# Patient Record
Sex: MALE | Race: WHITE | NOT HISPANIC OR LATINO | Employment: OTHER | URBAN - METROPOLITAN AREA
[De-identification: names, ages, dates, MRNs, and addresses within clinical notes are randomized per-mention and may not be internally consistent; named-entity substitution may affect disease eponyms.]

---

## 2018-01-22 ENCOUNTER — OFFICE VISIT CONVERTED (OUTPATIENT)
Dept: OTHER | Facility: HOSPITAL | Age: 73
End: 2018-01-22
Attending: INTERNAL MEDICINE

## 2018-02-16 ENCOUNTER — OFFICE VISIT CONVERTED (OUTPATIENT)
Dept: CARDIOLOGY | Facility: CLINIC | Age: 73
End: 2018-02-16
Attending: SPECIALIST

## 2018-02-28 ENCOUNTER — OFFICE VISIT CONVERTED (OUTPATIENT)
Dept: OTHER | Facility: HOSPITAL | Age: 73
End: 2018-02-28
Attending: INTERNAL MEDICINE

## 2018-03-13 ENCOUNTER — OFFICE VISIT CONVERTED (OUTPATIENT)
Dept: OTHER | Facility: HOSPITAL | Age: 73
End: 2018-03-13
Attending: INTERNAL MEDICINE

## 2018-05-22 ENCOUNTER — CONVERSION ENCOUNTER (OUTPATIENT)
Dept: NEUROLOGY | Facility: CLINIC | Age: 73
End: 2018-05-22

## 2018-05-22 ENCOUNTER — OFFICE VISIT CONVERTED (OUTPATIENT)
Dept: NEUROLOGY | Facility: CLINIC | Age: 73
End: 2018-05-22
Attending: PSYCHIATRY & NEUROLOGY

## 2018-06-12 ENCOUNTER — OFFICE VISIT CONVERTED (OUTPATIENT)
Dept: OTHER | Facility: HOSPITAL | Age: 73
End: 2018-06-12
Attending: INTERNAL MEDICINE

## 2018-07-03 ENCOUNTER — OFFICE VISIT CONVERTED (OUTPATIENT)
Dept: CARDIOLOGY | Facility: CLINIC | Age: 73
End: 2018-07-03
Attending: SPECIALIST

## 2018-08-10 ENCOUNTER — OFFICE VISIT CONVERTED (OUTPATIENT)
Dept: GASTROENTEROLOGY | Facility: CLINIC | Age: 73
End: 2018-08-10
Attending: NURSE PRACTITIONER

## 2018-08-10 ENCOUNTER — CONVERSION ENCOUNTER (OUTPATIENT)
Dept: OTHER | Facility: HOSPITAL | Age: 73
End: 2018-08-10

## 2018-08-21 ENCOUNTER — OFFICE VISIT CONVERTED (OUTPATIENT)
Dept: ORTHOPEDIC SURGERY | Facility: CLINIC | Age: 73
End: 2018-08-21
Attending: ORTHOPAEDIC SURGERY

## 2018-09-05 ENCOUNTER — OFFICE VISIT CONVERTED (OUTPATIENT)
Dept: NEUROLOGY | Facility: CLINIC | Age: 73
End: 2018-09-05
Attending: PSYCHIATRY & NEUROLOGY

## 2018-11-06 ENCOUNTER — CONVERSION ENCOUNTER (OUTPATIENT)
Dept: CARDIOLOGY | Facility: CLINIC | Age: 73
End: 2018-11-06

## 2018-11-06 ENCOUNTER — OFFICE VISIT CONVERTED (OUTPATIENT)
Dept: CARDIOLOGY | Facility: CLINIC | Age: 73
End: 2018-11-06
Attending: SPECIALIST

## 2018-12-26 PROCEDURE — 95886 MUSC TEST DONE W/N TEST COMP: CPT | Performed by: PSYCHIATRY & NEUROLOGY

## 2018-12-26 PROCEDURE — 95908 NRV CNDJ TST 3-4 STUDIES: CPT | Performed by: PSYCHIATRY & NEUROLOGY

## 2018-12-27 ENCOUNTER — OUTSIDE FACILITY SERVICE (OUTPATIENT)
Dept: NEUROLOGY | Facility: CLINIC | Age: 73
End: 2018-12-27

## 2019-01-02 ENCOUNTER — HOSPITAL ENCOUNTER (OUTPATIENT)
Dept: OTHER | Facility: HOSPITAL | Age: 74
Discharge: HOME | End: 2019-01-02
Attending: INTERNAL MEDICINE

## 2019-01-02 LAB
ALBUMIN SERPL-MCNC: 4.5 G/DL (ref 3.5–5)
ALBUMIN/GLOB SERPL: 1.7 {RATIO} (ref 1.4–2.6)
ALP SERPL-CCNC: 86 U/L (ref 56–155)
ALT SERPL-CCNC: 38 U/L (ref 10–40)
ANION GAP SERPL CALC-SCNC: 22 MMOL/L (ref 8–19)
AST SERPL-CCNC: 29 U/L (ref 15–50)
BASOPHILS # BLD AUTO: 0.06 10*3/UL (ref 0–0.2)
BASOPHILS NFR BLD AUTO: 0.73 % (ref 0–3)
BILIRUB SERPL-MCNC: 0.42 MG/DL (ref 0.2–1.3)
BUN SERPL-MCNC: 19 MG/DL (ref 5–25)
BUN/CREAT SERPL: 16 {RATIO} (ref 6–20)
CALCIUM SERPL-MCNC: 9.8 MG/DL (ref 8.7–10.4)
CHLORIDE SERPL-SCNC: 100 MMOL/L (ref 99–111)
CHOLEST SERPL-MCNC: 140 MG/DL (ref 107–200)
CHOLEST/HDLC SERPL: 3.6 {RATIO} (ref 3–6)
CONV CO2: 25 MMOL/L (ref 22–32)
CONV TOTAL PROTEIN: 7.1 G/DL (ref 6.3–8.2)
CREAT UR-MCNC: 1.18 MG/DL (ref 0.7–1.2)
EOSINOPHIL # BLD AUTO: 0.38 10*3/UL (ref 0–0.7)
EOSINOPHIL # BLD AUTO: 4.89 % (ref 0–7)
ERYTHROCYTE [DISTWIDTH] IN BLOOD BY AUTOMATED COUNT: 13.9 % (ref 11.5–14.5)
EST. AVERAGE GLUCOSE BLD GHB EST-MCNC: 226 MG/DL
GFR SERPLBLD BASED ON 1.73 SQ M-ARVRAT: >60 ML/MIN/{1.73_M2}
GLOBULIN UR ELPH-MCNC: 2.6 G/DL (ref 2–3.5)
GLUCOSE SERPL-MCNC: 162 MG/DL (ref 70–99)
HBA1C MFR BLD: 14.7 G/DL (ref 14–18)
HBA1C MFR BLD: 9.5 % (ref 3.5–5.7)
HCT VFR BLD AUTO: 41.6 % (ref 42–52)
HDLC SERPL-MCNC: 39 MG/DL (ref 40–60)
LDLC SERPL CALC-MCNC: 64 MG/DL (ref 70–100)
LYMPHOCYTES # BLD AUTO: 2.83 10*3/UL (ref 1–5)
MCH RBC QN AUTO: 31.2 PG (ref 27–31)
MCHC RBC AUTO-ENTMCNC: 35.2 G/DL (ref 33–37)
MCV RBC AUTO: 88.5 FL (ref 80–96)
MONOCYTES # BLD AUTO: 0.41 10*3/UL (ref 0.2–1.2)
MONOCYTES NFR BLD AUTO: 5.39 % (ref 3–10)
NEUTROPHILS # BLD AUTO: 4 10*3/UL (ref 2–8)
NEUTROPHILS NFR BLD AUTO: 52.1 % (ref 30–85)
NRBC BLD AUTO-RTO: 0 % (ref 0–0.01)
OSMOLALITY SERPL CALC.SUM OF ELEC: 300 MOSM/KG (ref 273–304)
PLATELET # BLD AUTO: 82.1 10*3/UL (ref 130–400)
PMV BLD AUTO: 6.6 FL (ref 7.4–10.4)
POTASSIUM SERPL-SCNC: 4.5 MMOL/L (ref 3.5–5.3)
RBC # BLD AUTO: 4.71 10*6/UL (ref 4.7–6.1)
SODIUM SERPL-SCNC: 142 MMOL/L (ref 135–147)
TRIGL SERPL-MCNC: 187 MG/DL (ref 40–150)
TSH SERPL-ACNC: 3.46 M[IU]/L (ref 0.27–4.2)
VARIANT LYMPHS NFR BLD MANUAL: 36.9 % (ref 20–45)
VLDLC SERPL-MCNC: 37 MG/DL (ref 5–37)
WBC # BLD AUTO: 7.68 10*3/UL (ref 4.8–10.8)

## 2019-01-21 ENCOUNTER — HOSPITAL ENCOUNTER (OUTPATIENT)
Dept: OTHER | Facility: HOSPITAL | Age: 74
Discharge: HOME | End: 2019-01-21
Attending: INTERNAL MEDICINE

## 2019-01-21 LAB
ALBUMIN SERPL-MCNC: 4.4 G/DL (ref 3.5–5)
ALBUMIN/GLOB SERPL: 1.6 {RATIO} (ref 1.4–2.6)
ALP SERPL-CCNC: 81 U/L (ref 56–155)
ALT SERPL-CCNC: 35 U/L (ref 10–40)
ANION GAP SERPL CALC-SCNC: 15 MMOL/L (ref 8–19)
AST SERPL-CCNC: 28 U/L (ref 15–50)
BASOPHILS # BLD AUTO: 0.04 10*3/UL (ref 0–0.2)
BASOPHILS NFR BLD AUTO: 0.51 % (ref 0–3)
BILIRUB SERPL-MCNC: 0.53 MG/DL (ref 0.2–1.3)
BUN SERPL-MCNC: 16 MG/DL (ref 5–25)
BUN/CREAT SERPL: 13 {RATIO} (ref 6–20)
CALCIUM SERPL-MCNC: 10.1 MG/DL (ref 8.7–10.4)
CHLORIDE SERPL-SCNC: 101 MMOL/L (ref 99–111)
CHOLEST SERPL-MCNC: 142 MG/DL (ref 107–200)
CHOLEST/HDLC SERPL: 3.2 {RATIO} (ref 3–6)
CONV CO2: 29 MMOL/L (ref 22–32)
CONV TOTAL PROTEIN: 7.1 G/DL (ref 6.3–8.2)
CREAT UR-MCNC: 1.23 MG/DL (ref 0.7–1.2)
EOSINOPHIL # BLD AUTO: 0.43 10*3/UL (ref 0–0.7)
EOSINOPHIL # BLD AUTO: 5.26 % (ref 0–7)
ERYTHROCYTE [DISTWIDTH] IN BLOOD BY AUTOMATED COUNT: 14.3 % (ref 11.5–14.5)
EST. AVERAGE GLUCOSE BLD GHB EST-MCNC: 194 MG/DL
GFR SERPLBLD BASED ON 1.73 SQ M-ARVRAT: 59 ML/MIN/{1.73_M2}
GLOBULIN UR ELPH-MCNC: 2.7 G/DL (ref 2–3.5)
GLUCOSE SERPL-MCNC: 131 MG/DL (ref 70–99)
HBA1C MFR BLD: 15.2 G/DL (ref 14–18)
HBA1C MFR BLD: 8.4 % (ref 3.5–5.7)
HCT VFR BLD AUTO: 43.2 % (ref 42–52)
HDLC SERPL-MCNC: 45 MG/DL (ref 40–60)
LDLC SERPL CALC-MCNC: 62 MG/DL (ref 70–100)
LYMPHOCYTES # BLD AUTO: 2.91 10*3/UL (ref 1–5)
MCH RBC QN AUTO: 31.5 PG (ref 27–31)
MCHC RBC AUTO-ENTMCNC: 35.1 G/DL (ref 33–37)
MCV RBC AUTO: 89.6 FL (ref 80–96)
MONOCYTES # BLD AUTO: 0.46 10*3/UL (ref 0.2–1.2)
MONOCYTES NFR BLD AUTO: 5.63 % (ref 3–10)
NEUTROPHILS # BLD AUTO: 4.27 10*3/UL (ref 2–8)
NEUTROPHILS NFR BLD AUTO: 52.7 % (ref 30–85)
NRBC BLD AUTO-RTO: 0 % (ref 0–0.01)
OSMOLALITY SERPL CALC.SUM OF ELEC: 295 MOSM/KG (ref 273–304)
PLATELET # BLD AUTO: 75.1 10*3/UL (ref 130–400)
PMV BLD AUTO: 6.5 FL (ref 7.4–10.4)
POTASSIUM SERPL-SCNC: 4.4 MMOL/L (ref 3.5–5.3)
RBC # BLD AUTO: 4.82 10*6/UL (ref 4.7–6.1)
SODIUM SERPL-SCNC: 141 MMOL/L (ref 135–147)
TRIGL SERPL-MCNC: 174 MG/DL (ref 40–150)
TSH SERPL-ACNC: 10.46 M[IU]/L (ref 0.27–4.2)
VARIANT LYMPHS NFR BLD MANUAL: 35.9 % (ref 20–45)
VLDLC SERPL-MCNC: 35 MG/DL (ref 5–37)
WBC # BLD AUTO: 8.11 10*3/UL (ref 4.8–10.8)

## 2019-01-24 ENCOUNTER — HOSPITAL ENCOUNTER (OUTPATIENT)
Dept: OTHER | Facility: HOSPITAL | Age: 74
Discharge: HOME | End: 2019-01-24
Attending: INTERNAL MEDICINE

## 2019-01-24 LAB
EST. AVERAGE GLUCOSE BLD GHB EST-MCNC: 183 MG/DL
HBA1C MFR BLD: 8 % (ref 3.5–5.7)

## 2019-02-26 ENCOUNTER — HOSPITAL ENCOUNTER (OUTPATIENT)
Dept: OTHER | Facility: HOSPITAL | Age: 74
Discharge: HOME | End: 2019-02-26
Attending: INTERNAL MEDICINE

## 2019-02-26 LAB
ALBUMIN SERPL-MCNC: 4.2 G/DL (ref 3.5–5)
ALBUMIN/GLOB SERPL: 1.4 {RATIO} (ref 1.4–2.6)
ALP SERPL-CCNC: 100 U/L (ref 56–155)
ALT SERPL-CCNC: 17 U/L (ref 10–40)
ANION GAP SERPL CALC-SCNC: 18 MMOL/L (ref 8–19)
AST SERPL-CCNC: 16 U/L (ref 15–50)
BASOPHILS # BLD AUTO: 0.08 10*3/UL (ref 0–0.2)
BASOPHILS NFR BLD AUTO: 0.9 % (ref 0–3)
BILIRUB SERPL-MCNC: 0.33 MG/DL (ref 0.2–1.3)
BUN SERPL-MCNC: 19 MG/DL (ref 5–25)
BUN/CREAT SERPL: 15 {RATIO} (ref 6–20)
CALCIUM SERPL-MCNC: 9.8 MG/DL (ref 8.7–10.4)
CHLORIDE SERPL-SCNC: 101 MMOL/L (ref 99–111)
CONV ABS IMM GRAN: 0.07 10*3/UL (ref 0–0.2)
CONV CO2: 26 MMOL/L (ref 22–32)
CONV IMMATURE GRAN: 0.8 % (ref 0–1.8)
CONV TOTAL PROTEIN: 7.3 G/DL (ref 6.3–8.2)
CREAT UR-MCNC: 1.31 MG/DL (ref 0.7–1.2)
DEPRECATED RDW RBC AUTO: 54.3 FL (ref 35.1–43.9)
EOSINOPHIL # BLD AUTO: 0.67 10*3/UL (ref 0–0.7)
EOSINOPHIL # BLD AUTO: 7.3 % (ref 0–7)
ERYTHROCYTE [DISTWIDTH] IN BLOOD BY AUTOMATED COUNT: 16.2 % (ref 11.6–14.4)
GFR SERPLBLD BASED ON 1.73 SQ M-ARVRAT: 54 ML/MIN/{1.73_M2}
GLOBULIN UR ELPH-MCNC: 3.1 G/DL (ref 2–3.5)
GLUCOSE SERPL-MCNC: 145 MG/DL (ref 70–99)
HBA1C MFR BLD: 12.7 G/DL (ref 14–18)
HCT VFR BLD AUTO: 38.1 % (ref 42–52)
IRON SATN MFR SERPL: 19 % (ref 20–55)
IRON SERPL-MCNC: 72 UG/DL (ref 70–180)
LYMPHOCYTES # BLD AUTO: 3.19 10*3/UL (ref 1–5)
MCH RBC QN AUTO: 31 PG (ref 27–31)
MCHC RBC AUTO-ENTMCNC: 33.3 G/DL (ref 33–37)
MCV RBC AUTO: 92.9 FL (ref 80–96)
MONOCYTES # BLD AUTO: 0.42 10*3/UL (ref 0.2–1.2)
MONOCYTES NFR BLD AUTO: 4.6 % (ref 3–10)
NEUTROPHILS # BLD AUTO: 4.69 10*3/UL (ref 2–8)
NEUTROPHILS NFR BLD AUTO: 51.4 % (ref 30–85)
NRBC CBCN: 0 % (ref 0–0.7)
OSMOLALITY SERPL CALC.SUM OF ELEC: 295 MOSM/KG (ref 273–304)
PLATELET # BLD AUTO: 156 10*3/UL (ref 130–400)
PMV BLD AUTO: 9.1 FL (ref 9.4–12.4)
POTASSIUM SERPL-SCNC: 5.2 MMOL/L (ref 3.5–5.3)
RBC # BLD AUTO: 4.1 10*6/UL (ref 4.7–6.1)
SODIUM SERPL-SCNC: 140 MMOL/L (ref 135–147)
TIBC SERPL-MCNC: 382 UG/DL (ref 245–450)
TRANSFERRIN SERPL-MCNC: 267 MG/DL (ref 215–365)
TSH SERPL-ACNC: 64.08 M[IU]/L (ref 0.27–4.2)
VARIANT LYMPHS NFR BLD MANUAL: 35 % (ref 20–45)
WBC # BLD AUTO: 9.12 10*3/UL (ref 4.8–10.8)

## 2019-02-27 LAB
ALBUMIN SERPL-MCNC: 3.8 G/DL (ref 2.9–4.4)
ALBUMIN/GLOB SERPL: 1.2 {RATIO} (ref 0.7–1.7)
ALPHA2 GLOB SERPL ELPH-MCNC: 0.6 G/DL (ref 0.4–1)
BETA GLOBULIN: 1 G/DL (ref 0.7–1.3)
CONV ALPHA-1-GLOBULIN: 0.2 G/DL (ref 0–0.4)
CONV IMMUNOGLOBULIN G (IGG): 1180 MG/DL (ref 700–1600)
CONV IMMUNOGLOBULIN M (IGM): 66 MG/DL (ref 15–143)
CONV PE INTERPRETATION: NORMAL
CONV PE NOTE: NORMAL
CONV TOTAL PROTEIN: 6.9 G/DL (ref 6–8.5)
FREE LIGHT CHAINS: 42 MG/L (ref 3.3–19.4)
FRUCTOSAMINE SERPL-SCNC: 298 UMOL/L (ref 0–285)
GAMMA GLOB SERPL ELPH-MCNC: 1.3 G/DL (ref 0.4–1.8)
GLOBULIN UR ELPH-MCNC: 3.1 G/DL (ref 2.2–3.9)
IGA SERPL-MCNC: 169 MG/DL (ref 61–437)
KAPPA LC/LAMBDA SER: 1.62 {RATIO} (ref 0.26–1.65)
LAMBDA LC FREE SERPL-MCNC: 26 MG/L (ref 5.7–26.3)
M-SPIKE: NORMAL G/DL
PROT PATTERN SERPL IFE-IMP: NORMAL

## 2019-03-06 ENCOUNTER — OFFICE VISIT CONVERTED (OUTPATIENT)
Dept: OTHER | Facility: HOSPITAL | Age: 74
End: 2019-03-06
Attending: INTERNAL MEDICINE

## 2019-03-26 ENCOUNTER — OFFICE VISIT CONVERTED (OUTPATIENT)
Dept: CARDIOLOGY | Facility: CLINIC | Age: 74
End: 2019-03-26
Attending: SPECIALIST

## 2019-04-26 ENCOUNTER — HOSPITAL ENCOUNTER (OUTPATIENT)
Dept: OTHER | Facility: HOSPITAL | Age: 74
Discharge: HOME | End: 2019-04-26
Attending: INTERNAL MEDICINE

## 2019-04-26 LAB
ANION GAP SERPL CALC-SCNC: 15 MMOL/L (ref 8–19)
BUN SERPL-MCNC: 22 MG/DL (ref 5–25)
BUN/CREAT SERPL: 19 {RATIO} (ref 6–20)
CALCIUM SERPL-MCNC: 9.4 MG/DL (ref 8.7–10.4)
CHLORIDE SERPL-SCNC: 103 MMOL/L (ref 99–111)
CONV CO2: 26 MMOL/L (ref 22–32)
CREAT UR-MCNC: 1.18 MG/DL (ref 0.7–1.2)
EST. AVERAGE GLUCOSE BLD GHB EST-MCNC: 134 MG/DL
GFR SERPLBLD BASED ON 1.73 SQ M-ARVRAT: >60 ML/MIN/{1.73_M2}
GLUCOSE SERPL-MCNC: 134 MG/DL (ref 70–99)
HBA1C MFR BLD: 6.3 % (ref 3.5–5.7)
OSMOLALITY SERPL CALC.SUM OF ELEC: 293 MOSM/KG (ref 273–304)
POTASSIUM SERPL-SCNC: 4.7 MMOL/L (ref 3.5–5.3)
SODIUM SERPL-SCNC: 139 MMOL/L (ref 135–147)
TSH SERPL-ACNC: 0.7 M[IU]/L (ref 0.27–4.2)

## 2019-05-13 ENCOUNTER — CONVERSION ENCOUNTER (OUTPATIENT)
Dept: GASTROENTEROLOGY | Facility: CLINIC | Age: 74
End: 2019-05-13

## 2019-05-13 ENCOUNTER — OFFICE VISIT CONVERTED (OUTPATIENT)
Dept: GASTROENTEROLOGY | Facility: CLINIC | Age: 74
End: 2019-05-13
Attending: NURSE PRACTITIONER

## 2019-05-24 ENCOUNTER — OFFICE VISIT CONVERTED (OUTPATIENT)
Dept: CARDIOLOGY | Facility: CLINIC | Age: 74
End: 2019-05-24
Attending: SPECIALIST

## 2019-05-24 ENCOUNTER — CONVERSION ENCOUNTER (OUTPATIENT)
Dept: CARDIOLOGY | Facility: CLINIC | Age: 74
End: 2019-05-24

## 2019-05-28 ENCOUNTER — HOSPITAL ENCOUNTER (OUTPATIENT)
Dept: GENERAL RADIOLOGY | Facility: HOSPITAL | Age: 74
Discharge: HOME | End: 2019-05-28
Attending: NURSE PRACTITIONER

## 2019-07-16 ENCOUNTER — OFFICE VISIT CONVERTED (OUTPATIENT)
Dept: CARDIOLOGY | Facility: CLINIC | Age: 74
End: 2019-07-16
Attending: SPECIALIST

## 2019-07-16 ENCOUNTER — CONVERSION ENCOUNTER (OUTPATIENT)
Dept: CARDIOLOGY | Facility: CLINIC | Age: 74
End: 2019-07-16

## 2019-07-23 ENCOUNTER — HOSPITAL ENCOUNTER (OUTPATIENT)
Dept: OTHER | Facility: HOSPITAL | Age: 74
Discharge: HOME | End: 2019-07-23
Attending: INTERNAL MEDICINE

## 2019-07-23 LAB
ALBUMIN SERPL-MCNC: 4.5 G/DL (ref 3.5–5)
ALBUMIN/GLOB SERPL: 1.7 {RATIO} (ref 1.4–2.6)
ALP SERPL-CCNC: 100 U/L (ref 56–155)
ALT SERPL-CCNC: 19 U/L (ref 10–40)
ANION GAP SERPL CALC-SCNC: 17 MMOL/L (ref 8–19)
AST SERPL-CCNC: 17 U/L (ref 15–50)
BILIRUB SERPL-MCNC: 0.43 MG/DL (ref 0.2–1.3)
BUN SERPL-MCNC: 20 MG/DL (ref 5–25)
BUN/CREAT SERPL: 18 {RATIO} (ref 6–20)
CALCIUM SERPL-MCNC: 9.2 MG/DL (ref 8.7–10.4)
CHLORIDE SERPL-SCNC: 101 MMOL/L (ref 99–111)
CHOLEST SERPL-MCNC: 128 MG/DL (ref 107–200)
CHOLEST/HDLC SERPL: 3.5 {RATIO} (ref 3–6)
CONV CO2: 26 MMOL/L (ref 22–32)
CONV TOTAL PROTEIN: 7.2 G/DL (ref 6.3–8.2)
CREAT UR-MCNC: 1.09 MG/DL (ref 0.7–1.2)
EST. AVERAGE GLUCOSE BLD GHB EST-MCNC: 151 MG/DL
GFR SERPLBLD BASED ON 1.73 SQ M-ARVRAT: >60 ML/MIN/{1.73_M2}
GLOBULIN UR ELPH-MCNC: 2.7 G/DL (ref 2–3.5)
GLUCOSE SERPL-MCNC: 100 MG/DL (ref 70–99)
HBA1C MFR BLD: 6.9 % (ref 3.5–5.7)
HDLC SERPL-MCNC: 37 MG/DL (ref 40–60)
LDLC SERPL CALC-MCNC: 57 MG/DL (ref 70–100)
OSMOLALITY SERPL CALC.SUM OF ELEC: 291 MOSM/KG (ref 273–304)
POTASSIUM SERPL-SCNC: 4.6 MMOL/L (ref 3.5–5.3)
SODIUM SERPL-SCNC: 139 MMOL/L (ref 135–147)
TRIGL SERPL-MCNC: 171 MG/DL (ref 40–150)
TSH SERPL-ACNC: 1.03 M[IU]/L (ref 0.27–4.2)
VLDLC SERPL-MCNC: 34 MG/DL (ref 5–37)

## 2019-07-29 ENCOUNTER — HOSPITAL ENCOUNTER (OUTPATIENT)
Dept: GASTROENTEROLOGY | Facility: HOSPITAL | Age: 74
Setting detail: HOSPITAL OUTPATIENT SURGERY
Discharge: HOME | End: 2019-07-29
Attending: INTERNAL MEDICINE

## 2019-07-29 LAB — GLUCOSE BLD-MCNC: 107 MG/DL (ref 70–99)

## 2019-08-05 ENCOUNTER — HOSPITAL ENCOUNTER (OUTPATIENT)
Dept: OTHER | Facility: HOSPITAL | Age: 74
Discharge: HOME | End: 2019-08-05
Attending: INTERNAL MEDICINE

## 2019-08-05 LAB
BASOPHILS # BLD AUTO: 0.08 10*3/UL (ref 0–0.2)
BASOPHILS NFR BLD AUTO: 0.9 % (ref 0–3)
CONV ABS IMM GRAN: 0.03 10*3/UL (ref 0–0.2)
CONV IMMATURE GRAN: 0.4 % (ref 0–1.8)
DEPRECATED RDW RBC AUTO: 50.7 FL (ref 35.1–43.9)
EOSINOPHIL # BLD AUTO: 0.64 10*3/UL (ref 0–0.7)
EOSINOPHIL # BLD AUTO: 7.5 % (ref 0–7)
ERYTHROCYTE [DISTWIDTH] IN BLOOD BY AUTOMATED COUNT: 15 % (ref 11.6–14.4)
HCT VFR BLD AUTO: 39.5 % (ref 42–52)
HGB BLD-MCNC: 13.4 G/DL (ref 14–18)
IRON SATN MFR SERPL: 26 % (ref 20–55)
IRON SERPL-MCNC: 85 UG/DL (ref 70–180)
LYMPHOCYTES # BLD AUTO: 3.76 10*3/UL (ref 1–5)
LYMPHOCYTES NFR BLD AUTO: 43.9 % (ref 20–45)
MCH RBC QN AUTO: 31.2 PG (ref 27–31)
MCHC RBC AUTO-ENTMCNC: 33.9 G/DL (ref 33–37)
MCV RBC AUTO: 92.1 FL (ref 80–96)
MONOCYTES # BLD AUTO: 0.56 10*3/UL (ref 0.2–1.2)
MONOCYTES NFR BLD AUTO: 6.5 % (ref 3–10)
NEUTROPHILS # BLD AUTO: 3.49 10*3/UL (ref 2–8)
NEUTROPHILS NFR BLD AUTO: 40.8 % (ref 30–85)
NRBC CBCN: 0 % (ref 0–0.7)
PLATELET # BLD AUTO: 77 10*3/UL (ref 130–400)
PMV BLD AUTO: 9.5 FL (ref 9.4–12.4)
RBC # BLD AUTO: 4.29 10*6/UL (ref 4.7–6.1)
RETICS # AUTO: 0.11 10*6/UL (ref 0.03–0.1)
RETICS/RBC NFR AUTO: 2.55 % (ref 0.51–1.81)
TIBC SERPL-MCNC: 325 UG/DL (ref 245–450)
TRANSFERRIN SERPL-MCNC: 227 MG/DL (ref 215–365)
WBC # BLD AUTO: 8.56 10*3/UL (ref 4.8–10.8)

## 2019-08-07 ENCOUNTER — HOSPITAL ENCOUNTER (OUTPATIENT)
Dept: OTHER | Facility: HOSPITAL | Age: 74
Discharge: HOME | End: 2019-08-07
Attending: INTERNAL MEDICINE

## 2019-08-07 ENCOUNTER — OFFICE VISIT CONVERTED (OUTPATIENT)
Dept: OTHER | Facility: HOSPITAL | Age: 74
End: 2019-08-07
Attending: INTERNAL MEDICINE

## 2019-08-07 LAB
ANION GAP SERPL CALC-SCNC: 15 MMOL/L (ref 8–19)
BNP SERPL-MCNC: 11 PG/ML (ref 0–900)
BUN SERPL-MCNC: 29 MG/DL (ref 5–25)
BUN/CREAT SERPL: 19 {RATIO} (ref 6–20)
CALCIUM SERPL-MCNC: 9.2 MG/DL (ref 8.7–10.4)
CHLORIDE SERPL-SCNC: 105 MMOL/L (ref 99–111)
CONV CO2: 22 MMOL/L (ref 22–32)
CREAT UR-MCNC: 1.54 MG/DL (ref 0.7–1.2)
GFR SERPLBLD BASED ON 1.73 SQ M-ARVRAT: 44 ML/MIN/{1.73_M2}
GLUCOSE SERPL-MCNC: 258 MG/DL (ref 70–99)
OSMOLALITY SERPL CALC.SUM OF ELEC: 297 MOSM/KG (ref 273–304)
POTASSIUM SERPL-SCNC: 5.5 MMOL/L (ref 3.5–5.3)
SODIUM SERPL-SCNC: 136 MMOL/L (ref 135–147)

## 2019-08-23 ENCOUNTER — HOSPITAL ENCOUNTER (OUTPATIENT)
Dept: OTHER | Facility: HOSPITAL | Age: 74
Discharge: HOME | End: 2019-08-23
Attending: NURSE PRACTITIONER

## 2019-08-23 LAB
ALBUMIN SERPL-MCNC: 4.5 G/DL (ref 3.5–5)
ALBUMIN/GLOB SERPL: 1.7 {RATIO} (ref 1.4–2.6)
ALP SERPL-CCNC: 97 U/L (ref 56–155)
ALT SERPL-CCNC: 25 U/L (ref 10–40)
ANION GAP SERPL CALC-SCNC: 17 MMOL/L (ref 8–19)
AST SERPL-CCNC: 17 U/L (ref 15–50)
BASOPHILS # BLD AUTO: 0.08 10*3/UL (ref 0–0.2)
BASOPHILS NFR BLD AUTO: 1.1 % (ref 0–3)
BILIRUB SERPL-MCNC: 0.36 MG/DL (ref 0.2–1.3)
BUN SERPL-MCNC: 22 MG/DL (ref 5–25)
BUN/CREAT SERPL: 21 {RATIO} (ref 6–20)
CALCIUM SERPL-MCNC: 9.3 MG/DL (ref 8.7–10.4)
CHLORIDE SERPL-SCNC: 102 MMOL/L (ref 99–111)
CONV ABS IMM GRAN: 0.03 10*3/UL (ref 0–0.2)
CONV AFP: 1.8 NG/ML (ref 0–8.7)
CONV CO2: 25 MMOL/L (ref 22–32)
CONV IMMATURE GRAN: 0.4 % (ref 0–1.8)
CONV TOTAL PROTEIN: 7.2 G/DL (ref 6.3–8.2)
CREAT UR-MCNC: 1.07 MG/DL (ref 0.7–1.2)
DEPRECATED RDW RBC AUTO: 48.6 FL (ref 35.1–43.9)
EOSINOPHIL # BLD AUTO: 0.56 10*3/UL (ref 0–0.7)
EOSINOPHIL # BLD AUTO: 8 % (ref 0–7)
ERYTHROCYTE [DISTWIDTH] IN BLOOD BY AUTOMATED COUNT: 14.6 % (ref 11.6–14.4)
GFR SERPLBLD BASED ON 1.73 SQ M-ARVRAT: >60 ML/MIN/{1.73_M2}
GLOBULIN UR ELPH-MCNC: 2.7 G/DL (ref 2–3.5)
GLUCOSE SERPL-MCNC: 129 MG/DL (ref 70–99)
HCT VFR BLD AUTO: 38.6 % (ref 42–52)
HGB BLD-MCNC: 13.1 G/DL (ref 14–18)
INR PPP: 0.98 (ref 2–3)
LYMPHOCYTES # BLD AUTO: 2.59 10*3/UL (ref 1–5)
LYMPHOCYTES NFR BLD AUTO: 36.8 % (ref 20–45)
MCH RBC QN AUTO: 31.1 PG (ref 27–31)
MCHC RBC AUTO-ENTMCNC: 33.9 G/DL (ref 33–37)
MCV RBC AUTO: 91.7 FL (ref 80–96)
MONOCYTES # BLD AUTO: 0.45 10*3/UL (ref 0.2–1.2)
MONOCYTES NFR BLD AUTO: 6.4 % (ref 3–10)
NEUTROPHILS # BLD AUTO: 3.32 10*3/UL (ref 2–8)
NEUTROPHILS NFR BLD AUTO: 47.3 % (ref 30–85)
NRBC CBCN: 0 % (ref 0–0.7)
OSMOLALITY SERPL CALC.SUM OF ELEC: 293 MOSM/KG (ref 273–304)
PLATELET # BLD AUTO: 71 10*3/UL (ref 130–400)
PMV BLD AUTO: 8.9 FL (ref 9.4–12.4)
POTASSIUM SERPL-SCNC: 4.6 MMOL/L (ref 3.5–5.3)
PROTHROMBIN TIME: 10.2 S (ref 9.4–12)
RBC # BLD AUTO: 4.21 10*6/UL (ref 4.7–6.1)
SODIUM SERPL-SCNC: 139 MMOL/L (ref 135–147)
WBC # BLD AUTO: 7.03 10*3/UL (ref 4.8–10.8)

## 2019-09-27 ENCOUNTER — CONVERSION ENCOUNTER (OUTPATIENT)
Dept: CARDIOLOGY | Facility: CLINIC | Age: 74
End: 2019-09-27

## 2019-09-27 ENCOUNTER — OFFICE VISIT CONVERTED (OUTPATIENT)
Dept: CARDIOLOGY | Facility: CLINIC | Age: 74
End: 2019-09-27
Attending: SPECIALIST

## 2019-10-22 ENCOUNTER — HOSPITAL ENCOUNTER (OUTPATIENT)
Dept: OTHER | Facility: HOSPITAL | Age: 74
Discharge: HOME | End: 2019-10-22
Attending: INTERNAL MEDICINE

## 2019-10-22 LAB
ANION GAP SERPL CALC-SCNC: 20 MMOL/L (ref 8–19)
BUN SERPL-MCNC: 19 MG/DL (ref 5–25)
BUN/CREAT SERPL: 17 {RATIO} (ref 6–20)
CALCIUM SERPL-MCNC: 9.5 MG/DL (ref 8.7–10.4)
CHLORIDE SERPL-SCNC: 102 MMOL/L (ref 99–111)
CONV CO2: 24 MMOL/L (ref 22–32)
CREAT UR-MCNC: 1.15 MG/DL (ref 0.7–1.2)
EST. AVERAGE GLUCOSE BLD GHB EST-MCNC: 163 MG/DL
GFR SERPLBLD BASED ON 1.73 SQ M-ARVRAT: >60 ML/MIN/{1.73_M2}
GLUCOSE SERPL-MCNC: 118 MG/DL (ref 70–99)
HBA1C MFR BLD: 7.3 % (ref 3.5–5.7)
OSMOLALITY SERPL CALC.SUM OF ELEC: 295 MOSM/KG (ref 273–304)
POTASSIUM SERPL-SCNC: 4.8 MMOL/L (ref 3.5–5.3)
SODIUM SERPL-SCNC: 141 MMOL/L (ref 135–147)
TSH SERPL-ACNC: 0.57 M[IU]/L (ref 0.27–4.2)

## 2019-11-12 ENCOUNTER — OFFICE VISIT CONVERTED (OUTPATIENT)
Dept: GASTROENTEROLOGY | Facility: CLINIC | Age: 74
End: 2019-11-12
Attending: NURSE PRACTITIONER

## 2019-12-04 ENCOUNTER — HOSPITAL ENCOUNTER (OUTPATIENT)
Dept: GENERAL RADIOLOGY | Facility: HOSPITAL | Age: 74
Discharge: HOME | End: 2019-12-04
Attending: NURSE PRACTITIONER

## 2019-12-06 ENCOUNTER — HOSPITAL ENCOUNTER (OUTPATIENT)
Dept: OTHER | Facility: HOSPITAL | Age: 74
Discharge: HOME | End: 2019-12-06
Attending: INTERNAL MEDICINE

## 2019-12-06 LAB
BASOPHILS # BLD AUTO: 0.04 10*3/UL (ref 0–0.2)
BASOPHILS NFR BLD AUTO: 0.5 % (ref 0–3)
CONV ABS IMM GRAN: 0.02 10*3/UL (ref 0–0.2)
CONV IMMATURE GRAN: 0.3 % (ref 0–1.8)
DEPRECATED RDW RBC AUTO: 48.3 FL (ref 35.1–43.9)
EOSINOPHIL # BLD AUTO: 0.37 10*3/UL (ref 0–0.7)
EOSINOPHIL # BLD AUTO: 5.1 % (ref 0–7)
ERYTHROCYTE [DISTWIDTH] IN BLOOD BY AUTOMATED COUNT: 14.5 % (ref 11.6–14.4)
FOLATE SERPL-MCNC: 10.1 NG/ML (ref 4.8–20)
HCT VFR BLD AUTO: 38.8 % (ref 42–52)
HGB BLD-MCNC: 12.9 G/DL (ref 14–18)
IRON SATN MFR SERPL: 24 % (ref 20–55)
IRON SERPL-MCNC: 79 UG/DL (ref 70–180)
LYMPHOCYTES # BLD AUTO: 2.74 10*3/UL (ref 1–5)
LYMPHOCYTES NFR BLD AUTO: 37.5 % (ref 20–45)
MCH RBC QN AUTO: 30.8 PG (ref 27–31)
MCHC RBC AUTO-ENTMCNC: 33.2 G/DL (ref 33–37)
MCV RBC AUTO: 92.6 FL (ref 80–96)
MONOCYTES # BLD AUTO: 0.53 10*3/UL (ref 0.2–1.2)
MONOCYTES NFR BLD AUTO: 7.3 % (ref 3–10)
NEUTROPHILS # BLD AUTO: 3.61 10*3/UL (ref 2–8)
NEUTROPHILS NFR BLD AUTO: 49.3 % (ref 30–85)
NRBC CBCN: 0 % (ref 0–0.7)
PLATELET # BLD AUTO: 68 10*3/UL (ref 130–400)
PMV BLD AUTO: 9.4 FL (ref 9.4–12.4)
RBC # BLD AUTO: 4.19 10*6/UL (ref 4.7–6.1)
TIBC SERPL-MCNC: 332 UG/DL (ref 245–450)
TRANSFERRIN SERPL-MCNC: 232 MG/DL (ref 215–365)
VIT B12 SERPL-MCNC: 1981 PG/ML (ref 211–911)
WBC # BLD AUTO: 7.31 10*3/UL (ref 4.8–10.8)

## 2019-12-09 ENCOUNTER — OFFICE VISIT CONVERTED (OUTPATIENT)
Dept: OTHER | Facility: HOSPITAL | Age: 74
End: 2019-12-09
Attending: INTERNAL MEDICINE

## 2020-01-31 ENCOUNTER — HOSPITAL ENCOUNTER (OUTPATIENT)
Dept: OTHER | Facility: HOSPITAL | Age: 75
Discharge: HOME | End: 2020-01-31
Attending: INTERNAL MEDICINE

## 2020-01-31 LAB
ALBUMIN SERPL-MCNC: 4.2 G/DL (ref 3.5–5)
ALBUMIN/GLOB SERPL: 1.5 {RATIO} (ref 1.4–2.6)
ALP SERPL-CCNC: 96 U/L (ref 56–155)
ALT SERPL-CCNC: 23 U/L (ref 10–40)
ANION GAP SERPL CALC-SCNC: 17 MMOL/L (ref 8–19)
AST SERPL-CCNC: 17 U/L (ref 15–50)
BILIRUB SERPL-MCNC: 0.31 MG/DL (ref 0.2–1.3)
BUN SERPL-MCNC: 25 MG/DL (ref 5–25)
BUN/CREAT SERPL: 23 {RATIO} (ref 6–20)
CALCIUM SERPL-MCNC: 9.4 MG/DL (ref 8.7–10.4)
CHLORIDE SERPL-SCNC: 103 MMOL/L (ref 99–111)
CHOLEST SERPL-MCNC: 136 MG/DL (ref 107–200)
CHOLEST/HDLC SERPL: 3.6 {RATIO} (ref 3–6)
CONV CO2: 24 MMOL/L (ref 22–32)
CONV TOTAL PROTEIN: 7 G/DL (ref 6.3–8.2)
CREAT UR-MCNC: 1.09 MG/DL (ref 0.7–1.2)
EST. AVERAGE GLUCOSE BLD GHB EST-MCNC: 166 MG/DL
GFR SERPLBLD BASED ON 1.73 SQ M-ARVRAT: >60 ML/MIN/{1.73_M2}
GLOBULIN UR ELPH-MCNC: 2.8 G/DL (ref 2–3.5)
GLUCOSE SERPL-MCNC: 160 MG/DL (ref 70–99)
HBA1C MFR BLD: 7.4 % (ref 3.5–5.7)
HDLC SERPL-MCNC: 38 MG/DL (ref 40–60)
LDLC SERPL CALC-MCNC: 58 MG/DL (ref 70–100)
OSMOLALITY SERPL CALC.SUM OF ELEC: 298 MOSM/KG (ref 273–304)
POTASSIUM SERPL-SCNC: 4.4 MMOL/L (ref 3.5–5.3)
SODIUM SERPL-SCNC: 140 MMOL/L (ref 135–147)
TRIGL SERPL-MCNC: 198 MG/DL (ref 40–150)
TSH SERPL-ACNC: 1.48 M[IU]/L (ref 0.27–4.2)
VLDLC SERPL-MCNC: 40 MG/DL (ref 5–37)

## 2020-03-09 ENCOUNTER — HOSPITAL ENCOUNTER (OUTPATIENT)
Dept: OTHER | Facility: HOSPITAL | Age: 75
Discharge: HOME | End: 2020-03-09
Attending: INTERNAL MEDICINE

## 2020-03-09 ENCOUNTER — OFFICE VISIT CONVERTED (OUTPATIENT)
Dept: OTHER | Facility: HOSPITAL | Age: 75
End: 2020-03-09
Attending: INTERNAL MEDICINE

## 2020-03-09 LAB
BASOPHILS # BLD AUTO: 0.08 10*3/UL (ref 0–0.2)
BASOPHILS # BLD: 1 % (ref 0–3)
BASOPHILS NFR BLD AUTO: 0.9 % (ref 0–3)
CONV ABS BANDS: 0 % (ref 1–5)
CONV ABS IMM GRAN: 0.03 10*3/UL (ref 0–0.2)
CONV ANISOCYTES: ABNORMAL
CONV ATYPICAL LYMPHOCYTES: 8 % (ref 0–5)
CONV HYPOCHROMIA IN BLOOD BY LIGHT MICROSCOPY: SLIGHT
CONV IMMATURE GRAN: 0.3 % (ref 0–1.8)
CONV SEGMENTED NEUTROPHILS: 36 % (ref 45–70)
DACRYOCYTES BLD QL SMEAR: ABNORMAL
DEPRECATED RDW RBC AUTO: 50.4 FL (ref 35.1–43.9)
EOSINOPHIL # BLD AUTO: 0.64 10*3/UL (ref 0–0.7)
EOSINOPHIL # BLD AUTO: 6.8 % (ref 0–7)
EOSINOPHIL NFR BLD AUTO: 8 % (ref 0–7)
ERYTHROCYTE [DISTWIDTH] IN BLOOD BY AUTOMATED COUNT: 14.8 % (ref 11.6–14.4)
HCT VFR BLD AUTO: 40.6 % (ref 42–52)
HGB BLD-MCNC: 13.7 G/DL (ref 14–18)
LARGE PLATELETS: SLIGHT
LYMPHOCYTES # BLD AUTO: 5.01 10*3/UL (ref 1–5)
LYMPHOCYTES NFR BLD AUTO: 53.2 % (ref 20–45)
MACROCYTES BLD QL SMEAR: ABNORMAL
MCH RBC QN AUTO: 31.4 PG (ref 27–31)
MCHC RBC AUTO-ENTMCNC: 33.7 G/DL (ref 33–37)
MCV RBC AUTO: 92.9 FL (ref 80–96)
MONOCYTES # BLD AUTO: 0.46 10*3/UL (ref 0.2–1.2)
MONOCYTES NFR BLD AUTO: 4.9 % (ref 3–10)
MONOCYTES NFR BLD MANUAL: 3 % (ref 3–10)
NEUTROPHILS # BLD AUTO: 3.19 10*3/UL (ref 2–8)
NEUTROPHILS NFR BLD AUTO: 33.9 % (ref 30–85)
NRBC CBCN: 0 % (ref 0–0.7)
NUC CELL # PRT MANUAL: 0 /100{WBCS}
OVALOCYTES BLD QL SMEAR: ABNORMAL
PLAT MORPH BLD: NORMAL
PLATELET # BLD AUTO: 76 10*3/UL (ref 130–400)
PMV BLD AUTO: 9.4 FL (ref 9.4–12.4)
POIKILOCYTOSIS BLD QL SMEAR: ABNORMAL
POLYCHROMASIA BLD QL SMEAR: ABNORMAL
RBC # BLD AUTO: 4.37 10*6/UL (ref 4.7–6.1)
SMALL PLATELETS BLD QL SMEAR: ABNORMAL
VARIANT LYMPHS NFR BLD MANUAL: 44 % (ref 20–45)
WBC # BLD AUTO: 9.41 10*3/UL (ref 4.8–10.8)

## 2020-03-10 LAB
ALBUMIN SERPL-MCNC: 4 G/DL (ref 2.9–4.4)
ALBUMIN/GLOB SERPL: 1.4 {RATIO} (ref 0.7–1.7)
ALPHA2 GLOB SERPL ELPH-MCNC: 0.6 G/DL (ref 0.4–1)
BETA GLOBULIN: 1 G/DL (ref 0.7–1.3)
CONV ALPHA-1-GLOBULIN: 0.2 G/DL (ref 0–0.4)
CONV IMMUNOGLOBULIN G (IGG): 1113 MG/DL (ref 700–1600)
CONV IMMUNOGLOBULIN M (IGM): 81 MG/DL (ref 15–143)
CONV PE INTERPRETATION: NORMAL
CONV PE NOTE: NORMAL
CONV TOTAL PROTEIN: 6.8 G/DL (ref 6–8.5)
FREE LIGHT CHAINS: 36.7 MG/L (ref 3.3–19.4)
GAMMA GLOB SERPL ELPH-MCNC: 1.1 G/DL (ref 0.4–1.8)
GLOBULIN UR ELPH-MCNC: 2.8 G/DL (ref 2.2–3.9)
IGA SERPL-MCNC: 130 MG/DL (ref 61–437)
KAPPA LC/LAMBDA SER: 1.5 {RATIO} (ref 0.26–1.65)
LAMBDA LC FREE SERPL-MCNC: 24.4 MG/L (ref 5.7–26.3)
M-SPIKE: NORMAL G/DL
PROT PATTERN SERPL IFE-IMP: NORMAL

## 2020-03-12 LAB — CONV LEUKEMIA/LYMPHOMA PHENOTYPING PROFILE (BLOOD): NORMAL

## 2020-05-12 ENCOUNTER — TELEMEDICINE CONVERTED (OUTPATIENT)
Dept: GASTROENTEROLOGY | Facility: CLINIC | Age: 75
End: 2020-05-12
Attending: NURSE PRACTITIONER

## 2020-05-29 ENCOUNTER — HOSPITAL ENCOUNTER (OUTPATIENT)
Dept: LAB | Facility: HOSPITAL | Age: 75
Discharge: HOME | End: 2020-05-29
Attending: INTERNAL MEDICINE

## 2020-05-29 LAB
ALBUMIN SERPL-MCNC: 4.4 G/DL (ref 3.5–5)
ALBUMIN/GLOB SERPL: 1.5 {RATIO} (ref 1.4–2.6)
ALP SERPL-CCNC: 93 U/L (ref 56–155)
ALT SERPL-CCNC: 29 U/L (ref 10–40)
ANION GAP SERPL CALC-SCNC: 16 MMOL/L (ref 8–19)
AST SERPL-CCNC: 19 U/L (ref 15–50)
BASOPHILS # BLD AUTO: 0.09 10*3/UL (ref 0–0.2)
BASOPHILS NFR BLD AUTO: 1 % (ref 0–3)
BILIRUB SERPL-MCNC: 0.26 MG/DL (ref 0.2–1.3)
BUN SERPL-MCNC: 30 MG/DL (ref 5–25)
BUN/CREAT SERPL: 21 {RATIO} (ref 6–20)
CALCIUM SERPL-MCNC: 9.7 MG/DL (ref 8.7–10.4)
CHLORIDE SERPL-SCNC: 102 MMOL/L (ref 99–111)
CONV ABS IMM GRAN: 0.02 10*3/UL (ref 0–0.2)
CONV CO2: 24 MMOL/L (ref 22–32)
CONV IMMATURE GRAN: 0.2 % (ref 0–1.8)
CONV TOTAL PROTEIN: 7.3 G/DL (ref 6.3–8.2)
CREAT UR-MCNC: 1.4 MG/DL (ref 0.7–1.2)
DEPRECATED RDW RBC AUTO: 50.5 FL (ref 35.1–43.9)
EOSINOPHIL # BLD AUTO: 0.43 10*3/UL (ref 0–0.7)
EOSINOPHIL # BLD AUTO: 4.9 % (ref 0–7)
ERYTHROCYTE [DISTWIDTH] IN BLOOD BY AUTOMATED COUNT: 14.5 % (ref 11.6–14.4)
EST. AVERAGE GLUCOSE BLD GHB EST-MCNC: 163 MG/DL
GFR SERPLBLD BASED ON 1.73 SQ M-ARVRAT: 50 ML/MIN/{1.73_M2}
GLOBULIN UR ELPH-MCNC: 2.9 G/DL (ref 2–3.5)
GLUCOSE SERPL-MCNC: 140 MG/DL (ref 70–99)
HBA1C MFR BLD: 7.3 % (ref 3.5–5.7)
HCT VFR BLD AUTO: 41.2 % (ref 42–52)
HGB BLD-MCNC: 13.4 G/DL (ref 14–18)
IRON SATN MFR SERPL: 26 % (ref 20–55)
IRON SERPL-MCNC: 84 UG/DL (ref 70–180)
LYMPHOCYTES # BLD AUTO: 3.89 10*3/UL (ref 1–5)
LYMPHOCYTES NFR BLD AUTO: 44.2 % (ref 20–45)
MCH RBC QN AUTO: 30.9 PG (ref 27–31)
MCHC RBC AUTO-ENTMCNC: 32.5 G/DL (ref 33–37)
MCV RBC AUTO: 94.9 FL (ref 80–96)
MONOCYTES # BLD AUTO: 0.44 10*3/UL (ref 0.2–1.2)
MONOCYTES NFR BLD AUTO: 5 % (ref 3–10)
NEUTROPHILS # BLD AUTO: 3.93 10*3/UL (ref 2–8)
NEUTROPHILS NFR BLD AUTO: 44.7 % (ref 30–85)
NRBC CBCN: 0 % (ref 0–0.7)
OSMOLALITY SERPL CALC.SUM OF ELEC: 290 MOSM/KG (ref 273–304)
PLATELET # BLD AUTO: 71 10*3/UL (ref 130–400)
PMV BLD AUTO: 9.7 FL (ref 9.4–12.4)
POTASSIUM SERPL-SCNC: 5.5 MMOL/L (ref 3.5–5.3)
RBC # BLD AUTO: 4.34 10*6/UL (ref 4.7–6.1)
SODIUM SERPL-SCNC: 136 MMOL/L (ref 135–147)
TIBC SERPL-MCNC: 329 UG/DL (ref 245–450)
TRANSFERRIN SERPL-MCNC: 230 MG/DL (ref 215–365)
WBC # BLD AUTO: 8.8 10*3/UL (ref 4.8–10.8)

## 2020-06-23 ENCOUNTER — HOSPITAL ENCOUNTER (OUTPATIENT)
Dept: OTHER | Facility: HOSPITAL | Age: 75
Discharge: HOME | End: 2020-06-23
Attending: INTERNAL MEDICINE

## 2020-06-23 LAB
ALBUMIN SERPL-MCNC: 4.5 G/DL (ref 3.5–5)
ALBUMIN SERPL-MCNC: 4.5 G/DL (ref 3.5–5)
ALBUMIN/GLOB SERPL: 1.8 {RATIO} (ref 1.4–2.6)
ALP SERPL-CCNC: 91 U/L (ref 56–155)
ALT SERPL-CCNC: 26 U/L (ref 10–40)
AMMONIA PLAS-MCNC: 16 UMOL/L (ref 16–60)
ANION GAP SERPL CALC-SCNC: 13 MMOL/L (ref 8–19)
ANION GAP SERPL CALC-SCNC: 14 MMOL/L (ref 8–19)
AST SERPL-CCNC: 18 U/L (ref 15–50)
BASOPHILS # BLD AUTO: 0.07 10*3/UL (ref 0–0.2)
BASOPHILS NFR BLD AUTO: 0.7 % (ref 0–3)
BILIRUB SERPL-MCNC: 0.29 MG/DL (ref 0.2–1.3)
BUN SERPL-MCNC: 18 MG/DL (ref 5–25)
BUN SERPL-MCNC: 19 MG/DL (ref 5–25)
BUN/CREAT SERPL: 16 {RATIO} (ref 6–20)
BUN/CREAT SERPL: 19 {RATIO} (ref 6–20)
CALCIUM SERPL-MCNC: 9.4 MG/DL (ref 8.7–10.4)
CALCIUM SERPL-MCNC: 9.6 MG/DL (ref 8.7–10.4)
CHLORIDE SERPL-SCNC: 103 MMOL/L (ref 99–111)
CHLORIDE SERPL-SCNC: 103 MMOL/L (ref 99–111)
CONV ABS IMM GRAN: 0.03 10*3/UL (ref 0–0.2)
CONV AFP: 2 NG/ML (ref 0–8.7)
CONV CO2: 28 MMOL/L (ref 22–32)
CONV CO2: 28 MMOL/L (ref 22–32)
CONV IMMATURE GRAN: 0.3 % (ref 0–1.8)
CONV TOTAL PROTEIN: 7 G/DL (ref 6.3–8.2)
CREAT UR-MCNC: 1.02 MG/DL (ref 0.7–1.2)
CREAT UR-MCNC: 1.1 MG/DL (ref 0.7–1.2)
DEPRECATED RDW RBC AUTO: 48.2 FL (ref 35.1–43.9)
EOSINOPHIL # BLD AUTO: 0.42 10*3/UL (ref 0–0.7)
EOSINOPHIL # BLD AUTO: 4.4 % (ref 0–7)
ERYTHROCYTE [DISTWIDTH] IN BLOOD BY AUTOMATED COUNT: 14.1 % (ref 11.6–14.4)
GFR SERPLBLD BASED ON 1.73 SQ M-ARVRAT: >60 ML/MIN/{1.73_M2}
GFR SERPLBLD BASED ON 1.73 SQ M-ARVRAT: >60 ML/MIN/{1.73_M2}
GLOBULIN UR ELPH-MCNC: 2.5 G/DL (ref 2–3.5)
GLUCOSE SERPL-MCNC: 104 MG/DL (ref 70–99)
GLUCOSE SERPL-MCNC: 106 MG/DL (ref 70–99)
HCT VFR BLD AUTO: 40.6 % (ref 42–52)
HGB BLD-MCNC: 13.5 G/DL (ref 14–18)
INR PPP: 0.98 (ref 2–3)
LYMPHOCYTES # BLD AUTO: 4.69 10*3/UL (ref 1–5)
LYMPHOCYTES NFR BLD AUTO: 49.4 % (ref 20–45)
MCH RBC QN AUTO: 31.1 PG (ref 27–31)
MCHC RBC AUTO-ENTMCNC: 33.3 G/DL (ref 33–37)
MCV RBC AUTO: 93.5 FL (ref 80–96)
MONOCYTES # BLD AUTO: 0.42 10*3/UL (ref 0.2–1.2)
MONOCYTES NFR BLD AUTO: 4.4 % (ref 3–10)
NEUTROPHILS # BLD AUTO: 3.87 10*3/UL (ref 2–8)
NEUTROPHILS NFR BLD AUTO: 40.8 % (ref 30–85)
NRBC CBCN: 0 % (ref 0–0.7)
OSMOLALITY SERPL CALC.SUM OF ELEC: 293 MOSM/KG (ref 273–304)
PHOSPHATE SERPL-MCNC: 4 MG/DL (ref 2.4–4.5)
PLATELET # BLD AUTO: 67 10*3/UL (ref 130–400)
PMV BLD AUTO: 9.1 FL (ref 9.4–12.4)
POTASSIUM SERPL-SCNC: 4.8 MMOL/L (ref 3.5–5.3)
POTASSIUM SERPL-SCNC: 4.9 MMOL/L (ref 3.5–5.3)
PROTHROMBIN TIME: 10.6 S (ref 9.4–12)
RBC # BLD AUTO: 4.34 10*6/UL (ref 4.7–6.1)
SODIUM SERPL-SCNC: 139 MMOL/L (ref 135–147)
SODIUM SERPL-SCNC: 140 MMOL/L (ref 135–147)
WBC # BLD AUTO: 9.5 10*3/UL (ref 4.8–10.8)

## 2020-07-20 ENCOUNTER — TELEMEDICINE CONVERTED (OUTPATIENT)
Dept: CARDIOLOGY | Facility: CLINIC | Age: 75
End: 2020-07-20
Attending: SPECIALIST

## 2020-09-29 ENCOUNTER — HOSPITAL ENCOUNTER (OUTPATIENT)
Dept: LAB | Facility: HOSPITAL | Age: 75
Discharge: HOME | End: 2020-09-29
Attending: INTERNAL MEDICINE

## 2020-09-29 LAB
ANION GAP SERPL CALC-SCNC: 15 MMOL/L (ref 8–19)
BNP SERPL-MCNC: 143 PG/ML (ref 0–900)
BUN SERPL-MCNC: 24 MG/DL (ref 5–25)
BUN/CREAT SERPL: 16 {RATIO} (ref 6–20)
CALCIUM SERPL-MCNC: 9.3 MG/DL (ref 8.7–10.4)
CHLORIDE SERPL-SCNC: 105 MMOL/L (ref 99–111)
CONV CO2: 23 MMOL/L (ref 22–32)
CREAT UR-MCNC: 1.54 MG/DL (ref 0.7–1.2)
GFR SERPLBLD BASED ON 1.73 SQ M-ARVRAT: 44 ML/MIN/{1.73_M2}
GLUCOSE SERPL-MCNC: 84 MG/DL (ref 70–99)
OSMOLALITY SERPL CALC.SUM OF ELEC: 289 MOSM/KG (ref 273–304)
POTASSIUM SERPL-SCNC: 5.3 MMOL/L (ref 3.5–5.3)
SODIUM SERPL-SCNC: 138 MMOL/L (ref 135–147)

## 2020-10-06 ENCOUNTER — OFFICE VISIT CONVERTED (OUTPATIENT)
Dept: CARDIOLOGY | Facility: CLINIC | Age: 75
End: 2020-10-06
Attending: SPECIALIST

## 2020-10-16 ENCOUNTER — HOSPITAL ENCOUNTER (OUTPATIENT)
Dept: PREADMISSION TESTING | Facility: HOSPITAL | Age: 75
Discharge: HOME | End: 2020-10-16
Attending: SPECIALIST

## 2020-10-17 LAB — SARS-COV-2 RNA SPEC QL NAA+PROBE: NOT DETECTED

## 2020-10-21 ENCOUNTER — HOSPITAL ENCOUNTER (OUTPATIENT)
Dept: INFUSION THERAPY | Facility: HOSPITAL | Age: 75
Setting detail: HOSPITAL OUTPATIENT SURGERY
Discharge: HOME | End: 2020-10-21
Attending: SPECIALIST

## 2020-10-21 LAB
ANION GAP SERPL CALC-SCNC: 15 MMOL/L (ref 8–19)
BASOPHILS # BLD AUTO: 0.07 10*3/UL (ref 0–0.2)
BASOPHILS NFR BLD AUTO: 0.7 % (ref 0–3)
BUN SERPL-MCNC: 23 MG/DL (ref 5–25)
BUN/CREAT SERPL: 15 {RATIO} (ref 6–20)
CALCIUM SERPL-MCNC: 9.9 MG/DL (ref 8.7–10.4)
CHLORIDE SERPL-SCNC: 104 MMOL/L (ref 99–111)
CONV ABS IMM GRAN: 0.02 10*3/UL (ref 0–0.2)
CONV CO2: 25 MMOL/L (ref 22–32)
CONV IMMATURE GRAN: 0.2 % (ref 0–1.8)
CREAT UR-MCNC: 1.53 MG/DL (ref 0.7–1.2)
DEPRECATED RDW RBC AUTO: 52.6 FL (ref 35.1–43.9)
EOSINOPHIL # BLD AUTO: 0.37 10*3/UL (ref 0–0.7)
EOSINOPHIL # BLD AUTO: 3.7 % (ref 0–7)
ERYTHROCYTE [DISTWIDTH] IN BLOOD BY AUTOMATED COUNT: 15.3 % (ref 11.6–14.4)
GFR SERPLBLD BASED ON 1.73 SQ M-ARVRAT: 44 ML/MIN/{1.73_M2}
GLUCOSE SERPL-MCNC: 98 MG/DL (ref 70–99)
HCT VFR BLD AUTO: 38.8 % (ref 42–52)
HGB BLD-MCNC: 12.9 G/DL (ref 14–18)
INR PPP: 1.02 (ref 2–3)
LYMPHOCYTES # BLD AUTO: 5.45 10*3/UL (ref 1–5)
LYMPHOCYTES NFR BLD AUTO: 55.2 % (ref 20–45)
MCH RBC QN AUTO: 31.1 PG (ref 27–31)
MCHC RBC AUTO-ENTMCNC: 33.2 G/DL (ref 33–37)
MCV RBC AUTO: 93.5 FL (ref 80–96)
MONOCYTES # BLD AUTO: 0.45 10*3/UL (ref 0.2–1.2)
MONOCYTES NFR BLD AUTO: 4.6 % (ref 3–10)
NEUTROPHILS # BLD AUTO: 3.51 10*3/UL (ref 2–8)
NEUTROPHILS NFR BLD AUTO: 35.6 % (ref 30–85)
NRBC CBCN: 0 % (ref 0–0.7)
OSMOLALITY SERPL CALC.SUM OF ELEC: 292 MOSM/KG (ref 273–304)
PLAT MORPH BLD: NORMAL
PLATELET # BLD AUTO: 45 10*3/UL (ref 130–400)
PMV BLD AUTO: 9.5 FL (ref 9.4–12.4)
POTASSIUM SERPL-SCNC: 4.8 MMOL/L (ref 3.5–5.3)
PROTHROMBIN TIME: 11 S (ref 9.4–12)
RBC # BLD AUTO: 4.15 10*6/UL (ref 4.7–6.1)
SMALL PLATELETS BLD QL SMEAR: NORMAL
SODIUM SERPL-SCNC: 139 MMOL/L (ref 135–147)
WBC # BLD AUTO: 9.87 10*3/UL (ref 4.8–10.8)

## 2020-11-30 ENCOUNTER — HOSPITAL ENCOUNTER (OUTPATIENT)
Dept: PET IMAGING | Facility: HOSPITAL | Age: 75
Discharge: HOME | End: 2020-11-30
Attending: INTERNAL MEDICINE

## 2020-12-31 ENCOUNTER — HOSPITAL ENCOUNTER (OUTPATIENT)
Dept: PREADMISSION TESTING | Facility: HOSPITAL | Age: 75
Discharge: HOME | End: 2020-12-31
Attending: SPECIALIST

## 2021-01-01 LAB — SARS-COV-2 RNA SPEC QL NAA+PROBE: DETECTED

## 2021-01-04 ENCOUNTER — HOSPITAL ENCOUNTER (OUTPATIENT)
Dept: OTHER | Facility: HOSPITAL | Age: 76
Discharge: HOME | End: 2021-01-04
Attending: SPECIALIST

## 2021-01-04 LAB
ANION GAP SERPL CALC-SCNC: 14 MMOL/L (ref 8–19)
BASOPHILS # BLD AUTO: 0.06 10*3/UL (ref 0–0.2)
BASOPHILS # BLD: 0 % (ref 0–3)
BASOPHILS NFR BLD AUTO: 0.5 % (ref 0–3)
BUN SERPL-MCNC: 20 MG/DL (ref 5–25)
BUN/CREAT SERPL: 17 {RATIO} (ref 6–20)
CALCIUM SERPL-MCNC: 9.6 MG/DL (ref 8.7–10.4)
CHLORIDE SERPL-SCNC: 103 MMOL/L (ref 99–111)
CONV ABS BANDS: 0 % (ref 1–5)
CONV ABS IMM GRAN: 0.06 10*3/UL (ref 0–0.2)
CONV ATYPICAL LYMPHOCYTES: 3 % (ref 0–5)
CONV CO2: 28 MMOL/L (ref 22–32)
CONV IMMATURE GRAN: 0.5 % (ref 0–1.8)
CONV SEGMENTED NEUTROPHILS: 33 % (ref 45–70)
CREAT UR-MCNC: 1.19 MG/DL (ref 0.7–1.2)
DEPRECATED RDW RBC AUTO: 46.5 FL (ref 35.1–43.9)
EOSINOPHIL # BLD AUTO: 0.18 10*3/UL (ref 0–0.7)
EOSINOPHIL # BLD AUTO: 1.4 % (ref 0–7)
EOSINOPHIL NFR BLD AUTO: 2 % (ref 0–7)
ERYTHROCYTE [DISTWIDTH] IN BLOOD BY AUTOMATED COUNT: 13.6 % (ref 11.6–14.4)
GFR SERPLBLD BASED ON 1.73 SQ M-ARVRAT: >60 ML/MIN/{1.73_M2}
GLUCOSE SERPL-MCNC: 89 MG/DL (ref 70–99)
HCT VFR BLD AUTO: 41.6 % (ref 42–52)
HGB BLD-MCNC: 13.7 G/DL (ref 14–18)
INR PPP: 0.99 (ref 2–3)
LYMPHOCYTES # BLD AUTO: 7.72 10*3/UL (ref 1–5)
LYMPHOCYTES NFR BLD AUTO: 58.3 % (ref 20–45)
MCH RBC QN AUTO: 30.3 PG (ref 27–31)
MCHC RBC AUTO-ENTMCNC: 32.9 G/DL (ref 33–37)
MCV RBC AUTO: 92 FL (ref 80–96)
MONOCYTES # BLD AUTO: 0.46 10*3/UL (ref 0.2–1.2)
MONOCYTES NFR BLD AUTO: 3.5 % (ref 3–10)
MONOCYTES NFR BLD MANUAL: 2 % (ref 3–10)
NEUTROPHILS # BLD AUTO: 4.77 10*3/UL (ref 2–8)
NEUTROPHILS NFR BLD AUTO: 35.8 % (ref 30–85)
NRBC CBCN: 0 % (ref 0–0.7)
NUC CELL # PRT MANUAL: 0 /100{WBCS}
OSMOLALITY SERPL CALC.SUM OF ELEC: 292 MOSM/KG (ref 273–304)
PLAT MORPH BLD: NORMAL
PLATELET # BLD AUTO: 58 10*3/UL (ref 130–400)
PMV BLD AUTO: 8.4 FL (ref 9.4–12.4)
POTASSIUM SERPL-SCNC: 4.9 MMOL/L (ref 3.5–5.3)
PROTHROMBIN TIME: 10.7 S (ref 9.4–12)
RBC # BLD AUTO: 4.52 10*6/UL (ref 4.7–6.1)
SMALL PLATELETS BLD QL SMEAR: ABNORMAL
SODIUM SERPL-SCNC: 140 MMOL/L (ref 135–147)
VARIANT LYMPHS NFR BLD MANUAL: 60 % (ref 20–45)
WBC # BLD AUTO: 13.25 10*3/UL (ref 4.8–10.8)

## 2021-01-06 ENCOUNTER — HOSPITAL ENCOUNTER (OUTPATIENT)
Dept: GASTROENTEROLOGY | Facility: HOSPITAL | Age: 76
Discharge: HOME | End: 2021-01-06
Attending: INTERNAL MEDICINE

## 2021-01-19 ENCOUNTER — HOSPITAL ENCOUNTER (OUTPATIENT)
Dept: LAB | Facility: HOSPITAL | Age: 76
Discharge: HOME | End: 2021-01-19
Attending: INTERNAL MEDICINE

## 2021-01-19 LAB
ANION GAP SERPL CALC-SCNC: 16 MMOL/L (ref 8–19)
BASOPHILS # BLD AUTO: 0.07 10*3/UL (ref 0–0.2)
BASOPHILS NFR BLD AUTO: 0.4 % (ref 0–3)
BUN SERPL-MCNC: 30 MG/DL (ref 5–25)
BUN/CREAT SERPL: 24 {RATIO} (ref 6–20)
CALCIUM SERPL-MCNC: 9.6 MG/DL (ref 8.7–10.4)
CHLORIDE SERPL-SCNC: 102 MMOL/L (ref 99–111)
CONV ABS IMM GRAN: 0.05 10*3/UL (ref 0–0.2)
CONV CO2: 24 MMOL/L (ref 22–32)
CONV IMMATURE GRAN: 0.3 % (ref 0–1.8)
CREAT UR-MCNC: 1.24 MG/DL (ref 0.7–1.2)
DEPRECATED RDW RBC AUTO: 46.8 FL (ref 35.1–43.9)
EOSINOPHIL # BLD AUTO: 0.14 10*3/UL (ref 0–0.7)
EOSINOPHIL # BLD AUTO: 0.9 % (ref 0–7)
ERYTHROCYTE [DISTWIDTH] IN BLOOD BY AUTOMATED COUNT: 14 % (ref 11.6–14.4)
EST. AVERAGE GLUCOSE BLD GHB EST-MCNC: 163 MG/DL
FERRITIN SERPL-MCNC: 210 NG/ML (ref 30–300)
GFR SERPLBLD BASED ON 1.73 SQ M-ARVRAT: 57 ML/MIN/{1.73_M2}
GLUCOSE SERPL-MCNC: 82 MG/DL (ref 70–99)
HBA1C MFR BLD: 7.3 % (ref 3.5–5.7)
HCT VFR BLD AUTO: 39.7 % (ref 42–52)
HGB BLD-MCNC: 13.4 G/DL (ref 14–18)
IRON SATN MFR SERPL: 20 % (ref 20–55)
IRON SERPL-MCNC: 62 UG/DL (ref 70–180)
LYMPHOCYTES # BLD AUTO: 10.72 10*3/UL (ref 1–5)
LYMPHOCYTES NFR BLD AUTO: 66.3 % (ref 20–45)
MCH RBC QN AUTO: 30.9 PG (ref 27–31)
MCHC RBC AUTO-ENTMCNC: 33.8 G/DL (ref 33–37)
MCV RBC AUTO: 91.5 FL (ref 80–96)
MONOCYTES # BLD AUTO: 0.51 10*3/UL (ref 0.2–1.2)
MONOCYTES NFR BLD AUTO: 3.2 % (ref 3–10)
NEUTROPHILS # BLD AUTO: 4.69 10*3/UL (ref 2–8)
NEUTROPHILS NFR BLD AUTO: 28.9 % (ref 30–85)
NRBC CBCN: 0 % (ref 0–0.7)
OSMOLALITY SERPL CALC.SUM OF ELEC: 291 MOSM/KG (ref 273–304)
PLATELET # BLD AUTO: 59 10*3/UL (ref 130–400)
PMV BLD AUTO: 9.3 FL (ref 9.4–12.4)
POTASSIUM SERPL-SCNC: 4.4 MMOL/L (ref 3.5–5.3)
RBC # BLD AUTO: 4.34 10*6/UL (ref 4.7–6.1)
SODIUM SERPL-SCNC: 138 MMOL/L (ref 135–147)
TIBC SERPL-MCNC: 315 UG/DL (ref 245–450)
TRANSFERRIN SERPL-MCNC: 220 MG/DL (ref 215–365)
TSH SERPL-ACNC: 0.2 M[IU]/L (ref 0.27–4.2)
WBC # BLD AUTO: 16.18 10*3/UL (ref 4.8–10.8)

## 2021-02-03 ENCOUNTER — HOSPITAL ENCOUNTER (OUTPATIENT)
Dept: INFUSION THERAPY | Facility: HOSPITAL | Age: 76
Setting detail: HOSPITAL OUTPATIENT SURGERY
Discharge: HOME | End: 2021-02-03
Attending: SPECIALIST

## 2021-02-03 LAB
ANION GAP SERPL CALC-SCNC: 13 MMOL/L (ref 8–19)
BASOPHILS # BLD AUTO: 0.09 10*3/UL (ref 0–0.2)
BASOPHILS # BLD: 0 % (ref 0–3)
BASOPHILS NFR BLD AUTO: 0.5 % (ref 0–3)
BUN SERPL-MCNC: 38 MG/DL (ref 5–25)
BUN/CREAT SERPL: 22 {RATIO} (ref 6–20)
CALCIUM SERPL-MCNC: 9.4 MG/DL (ref 8.7–10.4)
CHLORIDE SERPL-SCNC: 104 MMOL/L (ref 99–111)
CONV ABS BANDS: 0 % (ref 1–5)
CONV ABS IMM GRAN: 0.06 10*3/UL (ref 0–0.2)
CONV ANISOCYTES: SLIGHT
CONV ATYPICAL LYMPHOCYTES: 4 % (ref 0–5)
CONV CO2: 26 MMOL/L (ref 22–32)
CONV IMMATURE GRAN: 0.3 % (ref 0–1.8)
CONV SEGMENTED NEUTROPHILS: 27 % (ref 45–70)
CREAT UR-MCNC: 1.74 MG/DL (ref 0.7–1.2)
DACRYOCYTES BLD QL SMEAR: SLIGHT
DEPRECATED RDW RBC AUTO: 47.2 FL (ref 35.1–43.9)
EOSINOPHIL # BLD AUTO: 0.12 10*3/UL (ref 0–0.7)
EOSINOPHIL # BLD AUTO: 0.7 % (ref 0–7)
EOSINOPHIL NFR BLD AUTO: 0 % (ref 0–7)
ERYTHROCYTE [DISTWIDTH] IN BLOOD BY AUTOMATED COUNT: 14.5 % (ref 11.6–14.4)
GFR SERPLBLD BASED ON 1.73 SQ M-ARVRAT: 38 ML/MIN/{1.73_M2}
GLUCOSE SERPL-MCNC: 91 MG/DL (ref 70–99)
HCT VFR BLD AUTO: 40.8 % (ref 42–52)
HGB BLD-MCNC: 13.7 G/DL (ref 14–18)
INR PPP: 0.99 (ref 2–3)
LARGE PLATELETS: SLIGHT
LYMPHOCYTES # BLD AUTO: 10.86 10*3/UL (ref 1–5)
LYMPHOCYTES NFR BLD AUTO: 62.2 % (ref 20–45)
MACROCYTES BLD QL SMEAR: SLIGHT
MCH RBC QN AUTO: 30.3 PG (ref 27–31)
MCHC RBC AUTO-ENTMCNC: 33.6 G/DL (ref 33–37)
MCV RBC AUTO: 90.3 FL (ref 80–96)
MONOCYTES # BLD AUTO: 1.38 10*3/UL (ref 0.2–1.2)
MONOCYTES NFR BLD AUTO: 7.9 % (ref 3–10)
MONOCYTES NFR BLD MANUAL: 5 % (ref 3–10)
NEUTROPHILS # BLD AUTO: 4.94 10*3/UL (ref 2–8)
NEUTROPHILS NFR BLD AUTO: 28.4 % (ref 30–85)
NRBC CBCN: 0 % (ref 0–0.7)
NUC CELL # PRT MANUAL: 0 /100{WBCS}
OSMOLALITY SERPL CALC.SUM OF ELEC: 295 MOSM/KG (ref 273–304)
OVALOCYTES BLD QL SMEAR: SLIGHT
PLAT MORPH BLD: NORMAL
PLATELET # BLD AUTO: 66 10*3/UL (ref 130–400)
PMV BLD AUTO: 9.3 FL (ref 9.4–12.4)
POIKILOCYTOSIS BLD QL SMEAR: SLIGHT
POTASSIUM SERPL-SCNC: 5 MMOL/L (ref 3.5–5.3)
PROTHROMBIN TIME: 10.7 S (ref 9.4–12)
RBC # BLD AUTO: 4.52 10*6/UL (ref 4.7–6.1)
SMALL PLATELETS BLD QL SMEAR: ABNORMAL
SODIUM SERPL-SCNC: 138 MMOL/L (ref 135–147)
VARIANT LYMPHS NFR BLD MANUAL: 64 % (ref 20–45)
WBC # BLD AUTO: 17.45 10*3/UL (ref 4.8–10.8)

## 2021-02-04 ENCOUNTER — TELEPHONE (OUTPATIENT)
Dept: CARDIAC SURGERY | Facility: CLINIC | Age: 76
End: 2021-02-04

## 2021-02-04 ENCOUNTER — PREP FOR SURGERY (OUTPATIENT)
Dept: OTHER | Facility: HOSPITAL | Age: 76
End: 2021-02-04

## 2021-02-04 DIAGNOSIS — I25.10 CAD (CORONARY ARTERY DISEASE): Primary | ICD-10-CM

## 2021-02-04 NOTE — TELEPHONE ENCOUNTER
Spoke to patients wife and told her that today would be last dose of PLAVIX would be today per Dr. Tubbs from [place].  She expressed a verbal understanding of this.

## 2021-02-04 NOTE — TELEPHONE ENCOUNTER
Spoke to wife in regard to office admit and surgery. He will see Arley in office on 2- and be admitted. Surgery will be on 2- CABG.  He is on PLAVIX and I will need to call them back with the last dose.

## 2021-02-08 ENCOUNTER — TELEPHONE (OUTPATIENT)
Dept: CARDIAC SURGERY | Facility: CLINIC | Age: 76
End: 2021-02-08

## 2021-02-08 NOTE — TELEPHONE ENCOUNTER
Mrs. Gauthier called in to state her  had taken his PLAVIX up through today, 2-8-2021. I checked with Dr. Tubbs and he said that was fine. NO MORE after today. She expressed a verbal understanding of this.

## 2021-02-12 ENCOUNTER — TELEPHONE (OUTPATIENT)
Dept: CARDIAC SURGERY | Facility: CLINIC | Age: 76
End: 2021-02-12

## 2021-02-12 PROBLEM — I25.10 CAD (CORONARY ARTERY DISEASE): Status: ACTIVE | Noted: 2021-02-12

## 2021-02-15 ENCOUNTER — APPOINTMENT (OUTPATIENT)
Dept: CARDIOLOGY | Facility: HOSPITAL | Age: 76
End: 2021-02-15

## 2021-02-15 ENCOUNTER — TELEPHONE (OUTPATIENT)
Dept: CARDIAC SURGERY | Facility: CLINIC | Age: 76
End: 2021-02-15

## 2021-02-15 ENCOUNTER — OFFICE VISIT (OUTPATIENT)
Dept: CARDIAC SURGERY | Facility: CLINIC | Age: 76
End: 2021-02-15

## 2021-02-15 ENCOUNTER — APPOINTMENT (OUTPATIENT)
Dept: GENERAL RADIOLOGY | Facility: HOSPITAL | Age: 76
End: 2021-02-15

## 2021-02-15 ENCOUNTER — HOSPITAL ENCOUNTER (INPATIENT)
Facility: HOSPITAL | Age: 76
LOS: 5 days | Discharge: HOME HEALTH | End: 2021-02-20
Attending: THORACIC SURGERY (CARDIOTHORACIC VASCULAR SURGERY) | Admitting: THORACIC SURGERY (CARDIOTHORACIC VASCULAR SURGERY)

## 2021-02-15 VITALS
RESPIRATION RATE: 20 BRPM | TEMPERATURE: 97.6 F | WEIGHT: 260 LBS | HEIGHT: 72 IN | HEART RATE: 91 BPM | BODY MASS INDEX: 35.21 KG/M2 | OXYGEN SATURATION: 96 %

## 2021-02-15 DIAGNOSIS — R06.09 DYSPNEA ON EXERTION: ICD-10-CM

## 2021-02-15 DIAGNOSIS — E66.01 MORBIDLY OBESE (HCC): ICD-10-CM

## 2021-02-15 DIAGNOSIS — I25.118 CORONARY ARTERY DISEASE OF NATIVE HEART WITH STABLE ANGINA PECTORIS, UNSPECIFIED VESSEL OR LESION TYPE (HCC): Primary | ICD-10-CM

## 2021-02-15 PROBLEM — I25.10 CAD (CORONARY ARTERY DISEASE), NATIVE CORONARY ARTERY: Status: ACTIVE | Noted: 2021-02-15

## 2021-02-15 LAB
ABO GROUP BLD: NORMAL
ABO GROUP BLD: NORMAL
ALBUMIN SERPL-MCNC: 3.9 G/DL (ref 3.5–5.2)
ALBUMIN SERPL-MCNC: 4.3 G/DL (ref 3.5–5.2)
ALBUMIN/GLOB SERPL: 1.5 G/DL
ALBUMIN/GLOB SERPL: 1.5 G/DL
ALP SERPL-CCNC: 85 U/L (ref 39–117)
ALP SERPL-CCNC: 94 U/L (ref 39–117)
ALT SERPL W P-5'-P-CCNC: 19 U/L (ref 1–41)
ALT SERPL W P-5'-P-CCNC: 22 U/L (ref 1–41)
ANION GAP SERPL CALCULATED.3IONS-SCNC: 10.6 MMOL/L (ref 5–15)
ANION GAP SERPL CALCULATED.3IONS-SCNC: 9.1 MMOL/L (ref 5–15)
ANION GAP SERPL CALCULATED.3IONS-SCNC: 9.7 MMOL/L (ref 5–15)
APTT PPP: 29.8 SECONDS (ref 22.7–35.4)
ARTERIAL PATENCY WRIST A: POSITIVE
AST SERPL-CCNC: 12 U/L (ref 1–40)
AST SERPL-CCNC: 15 U/L (ref 1–40)
ATMOSPHERIC PRESS: 745.8 MMHG
BASE EXCESS BLDA CALC-SCNC: -4 MMOL/L (ref 0–2)
BDY SITE: ABNORMAL
BH CV XLRA MEAS - DIST GSV CALF DIST LEFT: 0.28 CM
BH CV XLRA MEAS - DIST GSV CALF DIST RIGHT: 0.37 CM
BH CV XLRA MEAS - DIST GSV THIGH DIST LEFT: 0.31 CM
BH CV XLRA MEAS - DIST GSV THIGH DIST RIGHT: 0.67 CM
BH CV XLRA MEAS - DIST LSV CALF DIST LEFT: 0.15 CM
BH CV XLRA MEAS - DIST LSV CALF DIST RIGHT: 0.08 CM
BH CV XLRA MEAS - GSV ANKLE DIST LEFT: 0.24 CM
BH CV XLRA MEAS - GSV ANKLE DIST RIGHT: 0.4 CM
BH CV XLRA MEAS - GSV KNEE DIST LEFT: 0.31 CM
BH CV XLRA MEAS - GSV KNEE DIST RIGHT: 0.46 CM
BH CV XLRA MEAS - GSV ORIGIN DIST LEFT: 0.66 CM
BH CV XLRA MEAS - GSV ORIGIN DIST RIGHT: 0.94 CM
BH CV XLRA MEAS - MID GSV CALF LEFT: 0.26 CM
BH CV XLRA MEAS - MID GSV CALF RIGHT: 0.34 CM
BH CV XLRA MEAS - MID GSV THIGH  LEFT: 0.36 CM
BH CV XLRA MEAS - MID GSV THIGH  RIGHT: 0.48 CM
BH CV XLRA MEAS - MID LSV CALF DIST LEFT: 0.2 CM
BH CV XLRA MEAS - MID LSV CALF DIST RIGHT: 0.11 CM
BH CV XLRA MEAS - PROX GSV CALF DIST LEFT: 0.36 CM
BH CV XLRA MEAS - PROX GSV CALF DIST RIGHT: 0.22 CM
BH CV XLRA MEAS - PROX GSV THIGH  LEFT: 0.47 CM
BH CV XLRA MEAS - PROX GSV THIGH  RIGHT: 0.67 CM
BH CV XLRA MEAS - PROX LSV CALF DIST LEFT: 0.31 CM
BH CV XLRA MEAS - PROX LSV CALF DIST RIGHT: 0.16 CM
BH CV XLRA MEAS LEFT CCA RATIO VEL: 93 CM/SEC
BH CV XLRA MEAS LEFT DIST CCA EDV: 15.5 CM/SEC
BH CV XLRA MEAS LEFT DIST CCA PSV: 93 CM/SEC
BH CV XLRA MEAS LEFT DIST ICA EDV: -18.4 CM/SEC
BH CV XLRA MEAS LEFT DIST ICA PSV: -65.5 CM/SEC
BH CV XLRA MEAS LEFT ICA RATIO VEL: -75 CM/SEC
BH CV XLRA MEAS LEFT ICA/CCA RATIO: -0.81
BH CV XLRA MEAS LEFT MID ICA EDV: -18.8 CM/SEC
BH CV XLRA MEAS LEFT MID ICA PSV: -53.9 CM/SEC
BH CV XLRA MEAS LEFT PROX CCA EDV: 22 CM/SEC
BH CV XLRA MEAS LEFT PROX CCA PSV: 133.9 CM/SEC
BH CV XLRA MEAS LEFT PROX ECA PSV: -95.9 CM/SEC
BH CV XLRA MEAS LEFT PROX ICA EDV: -14.7 CM/SEC
BH CV XLRA MEAS LEFT PROX ICA PSV: -75 CM/SEC
BH CV XLRA MEAS LEFT PROX SCLA PSV: 90.9 CM/SEC
BH CV XLRA MEAS LEFT VERTEBRAL A EDV: 7.5 CM/SEC
BH CV XLRA MEAS LEFT VERTEBRAL A PSV: 36.9 CM/SEC
BH CV XLRA MEAS RIGHT CCA RATIO VEL: -105 CM/SEC
BH CV XLRA MEAS RIGHT DIST CCA EDV: -7.1 CM/SEC
BH CV XLRA MEAS RIGHT DIST CCA PSV: -105.1 CM/SEC
BH CV XLRA MEAS RIGHT DIST ICA EDV: -22.3 CM/SEC
BH CV XLRA MEAS RIGHT DIST ICA PSV: -64.5 CM/SEC
BH CV XLRA MEAS RIGHT ICA RATIO VEL: -97 CM/SEC
BH CV XLRA MEAS RIGHT ICA/CCA RATIO: 0.92
BH CV XLRA MEAS RIGHT MID ICA EDV: -13.7 CM/SEC
BH CV XLRA MEAS RIGHT MID ICA PSV: -47.8 CM/SEC
BH CV XLRA MEAS RIGHT PROX CCA EDV: -14.9 CM/SEC
BH CV XLRA MEAS RIGHT PROX CCA PSV: -148.2 CM/SEC
BH CV XLRA MEAS RIGHT PROX ECA PSV: -133.6 CM/SEC
BH CV XLRA MEAS RIGHT PROX ICA EDV: -13 CM/SEC
BH CV XLRA MEAS RIGHT PROX ICA PSV: -97 CM/SEC
BH CV XLRA MEAS RIGHT PROX SCLA EDV: 19.1 CM/SEC
BH CV XLRA MEAS RIGHT PROX SCLA PSV: 240.9 CM/SEC
BH CV XLRA MEAS RIGHT VERTEBRAL A EDV: -7.9 CM/SEC
BH CV XLRA MEAS RIGHT VERTEBRAL A PSV: -45.7 CM/SEC
BILIRUB SERPL-MCNC: 0.3 MG/DL (ref 0–1.2)
BILIRUB SERPL-MCNC: 0.4 MG/DL (ref 0–1.2)
BILIRUB UR QL STRIP: NEGATIVE
BLD GP AB SCN SERPL QL: NEGATIVE
BUN SERPL-MCNC: 43 MG/DL (ref 8–23)
BUN SERPL-MCNC: 43 MG/DL (ref 8–23)
BUN SERPL-MCNC: 46 MG/DL (ref 8–23)
BUN/CREAT SERPL: 24.7 (ref 7–25)
BUN/CREAT SERPL: 26.4 (ref 7–25)
BUN/CREAT SERPL: 26.9 (ref 7–25)
CALCIUM SPEC-SCNC: 9.1 MG/DL (ref 8.6–10.5)
CALCIUM SPEC-SCNC: 9.8 MG/DL (ref 8.6–10.5)
CALCIUM SPEC-SCNC: 9.8 MG/DL (ref 8.6–10.5)
CHLORIDE SERPL-SCNC: 102 MMOL/L (ref 98–107)
CHLORIDE SERPL-SCNC: 103 MMOL/L (ref 98–107)
CHLORIDE SERPL-SCNC: 105 MMOL/L (ref 98–107)
CHOLEST SERPL-MCNC: 150 MG/DL (ref 0–200)
CLARITY UR: CLEAR
CLOSE TME COLL+ADP + EPINEP PNL BLD: 42 %
CO2 SERPL-SCNC: 21.9 MMOL/L (ref 22–29)
CO2 SERPL-SCNC: 22.3 MMOL/L (ref 22–29)
CO2 SERPL-SCNC: 22.4 MMOL/L (ref 22–29)
COLOR UR: YELLOW
CREAT SERPL-MCNC: 1.63 MG/DL (ref 0.76–1.27)
CREAT SERPL-MCNC: 1.71 MG/DL (ref 0.76–1.27)
CREAT SERPL-MCNC: 1.74 MG/DL (ref 0.76–1.27)
D-LACTATE SERPL-SCNC: 0.9 MMOL/L (ref 0.5–2)
DEPRECATED RDW RBC AUTO: 45.1 FL (ref 37–54)
EOSINOPHIL # BLD MANUAL: 0.16 10*3/MM3 (ref 0–0.4)
EOSINOPHIL NFR BLD MANUAL: 1 % (ref 0.3–6.2)
ERYTHROCYTE [DISTWIDTH] IN BLOOD BY AUTOMATED COUNT: 14.1 % (ref 12.3–15.4)
FERRITIN SERPL-MCNC: 296 NG/ML (ref 30–400)
GFR SERPL CREATININE-BSD FRML MDRD: 39 ML/MIN/1.73
GFR SERPL CREATININE-BSD FRML MDRD: 39 ML/MIN/1.73
GFR SERPL CREATININE-BSD FRML MDRD: 42 ML/MIN/1.73
GLOBULIN UR ELPH-MCNC: 2.6 GM/DL
GLOBULIN UR ELPH-MCNC: 2.8 GM/DL
GLUCOSE BLDC GLUCOMTR-MCNC: 133 MG/DL (ref 70–130)
GLUCOSE BLDC GLUCOMTR-MCNC: 144 MG/DL (ref 70–130)
GLUCOSE SERPL-MCNC: 101 MG/DL (ref 65–99)
GLUCOSE SERPL-MCNC: 138 MG/DL (ref 65–99)
GLUCOSE SERPL-MCNC: 142 MG/DL (ref 65–99)
GLUCOSE UR STRIP-MCNC: ABNORMAL MG/DL
HAPTOGLOB SERPL-MCNC: 56 MG/DL (ref 30–200)
HBA1C MFR BLD: 7.3 % (ref 4.8–5.6)
HCO3 BLDA-SCNC: 21.3 MMOL/L (ref 22–28)
HCT VFR BLD AUTO: 38.1 % (ref 37.5–51)
HDLC SERPL-MCNC: 36 MG/DL (ref 40–60)
HGB BLD-MCNC: 12.9 G/DL (ref 13–17.7)
HGB UR QL STRIP.AUTO: NEGATIVE
INHALED O2 CONCENTRATION: 21 %
INR PPP: 0.97 (ref 0.9–1.1)
IRON 24H UR-MRATE: 66 MCG/DL (ref 59–158)
IRON SATN MFR SERPL: 21 % (ref 20–50)
KETONES UR QL STRIP: NEGATIVE
LDH SERPL-CCNC: 175 U/L (ref 135–225)
LDH SERPL-CCNC: 175 U/L (ref 135–225)
LDLC SERPL CALC-MCNC: 74 MG/DL (ref 0–100)
LDLC/HDLC SERPL: 1.83 {RATIO}
LEUKOCYTE ESTERASE UR QL STRIP.AUTO: NEGATIVE
LYMPHOCYTES # BLD MANUAL: 2.31 10*3/MM3 (ref 0.7–3.1)
LYMPHOCYTES NFR BLD MANUAL: 14 % (ref 19.6–45.3)
LYMPHOCYTES NFR BLD MANUAL: 6 % (ref 5–12)
MAGNESIUM SERPL-MCNC: 1.7 MG/DL (ref 1.6–2.4)
MCH RBC QN AUTO: 30.2 PG (ref 26.6–33)
MCHC RBC AUTO-ENTMCNC: 33.9 G/DL (ref 31.5–35.7)
MCV RBC AUTO: 89.2 FL (ref 79–97)
MODALITY: ABNORMAL
MONOCYTES # BLD AUTO: 0.99 10*3/MM3 (ref 0.1–0.9)
NEUTROPHILS # BLD AUTO: 9.73 10*3/MM3 (ref 1.7–7)
NEUTROPHILS NFR BLD MANUAL: 59 % (ref 42.7–76)
NITRITE UR QL STRIP: NEGATIVE
NRBC BLD AUTO-RTO: 0 /100 WBC (ref 0–0.2)
NT-PROBNP SERPL-MCNC: 47.2 PG/ML (ref 0–900)
O2 A-A PPRESDIFF RESPIRATORY: 0.7 MMHG
PCO2 BLDA: 38.7 MM HG (ref 35–45)
PH BLDA: 7.35 PH UNITS (ref 7.35–7.45)
PH UR STRIP.AUTO: <=5 [PH] (ref 5–8)
PLAT MORPH BLD: NORMAL
PLATELET # BLD AUTO: 59 10*3/MM3 (ref 140–450)
PLATELET # BLD AUTO: 69 10*3/MM3 (ref 140–450)
PLATELETS.RETICULATED NFR BLD AUTO: 2.3 % (ref 0.9–6.5)
PMV BLD AUTO: 9.2 FL (ref 6–12)
PO2 BLDA: 74.6 MM HG (ref 80–100)
POTASSIUM SERPL-SCNC: 5 MMOL/L (ref 3.5–5.2)
POTASSIUM SERPL-SCNC: 6.6 MMOL/L (ref 3.5–5.2)
POTASSIUM SERPL-SCNC: 7.1 MMOL/L (ref 3.5–5.2)
PROT SERPL-MCNC: 6.5 G/DL (ref 6–8.5)
PROT SERPL-MCNC: 7.1 G/DL (ref 6–8.5)
PROT UR QL STRIP: NEGATIVE
PROTHROMBIN TIME: 12.7 SECONDS (ref 11.7–14.2)
QT INTERVAL: 367 MS
RBC # BLD AUTO: 4.27 10*6/MM3 (ref 4.14–5.8)
RBC MORPH BLD: NORMAL
RH BLD: NEGATIVE
RH BLD: NEGATIVE
SAO2 % BLDCOA: 94.1 % (ref 92–99)
SARS-COV-2 RNA RESP QL NAA+PROBE: NOT DETECTED
SODIUM SERPL-SCNC: 134 MMOL/L (ref 136–145)
SODIUM SERPL-SCNC: 136 MMOL/L (ref 136–145)
SODIUM SERPL-SCNC: 136 MMOL/L (ref 136–145)
SP GR UR STRIP: 1.02 (ref 1–1.03)
T&S EXPIRATION DATE: NORMAL
TIBC SERPL-MCNC: 317 MCG/DL (ref 298–536)
TOTAL RATE: 20 BREATHS/MINUTE
TRANSFERRIN SERPL-MCNC: 213 MG/DL (ref 200–360)
TRIGL SERPL-MCNC: 241 MG/DL (ref 0–150)
UROBILINOGEN UR QL STRIP: ABNORMAL
VARIANT LYMPHS NFR BLD MANUAL: 20 % (ref 0–5)
VIT B12 BLD-MCNC: 1986 PG/ML (ref 211–946)
VLDLC SERPL-MCNC: 40 MG/DL (ref 5–40)
WBC # BLD AUTO: 16.49 10*3/MM3 (ref 3.4–10.8)
WBC MORPH BLD: NORMAL

## 2021-02-15 PROCEDURE — 80061 LIPID PANEL: CPT | Performed by: NURSE PRACTITIONER

## 2021-02-15 PROCEDURE — 82747 ASSAY OF FOLIC ACID RBC: CPT | Performed by: INTERNAL MEDICINE

## 2021-02-15 PROCEDURE — 86923 COMPATIBILITY TEST ELECTRIC: CPT

## 2021-02-15 PROCEDURE — 85014 HEMATOCRIT: CPT | Performed by: INTERNAL MEDICINE

## 2021-02-15 PROCEDURE — 80053 COMPREHEN METABOLIC PANEL: CPT | Performed by: NURSE PRACTITIONER

## 2021-02-15 PROCEDURE — 82607 VITAMIN B-12: CPT | Performed by: INTERNAL MEDICINE

## 2021-02-15 PROCEDURE — 83921 ORGANIC ACID SINGLE QUANT: CPT | Performed by: INTERNAL MEDICINE

## 2021-02-15 PROCEDURE — 85055 RETICULATED PLATELET ASSAY: CPT | Performed by: INTERNAL MEDICINE

## 2021-02-15 PROCEDURE — 71046 X-RAY EXAM CHEST 2 VIEWS: CPT

## 2021-02-15 PROCEDURE — 80053 COMPREHEN METABOLIC PANEL: CPT | Performed by: INTERNAL MEDICINE

## 2021-02-15 PROCEDURE — 86900 BLOOD TYPING SEROLOGIC ABO: CPT

## 2021-02-15 PROCEDURE — 85730 THROMBOPLASTIN TIME PARTIAL: CPT | Performed by: NURSE PRACTITIONER

## 2021-02-15 PROCEDURE — 84466 ASSAY OF TRANSFERRIN: CPT | Performed by: INTERNAL MEDICINE

## 2021-02-15 PROCEDURE — 83605 ASSAY OF LACTIC ACID: CPT | Performed by: INTERNAL MEDICINE

## 2021-02-15 PROCEDURE — 93880 EXTRACRANIAL BILAT STUDY: CPT

## 2021-02-15 PROCEDURE — 82728 ASSAY OF FERRITIN: CPT | Performed by: INTERNAL MEDICINE

## 2021-02-15 PROCEDURE — 93010 ELECTROCARDIOGRAM REPORT: CPT | Performed by: INTERNAL MEDICINE

## 2021-02-15 PROCEDURE — 85610 PROTHROMBIN TIME: CPT | Performed by: NURSE PRACTITIONER

## 2021-02-15 PROCEDURE — 36600 WITHDRAWAL OF ARTERIAL BLOOD: CPT

## 2021-02-15 PROCEDURE — 63710000001 PREDNISONE PER 1 MG: Performed by: THORACIC SURGERY (CARDIOTHORACIC VASCULAR SURGERY)

## 2021-02-15 PROCEDURE — 83036 HEMOGLOBIN GLYCOSYLATED A1C: CPT | Performed by: NURSE PRACTITIONER

## 2021-02-15 PROCEDURE — 82962 GLUCOSE BLOOD TEST: CPT

## 2021-02-15 PROCEDURE — 86901 BLOOD TYPING SEROLOGIC RH(D): CPT

## 2021-02-15 PROCEDURE — 86901 BLOOD TYPING SEROLOGIC RH(D): CPT | Performed by: NURSE PRACTITIONER

## 2021-02-15 PROCEDURE — 83735 ASSAY OF MAGNESIUM: CPT | Performed by: NURSE PRACTITIONER

## 2021-02-15 PROCEDURE — 85576 BLOOD PLATELET AGGREGATION: CPT | Performed by: NURSE PRACTITIONER

## 2021-02-15 PROCEDURE — 63710000001 INSULIN REGULAR HUMAN PER 5 UNITS: Performed by: INTERNAL MEDICINE

## 2021-02-15 PROCEDURE — 93970 EXTREMITY STUDY: CPT

## 2021-02-15 PROCEDURE — 85007 BL SMEAR W/DIFF WBC COUNT: CPT | Performed by: NURSE PRACTITIONER

## 2021-02-15 PROCEDURE — 83615 LACTATE (LD) (LDH) ENZYME: CPT | Performed by: INTERNAL MEDICINE

## 2021-02-15 PROCEDURE — 86900 BLOOD TYPING SEROLOGIC ABO: CPT | Performed by: NURSE PRACTITIONER

## 2021-02-15 PROCEDURE — 99202 OFFICE O/P NEW SF 15 MIN: CPT | Performed by: NURSE PRACTITIONER

## 2021-02-15 PROCEDURE — 82803 BLOOD GASES ANY COMBINATION: CPT

## 2021-02-15 PROCEDURE — 83010 ASSAY OF HAPTOGLOBIN QUANT: CPT | Performed by: INTERNAL MEDICINE

## 2021-02-15 PROCEDURE — 82668 ASSAY OF ERYTHROPOIETIN: CPT | Performed by: INTERNAL MEDICINE

## 2021-02-15 PROCEDURE — 83540 ASSAY OF IRON: CPT | Performed by: INTERNAL MEDICINE

## 2021-02-15 PROCEDURE — U0003 INFECTIOUS AGENT DETECTION BY NUCLEIC ACID (DNA OR RNA); SEVERE ACUTE RESPIRATORY SYNDROME CORONAVIRUS 2 (SARS-COV-2) (CORONAVIRUS DISEASE [COVID-19]), AMPLIFIED PROBE TECHNIQUE, MAKING USE OF HIGH THROUGHPUT TECHNOLOGIES AS DESCRIBED BY CMS-2020-01-R: HCPCS | Performed by: NURSE PRACTITIONER

## 2021-02-15 PROCEDURE — 86850 RBC ANTIBODY SCREEN: CPT | Performed by: NURSE PRACTITIONER

## 2021-02-15 PROCEDURE — 83880 ASSAY OF NATRIURETIC PEPTIDE: CPT | Performed by: NURSE PRACTITIONER

## 2021-02-15 PROCEDURE — 85025 COMPLETE CBC W/AUTO DIFF WBC: CPT | Performed by: NURSE PRACTITIONER

## 2021-02-15 PROCEDURE — 25010000002 CALCIUM GLUCONATE-NACL 1-0.675 GM/50ML-% SOLUTION: Performed by: INTERNAL MEDICINE

## 2021-02-15 PROCEDURE — 94640 AIRWAY INHALATION TREATMENT: CPT

## 2021-02-15 PROCEDURE — 93005 ELECTROCARDIOGRAM TRACING: CPT | Performed by: THORACIC SURGERY (CARDIOTHORACIC VASCULAR SURGERY)

## 2021-02-15 PROCEDURE — 81003 URINALYSIS AUTO W/O SCOPE: CPT | Performed by: NURSE PRACTITIONER

## 2021-02-15 RX ORDER — PREDNISONE 20 MG/1
20 TABLET ORAL DAILY
Status: DISCONTINUED | OUTPATIENT
Start: 2021-02-15 | End: 2021-02-20 | Stop reason: HOSPADM

## 2021-02-15 RX ORDER — DONEPEZIL HYDROCHLORIDE 10 MG/1
10 TABLET, FILM COATED ORAL DAILY
Status: DISCONTINUED | OUTPATIENT
Start: 2021-02-15 | End: 2021-02-20 | Stop reason: HOSPADM

## 2021-02-15 RX ORDER — LISINOPRIL 5 MG/1
10 TABLET ORAL DAILY
COMMUNITY
End: 2021-05-31 | Stop reason: HOSPADM

## 2021-02-15 RX ORDER — SIMVASTATIN 40 MG
20 TABLET ORAL NIGHTLY
COMMUNITY

## 2021-02-15 RX ORDER — PANTOPRAZOLE SODIUM 40 MG/1
40 TABLET, DELAYED RELEASE ORAL DAILY
COMMUNITY
End: 2021-06-22

## 2021-02-15 RX ORDER — LANOLIN ALCOHOL/MO/W.PET/CERES
5000 CREAM (GRAM) TOPICAL NIGHTLY
COMMUNITY

## 2021-02-15 RX ORDER — CLOPIDOGREL BISULFATE 75 MG/1
75 TABLET ORAL DAILY
Status: ON HOLD | COMMUNITY
End: 2021-02-15

## 2021-02-15 RX ORDER — INSULIN GLARGINE 100 [IU]/ML
30 INJECTION, SOLUTION SUBCUTANEOUS NIGHTLY
COMMUNITY

## 2021-02-15 RX ORDER — SERTRALINE HYDROCHLORIDE 100 MG/1
100 TABLET, FILM COATED ORAL DAILY
Status: DISCONTINUED | OUTPATIENT
Start: 2021-02-15 | End: 2021-02-20 | Stop reason: HOSPADM

## 2021-02-15 RX ORDER — ALPRAZOLAM 0.25 MG/1
0.25 TABLET ORAL EVERY 8 HOURS PRN
Status: DISCONTINUED | OUTPATIENT
Start: 2021-02-15 | End: 2021-02-20 | Stop reason: HOSPADM

## 2021-02-15 RX ORDER — ATORVASTATIN CALCIUM 20 MG/1
20 TABLET, FILM COATED ORAL DAILY
Status: DISCONTINUED | OUTPATIENT
Start: 2021-02-15 | End: 2021-02-20 | Stop reason: HOSPADM

## 2021-02-15 RX ORDER — LEVOTHYROXINE SODIUM 0.1 MG/1
200 TABLET ORAL EVERY MORNING
Status: DISCONTINUED | OUTPATIENT
Start: 2021-02-16 | End: 2021-02-20 | Stop reason: HOSPADM

## 2021-02-15 RX ORDER — NITROGLYCERIN 0.4 MG/1
0.4 TABLET SUBLINGUAL
Status: DISCONTINUED | OUTPATIENT
Start: 2021-02-15 | End: 2021-02-20 | Stop reason: HOSPADM

## 2021-02-15 RX ORDER — DONEPEZIL HYDROCHLORIDE 10 MG/1
10 TABLET, FILM COATED ORAL
COMMUNITY

## 2021-02-15 RX ORDER — AMITRIPTYLINE HYDROCHLORIDE 50 MG/1
50 TABLET, FILM COATED ORAL NIGHTLY
COMMUNITY
End: 2021-06-22

## 2021-02-15 RX ORDER — NADOLOL 40 MG/1
40 TABLET ORAL DAILY
COMMUNITY
End: 2021-06-22

## 2021-02-15 RX ORDER — GLIPIZIDE 10 MG/1
10 TABLET ORAL
COMMUNITY
End: 2021-06-22

## 2021-02-15 RX ORDER — NICOTINE POLACRILEX 4 MG
15 LOZENGE BUCCAL
Status: DISCONTINUED | OUTPATIENT
Start: 2021-02-15 | End: 2021-02-20 | Stop reason: HOSPADM

## 2021-02-15 RX ORDER — ASPIRIN 81 MG/1
81 TABLET ORAL DAILY
Status: DISCONTINUED | OUTPATIENT
Start: 2021-02-15 | End: 2021-02-20 | Stop reason: HOSPADM

## 2021-02-15 RX ORDER — GABAPENTIN 300 MG/1
300 CAPSULE ORAL NIGHTLY
Status: DISCONTINUED | OUTPATIENT
Start: 2021-02-15 | End: 2021-02-20 | Stop reason: HOSPADM

## 2021-02-15 RX ORDER — ACETAMINOPHEN 325 MG/1
650 TABLET ORAL EVERY 4 HOURS PRN
Status: DISCONTINUED | OUTPATIENT
Start: 2021-02-15 | End: 2021-02-20 | Stop reason: HOSPADM

## 2021-02-15 RX ORDER — AMITRIPTYLINE HYDROCHLORIDE 50 MG/1
50 TABLET, FILM COATED ORAL NIGHTLY
Status: DISCONTINUED | OUTPATIENT
Start: 2021-02-15 | End: 2021-02-15

## 2021-02-15 RX ORDER — DEXTROSE MONOHYDRATE 25 G/50ML
50 INJECTION, SOLUTION INTRAVENOUS ONCE
Status: COMPLETED | OUTPATIENT
Start: 2021-02-15 | End: 2021-02-15

## 2021-02-15 RX ORDER — SERTRALINE HYDROCHLORIDE 100 MG/1
150 TABLET, FILM COATED ORAL DAILY
COMMUNITY
End: 2022-07-06 | Stop reason: SDUPTHER

## 2021-02-15 RX ORDER — SODIUM POLYSTYRENE SULFONATE 15 G/60ML
30 SUSPENSION ORAL; RECTAL ONCE
Status: COMPLETED | OUTPATIENT
Start: 2021-02-15 | End: 2021-02-15

## 2021-02-15 RX ORDER — FERROUS SULFATE 325(65) MG
325 TABLET ORAL
COMMUNITY

## 2021-02-15 RX ORDER — GABAPENTIN 300 MG/1
600 CAPSULE ORAL NIGHTLY
COMMUNITY
End: 2021-09-02 | Stop reason: DRUGHIGH

## 2021-02-15 RX ORDER — CHLORHEXIDINE GLUCONATE 0.12 MG/ML
15 RINSE ORAL EVERY 12 HOURS SCHEDULED
Status: DISPENSED | OUTPATIENT
Start: 2021-02-15 | End: 2021-02-16

## 2021-02-15 RX ORDER — CALCIUM GLUCONATE 20 MG/ML
1 INJECTION, SOLUTION INTRAVENOUS ONCE
Status: COMPLETED | OUTPATIENT
Start: 2021-02-15 | End: 2021-02-15

## 2021-02-15 RX ORDER — SODIUM CHLORIDE 0.9 % (FLUSH) 0.9 %
3 SYRINGE (ML) INJECTION EVERY 12 HOURS SCHEDULED
Status: DISCONTINUED | OUTPATIENT
Start: 2021-02-15 | End: 2021-02-20 | Stop reason: HOSPADM

## 2021-02-15 RX ORDER — SODIUM CHLORIDE 0.9 % (FLUSH) 0.9 %
10 SYRINGE (ML) INJECTION AS NEEDED
Status: DISCONTINUED | OUTPATIENT
Start: 2021-02-15 | End: 2021-02-20 | Stop reason: HOSPADM

## 2021-02-15 RX ORDER — BUMETANIDE 0.25 MG/ML
4 INJECTION INTRAMUSCULAR; INTRAVENOUS ONCE
Status: COMPLETED | OUTPATIENT
Start: 2021-02-15 | End: 2021-02-15

## 2021-02-15 RX ORDER — DEXTROSE MONOHYDRATE 25 G/50ML
25 INJECTION, SOLUTION INTRAVENOUS
Status: DISCONTINUED | OUTPATIENT
Start: 2021-02-15 | End: 2021-02-20 | Stop reason: HOSPADM

## 2021-02-15 RX ORDER — PREDNISONE 1 MG/1
20 TABLET ORAL DAILY
Status: ON HOLD | COMMUNITY
End: 2021-05-18

## 2021-02-15 RX ORDER — LEVOTHYROXINE SODIUM 0.12 MG/1
250 TABLET ORAL EVERY MORNING
COMMUNITY

## 2021-02-15 RX ORDER — CHLORHEXIDINE GLUCONATE 500 MG/1
1 CLOTH TOPICAL EVERY 12 HOURS
Status: ACTIVE | OUTPATIENT
Start: 2021-02-15 | End: 2021-02-16

## 2021-02-15 RX ORDER — ISOSORBIDE MONONITRATE 60 MG/1
60 TABLET, EXTENDED RELEASE ORAL DAILY
COMMUNITY
End: 2021-05-31 | Stop reason: HOSPADM

## 2021-02-15 RX ORDER — CEFAZOLIN SODIUM 2 G/100ML
2 INJECTION, SOLUTION INTRAVENOUS
Status: ACTIVE | OUTPATIENT
Start: 2021-02-16 | End: 2021-02-17

## 2021-02-15 RX ORDER — TEMAZEPAM 7.5 MG/1
7.5 CAPSULE ORAL NIGHTLY PRN
Status: DISCONTINUED | OUTPATIENT
Start: 2021-02-15 | End: 2021-02-20 | Stop reason: HOSPADM

## 2021-02-15 RX ADMIN — DEXTROSE MONOHYDRATE 50 ML: 500 INJECTION PARENTERAL at 18:56

## 2021-02-15 RX ADMIN — PREDNISONE 20 MG: 20 TABLET ORAL at 21:09

## 2021-02-15 RX ADMIN — INSULIN HUMAN 10 UNITS: 100 INJECTION, SOLUTION PARENTERAL at 18:56

## 2021-02-15 RX ADMIN — SODIUM BICARBONATE 50 MEQ: 84 INJECTION INTRAVENOUS at 19:10

## 2021-02-15 RX ADMIN — CHLORHEXIDINE GLUCONATE 1 APPLICATION: 500 CLOTH TOPICAL at 21:11

## 2021-02-15 RX ADMIN — SODIUM CHLORIDE, PRESERVATIVE FREE 3 ML: 5 INJECTION INTRAVENOUS at 21:10

## 2021-02-15 RX ADMIN — GABAPENTIN 300 MG: 300 CAPSULE ORAL at 21:10

## 2021-02-15 RX ADMIN — ATORVASTATIN CALCIUM 20 MG: 20 TABLET, FILM COATED ORAL at 21:09

## 2021-02-15 RX ADMIN — DONEPEZIL HYDROCHLORIDE 10 MG: 10 TABLET, FILM COATED ORAL at 21:09

## 2021-02-15 RX ADMIN — ALBUTEROL SULFATE 10 MG: 2.5 SOLUTION RESPIRATORY (INHALATION) at 21:38

## 2021-02-15 RX ADMIN — SODIUM POLYSTYRENE SULFONATE 30 G: 15 SUSPENSION ORAL; RECTAL at 19:15

## 2021-02-15 RX ADMIN — CHLORHEXIDINE GLUCONATE 15 ML: 1.2 RINSE ORAL at 21:09

## 2021-02-15 RX ADMIN — MUPIROCIN 1 APPLICATION: 20 OINTMENT TOPICAL at 21:10

## 2021-02-15 RX ADMIN — SERTRALINE 100 MG: 100 TABLET, FILM COATED ORAL at 21:09

## 2021-02-15 RX ADMIN — CALCIUM GLUCONATE 1 G: 20 INJECTION, SOLUTION INTRAVENOUS at 19:11

## 2021-02-15 RX ADMIN — TEMAZEPAM 7.5 MG: 7.5 CAPSULE ORAL at 23:04

## 2021-02-15 RX ADMIN — BUMETANIDE 4 MG: 0.25 INJECTION, SOLUTION INTRAMUSCULAR; INTRAVENOUS at 19:00

## 2021-02-15 NOTE — TELEPHONE ENCOUNTER
Pt did not have med list when he came to see Dr Tubbs for new pt appointment. Pt states his med list is down in his car. Please review meds for patient as well as allergies.

## 2021-02-15 NOTE — PROGRESS NOTES
I had reviewed this film earlier because of the LAD.  He has multivessel coronary disease with an occluded LAD and occluded right.  The circumflex had a 70 to 80% lesions.  He has shortness of breath as a symptom but he is a severe diabetic.  This is his angina equivalent.  He has no murmurs clicks rubs or gallops.  His pulses are good distally.  He is weak and wobbly per his history.  He saw us in the office today in a wheelchair.    Operation is advisable and will plan CABG in the morning.  Physical therapy should see him preoperatively.  Discussed all this with the patient and his.

## 2021-02-15 NOTE — PROGRESS NOTES
2/15/2021      Subjective:      Alex Buckley MD    Chief Complaint: dyspnea     History of Present Illness:       Dear Alex García MD and Colleagues,  It was nice to see Cosmo Gauthier in consultation at your request. He is a 75 y.o. male with a history of diabetes type 2, hypothyroidism, hx of CAD s/p stents, hyperlipidemia, obesity who has developed progressively worsening dyspnea on exertion. He denies chest pain, nausea/vomiting, and palpitations.  The Cardiac Cath that Dr. Tubbs reviewed revealed severe 2 vessel CAD, 100% LAD occlusion and 100% RCA occlusion with a preserved LV function, EF 50-60% per  Transthoracic echocardiogram.    Patient Active Problem List   Diagnosis   • CAD (coronary artery disease)   • Morbidly obese (CMS/HCC)     PMH: CAD, s/p STENTs, DM type 2, hyperlipidemia, hypertension, mobility issues related to chronic back pain    PSxH: left total knee, left shoulder surgery     No current outpatient medications on file.    Social History     Socioeconomic History   • Marital status:      Spouse name: Not on file   • Number of children: Not on file   • Years of education: Not on file   • Highest education level: Not on file       No family history on file.        Review of Systems:  Review of Systems   Constitutional: Positive for activity change and fatigue.   HENT: Negative.    Eyes: Negative.    Respiratory: Negative.    Gastrointestinal: Negative.    Endocrine: Negative.    Genitourinary: Negative.    Musculoskeletal: Positive for arthralgias and back pain.   Skin: Negative.    Allergic/Immunologic: Negative.    Neurological: Positive for weakness.   Psychiatric/Behavioral: Negative.      Cardiovascular ROS: positive for - dyspnea on exertion  Physical Exam:    Vital Signs:  Weight: 118 kg (260 lb)   Body mass index is 35.26 kg/m².  Temp: 97.6 °F (36.4 °C)   Heart Rate: 91         Constitutional:       Appearance: Not in distress. Ill-appearing.   Eyes:      Pupils:  Pupils are equal, round, and reactive to light.   Pulmonary:      Effort: Pulmonary effort is normal.      Breath sounds: Normal breath sounds.   Cardiovascular:      PMI at left midclavicular line. Normal rate. Regular rhythm.      Murmurs: There is no murmur.   Pulses:     Intact distal pulses.   Abdominal:      General: Bowel sounds are normal. There is no distension.   Musculoskeletal: Normal range of motion.   Skin:     General: Skin is warm.      Coloration: Skin is pale.   Neurological:      Mental Status: Alert and oriented to person, place and time.          Assessment:       Severe 2- vessel CAD   Dyspnea on exertion   Obesity  Decreased mobility   Hypothyroidism  Hypertension  Hyperlipidemia         Recommendation/Plan:       Dr. Tubbs reviewed films and recommends cardiac surgery revascularization  Will admit to inpatient today and obtain preoperative studies  Discussed with Dr. Tubbs, patient and son as well as wife on the phone       Thank you for allowing me to participate in his care.    Regards,    MAXWELL Nj

## 2021-02-16 ENCOUNTER — APPOINTMENT (OUTPATIENT)
Dept: CARDIOLOGY | Facility: HOSPITAL | Age: 76
End: 2021-02-16

## 2021-02-16 PROBLEM — D72.820 LYMPHOCYTOSIS: Status: ACTIVE | Noted: 2021-02-16

## 2021-02-16 PROBLEM — D64.9 ANEMIA: Status: ACTIVE | Noted: 2021-02-16

## 2021-02-16 LAB
ALBUMIN SERPL-MCNC: 4.2 G/DL (ref 3.5–5.2)
ALBUMIN/GLOB SERPL: 1.9 G/DL
ALP SERPL-CCNC: 84 U/L (ref 39–117)
ALT SERPL W P-5'-P-CCNC: 17 U/L (ref 1–41)
ANION GAP SERPL CALCULATED.3IONS-SCNC: 12.2 MMOL/L (ref 5–15)
AORTIC ARCH: 2.4 CM
AORTIC DIMENSIONLESS INDEX: 0.9 (DI)
ASCENDING AORTA: 4.1 CM
AST SERPL-CCNC: 17 U/L (ref 1–40)
BH CV ECHO MEAS - ACS: 2.4 CM
BH CV ECHO MEAS - AI DEC SLOPE: 202.7 CM/SEC^2
BH CV ECHO MEAS - AI MAX PG: 59.3 MMHG
BH CV ECHO MEAS - AI MAX VEL: 385 CM/SEC
BH CV ECHO MEAS - AI P1/2T: 556.4 MSEC
BH CV ECHO MEAS - AO ACC TIME: 0.11 SEC
BH CV ECHO MEAS - AO MAX PG (FULL): 0.36 MMHG
BH CV ECHO MEAS - AO MAX PG: 5.2 MMHG
BH CV ECHO MEAS - AO MEAN PG (FULL): 1 MMHG
BH CV ECHO MEAS - AO MEAN PG: 3 MMHG
BH CV ECHO MEAS - AO ROOT AREA (BSA CORRECTED): 0.23
BH CV ECHO MEAS - AO ROOT AREA: 10.2 CM^2
BH CV ECHO MEAS - AO ROOT DIAM: 3.6 CM
BH CV ECHO MEAS - AO V2 MAX: 114 CM/SEC
BH CV ECHO MEAS - AO V2 MEAN: 77.2 CM/SEC
BH CV ECHO MEAS - AO V2 VTI: 21.2 CM
BH CV ECHO MEAS - ASC AORTA: 4.1 CM
BH CV ECHO MEAS - AVA(I,A): 3.1 CM^2
BH CV ECHO MEAS - AVA(I,D): 3.1 CM^2
BH CV ECHO MEAS - AVA(V,A): 3.3 CM^2
BH CV ECHO MEAS - AVA(V,D): 3.3 CM^2
BH CV ECHO MEAS - BSA(HAYCOCK): 4.6 M^2
BH CV ECHO MEAS - BSA: 15.8 M^2
BH CV ECHO MEAS - BZI_BMI: 0.01 KILOGRAMS/M^2
BH CV ECHO MEAS - BZI_METRIC_HEIGHT: 5547 CM
BH CV ECHO MEAS - BZI_METRIC_WEIGHT: 29.9 KG
BH CV ECHO MEAS - CONTRAST EF (2CH): 61.1 CM2
BH CV ECHO MEAS - CONTRAST EF 4CH: 58.6 CM2
BH CV ECHO MEAS - EDV(CUBED): 74.1 ML
BH CV ECHO MEAS - EDV(MOD-SP2): 90 ML
BH CV ECHO MEAS - EDV(MOD-SP4): 111 ML
BH CV ECHO MEAS - EDV(TEICH): 78.6 ML
BH CV ECHO MEAS - EF(CUBED): 59.8 %
BH CV ECHO MEAS - EF(MOD-BP): 59 %
BH CV ECHO MEAS - EF(TEICH): 51.7 %
BH CV ECHO MEAS - ESV(CUBED): 29.8 ML
BH CV ECHO MEAS - ESV(MOD-SP2): 35 ML
BH CV ECHO MEAS - ESV(MOD-SP4): 46 ML
BH CV ECHO MEAS - ESV(TEICH): 37.9 ML
BH CV ECHO MEAS - FS: 26.2 %
BH CV ECHO MEAS - IVS/LVPW: 1.3
BH CV ECHO MEAS - IVSD: 1.4 CM
BH CV ECHO MEAS - LAT PEAK E' VEL: 9 CM/SEC
BH CV ECHO MEAS - LV DIASTOLIC VOL/BSA (35-75): 7 ML/M^2
BH CV ECHO MEAS - LV MASS(C)D: 189.2 GRAMS
BH CV ECHO MEAS - LV MASS(C)DI: 12 GRAMS/M^2
BH CV ECHO MEAS - LV MAX PG: 4.8 MMHG
BH CV ECHO MEAS - LV MEAN PG: 2 MMHG
BH CV ECHO MEAS - LV SYSTOLIC VOL/BSA (12-30): 2.9 ML/M^2
BH CV ECHO MEAS - LV V1 MAX: 110 CM/SEC
BH CV ECHO MEAS - LV V1 MEAN: 64.5 CM/SEC
BH CV ECHO MEAS - LV V1 VTI: 19.1 CM
BH CV ECHO MEAS - LVIDD: 4.2 CM
BH CV ECHO MEAS - LVIDS: 3.1 CM
BH CV ECHO MEAS - LVLD AP2: 8.9 CM
BH CV ECHO MEAS - LVLD AP4: 9.5 CM
BH CV ECHO MEAS - LVLS AP2: 7.7 CM
BH CV ECHO MEAS - LVLS AP4: 6.8 CM
BH CV ECHO MEAS - LVOT AREA (M): 3.5 CM^2
BH CV ECHO MEAS - LVOT AREA: 3.5 CM^2
BH CV ECHO MEAS - LVOT DIAM: 2.1 CM
BH CV ECHO MEAS - LVPWD: 1.1 CM
BH CV ECHO MEAS - MED PEAK E' VEL: 5.7 CM/SEC
BH CV ECHO MEAS - MV A DUR: 0.16 SEC
BH CV ECHO MEAS - MV A MAX VEL: 105 CM/SEC
BH CV ECHO MEAS - MV DEC SLOPE: 224.5 CM/SEC^2
BH CV ECHO MEAS - MV DEC TIME: 119 SEC
BH CV ECHO MEAS - MV E MAX VEL: 46.1 CM/SEC
BH CV ECHO MEAS - MV E/A: 0.44
BH CV ECHO MEAS - MV MAX PG: 3.3 MMHG
BH CV ECHO MEAS - MV MEAN PG: 1 MMHG
BH CV ECHO MEAS - MV P1/2T MAX VEL: 42.1 CM/SEC
BH CV ECHO MEAS - MV P1/2T: 54.9 MSEC
BH CV ECHO MEAS - MV V2 MAX: 91.1 CM/SEC
BH CV ECHO MEAS - MV V2 MEAN: 45.2 CM/SEC
BH CV ECHO MEAS - MV V2 VTI: 15.8 CM
BH CV ECHO MEAS - MVA P1/2T LCG: 5.2 CM^2
BH CV ECHO MEAS - MVA(P1/2T): 4 CM^2
BH CV ECHO MEAS - MVA(VTI): 4.2 CM^2
BH CV ECHO MEAS - PA ACC TIME: 0.08 SEC
BH CV ECHO MEAS - PA MAX PG (FULL): 0.74 MMHG
BH CV ECHO MEAS - PA MAX PG: 2 MMHG
BH CV ECHO MEAS - PA PR(ACCEL): 42.1 MMHG
BH CV ECHO MEAS - PA V2 MAX: 71.2 CM/SEC
BH CV ECHO MEAS - PULM A REVS DUR: 0.09 SEC
BH CV ECHO MEAS - PULM A REVS VEL: 30.3 CM/SEC
BH CV ECHO MEAS - PULM DIAS VEL: 50.2 CM/SEC
BH CV ECHO MEAS - PULM S/D: 0.64
BH CV ECHO MEAS - PULM SYS VEL: 32 CM/SEC
BH CV ECHO MEAS - PVA(V,A): 3.9 CM^2
BH CV ECHO MEAS - PVA(V,D): 3.9 CM^2
BH CV ECHO MEAS - QP/QS: 0.73
BH CV ECHO MEAS - RAP SYSTOLE: 8 MMHG
BH CV ECHO MEAS - RV MAX PG: 1.3 MMHG
BH CV ECHO MEAS - RV MEAN PG: 1 MMHG
BH CV ECHO MEAS - RV V1 MAX: 56.8 CM/SEC
BH CV ECHO MEAS - RV V1 MEAN: 40.1 CM/SEC
BH CV ECHO MEAS - RV V1 VTI: 9.9 CM
BH CV ECHO MEAS - RVOT AREA: 4.9 CM^2
BH CV ECHO MEAS - RVOT DIAM: 2.5 CM
BH CV ECHO MEAS - RVSP: 33 MMHG
BH CV ECHO MEAS - SI(AO): 13.7 ML/M^2
BH CV ECHO MEAS - SI(CUBED): 2.8 ML/M^2
BH CV ECHO MEAS - SI(LVOT): 4.2 ML/M^2
BH CV ECHO MEAS - SI(MOD-SP2): 3.5 ML/M^2
BH CV ECHO MEAS - SI(MOD-SP4): 4.1 ML/M^2
BH CV ECHO MEAS - SI(TEICH): 2.6 ML/M^2
BH CV ECHO MEAS - SUP REN AO DIAM: 2.7 CM
BH CV ECHO MEAS - SV(AO): 215.8 ML
BH CV ECHO MEAS - SV(CUBED): 44.3 ML
BH CV ECHO MEAS - SV(LVOT): 66.2 ML
BH CV ECHO MEAS - SV(MOD-SP2): 55 ML
BH CV ECHO MEAS - SV(MOD-SP4): 65 ML
BH CV ECHO MEAS - SV(RVOT): 48.4 ML
BH CV ECHO MEAS - SV(TEICH): 40.7 ML
BH CV ECHO MEAS - TAPSE (>1.6): 2 CM
BH CV ECHO MEAS - TR MAX VEL: 250 CM/SEC
BH CV ECHO MEASUREMENTS AVERAGE E/E' RATIO: 6.27
BH CV VAS BP LEFT ARM: NORMAL MMHG
BH CV XLRA - RV BASE: 3 CM
BH CV XLRA - RV LENGTH: 7.7 CM
BH CV XLRA - RV MID: 2.4 CM
BH CV XLRA - TDI S': 12.4 CM/SEC
BILIRUB SERPL-MCNC: 0.4 MG/DL (ref 0–1.2)
BUN SERPL-MCNC: 41 MG/DL (ref 8–23)
BUN/CREAT SERPL: 27.5 (ref 7–25)
CALCIUM SPEC-SCNC: 9.7 MG/DL (ref 8.6–10.5)
CHLORIDE SERPL-SCNC: 100 MMOL/L (ref 98–107)
CLOSE TME COLL+ADP + EPINEP PNL BLD: 84 %
CO2 SERPL-SCNC: 22.8 MMOL/L (ref 22–29)
CREAT SERPL-MCNC: 1.49 MG/DL (ref 0.76–1.27)
DEPRECATED RDW RBC AUTO: 47.3 FL (ref 37–54)
ERYTHROCYTE [DISTWIDTH] IN BLOOD BY AUTOMATED COUNT: 14.5 % (ref 12.3–15.4)
FOLATE SERPL-MCNC: 13.4 NG/ML (ref 4.78–24.2)
GFR SERPL CREATININE-BSD FRML MDRD: 46 ML/MIN/1.73
GLOBULIN UR ELPH-MCNC: 2.2 GM/DL
GLUCOSE BLDC GLUCOMTR-MCNC: 154 MG/DL (ref 70–130)
GLUCOSE BLDC GLUCOMTR-MCNC: 185 MG/DL (ref 70–130)
GLUCOSE BLDC GLUCOMTR-MCNC: 283 MG/DL (ref 70–130)
GLUCOSE BLDC GLUCOMTR-MCNC: 343 MG/DL (ref 70–130)
GLUCOSE SERPL-MCNC: 162 MG/DL (ref 65–99)
HCT VFR BLD AUTO: 38.1 % (ref 37.5–51)
HGB BLD-MCNC: 12.7 G/DL (ref 13–17.7)
LDH SERPL-CCNC: 171 U/L (ref 135–225)
LEFT ATRIUM VOLUME INDEX: 27.9 ML/M2
LV EF 2D ECHO EST: 59 %
LYMPHOCYTES # BLD MANUAL: 7.41 10*3/MM3 (ref 0.7–3.1)
LYMPHOCYTES NFR BLD MANUAL: 47 % (ref 19.6–45.3)
LYMPHOCYTES NFR BLD MANUAL: 5 % (ref 5–12)
MCH RBC QN AUTO: 29.7 PG (ref 26.6–33)
MCHC RBC AUTO-ENTMCNC: 33.3 G/DL (ref 31.5–35.7)
MCV RBC AUTO: 89.2 FL (ref 79–97)
MONOCYTES # BLD AUTO: 0.79 10*3/MM3 (ref 0.1–0.9)
NEUTROPHILS # BLD AUTO: 7.09 10*3/MM3 (ref 1.7–7)
NEUTROPHILS NFR BLD MANUAL: 45 % (ref 42.7–76)
NRBC BLD AUTO-RTO: 0 /100 WBC (ref 0–0.2)
PLAT MORPH BLD: NORMAL
PLATELET # BLD AUTO: 65 10*3/MM3 (ref 140–450)
PLATELET # BLD AUTO: 65 10*3/MM3 (ref 140–450)
PLATELETS.RETICULATED NFR BLD AUTO: 2.1 % (ref 0.9–6.5)
PMV BLD AUTO: 8.9 FL (ref 6–12)
POTASSIUM SERPL-SCNC: 5 MMOL/L (ref 3.5–5.2)
PROT SERPL-MCNC: 6.4 G/DL (ref 6–8.5)
RBC # BLD AUTO: 4.27 10*6/MM3 (ref 4.14–5.8)
RBC MORPH BLD: NORMAL
SINUS: 4.1 CM
SODIUM SERPL-SCNC: 135 MMOL/L (ref 136–145)
STJ: 3.1 CM
URATE SERPL-MCNC: 7.1 MG/DL (ref 3.4–7)
VARIANT LYMPHS NFR BLD MANUAL: 3 % (ref 0–5)
WBC # BLD AUTO: 15.76 10*3/MM3 (ref 3.4–10.8)
WBC MORPH BLD: NORMAL

## 2021-02-16 PROCEDURE — 83615 LACTATE (LD) (LDH) ENZYME: CPT | Performed by: INTERNAL MEDICINE

## 2021-02-16 PROCEDURE — 82962 GLUCOSE BLOOD TEST: CPT

## 2021-02-16 PROCEDURE — 85576 BLOOD PLATELET AGGREGATION: CPT | Performed by: THORACIC SURGERY (CARDIOTHORACIC VASCULAR SURGERY)

## 2021-02-16 PROCEDURE — 88184 FLOWCYTOMETRY/ TC 1 MARKER: CPT | Performed by: INTERNAL MEDICINE

## 2021-02-16 PROCEDURE — 82746 ASSAY OF FOLIC ACID SERUM: CPT | Performed by: INTERNAL MEDICINE

## 2021-02-16 PROCEDURE — 85055 RETICULATED PLATELET ASSAY: CPT | Performed by: INTERNAL MEDICINE

## 2021-02-16 PROCEDURE — 85007 BL SMEAR W/DIFF WBC COUNT: CPT | Performed by: INTERNAL MEDICINE

## 2021-02-16 PROCEDURE — 99222 1ST HOSP IP/OBS MODERATE 55: CPT | Performed by: INTERNAL MEDICINE

## 2021-02-16 PROCEDURE — 80053 COMPREHEN METABOLIC PANEL: CPT | Performed by: INTERNAL MEDICINE

## 2021-02-16 PROCEDURE — 88182 CELL MARKER STUDY: CPT

## 2021-02-16 PROCEDURE — 25010000002 PERFLUTREN (DEFINITY) 8.476 MG IN SODIUM CHLORIDE (PF) 0.9 % 10 ML INJECTION: Performed by: THORACIC SURGERY (CARDIOTHORACIC VASCULAR SURGERY)

## 2021-02-16 PROCEDURE — 93306 TTE W/DOPPLER COMPLETE: CPT

## 2021-02-16 PROCEDURE — 63710000001 INSULIN REGULAR HUMAN PER 5 UNITS: Performed by: THORACIC SURGERY (CARDIOTHORACIC VASCULAR SURGERY)

## 2021-02-16 PROCEDURE — 85025 COMPLETE CBC W/AUTO DIFF WBC: CPT | Performed by: INTERNAL MEDICINE

## 2021-02-16 PROCEDURE — 97162 PT EVAL MOD COMPLEX 30 MIN: CPT

## 2021-02-16 PROCEDURE — 93306 TTE W/DOPPLER COMPLETE: CPT | Performed by: INTERNAL MEDICINE

## 2021-02-16 PROCEDURE — 84550 ASSAY OF BLOOD/URIC ACID: CPT | Performed by: INTERNAL MEDICINE

## 2021-02-16 PROCEDURE — 97530 THERAPEUTIC ACTIVITIES: CPT

## 2021-02-16 PROCEDURE — 88185 FLOWCYTOMETRY/TC ADD-ON: CPT | Performed by: INTERNAL MEDICINE

## 2021-02-16 RX ADMIN — SERTRALINE 100 MG: 100 TABLET, FILM COATED ORAL at 08:34

## 2021-02-16 RX ADMIN — ASPIRIN 81 MG: 81 TABLET, COATED ORAL at 08:34

## 2021-02-16 RX ADMIN — ATORVASTATIN CALCIUM 20 MG: 20 TABLET, FILM COATED ORAL at 08:34

## 2021-02-16 RX ADMIN — LEVOTHYROXINE SODIUM 200 MCG: 0.1 TABLET ORAL at 05:27

## 2021-02-16 RX ADMIN — GABAPENTIN 300 MG: 300 CAPSULE ORAL at 20:47

## 2021-02-16 RX ADMIN — DONEPEZIL HYDROCHLORIDE 10 MG: 10 TABLET, FILM COATED ORAL at 08:34

## 2021-02-16 RX ADMIN — TEMAZEPAM 7.5 MG: 7.5 CAPSULE ORAL at 20:47

## 2021-02-16 RX ADMIN — PERFLUTREN 3 ML: 6.52 INJECTION, SUSPENSION INTRAVENOUS at 18:15

## 2021-02-16 RX ADMIN — INSULIN HUMAN 12 UNITS: 100 INJECTION, SOLUTION PARENTERAL at 10:45

## 2021-02-16 RX ADMIN — INSULIN HUMAN 16 UNITS: 100 INJECTION, SOLUTION PARENTERAL at 21:12

## 2021-02-16 RX ADMIN — SODIUM CHLORIDE, PRESERVATIVE FREE 3 ML: 5 INJECTION INTRAVENOUS at 22:03

## 2021-02-16 RX ADMIN — INSULIN HUMAN 4 UNITS: 100 INJECTION, SOLUTION PARENTERAL at 17:41

## 2021-02-16 NOTE — PLAN OF CARE
Goal Outcome Evaluation:  Plan of Care Reviewed With: patient     Outcome Summary: Pt is a 74 yo M who was admitted from MD office with generalized weakness and evidence of CAD. Pt is undergoing work up for possible CABG. Pt presents to PT with impaired functional mobility and gait secondary to impaired balance, some impaired coordination, and decreased activity tolerance. Pt may benefit from skilled PT to address strength, mobility, and gait.Patient was intermittently wearing a face mask during this therapy encounter. Therapist used appropriate personal protective equipment including eye protection, mask, and gloves.  Mask used was standard procedure mask. Appropriate PPE was worn during the entire therapy session. Hand hygiene was completed before and after therapy session. Patient is not in enhanced droplet precautions.

## 2021-02-16 NOTE — PROGRESS NOTES
Discharge Planning Assessment  [facility]     Patient Name: Cosmo Gauthier  MRN: [id number]  Today's Date: 2/16/2021    Admit Date: 2/15/2021    Discharge Needs Assessment     Row Name 02/16/21 1407       Living Environment    Lives With  spouse;child(marcelino), adult;grandchild(marcelino)    Name(s) of Who Lives With Patient  Carla Gauthier, spouse; Adult daughter Neelima and 26 year old grandson    Current Living Arrangements  home/apartment/condo    Primary Care Provided by  self    Provides Primary Care For  pet(s) 4 dogs    Family Caregiver if Needed  child(marcelino), adult;spouse    Family Caregiver Names  Carla, spouse and Neelima, daughter    Quality of Family Relationships  helpful;involved;supportive    Able to Return to Prior Arrangements  yes       Resource/Environmental Concerns    Resource/Environmental Concerns  none    Transportation Concerns  car, none       Transition Planning    Patient/Family Anticipates Transition to  home with family    Patient/Family Anticipated Services at Transition  home health care;    Transportation Anticipated  family or friend will provide       Discharge Needs Assessment    Readmission Within the Last 30 Days  no previous admission in last 30 days    Equipment Currently Used at Home  cane, straight;cpap;glucometer;grab bar;shower chair    Concerns to be Addressed  discharge planning    Anticipated Changes Related to Illness  none    Equipment Needed After Discharge  none    Discharge Facility/Level of Care Needs  home with home health    Provided Post Acute Provider List?  Yes    Post Acute Provider List  Home Health    Delivered To  Patient    Method of Delivery  In person    Patient's Choice of Community Agency(s)  [facility]        Discharge Plan     Row Name 02/16/21 1417       Plan    Plan  2/16 Home w/ spouse;  Home Health referral pending.    Plan Comments  CCP spoke with Pt at bedside.  CCP donned a mask and face shield.  CCP introduced self and  role.  Pt confirmed information on face sheet.  Pt stated he is IADL'S, retired & drives.  Pt lives with his spouse, Carla Gauthier ([phone]), adult daughter Neelima and 26 year old grandson in a single story house with four entrance stair steps.  Pt reports PCP is Alex Buckley MD.  Pt confirms pharmacy as [facility] in [place], [place].  Pt chose to use [facility] at discharge.  Pt denies issues with affording his medications.  Pt reports he has used home health in the past but cannot recall the agency name.  Pt declines past sub-acute rehab.  [facility]  Pt reports he occasionally uses a straight cane.  Pt has a shower chair, glucometer and grab bars at home.  Pt reports he plans to return home with his family’s assistance at discharge.  Pt chose [facility] to follow him at d/c.  Pt scheduled for open heart surgery tomorrow, 2/17/2021.  CCP sent home health referral to [facility] .  CCP will continue to follow…GI STALLINGS/[facility]        Continued Care and Services - Admitted Since 2/15/2021     Home Medical Care     Service Provider Request Status Selected Services Address Phone Fax Patient Preferred    [facility]- ,[place]  Pending - Request Sent N/A [id number], [address], [facility] [address] [phone] --                Demographic Summary     Row Name 02/16/21 1406       General Information    Admission Type  inpatient    Arrived From  home    Required Notices Provided  Important Message from Medicare    Referral Source  admission list;physician    Reason for Consult  discharge planning    Preferred Language  English     Used During This Interaction  no        Functional Status     Row Name 02/16/21 1407       Functional Status    Usual Activity Tolerance  good    Current Activity Tolerance  good       Functional Status, IADL    Medications  independent    Meal Preparation  independent     Housekeeping  independent    Laundry  independent    Shopping  independent       Mental Status    General Appearance WDL  WDL       Mental Status Summary    Recent Changes in Mental Status/Cognitive Functioning  no changes       Employment/    Employment Status  retired        Psychosocial    No documentation.       Abuse/Neglect    No documentation.       Legal    No documentation.       Substance Abuse    No documentation.       Patient Forms    No documentation.           Margaret Suero RN

## 2021-02-16 NOTE — PLAN OF CARE
Goal Outcome Evaluation:  Plan of Care Reviewed With: patient  Progress: no change  Outcome Summary: NSR, all VSS. No reports of chest pain or SOB. Ambulating with assist x1. Undergoing workup for possible CABG.

## 2021-02-16 NOTE — CONSULTS
Referring Provider: Jr Tom Tubbs MD  Reason for Consultation: MARKUS and hyperkalemia    Subjective     Chief complaint No chief complaint on file.      History of present illness:        75 years old white male with a past medical history of hypertension, diabetes mellitus type 2, history of coronary artery disease status post stenting in the past, obesity and dyslipidemia as well as low-grade lymphoma and history of ITP who was admitted from Dr. Tubbs office for open heart surgery.  Patient was found to have acute kidney injury and severe hyperkalemia so we were consulted to help with management.  Bedside bladder ultrasound showed urinary retention.  Patient is poor historian and history was taken mainly from the chart and medical staff.  He denies dysuria or urgency.  He denied taking potassium at home or NSAID.            Past Medical History:   Diagnosis Date   • Anxiety and depression    • Arthritis    • Coronary artery disease    • Dementia (CMS/HCC)    • Diabetes mellitus (CMS/HCC)    • Disease of thyroid gland    • Elevated cholesterol    • GERD (gastroesophageal reflux disease)    • Hypertension    • Lymphoma (CMS/HCC)     History of lymphoma, has been in remission   • Sleep apnea      Past Surgical History:   Procedure Laterality Date   • HERNIA REPAIR     • JOINT REPLACEMENT       History reviewed. No pertinent family history.  Social History     Tobacco Use   • Smoking status: Never Smoker   • Smokeless tobacco: Never Used   Substance Use Topics   • Alcohol use: Not Currently     Comment: QUIT DRINKING 32 YEARS AGO   • Drug use: Never     Medications Prior to Admission   Medication Sig Dispense Refill Last Dose   • amitriptyline (ELAVIL) 50 MG tablet Take 50 mg by mouth Every Night.      • DONEPEZIL HCL PO Take 10 mg by mouth Daily.      • ferrous sulfate 325 (65 FE) MG tablet Take 325 mg by mouth 3 (Three) Times a Day.      • gabapentin (NEURONTIN) 300 MG capsule Take 300 mg by mouth Every  "Night.      • glipizide (GLUCOTROL) 10 MG tablet Take 10 mg by mouth 2 (Two) Times a Day Before Meals.      • insulin glargine (LANTUS, SEMGLEE) 100 UNIT/ML injection Inject 30 Units under the skin into the appropriate area as directed Every Night.      • insulin lispro (humaLOG, ADMELOG) 100 UNIT/ML injection Inject 15 Units under the skin into the appropriate area as directed 3 (Three) Times a Day Before Meals.      • isosorbide mononitrate (IMDUR) 60 MG 24 hr tablet Take 60 mg by mouth Daily.      • levothyroxine (SYNTHROID, LEVOTHROID) 200 MCG tablet Take 200 mcg by mouth Every Morning.      • lisinopril (PRINIVIL,ZESTRIL) 5 MG tablet Take 10 mg by mouth Daily. Take 0.5 tablet daily      • metFORMIN (GLUCOPHAGE) 1000 MG tablet Take 1,000 mg by mouth 2 (Two) Times a Day With Meals.      • nadolol (CORGARD) 40 MG tablet Take 40 mg by mouth Daily.      • pantoprazole (PROTONIX) 40 MG EC tablet Take 40 mg by mouth Daily.      • predniSONE (DELTASONE) 5 MG tablet Take 20 mg by mouth Daily.      • sertraline (ZOLOFT) 100 MG tablet Take 100 mg by mouth Daily. Take 1.5 tablets daily      • simvastatin (ZOCOR) 40 MG tablet Take 40 mg by mouth Every Night. Take 0.5 tablets every night      • vitamin B-12 (CYANOCOBALAMIN) 1000 MCG tablet Take 2,000 mcg by mouth Every Night.      • vitamin D3 125 MCG (5000 UT) capsule capsule Take 5,000 Units by mouth Daily.        Allergies:  Patient has no known allergies.    Review of Systems  Pertinent items are noted in HPI.    Objective     Vital Signs  Temp:  [97.6 °F (36.4 °C)-98.2 °F (36.8 °C)] 98.2 °F (36.8 °C)  Heart Rate:  [75-97] 85  Resp:  [16-22] 16  BP: (105-131)/(82-85) 126/83    Flowsheet Rows      First Filed Value   Admission Height  182.9 cm (72\") Documented at 02/15/2021 1404   Admission Weight  115 kg (252 lb 8 oz) Documented at 02/15/2021 1404           No intake/output data recorded.  I/O last 3 completed shifts:  In: 480 [P.O.:480]  Out: 9100 " [Urine:2990]    Intake/Output Summary (Last 24 hours) at 2/16/2021 0959  Last data filed at 2/16/2021 0410  Gross per 24 hour   Intake 480 ml   Output 2990 ml   Net -2510 ml       Physical Exam:     General Appearance:    Alert, cooperative, in no acute distress   Head:    Normocephalic, without obvious abnormality, atraumatic   Eyes:            Lids and lashes normal, conjunctivae and sclerae normal, no   icterus, no pallor, corneas clear, PERRLA   Ears:    Ears appear intact with no abnormalities noted   Throat:   No oral lesions, no thrush, oral mucosa moist   Neck:   No adenopathy, supple, trachea midline, no thyromegaly, no     carotid bruit, no JVD   Back:     No kyphosis present, no scoliosis present, no skin lesions,       erythema or scars, no tenderness to percussion or                   palpation,   range of motion normal   Lungs:     Clear to auscultation,respirations regular, even and                   unlabored    Heart:    Regular rhythm and normal rate, normal S1 and S2, no            murmur, no gallop, no rub, no click       Abdomen:     Normal bowel sounds, no masses, no organomegaly, soft        non-tender, non-distended, no guarding, no rebound                 tenderness       Extremities:   Moves all extremities well, no edema, no cyanosis, no              redness   Pulses:   Pulses palpable and equal bilaterally   Skin:   No bleeding, bruising or rash               Results Review:  Results from last 7 days   Lab Units 02/16/21  0351 02/15/21  2039 02/15/21  1648 02/15/21  1404   SODIUM mmol/L 135* 136 136 134*   POTASSIUM mmol/L 5.0 5.0 7.1* 6.6*   CHLORIDE mmol/L 100 103 105 102   CO2 mmol/L 22.8 22.4 21.9* 22.3   BUN mg/dL 41* 46* 43* 43*   CREATININE mg/dL 1.49* 1.71* 1.63* 1.74*   CALCIUM mg/dL 9.7 9.8 9.1 9.8   BILIRUBIN mg/dL 0.4 0.3  --  0.4   ALK PHOS U/L 84 85  --  94   ALT (SGPT) U/L 17 19  --  22   AST (SGOT) U/L 17 12  --  15   GLUCOSE mg/dL 162* 142* 101* 138*       Estimated  Creatinine Clearance: 41.3 mL/min (A) (by C-G formula based on SCr of 1.49 mg/dL (H)).    Results from last 7 days   Lab Units 02/15/21  1404   MAGNESIUM mg/dL 1.7       Results from last 7 days   Lab Units 02/16/21  0351 02/15/21  1648   WBC 10*3/mm3 15.76* 16.49*   HEMOGLOBIN g/dL 12.7* 12.9*   PLATELETS 10*3/mm3 65*  65* 69*  59*       Results from last 7 days   Lab Units 02/15/21  1404   INR  0.97       Active Medications  aspirin, 81 mg, Oral, Daily  atorvastatin, 20 mg, Oral, Daily  ceFAZolin, 2 g, Intravenous, On Call to OR  chlorhexidine, 15 mL, Mouth/Throat, Q12H  Chlorhexidine Gluconate Cloth, 1 application, Topical, Q12H  donepezil, 10 mg, Oral, Daily  gabapentin, 300 mg, Oral, Nightly  insulin regular, 0-24 Units, Subcutaneous, Q6H  levothyroxine, 200 mcg, Oral, QAM  metoprolol tartrate, 12.5 mg, Oral, On Call to OR  mupirocin, 1 application, Each Nare, Q12H  predniSONE, 20 mg, Oral, Daily  sertraline, 100 mg, Oral, Daily  sodium chloride, 3 mL, Intravenous, Q12H           Assessment/Plan       CAD (coronary artery disease)    Morbidly obese (CMS/HCC)    CAD (coronary artery disease), native coronary artery    Lymphoma (CMS/HCC)    Diabetes mellitus (CMS/HCC)    Dementia (CMS/HCC)    Stage 3b chronic kidney disease (CMS/HCC)    Thrombocytopenia (CMS/HCC)    Disease of thyroid gland      -Acute kidney injury on chronic kidney disease stage III associated with severe hyperkalemia: Etiology is likely related to urinary retention that has improved with Kaur catheter placement as well as side effect of ACE inhibitor.  There is no evidence of hemolysis based on LDH and haptoglobin.    Will keep Kaur catheter till after surgery.  Continue to hold ACE inhibitor and other nephrotoxic medication.  We will give additional dose of diuretic.  Okay to proceed with surgery tomorrow  Cussed with medical staff and oncology    -Severe hyperkalemia with potassium 7.1 on admission and down to 5 today.  Okay to proceed  with surgery tomorrow morning   -Chronic kidney disease stage III  -Coronary artery disease plan for surgery tomorrow morning  -History of lymphoma: Oncology on board  -History of ITP  -Early dementia      Miya Landis MD  02/16/21  09:59 EST

## 2021-02-16 NOTE — CONSULTS
REASON FOR CONSULTATION: 1. Leukocytosis     2.  History of lymphoma followed by Dr. Saldana in [place]     3.  Chronic thrombocytopenia     Provide an opinion on any further workup or treatment                             REQUESTING PHYSICIAN: Dr. Tom Milligan     RECORDS OBTAINED:  Records of the patients history including those obtained from the referring provider were reviewed and summarized in detail.     HISTORY OF PRESENT ILLNESS:  The patient is a 75 y.o. year old male who is here for an opinion about the above issue.     History of Present Illness      Patient is a 75-year-old gentleman who has history of diabetes type 2, hypothyroidism, history of coronary artery disease s/p stents, hyperlipidemia, obesity who developed progressively worsening dyspnea on exertion.  He did not have any palpitations and denied nausea vomiting chest pain.  Dr. Milligan had done cardiac cath which showed severe two-vessel coronary artery disease with 100% LAD occlusion and 100% RCA occlusion.  His ejection fraction on echocardiogram was 50 to 60%.  Actually it was on transthoracic echocardiogram.  He was admitted today for surgery tomorrow.     Patient apparently has had a history of lymphoma in the past and was seen by hematologist.  He has been followed by Dr. Saldana [phone] at [place].  Apparently he has had chronic thrombocytopenia.  When patient had CBC today, his hemoglobin is 12.9, white count of 16.49, platelet of 59.  He has 59% neutrophils, 14% lymphocytes 6% monocytes, 20% atypical lymphocytes.  LDH is 175.  Urine analysis is negative except glucose 100 mg per DL.  PT PTT were normal.  BUN is 43 and creatinine of 1.63.  Patient has a potassium of 7.1.  COVID-19 not detected.     Patient has had significant Chiari malformation.  He also has chronic back pain with multilevel degenerative disease on the spine.  He is followed by neurology at [facility].  He has sleep apnea on CPAP machine        We  have been contacted in order to evaluate the leukocytosis with atypical lymphocytes and history of lymphoma as well as thrombocytopenia.     Medical History[]Expand by Default        Past Medical History:   Diagnosis Date   • Anxiety and depression     • Arthritis     • Coronary artery disease     • Diabetes mellitus (CMS/HCC)     • Disease of thyroid gland     • Elevated cholesterol     • GERD (gastroesophageal reflux disease)     • Hypertension     • Sleep apnea        ·  Follicular lymphoma   · Chronic thrombocytopenia       Surgical History[]Expand by Default         Past Surgical History:   Procedure Laterality Date   • HERNIA REPAIR       • JOINT REPLACEMENT                No current facility-administered medications on file prior to encounter.              Current Outpatient Medications on File Prior to Encounter   Medication Sig Dispense Refill   • amitriptyline (ELAVIL) 50 MG tablet Take 50 mg by mouth Every Night.       • clopidogrel (PLAVIX) 75 MG tablet Take 75 mg by mouth Daily.       • DONEPEZIL HCL PO Take 10 mg by mouth Daily.       • ferrous sulfate 325 (65 FE) MG tablet Take 325 mg by mouth 3 (Three) Times a Day.       • gabapentin (NEURONTIN) 300 MG capsule Take 300 mg by mouth Every Night.       • glipizide (GLUCOTROL) 10 MG tablet Take 10 mg by mouth 2 (Two) Times a Day Before Meals.       • insulin glargine (LANTUS, SEMGLEE) 100 UNIT/ML injection Inject 30 Units under the skin into the appropriate area as directed Every Night.       • insulin lispro (humaLOG, ADMELOG) 100 UNIT/ML injection Inject 15 Units under the skin into the appropriate area as directed 3 (Three) Times a Day Before Meals.       • isosorbide mononitrate (IMDUR) 60 MG 24 hr tablet Take 60 mg by mouth Daily.       • levothyroxine (SYNTHROID, LEVOTHROID) 200 MCG tablet Take 200 mcg by mouth Every Morning.       • lisinopril (PRINIVIL,ZESTRIL) 5 MG tablet Take 10 mg by mouth Daily. Take 0.5 tablet daily       • metFORMIN  (GLUCOPHAGE) 1000 MG tablet Take 1,000 mg by mouth 2 (Two) Times a Day With Meals.       • nadolol (CORGARD) 40 MG tablet Take 40 mg by mouth Daily.       • pantoprazole (PROTONIX) 40 MG EC tablet Take 40 mg by mouth Daily.       • predniSONE (DELTASONE) 5 MG tablet Take 5 mg by mouth 4 (Four) Times a Day.       • sertraline (ZOLOFT) 100 MG tablet Take 100 mg by mouth Daily. Take 1.5 tablets daily       • simvastatin (ZOCOR) 40 MG tablet Take 40 mg by mouth Every Night. Take 0.5 tablets every night       • vitamin B-12 (CYANOCOBALAMIN) 1000 MCG tablet Take 2,000 mcg by mouth Every Night.       • vitamin D3 125 MCG (5000 UT) capsule capsule Take 5,000 Units by mouth Daily.             ALLERGIES:  No Known Allergies      Social History   Social History      Socioeconomic History   • Marital status:        Spouse name: Not on file   • Number of children: Not on file   • Years of education: Not on file   • Highest education level: Not on file   Tobacco Use   • Smoking status: Never Smoker   • Smokeless tobacco: Never Used   Substance and Sexual Activity   • Alcohol use: Not Currently       Comment: QUIT DRINKING 32 YEARS AGO   • Drug use: Never            History reviewed. No pertinent family history.      Review of Systems   Constitutional: Positive for activity change and fatigue. Negative for appetite change, diaphoresis, fever and unexpected weight change.   HENT: Negative for mouth sores and nosebleeds.    Eyes: Negative for photophobia and visual disturbance.   Respiratory: Positive for shortness of breath. Negative for cough.    Cardiovascular: Negative for chest pain and leg swelling.   Gastrointestinal: Negative for abdominal pain, blood in stool and nausea.   Endocrine: Negative for cold intolerance.   Genitourinary: Negative for dysuria and hematuria.   Musculoskeletal: Negative for arthralgias and joint swelling.   Skin: Negative for color change and pallor.   Allergic/Immunologic: Negative for  "immunocompromised state.   Neurological: Negative for dizziness, syncope and headaches.   Hematological: Negative for adenopathy. Does not bruise/bleed easily.   Psychiatric/Behavioral: Negative for agitation and confusion.            Objective         Vitals        Vitals:     02/15/21 1404 02/15/21 1745   BP:   105/82   BP Location:   Left arm   Patient Position:   Sitting   Pulse: 84 75   Resp: 22 16   Temp:   98.2 °F (36.8 °C)   TempSrc: Oral Oral   SpO2:   93%   Weight: 115 kg (252 lb 8 oz)     Height: 182.9 cm (72\")          No flowsheet data found.     Physical Exam  Constitutional:       General: He is not in acute distress.     Appearance: Normal appearance.   Eyes:      General: No scleral icterus.     Conjunctiva/sclera: Conjunctivae normal.   Neck:      Musculoskeletal: Neck supple.   Cardiovascular:      Rate and Rhythm: Normal rate and regular rhythm.      Pulses: Normal pulses.      Heart sounds: Normal heart sounds.   Pulmonary:      Effort: Pulmonary effort is normal.      Breath sounds: Normal breath sounds.   Abdominal:      General: Bowel sounds are normal.      Palpations: Abdomen is soft.   Musculoskeletal:      Right lower leg: No edema.      Left lower leg: No edema.   Lymphadenopathy:      Cervical: No cervical adenopathy.   Skin:     Coloration: Skin is not jaundiced.      Findings: No rash.   Neurological:      General: No focal deficit present.      Mental Status: He is alert and oriented to person, place, and time.   Psychiatric:         Mood and Affect: Mood normal.         Thought Content: Thought content normal.            RECENT LABS:           Results from last 7 days   Lab Units 02/16/21  0351 02/15/21  1648   WBC 10*3/mm3 15.76* 16.49*   HEMOGLOBIN g/dL 12.7* 12.9*   HEMATOCRIT % 38.1 38.1   PLATELETS 10*3/mm3 65*  65* 69*  59*   MONOCYTES % % 5.0 6.0              Results from last 7 days   Lab Units 02/16/21  0351 02/15/21  2039 02/15/21  1648 02/15/21  1404   SODIUM mmol/L " 135* 136 136 134*   POTASSIUM mmol/L 5.0 5.0 7.1* 6.6*   CHLORIDE mmol/L 100 103 105 102   CO2 mmol/L 22.8 22.4 21.9* 22.3   BUN mg/dL 41* 46* 43* 43*   CREATININE mg/dL 1.49* 1.71* 1.63* 1.74*   CALCIUM mg/dL 9.7 9.8 9.1 9.8   BILIRUBIN mg/dL 0.4 0.3  --  0.4   ALK PHOS U/L 84 85  --  94   ALT (SGPT) U/L 17 19  --  22   AST (SGOT) U/L 17 12  --  15   GLUCOSE mg/dL 162* 142* 101* 138*            Lab Results   Component Value Date     IRON 66 02/15/2021     TIBC 317 02/15/2021     FERRITIN 296.00 02/15/2021            Lab Results   Component Value Date     FOLATE 13.40 02/16/2021            Lab Results   Component Value Date     RUAOUWNL30 1,986 (H) 02/15/2021      Component      Latest Ref Rng & Units 2/16/2021   IPF      0.90 - 6.50 % 2.10   Platelets      140 - 450 10*3/mm3 65 (L)   LDH      135 - 225 U/L 171            Assessment/Plan      *  History of lymphoma followed by hematologist Dr. Saldana at [place].   · I called her this morning on 2/16/2021 and spoke with Dr. Saldana, and was told the patient having low-grade follicular lymphoma who has never been treated previously with any chemotherapy or immunotherapy.  Dr. Saldana is planning to treat him may be using Rituxan in the near future.   · His lymphoma is indolent, should not interfere or delay his surgery.     *Chronic thrombocytopenia.   · According to his primary hematologist Dr. Saldana that patient used to have platelets in the 20-30,000 range.  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  Patient has been given prednisone 20 mg daily.  Platelets subsequently improved to the 60,000 range.   · Currently patient has platelets at around 60,000.  Patient has normal IPF 2.3% and 2.1% for 2 consecutive days.  There is no evidence of active platelets destruction/ITP, LDH is also normal.   · I reviewed his peripheral blood smear, there is no clumping of platelets.  Morphology complaints is normal.  · Since surgery team  prefers platelets to be around 100,000, we recommended to transfuse 2 units of pheresis platelets right before surgery.  I double confirmed with blood bank, there are already 2 units platelets being prepared for this patient.   · Postoperatively, platelets need to be followed, he may need extra platelets transfusion.      *Coronary artery disease, with 100% occlusion of LAD and RCA.   · Patient is likely having CABG surgery tomorrow on 3/17/2021.     * Leukocytosis with atypical lymphocytes.   · I reviewed his peripheral blood smear, there are many lymphocytes with dark nucleus occupies almost 90 to 95% of the surface, with very slim cytoplasm.  · Absolute lymphocytes 7410 on 2/16/2021.  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  Will obtain flow cytometry for further evaluation.     *.  Anemia of chronic kidney disease, check EPO level, iron studies.   · Iron study reported proper ferritin and iron saturation, excellent vitamin B12 level and normal folate.  No evidence for deficiency.    · I reviewed his peripheral blood, morphology of RBCs normal, no evidence of schistocytes or targeted cells.  · EPO level is pending.  · Patient has normal LDH, normal total bilirubin, and a normal haptoglobin.  No evidence of hemolysis.      *.  Hyperkalemia and acute renal injury.   · Potassium has normalized today.  Patient is seen by nephrology service.  · Laboratory study otherwise shows no evidence for tumor lysis syndrome. Will check uric acid level.        PLAN:  1. From hematology/medical oncology point of view, patient should go ahead with CABG surgery for his coronary artery disease.   2. We recommended transfusing 2 units pheresis platelets prior to his CABG surgery.  3. Monitor CBC closely, may need further transfusion of platelets pending postoperative bleeding situation.   4. Peripheral blood for flow cytometry study for further evaluation of lymphocytosis.   5. Check uric acid today.   6. Monitor CBC  daily.         Discussed with nephrologist Dr. Chavez.      Also spoke with cardiovascular surgery team Dr. Milligan. and  Gillian MAXWELL.      I discussed with my colleague Dr. Chin today.      I spoke with his primary oncologist Dr. Saldana from [place].     Thanks for letting us participate in the care of this patient!         VICKEY DIANA M.D., Ph.D.    2/16/2021

## 2021-02-16 NOTE — PROGRESS NOTES
" LOS: 1 day   Patient Care Team:  Alex Buckley MD as PCP - General (Internal Medicine)    Chief Complaint:   Dyspnea on exertion    Subjective:  Symptoms:  No shortness of breath or chest pain.    Diet:  No nausea.    Activity level: Impaired due to weakness.      \"I feel good this morning\"    Vital Signs  Temp:  [97.6 °F (36.4 °C)-98.2 °F (36.8 °C)] 98.2 °F (36.8 °C)  Heart Rate:  [75-97] 85  Resp:  [16-22] 16  BP: (105-131)/(82-85) 126/83      02/15/21  1404 02/15/21  2100   Weight: 115 kg (252 lb 8 oz) 66.2 kg (145 lb 14.4 oz)     Body mass index is 19.79 kg/m².    Intake/Output Summary (Last 24 hours) at 2/16/2021 0759  Last data filed at 2/16/2021 0410  Gross per 24 hour   Intake 480 ml   Output 2990 ml   Net -2510 ml     No intake/output data recorded.          Objective:  General Appearance:  In no acute distress and comfortable.    Vital signs: (most recent): Blood pressure 126/83, pulse 85, temperature 98.2 °F (36.8 °C), temperature source Oral, resp. rate 16, height 182.9 cm (72\"), weight 66.2 kg (145 lb 14.4 oz), SpO2 93 %.  Vital signs are normal.    Output: Producing urine and producing stool.    Lungs:  Normal effort and normal respiratory rate.  He is not in respiratory distress.  No wheezes.    Heart: Normal rate.  Regular rhythm.    Abdomen: Bowel sounds are normal.   There is no abdominal tenderness.     Pulses: Distal pulses are intact.    Neurological: Patient is alert and oriented to person, place and time.    Skin:  Warm and pale.              Physical Exam:   General Appearance: awake and alert, no acute distress   Lungs: clear to auscultation, respirations regular, respirations even and respirations unlabored   Heart: regular rhythm & normal rate, normal S1, S2, no murmur, no gallop, no rub and no click   Abdomen: bowel sounds present and normal in all 4 quadrants,     Neuro: alert and oriented, no focal deficits.     Results Review:        WBC WBC   Date Value Ref Range Status   02/16/2021 " 15.76 (H) 3.40 - 10.80 10*3/mm3 Final   02/15/2021 16.49 (H) 3.40 - 10.80 10*3/mm3 Final      HGB Hemoglobin   Date Value Ref Range Status   02/16/2021 12.7 (L) 13.0 - 17.7 g/dL Final   02/15/2021 12.9 (L) 13.0 - 17.7 g/dL Final      HCT Hematocrit   Date Value Ref Range Status   02/16/2021 38.1 37.5 - 51.0 % Final   02/15/2021 38.1 37.5 - 51.0 % Final      Platelets Platelets   Date Value Ref Range Status   02/16/2021 65 (L) 140 - 450 10*3/mm3 Final   02/16/2021 65 (L) 140 - 450 10*3/mm3 Final   02/15/2021 59 (L) 140 - 450 10*3/mm3 Final   02/15/2021 69 (L) 140 - 450 10*3/mm3 Final        PT/INR:    Protime   Date Value Ref Range Status   02/15/2021 12.7 11.7 - 14.2 Seconds Final   /  INR   Date Value Ref Range Status   02/15/2021 0.97 0.90 - 1.10 Final       Sodium Sodium   Date Value Ref Range Status   02/16/2021 135 (L) 136 - 145 mmol/L Final   02/15/2021 136 136 - 145 mmol/L Final   02/15/2021 136 136 - 145 mmol/L Final   02/15/2021 134 (L) 136 - 145 mmol/L Final      Potassium Potassium   Date Value Ref Range Status   02/16/2021 5.0 3.5 - 5.2 mmol/L Final   02/15/2021 5.0 3.5 - 5.2 mmol/L Final   02/15/2021 7.1 (C) 3.5 - 5.2 mmol/L Final   02/15/2021 6.6 (C) 3.5 - 5.2 mmol/L Final      Chloride Chloride   Date Value Ref Range Status   02/16/2021 100 98 - 107 mmol/L Final   02/15/2021 103 98 - 107 mmol/L Final   02/15/2021 105 98 - 107 mmol/L Final   02/15/2021 102 98 - 107 mmol/L Final      Bicarbonate CO2   Date Value Ref Range Status   02/16/2021 22.8 22.0 - 29.0 mmol/L Final   02/15/2021 22.4 22.0 - 29.0 mmol/L Final   02/15/2021 21.9 (L) 22.0 - 29.0 mmol/L Final   02/15/2021 22.3 22.0 - 29.0 mmol/L Final      BUN BUN   Date Value Ref Range Status   02/16/2021 41 (H) 8 - 23 mg/dL Final   02/15/2021 46 (H) 8 - 23 mg/dL Final   02/15/2021 43 (H) 8 - 23 mg/dL Final   02/15/2021 43 (H) 8 - 23 mg/dL Final      Creatinine Creatinine   Date Value Ref Range Status   02/16/2021 1.49 (H) 0.76 - 1.27 mg/dL Final    02/15/2021 1.71 (H) 0.76 - 1.27 mg/dL Final   02/15/2021 1.63 (H) 0.76 - 1.27 mg/dL Final   02/15/2021 1.74 (H) 0.76 - 1.27 mg/dL Final      Calcium Calcium   Date Value Ref Range Status   02/16/2021 9.7 8.6 - 10.5 mg/dL Final   02/15/2021 9.8 8.6 - 10.5 mg/dL Final   02/15/2021 9.1 8.6 - 10.5 mg/dL Final   02/15/2021 9.8 8.6 - 10.5 mg/dL Final      Magnesium Magnesium   Date Value Ref Range Status   02/15/2021 1.7 1.6 - 2.4 mg/dL Final          aspirin, 81 mg, Oral, Daily  atorvastatin, 20 mg, Oral, Daily  ceFAZolin, 2 g, Intravenous, On Call to OR  chlorhexidine, 15 mL, Mouth/Throat, Q12H  Chlorhexidine Gluconate Cloth, 1 application, Topical, Q12H  donepezil, 10 mg, Oral, Daily  gabapentin, 300 mg, Oral, Nightly  insulin regular, 0-24 Units, Subcutaneous, Q6H  levothyroxine, 200 mcg, Oral, QAM  metoprolol tartrate, 12.5 mg, Oral, On Call to OR  mupirocin, 1 application, Each Nare, Q12H  predniSONE, 20 mg, Oral, Daily  sertraline, 100 mg, Oral, Daily  sodium chloride, 3 mL, Intravenous, Q12H               Patient Active Problem List   Diagnosis Code   • CAD (coronary artery disease) I25.10   • Morbidly obese (CMS/[facility]) E66.01   • CAD (coronary artery disease), native coronary artery I25.10   • Lymphoma (CMS/[facility]) C85.90   • Diabetes mellitus (CMS/[facility]) E11.9   • Dementia (CMS/[facility]) F03.90   • Stage 3b chronic kidney disease (CMS/[facility]) N18.32   • Thrombocytopenia (CMS/[facility]) D69.6   • Disease of thyroid gland E07.9       Assessment & Plan    -Severe 2- vessel CAD   -Dyspnea on exertion   -Obesity  -Diabetes type 2  -Decreased mobility   -Hypothyroidism  -Hypertension  -YI  -hx of lymphoma   -chronic steroid use  -chronic back pain  -Hyperlipidemia   -CKD stage III-- renal consulted  -Thrombocytopenia-- plt count 65  -leukocytosis    Hematology oncology consulted with history of lymphoma and thrombocytopenia  Potassium 7 yesterday afternoon- renal consulted  No plans for surgery at this time until evaluated by renal and  hematology  Discussed with patient     Jazmine Lovett, APRN  02/16/21  07:59 EST

## 2021-02-16 NOTE — DISCHARGE PLACEMENT REQUEST
"Cosmo Gauthier (75 y.o. Male)     Date of Birth Social Security Number Address Home Phone MRN    1945  [facility] [address] [phone] [id number]    Nondenominational Marital Status          L[employer]-day        Admission Date Admission Type Admitting Provider Attending Provider Department, Room/Bed    2/15/21 Urgent Jr Tom Tubbs MD [place], Jr Samuel B, MD [facility] CARDIOVASC UNIT, 2221/1    Discharge Date Discharge Disposition Discharge Destination                       Attending Provider: Jr Tom Tubbs MD    Allergies: No Known Allergies    Isolation: None   Infection: None   Code Status: CPR    Ht: 182.9 cm (72\")   Wt: 66.2 kg (145 lb 14.4 oz)    Admission Cmt: None   Principal Problem: None                Active Insurance as of 2/15/2021     Primary Coverage     Payor Plan Insurance Group Employer/Plan Group    AETNA MEDICARE REPLACEMENT A[employer]NA MEDICARE REPLACEMENT [id number]     Payor Plan Address Payor Plan Phone Number Payor Plan Fax Number Effective Dates    [address] [phone]  1/1/2018 - None Entered    [facility] [id number]       Subscriber Name Subscriber Birth Date Member ID       COSMO GAUTHIER [id number]                 Emergency Contacts      (Rel.) Home Phone Work Phone Mobile Phone    ZEV GAUTHIER (Spouse) [phone] -- --          "

## 2021-02-16 NOTE — NURSING NOTE
Patient inadvertently pulled his IJ catheter. Dressing applied, no hematoma noted. He is unable to tell me if it was pulled accidentally by changing position or if he pulled it on purpose. Dr. Tomas notified. No new orders at this time.

## 2021-02-16 NOTE — THERAPY EVALUATION
Patient Name: Cosmo Gauthier  : 1945    MRN: [id number]                              Today's Date: 2021       Admit Date: 2/15/2021    Visit Dx: No diagnosis found.  Patient Active Problem List   Diagnosis   • CAD (coronary artery disease)   • Morbidly obese (CMS/HCC)   • CAD (coronary artery disease), native coronary artery   • Lymphoma (CMS/HCC)   • Diabetes mellitus (CMS/HCC)   • Dementia (CMS/HCC)   • Stage 3b chronic kidney disease (CMS/HCC)   • Thrombocytopenia (CMS/HCC)   • Disease of thyroid gland   • Anemia   • Lymphocytosis     Past Medical History:   Diagnosis Date   • Anxiety and depression    • Arthritis    • Coronary artery disease    • Dementia (CMS/HCC)    • Diabetes mellitus (CMS/HCC)    • Disease of thyroid gland    • Elevated cholesterol    • GERD (gastroesophageal reflux disease)    • Hypertension    • Lymphoma (CMS/HCC)     History of lymphoma, has been in remission   • Sleep apnea      Past Surgical History:   Procedure Laterality Date   • HERNIA REPAIR     • JOINT REPLACEMENT       General Information     Row Name 21 1115          Physical Therapy Time and Intention    Document Type  evaluation  -     Mode of Treatment  individual therapy;physical therapy  -     Row Name 21 1115          General Information    Prior Level of Function  independent:;gait;transfer;bed mobility pt reports he walks with a cane, reports some falls at home  -     Existing Precautions/Restrictions  fall  -     Barriers to Rehab  medically complex  -     Row Name 21 111          Cognition    Orientation Status (Cognition)  oriented x 3  -     Row Name 21 1115          Safety Issues, Functional Mobility    Impairments Affecting Function (Mobility)  balance;coordination;endurance/activity tolerance;shortness of breath  -       User Key  (r) = Recorded By, (t) = Taken By, (c) = Cosigned By    Initials Name Provider Type     Fang Roche, PT Physical Therapist         Mobility     [facility] Name 02/16/21 1116          Bed Mobility    Bed Mobility  supine-sit;sit-supine  -     Supine-Sit [place] (Bed Mobility)  standby assist  -     Sit-Supine [place] (Bed Mobility)  standby assist  -[facility] Name 02/16/21 1116          Sit-Stand Transfer    Sit-Stand [place] (Transfers)  verbal cues;nonverbal cues (demo/gesture);contact guard  -     Assistive Device (Sit-Stand Transfers)  -- HHA  -[facility] Name 02/16/21 1116          Gait/Stairs (Locomotion)    [place] Level (Gait)  verbal cues;nonverbal cues (demo/gesture);minimum assist (75% patient effort);2 person assist  -     Assistive Device (Gait)  -- HHA  -     Distance in Feet (Gait)  80  -     Deviations/Abnormal Patterns (Gait)  gonzalo decreased;gait speed decreased;steppage  -     Left Sided Gait Deviations  heel strike decreased  -     Comment (Gait/Stairs)  pt with posterior lean during ambulation, gait is unsteady, LOB with turns and side steps to \[facility] [place] [facility]\""  -       User Key  (r) = Recorded By, (t) = Taken By, (c) = Cosigned By    Initials Name Provider Type     Fang Roche, PT Physical Therapist        Obj/Interventions     [facility] Name 02/16/21 1118          Range of Motion Comprehensive    General Range of Motion  no range of motion deficits identified  -CH     Row Name 02/16/21 1118          Strength Comprehensive (MMT)    General Manual Muscle Testing (MMT) Assessment  no strength deficits identified  -     Comment, General Manual Muscle Testing (MMT) Assessment  B LE grossly 4+/5  -CH     Row Name 02/16/21 1118          Motor Skills    Therapeutic Exercise  -- 10 reps B LE AP and LAQ  -CH     Row Name 02/16/21 1118          Balance    Balance Assessment  sitting dynamic balance;standing dynamic balance  -     Dynamic Sitting Balance  mild impairment pt with posterior lean in sitting during LE exercises and MMT  -     Dynamic Standing Balance  moderate impairment  -        User Key  (r) = Recorded By, (t) = Taken By, (c) = Cosigned By    Initials Name Provider Type     Fang Roche PT Physical Therapist        Goals/Plan     Row Name 02/16/21 1123          Bed Mobility Goal 1 (PT)    Activity/Assistive Device (Bed Mobility Goal 1, PT)  bed mobility activities, all  -CH     [place] Level/Cues Needed (Bed Mobility Goal 1, PT)  supervision required  -CH     Time Frame (Bed Mobility Goal 1, PT)  1 week  -CH     Row Name 02/16/21 1123          Transfer Goal 1 (PT)    Activity/Assistive Device (Transfer Goal 1, PT)  transfers, all;cane, straight  -CH     [place] Level/Cues Needed (Transfer Goal 1, PT)  supervision required  -CH     Time Frame (Transfer Goal 1, PT)  1 week  -CH     Row Name 02/16/21 1123          Gait Training Goal 1 (PT)    Activity/Assistive Device (Gait Training Goal 1, PT)  gait (walking locomotion);cane, straight  -CH     [place] Level (Gait Training Goal 1, PT)  supervision required  -CH     Distance (Gait Training Goal 1, PT)  150  -CH     Time Frame (Gait Training Goal 1, PT)  1 week  -       User Key  (r) = Recorded By, (t) = Taken By, (c) = Cosigned By    Initials Name Provider Type    Fang Campbell PT Physical Therapist        Clinical Impression     Row Name 02/16/21 1120          Pain    Additional Documentation  Pain Scale: FACES Pre/Post-Treatment (Group)  -CH     Row Name 02/16/21 1120          Pain Scale: FACES Pre/Post-Treatment    Pain: FACES Scale, Pretreatment  0-->no hurt  -     Posttreatment Pain Rating  0-->no hurt  -CH     Row Name 02/16/21 1120          Plan of Care Review    Plan of Care Reviewed With  patient  -     Outcome Summary  Pt is a 76 yo M who was admitted from MD office with generalized weakness and evidence of CAD. Pt is undergoing work up for possible CABG. Pt presents to PT with impaired functional mobility and gait secondary to impaired balance, some impaired coordination, and decreased  activity tolerance. Pt may benefit from skilled PT to address strength, mobility, and gait.  -     Row Name 02/16/21 1120          Therapy Assessment/Plan (PT)    Patient/Family Therapy Goals Statement (PT)  to return to PLOF  -     Rehab Potential (PT)  good, to achieve stated therapy goals  -     Criteria for Skilled Interventions Met (PT)  skilled treatment is necessary  -     Row Name 02/16/21 1120          Positioning and Restraints    Pre-Treatment Position  in bed  -     Post Treatment Position  bed  -     In Bed  supine;call light within reach;encouraged to call for assist;exit alarm on  -       User Key  (r) = Recorded By, (t) = Taken By, (c) = Cosigned By    Initials Name Provider Type     Fang Roche, PT Physical Therapist        Outcome Measures     Row Name 02/16/21 1124          How much help from another person do you currently need...    Turning from your back to your side while in flat bed without using bedrails?  3  -CH     Moving from lying on back to sitting on the side of a flat bed without bedrails?  3  -CH     Moving to and from a bed to a chair (including a wheelchair)?  3  -CH     Standing up from a chair using your arms (e.g., wheelchair, bedside chair)?  3  -CH     Climbing 3-5 steps with a railing?  1  -CH     To walk in hospital room?  3  -     AM-PAC 6 Clicks Score (PT)  [facility]     Row Name 02/16/21 1124          Functional Assessment    Outcome Measure Options  AM-PAC 6 Clicks Basic Mobility (PT)  -       User Key  (r) = Recorded By, (t) = Taken By, (c) = Cosigned By    Initials Name Provider Type    Fang Campbell, PT Physical Therapist        Physical Therapy Education                 Title: PT OT SLP Therapies (Done)     Topic: Physical Therapy (Done)     Point: Mobility training (Done)     Learning Progress Summary           Patient Acceptance, E,TB,D, VU,NR by  at 2/16/2021 1124                   Point: Home exercise program (Done)     Learning  Progress Summary           Patient Acceptance, E,TB,D, VU,NR by  at 2/16/2021 1124                   Point: Body mechanics (Done)     Learning Progress Summary           Patient Acceptance, E,TB,D, VU,NR by  at 2/16/2021 1124                   Point: Precautions (Done)     Learning Progress Summary           Patient Acceptance, E,TB,D, VU,NR by  at 2/16/2021 1124                               User Key     Initials Effective Dates Name Provider Type [facility] 04/03/18 -  Fang Roche PT Physical Therapist PT              PT Recommendation and Plan  Planned Therapy Interventions (PT): balance training, bed mobility training, gait training, home exercise program, patient/family education, strengthening, transfer training  Plan of Care Reviewed With: patient  Outcome Summary: Pt is a 76 yo M who was admitted from MD office with generalized weakness and evidence of CAD. Pt is undergoing work up for possible CABG. Pt presents to PT with impaired functional mobility and gait secondary to impaired balance, some impaired coordination, and decreased activity tolerance. Pt may benefit from skilled PT to address strength, mobility, and gait.     Time Calculation:   PT Charges     Row Name 02/16/21 1109             Time Calculation    Start Time  0845  -      Stop Time  0857  -      Time Calculation (min)  12 min  -      PT Received On  02/16/21  -      PT - Next Appointment  02/17/21  -      PT Goal Re-Cert Due Date  02/23/21  -         Time Calculation- PT    Total Timed Code Minutes- PT  8 minute(s)  -        User Key  (r) = Recorded By, (t) = Taken By, (c) = Cosigned By    Initials Name Provider Type     Fang Roche PT Physical Therapist        Therapy Charges for Today     Code Description Service Date Service Provider Modifiers Qty    71682810143  PT EVAL MOD COMPLEXITY 2 2/16/2021 Fang Roche PT GP 1    [id number]  PT THERAPEUTIC ACT EA 15 MIN 2/16/2021 Melchor  Fang BELLO, PT GP 1    [id number]  PT THER SUPP EA 15 MIN 2/16/2021 Fang Roche, PT GP 1          PT G-Codes  Outcome Measure Options: AM-PAC 6 Clicks Basic Mobility (PT)  AM-PAC 6 Clicks Score (PT): 16    Fang Roche, PT  2/16/2021

## 2021-02-17 LAB
ANION GAP SERPL CALCULATED.3IONS-SCNC: 8.5 MMOL/L (ref 5–15)
BUN SERPL-MCNC: 32 MG/DL (ref 8–23)
BUN/CREAT SERPL: 24.1 (ref 7–25)
CALCIUM SPEC-SCNC: 9 MG/DL (ref 8.6–10.5)
CHLORIDE SERPL-SCNC: 102 MMOL/L (ref 98–107)
CO2 SERPL-SCNC: 30.5 MMOL/L (ref 22–29)
CREAT SERPL-MCNC: 1.33 MG/DL (ref 0.76–1.27)
DEPRECATED RDW RBC AUTO: 44.6 FL (ref 37–54)
EOSINOPHIL # BLD MANUAL: 0.16 10*3/MM3 (ref 0–0.4)
EOSINOPHIL NFR BLD MANUAL: 1 % (ref 0.3–6.2)
EPO SERPL-ACNC: 12.3 MIU/ML (ref 2.6–18.5)
ERYTHROCYTE [DISTWIDTH] IN BLOOD BY AUTOMATED COUNT: 14 % (ref 12.3–15.4)
FOLATE BLD-MCNC: 490 NG/ML
FOLATE RBC-MCNC: 1216 NG/ML
GFR SERPL CREATININE-BSD FRML MDRD: 53 ML/MIN/1.73
GLUCOSE BLDC GLUCOMTR-MCNC: 177 MG/DL (ref 70–130)
GLUCOSE BLDC GLUCOMTR-MCNC: 248 MG/DL (ref 70–130)
GLUCOSE BLDC GLUCOMTR-MCNC: 250 MG/DL (ref 70–130)
GLUCOSE BLDC GLUCOMTR-MCNC: 90 MG/DL (ref 70–130)
GLUCOSE SERPL-MCNC: 60 MG/DL (ref 65–99)
HCT VFR BLD AUTO: 39.2 % (ref 37.5–51)
HCT VFR BLD AUTO: 40.3 % (ref 37.5–51)
HGB BLD-MCNC: 13.3 G/DL (ref 13–17.7)
LYMPHOCYTES # BLD MANUAL: 11.25 10*3/MM3 (ref 0.7–3.1)
LYMPHOCYTES NFR BLD MANUAL: 4 % (ref 5–12)
LYMPHOCYTES NFR BLD MANUAL: 69 % (ref 19.6–45.3)
MCH RBC QN AUTO: 29.9 PG (ref 26.6–33)
MCHC RBC AUTO-ENTMCNC: 33.9 G/DL (ref 31.5–35.7)
MCV RBC AUTO: 88.1 FL (ref 79–97)
MONOCYTES # BLD AUTO: 0.65 10*3/MM3 (ref 0.1–0.9)
NEUTROPHILS # BLD AUTO: 4.24 10*3/MM3 (ref 1.7–7)
NEUTROPHILS NFR BLD MANUAL: 26 % (ref 42.7–76)
PLAT MORPH BLD: NORMAL
PLATELET # BLD AUTO: 48 10*3/MM3 (ref 140–450)
PMV BLD AUTO: 8.9 FL (ref 6–12)
POTASSIUM SERPL-SCNC: 4.3 MMOL/L (ref 3.5–5.2)
RBC # BLD AUTO: 4.45 10*6/MM3 (ref 4.14–5.8)
RBC MORPH BLD: NORMAL
REF LAB TEST METHOD: NORMAL
SODIUM SERPL-SCNC: 141 MMOL/L (ref 136–145)
WBC # BLD AUTO: 16.3 10*3/MM3 (ref 3.4–10.8)
WBC MORPH BLD: NORMAL

## 2021-02-17 PROCEDURE — 85025 COMPLETE CBC W/AUTO DIFF WBC: CPT | Performed by: INTERNAL MEDICINE

## 2021-02-17 PROCEDURE — 99232 SBSQ HOSP IP/OBS MODERATE 35: CPT | Performed by: INTERNAL MEDICINE

## 2021-02-17 PROCEDURE — 82962 GLUCOSE BLOOD TEST: CPT

## 2021-02-17 PROCEDURE — 85007 BL SMEAR W/DIFF WBC COUNT: CPT | Performed by: INTERNAL MEDICINE

## 2021-02-17 PROCEDURE — 63710000001 INSULIN REGULAR HUMAN PER 5 UNITS: Performed by: THORACIC SURGERY (CARDIOTHORACIC VASCULAR SURGERY)

## 2021-02-17 PROCEDURE — 63710000001 PREDNISONE PER 1 MG: Performed by: THORACIC SURGERY (CARDIOTHORACIC VASCULAR SURGERY)

## 2021-02-17 PROCEDURE — 97530 THERAPEUTIC ACTIVITIES: CPT

## 2021-02-17 PROCEDURE — 80048 BASIC METABOLIC PNL TOTAL CA: CPT | Performed by: THORACIC SURGERY (CARDIOTHORACIC VASCULAR SURGERY)

## 2021-02-17 RX ADMIN — INSULIN HUMAN 8 UNITS: 100 INJECTION, SOLUTION PARENTERAL at 16:55

## 2021-02-17 RX ADMIN — LEVOTHYROXINE SODIUM 200 MCG: 0.1 TABLET ORAL at 06:12

## 2021-02-17 RX ADMIN — INSULIN HUMAN 4 UNITS: 100 INJECTION, SOLUTION PARENTERAL at 21:07

## 2021-02-17 RX ADMIN — DONEPEZIL HYDROCHLORIDE 10 MG: 10 TABLET, FILM COATED ORAL at 08:48

## 2021-02-17 RX ADMIN — TEMAZEPAM 7.5 MG: 7.5 CAPSULE ORAL at 21:07

## 2021-02-17 RX ADMIN — ALPRAZOLAM 0.25 MG: 0.25 TABLET ORAL at 16:54

## 2021-02-17 RX ADMIN — SERTRALINE 100 MG: 100 TABLET, FILM COATED ORAL at 08:48

## 2021-02-17 RX ADMIN — GABAPENTIN 300 MG: 300 CAPSULE ORAL at 21:07

## 2021-02-17 RX ADMIN — ATORVASTATIN CALCIUM 20 MG: 20 TABLET, FILM COATED ORAL at 08:48

## 2021-02-17 RX ADMIN — PREDNISONE 20 MG: 20 TABLET ORAL at 08:48

## 2021-02-17 RX ADMIN — ASPIRIN 81 MG: 81 TABLET, COATED ORAL at 08:48

## 2021-02-17 RX ADMIN — SODIUM CHLORIDE, PRESERVATIVE FREE 3 ML: 5 INJECTION INTRAVENOUS at 21:07

## 2021-02-17 RX ADMIN — INSULIN HUMAN 12 UNITS: 100 INJECTION, SOLUTION PARENTERAL at 11:21

## 2021-02-17 NOTE — THERAPY TREATMENT NOTE
----- Message from Maryanne Johnson MD sent at 2/14/2018 12:28 PM CST -----  Start B12 inj 1000 mcg im x 5 days then q w x 5 w then q mo x 5 mo then recheck level   Patient Name: Cosmo Gauthier  : 1945    MRN: [id number]                              Today's Date: 2021       Admit Date: 2/15/2021    Visit Dx: No diagnosis found.  Patient Active Problem List   Diagnosis   • CAD (coronary artery disease)   • Morbidly obese (CMS/HCC)   • CAD (coronary artery disease), native coronary artery   • Lymphoma (CMS/HCC)   • Diabetes mellitus (CMS/HCC)   • Dementia (CMS/HCC)   • Stage 3b chronic kidney disease (CMS/HCC)   • Thrombocytopenia (CMS/HCC)   • Disease of thyroid gland   • Anemia   • Lymphocytosis     Past Medical History:   Diagnosis Date   • Anxiety and depression    • Arthritis    • Coronary artery disease    • Dementia (CMS/HCC)    • Diabetes mellitus (CMS/HCC)    • Disease of thyroid gland    • Elevated cholesterol    • GERD (gastroesophageal reflux disease)    • Hypertension    • Lymphoma (CMS/HCC)     History of lymphoma, has been in remission   • Sleep apnea      Past Surgical History:   Procedure Laterality Date   • HERNIA REPAIR     • JOINT REPLACEMENT       General Information     Row Name 21 1055          Physical Therapy Time and Intention    Document Type  therapy note (daily note)  -CH (r) BH (t) CH (c)     Mode of Treatment  individual therapy;physical therapy  -CH (r) BH (t) CH (c)     Row Name 21 1055          General Information    Existing Precautions/Restrictions  fall  -CH (r) BH (t) CH (c)     Row Name 21 1055          Cognition    Orientation Status (Cognition)  oriented x 4  -CH (r) BH (t) CH (c)     Row Name 21 1055          Safety Issues, Functional Mobility    Impairments Affecting Function (Mobility)  balance;coordination;endurance/activity tolerance;shortness of breath  -CH (r) BH (t) CH (c)       User Key  (r) = Recorded By, (t) = Taken By, (c) = Cosigned By    Initials Name Provider Type    CH Fang Roche, PT Physical Therapist    Sofia Galaviz PT Student PT Student        Mobility     Row Name  02/17/21 1057          Bed Mobility    Bed Mobility  supine-sit;sit-supine  -CH (r) BH (t) CH (c)     Supine-Sit [place] (Bed Mobility)  standby assist  -CH (r) BH (t) CH (c)     Sit-Supine [place] (Bed Mobility)  standby assist  -CH (r) BH (t) CH (c)     Row Name 02/17/21 1057          Sit-Stand Transfer    Sit-Stand [place] (Transfers)  verbal cues;nonverbal cues (demo/gesture);standby assist  -CH (r) BH (t) CH (c)     Row Name 02/17/21 1057          Gait/Stairs (Locomotion)    [place] Level (Gait)  verbal cues;nonverbal cues (demo/gesture);contact guard  -CH (r) BH (t) CH (c)     Distance in Feet (Gait)  150  -CH (r) BH (t) CH (c)     Deviations/Abnormal Patterns (Gait)  gonzalo decreased;gait speed decreased;stride length decreased  -CH (r) BH (t) CH (c)     Left Sided Gait Deviations  heel strike decreased  -CH (r) BH (t) CH (c)     Comment (Gait/Stairs)  Pt was much more steady with gait today and able to increase his distance to 150 ft with no reports of fatigue or SOB. Pt reported he was feeling much better with walking and overall today.  -CH (r) BH (t) CH (c)       User Key  (r) = Recorded By, (t) = Taken By, (c) = Cosigned By    Initials Name Provider Type    Fang Campbell, PT Physical Therapist    Sfoia Galaviz, PT Student PT Student        Obj/Interventions     Row Name 02/17/21 1100          Motor Skills    Therapeutic Exercise  -- 10 reps B cardiac rehab protocol to give pt an idea of what he would be doing with PT post-op  -CH (r) BH (t) CH (c)       User Key  (r) = Recorded By, (t) = Taken By, (c) = Cosigned By    Initials Name Provider Type    Fang Campbell, PT Physical Therapist    Sofia Galaviz, PT Student PT Student        Goals/Plan    No documentation.       Clinical Impression     Row Name 02/17/21 1101          Pain    Additional Documentation  Pain Scale: FACES Pre/Post-Treatment (Group)  -CH (r) BH (t) CH (c)     Row Name 02/17/21 1107           Pain Scale: FACES Pre/Post-Treatment    Pain: FACES Scale, Pretreatment  0-->no hurt  -CH (r) BH (t) CH (c)     Posttreatment Pain Rating  0-->no hurt  -CH (r) BH (t) CH (c)     Row Name 02/17/21 1101          Plan of Care Review    Plan of Care Reviewed With  patient  -CH (r) BH (t) CH (c)     Progress  improving  -CH (r) BH (t) CH (c)     Outcome Summary  Pt increased his distance walked today with no fatigue/SOB and with only CGA. Pt reports he will be having surgery tomorrow morning 2/18. Pt will benefit from continued skilled PT to improve strength, endurance, and functional mobility following surgery.  -CH (r) BH (t) CH (c)     Row Name 02/17/21 1101          Positioning and Restraints    Pre-Treatment Position  in bed  -CH (r) BH (t) CH (c)     Post Treatment Position  bed  -CH (r) BH (t) CH (c)     In Bed  supine;call light within reach;encouraged to call for assist  -CH (r) BH (t) CH (c)       User Key  (r) = Recorded By, (t) = Taken By, (c) = Cosigned By    Initials Name Provider Type     Fang Roche, PT Physical Therapist    Sofia Galaviz, PT Student PT Student        Outcome Measures     Row Name 02/17/21 1103          How much help from another person do you currently need...    Turning from your back to your side while in flat bed without using bedrails?  3  -CH (r) BH (t) CH (c)     Moving from lying on back to sitting on the side of a flat bed without bedrails?  3  -CH (r) BH (t) CH (c)     Moving to and from a bed to a chair (including a wheelchair)?  3  -CH (r) BH (t) CH (c)     Standing up from a chair using your arms (e.g., wheelchair, bedside chair)?  4  -CH (r) BH (t) CH (c)     Climbing 3-5 steps with a railing?  2  -CH (r) BH (t) CH (c)     To walk in hospital room?  4  -CH (r) BH (t) CH (c)     AM-PAC 6 Clicks Score (PT)  19  -CH (r) BH (t)     Row Name 02/17/21 1103          Functional Assessment    Outcome Measure Options  AM-PAC 6 Clicks Basic Mobility (PT)  -MISAEL (r) SARKIS (t) CH  (c)       User Key  (r) = Recorded By, (t) = Taken By, (c) = Cosigned By    Initials Name Provider Type     Fang Roche, PT Physical Therapist     Sofia Chappell, PT Student PT Student        Physical Therapy Education                 Title: PT OT SLP Therapies (Done)     Topic: Physical Therapy (Done)     Point: Mobility training (Done)     Learning Progress Summary           Patient Acceptance, E,TB,D, VU,NR by  at 2/17/2021 1104    Acceptance, E,TB,D, VU,NR by  at 2/16/2021 1124                   Point: Home exercise program (Done)     Learning Progress Summary           Patient Acceptance, E,TB,D, VU,NR by  at 2/17/2021 1104    Acceptance, E,TB,D, VU,NR by  at 2/16/2021 1124                   Point: Body mechanics (Done)     Learning Progress Summary           Patient Acceptance, E,TB,D, VU,NR by  at 2/17/2021 1104    Acceptance, E,TB,D, VU,NR by  at 2/16/2021 1124                   Point: Precautions (Done)     Learning Progress Summary           Patient Acceptance, E,TB,D, VU,NR by  at 2/17/2021 1104    Acceptance, E,TB,D, VU,NR by  at 2/16/2021 1124                               User Key     Initials Effective Dates Name Provider Type [facility] 04/03/18 -  Fang Roche, PT Physical Therapist PT     02/01/21 -  Sofia Chappell, PT Student PT Student PT              PT Recommendation and Plan     Plan of Care Reviewed With: patient  Progress: improving  Outcome Summary: Pt increased his distance walked today with no fatigue/SOB and with only CGA. Pt reports he will be having surgery tomorrow morning 2/18. Pt will benefit from continued skilled PT to improve strength, endurance, and functional mobility following surgery.     Time Calculation:   PT Charges     Row Name 02/17/21 1104             Time Calculation    Start Time  1015  -CH (r) BH (t) CH (c)      Stop Time  1024  -CH (r) BH (t) CH (c)      Time Calculation (min)  9 min  -CH (r) BH (t)      PT Received On   02/18/21  -CH (r) BH (t) CH (c)      PT - Next Appointment  02/18/21  -CH (r) BH (t) CH (c)         Time Calculation- PT    Total Timed Code Minutes- PT  9 minute(s)  -CH (r) BH (t) CH (c)        User Key  (r) = Recorded By, (t) = Taken By, (c) = Cosigned By    Initials Name Provider Type     Fang Roche, PT Physical Therapist    Sofia Galaviz, PT Student PT Student        Therapy Charges for Today     Code Description Service Date Service Provider Modifiers Qty    [id number]  PT THERAPEUTIC ACT EA 15 MIN 2/17/2021 Sofia Chappell, PT Student GP 1          PT G-Codes  Outcome Measure Options: AM-PAC 6 Clicks Basic Mobility (PT)  AM-PAC 6 Clicks Score (PT): 19    Sofia Chappell PT Student  2/17/2021

## 2021-02-17 NOTE — PROGRESS NOTES
LOS: 2 days   Patient Care Team:  Alex Buckley MD as PCP - General (Internal Medicine)    Chief Complaint: CAD    Subjective:  Symptoms:  He reports shortness of breath.  No chest pain or chest pressure.    Activity level: Impaired due to weakness.          Vital Signs  Temp:  [98.1 °F (36.7 °C)-98.9 °F (37.2 °C)] 98.9 °F (37.2 °C)  Heart Rate:  [84-99] 99  Resp:  [15-16] 16  BP: (113-138)/(72-96) 132/86  Body mass index is 33.43 kg/m².    Intake/Output Summary (Last 24 hours) at 2/17/2021 0948  Last data filed at 2/17/2021 0614  Gross per 24 hour   Intake 120 ml   Output 1950 ml   Net -1830 ml     No intake/output data recorded.          02/15/21  2100 02/16/21  1600 02/17/21  0614   Weight: 66.2 kg (145 lb 14.4 oz) 66 kg (145 lb 8.1 oz) 111 kg (244 lb 1.6 oz)         Objective    Results Review:        WBC WBC   Date Value Ref Range Status   02/17/2021 16.30 (H) 3.40 - 10.80 10*3/mm3 Final   02/16/2021 15.76 (H) 3.40 - 10.80 10*3/mm3 Final   02/15/2021 16.49 (H) 3.40 - 10.80 10*3/mm3 Final      HGB Hemoglobin   Date Value Ref Range Status   02/17/2021 13.3 13.0 - 17.7 g/dL Final   02/16/2021 12.7 (L) 13.0 - 17.7 g/dL Final   02/15/2021 12.9 (L) 13.0 - 17.7 g/dL Final      HCT Hematocrit   Date Value Ref Range Status   02/17/2021 39.2 37.5 - 51.0 % Final   02/16/2021 38.1 37.5 - 51.0 % Final   02/15/2021 38.1 37.5 - 51.0 % Final      Platelets Platelets   Date Value Ref Range Status   02/17/2021 48 (C) 140 - 450 10*3/mm3 Final   02/16/2021 65 (L) 140 - 450 10*3/mm3 Final   02/16/2021 65 (L) 140 - 450 10*3/mm3 Final   02/15/2021 59 (L) 140 - 450 10*3/mm3 Final   02/15/2021 69 (L) 140 - 450 10*3/mm3 Final        PT/INR:    Protime   Date Value Ref Range Status   02/15/2021 12.7 11.7 - 14.2 Seconds Final   /  INR   Date Value Ref Range Status   02/15/2021 0.97 0.90 - 1.10 Final       Sodium Sodium   Date Value Ref Range Status   02/17/2021 141 136 - 145 mmol/L Final   02/16/2021 135 (L) 136 - 145 mmol/L Final    02/15/2021 136 136 - 145 mmol/L Final   02/15/2021 136 136 - 145 mmol/L Final   02/15/2021 134 (L) 136 - 145 mmol/L Final      Potassium Potassium   Date Value Ref Range Status   02/17/2021 4.3 3.5 - 5.2 mmol/L Final   02/16/2021 5.0 3.5 - 5.2 mmol/L Final   02/15/2021 5.0 3.5 - 5.2 mmol/L Final   02/15/2021 7.1 (C) 3.5 - 5.2 mmol/L Final   02/15/2021 6.6 (C) 3.5 - 5.2 mmol/L Final      Chloride Chloride   Date Value Ref Range Status   02/17/2021 102 98 - 107 mmol/L Final   02/16/2021 100 98 - 107 mmol/L Final   02/15/2021 103 98 - 107 mmol/L Final   02/15/2021 105 98 - 107 mmol/L Final   02/15/2021 102 98 - 107 mmol/L Final      Bicarbonate CO2   Date Value Ref Range Status   02/17/2021 30.5 (H) 22.0 - 29.0 mmol/L Final   02/16/2021 22.8 22.0 - 29.0 mmol/L Final   02/15/2021 22.4 22.0 - 29.0 mmol/L Final   02/15/2021 21.9 (L) 22.0 - 29.0 mmol/L Final   02/15/2021 22.3 22.0 - 29.0 mmol/L Final      BUN BUN   Date Value Ref Range Status   02/17/2021 32 (H) 8 - 23 mg/dL Final   02/16/2021 41 (H) 8 - 23 mg/dL Final   02/15/2021 46 (H) 8 - 23 mg/dL Final   02/15/2021 43 (H) 8 - 23 mg/dL Final   02/15/2021 43 (H) 8 - 23 mg/dL Final      Creatinine Creatinine   Date Value Ref Range Status   02/17/2021 1.33 (H) 0.76 - 1.27 mg/dL Final   02/16/2021 1.49 (H) 0.76 - 1.27 mg/dL Final   02/15/2021 1.71 (H) 0.76 - 1.27 mg/dL Final   02/15/2021 1.63 (H) 0.76 - 1.27 mg/dL Final   02/15/2021 1.74 (H) 0.76 - 1.27 mg/dL Final      Calcium Calcium   Date Value Ref Range Status   02/17/2021 9.0 8.6 - 10.5 mg/dL Final   02/16/2021 9.7 8.6 - 10.5 mg/dL Final   02/15/2021 9.8 8.6 - 10.5 mg/dL Final   02/15/2021 9.1 8.6 - 10.5 mg/dL Final   02/15/2021 9.8 8.6 - 10.5 mg/dL Final      Magnesium Magnesium   Date Value Ref Range Status   02/15/2021 1.7 1.6 - 2.4 mg/dL Final          aspirin, 81 mg, Oral, Daily  atorvastatin, 20 mg, Oral, Daily  donepezil, 10 mg, Oral, Daily  gabapentin, 300 mg, Oral, Nightly  insulin regular, 0-24 Units,  Subcutaneous, Q6H  levothyroxine, 200 mcg, Oral, QAM  predniSONE, 20 mg, Oral, Daily  sertraline, 100 mg, Oral, Daily  sodium chloride, 3 mL, Intravenous, Q12H               Patient Active Problem List   Diagnosis Code   • CAD (coronary artery disease) I25.10   • Morbidly obese (CMS/[facility]) E66.01   • CAD (coronary artery disease), native coronary artery I25.10   • Lymphoma (CMS/[facility]) C85.90   • Diabetes mellitus (CMS/[facility]) E11.9   • Dementia (CMS/[facility]) F03.90   • Stage 3b chronic kidney disease (CMS/[facility]) N18.32   • Thrombocytopenia (CMS/[facility]) D69.6   • Disease of thyroid gland E07.9   • Anemia D64.9   • Lymphocytosis D72.820       Assessment & Plan    -Severe 2- vessel CAD   -Dyspnea on exertion   -Obesity  -Diabetes type 2  -Decreased mobility   -Hypothyroidism  -Hypertension  -YI  -hx of lymphoma   -chronic steroid use  -chronic back pain  -Hyperlipidemia   -CKD stage III-- renal consulted  -Thrombocytopenia-- plt count 65  -leukocytosis     Echo with mild dilatation of ascending aorta 4.1 cm  plt count 48 this morning  Discussed with Dr. Tubbs he would like cardiology to see, uncertain he is a surgical candidate, will ask Dr. Nichole to see him      Jazmine Lovett, MAXWELL  02/17/21  09:48 EST

## 2021-02-17 NOTE — PROGRESS NOTES
NEPHROLOGY PROGRESS NOTE    PATIENT IDENTIFICATION:   Name:  Cosmo Gauthier      MRN:  [id number]     75 y.o.  male             Reason for visit: MARKUS and hyperkalemia    SUBJECTIVE:   Seen and examined.  Sitting up in chair.  Kaur catheter is anchored.  No new issues.  OBJECTIVE:  Vitals:    02/16/21 2346 02/17/21 0300 02/17/21 0614 02/17/21 0706   BP: 137/83 113/72  132/86   BP Location: Right arm Right arm  Left arm   Patient Position: Lying Lying  Sitting   Pulse: 84 87  99   Resp: 16 15  16   Temp: 98.1 °F (36.7 °C) 98.9 °F (37.2 °C)  98.9 °F (37.2 °C)   TempSrc: Oral Oral  Oral   SpO2: 92% 96%  94%   Weight:   111 kg (244 lb 1.6 oz)    Height:               Body mass index is 33.43 kg/m².    Intake/Output Summary (Last 24 hours) at 2/17/2021 0938  Last data filed at 2/17/2021 0614  Gross per 24 hour   Intake 120 ml   Output 1950 ml   Net -1830 ml         Exam:  GEN:  No distress, appears stated age  EYES:   Anicteric sclera  ENT:    External ears/nose normal, MM are moist  NECK:  No adenopathy, JVP none  LUNGS: Normal chest on inspection; not labored  CV:  Normal S1S2, without murmur  ABD:  Non-tender, non-distended, no hepatosplenomegaly, +BS  EXT:  No edema; no cyanosis; clubbing    Scheduled meds:  aspirin, 81 mg, Oral, Daily  atorvastatin, 20 mg, Oral, Daily  donepezil, 10 mg, Oral, Daily  gabapentin, 300 mg, Oral, Nightly  insulin regular, 0-24 Units, Subcutaneous, Q6H  levothyroxine, 200 mcg, Oral, QAM  predniSONE, 20 mg, Oral, Daily  sertraline, 100 mg, Oral, Daily  sodium chloride, 3 mL, Intravenous, Q12H      IV meds:                           Data Review:    Results from last 7 days   Lab Units 02/17/21  0754 02/16/21  0351 02/15/21  2039  02/15/21  1404   SODIUM mmol/L 141 135* 136   < > 134*   POTASSIUM mmol/L 4.3 5.0 5.0   < > 6.6*   CHLORIDE mmol/L 102 100 103   < > 102   CO2 mmol/L 30.5* 22.8 22.4   < > 22.3   BUN mg/dL 32* 41* 46*   < > 43*   CREATININE mg/dL 1.33* 1.49* 1.71*   < > 1.74*    CALCIUM mg/dL 9.0 9.7 9.8   < > 9.8   BILIRUBIN mg/dL  --  0.4 0.3  --  0.4   ALK PHOS U/L  --  84 85  --  94   ALT (SGPT) U/L  --  17 19  --  22   AST (SGOT) U/L  --  17 12  --  15   GLUCOSE mg/dL 60* 162* 142*   < > 138*    < > = values in this interval not displayed.       Estimated Creatinine Clearance: 63.3 mL/min (A) (by C-G formula based on SCr of 1.33 mg/dL (H)).    Results from last 7 days   Lab Units 02/15/21  1404   MAGNESIUM mg/dL 1.7       Results from last 7 days   Lab Units 02/17/21  0346 02/16/21  0351 02/15/21  1648   WBC 10*3/mm3 16.30* 15.76* 16.49*   HEMOGLOBIN g/dL 13.3 12.7* 12.9*   PLATELETS 10*3/mm3 48* 65*  65* 69*  59*       Results from last 7 days   Lab Units 02/15/21  1404   INR  0.97             ASSESSMENT:     CAD (coronary artery disease)    Morbidly obese (CMS/HCC)    CAD (coronary artery disease), native coronary artery    Lymphoma (CMS/HCC)    Diabetes mellitus (CMS/HCC)    Dementia (CMS/HCC)    Stage 3b chronic kidney disease (CMS/HCC)    Thrombocytopenia (CMS/HCC)    Disease of thyroid gland    Anemia    Lymphocytosis      -Acute kidney injury on chronic kidney disease stage III associated with severe hyperkalemia: Etiology is likely related to urinary retention that has improved with Kaur catheter placement as well as side effect of ACE inhibitor.     -Severe hyperkalemia with potassium 7.1 on admission and down to 4.3  -Chronic kidney disease stage III  -Coronary artery disease plan for surgery tomorrow morning  -History of low-grade lymphoma: Oncology on board  -History of ITP  -Early dementia    Plan:    Kidney function is improving and his potassium is down to normal 4.3.    Keep Kaur catheter in.  Okay to proceed to surgery tomorrow  Surveillance labs    Miya Landis MD  2/17/2021    09:38 EST

## 2021-02-17 NOTE — PROGRESS NOTES
[facility] INPATIENT PROGRESS NOTE    Length of Stay:  2 days    CHIEF COMPLAINT/REASON FOR VISIT:  1. Leukocytosis     2.  History of lymphoma followed by Dr. Saldana in [place]     3.  Chronic thrombocytopenia       SUBJECTIVE:   2.17.2021  Patient is afebrile and vitals are stable.  He continues to have exertional dyspnea.  WBC increased to 16.3 from yesterdays 15.76. Platelets dropped again to 48,000 from 65,000.    Results of peripheral blood flow cytometry study reported today 2/17/2021 monoclonal CD5 negative, CD10 negative, kappa restricted B lymphocytes.  Pathologist commented this was non-specific for CLL, mantle cell lymphoma, follicular cell lymphoma.        HISTORY OF PRESENT ILLNESS:  The patient is a 75 y.o. year old male who is here for an opinion about the above issue.     History of Present Illness      Patient is a 75-year-old gentleman who has history of diabetes type 2, hypothyroidism, history of coronary artery disease s/p stents, hyperlipidemia, obesity who developed progressively worsening dyspnea on exertion.  He did not have any palpitations and denied nausea vomiting chest pain.  Dr. Milligan had done cardiac cath which showed severe two-vessel coronary artery disease with 100% LAD occlusion and 100% RCA occlusion.  His ejection fraction on echocardiogram was 50 to 60%.  Actually it was on transthoracic echocardiogram.  He was admitted today for surgery tomorrow.     Patient apparently has had a history of lymphoma in the past and was seen by hematologist.  He has been followed by Dr. Saldana [phone] at [place].  Apparently he has had chronic thrombocytopenia.  When patient had CBC today, his hemoglobin is 12.9, white count of 16.49, platelet of 59.  He has 59% neutrophils, 14% lymphocytes 6% monocytes, 20% atypical lymphocytes.  LDH is 175.  Urine analysis is negative except glucose 100 mg per DL.  PT PTT were normal.  BUN is 43 and creatinine of 1.63.   Patient has a potassium of 7.1.  COVID-19 not detected.     Patient has had significant Chiari malformation.  He also has chronic back pain with multilevel degenerative disease on the spine.  He is followed by neurology at [facility].  He has sleep apnea on CPAP machine      We have been contacted in order to evaluate the leukocytosis with atypical lymphocytes and history of lymphoma as well as thrombocytopenia      ROS:  Review of Systems   Constitutional: Positive for activity change, appetite change and fatigue. Negative for diaphoresis, fever and unexpected weight change.   HENT: Negative for nosebleeds and sore throat.    Eyes: Negative for photophobia and visual disturbance.   Respiratory: Positive for shortness of breath. Negative for cough and wheezing.    Cardiovascular: Positive for palpitations. Negative for chest pain.   Gastrointestinal: Negative for abdominal pain, blood in stool and nausea.   Endocrine: Negative for cold intolerance.   Genitourinary: Negative for dysuria and hematuria.   Musculoskeletal: Positive for arthralgias. Negative for joint swelling.   Skin: Negative for color change.   Allergic/Immunologic: Positive for immunocompromised state (Chronically non-Hodgkin's lymphoma).   Neurological: Negative for dizziness and headaches.   Hematological: Negative for adenopathy. Does not bruise/bleed easily.   Psychiatric/Behavioral: Negative for agitation and confusion.        OBJECTIVE:  Vitals:    02/17/21 0300 02/17/21 0614 02/17/21 0706 02/17/21 1043   BP: 113/72  132/86 136/85   BP Location: Right arm  Left arm Right arm   Patient Position: Lying  Sitting Sitting   Pulse: 87  99 97   Resp: 15  16 16   Temp: 98.9 °F (37.2 °C)  98.9 °F (37.2 °C) 98.3 °F (36.8 °C)   TempSrc: Oral  Oral Oral   SpO2: 96%  94%    Weight:  111 kg (244 lb 1.6 oz)     Height:         Physical Exam  Constitutional:       General: He is not in acute distress.     Appearance: Normal appearance.   Eyes:      General: No  scleral icterus.     Conjunctiva/sclera: Conjunctivae normal.   Neck:      Musculoskeletal: Neck supple.   Cardiovascular:      Rate and Rhythm: Normal rate and regular rhythm.      Pulses: Normal pulses.      Heart sounds: Normal heart sounds.   Pulmonary:      Effort: Pulmonary effort is normal.      Breath sounds: Normal breath sounds.   Abdominal:      General: Bowel sounds are normal.      Palpations: Abdomen is soft.   Musculoskeletal:      Right lower leg: No edema.      Left lower leg: No edema.   Lymphadenopathy:      Cervical: No cervical adenopathy.   Skin:     Coloration: Skin is not jaundiced.      Findings: No rash.   Neurological:      General: No focal deficit present.      Mental Status: He is alert and oriented to person, place, and time.               DIAGNOSTIC DATA:  Results Review:      Results from last 7 days   Lab Units 02/17/21  0346 02/16/21  0351 02/15/21  1648   WBC 10*3/mm3 16.30* 15.76* 16.49*   HEMOGLOBIN g/dL 13.3 12.7* 12.9*   HEMATOCRIT % 39.2 38.1 38.1   PLATELETS 10*3/mm3 48* 65*  65* 69*  59*      Results from last 7 days   Lab Units 02/17/21  0754 02/16/21  0351 02/15/21  2039  02/15/21  1404   SODIUM mmol/L 141 135* 136   < > 134*   POTASSIUM mmol/L 4.3 5.0 5.0   < > 6.6*   CHLORIDE mmol/L 102 100 103   < > 102   CO2 mmol/L 30.5* 22.8 22.4   < > 22.3   BUN mg/dL 32* 41* 46*   < > 43*   CREATININE mg/dL 1.33* 1.49* 1.71*   < > 1.74*   CALCIUM mg/dL 9.0 9.7 9.8   < > 9.8   BILIRUBIN mg/dL  --  0.4 0.3  --  0.4   ALK PHOS U/L  --  84 85  --  94   ALT (SGPT) U/L  --  17 19  --  22   AST (SGOT) U/L  --  17 12  --  15   GLUCOSE mg/dL 60* 162* 142*   < > 138*    < > = values in this interval not displayed.      Lab Results   Component Value Date    NEUTROABS 4.24 02/17/2021     Results from last 7 days   Lab Units 02/15/21  1404   INR  0.97   APTT seconds 29.8     Results from last 7 days   Lab Units 02/15/21  1404   MAGNESIUM mg/dL 1.7     Component      Latest Ref Rng & Units  2/16/2021   IPF      0.90 - 6.50 % 2.10   Platelets      140 - 450 10*3/mm3 65 (L)   LDH      135 - 225 U/L 171       Flow Cytometry, Blood (02/16/2021 03:51)     IMAGING:    XR Chest PA & Lateral (02/15/2021 15:11)  Carotid Duplex - Bilateral (02/15/2021 15:04)  Duplex Vein Mapping Lower Extremity - Bilateral CAR (02/15/2021 15:02)      Assessment/Plan   ASSESSMENT/PLAN:  This is a 75 y.o. male with:     *  History of lymphoma followed by hematologist Dr. Saldana at [place].   · I called this morning on 2/16/2021 and spoke with Dr. Saldana, and was told the patient having low-grade follicular lymphoma who has never been treated previously with any chemotherapy or immunotherapy.  Dr. Saldana is planning to treat him may be using Rituxan in the near future.   · His lymphoma is indolent, should not interfere or delay his surgery.     * Leukocytosis with atypical lymphocytes.   · I reviewed his peripheral blood smear, there are many lymphocytes with dark nucleus occupies almost 90 to 95% of the surface, with very slim cytoplasm.  · Absolute lymphocytes 7410 on 2/16/2021.  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  Will obtain flow cytometry for further evaluation.  · Results of peripheral blood flow cytometry study reported today 2/17/2021 with monoclonal CD5 negative, CD10 negative, kappa restricted B lymphocytes.  Pathologist commented this was non-specific for CLL, mantle cell lymphoma, follicular cell lymphoma.  · So this patient has leukemia phase of lymphoma.      *Chronic thrombocytopenia.   · According to his primary hematologist Dr. Saldana that patient used to have platelets in the 20-30,000 range.  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  Patient has been given prednisone 20 mg daily.  Platelets subsequently improved to the 60,000 range.   · Currently patient has platelets at around 60,000.  Patient has normal IPF 2.3% and 2.1% for 2 consecutive days.   There is no evidence of active platelets destruction/ITP, LDH is also normal.   · I reviewed his peripheral blood smear, there is no clumping of platelets.  Morphology is normal.       *Coronary artery disease, with 100% occlusion of LAD and RCA.   · Upon further evaluation, patient is a very high risk for CABG surgical procedure.  Patient is waiting for cardiology evaluation to see if he would be a candidate for angioplasty/stents placement.       *.  Anemia of chronic kidney disease, check EPO level, iron studies.   · Iron study reported proper ferritin and iron saturation, excellent vitamin B12 level and normal folate.  No evidence for deficiency.    · I reviewed his peripheral blood, morphology of RBCs normal, no evidence of schistocytes or targeted cells.  · EPO level is 12.3 mIU/mL.  · Patient has normal LDH, normal total bilirubin, and normal haptoglobin.  No evidence of hemolysis.      *.  Hyperkalemia and acute renal injury.   · Potassium has normalized today.  Patient is seen by nephrology service.  · Laboratory study otherwise shows no evidence for tumor lysis syndrome. Will check uric acid level.        PLAN:  1. Waiting for cardiology consultation for possible angioplasty/stent placement.    2. Not a candidate for CABG surgery.  3. If needed, we recommended transfusing 2 units pheresis platelets prior to his surgical procedure.  4. Monitor CBC daily.   5. After this hospitalization, patient can follow-up with his primary hematologist Dr. Saldana in [place].        I spoke with cardiovascular surgery Dr. Milligan.     VICKEY DIANA M.D., Ph.D.    2/17/2021

## 2021-02-17 NOTE — PLAN OF CARE
Goal Outcome Evaluation:  Plan of Care Reviewed With: (P) patient  Progress: (P) improving  Outcome Summary: (P) Pt increased his distance walked today with no fatigue/SOB and with only CGA. Pt reports he will be having surgery tomorrow morning 2/18. Pt will benefit from continued skilled PT to improve strength, endurance, and functional mobility following surgery.

## 2021-02-17 NOTE — PLAN OF CARE
Goal Outcome Evaluation:  Plan of Care Reviewed With: patient  Progress: no change  Outcome Summary: Condition remains the same. Not likely a surgical candidate per CTS note. To be seen by Cardiology for alternative options. Patient and family updated.

## 2021-02-18 LAB
ANION GAP SERPL CALCULATED.3IONS-SCNC: 8.8 MMOL/L (ref 5–15)
ANISOCYTOSIS BLD QL: ABNORMAL
BUN SERPL-MCNC: 24 MG/DL (ref 8–23)
BUN/CREAT SERPL: 19.5 (ref 7–25)
CALCIUM SPEC-SCNC: 9.7 MG/DL (ref 8.6–10.5)
CHLORIDE SERPL-SCNC: 101 MMOL/L (ref 98–107)
CO2 SERPL-SCNC: 27.2 MMOL/L (ref 22–29)
CREAT SERPL-MCNC: 1.23 MG/DL (ref 0.76–1.27)
DEPRECATED RDW RBC AUTO: 45.1 FL (ref 37–54)
EOSINOPHIL # BLD MANUAL: 0.2 10*3/MM3 (ref 0–0.4)
EOSINOPHIL NFR BLD MANUAL: 1 % (ref 0.3–6.2)
ERYTHROCYTE [DISTWIDTH] IN BLOOD BY AUTOMATED COUNT: 13.8 % (ref 12.3–15.4)
GFR SERPL CREATININE-BSD FRML MDRD: 58 ML/MIN/1.73
GLUCOSE BLDC GLUCOMTR-MCNC: 138 MG/DL (ref 70–130)
GLUCOSE BLDC GLUCOMTR-MCNC: 224 MG/DL (ref 70–130)
GLUCOSE BLDC GLUCOMTR-MCNC: 232 MG/DL (ref 70–130)
GLUCOSE BLDC GLUCOMTR-MCNC: 242 MG/DL (ref 70–130)
GLUCOSE BLDC GLUCOMTR-MCNC: 89 MG/DL (ref 70–130)
GLUCOSE SERPL-MCNC: 113 MG/DL (ref 65–99)
HCT VFR BLD AUTO: 38.4 % (ref 37.5–51)
HGB BLD-MCNC: 13 G/DL (ref 13–17.7)
LDH SERPL-CCNC: 192 U/L (ref 135–225)
LYMPHOCYTES # BLD MANUAL: 10.96 10*3/MM3 (ref 0.7–3.1)
LYMPHOCYTES NFR BLD MANUAL: 1 % (ref 5–12)
LYMPHOCYTES NFR BLD MANUAL: 55 % (ref 19.6–45.3)
MCH RBC QN AUTO: 30.1 PG (ref 26.6–33)
MCHC RBC AUTO-ENTMCNC: 33.9 G/DL (ref 31.5–35.7)
MCV RBC AUTO: 88.9 FL (ref 79–97)
METAMYELOCYTES NFR BLD MANUAL: 1 % (ref 0–0)
MONOCYTES # BLD AUTO: 0.2 10*3/MM3 (ref 0.1–0.9)
NEUTROPHILS # BLD AUTO: 6.38 10*3/MM3 (ref 1.7–7)
NEUTROPHILS NFR BLD MANUAL: 32 % (ref 42.7–76)
NRBC BLD AUTO-RTO: 0 /100 WBC (ref 0–0.2)
PLAT MORPH BLD: NORMAL
PLATELET # BLD AUTO: 60 10*3/MM3 (ref 140–450)
PLATELET # BLD AUTO: 60 10*3/MM3 (ref 140–450)
PLATELETS.RETICULATED NFR BLD AUTO: 2.7 % (ref 0.9–6.5)
PMV BLD AUTO: 8.8 FL (ref 6–12)
POTASSIUM SERPL-SCNC: 4.4 MMOL/L (ref 3.5–5.2)
RBC # BLD AUTO: 4.32 10*6/MM3 (ref 4.14–5.8)
SODIUM SERPL-SCNC: 137 MMOL/L (ref 136–145)
VARIANT LYMPHS NFR BLD MANUAL: 10 % (ref 0–5)
WBC # BLD AUTO: 19.93 10*3/MM3 (ref 3.4–10.8)
WBC MORPH BLD: NORMAL

## 2021-02-18 PROCEDURE — 85025 COMPLETE CBC W/AUTO DIFF WBC: CPT | Performed by: INTERNAL MEDICINE

## 2021-02-18 PROCEDURE — 85055 RETICULATED PLATELET ASSAY: CPT | Performed by: INTERNAL MEDICINE

## 2021-02-18 PROCEDURE — 63710000001 INSULIN REGULAR HUMAN PER 5 UNITS: Performed by: THORACIC SURGERY (CARDIOTHORACIC VASCULAR SURGERY)

## 2021-02-18 PROCEDURE — 85007 BL SMEAR W/DIFF WBC COUNT: CPT | Performed by: INTERNAL MEDICINE

## 2021-02-18 PROCEDURE — 63710000001 PREDNISONE PER 1 MG: Performed by: THORACIC SURGERY (CARDIOTHORACIC VASCULAR SURGERY)

## 2021-02-18 PROCEDURE — 83615 LACTATE (LD) (LDH) ENZYME: CPT | Performed by: INTERNAL MEDICINE

## 2021-02-18 PROCEDURE — 99222 1ST HOSP IP/OBS MODERATE 55: CPT | Performed by: INTERNAL MEDICINE

## 2021-02-18 PROCEDURE — 80048 BASIC METABOLIC PNL TOTAL CA: CPT | Performed by: NURSE PRACTITIONER

## 2021-02-18 PROCEDURE — 99231 SBSQ HOSP IP/OBS SF/LOW 25: CPT | Performed by: INTERNAL MEDICINE

## 2021-02-18 PROCEDURE — 97530 THERAPEUTIC ACTIVITIES: CPT

## 2021-02-18 PROCEDURE — 82962 GLUCOSE BLOOD TEST: CPT

## 2021-02-18 RX ORDER — POLYETHYLENE GLYCOL 3350 17 G/17G
17 POWDER, FOR SOLUTION ORAL DAILY
Status: DISCONTINUED | OUTPATIENT
Start: 2021-02-18 | End: 2021-02-20 | Stop reason: HOSPADM

## 2021-02-18 RX ORDER — BISACODYL 5 MG/1
10 TABLET, DELAYED RELEASE ORAL DAILY PRN
Status: DISCONTINUED | OUTPATIENT
Start: 2021-02-18 | End: 2021-02-20 | Stop reason: HOSPADM

## 2021-02-18 RX ORDER — TAMSULOSIN HYDROCHLORIDE 0.4 MG/1
0.4 CAPSULE ORAL DAILY
Status: DISCONTINUED | OUTPATIENT
Start: 2021-02-18 | End: 2021-02-20 | Stop reason: HOSPADM

## 2021-02-18 RX ADMIN — LEVOTHYROXINE SODIUM 200 MCG: 0.1 TABLET ORAL at 06:16

## 2021-02-18 RX ADMIN — SODIUM CHLORIDE, PRESERVATIVE FREE 3 ML: 5 INJECTION INTRAVENOUS at 21:09

## 2021-02-18 RX ADMIN — ASPIRIN 81 MG: 81 TABLET, COATED ORAL at 09:19

## 2021-02-18 RX ADMIN — ATORVASTATIN CALCIUM 20 MG: 20 TABLET, FILM COATED ORAL at 09:19

## 2021-02-18 RX ADMIN — DONEPEZIL HYDROCHLORIDE 10 MG: 10 TABLET, FILM COATED ORAL at 09:19

## 2021-02-18 RX ADMIN — SERTRALINE 100 MG: 100 TABLET, FILM COATED ORAL at 09:19

## 2021-02-18 RX ADMIN — TAMSULOSIN HYDROCHLORIDE 0.4 MG: 0.4 CAPSULE ORAL at 16:55

## 2021-02-18 RX ADMIN — POLYETHYLENE GLYCOL 3350 17 G: 17 POWDER, FOR SOLUTION ORAL at 16:56

## 2021-02-18 RX ADMIN — PREDNISONE 20 MG: 20 TABLET ORAL at 09:19

## 2021-02-18 RX ADMIN — TEMAZEPAM 7.5 MG: 7.5 CAPSULE ORAL at 21:09

## 2021-02-18 RX ADMIN — INSULIN HUMAN 8 UNITS: 100 INJECTION, SOLUTION PARENTERAL at 16:57

## 2021-02-18 RX ADMIN — GABAPENTIN 300 MG: 300 CAPSULE ORAL at 21:09

## 2021-02-18 RX ADMIN — INSULIN HUMAN 8 UNITS: 100 INJECTION, SOLUTION PARENTERAL at 12:06

## 2021-02-18 NOTE — PLAN OF CARE
Goal Outcome Evaluation:  Plan of Care Reviewed With: (P) patient  Progress: (P) improving  Outcome Summary: (P) Pt increased his gait distance to 250 ft today with improved fluidity and gait speed. Pt is still intermittently unsteady on his feet, but overall safety and indpendence with functional mobility has improved. Pt will continue to benefit from skilled PT to improve strength, endurance, and gait.

## 2021-02-18 NOTE — CONSULTS
Referring Provider:       Patient Care Team:  Alex Buckley MD as PCP - General (Internal Medicine)      Reason for Consultation:   Multivessel coronary artery disease  Dyspnea      History of present illness:   I had the pleasure of seeing Mr. Gauthier as consultation secondary to his coronary artery disease.  He is a very pleasant 75-year-old male with medical history of diabetes mellitus, hypothyroidism, coronary artery disease, and lymphoma who presented with worsening NICHOLS.  He did not report orthopnea or PND.  He underwent coronary angiography which I have reviewed showing a chronic total occlusion of the mid LAD that is short along with a chronic total occlusion of the RCA.  There was also a critical mid to distal RCA stenosis prior to the .  Ejection fraction was normal.  Denies angina.  He was noted to have chronic kidney disease with a creatinine of 1.6 along with evidence of thrombocytopenia.  Platelet count decreased to 48 yesterday.  Has improved to 60,000 today.  White count is up to 19,000.  Creatinine has improved to 1.2.    Review of Systems  All other systems reviewed and negative.     Past Medical History:   Past Medical History:   Diagnosis Date   • Anxiety and depression    • Arthritis    • Coronary artery disease    • Dementia (CMS/HCC)    • Diabetes mellitus (CMS/HCC)    • Disease of thyroid gland    • Elevated cholesterol    • GERD (gastroesophageal reflux disease)    • Hypertension    • Lymphoma (CMS/HCC)     History of lymphoma, has been in remission   • Sleep apnea        Past Surgical History:   Past Surgical History:   Procedure Laterality Date   • HERNIA REPAIR     • JOINT REPLACEMENT         Family History: History reviewed. No pertinent family history.    Social History:   Social History     Tobacco Use   • Smoking status: Never Smoker   • Smokeless tobacco: Never Used   Substance Use Topics   • Alcohol use: Not Currently     Comment: QUIT DRINKING 32 YEARS AGO   • Drug use: Never        Home Medications:   Medications Prior to Admission   Medication Sig Dispense Refill Last Dose   • amitriptyline (ELAVIL) 50 MG tablet Take 50 mg by mouth Every Night.      • DONEPEZIL HCL PO Take 10 mg by mouth Daily.      • ferrous sulfate 325 (65 FE) MG tablet Take 325 mg by mouth 3 (Three) Times a Day.      • gabapentin (NEURONTIN) 300 MG capsule Take 300 mg by mouth Every Night.      • glipizide (GLUCOTROL) 10 MG tablet Take 10 mg by mouth 2 (Two) Times a Day Before Meals.      • insulin glargine (LANTUS, SEMGLEE) 100 UNIT/ML injection Inject 30 Units under the skin into the appropriate area as directed Every Night.      • insulin lispro (humaLOG, ADMELOG) 100 UNIT/ML injection Inject 15 Units under the skin into the appropriate area as directed 3 (Three) Times a Day Before Meals.      • isosorbide mononitrate (IMDUR) 60 MG 24 hr tablet Take 60 mg by mouth Daily.      • levothyroxine (SYNTHROID, LEVOTHROID) 200 MCG tablet Take 200 mcg by mouth Every Morning.      • lisinopril (PRINIVIL,ZESTRIL) 5 MG tablet Take 10 mg by mouth Daily. Take 0.5 tablet daily      • metFORMIN (GLUCOPHAGE) 1000 MG tablet Take 1,000 mg by mouth 2 (Two) Times a Day With Meals.      • nadolol (CORGARD) 40 MG tablet Take 40 mg by mouth Daily.      • pantoprazole (PROTONIX) 40 MG EC tablet Take 40 mg by mouth Daily.      • predniSONE (DELTASONE) 5 MG tablet Take 20 mg by mouth Daily.      • sertraline (ZOLOFT) 100 MG tablet Take 100 mg by mouth Daily. Take 1.5 tablets daily      • simvastatin (ZOCOR) 40 MG tablet Take 40 mg by mouth Every Night. Take 0.5 tablets every night      • vitamin B-12 (CYANOCOBALAMIN) 1000 MCG tablet Take 2,000 mcg by mouth Every Night.      • vitamin D3 125 MCG (5000 UT) capsule capsule Take 5,000 Units by mouth Daily.          Current Medications:   Current Facility-Administered Medications:   •  acetaminophen (TYLENOL) tablet 650 mg, 650 mg, Oral, Q4H PRN, French, Jazmine, APRN  •  ALPRAZolam (XANAX)  tablet 0.25 mg, 0.25 mg, Oral, Q8H PRN, Jazmine French APRN, 0.25 mg at 02/17/21 1654  •  aspirin EC tablet 81 mg, 81 mg, Oral, Daily, Jr Tom Tubbs MD, 81 mg at 02/18/21 0919  •  atorvastatin (LIPITOR) tablet 20 mg, 20 mg, Oral, Daily, Jr Tom Tubbs MD, 20 mg at 02/18/21 0919  •  bisacodyl (DULCOLAX) EC tablet 10 mg, 10 mg, Oral, Daily PRN, Ayde Smith MD  •  dextrose (D50W) 25 g/ 50mL Intravenous Solution 25 g, 25 g, Intravenous, Q15 Min PRN, Jr Tom Tubbs MD  •  dextrose (GLUTOSE) oral gel 15 g, 15 g, Oral, Q15 Min PRN, Jr Tom Tubbs MD  •  donepezil (ARICEPT) tablet 10 mg, 10 mg, Oral, Daily, Jr Tom Tubbs MD, 10 mg at 02/18/21 0919  •  gabapentin (NEURONTIN) capsule 300 mg, 300 mg, Oral, Nightly, Jr Tom Tubbs MD, 300 mg at 02/17/21 2107  •  glucagon (human recombinant) (GLUCAGEN DIAGNOSTIC) injection 1 mg, 1 mg, Subcutaneous, Q15 Min PRN, Jr Tom Tubbs MD  •  insulin regular (humuLIN R,novoLIN R) injection 0-24 Units, 0-24 Units, Subcutaneous, Q6H, Jr Tom Tubbs MD, 8 Units at 02/18/21 1206  •  levothyroxine (SYNTHROID, LEVOTHROID) tablet 200 mcg, 200 mcg, Oral, QAM, Jr Tom Tubbs MD, 200 mcg at 02/18/21 0616  •  nitroglycerin (NITROSTAT) SL tablet 0.4 mg, 0.4 mg, Sublingual, Q5 Min PRN, Jazmine French, APRN  •  polyethylene glycol (MIRALAX) packet 17 g, 17 g, Oral, Daily, Ayde Smith MD  •  predniSONE (DELTASONE) tablet 20 mg, 20 mg, Oral, Daily, Jr Tom Tubbs MD, 20 mg at 02/18/21 0919  •  sertraline (ZOLOFT) tablet 100 mg, 100 mg, Oral, Daily, Jr Tom Tubbs MD, 100 mg at 02/18/21 0919  •  sodium chloride 0.9 % flush 10 mL, 10 mL, Intravenous, PRN, Jazmine French APRN  •  sodium chloride 0.9 % flush 3 mL, 3 mL, Intravenous, Q12H, Jazmine French APRN, 3 mL at 02/17/21 2107  •  tamsulosin (FLOMAX) 24 hr capsule 0.4 mg, 0.4 mg, Oral, Daily, Ayde Smith MD  •  temazepam (RESTORIL) capsule  "7.5 mg, 7.5 mg, Oral, Nightly PRN, French Jazmine, APRN, 7.5 mg at 02/17/21 2107     Allergies: Patient has no known allergies.      Vital Signs   Temp:  [97.6 °F (36.4 °C)-98.1 °F (36.7 °C)] 97.6 °F (36.4 °C)  Heart Rate:  [96-98] 96  Resp:  [16] 16  BP: (109-153)/(67-99) 109/73  Flowsheet Rows      First Filed Value   Admission Height  182.9 cm (72\") Documented at 02/15/2021 1404   Admission Weight  115 kg (252 lb 8 oz) Documented at 02/15/2021 1404          General Appearance:    Alert, cooperative, in no acute distress   Head:    Normocephalic, without obvious abnormality, atraumatic       Neck:   No adenopathy, supple, no thyromegaly, no carotid bruit, no    JVD   Lungs:     Clear to auscultation bilaterally, no wheezes, rales, or     rhonchi    Heart:    Normal rate, regular rhythm, no murmur, no rub, no gallop   Chest Wall:    No abnormalities observed   Abdomen:     Normal bowel sounds, soft, nontender, nondistended,            no rebound tenderness   Extremities:   No cyanosis, clubbing, or edema   Pulses:   Pulses palpable and equal bilaterally   Skin:   No bleeding or rash   Lymph nodes:   No cervical adenopathy   Neurologic:   Cranial nerves 2 - 12 grossly intact, sensation intact               Results Review: I personally viewed and interpreted the patient's EKG/Telemetry data    Results from last 7 days   Lab Units 02/18/21  0307   WBC 10*3/mm3 19.93*   HEMOGLOBIN g/dL 13.0   HEMATOCRIT % 38.4   PLATELETS 10*3/mm3 60*  60*     Results from last 7 days   Lab Units 02/18/21  0307   SODIUM mmol/L 137   POTASSIUM mmol/L 4.4   CHLORIDE mmol/L 101   CO2 mmol/L 27.2   BUN mg/dL 24*   CREATININE mg/dL 1.23   GLUCOSE mg/dL 113*   CALCIUM mg/dL 9.7     No results found for: TROPONINT        Assessment/Plan   1.  Coronary artery disease with  of the LAD and RCA  2.  Lymphoma  3.  Thrombocytopenia  4.  Diabetes mellitus  5.  Mild ascending aortic dilation    -I have reviewed the patient's clinical history " and anatomy.  His symptoms consist predominantly of dyspnea on exertion in the setting of a preserved ejection fraction.  He does not have angina.  He has a chronic total occlusion of the LAD and a  of the RCA.  I think the lesion in the mid to distal RCA prior to the  is amenable to PCI but not the actual chronic total occlusion.  A  of the LAD could be fixable, however I have great worries about  intervention to the LAD with PCI and PCI to the RCA in somebody with chronic thrombocytopenia with fluctuating platelet count.  -She would need to be on dual antiplatelet therapy uninterrupted with this type of intervention  -I think that the better options in this scenario would be medical management of his coronary artery disease or consideration of platelet transfusion and CAB G with a LIMA to LAD and SVG to the right.  -I would not recommend PCI in this scenario.    I discussed the patient's findings and my recommendations with the patient

## 2021-02-18 NOTE — THERAPY TREATMENT NOTE
Patient Name: Cosmo Gauthier  : 1945    MRN: [id number]                              Today's Date: 2021       Admit Date: 2/15/2021    Visit Dx: No diagnosis found.  Patient Active Problem List   Diagnosis   • CAD (coronary artery disease)   • Morbidly obese (CMS/HCC)   • CAD (coronary artery disease), native coronary artery   • Lymphoma (CMS/HCC)   • Diabetes mellitus (CMS/HCC)   • Dementia (CMS/HCC)   • Stage 3b chronic kidney disease (CMS/HCC)   • Thrombocytopenia (CMS/HCC)   • Disease of thyroid gland   • Anemia   • Lymphocytosis     Past Medical History:   Diagnosis Date   • Anxiety and depression    • Arthritis    • Coronary artery disease    • Dementia (CMS/HCC)    • Diabetes mellitus (CMS/HCC)    • Disease of thyroid gland    • Elevated cholesterol    • GERD (gastroesophageal reflux disease)    • Hypertension    • Lymphoma (CMS/HCC)     History of lymphoma, has been in remission   • Sleep apnea      Past Surgical History:   Procedure Laterality Date   • HERNIA REPAIR     • JOINT REPLACEMENT       General Information     Row Name 21 1130          Physical Therapy Time and Intention    Document Type  therapy note (daily note)  -CH (r) BH (t) CH (c)     Mode of Treatment  individual therapy;physical therapy  -CH (r) BH (t) CH (c)     Row Name 21 1130          General Information    Existing Precautions/Restrictions  fall  -CH (r) BH (t) CH (c)     Row Name 21 1130          Cognition    Orientation Status (Cognition)  oriented x 4  -CH (r) BH (t) CH (c)     Row Name 21 1130          Safety Issues, Functional Mobility    Impairments Affecting Function (Mobility)  balance;coordination;endurance/activity tolerance;shortness of breath  -CH (r) BH (t) CH (c)       User Key  (r) = Recorded By, (t) = Taken By, (c) = Cosigned By    Initials Name Provider Type    CH Fang Roche, PT Physical Therapist    Sofia Galaviz PT Student PT Student        Mobility     Row Name  02/18/21 1130          Bed Mobility    Bed Mobility  supine-sit;sit-supine  -CH (r) BH (t) CH (c)     Supine-Sit Twiggs (Bed Mobility)  standby assist  -CH (r) BH (t) CH (c)     Sit-Supine [place] (Bed Mobility)  standby assist  -CH (r) BH (t) CH (c)     Row Name 02/18/21 1130          Sit-Stand Transfer    Sit-Stand Twiggs (Transfers)  verbal cues;nonverbal cues (demo/gesture);standby assist  -CH (r) BH (t) CH (c)     Row Name 02/18/21 1130          Gait/Stairs (Locomotion)    Twiggs Level (Gait)  verbal cues;nonverbal cues (demo/gesture);contact guard  -CH (r) BH (t) CH (c)     Distance in Feet (Gait)  250  -CH (r) BH (t) CH (c)     Deviations/Abnormal Patterns (Gait)  gonzalo decreased;gait speed decreased;stride length decreased  -CH (r) BH (t) CH (c)     Left Sided Gait Deviations  heel strike decreased  -CH (r) BH (t) CH (c)     Comment (Gait/Stairs)  Pt increased his gait distance to 250 ft today noting he continues to feel better and better. He did not report fatigue or SOB, and O2sat stayed between 90 and 96 during ambulation.  -CH (r) BH (t) CH (c)       User Key  (r) = Recorded By, (t) = Taken By, (c) = Cosigned By    Initials Name Provider Type    Fang Campbell, PT Physical Therapist    Sofia Galaviz, PT Student PT Student        Obj/Interventions     Row Name 02/18/21 1133          Motor Skills    Therapeutic Exercise  -- 10 reps B cardiac rehab protocol, 10x seated marches  -CH (r) BH (t) CH (c)       User Key  (r) = Recorded By, (t) = Taken By, (c) = Cosigned By    Initials Name Provider Type    Fang Campbell, PT Physical Therapist    Sofia Galaviz, PT Student PT Student        Goals/Plan    No documentation.       Clinical Impression     Row Name 02/18/21 1134          Pain    Additional Documentation  Pain Scale: FACES Pre/Post-Treatment (Group)  -CH (r) BH (t) CH (c)     Row Name 02/18/21 1134          Pain Scale: FACES Pre/Post-Treatment    Pain:  FACES Scale, Pretreatment  0-->no hurt  -CH (r) BH (t) CH (c)     Posttreatment Pain Rating  0-->no hurt  -CH (r) BH (t) CH (c)     Row Name 02/18/21 1134          Plan of Care Review    Plan of Care Reviewed With  patient  -CH (r) BH (t) CH (c)     Progress  improving  -CH (r) BH (t) CH (c)     Outcome Summary  Pt increased his gait distance to 250 ft today with improved fluidity and gait speed. Pt is still intermittently unsteady on his feet, but overall safety and indpendence with functional mobility has improved. Pt will continue to benefit from skilled PT to improve strength, endurance, and gait.  -CH (r) BH (t) CH (c)     Row Name 02/18/21 1134          Vital Signs    Pre SpO2 (%)  96  -CH (r) BH (t) CH (c)     O2 Delivery Pre Treatment  room air  -CH (r) BH (t) CH (c)     Intra SpO2 (%)  90  -CH (r) BH (t) CH (c)     O2 Delivery Intra Treatment  room air  -CH (r) BH (t) CH (c)     Post SpO2 (%)  96  -CH (r) BH (t) CH (c)     O2 Delivery Post Treatment  room air  -CH (r) BH (t) CH (c)     Row Name 02/18/21 1134          Positioning and Restraints    Pre-Treatment Position  in bed  -CH (r) BH (t) CH (c)     Post Treatment Position  bed  -CH (r) BH (t) CH (c)     In Bed  supine;call light within reach;encouraged to call for assist;exit alarm on  -CH (r) BH (t) CH (c)       User Key  (r) = Recorded By, (t) = Taken By, (c) = Cosigned By    Initials Name Provider Type    CH Fang Roche, PT Physical Therapist    Sofia Galaviz, PT Student PT Student        Outcome Measures     Row Name 02/18/21 1137          How much help from another person do you currently need...    Turning from your back to your side while in flat bed without using bedrails?  3  -CH (r) BH (t) CH (c)     Moving from lying on back to sitting on the side of a flat bed without bedrails?  3  -CH (r) BH (t) CH (c)     Moving to and from a bed to a chair (including a wheelchair)?  4  -CH (r) BH (t) CH (c)     Standing up from a chair using  your arms (e.g., wheelchair, bedside chair)?  4  -CH (r) BH (t) CH (c)     Climbing 3-5 steps with a railing?  2  -CH (r) BH (t) CH (c)     To walk in hospital room?  4  -CH (r) BH (t) CH (c)     AM-PAC 6 Clicks Score (PT)  20  -CH (r) BH (t)     Row Name 02/18/21 1136          Functional Assessment    Outcome Measure Options  AM-PAC 6 Clicks Basic Mobility (PT)  -CH (r) BH (t) CH (c)       User Key  (r) = Recorded By, (t) = Taken By, (c) = Cosigned By    Initials Name Provider Type     Fang Roche, PT Physical Therapist     Sofia Chappell PT Student PT Student        Physical Therapy Education                 Title: PT OT SLP Therapies (Done)     Topic: Physical Therapy (Done)     Point: Mobility training (Done)     Learning Progress Summary           Patient Acceptance, E,TB,D, VU,NR by  at 2/18/2021 1137    Acceptance, E,TB,D, VU,NR by  at 2/17/2021 1104    Acceptance, E,TB,D, VU,NR by  at 2/16/2021 1124                   Point: Home exercise program (Done)     Learning Progress Summary           Patient Acceptance, E,TB,D, VU,NR by  at 2/18/2021 1137    Acceptance, E,TB,D, VU,NR by  at 2/17/2021 1104    Acceptance, E,TB,D, VU,NR by  at 2/16/2021 1124                   Point: Body mechanics (Done)     Learning Progress Summary           Patient Acceptance, E,TB,D, VU,NR by  at 2/18/2021 1137    Acceptance, E,TB,D, VU,NR by  at 2/17/2021 1104    Acceptance, E,TB,D, VU,NR by  at 2/16/2021 1124                   Point: Precautions (Done)     Learning Progress Summary           Patient Acceptance, E,TB,D, VU,NR by  at 2/18/2021 1137    Acceptance, E,TB,D, VU,NR by  at 2/17/2021 1104    Acceptance, E,TB,D, VU,NR by  at 2/16/2021 1124                               User Key     Initials Effective Dates Name Provider Type Discipline     04/03/18 -  Fang Roche, PT Physical Therapist PT     02/01/21 -  Sofia Chappell PT Student PT Student PT              PT Recommendation  and Plan     Plan of Care Reviewed With: patient  Progress: improving  Outcome Summary: Pt increased his gait distance to 250 ft today with improved fluidity and gait speed. Pt is still intermittently unsteady on his feet, but overall safety and indpendence with functional mobility has improved. Pt will continue to benefit from skilled PT to improve strength, endurance, and gait.     Time Calculation:   PT Charges     Row Name 02/18/21 1138             Time Calculation    Start Time  1028  -CH (r) BH (t) CH (c)      Stop Time  1040  -CH (r) BH (t) CH (c)      Time Calculation (min)  12 min  -CH (r) BH (t)      PT Received On  02/18/21  -CH (r) BH (t) CH (c)      PT - Next Appointment  02/19/21  -CH (r) BH (t) CH (c)         Time Calculation- PT    Total Timed Code Minutes- PT  12 minute(s)  -CH (r) BH (t) CH (c)        User Key  (r) = Recorded By, (t) = Taken By, (c) = Cosigned By    Initials Name Provider Type     Fang Roche S, PT Physical Therapist     Sofia Chappell, PT Student PT Student        Therapy Charges for Today     Code Description Service Date Service Provider Modifiers Qty    02598562993  PT THERAPEUTIC ACT EA 15 MIN 2/17/2021 Sofia Chappell, PT Student GP 1    [id number]  PT THERAPEUTIC ACT EA 15 MIN 2/18/2021 Sofia Chappell PT Student GP 1          PT G-Codes  Outcome Measure Options: AM-PAC 6 Clicks Basic Mobility (PT)  AM-PAC 6 Clicks Score (PT): 20    Sofia Chappell PT Student  2/18/2021

## 2021-02-18 NOTE — PLAN OF CARE
Problem: Adult Inpatient Plan of Care  Goal: Plan of Care Review  Outcome: Ongoing, Progressing  Goal: Patient-Specific Goal (Individualized)  Outcome: Ongoing, Progressing  Goal: Absence of Hospital-Acquired Illness or Injury  Outcome: Ongoing, Progressing  Intervention: Identify and Manage Fall Risk  Recent Flowsheet Documentation  Taken 2/18/2021 0756 by Fabiola Tripp RN  Safety Promotion/Fall Prevention:   activity supervised   fall prevention program maintained   safety round/check completed   room organization consistent   nonskid shoes/slippers when out of bed  Intervention: Prevent Skin Injury  Recent Flowsheet Documentation  Taken 2/18/2021 0756 by Fabiola Tripp RN  Body Position: position changed independently  Intervention: Prevent Infection  Recent Flowsheet Documentation  Taken 2/18/2021 0756 by Fabiola Tripp RN  Infection Prevention: environmental surveillance performed  Goal: Optimal Comfort and Wellbeing  Outcome: Ongoing, Progressing  Goal: Readiness for Transition of Care  Outcome: Ongoing, Progressing     Problem: Fall Injury Risk  Goal: Absence of Fall and Fall-Related Injury  Outcome: Ongoing, Progressing  Intervention: Identify and Manage Contributors to Fall Injury Risk  Recent Flowsheet Documentation  Taken 2/18/2021 0756 by Fabiola Tripp RN  Medication Review/Management: medications reviewed  Intervention: Promote Injury-Free Environment  Recent Flowsheet Documentation  Taken 2/18/2021 0756 by Fabiola Tripp RN  Safety Promotion/Fall Prevention:   activity supervised   fall prevention program maintained   safety round/check completed   room organization consistent   nonskid shoes/slippers when out of bed     Problem: Skin Injury Risk Increased  Goal: Skin Health and Integrity  Outcome: Ongoing, Progressing  Intervention: Optimize Skin Protection  Recent Flowsheet Documentation  Taken 2/18/2021 0756 by Fabiola Tripp RN  Pressure Reduction Techniques: heels elevated off bed  Head of Bed  (HOB): HOB at 30-45 degrees  Pressure Reduction Devices: alternating pressure pump (ADD)  Skin Protection: adhesive use limited   Goal Outcome Evaluation:

## 2021-02-18 NOTE — PROGRESS NOTES
[facility] CBC GROUP INPATIENT PROGRESS NOTE    Length of Stay:  3 days    CHIEF COMPLAINT/REASON FOR VISIT:  1. Leukocytosis     2.  History of lymphoma followed by Dr. Saldana in [place]     3.  Chronic thrombocytopenia       SUBJECTIVE:     2.18.2021  Afebrile and hypotensive at 109/73    IPF 2.70  WBC increased to 19.93 from 16.3 yesterday, platelets also higher at 60,000 from 48,000.  Patient reports his dyspnea is slightly better when he is sitting in chair not having too much physical activity.      2.17.2021  Patient is afebrile and vitals are stable.  He continues to have exertional dyspnea.  WBC increased to 16.3 from yesterdays 15.76. Platelets dropped again to 48,000 from 65,000.    Results of peripheral blood flow cytometry study reported today 2/17/2021 monoclonal CD5 negative, CD10 negative, kappa restricted B lymphocytes.  Pathologist commented this was non-specific for CLL, mantle cell lymphoma, follicular cell lymphoma.        HISTORY OF PRESENT ILLNESS:  The patient is a 75 y.o. year old male who is here for an opinion about the above issue.     History of Present Illness      Patient is a 75-year-old gentleman who has history of diabetes type 2, hypothyroidism, history of coronary artery disease s/p stents, hyperlipidemia, obesity who developed progressively worsening dyspnea on exertion.  He did not have any palpitations and denied nausea vomiting chest pain.  Dr. Milligan had done cardiac cath which showed severe two-vessel coronary artery disease with 100% LAD occlusion and 100% RCA occlusion.  His ejection fraction on echocardiogram was 50 to 60%.  Actually it was on transthoracic echocardiogram.  He was admitted today for surgery tomorrow.     Patient apparently has had a history of lymphoma in the past and was seen by hematologist.  He has been followed by Dr. Saldana [phone] at [place].  Apparently he has had chronic thrombocytopenia.  When patient had CBC  today, his hemoglobin is 12.9, white count of 16.49, platelet of 59.  He has 59% neutrophils, 14% lymphocytes 6% monocytes, 20% atypical lymphocytes.  LDH is 175.  Urine analysis is negative except glucose 100 mg per DL.  PT PTT were normal.  BUN is 43 and creatinine of 1.63.  Patient has a potassium of 7.1.  COVID-19 not detected.     Patient has had significant Chiari malformation.  He also has chronic back pain with multilevel degenerative disease on the spine.  He is followed by neurology at [facility].  He has sleep apnea on CPAP machine        We have been contacted in order to evaluate the leukocytosis with atypical lymphocytes and history of lymphoma as well as thrombocytopenia  ROS:  Review of Systems   Constitutional: Positive for activity change and fatigue. Negative for appetite change and diaphoresis.   HENT: Negative for nosebleeds and sore throat.    Respiratory: Positive for shortness of breath (Improved). Negative for cough.    Cardiovascular: Negative for chest pain and leg swelling.   Gastrointestinal: Negative for abdominal pain, blood in stool and nausea.   Genitourinary: Negative for dysuria and hematuria.   Musculoskeletal: Negative for arthralgias and joint swelling.   Skin: Negative for color change and pallor.   Allergic/Immunologic: Positive for immunocompromised state (Chronic non-Hodgkin's lymphoma/leukemia).   Neurological: Negative for dizziness and headaches.   Hematological: Negative for adenopathy. Does not bruise/bleed easily.   Psychiatric/Behavioral: Negative for agitation and confusion.        OBJECTIVE:  Vitals:    02/17/21 1535 02/17/21 1906 02/18/21 0306 02/18/21 0720   BP: 143/93 153/99 117/67 109/73   BP Location: Right arm Right arm Right arm Right arm   Patient Position: Lying Lying Lying Lying   Pulse: 96 98  96   Resp: 16 16 16 16   Temp: 98.3 °F (36.8 °C) 98.1 °F (36.7 °C) 98 °F (36.7 °C) 97.6 °F (36.4 °C)   TempSrc: Oral Oral Oral Oral   SpO2:       Weight:        Height:         Physical Exam  Constitutional:       General: He is not in acute distress.  Patient is sitting in chair.     Appearance: Normal appearance.   Eyes:      General: No scleral icterus.     Conjunctiva/sclera: Conjunctivae normal.   Neck:      Musculoskeletal: Neck supple.   Cardiovascular:      Rate and Rhythm: Normal rate and regular rhythm.      Pulses: Normal pulses.      Heart sounds: Normal heart sounds.   Pulmonary:      Effort: Pulmonary effort is normal.      Breath sounds: Normal breath sounds.   Abdominal:      General: Bowel sounds are normal.      Palpations: Abdomen is soft.   Musculoskeletal:      Right lower leg: No edema.      Left lower leg: No edema.   Lymphadenopathy:      Cervical: No cervical adenopathy.   Skin:     Coloration: Skin is not jaundiced.   Neurological:      General: No focal deficit present.      Mental Status: He is alert and oriented to person, place, and time.   Psychiatric:         Mood and Affect: Mood normal.         Thought Content: Thought content normal.             DIAGNOSTIC DATA:  Results Review:      Results from last 7 days   Lab Units 02/18/21  0307 02/17/21  0346 02/16/21  0351   WBC 10*3/mm3 19.93* 16.30* 15.76*   HEMOGLOBIN g/dL 13.0 13.3 12.7*   HEMATOCRIT % 38.4 39.2 38.1   PLATELETS 10*3/mm3 60*  60* 48* 65*  65*      Results from last 7 days   Lab Units 02/18/21  0307 02/17/21  0754 02/16/21  0351 02/15/21  2039  02/15/21  1404   SODIUM mmol/L 137 141 135* 136   < > 134*   POTASSIUM mmol/L 4.4 4.3 5.0 5.0   < > 6.6*   CHLORIDE mmol/L 101 102 100 103   < > 102   CO2 mmol/L 27.2 30.5* 22.8 22.4   < > 22.3   BUN mg/dL 24* 32* 41* 46*   < > 43*   CREATININE mg/dL 1.23 1.33* 1.49* 1.71*   < > 1.74*   CALCIUM mg/dL 9.7 9.0 9.7 9.8   < > 9.8   BILIRUBIN mg/dL  --   --  0.4 0.3  --  0.4   ALK PHOS U/L  --   --  84 85  --  94   ALT (SGPT) U/L  --   --  17 19  --  22   AST (SGOT) U/L  --   --  17 12  --  15   GLUCOSE mg/dL 113* 60* 162* 142*   < > 138*     < > = values in this interval not displayed.      Lab Results   Component Value Date    NEUTROABS 6.38 02/18/2021     Results from last 7 days   Lab Units 02/15/21  1404   INR  0.97   APTT seconds 29.8     Results from last 7 days   Lab Units 02/15/21  1404   MAGNESIUM mg/dL 1.7     Component      Latest Ref Rng & Units 2/16/2021   IPF      0.90 - 6.50 % 2.10   Platelets      140 - 450 10*3/mm3 65 (L)   LDH      135 - 225 U/L 171      Component      Latest Ref Rng & Units 2/18/2021   IPF      0.90 - 6.50 % 2.70   Platelets      140 - 450 10*3/mm3 60 (L)   LDH      135 - 225 U/L 192      Flow Cytometry, Blood (02/16/2021 03:51)     IMAGING:    XR Chest PA & Lateral (02/15/2021 15:11)  Carotid Duplex - Bilateral (02/15/2021 15:04)  Duplex Vein Mapping Lower Extremity - Bilateral CAR (02/15/2021 15:02)      Assessment/Plan   ASSESSMENT/PLAN:  This is a 75 y.o. male with:     *  History of lymphoma followed by hematologist Dr. Saldana at [place].   · I called this morning on 2/16/2021 and spoke with Dr. Saldana, and was told the patient having low-grade follicular lymphoma who has never been treated previously with any chemotherapy or immunotherapy.  Dr. Saldana is planning to treat him may be using Rituxan in the near future.   · His lymphoma is indolent, should not interfere or delay his surgery.     * Leukocytosis with atypical lymphocytes.   · I reviewed his peripheral blood smear, there are many lymphocytes with dark nucleus occupies almost 90 to 95% of the surface, with very slim cytoplasm.  · Absolute lymphocytes 7410 on 2/16/2021.  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  Will obtain flow cytometry for further evaluation.  · Results of peripheral blood flow cytometry study reported today 2/17/2021 with monoclonal CD5 negative, CD10 negative, kappa restricted B lymphocytes.  Pathologist commented this was non-specific for CLL, mantle cell lymphoma, follicular cell lymphoma.   · So  this patient has leukemia phase of lymphoma.   · Lymphocytes 10,160 on 2/18/2020, stable.     *Chronic thrombocytopenia.   · According to his primary hematologist Dr. Saldana that patient used to have platelets in the 20-30,000 range.  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  Patient has been given prednisone 20 mg daily.  Platelets subsequently improved to the 60,000 range.   · Currently patient has platelets at around 60,000.  Patient has normal IPF 2.3% and 2.1% for 2 consecutive days.  There is no evidence of active platelets destruction/ITP, LDH is also normal.   · I reviewed his peripheral blood smear, there is no clumping of platelets.  Morphology is normal.       *Coronary artery disease, with 100% occlusion of LAD and RCA.   · Upon further evaluation, patient is a very high risk for CABG surgical procedure.    · Patient was evaluated by cardiology service, and deemed not a good candidate for PCI which require long-term dual antiplatelets medication however not feasible due to his thrombocytopenia.         *.  Anemia of chronic kidney disease, check EPO level, iron studies.   · Iron study reported proper ferritin and iron saturation, excellent vitamin B12 level and normal folate.  No evidence for deficiency.    · I reviewed his peripheral blood, morphology of RBCs normal, no evidence of schistocytes or targeted cells.  · EPO level is 12.3 mIU/mL.  · Patient has normal LDH, normal total bilirubin, and normal haptoglobin.  No evidence of hemolysis.      *.  Hyperkalemia and acute renal injury.   · Potassium has normalized today.  Patient is seen by nephrology service.  · Laboratory study otherwise shows no evidence for tumor lysis syndrome. Will check uric acid level.  · Stable hemoglobin 13.0 on 2/18/2021.         PLAN:  1. Not a candidate for CABG surgery.  Nor candidate for PCI.  2. Monitor CBC daily.   3. After this hospitalization, patient can follow-up with his primary  hematologist Dr. Saldana in [place].     If no surgical procedure planned, patient could not be discharged and to follow-up with local hematologist.     We will sign off.  Please call if having questions.       VICKEY DIANA M.D., Ph.D.    2/18/2021

## 2021-02-18 NOTE — PROGRESS NOTES
NEPHROLOGY PROGRESS NOTE    PATIENT IDENTIFICATION:   Name:  Cosmo Gauthier      MRN:  [id number]     75 y.o.  male             Reason for visit: MARKUS and hyperkalemia    SUBJECTIVE:   Feels fine.  Anxious about what might be options for 2 V CAD.  No bm for 3 days.  Not soa. Walked around in halls .Kaur in place.  OBJECTIVE:  Vitals:    02/17/21 1535 02/17/21 1906 02/18/21 0306 02/18/21 0720   BP: 143/93 153/99 117/67 109/73   BP Location: Right arm Right arm Right arm Right arm   Patient Position: Lying Lying Lying Lying   Pulse: 96 98  96   Resp: 16 16 16 16   Temp: 98.3 °F (36.8 °C) 98.1 °F (36.7 °C) 98 °F (36.7 °C) 97.6 °F (36.4 °C)   TempSrc: Oral Oral Oral Oral   SpO2:       Weight:       Height:               Body mass index is 33.43 kg/m².    Intake/Output Summary (Last 24 hours) at 2/18/2021 1346  Last data filed at 2/18/2021 1300  Gross per 24 hour   Intake 480 ml   Output 1850 ml   Net -1370 ml         Exam:  GEN:  Lying in bed. Anxious., no distress.   EYES:   Anicteric sclera  ENT:    External ears/nose normal, MM are moist. Mask                            applied .    LUNGS: Clear to auscultation, no wheezing .  HEART:  RRR no s3 or rub  ABD:  Non-tender, non-distended,  +BS  EXT:  No edema; no cyanosis.   NEURO:             Speech fluent. Moves all 4 ext    Scheduled meds:  aspirin, 81 mg, Oral, Daily  atorvastatin, 20 mg, Oral, Daily  donepezil, 10 mg, Oral, Daily  gabapentin, 300 mg, Oral, Nightly  insulin regular, 0-24 Units, Subcutaneous, Q6H  levothyroxine, 200 mcg, Oral, QAM  predniSONE, 20 mg, Oral, Daily  sertraline, 100 mg, Oral, Daily  sodium chloride, 3 mL, Intravenous, Q12H      IV meds:                           Data Review:    Results from last 7 days   Lab Units 02/18/21  0307 02/17/21  0754 02/16/21  0351 02/15/21  2039  02/15/21  1404   SODIUM mmol/L 137 141 135* 136   < > 134*   POTASSIUM mmol/L 4.4 4.3 5.0 5.0   < > 6.6*   CHLORIDE mmol/L 101 102 100 103   < > 102   CO2 mmol/L  27.2 30.5* 22.8 22.4   < > 22.3   BUN mg/dL 24* 32* 41* 46*   < > 43*   CREATININE mg/dL 1.23 1.33* 1.49* 1.71*   < > 1.74*   CALCIUM mg/dL 9.7 9.0 9.7 9.8   < > 9.8   BILIRUBIN mg/dL  --   --  0.4 0.3  --  0.4   ALK PHOS U/L  --   --  84 85  --  94   ALT (SGPT) U/L  --   --  17 19  --  22   AST (SGOT) U/L  --   --  17 12  --  15   GLUCOSE mg/dL 113* 60* 162* 142*   < > 138*    < > = values in this interval not displayed.       Estimated Creatinine Clearance: 68.5 mL/min (by C-G formula based on SCr of 1.23 mg/dL).    Results from last 7 days   Lab Units 02/15/21  1404   MAGNESIUM mg/dL 1.7       Results from last 7 days   Lab Units 02/18/21  0307 02/17/21  0346 02/16/21  0351 02/15/21  1648   WBC 10*3/mm3 19.93* 16.30* 15.76* 16.49*   HEMOGLOBIN g/dL 13.0 13.3 12.7* 12.9*   PLATELETS 10*3/mm3 60*  60* 48* 65*  65* 69*  59*       Results from last 7 days   Lab Units 02/15/21  1404   INR  0.97             ASSESSMENT:       1. MARKUS on chronic kidney disease stage III associated with severe hyperkalemia:urinary retention.  Resolved with chairez. K normal.      2. 2V, LAD and RCA Coronary artery disease .  Cardiology discussing plan .  3. Lymphoma, chronic thrombocytopenia .  4. YI.   5. DM2  6. Dementia     Plan:  1. Flomax 0.4 mg daily .  2. Will sign off. Please call with questions.    Ayde Smith MD  2/18/2021    13:46 EST

## 2021-02-18 NOTE — PROGRESS NOTES
" LOS: 3 days   Patient Care Team:  Alex Buckley MD as PCP - General (Internal Medicine)    Chief Complaint:     Subjective:  Symptoms:  No shortness of breath, cough or chest pain.    Diet:  Adequate intake.  No nausea or vomiting.    Activity level: Normal.    Pain:  He reports no pain.      Vital Signs  Temp:  [97.6 °F (36.4 °C)-98.3 °F (36.8 °C)] 97.6 °F (36.4 °C)  Heart Rate:  [96-98] 96  Resp:  [16] 16  BP: (109-153)/(67-99) 109/73  Body mass index is 33.43 kg/m².    Intake/Output Summary (Last 24 hours) at 2/18/2021 1040  Last data filed at 2/18/2021 0720  Gross per 24 hour   Intake 480 ml   Output 1350 ml   Net -870 ml     I/O this shift:  In: 480 [P.O.:480]  Out: -           02/15/21  2100 02/16/21  1600 02/17/21  0614   Weight: 66.2 kg (145 lb 14.4 oz) 66 kg (145 lb 8.1 oz) 111 kg (244 lb 1.6 oz)         Objective:  General Appearance:  Comfortable and in no acute distress.    Vital signs: (most recent): Blood pressure 109/73, pulse 96, temperature 97.6 °F (36.4 °C), temperature source Oral, resp. rate 16, height 182 cm (71.65\"), weight 111 kg (244 lb 1.6 oz), SpO2 94 %.  Vital signs are normal.  No fever.    Output: Producing urine.    Lungs:  Normal effort and normal respiratory rate.    Heart: Normal rate.  Regular rhythm.  (SR on tele monitor)  Abdomen: Abdomen is soft and distended.  Bowel sounds are normal.     Extremities: There is no dependent edema.    Pulses: Distal pulses are intact.    Neurological: Patient is alert and oriented to person, place and time.    Skin:  Warm and dry.          Results Review:        WBC WBC   Date Value Ref Range Status   02/18/2021 19.93 (H) 3.40 - 10.80 10*3/mm3 Final   02/17/2021 16.30 (H) 3.40 - 10.80 10*3/mm3 Final   02/16/2021 15.76 (H) 3.40 - 10.80 10*3/mm3 Final   02/15/2021 16.49 (H) 3.40 - 10.80 10*3/mm3 Final      HGB Hemoglobin   Date Value Ref Range Status   02/18/2021 13.0 13.0 - 17.7 g/dL Final   02/17/2021 13.3 13.0 - 17.7 g/dL Final   02/16/2021 " 12.7 (L) 13.0 - 17.7 g/dL Final   02/15/2021 12.9 (L) 13.0 - 17.7 g/dL Final      HCT Hematocrit   Date Value Ref Range Status   02/18/2021 38.4 37.5 - 51.0 % Final   02/17/2021 39.2 37.5 - 51.0 % Final   02/16/2021 38.1 37.5 - 51.0 % Final   02/15/2021 40.3 37.5 - 51.0 % Final   02/15/2021 38.1 37.5 - 51.0 % Final      Platelets Platelets   Date Value Ref Range Status   02/18/2021 60 (L) 140 - 450 10*3/mm3 Final   02/18/2021 60 (L) 140 - 450 10*3/mm3 Final   02/17/2021 48 (C) 140 - 450 10*3/mm3 Final   02/16/2021 65 (L) 140 - 450 10*3/mm3 Final   02/16/2021 65 (L) 140 - 450 10*3/mm3 Final   02/15/2021 59 (L) 140 - 450 10*3/mm3 Final   02/15/2021 69 (L) 140 - 450 10*3/mm3 Final        PT/INR:    Protime   Date Value Ref Range Status   02/15/2021 12.7 11.7 - 14.2 Seconds Final   /  INR   Date Value Ref Range Status   02/15/2021 0.97 0.90 - 1.10 Final       Sodium Sodium   Date Value Ref Range Status   02/18/2021 137 136 - 145 mmol/L Final   02/17/2021 141 136 - 145 mmol/L Final   02/16/2021 135 (L) 136 - 145 mmol/L Final   02/15/2021 136 136 - 145 mmol/L Final   02/15/2021 136 136 - 145 mmol/L Final   02/15/2021 134 (L) 136 - 145 mmol/L Final      Potassium Potassium   Date Value Ref Range Status   02/18/2021 4.4 3.5 - 5.2 mmol/L Final   02/17/2021 4.3 3.5 - 5.2 mmol/L Final   02/16/2021 5.0 3.5 - 5.2 mmol/L Final   02/15/2021 5.0 3.5 - 5.2 mmol/L Final   02/15/2021 7.1 (C) 3.5 - 5.2 mmol/L Final   02/15/2021 6.6 (C) 3.5 - 5.2 mmol/L Final      Chloride Chloride   Date Value Ref Range Status   02/18/2021 101 98 - 107 mmol/L Final   02/17/2021 102 98 - 107 mmol/L Final   02/16/2021 100 98 - 107 mmol/L Final   02/15/2021 103 98 - 107 mmol/L Final   02/15/2021 105 98 - 107 mmol/L Final   02/15/2021 102 98 - 107 mmol/L Final      Bicarbonate CO2   Date Value Ref Range Status   02/18/2021 27.2 22.0 - 29.0 mmol/L Final   02/17/2021 30.5 (H) 22.0 - 29.0 mmol/L Final   02/16/2021 22.8 22.0 - 29.0 mmol/L Final   02/15/2021  22.4 22.0 - 29.0 mmol/L Final   02/15/2021 21.9 (L) 22.0 - 29.0 mmol/L Final   02/15/2021 22.3 22.0 - 29.0 mmol/L Final      BUN BUN   Date Value Ref Range Status   02/18/2021 24 (H) 8 - 23 mg/dL Final   02/17/2021 32 (H) 8 - 23 mg/dL Final   02/16/2021 41 (H) 8 - 23 mg/dL Final   02/15/2021 46 (H) 8 - 23 mg/dL Final   02/15/2021 43 (H) 8 - 23 mg/dL Final   02/15/2021 43 (H) 8 - 23 mg/dL Final      Creatinine Creatinine   Date Value Ref Range Status   02/18/2021 1.23 0.76 - 1.27 mg/dL Final   02/17/2021 1.33 (H) 0.76 - 1.27 mg/dL Final   02/16/2021 1.49 (H) 0.76 - 1.27 mg/dL Final   02/15/2021 1.71 (H) 0.76 - 1.27 mg/dL Final   02/15/2021 1.63 (H) 0.76 - 1.27 mg/dL Final   02/15/2021 1.74 (H) 0.76 - 1.27 mg/dL Final      Calcium Calcium   Date Value Ref Range Status   02/18/2021 9.7 8.6 - 10.5 mg/dL Final   02/17/2021 9.0 8.6 - 10.5 mg/dL Final   02/16/2021 9.7 8.6 - 10.5 mg/dL Final   02/15/2021 9.8 8.6 - 10.5 mg/dL Final   02/15/2021 9.1 8.6 - 10.5 mg/dL Final   02/15/2021 9.8 8.6 - 10.5 mg/dL Final      Magnesium Magnesium   Date Value Ref Range Status   02/15/2021 1.7 1.6 - 2.4 mg/dL Final          aspirin, 81 mg, Oral, Daily  atorvastatin, 20 mg, Oral, Daily  donepezil, 10 mg, Oral, Daily  gabapentin, 300 mg, Oral, Nightly  insulin regular, 0-24 Units, Subcutaneous, Q6H  levothyroxine, 200 mcg, Oral, QAM  predniSONE, 20 mg, Oral, Daily  sertraline, 100 mg, Oral, Daily  sodium chloride, 3 mL, Intravenous, Q12H               Patient Active Problem List   Diagnosis Code   • CAD (coronary artery disease) I25.10   • Morbidly obese (CMS/[facility]) E66.01   • CAD (coronary artery disease), native coronary artery I25.10   • Lymphoma (CMS/[facility]) C85.90   • Diabetes mellitus (CMS/HCC) E11.9   • Dementia (CMS/[facility]) F03.90   • Stage 3b chronic kidney disease (CMS/HCC) N18.32   • Thrombocytopenia (CMS/[facility]) D69.6   • Disease of thyroid gland E07.9   • Anemia D64.9   • Lymphocytosis D72.820       Assessment & Plan   -Severe 2- vessel  CAD   -Dyspnea on exertion   -Obesity  -Diabetes type 2  -Decreased mobility   -Hypothyroidism  -Hypertension  -YI  -hx of lymphoma   -chronic steroid use  -chronic back pain  -Hyperlipidemia   -CKD stage III-- renal consulted  -Thrombocytopenia-- plt count 60  -leukocytosis--continue to trend    Plt count improved to 60k this morning  Discussed with Dr. Tubbs he would like cardiology to see, uncertain he is a surgical candidate--evaluation pending    Fabiola Burris, APRN  02/18/21  10:40 EST

## 2021-02-19 LAB
ANION GAP SERPL CALCULATED.3IONS-SCNC: 9.8 MMOL/L (ref 5–15)
BH BB BLOOD EXPIRATION DATE: NORMAL
BH BB BLOOD EXPIRATION DATE: NORMAL
BH BB BLOOD TYPE BARCODE: 600
BH BB BLOOD TYPE BARCODE: 600
BH BB DISPENSE STATUS: NORMAL
BH BB DISPENSE STATUS: NORMAL
BH BB PRODUCT CODE: NORMAL
BH BB PRODUCT CODE: NORMAL
BH BB UNIT NUMBER: NORMAL
BH BB UNIT NUMBER: NORMAL
BUN SERPL-MCNC: 25 MG/DL (ref 8–23)
BUN/CREAT SERPL: 23.1 (ref 7–25)
CALCIUM SPEC-SCNC: 9 MG/DL (ref 8.6–10.5)
CHLORIDE SERPL-SCNC: 101 MMOL/L (ref 98–107)
CO2 SERPL-SCNC: 25.2 MMOL/L (ref 22–29)
CREAT SERPL-MCNC: 1.08 MG/DL (ref 0.76–1.27)
CROSSMATCH INTERPRETATION: NORMAL
CROSSMATCH INTERPRETATION: NORMAL
DEPRECATED RDW RBC AUTO: 45.6 FL (ref 37–54)
ERYTHROCYTE [DISTWIDTH] IN BLOOD BY AUTOMATED COUNT: 14.2 % (ref 12.3–15.4)
GFR SERPL CREATININE-BSD FRML MDRD: 67 ML/MIN/1.73
GLUCOSE BLDC GLUCOMTR-MCNC: 117 MG/DL (ref 70–130)
GLUCOSE BLDC GLUCOMTR-MCNC: 136 MG/DL (ref 70–130)
GLUCOSE BLDC GLUCOMTR-MCNC: 143 MG/DL (ref 70–130)
GLUCOSE BLDC GLUCOMTR-MCNC: 267 MG/DL (ref 70–130)
GLUCOSE BLDC GLUCOMTR-MCNC: 290 MG/DL (ref 70–130)
GLUCOSE BLDC GLUCOMTR-MCNC: 321 MG/DL (ref 70–130)
GLUCOSE SERPL-MCNC: 160 MG/DL (ref 65–99)
HCT VFR BLD AUTO: 39.9 % (ref 37.5–51)
HGB BLD-MCNC: 13.6 G/DL (ref 13–17.7)
LYMPHOCYTES # BLD MANUAL: 10.74 10*3/MM3 (ref 0.7–3.1)
LYMPHOCYTES NFR BLD MANUAL: 55 % (ref 19.6–45.3)
LYMPHOCYTES NFR BLD MANUAL: 9 % (ref 5–12)
MCH RBC QN AUTO: 30.1 PG (ref 26.6–33)
MCHC RBC AUTO-ENTMCNC: 34.1 G/DL (ref 31.5–35.7)
MCV RBC AUTO: 88.3 FL (ref 79–97)
MONOCYTES # BLD AUTO: 1.76 10*3/MM3 (ref 0.1–0.9)
NEUTROPHILS # BLD AUTO: 4.88 10*3/MM3 (ref 1.7–7)
NEUTROPHILS NFR BLD MANUAL: 25 % (ref 42.7–76)
PLAT MORPH BLD: NORMAL
PLATELET # BLD AUTO: 60 10*3/MM3 (ref 140–450)
PMV BLD AUTO: 9.9 FL (ref 6–12)
POTASSIUM SERPL-SCNC: 5.1 MMOL/L (ref 3.5–5.2)
RBC # BLD AUTO: 4.52 10*6/MM3 (ref 4.14–5.8)
RBC MORPH BLD: NORMAL
SODIUM SERPL-SCNC: 136 MMOL/L (ref 136–145)
UNIT  ABO: NORMAL
UNIT  ABO: NORMAL
UNIT  RH: NORMAL
UNIT  RH: NORMAL
VARIANT LYMPHS NFR BLD MANUAL: 11 % (ref 0–5)
WBC # BLD AUTO: 19.53 10*3/MM3 (ref 3.4–10.8)
WBC MORPH BLD: NORMAL

## 2021-02-19 PROCEDURE — 63710000001 PREDNISONE PER 1 MG: Performed by: THORACIC SURGERY (CARDIOTHORACIC VASCULAR SURGERY)

## 2021-02-19 PROCEDURE — 97530 THERAPEUTIC ACTIVITIES: CPT

## 2021-02-19 PROCEDURE — 85007 BL SMEAR W/DIFF WBC COUNT: CPT | Performed by: INTERNAL MEDICINE

## 2021-02-19 PROCEDURE — 85025 COMPLETE CBC W/AUTO DIFF WBC: CPT | Performed by: INTERNAL MEDICINE

## 2021-02-19 PROCEDURE — 63710000001 INSULIN REGULAR HUMAN PER 5 UNITS: Performed by: THORACIC SURGERY (CARDIOTHORACIC VASCULAR SURGERY)

## 2021-02-19 PROCEDURE — 82962 GLUCOSE BLOOD TEST: CPT

## 2021-02-19 PROCEDURE — 80048 BASIC METABOLIC PNL TOTAL CA: CPT | Performed by: NURSE PRACTITIONER

## 2021-02-19 RX ORDER — ISOSORBIDE MONONITRATE 60 MG/1
60 TABLET, EXTENDED RELEASE ORAL DAILY
Status: DISCONTINUED | OUTPATIENT
Start: 2021-02-19 | End: 2021-02-20 | Stop reason: HOSPADM

## 2021-02-19 RX ORDER — NADOLOL 40 MG/1
40 TABLET ORAL DAILY
Status: DISCONTINUED | OUTPATIENT
Start: 2021-02-19 | End: 2021-02-20 | Stop reason: HOSPADM

## 2021-02-19 RX ADMIN — TAMSULOSIN HYDROCHLORIDE 0.4 MG: 0.4 CAPSULE ORAL at 08:00

## 2021-02-19 RX ADMIN — GABAPENTIN 300 MG: 300 CAPSULE ORAL at 21:52

## 2021-02-19 RX ADMIN — LEVOTHYROXINE SODIUM 200 MCG: 0.1 TABLET ORAL at 06:48

## 2021-02-19 RX ADMIN — PREDNISONE 20 MG: 20 TABLET ORAL at 08:00

## 2021-02-19 RX ADMIN — INSULIN HUMAN 16 UNITS: 100 INJECTION, SOLUTION PARENTERAL at 11:10

## 2021-02-19 RX ADMIN — ISOSORBIDE MONONITRATE 60 MG: 60 TABLET ORAL at 13:38

## 2021-02-19 RX ADMIN — ASPIRIN 81 MG: 81 TABLET, COATED ORAL at 08:00

## 2021-02-19 RX ADMIN — INSULIN HUMAN 12 UNITS: 100 INJECTION, SOLUTION PARENTERAL at 17:24

## 2021-02-19 RX ADMIN — ATORVASTATIN CALCIUM 20 MG: 20 TABLET, FILM COATED ORAL at 08:00

## 2021-02-19 RX ADMIN — NADOLOL 40 MG: 40 TABLET ORAL at 13:38

## 2021-02-19 RX ADMIN — DONEPEZIL HYDROCHLORIDE 10 MG: 10 TABLET, FILM COATED ORAL at 08:00

## 2021-02-19 RX ADMIN — POLYETHYLENE GLYCOL 3350 17 G: 17 POWDER, FOR SOLUTION ORAL at 08:00

## 2021-02-19 RX ADMIN — TEMAZEPAM 7.5 MG: 7.5 CAPSULE ORAL at 21:52

## 2021-02-19 RX ADMIN — SERTRALINE 100 MG: 100 TABLET, FILM COATED ORAL at 08:00

## 2021-02-19 RX ADMIN — SODIUM CHLORIDE, PRESERVATIVE FREE 3 ML: 5 INJECTION INTRAVENOUS at 08:00

## 2021-02-19 NOTE — THERAPY TREATMENT NOTE
Patient Name: Cosmo aGuthier  : 1945    MRN: [id number]                              Today's Date: 2021       Admit Date: 2/15/2021    Visit Dx: No diagnosis found.  Patient Active Problem List   Diagnosis   • CAD (coronary artery disease)   • Morbidly obese (CMS/HCC)   • CAD (coronary artery disease), native coronary artery   • Lymphoma (CMS/HCC)   • Diabetes mellitus (CMS/HCC)   • Dementia (CMS/HCC)   • Stage 3b chronic kidney disease (CMS/HCC)   • Thrombocytopenia (CMS/HCC)   • Disease of thyroid gland   • Anemia   • Lymphocytosis     Past Medical History:   Diagnosis Date   • Anxiety and depression    • Arthritis    • Coronary artery disease    • Dementia (CMS/HCC)    • Diabetes mellitus (CMS/HCC)    • Disease of thyroid gland    • Elevated cholesterol    • GERD (gastroesophageal reflux disease)    • Hypertension    • Lymphoma (CMS/HCC)     History of lymphoma, has been in remission   • Sleep apnea      Past Surgical History:   Procedure Laterality Date   • HERNIA REPAIR     • JOINT REPLACEMENT       General Information     Row Name 21 1116          Physical Therapy Time and Intention    Document Type  therapy note (daily note)  -CH (r) BH (t) CH (c)     Mode of Treatment  individual therapy;physical therapy  -CH (r) BH (t) CH (c)     Row Name 21 1116          General Information    Existing Precautions/Restrictions  fall  -CH (r) BH (t) CH (c)     Row Name 21 1116          Cognition    Orientation Status (Cognition)  oriented x 4  -CH (r) BH (t) CH (c)     Row Name 21 1116          Safety Issues, Functional Mobility    Impairments Affecting Function (Mobility)  balance;coordination;endurance/activity tolerance;shortness of breath  -CH (r) BH (t) CH (c)       User Key  (r) = Recorded By, (t) = Taken By, (c) = Cosigned By    Initials Name Provider Type    CH Fang Roche, PT Physical Therapist    Sofia Galaviz PT Student PT Student        Mobility     Row Name  02/19/21 1116          Bed Mobility    Supine-Sit [place] (Bed Mobility)  not tested  -CH (r) BH (t) CH (c)     Sit-Supine [place] (Bed Mobility)  not tested  -CH (r) BH (t) CH (c)     Comment (Bed Mobility)  Sitting in chair.  -CH (r) BH (t) CH (c)     Row Name 02/19/21 1116          Sit-Stand Transfer    Sit-Stand [place] (Transfers)  verbal cues;nonverbal cues (demo/gesture);standby assist  -CH (r) BH (t) CH (c)     Row Name 02/19/21 1116          Gait/Stairs (Locomotion)    [place] Level (Gait)  verbal cues;nonverbal cues (demo/gesture);contact guard  -CH (r) BH (t) CH (c)     Distance in Feet (Gait)  225  -CH (r) BH (t) CH (c)     Deviations/Abnormal Patterns (Gait)  gonzalo decreased;stride length decreased  -CH (r) BH (t) CH (c)     Bilateral Gait Deviations  heel strike decreased  -CH (r) BH (t) CH (c)     Comment (Gait/Stairs)  Pt demonstrated improved gait speed overall today, but continues to be moderately unsteady requiring CGA for safety.  -CH (r) BH (t) CH (c)       User Key  (r) = Recorded By, (t) = Taken By, (c) = Cosigned By    Initials Name Provider Type    Fang Campbell, PT Physical Therapist    Sofia Galaviz, PT Student PT Student        Obj/Interventions     Row Name 02/19/21 1117          Motor Skills    Therapeutic Exercise  -- 10 reps B cardiac rehab protocol, 10x seated marches  -CH (r) BH (t) CH (c)       User Key  (r) = Recorded By, (t) = Taken By, (c) = Cosigned By    Initials Name Provider Type    Fang Campbell, PT Physical Therapist    Sofia Galaviz, PT Student PT Student        Goals/Plan    No documentation.       Clinical Impression     Row Name 02/19/21 1118          Pain    Additional Documentation  Pain Scale: FACES Pre/Post-Treatment (Group)  -CH (r) BH (t) CH (c)     Row Name 02/19/21 1118          Pain Scale: FACES Pre/Post-Treatment    Pain: FACES Scale, Pretreatment  0-->no hurt  -CH (r) BH (t) CH (c)     Posttreatment Pain Rating   0-->no hurt  -CH (r) BH (t) CH (c)     Row Name 02/19/21 1118          Plan of Care Review    Plan of Care Reviewed With  patient  -CH (r) BH (t) CH (c)     Progress  no change  -CH (r) BH (t) CH (c)     Outcome Summary  Pt continues to walk with good speed and fluidity of gait, but he is quite unsteady on his feet requiring CGA to maintain balance. Pt reported being frustrated today as he doesn't really know what is going on and would like to speak to his doctor. PT passed information along to nursing. Pt will continue to benefit from skilled PT to maintain strength and improve gait safety/independence.  -CH (r) BH (t) CH (c)     Row Name 02/19/21 1118          Positioning and Restraints    Pre-Treatment Position  sitting in chair/recliner  -CH (r) BH (t) CH (c)     Post Treatment Position  chair  -CH (r) BH (t) CH (c)     In Chair  sitting;call light within reach;encouraged to call for assist  -CH (r) BH (t) CH (c)       User Key  (r) = Recorded By, (t) = Taken By, (c) = Cosigned By    Initials Name Provider Type     Fang Roche, PT Physical Therapist    Sofia Galaviz, PT Student PT Student        Outcome Measures     Row Name 02/19/21 1121          How much help from another person do you currently need...    Turning from your back to your side while in flat bed without using bedrails?  3  -CH (r) BH (t) CH (c)     Moving from lying on back to sitting on the side of a flat bed without bedrails?  3  -CH (r) BH (t) CH (c)     Moving to and from a bed to a chair (including a wheelchair)?  4  -CH (r) BH (t) CH (c)     Standing up from a chair using your arms (e.g., wheelchair, bedside chair)?  4  -CH (r) BH (t) CH (c)     Climbing 3-5 steps with a railing?  3  -CH (r) BH (t) CH (c)     To walk in hospital room?  4  -CH (r) BH (t) CH (c)     AM-PAC 6 Clicks Score (PT)  21  -CH (r) BH (t)       User Key  (r) = Recorded By, (t) = Taken By, (c) = Cosigned By    Initials Name Provider Type    MISAEL Roche,  Fang BELLO, PT Physical Therapist     Sofia Chappell, PT Student PT Student        Physical Therapy Education                 Title: PT OT SLP Therapies (Done)     Topic: Physical Therapy (Done)     Point: Mobility training (Done)     Learning Progress Summary           Patient Acceptance, E,TB,D, VU,NR by  at 2/19/2021 1121    Acceptance, E,TB,D, VU,NR by  at 2/18/2021 1137    Acceptance, E,TB,D, VU,NR by  at 2/17/2021 1104    Acceptance, E,TB,D, VU,NR by  at 2/16/2021 1124                   Point: Home exercise program (Done)     Learning Progress Summary           Patient Acceptance, E,TB,D, VU,NR by  at 2/19/2021 1121    Acceptance, E,TB,D, VU,NR by  at 2/18/2021 1137    Acceptance, E,TB,D, VU,NR by  at 2/17/2021 1104    Acceptance, E,TB,D, VU,NR by  at 2/16/2021 1124                   Point: Body mechanics (Done)     Learning Progress Summary           Patient Acceptance, E,TB,D, VU,NR by  at 2/19/2021 1121    Acceptance, E,TB,D, VU,NR by  at 2/18/2021 1137    Acceptance, E,TB,D, VU,NR by  at 2/17/2021 1104    Acceptance, E,TB,D, VU,NR by  at 2/16/2021 1124                   Point: Precautions (Done)     Learning Progress Summary           Patient Acceptance, E,TB,D, VU,NR by  at 2/19/2021 1121    Acceptance, E,TB,D, VU,NR by  at 2/18/2021 1137    Acceptance, E,TB,D, VU,NR by  at 2/17/2021 1104    Acceptance, E,TB,D, VU,NR by  at 2/16/2021 1124                               User Key     Initials Effective Dates Name Provider Type Discipline     04/03/18 -  Fang Roche, PT Physical Therapist PT     02/01/21 -  Sofia Chappell, PT Student PT Student PT              PT Recommendation and Plan     Plan of Care Reviewed With: patient  Progress: no change  Outcome Summary: Pt continues to walk with good speed and fluidity of gait, but he is quite unsteady on his feet requiring CGA to maintain balance. Pt reported being frustrated today as he doesn't really know what is going  on and would like to speak to his doctor. PT passed information along to nursing. Pt will continue to benefit from skilled PT to maintain strength and improve gait safety/independence.     Time Calculation:   PT Charges     Row Name 02/19/21 1121             Time Calculation    Start Time  1001  -CH (r) BH (t) CH (c)      Stop Time  1011  -CH (r) BH (t) CH (c)      Time Calculation (min)  10 min  -CH (r) BH (t)      PT Received On  02/19/21  -CH (r) BH (t) CH (c)      PT - Next Appointment  02/20/21  -CH (r) BH (t) CH (c)         Time Calculation- PT    Total Timed Code Minutes- PT  10 minute(s)  -CH (r) BH (t) CH (c)        User Key  (r) = Recorded By, (t) = Taken By, (c) = Cosigned By    Initials Name Provider Type     Fang Roche, PT Physical Therapist     Sofia Chappell, PT Student PT Student        Therapy Charges for Today     Code Description Service Date Service Provider Modifiers Qty    [id number]  PT THERAPEUTIC ACT EA 15 MIN 2/18/2021 Sofia Chappell, PT Student GP 1    [id number]  PT THERAPEUTIC ACT EA 15 MIN 2/19/2021 Sofia Chappell, PT Student GP 1          PT G-Codes  Outcome Measure Options: AM-PAC 6 Clicks Basic Mobility (PT)  AM-PAC 6 Clicks Score (PT): 21    Sofia Chappell PT Student  2/19/2021

## 2021-02-19 NOTE — CONSULTS
FIRST UROLOGY CONSULT      Patient Identification:  NAME:  Cosmo Gauthier  Age:  75 y.o.   Sex:  male   :  1945   MRN:  [id number]       Chief complaint: Unable to urinate    History of present illness: 75-year-old gentleman multiple health issues.  He has type 2 diabetes.  Hypertension hypothyroidism extensive coronary disease.  Recently had angiography and has high-grade occlusion of his LAD and at this point medical management is being entertained.  Catheter was placed on Monday.  He states he has no baseline voiding issues.  He has nocturia x1 minimal daytime frequency and urgency.  He has no incontinence.  His stream is of good caliber.  He states he does not take any prostate medications at home.  We have been asked to see him for this retention.  Is tentatively to be discharged here in the next day or so.      Past medical history:  Past Medical History:   Diagnosis Date   • Anxiety and depression    • Arthritis    • Coronary artery disease    • Dementia (CMS/HCC)    • Diabetes mellitus (CMS/HCC)    • Disease of thyroid gland    • Elevated cholesterol    • GERD (gastroesophageal reflux disease)    • Hypertension    • Lymphoma (CMS/HCC)     History of lymphoma, has been in remission   • Sleep apnea        Past surgical history:  Past Surgical History:   Procedure Laterality Date   • HERNIA REPAIR     • JOINT REPLACEMENT         Allergies:  Patient has no known allergies.    Home medications:  Medications Prior to Admission   Medication Sig Dispense Refill Last Dose   • amitriptyline (ELAVIL) 50 MG tablet Take 50 mg by mouth Every Night.      • DONEPEZIL HCL PO Take 10 mg by mouth Daily.      • ferrous sulfate 325 (65 FE) MG tablet Take 325 mg by mouth 3 (Three) Times a Day.      • gabapentin (NEURONTIN) 300 MG capsule Take 300 mg by mouth Every Night.      • glipizide (GLUCOTROL) 10 MG tablet Take 10 mg by mouth 2 (Two) Times a Day Before Meals.      • insulin glargine (LANTUS, SEMGLEE) 100  UNIT/ML injection Inject 30 Units under the skin into the appropriate area as directed Every Night.      • insulin lispro (humaLOG, ADMELOG) 100 UNIT/ML injection Inject 15 Units under the skin into the appropriate area as directed 3 (Three) Times a Day Before Meals.      • isosorbide mononitrate (IMDUR) 60 MG 24 hr tablet Take 60 mg by mouth Daily.      • levothyroxine (SYNTHROID, LEVOTHROID) 200 MCG tablet Take 200 mcg by mouth Every Morning.      • lisinopril (PRINIVIL,ZESTRIL) 5 MG tablet Take 10 mg by mouth Daily. Take 0.5 tablet daily      • metFORMIN (GLUCOPHAGE) 1000 MG tablet Take 1,000 mg by mouth 2 (Two) Times a Day With Meals.      • nadolol (CORGARD) 40 MG tablet Take 40 mg by mouth Daily.      • pantoprazole (PROTONIX) 40 MG EC tablet Take 40 mg by mouth Daily.      • predniSONE (DELTASONE) 5 MG tablet Take 20 mg by mouth Daily.      • sertraline (ZOLOFT) 100 MG tablet Take 100 mg by mouth Daily. Take 1.5 tablets daily      • simvastatin (ZOCOR) 40 MG tablet Take 40 mg by mouth Every Night. Take 0.5 tablets every night      • vitamin B-12 (CYANOCOBALAMIN) 1000 MCG tablet Take 2,000 mcg by mouth Every Night.      • vitamin D3 125 MCG (5000 UT) capsule capsule Take 5,000 Units by mouth Daily.           Hospital medications:  aspirin, 81 mg, Oral, Daily  atorvastatin, 20 mg, Oral, Daily  donepezil, 10 mg, Oral, Daily  gabapentin, 300 mg, Oral, Nightly  insulin regular, 0-24 Units, Subcutaneous, Q6H  isosorbide mononitrate, 60 mg, Oral, Daily  levothyroxine, 200 mcg, Oral, QAM  nadolol, 40 mg, Oral, Daily  polyethylene glycol, 17 g, Oral, Daily  predniSONE, 20 mg, Oral, Daily  sertraline, 100 mg, Oral, Daily  sodium chloride, 3 mL, Intravenous, Q12H  tamsulosin, 0.4 mg, Oral, Daily         •  acetaminophen  •  ALPRAZolam  •  bisacodyl  •  dextrose  •  dextrose  •  glucagon (human recombinant)  •  nitroglycerin  •  sodium chloride  •  temazepam    Family history:  History reviewed. No pertinent family  history.    Social history:  Social History     Tobacco Use   • Smoking status: Never Smoker   • Smokeless tobacco: Never Used   Substance Use Topics   • Alcohol use: Not Currently     Comment: QUIT DRINKING 32 YEARS AGO   • Drug use: Never       Review of systems:    Negative 12-system ROS except for the following: No gross hematuria.  No recurrent UTIs.  Voiding issues as above.      Objective:  TMax 24 hours:   Temp (24hrs), Av.8 °F (36.6 °C), Min:97.7 °F (36.5 °C), Max:97.9 °F (36.6 °C)      Vitals Ranges:   Temp:  [97.7 °F (36.5 °C)-97.9 °F (36.6 °C)] 97.9 °F (36.6 °C)  Heart Rate:  [] 118  Resp:  [16] 16  BP: (111-130)/(74-91) 130/82    Intake/Output Last 3 shifts:  I/O last 3 completed shifts:  In: 1140 [P.O.:1140]  Out: 1550 [Urine:1550]     Physical Exam:       General Appearance:    Alert, cooperative, in no acute distress   Head:    Normocephalic, without obvious abnormality, atraumatic   Eyes:          PERRL, conjunctivae and corneas clear   Ears:    Normal external inspection   Throat:   No oral lesions, oral mucosa moist   Neck:   Supple, no LAD, trachea midline   Back:     No CVA tenderness   Lungs:     Respirations unlabored, symmetric excursion    Heart:    RRR, intact peripheral pulses   Abdomen:    Soft obese.   :   Phallus normal Kaur catheter present.   NORMAN:  Extremities:  Furred for now.  Patient is in his cardiac chair..  No edema, no deformity   Skin:   No bleeding, bruising or rashes   Neuro/Psych:   Orientation intact, mood/affect pleasant, no focal findings       Results review:   I reviewed the patient's new clinical results.    Data review:  Lab Results (last 24 hours)     Procedure Component Value Units Date/Time    POC Glucose Once [[id number]]  (Abnormal) Collected: 21 1042    Specimen: Blood Updated: 21 1043     Glucose 321 mg/dL     Manual Differential [[id number]]  (Abnormal) Collected: 21 0640    Specimen: Blood Updated: 21 0817     Neutrophil %  25.0 %      Lymphocyte % 55.0 %      Monocyte % 9.0 %      Atypical Lymphocyte % 11.0 %      Neutrophils Absolute 4.88 10*3/mm3      Lymphocytes Absolute 10.74 10*3/mm3      Monocytes Absolute 1.76 10*3/mm3      RBC Morphology Normal     WBC Morphology Normal     Platelet Morphology Normal    CBC & Differential [[id number]]  (Abnormal) Collected: 02/19/21 0640    Specimen: Blood Updated: 02/19/21 0815    Narrative:      The following orders were created for panel order CBC & Differential.  Procedure                               Abnormality         Status                     ---------                               -----------         ------                     CBC Auto Differential[[id number]]        Abnormal            Final result                 Please view results for these tests on the individual orders.    CBC Auto Differential [[id number]]  (Abnormal) Collected: 02/19/21 0640    Specimen: Blood Updated: 02/19/21 0815     WBC 19.53 10*3/mm3      RBC 4.52 10*6/mm3      Hemoglobin 13.6 g/dL      Hematocrit 39.9 %      MCV 88.3 fL      MCH 30.1 pg      MCHC 34.1 g/dL      RDW 14.2 %      RDW-SD 45.6 fl      MPV 9.9 fL      Platelets 60 10*3/mm3     Basic Metabolic Panel [[id number]]  (Abnormal) Collected: 02/19/21 0640    Specimen: Blood Updated: 02/19/21 0723     Glucose 160 mg/dL      BUN 25 mg/dL      Creatinine 1.08 mg/dL      Sodium 136 mmol/L      Potassium 5.1 mmol/L      Comment: Slight hemolysis detected by analyzer. Results may be affected.        Chloride 101 mmol/L      CO2 25.2 mmol/L      Calcium 9.0 mg/dL      eGFR Non African Amer 67 mL/min/1.73      BUN/Creatinine Ratio 23.1     Anion Gap 9.8 mmol/L     Narrative:      GFR Normal >60  Chronic Kidney Disease <60  Kidney Failure <15      POC Glucose Once [[id number]]  (Abnormal) Collected: 02/19/21 0538    Specimen: Blood Updated: 02/19/21 0540     Glucose 143 mg/dL     POC Glucose Once [[id number]]  (Normal) Collected: 02/19/21 0002    Specimen: Blood  Updated: 02/19/21 0004     Glucose 117 mg/dL     POC Glucose Once [[id number]]  (Abnormal) Collected: 02/18/21 2005    Specimen: Blood Updated: 02/18/21 2006     Glucose 232 mg/dL     POC Glucose Once [[id number]]  (Abnormal) Collected: 02/18/21 1612    Specimen: Blood Updated: 02/18/21 1621     Glucose 242 mg/dL            Imaging:  Imaging Results (Last 24 Hours)     ** No results found for the last 24 hours. **             Assessment:       CAD (coronary artery disease)    Morbidly obese (CMS/HCC)    CAD (coronary artery disease), native coronary artery    Lymphoma (CMS/HCC)    Diabetes mellitus (CMS/HCC)    Dementia (CMS/HCC)    Stage 3b chronic kidney disease (CMS/HCC)    Thrombocytopenia (CMS/HCC)    Disease of thyroid gland    Anemia    Lymphocytosis    Urinary retention with minimal baseline voiding issues.  Possible component of neurogenic bladder exacerbated by medications and decreased activity.    Plan:     Voiding trial today.  He will remain on Flomax.  If he is able to void he can be discharged to home and we should follow him up in the office to do a rectal examination and check his postvoid residuals.  If he is unable to void that I would replace his Kaur catheter and send him home with this and we give another voiding trial in the office setting next week or have home health remove his catheter.    Robin Tejada MD  02/19/21  15:23 EST

## 2021-02-19 NOTE — PROGRESS NOTES
" LOS: 4 days   Patient Care Team:  Alex Buckley MD as PCP - General (Internal Medicine)    Chief Complaint: CAD    Subjective:  Symptoms:  No shortness of breath, cough or chest pain.    Diet:  Adequate intake.  No nausea or vomiting.    Activity level: Returning to normal.    Pain:  He reports no pain.      States that he is feeling much better    Vital Signs  Temp:  [97.7 °F (36.5 °C)-97.9 °F (36.6 °C)] 97.9 °F (36.6 °C)  Heart Rate:  [] 118  Resp:  [16] 16  BP: (111-130)/(74-91) 130/82  Body mass index is 33.43 kg/m².    Intake/Output Summary (Last 24 hours) at 2/19/2021 1219  Last data filed at 2/18/2021 2300  Gross per 24 hour   Intake 660 ml   Output 900 ml   Net -240 ml     No intake/output data recorded.          02/15/21  2100 02/16/21  1600 02/17/21  0614   Weight: 66.2 kg (145 lb 14.4 oz) 66 kg (145 lb 8.1 oz) 111 kg (244 lb 1.6 oz)       Objective:  General Appearance:  Comfortable and in no acute distress.    Vital signs: (most recent): Blood pressure 130/82, pulse 118, temperature 97.9 °F (36.6 °C), temperature source Oral, resp. rate 16, height 182 cm (71.65\"), weight 111 kg (244 lb 1.6 oz), SpO2 93 %.  Vital signs are normal.  No fever.    Output: Producing urine.    Lungs:  Normal effort and normal respiratory rate.    Heart: Tachycardia.  Regular rhythm.  (ST on tele monitor)  Abdomen: Abdomen is soft.  Bowel sounds are normal.     Extremities: There is no dependent edema.    Pulses: Distal pulses are intact.    Neurological: Patient is alert and oriented to person, place and time.    Skin:  Warm and dry.          Results Review:        WBC WBC   Date Value Ref Range Status   02/19/2021 19.53 (H) 3.40 - 10.80 10*3/mm3 Final   02/18/2021 19.93 (H) 3.40 - 10.80 10*3/mm3 Final   02/17/2021 16.30 (H) 3.40 - 10.80 10*3/mm3 Final      HGB Hemoglobin   Date Value Ref Range Status   02/19/2021 13.6 13.0 - 17.7 g/dL Final   02/18/2021 13.0 13.0 - 17.7 g/dL Final   02/17/2021 13.3 13.0 - 17.7 g/dL " Final      HCT Hematocrit   Date Value Ref Range Status   02/19/2021 39.9 37.5 - 51.0 % Final   02/18/2021 38.4 37.5 - 51.0 % Final   02/17/2021 39.2 37.5 - 51.0 % Final      Platelets Platelets   Date Value Ref Range Status   02/19/2021 60 (L) 140 - 450 10*3/mm3 Final   02/18/2021 60 (L) 140 - 450 10*3/mm3 Final   02/18/2021 60 (L) 140 - 450 10*3/mm3 Final   02/17/2021 48 (C) 140 - 450 10*3/mm3 Final        PT/INR:  No results found for: PROTIME/No results found for: INR    Sodium Sodium   Date Value Ref Range Status   02/19/2021 136 136 - 145 mmol/L Final   02/18/2021 137 136 - 145 mmol/L Final   02/17/2021 141 136 - 145 mmol/L Final      Potassium Potassium   Date Value Ref Range Status   02/19/2021 5.1 3.5 - 5.2 mmol/L Final     Comment:     Slight hemolysis detected by analyzer. Results may be affected.   02/18/2021 4.4 3.5 - 5.2 mmol/L Final   02/17/2021 4.3 3.5 - 5.2 mmol/L Final      Chloride Chloride   Date Value Ref Range Status   02/19/2021 101 98 - 107 mmol/L Final   02/18/2021 101 98 - 107 mmol/L Final   02/17/2021 102 98 - 107 mmol/L Final      Bicarbonate CO2   Date Value Ref Range Status   02/19/2021 25.2 22.0 - 29.0 mmol/L Final   02/18/2021 27.2 22.0 - 29.0 mmol/L Final   02/17/2021 30.5 (H) 22.0 - 29.0 mmol/L Final      BUN BUN   Date Value Ref Range Status   02/19/2021 25 (H) 8 - 23 mg/dL Final   02/18/2021 24 (H) 8 - 23 mg/dL Final   02/17/2021 32 (H) 8 - 23 mg/dL Final      Creatinine Creatinine   Date Value Ref Range Status   02/19/2021 1.08 0.76 - 1.27 mg/dL Final   02/18/2021 1.23 0.76 - 1.27 mg/dL Final   02/17/2021 1.33 (H) 0.76 - 1.27 mg/dL Final      Calcium Calcium   Date Value Ref Range Status   02/19/2021 9.0 8.6 - 10.5 mg/dL Final   02/18/2021 9.7 8.6 - 10.5 mg/dL Final   02/17/2021 9.0 8.6 - 10.5 mg/dL Final      Magnesium No results found for: MG       aspirin, 81 mg, Oral, Daily  atorvastatin, 20 mg, Oral, Daily  donepezil, 10 mg, Oral, Daily  gabapentin, 300 mg, Oral,  Nightly  insulin regular, 0-24 Units, Subcutaneous, Q6H  isosorbide mononitrate, 60 mg, Oral, Daily  levothyroxine, 200 mcg, Oral, QAM  nadolol, 40 mg, Oral, Daily  polyethylene glycol, 17 g, Oral, Daily  predniSONE, 20 mg, Oral, Daily  sertraline, 100 mg, Oral, Daily  sodium chloride, 3 mL, Intravenous, Q12H  tamsulosin, 0.4 mg, Oral, Daily           Patient Active Problem List   Diagnosis Code   • CAD (coronary artery disease) I25.10   • Morbidly obese (CMS/[facility]) E66.01   • CAD (coronary artery disease), native coronary artery I25.10   • Lymphoma (CMS/[facility]) C85.90   • Diabetes mellitus (CMS/[facility]) E11.9   • Dementia (CMS/[facility]) F03.90   • Stage 3b chronic kidney disease (CMS/[facility]) N18.32   • Thrombocytopenia (CMS/[facility]) D69.6   • Disease of thyroid gland E07.9   • Anemia D64.9   • Lymphocytosis D72.820       Assessment & Plan   -Severe 2- vessel CAD   -Dyspnea on exertion   -Obesity  -Diabetes type 2  -Decreased mobility   -Hypothyroidism  -Hypertension  -YI  -hx of lymphoma   -chronic steroid use  -chronic back pain  -Hyperlipidemia   -CKD stage III-- renal consulted  -Thrombocytopenia-- plt count 60  - acute urinary retention--chairez in place  -leukocytosis--continue to trend     Dr. Tubbs discussed with the patient, and recommends medical management at this time  Tachycardic--will restart home BB  He still has a chairez--will have urology come and see today  Anticipate possible discharge home tomorrow    Fabiola Burris, APRN  02/19/21  12:19 EST

## 2021-02-19 NOTE — PLAN OF CARE
Goal Outcome Evaluation:  Plan of Care Reviewed With: (P) patient  Progress: (P) no change  Outcome Summary: (P) Pt continues to walk with good speed and fluidity of gait, but he is quite unsteady on his feet requiring CGA to maintain balance. Pt reported being frustrated today as he doesn't really know what is going on and would like to speak to his doctor. PT passed information along to nursing. Pt will continue to benefit from skilled PT to maintain strength and improve gait safety/independence.

## 2021-02-20 VITALS
WEIGHT: 244.1 LBS | OXYGEN SATURATION: 93 % | HEIGHT: 72 IN | SYSTOLIC BLOOD PRESSURE: 102 MMHG | TEMPERATURE: 97.5 F | BODY MASS INDEX: 33.06 KG/M2 | DIASTOLIC BLOOD PRESSURE: 66 MMHG | HEART RATE: 61 BPM | RESPIRATION RATE: 18 BRPM

## 2021-02-20 LAB
ANION GAP SERPL CALCULATED.3IONS-SCNC: 11.1 MMOL/L (ref 5–15)
BUN SERPL-MCNC: 28 MG/DL (ref 8–23)
BUN/CREAT SERPL: 24.1 (ref 7–25)
CALCIUM SPEC-SCNC: 9.4 MG/DL (ref 8.6–10.5)
CHLORIDE SERPL-SCNC: 102 MMOL/L (ref 98–107)
CO2 SERPL-SCNC: 23.9 MMOL/L (ref 22–29)
CREAT SERPL-MCNC: 1.16 MG/DL (ref 0.76–1.27)
DEPRECATED RDW RBC AUTO: 46.6 FL (ref 37–54)
EOSINOPHIL # BLD MANUAL: 0.22 10*3/MM3 (ref 0–0.4)
EOSINOPHIL NFR BLD MANUAL: 1 % (ref 0.3–6.2)
ERYTHROCYTE [DISTWIDTH] IN BLOOD BY AUTOMATED COUNT: 14.3 % (ref 12.3–15.4)
GFR SERPL CREATININE-BSD FRML MDRD: 62 ML/MIN/1.73
GLUCOSE BLDC GLUCOMTR-MCNC: 133 MG/DL (ref 70–130)
GLUCOSE BLDC GLUCOMTR-MCNC: 199 MG/DL (ref 70–130)
GLUCOSE SERPL-MCNC: 103 MG/DL (ref 65–99)
HCT VFR BLD AUTO: 37.2 % (ref 37.5–51)
HGB BLD-MCNC: 12.3 G/DL (ref 13–17.7)
LYMPHOCYTES # BLD MANUAL: 13.31 10*3/MM3 (ref 0.7–3.1)
LYMPHOCYTES NFR BLD MANUAL: 2 % (ref 5–12)
LYMPHOCYTES NFR BLD MANUAL: 60 % (ref 19.6–45.3)
MCH RBC QN AUTO: 29.3 PG (ref 26.6–33)
MCHC RBC AUTO-ENTMCNC: 33.1 G/DL (ref 31.5–35.7)
MCV RBC AUTO: 88.6 FL (ref 79–97)
MONOCYTES # BLD AUTO: 0.44 10*3/MM3 (ref 0.1–0.9)
NEUTROPHILS # BLD AUTO: 6.43 10*3/MM3 (ref 1.7–7)
NEUTROPHILS NFR BLD MANUAL: 29 % (ref 42.7–76)
NRBC BLD AUTO-RTO: 0 /100 WBC (ref 0–0.2)
PLAT MORPH BLD: NORMAL
PLATELET # BLD AUTO: 63 10*3/MM3 (ref 140–450)
PMV BLD AUTO: 9.3 FL (ref 6–12)
POTASSIUM SERPL-SCNC: 4.3 MMOL/L (ref 3.5–5.2)
RBC # BLD AUTO: 4.2 10*6/MM3 (ref 4.14–5.8)
RBC MORPH BLD: NORMAL
SODIUM SERPL-SCNC: 137 MMOL/L (ref 136–145)
VARIANT LYMPHS NFR BLD MANUAL: 8 % (ref 0–5)
WBC # BLD AUTO: 22.18 10*3/MM3 (ref 3.4–10.8)
WBC MORPH BLD: NORMAL

## 2021-02-20 PROCEDURE — 85007 BL SMEAR W/DIFF WBC COUNT: CPT | Performed by: INTERNAL MEDICINE

## 2021-02-20 PROCEDURE — 63710000001 INSULIN REGULAR HUMAN PER 5 UNITS: Performed by: THORACIC SURGERY (CARDIOTHORACIC VASCULAR SURGERY)

## 2021-02-20 PROCEDURE — 82962 GLUCOSE BLOOD TEST: CPT

## 2021-02-20 PROCEDURE — 85025 COMPLETE CBC W/AUTO DIFF WBC: CPT | Performed by: INTERNAL MEDICINE

## 2021-02-20 PROCEDURE — 80048 BASIC METABOLIC PNL TOTAL CA: CPT | Performed by: NURSE PRACTITIONER

## 2021-02-20 PROCEDURE — 63710000001 PREDNISONE PER 1 MG: Performed by: THORACIC SURGERY (CARDIOTHORACIC VASCULAR SURGERY)

## 2021-02-20 RX ORDER — TAMSULOSIN HYDROCHLORIDE 0.4 MG/1
0.4 CAPSULE ORAL DAILY
Qty: 30 CAPSULE | Refills: 5 | Status: SHIPPED | OUTPATIENT
Start: 2021-02-20

## 2021-02-20 RX ADMIN — INSULIN HUMAN 4 UNITS: 100 INJECTION, SOLUTION PARENTERAL at 11:23

## 2021-02-20 RX ADMIN — SERTRALINE 100 MG: 100 TABLET, FILM COATED ORAL at 09:30

## 2021-02-20 RX ADMIN — PREDNISONE 20 MG: 20 TABLET ORAL at 09:30

## 2021-02-20 RX ADMIN — TAMSULOSIN HYDROCHLORIDE 0.4 MG: 0.4 CAPSULE ORAL at 09:30

## 2021-02-20 RX ADMIN — NADOLOL 40 MG: 40 TABLET ORAL at 09:30

## 2021-02-20 RX ADMIN — DONEPEZIL HYDROCHLORIDE 10 MG: 10 TABLET, FILM COATED ORAL at 09:30

## 2021-02-20 RX ADMIN — ATORVASTATIN CALCIUM 20 MG: 20 TABLET, FILM COATED ORAL at 09:30

## 2021-02-20 RX ADMIN — POLYETHYLENE GLYCOL 3350 17 G: 17 POWDER, FOR SOLUTION ORAL at 09:30

## 2021-02-20 RX ADMIN — LEVOTHYROXINE SODIUM 200 MCG: 0.1 TABLET ORAL at 06:07

## 2021-02-20 RX ADMIN — ISOSORBIDE MONONITRATE 60 MG: 60 TABLET ORAL at 09:30

## 2021-02-20 RX ADMIN — ASPIRIN 81 MG: 81 TABLET, COATED ORAL at 09:30

## 2021-02-20 RX ADMIN — SODIUM CHLORIDE, PRESERVATIVE FREE 3 ML: 5 INJECTION INTRAVENOUS at 06:08

## 2021-02-20 NOTE — DISCHARGE SUMMARY
Date of Admission:   Date of Discharge:  2/20/2021    Discharge Diagnosis: Coronary artery disease.  Lymphoma.  Diabetes.  Thrombocytopenia.    Presenting Problem/History of Present Illness  CAD (coronary artery disease) [I25.10]     Hospital Course  Patient is a 75 y.o. male presented with shortness of air.  He had multivessel coronary disease.  He has thrombocytopenia and we checked out his lymphoma.  He did not have a chemo transformation.  We had a long time thinking about what was the best course of therapy for him.  While he was in the hospital his shortness of breath completely went away.  He has no angina.  Because of this I thought it was best to treat him medically and we will follow him up in a month.  He will also follow-up by his cardiologist.  When he came in the hospital his potassium was 7.1 from urinary tract obstruction.  A Kaur catheter was placed and he was put on medication for this.  A catheter removal trial was done and was successful.  I will see him back in 1 month.  He will follow up also with his hematologist nephrologist and cardiologist.    Procedures Performed         Consults:   Consults     Date and Time Order Name Status Description    2/19/2021 1218 Inpatient Urology Consult Completed     2/17/2021 1136 Inpatient Cardiology Consult Completed     2/15/2021 1833 Hematology & Oncology Inpatient Consult Completed     2/15/2021 1813 Inpatient Nephrology Consult Completed     2/15/2021 1339 Inpatient Anesthesiology Consult            Pertinent Test Results:    Lab Results   Component Value Date    WBC 22.18 (H) 02/20/2021    HGB 12.3 (L) 02/20/2021    HCT 37.2 (L) 02/20/2021    MCV 88.6 02/20/2021    PLT 63 (L) 02/20/2021      Lab Results   Component Value Date    GLUCOSE 103 (H) 02/20/2021    CALCIUM 9.4 02/20/2021     02/20/2021    K 4.3 02/20/2021    CO2 23.9 02/20/2021     02/20/2021    BUN 28 (H) 02/20/2021    CREATININE 1.16 02/20/2021    EGFRIFNONA 62 02/20/2021    BCR  24.1 02/20/2021    ANIONGAP 11.1 02/20/2021     Lab Results   Component Value Date    INR 0.97 02/15/2021    PROTIME 12.7 02/15/2021         Condition on Discharge: Improved without surgery.    Vital Signs  Temp:  [97.7 °F (36.5 °C)-98 °F (36.7 °C)] 97.7 °F (36.5 °C)  Heart Rate:  [56-69] 59  Resp:  [16-18] 18  BP: ()/(64-82) 119/75      Discharge Disposition  Home or Self Care    Discharge Medications     Discharge Medications      New Medications      Instructions Start Date   tamsulosin 0.4 MG capsule 24 hr capsule  Commonly known as: FLOMAX   0.4 mg, Oral, Daily         Continue These Medications      Instructions Start Date   amitriptyline 50 MG tablet  Commonly known as: ELAVIL   50 mg, Oral, Nightly      DONEPEZIL HCL PO   10 mg, Oral, Daily      ferrous sulfate 325 (65 FE) MG tablet   325 mg, Oral, 3 Times Daily      gabapentin 300 MG capsule  Commonly known as: NEURONTIN   300 mg, Oral, Nightly      glipizide 10 MG tablet  Commonly known as: GLUCOTROL   10 mg, Oral, 2 Times Daily Before Meals      insulin glargine 100 UNIT/ML injection  Commonly known as: LANTUS, SEMGLEE   30 Units, Subcutaneous, Nightly      insulin lispro 100 UNIT/ML injection  Commonly known as: humaLOG   15 Units, Subcutaneous, 3 Times Daily Before Meals      isosorbide mononitrate 60 MG 24 hr tablet  Commonly known as: IMDUR   60 mg, Oral, Daily      levothyroxine 200 MCG tablet  Commonly known as: SYNTHROID, LEVOTHROID   200 mcg, Oral, Every Morning      lisinopril 5 MG tablet  Commonly known as: PRINIVIL,ZESTRIL   10 mg, Oral, Daily, Take 0.5 tablet daily       metFORMIN 1000 MG tablet  Commonly known as: GLUCOPHAGE   1,000 mg, Oral, 2 Times Daily With Meals      nadolol 40 MG tablet  Commonly known as: CORGARD   40 mg, Oral, Daily      pantoprazole 40 MG EC tablet  Commonly known as: PROTONIX   40 mg, Oral, Daily      predniSONE 5 MG tablet  Commonly known as: DELTASONE   20 mg, Oral, Daily      sertraline 100 MG  tablet  Commonly known as: ZOLOFT   100 mg, Oral, Daily, Take 1.5 tablets daily       simvastatin 40 MG tablet  Commonly known as: ZOCOR   40 mg, Oral, Nightly, Take 0.5 tablets every night       vitamin B-12 1000 MCG tablet  Commonly known as: CYANOCOBALAMIN   2,000 mcg, Oral, Nightly      vitamin D3 125 MCG (5000 UT) capsule capsule   5,000 Units, Oral, Daily             Discharge Diet:     Activity at Discharge:     Follow-up Appointments  No future appointments.      Test Results Pending at Discharge  Pending Labs     Order Current Status    Methylmalonic Acid, Serum In process           Tom Tubbs MD  02/20/21  09:20 EST

## 2021-02-20 NOTE — PROGRESS NOTES
Discharge Planning Assessment  [facility]     Patient Name: Cosmo Gauthier  MRN: [id number]  Today's Date: 2/20/2021    Admit Date: 2/15/2021    Discharge Needs Assessment    No documentation.       Discharge Plan     Row Name 02/20/21 1249       Plan    Plan Comments  KAIT called and states Margaret sent this referral and she will call the patient. The patient was discharged on 2/20/21. BRIAN Heart RN, CCP.        Continued Care and Services - Discharged on 2/20/2021 Admission date: 2/15/2021 - Discharge disposition: Home or Self Care    Home Medical Care     Service Provider Request Status Selected Services Address Phone Fax Patient Preferred    [facility]  Accepted N/A [address], [facility] [address] [phone] --    [facility]  P[facility] - Request Sent N/A [address] [facility] [address] [address] [phone] --    [facility]  Declined  payor N/A [address], [facility] [id number] [phone] --    [facility] [place]  Declined  [employer] plan is PPO we accept only O N/A [address] [facility] [address] [phone] --    [facility] [place]  Declined  entered by accident N/A [address] [facility] [address] [phone] --    [facility] [place]  Declined  cannot provide care at this time N/A [address] [facility] [address] [id number] [phone] --              Expected Discharge Date and Time     Expected Discharge Date Expected Discharge Time    Feb 20, 2021         Demographic Summary    No documentation.       Functional Status    No documentation.       Psychosocial    No documentation.       Abuse/Neglect    No documentation.       Legal    No documentation.       Substance Abuse    No documentation.       Patient Forms    No documentation.            Susanna Heart RN

## 2021-02-21 ENCOUNTER — READMISSION MANAGEMENT (OUTPATIENT)
Dept: CALL CENTER | Facility: HOSPITAL | Age: 76
End: 2021-02-21

## 2021-02-21 LAB
Lab: NORMAL
METHYLMALONATE SERPL-SCNC: 243 NMOL/L (ref 0–378)

## 2021-02-22 NOTE — PROGRESS NOTES
Case Management Discharge Note      Final Note: Pt d/c home with [facility] scheduled to follow.  [facility] STALLINGS/CCP    Provided Post Acute Provider List?: Yes  Post Acute Provider List: Home Health  Delivered To: Patient  Method of Delivery: In person    Selected Continued Care - Discharged on 2/20/2021 Admission date: 2/15/2021 - Discharge disposition: Home or Self Care    Destination    No services have been selected for the patient.              Durable Medical Equipment    No services have been selected for the patient.              Dialysis/Infusion    No services have been selected for the patient.              Home Medical Care    No services have been selected for the patient.              Therapy    No services have been selected for the patient.              Community Resources    No services have been selected for the patient.                       Final Discharge Disposition Code: 06 - home with home health care

## 2021-02-22 NOTE — PAYOR COMM NOTE
"Shell Gauthier (75 y.o. Male)                       ATTENTION;    DISCHARGE SUMMARY CASE REF #     [id number]                    REPLY TO UR DEPT ,DANNIE CHAMBERLAIN LPN    [phone] OR CALL          Date of Birth Social Security Number Address Home Phone MRN    1945  [facility] [address] [phone] [id number]    Catholic Marital Status          Yarsanism        Admission Date Admission Type Admitting Provider Attending Provider Department, Room/Bed    2/15/21 Urgent Jr Tom Tubbs MD  [facility] CARDIOVASC UNIT, 2221/1    Discharge Date Discharge Disposition Discharge Destination        2/20/2021 Home or Self Care              Attending Provider: (none)   Allergies: No Known Allergies    Isolation: None   Infection: None   Code Status: Prior    Ht: 182 cm (71.65\")   Wt: 111 kg (244 lb 1.6 oz)    Admission Cmt: None   Principal Problem: None                Active Insurance as of 2/15/2021     Primary Coverage     Payor Plan Insurance Group Employer/Plan Group    AETNA MEDICARE REPLACEMENT AETNA MEDICARE REPLACEMENT [id number]     Payor Plan Address Payor Plan Phone Number Payor Plan Fax Number Effective Dates    [address] [phone]  1/1/2018 - None Entered    [facility] [id number]       Subscriber Name Subscriber Birth Date Member ID       SHELL GAUTHIER 1945 [id number]                 Emergency Contacts      (Rel.) Home Phone Work Phone Mobile Phone    ZEV GAUTHIER (Spouse) [phone] -- --               Discharge Summary      Jr Tom Tubbs MD at 02/20/21 0920          Date of Admission:   Date of Discharge:  2/20/2021    Discharge Diagnosis: Coronary artery disease.  Lymphoma.  Diabetes.  Thrombocytopenia.    Presenting Problem/History of Present Illness  CAD (coronary artery disease) [I25.10]     Hospital Course  Patient is a 75 y.o. male presented with shortness of air.  He had multivessel " coronary disease.  He has thrombocytopenia and we checked out his lymphoma.  He did not have a chemo transformation.  We had a long time thinking about what was the best course of therapy for him.  While he was in the hospital his shortness of breath completely went away.  He has no angina.  Because of this I thought it was best to treat him medically and we will follow him up in a month.  He will also follow-up by his cardiologist.  When he came in the hospital his potassium was 7.1 from urinary tract obstruction.  A Kaur catheter was placed and he was put on medication for this.  A catheter removal trial was done and was successful.  I will see him back in 1 month.  He will follow up also with his hematologist nephrologist and cardiologist.    Procedures Performed         Consults:   Consults     Date and Time Order Name Status Description    2/19/2021 1218 Inpatient Urology Consult Completed     2/17/2021 1136 Inpatient Cardiology Consult Completed     2/15/2021 1833 Hematology & Oncology Inpatient Consult Completed     2/15/2021 1813 Inpatient Nephrology Consult Completed     2/15/2021 1339 Inpatient Anesthesiology Consult            Pertinent Test Results:    Lab Results   Component Value Date    WBC 22.18 (H) 02/20/2021    HGB 12.3 (L) 02/20/2021    HCT 37.2 (L) 02/20/2021    MCV 88.6 02/20/2021    PLT 63 (L) 02/20/2021      Lab Results   Component Value Date    GLUCOSE 103 (H) 02/20/2021    CALCIUM 9.4 02/20/2021     02/20/2021    K 4.3 02/20/2021    CO2 23.9 02/20/2021     02/20/2021    BUN 28 (H) 02/20/2021    CREATININE 1.16 02/20/2021    EGFRIFNONA 62 02/20/2021    BCR 24.1 02/20/2021    ANIONGAP 11.1 02/20/2021     Lab Results   Component Value Date    INR 0.97 02/15/2021    PROTIME 12.7 02/15/2021         Condition on Discharge: Improved without surgery.    Vital Signs  Temp:  [97.7 °F (36.5 °C)-98 °F (36.7 °C)] 97.7 °F (36.5 °C)  Heart Rate:  [56-69] 59  Resp:  [16-18] 18  BP:  (96130)/(64-82) 119/75      Discharge Disposition  Home or Self Care    Discharge Medications     Discharge Medications      New Medications      Instructions Start Date   tamsulosin 0.4 MG capsule 24 hr capsule  Commonly known as: FLOMAX   0.4 mg, Oral, Daily         Continue These Medications      Instructions Start Date   amitriptyline 50 MG tablet  Commonly known as: ELAVIL   50 mg, Oral, Nightly      DONEPEZIL HCL PO   10 mg, Oral, Daily      ferrous sulfate 325 (65 FE) MG tablet   325 mg, Oral, 3 Times Daily      gabapentin 300 MG capsule  Commonly known as: NEURONTIN   300 mg, Oral, Nightly      glipizide 10 MG tablet  Commonly known as: GLUCOTROL   10 mg, Oral, 2 Times Daily Before Meals      insulin glargine 100 UNIT/ML injection  Commonly known as: LANTUS, SEMGLEE   30 Units, Subcutaneous, Nightly      insulin lispro 100 UNIT/ML injection  Commonly known as: humaLOG   15 Units, Subcutaneous, 3 Times Daily Before Meals      isosorbide mononitrate 60 MG 24 hr tablet  Commonly known as: IMDUR   60 mg, Oral, Daily      levothyroxine 200 MCG tablet  Commonly known as: SYNTHROID, LEVOTHROID   200 mcg, Oral, Every Morning      lisinopril 5 MG tablet  Commonly known as: PRINIVIL,ZESTRIL   10 mg, Oral, Daily, Take 0.5 tablet daily       metFORMIN 1000 MG tablet  Commonly known as: GLUCOPHAGE   1,000 mg, Oral, 2 Times Daily With Meals      nadolol 40 MG tablet  Commonly known as: CORGARD   40 mg, Oral, Daily      pantoprazole 40 MG EC tablet  Commonly known as: PROTONIX   40 mg, Oral, Daily      predniSONE 5 MG tablet  Commonly known as: DELTASONE   20 mg, Oral, Daily      sertraline 100 MG tablet  Commonly known as: ZOLOFT   100 mg, Oral, Daily, Take 1.5 tablets daily       simvastatin 40 MG tablet  Commonly known as: ZOCOR   40 mg, Oral, Nightly, Take 0.5 tablets every night       vitamin B-12 1000 MCG tablet  Commonly known as: CYANOCOBALAMIN   2,000 mcg, Oral, Nightly      vitamin D3 125 MCG (5000 UT) capsule  capsule   5,000 Units, Oral, Daily             Discharge Diet:     Activity at Discharge:     Follow-up Appointments  No future appointments.      Test Results Pending at Discharge  Pending Labs     Order Current Status    Methylmalonic Acid, Serum In process           Tom Tubbs MD  02/20/21  09:20 EST              Electronically signed by Jr Tom Tubbs MD at 02/20/21 6145

## 2021-02-23 ENCOUNTER — READMISSION MANAGEMENT (OUTPATIENT)
Dept: CALL CENTER | Facility: HOSPITAL | Age: 76
End: 2021-02-23

## 2021-02-23 NOTE — OUTREACH NOTE
Medical Week 1 Survey      Responses   [facility] patient discharged from?  [place]   Does the patient have one of the following disease processes/diagnoses(primary or secondary)?  Other   Week 1 attempt successful?  Yes   Call start time  1042   Call end time  1047   Discharge diagnosis  CAD, lymphoma, diabetes, thrombocytopenia   List who call center can speak with  Chris - Wife    Person spoke with today (if not patient) and relationship  wife - Deboarah   Medication alerts for this patient  Per AVS   Meds reviewed with patient/caregiver?  Yes   Is the patient having any side effects they believe may be caused by any medication additions or changes?  No   Does the patient have all medications ordered at discharge?  Yes   Is the patient taking all medications as directed (includes completed medication regime)?  Yes   Does the patient have a primary care provider?   Yes   Does the patient have an appointment with their PCP within 7 days of discharge?  No   What is preventing the patient from scheduling follow up appointments within 7 days of discharge?  Uncomfortable attending in person appointment   Nursing Interventions  Assisted with scheduling video appointment   Has the patient kept scheduled appointments due by today?  Yes   Comments  telehealth visit.    What is the Home health agency?   [facility]    Has home health visited the patient within 72 hours of discharge?  Yes   Did the patient receive a copy of their discharge instructions?  Yes   Nursing interventions  Reviewed instructions with patient, Educated on MyChart   What is the patient's perception of their health status since discharge?  Same [He was dizzy yesterday it is gone today. ]   Is the patient/caregiver able to teach back signs and symptoms related to disease process for when to call PCP?  Yes   Is the patient/caregiver able to teach back signs and symptoms related to disease process for when to call 911?  Yes   Is  the patient/caregiver able to teach back the hierarchy of who to call/visit for symptoms/problems? PCP, Specialist, Home health nurse, Urgent Care, ED, 911  Yes   If the patient is a current smoker, are they able to teach back resources for cessation?  Not a smoker   Week 1 call completed?  Yes          Anum Delcid RN

## 2021-03-01 ENCOUNTER — OUTSIDE FACILITY SERVICE (OUTPATIENT)
Dept: CARDIAC SURGERY | Facility: CLINIC | Age: 76
End: 2021-03-01

## 2021-03-01 PROCEDURE — G0180 MD CERTIFICATION HHA PATIENT: HCPCS | Performed by: THORACIC SURGERY (CARDIOTHORACIC VASCULAR SURGERY)

## 2021-03-03 ENCOUNTER — READMISSION MANAGEMENT (OUTPATIENT)
Dept: CALL CENTER | Facility: HOSPITAL | Age: 76
End: 2021-03-03

## 2021-03-03 NOTE — OUTREACH NOTE
Medical Week 2 Survey      Responses   [facility] [place] [facility]  [place]   Does the patient have one of the following disease processes/diagnoses(primary or secondary)?  Other   Week 2 attempt successful?  Yes   Call start time  1350   Discharge diagnosis  CAD, lymphoma, diabetes, thrombocytopenia   Call end time  1353   Person spoke with today (if not patient) and relationship  wife - Temitopeoarah   Meds reviewed with patient/caregiver?  Yes   Is the patient taking all medications as directed (includes completed medication regime)?  Yes   Has the patient kept scheduled appointments due by today?  Yes   Comments  Seeing Oncology Cardiology   [facility]    What is the patient's perception of their health status since discharge?  Same   Week 2 Call Completed?  Yes          Brittney Galvin RN

## 2021-03-04 ENCOUNTER — TELEPHONE (OUTPATIENT)
Dept: CARDIAC SURGERY | Facility: CLINIC | Age: 76
End: 2021-03-04

## 2021-03-04 NOTE — TELEPHONE ENCOUNTER
Spoke with , pt has f/u with hematologist today 3/4, cardiology 3/9/21, nephrology 3/11/21.  states due to transportation and helping take care of grandson  would next a Tuesday appt or Wednesday (every other as grandson has school) Wednesday appts that would work 3/24 or 4/7? Per  they will need an appt between 1230-2pm.   Advised  I will return her call with appt.

## 2021-03-04 NOTE — TELEPHONE ENCOUNTER
----- Message from Rynan Ha sent at 2/22/2021  8:51 AM EST -----  Regarding: patient can only come on Tuesday  Mrs. Gauthier called, needs to schedule a one month follow up for her  . They can only come on Tuesday , due to transportation      please call and let her know , after speaking with Dr. Tubbs   her phone no     [phone]

## 2021-03-05 ENCOUNTER — TELEPHONE (OUTPATIENT)
Dept: CARDIAC SURGERY | Facility: CLINIC | Age: 76
End: 2021-03-05

## 2021-03-05 NOTE — TELEPHONE ENCOUNTER
Patients spouse calls to set up a follow up visit with Dr Tubbs Her  has appointments with hematology, cardiology and nephrology. He will follow up with Dr Tubbs after treatments with hematology are finished Dr Quinn [phone]

## 2021-03-09 ENCOUNTER — TELEPHONE (OUTPATIENT)
Dept: CARDIAC SURGERY | Facility: CLINIC | Age: 76
End: 2021-03-09

## 2021-03-09 ENCOUNTER — TELEMEDICINE CONVERTED (OUTPATIENT)
Dept: CARDIOLOGY | Facility: CLINIC | Age: 76
End: 2021-03-09
Attending: SPECIALIST

## 2021-03-09 ENCOUNTER — PREP FOR SURGERY (OUTPATIENT)
Dept: OTHER | Facility: HOSPITAL | Age: 76
End: 2021-03-09

## 2021-03-09 ENCOUNTER — HOSPITAL ENCOUNTER (OUTPATIENT)
Facility: HOSPITAL | Age: 76
Setting detail: SURGERY ADMIT
End: 2021-03-09
Attending: THORACIC SURGERY (CARDIOTHORACIC VASCULAR SURGERY) | Admitting: THORACIC SURGERY (CARDIOTHORACIC VASCULAR SURGERY)

## 2021-03-09 ENCOUNTER — DOCUMENTATION (OUTPATIENT)
Dept: CARDIAC SURGERY | Facility: CLINIC | Age: 76
End: 2021-03-09

## 2021-03-09 DIAGNOSIS — I25.118 CORONARY ARTERY DISEASE OF NATIVE HEART WITH STABLE ANGINA PECTORIS, UNSPECIFIED VESSEL OR LESION TYPE (HCC): Primary | ICD-10-CM

## 2021-03-09 NOTE — TELEPHONE ENCOUNTER
After speaking with Dr Tubbs I called and spoke with patients spouse. He is agreeable to being seen in office 3/15/21 with surgery likely being the following day I verified that he has not restarted any blood thinner

## 2021-03-09 NOTE — PROGRESS NOTES
Dr. Quinn reports that his platelet count is up to 87,000.  He will give him some more steroids and will try to planned surgery for next week.  Hold off on his chemotherapy for now.  Will contact the patient.

## 2021-03-10 ENCOUNTER — READMISSION MANAGEMENT (OUTPATIENT)
Dept: CALL CENTER | Facility: HOSPITAL | Age: 76
End: 2021-03-10

## 2021-03-10 NOTE — OUTREACH NOTE
Medical Week 3 Survey      Responses   [facility] patient discharged from?  [place]   Does the patient have one of the following disease processes/diagnoses(primary or secondary)?  Other   Week 3 attempt successful?  Yes   Call start time  1302   Call end time  1303   Discharge diagnosis  CAD, lymphoma, diabetes, thrombocytopenia   Person spoke with today (if not patient) and relationship  wife - Temitopeoarah   Meds reviewed with patient/caregiver?  Yes   Is the patient taking all medications as directed (includes completed medication regime)?  Yes   Has the patient kept scheduled appointments due by today?  Yes   Comments  Dr Tubbs 03/16/2021   What is the Home health agency?   [facility]    Psychosocial issues?  No   What is the patient's perception of their health status since discharge?  Improving   Week 3 Call Completed?  Yes          Brittney Galvin RN

## 2021-03-11 ENCOUNTER — HOSPITAL ENCOUNTER (OUTPATIENT)
Dept: UROLOGY | Facility: CLINIC | Age: 76
Discharge: HOME | End: 2021-03-11
Attending: UROLOGY

## 2021-03-11 ENCOUNTER — OFFICE VISIT CONVERTED (OUTPATIENT)
Dept: UROLOGY | Facility: CLINIC | Age: 76
End: 2021-03-11
Attending: UROLOGY

## 2021-03-11 ENCOUNTER — CONVERSION ENCOUNTER (OUTPATIENT)
Dept: UROLOGY | Facility: CLINIC | Age: 76
End: 2021-03-11

## 2021-03-11 LAB
BILIRUB UR QL STRIP: NEGATIVE
COLOR UR: YELLOW
CONV BACTERIA IN URINE MICRO: NORMAL
CONV CALCIUM OXALATE CRYSTALS /HPF IN URINE SEDIMENT BY MICROSCOPY: 0
CONV CLARITY OF URINE: CLEAR
CONV PROTEIN IN URINE BY AUTOMATED TEST STRIP: NEGATIVE
CONV UROBILINOGEN IN URINE BY AUTOMATED TEST STRIP: NORMAL
GLUCOSE UR QL: NEGATIVE
HGB UR QL STRIP: NORMAL
KETONES UR QL STRIP: NEGATIVE
LEUKOCYTE ESTERASE UR QL STRIP: NORMAL
NITRITE UR QL STRIP: NEGATIVE
PH UR STRIP.AUTO: 5.5 [PH]
RBC #/AREA URNS HPF: 0 /[HPF]
RENAL EPI CELLS #/AREA URNS HPF: 0 /[HPF]
SP GR UR: 1.02
SQUAMOUS SPT QL MICRO: 0
WBC #/AREA URNS HPF: NORMAL /[HPF]

## 2021-03-12 ENCOUNTER — TELEPHONE (OUTPATIENT)
Dept: CARDIAC SURGERY | Facility: CLINIC | Age: 76
End: 2021-03-12

## 2021-03-12 LAB — PSA SERPL-MCNC: 0.56 NG/ML (ref 0–4)

## 2021-03-12 NOTE — TELEPHONE ENCOUNTER
Discussed with Fabiola ARREOLA and , per  will proceed with surgery as scheduled.     Spoke with  notified per  proceed with PAT and surgery as scheduled.  states patient currently at [facility] for evaluation.

## 2021-03-12 NOTE — TELEPHONE ENCOUNTER
Spoke with Carla patient's wife. Per Carla patient f/u with urologist yesterday 3/11  was diagnosed with UTI and started on Bactrim DS. Carla states last night 2100 patient become nauseous and this morning pt very weak, has fallen multiple times requiring assistance to get up, Carla states she had to call her son to come and help her. She is wanting to know if she should continue to give  Bactrim. Advised  to consult with urologist and have patient evaluated in the emergency room since he is very weak and has fallen multiple times this morning and unable to get up from his falls. Advised  I would also discuss with  as  is scheduled for surgery next Tuesday 3/16 and will still be on antibiotic treatment for UTI.

## 2021-03-14 LAB
AMPICILLIN SUSC ISLT: <=2
BACTERIA UR CULT: ABNORMAL
CIPROFLOXACIN SUSC ISLT: <=0.5
CONV GENTAMICIN HIGH LEVEL SYNERGY: ABNORMAL
CONV STREPTOMYCIN HIGH LEVEL SYNERGY: ABNORMAL
DAPTOMYCIN SUSC ISLT: 2
DOXYCYCLINE SUSC ISLT: >=16
ERYTHROMYCIN SUSC ISLT: 2
LEVOFLOXACIN SUSC ISLT: 1
LINEZOLID SUSC ISLT: 2
NITROFURANTOIN SUSC ISLT: <=16
TETRACYCLINE SUSC ISLT: >=16
VANCOMYCIN SUSC ISLT: 1

## 2021-03-15 ENCOUNTER — HOSPITAL ENCOUNTER (INPATIENT)
Facility: HOSPITAL | Age: 76
End: 2021-03-15
Attending: THORACIC SURGERY (CARDIOTHORACIC VASCULAR SURGERY) | Admitting: THORACIC SURGERY (CARDIOTHORACIC VASCULAR SURGERY)

## 2021-03-19 ENCOUNTER — READMISSION MANAGEMENT (OUTPATIENT)
Dept: CALL CENTER | Facility: HOSPITAL | Age: 76
End: 2021-03-19

## 2021-03-19 NOTE — OUTREACH NOTE
Medical Week 4 Survey      Responses   [facility] patient discharged from?  [place]   Does the patient have one of the following disease processes/diagnoses(primary or secondary)?  Other   Week 4 attempt successful?  No   Rescheduled  Revoked   Revoke  Phone number issues          Samra Guthrie RN

## 2021-03-26 ENCOUNTER — TELEPHONE (OUTPATIENT)
Dept: CARDIAC SURGERY | Facility: CLINIC | Age: 76
End: 2021-03-26

## 2021-03-30 ENCOUNTER — TELEPHONE (OUTPATIENT)
Dept: CARDIAC SURGERY | Facility: CLINIC | Age: 76
End: 2021-03-30

## 2021-03-31 ENCOUNTER — TELEPHONE (OUTPATIENT)
Dept: CARDIAC SURGERY | Facility: CLINIC | Age: 76
End: 2021-03-31

## 2021-03-31 ENCOUNTER — OFFICE VISIT (OUTPATIENT)
Dept: CARDIAC SURGERY | Facility: CLINIC | Age: 76
End: 2021-03-31

## 2021-03-31 VITALS
HEART RATE: 64 BPM | TEMPERATURE: 97.6 F | DIASTOLIC BLOOD PRESSURE: 86 MMHG | BODY MASS INDEX: 33.18 KG/M2 | OXYGEN SATURATION: 99 % | HEIGHT: 72 IN | SYSTOLIC BLOOD PRESSURE: 141 MMHG | WEIGHT: 245 LBS | RESPIRATION RATE: 20 BRPM

## 2021-03-31 DIAGNOSIS — I25.10 CORONARY ARTERY DISEASE INVOLVING NATIVE CORONARY ARTERY OF NATIVE HEART WITHOUT ANGINA PECTORIS: Primary | ICD-10-CM

## 2021-03-31 PROCEDURE — 99024 POSTOP FOLLOW-UP VISIT: CPT | Performed by: THORACIC SURGERY (CARDIOTHORACIC VASCULAR SURGERY)

## 2021-03-31 NOTE — TELEPHONE ENCOUNTER
ROSEANNA WITH [facility] INS[employer] CALLED REGARDING PT'S AUTH FOR CABG W/DR TENORIO. I INFORMED ROSEANNA THAT DR TENORIO HAS DECIDED NOT TO OPERATE AT THIS TIME. PER ROSEANNA, PT HAS BEEN APPROVED FOR SURGERY AND THE AUTH IS GOOD FOR 6 MONTHS FROM 4/1/2021. THE AUTH # IS [id number].

## 2021-03-31 NOTE — PROGRESS NOTES
I saw him in the office today.  He is completely asymptomatic and feels great.  He has no chest discomfort and has no shortness of air.  He has significant morbidities related to his medical conditions and for this reason I would recommend medical treatment.  Stenting would be a problem for him as we have had cardiology to look at his coronaries and I think at this point it is safer to treat medically with a high risk of comorbid problems with his bypass surgery.  If anything changes we can always reconsider.  I do not think he needs to be on Plavix either so he has been off for a month.  I discussed this in detail with the patient face-to-face.

## 2021-05-10 ENCOUNTER — TELEPHONE (OUTPATIENT)
Dept: CARDIAC SURGERY | Facility: CLINIC | Age: 76
End: 2021-05-10

## 2021-05-10 NOTE — TELEPHONE ENCOUNTER
Patients spouse calls asking if Dr Tubbs will reconsider her  for surgery She states his cancer is in remission and his platelet count is good. I will discuss with Dr Tubbs and call her back.

## 2021-05-11 ENCOUNTER — TELEPHONE (OUTPATIENT)
Dept: CARDIAC SURGERY | Facility: CLINIC | Age: 76
End: 2021-05-11

## 2021-05-11 NOTE — TELEPHONE ENCOUNTER
After speaking with Dr Tubbs I called and spoke with patients spouse Patient is agreeable to seeing Dr Tubbs in office 5/18/21 at 11am, with surgery likely the following day

## 2021-05-13 NOTE — PROGRESS NOTES
Progress Note      Patient Name: Cosmo Gauthier   Patient ID: [id number]   Sex: Male   YOB: 1945    Primary Care Provider: Alex Buckley MD   Referring Provider: Donna ARREOLA    Visit Date: July 20, 2020    Provider: Sven Duran MD   Location: [facility]   Location Address: [address] [place] [address], [address]   [place]  [id number]   Location Phone: [phone]          Chief Complaint  · Coronary artery disease   · Shortness of breath       History Of Present Illness  Video Conferencing Visit  Cosmo Gauthier is a 74 year old /White male who is presenting for evaluation via video conferencing. Verbal consent obtained before beginning visit. Telehealth due to COVID-19. He has a history of coronary artery disease. He denies any chest pain. No shortness of breath.   The following staff were present during this visit: Provider only.   CURRENT MEDICATIONS: include Imdur 60 mg daily; Clopidogrel 75 mg daily; Simvastatin 40 mg 1/2 tablet daily; ferrous sulfate 325 mg t.i.d.; Donepezil 10 mg daily; Levothyroxine 250 mcg daily; Vitamin D3; Vitamin B12; Humalog; Lantus; Metformin 1000 mg b.i.d; Glipizide 10 mg b.i.d; Pantoprazole 40 mg daily; Sertraline 100 mg 1-1/2 tablets daily. The dosage and frequency of the medications were reviewed with the patient.   PAST MEDICAL HISTORY: Positive for coronary artery disease, status post PTCA/stent, diabetes.   FAMILY HISTORY: Positive for diabetes and heart disease. Negative for hypertension.   PSYCHOSOCIAL HISTORY: He never used alcohol or tobacco.       Review of Systems  · Cardiovascular  o Admits  o : shortness of breath while walking or lying flat  o Denies  o : palpitations (fast, fluttering, or skipping beats), swelling (feet, ankles, hands), chest pain or angina pectoris   · Respiratory  o Denies  o : chronic or frequent cough, asthma or wheezing      Vitals     Per patient, at-home vitals are blood pressure  118/65, heart rate of 78.           Assessment     ASSESSMENT AND PLAN:    1.  Coronary artery disease, s/p PTCA/stent, stable:  Continue aspirin and Imdur.  2.  Hyperlipidemia:  Continue current dose of Simvastatin.  3.  Morbid obesity:  I asked him to be on a low-fat diet and try to lose some weight.    Sven Duran MD, [facility]  ELVIS/miriam           This note was transcribed by Cyn Pop.  miriam/ELVIS  The above service was transcribed by Cyn Pop, and I attest to the accuracy of the note.  ELVIS               Electronically Signed by: Cyn Pop-, -Author on July 22, 2020 10:54:42 AM  Electronically Co-signed by: Sven Duran MD -Reviewer on July 24, 2020 08:34:40 AM

## 2021-05-13 NOTE — PROGRESS NOTES
Progress Note      Patient Name: Cosmo Gauthier   Patient ID: [id number]   Sex: Male   YOB: 1945    Primary Care Provider: Alex Buckley MD   Referring Provider: Donna ARREOLA    Visit Date: May 12, 2020    Provider: MAXWELL Herzog   Location: [facility]   Location Address: [address] [place] [address]  [id number]   Location Phone: [phone]          Chief Complaint     Pt states follow up. no other issue at this time.       History Of Present Illness  Cosmo Gauthier is a 74 year old /White male who presents to the office today.   Video Conferencing Visit  Cosmo Gauthier is a 74 year old /White male who is presenting for evaluation via video conferencing. Verbal consent obtained before beginning visit.   The following staff were present during this visit: Marian Marcos [facility] [place]; Jon Antoine MA        Patient started seeing Dr. Bradshaw in 2006 for screening colonoscopies and heartburn.  Fatty liver noted in 2010, liver biopsy April 2011 showed CANSECO.  Hepatic work-up negative 2014/2015. Fibrosure 5/20/2018: F2, A0A1. Hx ETOH x 20 yrs, but quit 30 yrs ago.  Last EGD February 25, 2014 gastritis, esophageal varices, patient was started on nadolol 40 mg/day.  Last colonoscopy 7/29/2019: Adequate prep, 2 small polyps in the transverse colon completely removedinflammatory and hyperplastic polyps.  Patient is in recall for 5 years.    Last AFP 1.8 August 2019, last CMP August 2019    Patient was last seen November 2019 reporting he was back on Xifaxan and thought it helped him feel a little better but still with daytime fatigue and complained of trouble sleeping at night.  No other GI complaints.  Labs and ultrasound were ordered.  Protonix was decreased to 20 mg/day and long-term risks of PPI were discussed.   Today, pt has no GI c/o. He appears to be off Xifaxin and he's not sure why, asked me to RF. He tolerated lower dose of Protonix well. He  did not do labs as ordered at last visit, states he still has orders and will do soon.   RUQ US 12/4/2019: Liver appears mildly enlarged and hyperechoic as before, fatty infiltration/cirrhosis, mild splenomegaly anechoic lesion in the left hepatic lobe appears similar to prior studies and is suggestive of a small cyst no new hepatic lesion is identified    3/9/2020: Hemoglobin 13.7, MCH elevated, RDW elevated       Past Medical History  Aftercare following left knee joint replacement surgery; Arthritis; Chronic Back Pain; Cirrhosis; Depression; Diabetes mellitus; Essential hypertension; Family history of colon cancer; Fatty liver; Gastric Ulcer; GERD (gastroesophageal reflux disease); Heart Disease; High cholesterol; Hyperthyroidism; Memory change; Mood disorder; Pain: Knee; Primary osteoarthritis of left knee; Sleep apnea; Thrombocytopenia; Thyroid disease         Past Surgical History  Colonoscopy; Endoscopy; Inguinal Hernia Repair; Knee Replacement; PTCA with Stent; shoulder repair         Medication List  Name Date Started Instructions   amitriptyline 10 mg oral tablet  take 1 tablet (10 mg) by oral route once daily at bedtime   clopidogrel 75 mg oral tablet  take 1 tablet (75 mg) by oral route once daily   cyanocobalamin (vitamin B-12) 500 mcg oral tablet  take 2 tablets by oral route daily   donepezil 10 mg oral tablet  take 1 tablet (10 mg) by oral route once daily in the evening   ferrous sulfate 325 mg (65 mg iron) oral tablet  take 1 tablet by oral route 3 times a day   gabapentin 300 mg oral capsule  take 1 capsule (300 mg) by oral route 3 times per day   glipizide 10 mg oral tablet  take 1 tablet (10 mg) by oral route 2 times per day before meals   Humalog U-100 Insulin 100 unit/mL subcutaneous solution  inject by subcutaneous route per prescriber's instructions. Insulin dosing requires individualization.   isosorbide mononitrate 60 mg oral tablet extended release 24 hr  take one-half tablet (30 mg) by  oral route once daily in the morning   Lantus Solostar 100 unit/mL (3 mL) subcutaneous insulin pen  inject by subcutaneous route as per insulin protocol   levothyroxine 175 mcg oral tablet  take 1 tablet (175 mcg) by oral route once daily   metformin 1,000 mg oral tablet  take 1 tablet (1,000 mg) by oral route 2 times per day with morning and evening meals   nadolol 40 mg oral tablet 11/12/2019 TAKE 1 TABLET BY MOUTH ONCE DAILY   pantoprazole 20 mg oral tablet,delayed release (DR/EC) 11/12/2019 take 1 tablet (20 mg) by oral route once daily for 30 days   sertraline 100 mg oral tablet  take 1 tablet (100 mg) by oral route once daily   simvastatin 40 mg oral tablet  take 1 tablet (40 mg) by oral route once daily in the evening   Vitamin D3 2,000 unit oral capsule  take 1 capsule by oral route daily         Allergy List  NO KNOWN DRUG ALLERGIES         Family Medical History  Diabetes, unspecified type; Hypertension; Family history of colon cancer; Family history of Gastrointestinal Cancer         Social History  Alcohol (Never); Denies illicit substance abuse (Never); lives with spouse; ; Retired; Tobacco (Never)         Review of Systems  · Constitutional  o Admits  o : good general health lately, no acute distress  · Gastrointestinal  o Denies  o : additional gastrointestinal symptoms except as noted in the HPI  · Psychiatric  o Admits  o : pleasant affect      Physical Examination  · Constitutional  o Appearance  o : well developed, well-nourished, in no acute distress  · Head and Face  o Head  o :   § Inspection  § : atraumatic, normocephalic  · Eyes  o Sclerae  o : sclerae white, no sclerae icterus  · Neck  o Inspection/Palpation  o : supple  · Respiratory  o Respiratory Effort  o : breathing unlabored  · Skin and Subcutaneous Tissue  o General Inspection  o : no lesions present, no rashes present  · Neurologic  o Mental Status Examination  o :   § Orientation  § : grossly oriented to person, place and  time  § Speech/Language  § : communication ability within normal limits, voice quality normal, articulation of speech normal, no evidence of aphasia  § Attention  § : attention normal, concentration abilities normal  · Psychiatric  o General  o : Alert and oriented x3  o Mood and Affect  o : Mood and affect are appropriate to circumstances              Assessment  · Anemia     285.9/D64.9  mild, also follows w DR Chaudhry  · Cirrhosis     571.5/K74.60  hx CANSECO  · Heartburn     787.1/R12  · Hepatic encephalopathy     572.2/K72.90  mild  · Esophageal varices     456.1/I85.00  · Liver cyst     573.8/K76.89      Plan  · Orders  o CT Abdomen without and with IV Contrast [facility]; suggest Oral Prep (41815) - - 06/12/2020  · Medications  o Xifaxan 550 mg oral tablet   SIG: take 1 tablet by oral route 2 times a day for 30 days   DISP: (60) tablets with 5 refills  Prescribed on 05/12/2020     o Medications have been Reconciled  o Transition of Care or Provider Policy  · Instructions  o Patient was educated/instructed on their diagnosis, treatment and medications prior to discharge from the clinic today.  o Patient instructed to seek medical attention urgently for new or worsening symptoms.  o Pt to do labs as ordered, he verb understanding  o F/U 6 mo            Electronically Signed by: MAXWELL Herzog -Author on May 12, 2020 01:54:29 PM

## 2021-05-13 NOTE — PROGRESS NOTES
Progress Note      Patient Name: Cosmo Gauthier   Patient ID: [id number]   Sex: Male   YOB: 1945    Primary Care Provider: Alex Buckley MD   Referring Provider: Donna ARREOLA    Visit Date: October 6, 2020    Provider: Sven Duran MD   Location: [facility]   Location Address: [address] [place] [address], [address]   [place]  [id number]   Location Phone: [phone]          Chief Complaint  · Coronary artery disease   · Exertional shortness of breath   · Chest pain       History Of Present Illness  Cosmo Gauthier is a 75 year old male with history of coronary artery disease, recently in the hospital with exertional shortness of breath and chest pain. Still has shortness of breath on minimal exertion. No chest pain at the present time.   CURRENT MEDICATIONS: include Lantus; Pantoprazole 40 my daily; Simvastatin 40 mg 1/2 tablet daily; Metformin 1000 b.i.d.; Levothyroxine 250 mg daily; Donepezil 10 mg daily; Glipizide 10 mg daily; iron 325 mg b.i.d.; Clopidogrel 75 mg daily; Amitriptyline 50 mg Gabapentin 600 mg 1/2 tablet daily; Lisinopril 10 mg 1/2 tablet daily; Sertraline 100 mg 1-1/2 tablet daily; vitamin B12; vitamin D3. The dosage and frequency of the medications were reviewed with the patient.   PAST MEDICAL HISTORY: Coronary artery disease, status post PTCA/stent; diabetes mellitus; hyperlipidemia.   PSYCHOSOCIAL HISTORY: Denies alcohol use. Denies tobacco use.       Review of Systems  · Cardiovascular  o Admits  o : shortness of breath while walking or lying flat  o Denies  o : palpitations (fast, fluttering, or skipping beats), swelling (feet, ankles, hands), chest pain or angina pectoris   · Respiratory  o Admits  o : chronic or frequent cough      Vitals  Date Time BP Position Site L\R Cuff Size HR RR TEMP (F) WT  HT  BMI kg/m2 BSA m2 O2 Sat FR L/min FiO2 HC       10/06/2020 01:40 /76 Sitting    84 - R   261lbs 0oz 6'   35.4 2.45             Physical  Examination  · Constitutional  o Appearance  o : Awake, alert, cooperative, pleasant.  · Respiratory  o Inspection of Chest  o : No chest wall deformities, moving equal.  o Auscultation of Lungs  o : Good air entry with vesicular breath sounds.  · Cardiovascular  o Heart  o :   § Auscultation of Heart  § : S1 and S2 regular. No S3. No S4. No murmurs.  o Peripheral Vascular System  o :   § Extremities  § : Peripheral pulses were well felt. No edema. No cyanosis.  · Gastrointestinal  o Abdominal Examination  o : No masses or organomegaly noted.          Assessment     ASSESSMENT AND PLAN:  Coronary artery disease, history of PTCA/stent, increased exertional shortness of breath.  In view of his symptoms and history of coronary artery disease, proceed with cardiac catheterization to definitely rule out any significant coronary artery disease.  Discussed with the patient all the risks and benefits of cardiac catheterization including risk of myocardial infarction, peripheral vascular complications, CVA, and worsening failure.  He understands and consents to the procedure.        MD ELVIS Aguirre/vasiliy    This note was transcribed by Divine Chavez.  vasiliy/ELVIS  The above service was transcribed by Divine Chavez, and I attest to the accuracy of the note.  ELVIS             Electronically Signed by: Karine Chavez-, Other -Author on October 12, 2020 08:19:40 AM  Electronically Co-signed by: Sven Duran MD -Reviewer on October 12, 2020 11:35:24 AM

## 2021-05-14 VITALS
WEIGHT: 261 LBS | HEIGHT: 72 IN | BODY MASS INDEX: 35.35 KG/M2 | HEART RATE: 84 BPM | SYSTOLIC BLOOD PRESSURE: 118 MMHG | DIASTOLIC BLOOD PRESSURE: 76 MMHG

## 2021-05-14 VITALS
SYSTOLIC BLOOD PRESSURE: 134 MMHG | DIASTOLIC BLOOD PRESSURE: 70 MMHG | WEIGHT: 261 LBS | HEIGHT: 72 IN | HEART RATE: 55 BPM | BODY MASS INDEX: 35.35 KG/M2 | TEMPERATURE: 97.5 F

## 2021-05-14 NOTE — PROGRESS NOTES
"   Progress Note      Patient Name: Cosmo Gauthier   Patient ID: [id number]   Sex: Male   YOB: 1945    Primary Care Provider: Alex Buckley MD   Referring Provider: Donna ARREOLA    Visit Date: March 9, 2021    Provider: Sven Duran MD   Location: [facility] [place] [facility]   Location Address: [address] [place] [address], [address]   [place]  [id number]   Location Phone: [phone]          Chief Complaint     Coronary artery disease.  Shortness of breath.  Thrombocytopenia.       History Of Present Illness  Video Conferencing Visit  Cosmo Gauthier is a 75 year old /White male who is presenting for evaluation via video conferencing. Verbal consent obtained before beginning visit. He is a morbidly obese male with history of multivessel coronary artery disease that was supposed to undergo coronary artery bypass graft surgery. He is waiting in view of his underlying thrombocytopenia. He sees Dr. Saldana for his thrombocytopenia.   The following staff were present during this visit: Provider only.   CURRENT MEDICATIONS: Medication list was reviewed and is as documented.   PAST MEDICAL HISTORY: Coronary artery disease, status post PTCA/stent; diabetes mellitus; hyperlipidemia.   PSYCHOSOCIAL HISTORY: Lifelong nonsmoker.      ALLERGIES: No known drug allergies.       Vitals     Per patient: Blood pressure: 146/68:  Height: 6\"0\"  Weight: 250           Assessment     ASSESSMENT & PLAN:    1.  Coronary artery disease.  Continue Imdur.  Continue aspirin.  Awaiting coronary artery bypass graft surgery        with Dr. Tubbs.  2.  Thrombocytopenia.  Managed by Dr. Saldana.  3.  Hyperlipidemia.  Continue current dose of simvastatin, managed by his PMD.   4.  Obesity.  Asked him to be on a low fat diet and to lose weight.   5.  See me back in 6 months.     Sven Duran MD, [facility]  ELVIS/rt                 Electronically Signed by: Nisha James-, Other -Author on March 17, " 2021 09:29:35 AM  Electronically Co-signed by: Sven Duran MD -Reviewer on March 18, 2021 09:12:35 AM

## 2021-05-14 NOTE — PROGRESS NOTES
"   Progress Note      Patient Name: Cosmo Gauthier   Patient ID: [id number]   Sex: Male   YOB: 1945    Primary Care Provider: Alex Buckley MD   Referring Provider: Donna ARREOLA    Visit Date: March 11, 2021    Provider: Morgan Dill MD   Location: [facility]   Location Address: [address] [place] [address], [address]  [id number]   Location Phone: [phone]          Chief Complaint  · \"Here for a prostate check\"      History Of Present Illness  The patient is a 75 year old /White male , who is a consultation from Alex Buckley MD , for the evaluation of bph.      1/19/21 cbcbmp.  Patient has no problem with urination.  Sometimes get up once at nighttime to urinate.  No frequency in the daytime.  His stream is good.       Past Medical History  Aftercare following left knee joint replacement surgery; Arthritis; Chronic Back Pain; Cirrhosis; Depression; Diabetes mellitus; Essential hypertension; Family history of colon cancer; Fatty liver; Gastric Ulcer; GERD (gastroesophageal reflux disease); Heart Disease; High cholesterol; Hyperthyroidism; Memory change; Mood disorder; Pain: Knee; Primary osteoarthritis of left knee; Sleep apnea; Thrombocytopenia; Thyroid disease         Past Surgical History  Colonoscopy; Endoscopy; Inguinal Hernia Repair; Knee Replacement; PTCA with Stent; shoulder repair         Medication List  amitriptyline 10 mg oral tablet; clopidogrel 75 mg oral tablet; cyanocobalamin (vitamin B-12) 500 mcg oral tablet; donepezil 10 mg oral tablet; ferrous sulfate 325 mg (65 mg iron) oral tablet; gabapentin 300 mg oral capsule; glipizide 10 mg oral tablet; Humalog U-100 Insulin 100 unit/mL subcutaneous solution; isosorbide mononitrate 60 mg oral tablet extended release 24 hr; Lantus Solostar 100 unit/mL (3 mL) subcutaneous insulin pen; levothyroxine 175 mcg oral tablet; metformin 1,000 mg oral tablet; nadolol 40 mg oral tablet; pantoprazole 20 mg oral " tablet,delayed release (DR/EC); sertraline 100 mg oral tablet; simvastatin 40 mg oral tablet; Vitamin D3 2,000 unit oral capsule; Xifaxan 550 mg oral tablet         Allergy List  NO KNOWN DRUG ALLERGIES       Allergies Reconciled  Family Medical History  Diabetes, unspecified type; Hypertension; Family history of colon cancer; Family history of Gastrointestinal Cancer         Social History  Alcohol (Never); Denies illicit substance abuse (Never); lives with spouse; ; Retired; Tobacco (Never)         Review of Systems  · Constitutional  o Denies  o : fever, headache, chills  · Eyes  o Denies  o : eye pain, double vision, blurred vision  · HENT  o Denies  o : sinus problems, sore throat, ear infection  · Cardiovascular  o Denies  o : chest pain, high blood pressure, varicosities  · Respiratory  o Denies  o : shortness of breath, wheezing, frequent cough  · Gastrointestinal  o Denies  o : nausea, vomiting, heartburn, indigestion, abdominal pain  · Genitourinary  o Denies  o : urgency, frequency, urinary retention, painful urination  · Integument  o Denies  o : rash, itching, boils  · Neurologic  o Denies  o : tingling or numbness, tremors, dizzy spells  · Musculoskeletal  o Denies  o : joint pain, neck pain, back pain  · Endocrine  o Denies  o : cold intolerance, heat intolerance, tired, excessive thirst, sluggish  · Psychiatric  o Admits  o : feels satisfied with life  o Denies  o : severe depression, concerns with hurting themselves  · Heme-Lymph  o Denies  o : swollen glands, blood clotting problems  · Allergic-Immunologic  o Denies  o : sinus allergy symptoms, hay fever      Vitals  Date Time BP Position Site L\R Cuff Size HR RR TEMP (F) WT  HT  BMI kg/m2 BSA m2 O2 Sat FR L/min FiO2        03/11/2021 10:51 /70 Sitting    55 - R  97.5 261lbs 0oz 6'   35.4 2.45             Physical Examination  · Constitutional  o Appearance  o : 72-year-old white male who has shortness of breath and is  obese  · Gastrointestinal  o Abdominal Examination  o : abdomen nontender to palpation, normal bowel sounds, tone normal without rigidity or guarding, no masses present, abdomen obese upon supine  o Liver and spleen  o : no abnormalities  o Hernias  o : Left inguinal hernia present. No hernia on the right side  · Genitourinary  o Bladder  o : no abnormalities  o Penis  o : Penis is normal  o Urethral Meatus  o : no abnormalities  o Scrotum and Scrotal Contents  o :   § Scrotum  § : no abnormalities  § Epididymides  § : no abnormalities  § Testes  § : no abnormalities  o Digital Rectal Examination  o :   § Prostate  § : Prostate gland is just about 25 to 30 g and feels benign  · Lymphatic  o Neck  o : No lymphadenopathy present  o Groin  o : No lymphadenopathy present  · Skin and Subcutaneous Tissue  o General Inspection  o : No rashes, lesions or areas of discoloration present. Skin turgor is normal.  · Neurologic  o Mental Status Examination  o : grossly oriented to person, place and time  o Gait and Station  o : normal gait, able to stand without difficulty  · Psychiatric  o Mood and Affect  o : mood normal, affect appropriate      Figure 1.0: Pain Rating Scale-[place]         Results  · In-Office Procedures  o Lab procedure  § Automated dipstick urinalysis with microscopy (64607)   § Color Ur: Yellow   § Clarity Ur: Clear   § Glucose Ur Ql Strip: Negative   § Bilirub Ur Ql Strip: Negative   § Ketones Ur Ql Strip: Negative   § Sp Gr Ur Qn: 1.025   § Hgb Ur Ql Strip: Trace-Lysed   § pH Ur-LsCnc: 5.5   § Prot Ur Ql Strip: Negative   § Urobilinogen Ur Strip-mCnc: 1.0 E.U./dL   § Nitrite Ur Ql Strip: Negative   § WBC Est Ur Ql Strip: Moderate   § RBC UrnS Qn HPF: 0   § WBC UrnS Qn HPF: 7-8   § Bacteria UrnS Qn HPF: 4+   § Crystals UrnS Qn HPF: 0   § Epithelial Cells (non renal): 0 /HPF  § Epithelial Cells (renal): 0       Assessment  · Prostate Cancer Screening     V76.44/Z12.5  · Urinary Tract  Infection     599.0/N39.0  · BPH     600.00  · Bladder Neck Obstruction     596.0/N32.0  · Diabetes mellitus     250.00/E11.9    Problems Reconciled  Plan  · Orders  o Urine culture (36798, 22011) - 599.0/N39.0, 250.00/E11.9, 600.00 - 03/11/2021  o PSA Ultrasensitive, ANNUAL SCREENING [facility] (85412, ) - 600.00, 596.0/N32.0, V76.44/Z12.5 - 03/11/2021  · Medications  o Medications have been Reconciled  o Transition of Care or Provider Policy  · Instructions  o Patient has urinary tract infection and is scheduled for surgery next week. I have done a urine culture on him and start him on Bactrim DS 1 tablet twice a day for 1 week. I checked his BUN/creatinine which are normal so the dose should be okay. I will recheck him in 2 months time because he is going to have a big operation next week            Electronically Signed by: Morgan Dill MD -Author on March 11, 2021 11:45:56 AM

## 2021-05-15 VITALS
DIASTOLIC BLOOD PRESSURE: 57 MMHG | HEIGHT: 72 IN | HEART RATE: 70 BPM | BODY MASS INDEX: 35.81 KG/M2 | WEIGHT: 264.37 LBS | SYSTOLIC BLOOD PRESSURE: 130 MMHG

## 2021-05-15 VITALS
DIASTOLIC BLOOD PRESSURE: 76 MMHG | HEART RATE: 86 BPM | WEIGHT: 261 LBS | BODY MASS INDEX: 35.35 KG/M2 | SYSTOLIC BLOOD PRESSURE: 146 MMHG | HEIGHT: 72 IN

## 2021-05-15 VITALS
HEIGHT: 72 IN | HEART RATE: 72 BPM | DIASTOLIC BLOOD PRESSURE: 76 MMHG | BODY MASS INDEX: 35.89 KG/M2 | WEIGHT: 265 LBS | SYSTOLIC BLOOD PRESSURE: 140 MMHG

## 2021-05-15 VITALS
WEIGHT: 268.12 LBS | SYSTOLIC BLOOD PRESSURE: 139 MMHG | BODY MASS INDEX: 36.32 KG/M2 | HEIGHT: 72 IN | DIASTOLIC BLOOD PRESSURE: 72 MMHG | HEART RATE: 94 BPM

## 2021-05-15 VITALS
HEIGHT: 72 IN | HEART RATE: 81 BPM | SYSTOLIC BLOOD PRESSURE: 132 MMHG | BODY MASS INDEX: 36.16 KG/M2 | WEIGHT: 267 LBS | DIASTOLIC BLOOD PRESSURE: 110 MMHG

## 2021-05-15 VITALS
HEART RATE: 82 BPM | SYSTOLIC BLOOD PRESSURE: 148 MMHG | BODY MASS INDEX: 36.03 KG/M2 | HEIGHT: 72 IN | DIASTOLIC BLOOD PRESSURE: 70 MMHG | WEIGHT: 266 LBS

## 2021-05-16 VITALS
WEIGHT: 258 LBS | DIASTOLIC BLOOD PRESSURE: 72 MMHG | SYSTOLIC BLOOD PRESSURE: 122 MMHG | HEIGHT: 72 IN | HEART RATE: 86 BPM | BODY MASS INDEX: 34.95 KG/M2

## 2021-05-16 VITALS — RESPIRATION RATE: 16 BRPM | BODY MASS INDEX: 35.21 KG/M2 | WEIGHT: 260 LBS | HEIGHT: 72 IN

## 2021-05-16 VITALS
HEART RATE: 58 BPM | BODY MASS INDEX: 35.9 KG/M2 | SYSTOLIC BLOOD PRESSURE: 141 MMHG | HEIGHT: 72 IN | WEIGHT: 265.06 LBS | DIASTOLIC BLOOD PRESSURE: 72 MMHG

## 2021-05-16 VITALS
HEIGHT: 72 IN | SYSTOLIC BLOOD PRESSURE: 108 MMHG | HEART RATE: 60 BPM | DIASTOLIC BLOOD PRESSURE: 84 MMHG | WEIGHT: 261 LBS | BODY MASS INDEX: 35.35 KG/M2

## 2021-05-16 VITALS
HEART RATE: 68 BPM | BODY MASS INDEX: 35.89 KG/M2 | DIASTOLIC BLOOD PRESSURE: 69 MMHG | HEIGHT: 72 IN | SYSTOLIC BLOOD PRESSURE: 134 MMHG | WEIGHT: 265 LBS

## 2021-05-16 VITALS
BODY MASS INDEX: 36.3 KG/M2 | HEART RATE: 60 BPM | HEIGHT: 72 IN | WEIGHT: 268 LBS | DIASTOLIC BLOOD PRESSURE: 68 MMHG | SYSTOLIC BLOOD PRESSURE: 126 MMHG

## 2021-05-16 VITALS
HEART RATE: 61 BPM | BODY MASS INDEX: 35.62 KG/M2 | SYSTOLIC BLOOD PRESSURE: 120 MMHG | HEIGHT: 72 IN | DIASTOLIC BLOOD PRESSURE: 63 MMHG | WEIGHT: 263 LBS

## 2021-05-17 ENCOUNTER — TELEPHONE (OUTPATIENT)
Dept: CARDIAC SURGERY | Facility: CLINIC | Age: 76
End: 2021-05-17

## 2021-05-18 ENCOUNTER — APPOINTMENT (OUTPATIENT)
Dept: CARDIOLOGY | Facility: HOSPITAL | Age: 76
End: 2021-05-18

## 2021-05-18 ENCOUNTER — HOSPITAL ENCOUNTER (INPATIENT)
Facility: HOSPITAL | Age: 76
LOS: 13 days | Discharge: HOME HEALTH | End: 2021-05-31
Attending: THORACIC SURGERY (CARDIOTHORACIC VASCULAR SURGERY) | Admitting: THORACIC SURGERY (CARDIOTHORACIC VASCULAR SURGERY)

## 2021-05-18 ENCOUNTER — APPOINTMENT (OUTPATIENT)
Dept: GENERAL RADIOLOGY | Facility: HOSPITAL | Age: 76
End: 2021-05-18

## 2021-05-18 ENCOUNTER — OFFICE VISIT (OUTPATIENT)
Dept: CARDIAC SURGERY | Facility: CLINIC | Age: 76
End: 2021-05-18

## 2021-05-18 VITALS
RESPIRATION RATE: 20 BRPM | OXYGEN SATURATION: 99 % | HEART RATE: 58 BPM | BODY MASS INDEX: 33.86 KG/M2 | SYSTOLIC BLOOD PRESSURE: 94 MMHG | DIASTOLIC BLOOD PRESSURE: 56 MMHG | TEMPERATURE: 97.6 F | WEIGHT: 250 LBS | HEIGHT: 72 IN

## 2021-05-18 DIAGNOSIS — D69.6 THROMBOCYTOPENIA (HCC): ICD-10-CM

## 2021-05-18 DIAGNOSIS — C85.10 B-CELL LYMPHOMA, UNSPECIFIED B-CELL LYMPHOMA TYPE, UNSPECIFIED BODY REGION (HCC): ICD-10-CM

## 2021-05-18 DIAGNOSIS — Z95.1 S/P CABG (CORONARY ARTERY BYPASS GRAFT): Primary | ICD-10-CM

## 2021-05-18 DIAGNOSIS — I25.10 CORONARY ARTERY DISEASE INVOLVING NATIVE CORONARY ARTERY OF NATIVE HEART WITHOUT ANGINA PECTORIS: Primary | ICD-10-CM

## 2021-05-18 DIAGNOSIS — E66.01 MORBIDLY OBESE (HCC): ICD-10-CM

## 2021-05-18 DIAGNOSIS — D72.820 LYMPHOCYTOSIS: ICD-10-CM

## 2021-05-18 DIAGNOSIS — E11.49 TYPE 2 DIABETES MELLITUS WITH OTHER NEUROLOGIC COMPLICATION, WITH LONG-TERM CURRENT USE OF INSULIN (HCC): ICD-10-CM

## 2021-05-18 DIAGNOSIS — I25.118 CORONARY ARTERY DISEASE OF NATIVE ARTERY OF NATIVE HEART WITH STABLE ANGINA PECTORIS (HCC): ICD-10-CM

## 2021-05-18 DIAGNOSIS — I25.118 CORONARY ARTERY DISEASE OF NATIVE HEART WITH STABLE ANGINA PECTORIS, UNSPECIFIED VESSEL OR LESION TYPE (HCC): ICD-10-CM

## 2021-05-18 DIAGNOSIS — D64.9 ANEMIA, UNSPECIFIED TYPE: ICD-10-CM

## 2021-05-18 DIAGNOSIS — E07.9 DISEASE OF THYROID GLAND: ICD-10-CM

## 2021-05-18 DIAGNOSIS — Z79.4 TYPE 2 DIABETES MELLITUS WITH OTHER NEUROLOGIC COMPLICATION, WITH LONG-TERM CURRENT USE OF INSULIN (HCC): ICD-10-CM

## 2021-05-18 LAB
ALBUMIN SERPL-MCNC: 4 G/DL (ref 3.5–5.2)
ALBUMIN/GLOB SERPL: 1.7 G/DL
ALP SERPL-CCNC: 53 U/L (ref 39–117)
ALT SERPL W P-5'-P-CCNC: 13 U/L (ref 1–41)
ANION GAP SERPL CALCULATED.3IONS-SCNC: 7.3 MMOL/L (ref 5–15)
APTT PPP: 31.8 SECONDS (ref 22.7–35.4)
ARTERIAL PATENCY WRIST A: POSITIVE
AST SERPL-CCNC: 11 U/L (ref 1–40)
ATMOSPHERIC PRESS: 753.3 MMHG
BASE EXCESS BLDA CALC-SCNC: -4.7 MMOL/L (ref 0–2)
BASOPHILS # BLD AUTO: 0.03 10*3/MM3 (ref 0–0.2)
BASOPHILS NFR BLD AUTO: 0.5 % (ref 0–1.5)
BDY SITE: ABNORMAL
BH CV XLRA MEAS - DIST GSV CALF DIST LEFT: 0.31 CM
BH CV XLRA MEAS - DIST GSV CALF DIST RIGHT: 0.34 CM
BH CV XLRA MEAS - DIST GSV THIGH DIST LEFT: 0.3 CM
BH CV XLRA MEAS - DIST GSV THIGH DIST RIGHT: 0.46 CM
BH CV XLRA MEAS - GSV ANKLE DIST LEFT: 0.31 CM
BH CV XLRA MEAS - GSV ANKLE DIST RIGHT: 0.36 CM
BH CV XLRA MEAS - GSV KNEE DIST LEFT: 0.32 CM
BH CV XLRA MEAS - GSV KNEE DIST RIGHT: 0.44 CM
BH CV XLRA MEAS - GSV ORIGIN DIST LEFT: 0.74 CM
BH CV XLRA MEAS - GSV ORIGIN DIST RIGHT: 0.7 CM
BH CV XLRA MEAS - MID GSV CALF LEFT: 0.31 CM
BH CV XLRA MEAS - MID GSV CALF RIGHT: 0.35 CM
BH CV XLRA MEAS - MID GSV THIGH  LEFT: 0.31 CM
BH CV XLRA MEAS - MID GSV THIGH  RIGHT: 0.52 CM
BH CV XLRA MEAS - PROX GSV CALF DIST LEFT: 0.35 CM
BH CV XLRA MEAS - PROX GSV CALF DIST RIGHT: 0.55 CM
BH CV XLRA MEAS - PROX GSV THIGH  LEFT: 0.37 CM
BH CV XLRA MEAS - PROX GSV THIGH  RIGHT: 0.81 CM
BH CV XLRA MEAS LEFT DIST CCA EDV: -18.2 CM/SEC
BH CV XLRA MEAS LEFT DIST CCA PSV: -98.5 CM/SEC
BH CV XLRA MEAS LEFT DIST ICA EDV: -19.4 CM/SEC
BH CV XLRA MEAS LEFT DIST ICA PSV: -60.1 CM/SEC
BH CV XLRA MEAS LEFT ICA/CCA RATIO: 0.9
BH CV XLRA MEAS LEFT MID ICA EDV: -17.6 CM/SEC
BH CV XLRA MEAS LEFT MID ICA PSV: -65.7 CM/SEC
BH CV XLRA MEAS LEFT PROX CCA EDV: -16 CM/SEC
BH CV XLRA MEAS LEFT PROX CCA PSV: -108 CM/SEC
BH CV XLRA MEAS LEFT PROX ECA EDV: -9.4 CM/SEC
BH CV XLRA MEAS LEFT PROX ECA PSV: -80.9 CM/SEC
BH CV XLRA MEAS LEFT PROX ICA EDV: 11.1 CM/SEC
BH CV XLRA MEAS LEFT PROX ICA PSV: 78.6 CM/SEC
BH CV XLRA MEAS LEFT PROX SCLA PSV: 89.6 CM/SEC
BH CV XLRA MEAS LEFT VERTEBRAL A EDV: 15.3 CM/SEC
BH CV XLRA MEAS LEFT VERTEBRAL A PSV: 64.4 CM/SEC
BH CV XLRA MEAS RIGHT DIST CCA EDV: 14.1 CM/SEC
BH CV XLRA MEAS RIGHT DIST CCA PSV: 109 CM/SEC
BH CV XLRA MEAS RIGHT DIST ICA EDV: -19.5 CM/SEC
BH CV XLRA MEAS RIGHT DIST ICA PSV: -78.6 CM/SEC
BH CV XLRA MEAS RIGHT ICA/CCA RATIO: 1
BH CV XLRA MEAS RIGHT MID ICA EDV: -25.2 CM/SEC
BH CV XLRA MEAS RIGHT MID ICA PSV: -84.4 CM/SEC
BH CV XLRA MEAS RIGHT PROX CCA EDV: 12.9 CM/SEC
BH CV XLRA MEAS RIGHT PROX CCA PSV: 97.3 CM/SEC
BH CV XLRA MEAS RIGHT PROX ICA EDV: 20.4 CM/SEC
BH CV XLRA MEAS RIGHT PROX ICA PSV: 112 CM/SEC
BH CV XLRA MEAS RIGHT PROX SCLA PSV: 214 CM/SEC
BH CV XLRA MEAS RIGHT VERTEBRAL A EDV: -9.5 CM/SEC
BH CV XLRA MEAS RIGHT VERTEBRAL A PSV: -34.4 CM/SEC
BILIRUB SERPL-MCNC: 0.4 MG/DL (ref 0–1.2)
BILIRUB UR QL STRIP: NEGATIVE
BUN SERPL-MCNC: 28 MG/DL (ref 8–23)
BUN/CREAT SERPL: 20.6 (ref 7–25)
CALCIUM SPEC-SCNC: 9 MG/DL (ref 8.6–10.5)
CHLORIDE SERPL-SCNC: 109 MMOL/L (ref 98–107)
CHOLEST SERPL-MCNC: 125 MG/DL (ref 0–200)
CLARITY UR: CLEAR
CLOSE TME COLL+ADP + EPINEP PNL BLD: 72 % (ref 86–100)
CO2 SERPL-SCNC: 23.7 MMOL/L (ref 22–29)
COLOR UR: ABNORMAL
CREAT SERPL-MCNC: 1.36 MG/DL (ref 0.76–1.27)
DEPRECATED RDW RBC AUTO: 51.3 FL (ref 37–54)
EOSINOPHIL # BLD AUTO: 0.15 10*3/MM3 (ref 0–0.4)
EOSINOPHIL NFR BLD AUTO: 2.6 % (ref 0.3–6.2)
ERYTHROCYTE [DISTWIDTH] IN BLOOD BY AUTOMATED COUNT: 15.8 % (ref 12.3–15.4)
GFR SERPL CREATININE-BSD FRML MDRD: 51 ML/MIN/1.73
GLOBULIN UR ELPH-MCNC: 2.4 GM/DL
GLUCOSE BLDC GLUCOMTR-MCNC: 128 MG/DL (ref 70–130)
GLUCOSE BLDC GLUCOMTR-MCNC: 136 MG/DL (ref 70–130)
GLUCOSE SERPL-MCNC: 125 MG/DL (ref 65–99)
GLUCOSE UR STRIP-MCNC: ABNORMAL MG/DL
HBA1C MFR BLD: 6.16 % (ref 4.8–5.6)
HCO3 BLDA-SCNC: 20.8 MMOL/L (ref 22–28)
HCT VFR BLD AUTO: 31.6 % (ref 37.5–51)
HDLC SERPL-MCNC: 40 MG/DL (ref 40–60)
HGB BLD-MCNC: 10.9 G/DL (ref 13–17.7)
HGB UR QL STRIP.AUTO: NEGATIVE
IMM GRANULOCYTES # BLD AUTO: 0.02 10*3/MM3 (ref 0–0.05)
IMM GRANULOCYTES NFR BLD AUTO: 0.4 % (ref 0–0.5)
INR PPP: 1.06 (ref 0.9–1.1)
KETONES UR QL STRIP: ABNORMAL
LDLC SERPL CALC-MCNC: 51 MG/DL (ref 0–100)
LDLC/HDLC SERPL: 1.09 {RATIO}
LEFT ARM BP: NORMAL MMHG
LEUKOCYTE ESTERASE UR QL STRIP.AUTO: NEGATIVE
LYMPHOCYTES # BLD AUTO: 2.24 10*3/MM3 (ref 0.7–3.1)
LYMPHOCYTES NFR BLD AUTO: 39.4 % (ref 19.6–45.3)
MAGNESIUM SERPL-MCNC: 1.5 MG/DL (ref 1.6–2.4)
MAXIMAL PREDICTED HEART RATE: 147 BPM
MAXIMAL PREDICTED HEART RATE: 147 BPM
MCH RBC QN AUTO: 30.7 PG (ref 26.6–33)
MCHC RBC AUTO-ENTMCNC: 34.5 G/DL (ref 31.5–35.7)
MCV RBC AUTO: 89 FL (ref 79–97)
MODALITY: ABNORMAL
MONOCYTES # BLD AUTO: 0.36 10*3/MM3 (ref 0.1–0.9)
MONOCYTES NFR BLD AUTO: 6.3 % (ref 5–12)
NEUTROPHILS NFR BLD AUTO: 2.88 10*3/MM3 (ref 1.7–7)
NEUTROPHILS NFR BLD AUTO: 50.8 % (ref 42.7–76)
NITRITE UR QL STRIP: NEGATIVE
NRBC BLD AUTO-RTO: 0 /100 WBC (ref 0–0.2)
NT-PROBNP SERPL-MCNC: 101.1 PG/ML (ref 0–900)
PCO2 BLDA: 38.8 MM HG (ref 35–45)
PH BLDA: 7.34 PH UNITS (ref 7.35–7.45)
PH UR STRIP.AUTO: <=5 [PH] (ref 5–8)
PLATELET # BLD AUTO: 64 10*3/MM3 (ref 140–450)
PMV BLD AUTO: 8.6 FL (ref 6–12)
PO2 BLDA: 76.2 MM HG (ref 80–100)
POTASSIUM SERPL-SCNC: 4.8 MMOL/L (ref 3.5–5.2)
PROT SERPL-MCNC: 6.4 G/DL (ref 6–8.5)
PROT UR QL STRIP: NEGATIVE
PROTHROMBIN TIME: 13.6 SECONDS (ref 11.7–14.2)
RBC # BLD AUTO: 3.55 10*6/MM3 (ref 4.14–5.8)
RIGHT ARM BP: NORMAL MMHG
SAO2 % BLDCOA: 94.2 % (ref 92–99)
SARS-COV-2 ORF1AB RESP QL NAA+PROBE: NOT DETECTED
SODIUM SERPL-SCNC: 140 MMOL/L (ref 136–145)
SP GR UR STRIP: 1.02 (ref 1–1.03)
STRESS TARGET HR: 125 BPM
STRESS TARGET HR: 125 BPM
TOTAL RATE: 18 BREATHS/MINUTE
TRIGL SERPL-MCNC: 208 MG/DL (ref 0–150)
UROBILINOGEN UR QL STRIP: ABNORMAL
VLDLC SERPL-MCNC: 34 MG/DL (ref 5–40)
WBC # BLD AUTO: 5.68 10*3/MM3 (ref 3.4–10.8)

## 2021-05-18 PROCEDURE — 80061 LIPID PANEL: CPT | Performed by: NURSE PRACTITIONER

## 2021-05-18 PROCEDURE — 85025 COMPLETE CBC W/AUTO DIFF WBC: CPT | Performed by: NURSE PRACTITIONER

## 2021-05-18 PROCEDURE — 80053 COMPREHEN METABOLIC PANEL: CPT | Performed by: NURSE PRACTITIONER

## 2021-05-18 PROCEDURE — 36600 WITHDRAWAL OF ARTERIAL BLOOD: CPT

## 2021-05-18 PROCEDURE — 82803 BLOOD GASES ANY COMBINATION: CPT

## 2021-05-18 PROCEDURE — 85610 PROTHROMBIN TIME: CPT | Performed by: NURSE PRACTITIONER

## 2021-05-18 PROCEDURE — 99215 OFFICE O/P EST HI 40 MIN: CPT | Performed by: THORACIC SURGERY (CARDIOTHORACIC VASCULAR SURGERY)

## 2021-05-18 PROCEDURE — 81003 URINALYSIS AUTO W/O SCOPE: CPT | Performed by: NURSE PRACTITIONER

## 2021-05-18 PROCEDURE — 83036 HEMOGLOBIN GLYCOSYLATED A1C: CPT | Performed by: NURSE PRACTITIONER

## 2021-05-18 PROCEDURE — 63710000001 INSULIN GLARGINE PER 5 UNITS: Performed by: NURSE PRACTITIONER

## 2021-05-18 PROCEDURE — U0004 COV-19 TEST NON-CDC HGH THRU: HCPCS | Performed by: NURSE PRACTITIONER

## 2021-05-18 PROCEDURE — 71046 X-RAY EXAM CHEST 2 VIEWS: CPT

## 2021-05-18 PROCEDURE — 82962 GLUCOSE BLOOD TEST: CPT

## 2021-05-18 PROCEDURE — 83880 ASSAY OF NATRIURETIC PEPTIDE: CPT | Performed by: NURSE PRACTITIONER

## 2021-05-18 PROCEDURE — 83735 ASSAY OF MAGNESIUM: CPT | Performed by: NURSE PRACTITIONER

## 2021-05-18 PROCEDURE — 85576 BLOOD PLATELET AGGREGATION: CPT | Performed by: NURSE PRACTITIONER

## 2021-05-18 PROCEDURE — 85730 THROMBOPLASTIN TIME PARTIAL: CPT | Performed by: NURSE PRACTITIONER

## 2021-05-18 PROCEDURE — 93880 EXTRACRANIAL BILAT STUDY: CPT

## 2021-05-18 PROCEDURE — 93970 EXTREMITY STUDY: CPT

## 2021-05-18 PROCEDURE — 94799 UNLISTED PULMONARY SVC/PX: CPT

## 2021-05-18 RX ORDER — NALOXONE HCL 0.4 MG/ML
0.4 VIAL (ML) INJECTION
Status: DISCONTINUED | OUTPATIENT
Start: 2021-05-18 | End: 2021-05-20

## 2021-05-18 RX ORDER — DONEPEZIL HYDROCHLORIDE 10 MG/1
10 TABLET, FILM COATED ORAL NIGHTLY
Status: DISCONTINUED | OUTPATIENT
Start: 2021-05-18 | End: 2021-05-20

## 2021-05-18 RX ORDER — ASPIRIN 81 MG/1
81 TABLET, CHEWABLE ORAL DAILY
Status: DISCONTINUED | OUTPATIENT
Start: 2021-05-18 | End: 2021-05-20

## 2021-05-18 RX ORDER — SERTRALINE HYDROCHLORIDE 100 MG/1
100 TABLET, FILM COATED ORAL DAILY
Status: DISCONTINUED | OUTPATIENT
Start: 2021-05-18 | End: 2021-05-20

## 2021-05-18 RX ORDER — ONDANSETRON 4 MG/1
4 TABLET, FILM COATED ORAL EVERY 6 HOURS PRN
Status: DISCONTINUED | OUTPATIENT
Start: 2021-05-18 | End: 2021-05-20

## 2021-05-18 RX ORDER — TAMSULOSIN HYDROCHLORIDE 0.4 MG/1
0.4 CAPSULE ORAL DAILY
Status: DISCONTINUED | OUTPATIENT
Start: 2021-05-18 | End: 2021-05-20

## 2021-05-18 RX ORDER — HYDROCODONE BITARTRATE AND ACETAMINOPHEN 5; 325 MG/1; MG/1
1 TABLET ORAL EVERY 4 HOURS PRN
Status: DISCONTINUED | OUTPATIENT
Start: 2021-05-18 | End: 2021-05-20

## 2021-05-18 RX ORDER — NICOTINE POLACRILEX 4 MG
15 LOZENGE BUCCAL
Status: DISCONTINUED | OUTPATIENT
Start: 2021-05-18 | End: 2021-05-20

## 2021-05-18 RX ORDER — DIPHENHYDRAMINE HCL 25 MG
25 CAPSULE ORAL NIGHTLY PRN
Status: DISCONTINUED | OUTPATIENT
Start: 2021-05-18 | End: 2021-05-20

## 2021-05-18 RX ORDER — ASPIRIN 81 MG/1
81 TABLET ORAL DAILY
COMMUNITY

## 2021-05-18 RX ORDER — GLIPIZIDE 10 MG/1
10 TABLET ORAL
Status: DISCONTINUED | OUTPATIENT
Start: 2021-05-18 | End: 2021-05-20

## 2021-05-18 RX ORDER — FERROUS SULFATE 325(65) MG
325 TABLET ORAL
Status: DISCONTINUED | OUTPATIENT
Start: 2021-05-19 | End: 2021-05-20

## 2021-05-18 RX ORDER — LISINOPRIL 10 MG/1
10 TABLET ORAL DAILY
Status: DISCONTINUED | OUTPATIENT
Start: 2021-05-18 | End: 2021-05-20

## 2021-05-18 RX ORDER — LEVOTHYROXINE SODIUM 0.05 MG/1
50 TABLET ORAL DAILY
COMMUNITY
End: 2022-01-18

## 2021-05-18 RX ORDER — LEVOTHYROXINE SODIUM 0.12 MG/1
250 TABLET ORAL EVERY MORNING
Status: DISCONTINUED | OUTPATIENT
Start: 2021-05-19 | End: 2021-05-20

## 2021-05-18 RX ORDER — NADOLOL 40 MG/1
40 TABLET ORAL DAILY
Status: DISCONTINUED | OUTPATIENT
Start: 2021-05-18 | End: 2021-05-20

## 2021-05-18 RX ORDER — GABAPENTIN 300 MG/1
300 CAPSULE ORAL NIGHTLY
Status: DISCONTINUED | OUTPATIENT
Start: 2021-05-18 | End: 2021-05-20

## 2021-05-18 RX ORDER — ALUMINA, MAGNESIA, AND SIMETHICONE 2400; 2400; 240 MG/30ML; MG/30ML; MG/30ML
15 SUSPENSION ORAL EVERY 6 HOURS PRN
Status: DISCONTINUED | OUTPATIENT
Start: 2021-05-18 | End: 2021-05-20

## 2021-05-18 RX ORDER — ONDANSETRON 2 MG/ML
4 INJECTION INTRAMUSCULAR; INTRAVENOUS EVERY 6 HOURS PRN
Status: DISCONTINUED | OUTPATIENT
Start: 2021-05-18 | End: 2021-05-20

## 2021-05-18 RX ORDER — INSULIN LISPRO 100 [IU]/ML
0-14 INJECTION, SOLUTION INTRAVENOUS; SUBCUTANEOUS
Status: DISCONTINUED | OUTPATIENT
Start: 2021-05-18 | End: 2021-05-20

## 2021-05-18 RX ORDER — DEXTROSE MONOHYDRATE 25 G/50ML
25 INJECTION, SOLUTION INTRAVENOUS
Status: DISCONTINUED | OUTPATIENT
Start: 2021-05-18 | End: 2021-05-20

## 2021-05-18 RX ORDER — ALPRAZOLAM 0.5 MG/1
0.5 TABLET ORAL EVERY 8 HOURS PRN
Status: DISCONTINUED | OUTPATIENT
Start: 2021-05-18 | End: 2021-05-20

## 2021-05-18 RX ORDER — ATORVASTATIN CALCIUM 20 MG/1
20 TABLET, FILM COATED ORAL DAILY
Status: DISCONTINUED | OUTPATIENT
Start: 2021-05-18 | End: 2021-05-20

## 2021-05-18 RX ORDER — ISOSORBIDE MONONITRATE 60 MG/1
60 TABLET, EXTENDED RELEASE ORAL DAILY
Status: DISCONTINUED | OUTPATIENT
Start: 2021-05-18 | End: 2021-05-20

## 2021-05-18 RX ORDER — MORPHINE SULFATE 2 MG/ML
1 INJECTION, SOLUTION INTRAMUSCULAR; INTRAVENOUS EVERY 4 HOURS PRN
Status: DISCONTINUED | OUTPATIENT
Start: 2021-05-18 | End: 2021-05-20

## 2021-05-18 RX ORDER — PANTOPRAZOLE SODIUM 40 MG/1
40 TABLET, DELAYED RELEASE ORAL
Status: DISCONTINUED | OUTPATIENT
Start: 2021-05-19 | End: 2021-05-20

## 2021-05-18 RX ORDER — INSULIN GLARGINE 100 [IU]/ML
30 INJECTION, SOLUTION SUBCUTANEOUS NIGHTLY
Status: DISCONTINUED | OUTPATIENT
Start: 2021-05-18 | End: 2021-05-20

## 2021-05-18 RX ORDER — SODIUM CHLORIDE 0.9 % (FLUSH) 0.9 %
10 SYRINGE (ML) INJECTION EVERY 12 HOURS SCHEDULED
Status: DISCONTINUED | OUTPATIENT
Start: 2021-05-18 | End: 2021-05-20

## 2021-05-18 RX ORDER — LEVOTHYROXINE SODIUM 0.1 MG/1
200 TABLET ORAL EVERY MORNING
Status: DISCONTINUED | OUTPATIENT
Start: 2021-05-19 | End: 2021-05-18

## 2021-05-18 RX ORDER — SODIUM CHLORIDE 0.9 % (FLUSH) 0.9 %
10 SYRINGE (ML) INJECTION AS NEEDED
Status: DISCONTINUED | OUTPATIENT
Start: 2021-05-18 | End: 2021-05-20

## 2021-05-18 RX ORDER — LEVOTHYROXINE SODIUM 0.05 MG/1
50 TABLET ORAL DAILY
Status: DISCONTINUED | OUTPATIENT
Start: 2021-05-18 | End: 2021-05-18

## 2021-05-18 RX ORDER — AMITRIPTYLINE HYDROCHLORIDE 50 MG/1
50 TABLET, FILM COATED ORAL NIGHTLY
Status: DISCONTINUED | OUTPATIENT
Start: 2021-05-18 | End: 2021-05-20

## 2021-05-18 RX ADMIN — ASPIRIN 81 MG: 81 TABLET, CHEWABLE ORAL at 21:09

## 2021-05-18 RX ADMIN — AMITRIPTYLINE HYDROCHLORIDE 50 MG: 50 TABLET, FILM COATED ORAL at 21:09

## 2021-05-18 RX ADMIN — DONEPEZIL HYDROCHLORIDE 10 MG: 10 TABLET, FILM COATED ORAL at 21:10

## 2021-05-18 RX ADMIN — GLIPIZIDE 10 MG: 10 TABLET ORAL at 21:09

## 2021-05-18 RX ADMIN — INSULIN GLARGINE 30 UNITS: 100 INJECTION, SOLUTION SUBCUTANEOUS at 21:10

## 2021-05-18 NOTE — PROGRESS NOTES
5/18/2021      Subjective:      Alex Buckley MD    Chief Complaint: Shortness of air    History of Present Illness:       Dear Alex García MD and Colleagues,  It was nice to see Cosmo Gauthier in consultation at your request. He is a 75 y.o. male with a history of coronary artery disease who we have seen several times over the last few months and postpone surgery due to thrombocytopenia.  Who has developed class III shortness of air late into class IV. He denies sniffing and angina. The ECHO that I reviewed personally shows ejection fraction of 30%.  There is no mitral incompetence. The Cardiac Cath that I reviewed personally shows 1% LAD.  99% ostial circumflex.  There is a 99.9% RCA.  No LV gram done.    We have seen him several times over the last couple months and now his platelet count is 134,000.  I think it is now time for surgery.  This is a difficult case and we spent a long time going over and over again we should do his surgery.  I think he is now ready..    Patient Active Problem List   Diagnosis   • CAD (coronary artery disease)   • Morbidly obese (CMS/HCC)   • CAD (coronary artery disease), native coronary artery   • Lymphoma (CMS/HCC)   • Diabetes mellitus (CMS/HCC)   • Dementia (CMS/HCC)   • Stage 3b chronic kidney disease (CMS/HCC)   • Thrombocytopenia (CMS/HCC)   • Disease of thyroid gland   • Anemia   • Lymphocytosis   • Coronary artery disease of native heart with stable angina pectoris (CMS/HCC)       Past Medical History:   Diagnosis Date   • Anxiety and depression    • Arthritis    • Coronary artery disease    • Dementia (CMS/HCC)    • Diabetes mellitus (CMS/HCC)    • Disease of thyroid gland    • Elevated cholesterol    • GERD (gastroesophageal reflux disease)    • Hypertension    • Lymphoma (CMS/HCC)     History of lymphoma, has been in remission   • Sleep apnea        Past Surgical History:   Procedure Laterality Date   • HERNIA REPAIR     • JOINT REPLACEMENT         No Known  Allergies      Current Outpatient Medications:   •  amitriptyline (ELAVIL) 50 MG tablet, Take 50 mg by mouth Every Night., Disp: , Rfl:   •  DONEPEZIL HCL PO, Take 10 mg by mouth Daily., Disp: , Rfl:   •  ferrous sulfate 325 (65 FE) MG tablet, Take 325 mg by mouth 3 (Three) Times a Day., Disp: , Rfl:   •  gabapentin (NEURONTIN) 300 MG capsule, Take 300 mg by mouth Every Night., Disp: , Rfl:   •  glipizide (GLUCOTROL) 10 MG tablet, Take 10 mg by mouth 2 (Two) Times a Day Before Meals., Disp: , Rfl:   •  insulin glargine (LANTUS, SEMGLEE) 100 UNIT/ML injection, Inject 30 Units under the skin into the appropriate area as directed Every Night., Disp: , Rfl:   •  insulin lispro (humaLOG, ADMELOG) 100 UNIT/ML injection, Inject 15 Units under the skin into the appropriate area as directed 3 (Three) Times a Day Before Meals., Disp: , Rfl:   •  isosorbide mononitrate (IMDUR) 60 MG 24 hr tablet, Take 60 mg by mouth Daily., Disp: , Rfl:   •  levothyroxine (SYNTHROID, LEVOTHROID) 200 MCG tablet, Take 200 mcg by mouth Every Morning., Disp: , Rfl:   •  lisinopril (PRINIVIL,ZESTRIL) 5 MG tablet, Take 10 mg by mouth Daily. Take 0.5 tablet daily, Disp: , Rfl:   •  metFORMIN (GLUCOPHAGE) 1000 MG tablet, Take 1,000 mg by mouth 2 (Two) Times a Day With Meals., Disp: , Rfl:   •  nadolol (CORGARD) 40 MG tablet, Take 40 mg by mouth Daily., Disp: , Rfl:   •  pantoprazole (PROTONIX) 40 MG EC tablet, Take 40 mg by mouth Daily., Disp: , Rfl:   •  sertraline (ZOLOFT) 100 MG tablet, Take 100 mg by mouth Daily. Take 1.5 tablets daily, Disp: , Rfl:   •  simvastatin (ZOCOR) 40 MG tablet, Take 40 mg by mouth Every Night. Take 0.5 tablets every night, Disp: , Rfl:   •  tamsulosin (FLOMAX) 0.4 MG capsule 24 hr capsule, Take 1 capsule by mouth Daily., Disp: 30 capsule, Rfl: 5  •  vitamin B-12 (CYANOCOBALAMIN) 1000 MCG tablet, Take 2,000 mcg by mouth Every Night., Disp: , Rfl:   •  vitamin D3 125 MCG (5000 UT) capsule capsule, Take 5,000 Units by mouth  Daily., Disp: , Rfl:   •  predniSONE (DELTASONE) 5 MG tablet, Take 20 mg by mouth Daily., Disp: , Rfl:     Social History     Socioeconomic History   • Marital status:      Spouse name: Not on file   • Number of children: Not on file   • Years of education: Not on file   • Highest education level: Not on file   Tobacco Use   • Smoking status: Never Smoker   • Smokeless tobacco: Never Used   Substance and Sexual Activity   • Alcohol use: Not Currently     Comment: QUIT DRINKING 32 YEARS AGO   • Drug use: Never       No family history on file.        Review of Systems:  Review of Systems   Constitutional: Positive for activity change and fatigue.   HENT: Negative.    Eyes: Negative.    Respiratory: Positive for chest tightness and shortness of breath.    Cardiovascular: Positive for leg swelling.   Gastrointestinal: Negative.    Endocrine: Negative.    Genitourinary: Negative.    Musculoskeletal: Negative.    Skin: Negative.    Allergic/Immunologic: Negative.    Neurological: Negative.    Hematological: Negative.    Psychiatric/Behavioral: Negative.      Cardiovascular ROS: positive for - chest pain, orthopnea and shortness of breath  Physical Exam:    Vital Signs:  Weight: 113 kg (250 lb)   Body mass index is 33.91 kg/m².  Temp: 97.6 °F (36.4 °C)   Heart Rate: 58   BP: 94/56     Constitutional:       Appearance: Chronically ill-appearing.   Eyes:      Conjunctiva/sclera: Conjunctivae normal.      Pupils: Pupils are equal, round, and reactive to light.   HENT:      Nose: Nose normal.    Mouth/Throat:      Pharynx: Oropharynx is clear.   Neck:      Vascular: JVR present.   Pulmonary:      Effort: Pulmonary effort is normal.      Breath sounds: Normal breath sounds.   Chest:      Chest wall: Not tender to palpatation.   Cardiovascular:      PMI at left midclavicular line. Normal rate. Regular rhythm. Normal S1. Normal S2.      Murmurs: There is no murmur.      S3 Gallop. No click. No rub.   Pulses:     Carotid:  2+ bilaterally.     Radial: 2+ bilaterally.     Femoral: 2+ bilaterally.     Dorsalis pedis: 2+ bilaterally.     Posterior tibial: 2+ bilaterally.  Edema:     Ankle: bilateral 2+ edema of the ankle.  Abdominal:      General: Bowel sounds are normal.   Musculoskeletal: Normal range of motion.      Cervical back: Normal range of motion and neck supple. Skin:     General: Skin is warm and dry.   Neurological:      Mental Status: Alert and oriented to person, place and time.          Assessment:   Multivessel coronary disease.  We have seen him several times when he had thrombocytopenia and this is improved.  He is still having significant symptoms and I think were at a point where we have to get his heart fixed.            Recommendation/Plan:     He will be admitted today for evaluation at surgery on Thursday.  We will check his platelets.  He may need platelets preoperatively but he is doing much better.  This is a complicated case and took a long time to getting ready for surgery.          Thank you for allowing me to participate in his care.    Regards,    Tom Tubbs MD

## 2021-05-19 ENCOUNTER — ANESTHESIA EVENT (OUTPATIENT)
Dept: PERIOP | Facility: HOSPITAL | Age: 76
End: 2021-05-19

## 2021-05-19 LAB
ABO GROUP BLD: NORMAL
ALBUMIN SERPL-MCNC: 4 G/DL (ref 3.5–5.2)
ALBUMIN/GLOB SERPL: 1.7 G/DL
ALP SERPL-CCNC: 49 U/L (ref 39–117)
ALT SERPL W P-5'-P-CCNC: 14 U/L (ref 1–41)
ANION GAP SERPL CALCULATED.3IONS-SCNC: 8.6 MMOL/L (ref 5–15)
AST SERPL-CCNC: 12 U/L (ref 1–40)
BASOPHILS # BLD AUTO: 0.03 10*3/MM3 (ref 0–0.2)
BASOPHILS NFR BLD AUTO: 0.5 % (ref 0–1.5)
BILIRUB SERPL-MCNC: 0.5 MG/DL (ref 0–1.2)
BLD GP AB SCN SERPL QL: NEGATIVE
BUN SERPL-MCNC: 26 MG/DL (ref 8–23)
BUN/CREAT SERPL: 23.9 (ref 7–25)
CALCIUM SPEC-SCNC: 9 MG/DL (ref 8.6–10.5)
CHLORIDE SERPL-SCNC: 109 MMOL/L (ref 98–107)
CO2 SERPL-SCNC: 23.4 MMOL/L (ref 22–29)
CREAT SERPL-MCNC: 1.09 MG/DL (ref 0.76–1.27)
DEPRECATED RDW RBC AUTO: 50.3 FL (ref 37–54)
EOSINOPHIL # BLD AUTO: 0.2 10*3/MM3 (ref 0–0.4)
EOSINOPHIL NFR BLD AUTO: 3.4 % (ref 0.3–6.2)
ERYTHROCYTE [DISTWIDTH] IN BLOOD BY AUTOMATED COUNT: 15.7 % (ref 12.3–15.4)
GFR SERPL CREATININE-BSD FRML MDRD: 66 ML/MIN/1.73
GLOBULIN UR ELPH-MCNC: 2.4 GM/DL
GLUCOSE BLDC GLUCOMTR-MCNC: 105 MG/DL (ref 70–130)
GLUCOSE BLDC GLUCOMTR-MCNC: 177 MG/DL (ref 70–130)
GLUCOSE BLDC GLUCOMTR-MCNC: 182 MG/DL (ref 70–130)
GLUCOSE BLDC GLUCOMTR-MCNC: 76 MG/DL (ref 70–130)
GLUCOSE SERPL-MCNC: 59 MG/DL (ref 65–99)
HCT VFR BLD AUTO: 32.8 % (ref 37.5–51)
HGB BLD-MCNC: 11.3 G/DL (ref 13–17.7)
IMM GRANULOCYTES # BLD AUTO: 0.02 10*3/MM3 (ref 0–0.05)
IMM GRANULOCYTES NFR BLD AUTO: 0.3 % (ref 0–0.5)
LYMPHOCYTES # BLD AUTO: 1.96 10*3/MM3 (ref 0.7–3.1)
LYMPHOCYTES NFR BLD AUTO: 33.2 % (ref 19.6–45.3)
MCH RBC QN AUTO: 30.2 PG (ref 26.6–33)
MCHC RBC AUTO-ENTMCNC: 34.5 G/DL (ref 31.5–35.7)
MCV RBC AUTO: 87.7 FL (ref 79–97)
MONOCYTES # BLD AUTO: 0.38 10*3/MM3 (ref 0.1–0.9)
MONOCYTES NFR BLD AUTO: 6.4 % (ref 5–12)
NEUTROPHILS NFR BLD AUTO: 3.31 10*3/MM3 (ref 1.7–7)
NEUTROPHILS NFR BLD AUTO: 56.2 % (ref 42.7–76)
NRBC BLD AUTO-RTO: 0 /100 WBC (ref 0–0.2)
PA ADP PRP-ACNC: 295 PRU (ref 194–418)
PLATELET # BLD AUTO: 66 10*3/MM3 (ref 140–450)
PMV BLD AUTO: 8.9 FL (ref 6–12)
POTASSIUM SERPL-SCNC: 4.5 MMOL/L (ref 3.5–5.2)
PROT SERPL-MCNC: 6.4 G/DL (ref 6–8.5)
QT INTERVAL: 431 MS
RBC # BLD AUTO: 3.74 10*6/MM3 (ref 4.14–5.8)
RH BLD: NEGATIVE
SODIUM SERPL-SCNC: 141 MMOL/L (ref 136–145)
T&S EXPIRATION DATE: NORMAL
WBC # BLD AUTO: 5.9 10*3/MM3 (ref 3.4–10.8)

## 2021-05-19 PROCEDURE — 86900 BLOOD TYPING SEROLOGIC ABO: CPT | Performed by: NURSE PRACTITIONER

## 2021-05-19 PROCEDURE — 86923 COMPATIBILITY TEST ELECTRIC: CPT

## 2021-05-19 PROCEDURE — 93010 ELECTROCARDIOGRAM REPORT: CPT | Performed by: INTERNAL MEDICINE

## 2021-05-19 PROCEDURE — 99024 POSTOP FOLLOW-UP VISIT: CPT | Performed by: THORACIC SURGERY (CARDIOTHORACIC VASCULAR SURGERY)

## 2021-05-19 PROCEDURE — 93005 ELECTROCARDIOGRAM TRACING: CPT | Performed by: THORACIC SURGERY (CARDIOTHORACIC VASCULAR SURGERY)

## 2021-05-19 PROCEDURE — 85025 COMPLETE CBC W/AUTO DIFF WBC: CPT | Performed by: NURSE PRACTITIONER

## 2021-05-19 PROCEDURE — 82962 GLUCOSE BLOOD TEST: CPT

## 2021-05-19 PROCEDURE — 63710000001 INSULIN LISPRO (HUMAN) PER 5 UNITS: Performed by: NURSE PRACTITIONER

## 2021-05-19 PROCEDURE — 86850 RBC ANTIBODY SCREEN: CPT | Performed by: NURSE PRACTITIONER

## 2021-05-19 PROCEDURE — 86901 BLOOD TYPING SEROLOGIC RH(D): CPT | Performed by: NURSE PRACTITIONER

## 2021-05-19 PROCEDURE — 85576 BLOOD PLATELET AGGREGATION: CPT | Performed by: NURSE PRACTITIONER

## 2021-05-19 PROCEDURE — 80053 COMPREHEN METABOLIC PANEL: CPT | Performed by: NURSE PRACTITIONER

## 2021-05-19 RX ORDER — CEFAZOLIN SODIUM 2 G/100ML
2 INJECTION, SOLUTION INTRAVENOUS
Status: COMPLETED | OUTPATIENT
Start: 2021-05-20 | End: 2021-05-20

## 2021-05-19 RX ORDER — LORAZEPAM 2 MG/ML
1 INJECTION INTRAMUSCULAR ONCE
Status: CANCELLED | OUTPATIENT
Start: 2021-05-20

## 2021-05-19 RX ORDER — CHLORHEXIDINE GLUCONATE 500 MG/1
1 CLOTH TOPICAL EVERY 12 HOURS
Status: DISCONTINUED | OUTPATIENT
Start: 2021-05-19 | End: 2021-05-20

## 2021-05-19 RX ORDER — FAMOTIDINE 10 MG/ML
20 INJECTION, SOLUTION INTRAVENOUS ONCE
Status: DISCONTINUED | OUTPATIENT
Start: 2021-05-19 | End: 2021-05-20

## 2021-05-19 RX ORDER — SODIUM CHLORIDE 0.9 % (FLUSH) 0.9 %
10 SYRINGE (ML) INJECTION AS NEEDED
Status: DISCONTINUED | OUTPATIENT
Start: 2021-05-19 | End: 2021-05-20

## 2021-05-19 RX ORDER — TEMAZEPAM 7.5 MG/1
7.5 CAPSULE ORAL NIGHTLY PRN
Status: DISCONTINUED | OUTPATIENT
Start: 2021-05-19 | End: 2021-05-20

## 2021-05-19 RX ORDER — SODIUM CHLORIDE 0.9 % (FLUSH) 0.9 %
3 SYRINGE (ML) INJECTION EVERY 12 HOURS SCHEDULED
Status: DISCONTINUED | OUTPATIENT
Start: 2021-05-19 | End: 2021-05-20

## 2021-05-19 RX ORDER — CHLORHEXIDINE GLUCONATE 0.12 MG/ML
15 RINSE ORAL EVERY 12 HOURS SCHEDULED
Status: COMPLETED | OUTPATIENT
Start: 2021-05-19 | End: 2021-05-20

## 2021-05-19 RX ADMIN — GLIPIZIDE 10 MG: 10 TABLET ORAL at 17:08

## 2021-05-19 RX ADMIN — CHLORHEXIDINE GLUCONATE 15 ML: 1.2 RINSE ORAL at 20:30

## 2021-05-19 RX ADMIN — MUPIROCIN 1 APPLICATION: 20 OINTMENT TOPICAL at 20:30

## 2021-05-19 RX ADMIN — CHLORHEXIDINE GLUCONATE 1 APPLICATION: 500 CLOTH TOPICAL at 20:31

## 2021-05-19 RX ADMIN — INSULIN LISPRO 3 UNITS: 100 INJECTION, SOLUTION INTRAVENOUS; SUBCUTANEOUS at 17:08

## 2021-05-19 RX ADMIN — AMITRIPTYLINE HYDROCHLORIDE 50 MG: 50 TABLET, FILM COATED ORAL at 20:31

## 2021-05-19 RX ADMIN — Medication 3 ML: at 21:57

## 2021-05-19 RX ADMIN — ATORVASTATIN CALCIUM 20 MG: 20 TABLET, FILM COATED ORAL at 09:54

## 2021-05-19 RX ADMIN — ISOSORBIDE MONONITRATE 60 MG: 60 TABLET ORAL at 09:53

## 2021-05-19 RX ADMIN — GABAPENTIN 300 MG: 300 CAPSULE ORAL at 20:30

## 2021-05-19 RX ADMIN — SODIUM CHLORIDE, PRESERVATIVE FREE 10 ML: 5 INJECTION INTRAVENOUS at 09:54

## 2021-05-19 RX ADMIN — LISINOPRIL 10 MG: 10 TABLET ORAL at 09:53

## 2021-05-19 RX ADMIN — ASPIRIN 81 MG: 81 TABLET, CHEWABLE ORAL at 09:53

## 2021-05-19 RX ADMIN — GLIPIZIDE 10 MG: 10 TABLET ORAL at 09:53

## 2021-05-19 RX ADMIN — TAMSULOSIN HYDROCHLORIDE 0.4 MG: 0.4 CAPSULE ORAL at 09:54

## 2021-05-19 RX ADMIN — SERTRALINE 100 MG: 100 TABLET, FILM COATED ORAL at 09:54

## 2021-05-19 RX ADMIN — PANTOPRAZOLE SODIUM 40 MG: 40 TABLET, DELAYED RELEASE ORAL at 07:20

## 2021-05-19 RX ADMIN — DONEPEZIL HYDROCHLORIDE 10 MG: 10 TABLET, FILM COATED ORAL at 20:31

## 2021-05-19 RX ADMIN — SODIUM CHLORIDE, PRESERVATIVE FREE 10 ML: 5 INJECTION INTRAVENOUS at 21:57

## 2021-05-19 RX ADMIN — NADOLOL 40 MG: 40 TABLET ORAL at 12:08

## 2021-05-19 RX ADMIN — INSULIN LISPRO 3 UNITS: 100 INJECTION, SOLUTION INTRAVENOUS; SUBCUTANEOUS at 12:08

## 2021-05-19 RX ADMIN — FERROUS SULFATE TAB 325 MG (65 MG ELEMENTAL FE) 325 MG: 325 (65 FE) TAB at 09:53

## 2021-05-20 ENCOUNTER — ANESTHESIA (OUTPATIENT)
Dept: PERIOP | Facility: HOSPITAL | Age: 76
End: 2021-05-20

## 2021-05-20 ENCOUNTER — ANCILLARY PROCEDURE (OUTPATIENT)
Dept: PERIOP | Facility: HOSPITAL | Age: 76
End: 2021-05-20

## 2021-05-20 ENCOUNTER — APPOINTMENT (OUTPATIENT)
Dept: GENERAL RADIOLOGY | Facility: HOSPITAL | Age: 76
End: 2021-05-20

## 2021-05-20 LAB
ACT BLD: 109 SECONDS (ref 82–152)
ACT BLD: 114 SECONDS (ref 82–152)
ACT BLD: 395 SECONDS (ref 82–152)
ACT BLD: 406 SECONDS (ref 82–152)
ACT BLD: 423 SECONDS (ref 82–152)
ACT BLD: 494 SECONDS (ref 82–152)
ALBUMIN SERPL-MCNC: 3.9 G/DL (ref 3.5–5.2)
ALBUMIN SERPL-MCNC: 3.9 G/DL (ref 3.5–5.2)
ALBUMIN SERPL-MCNC: 4.2 G/DL (ref 3.5–5.2)
ALBUMIN SERPL-MCNC: 4.2 G/DL (ref 3.5–5.2)
ALBUMIN/GLOB SERPL: 1.6 G/DL
ALP SERPL-CCNC: 52 U/L (ref 39–117)
ALT SERPL W P-5'-P-CCNC: 12 U/L (ref 1–41)
ANION GAP SERPL CALCULATED.3IONS-SCNC: 10.9 MMOL/L (ref 5–15)
ANION GAP SERPL CALCULATED.3IONS-SCNC: 11.2 MMOL/L (ref 5–15)
ANION GAP SERPL CALCULATED.3IONS-SCNC: 9.3 MMOL/L (ref 5–15)
ANION GAP SERPL CALCULATED.3IONS-SCNC: 9.8 MMOL/L (ref 5–15)
APTT PPP: 30.7 SECONDS (ref 22.7–35.4)
ARTERIAL PATENCY WRIST A: ABNORMAL
ARTERIAL PATENCY WRIST A: ABNORMAL
AST SERPL-CCNC: 15 U/L (ref 1–40)
ATMOSPHERIC PRESS: 759 MMHG
ATMOSPHERIC PRESS: 759.6 MMHG
BASE EXCESS BLDA CALC-SCNC: -1 MMOL/L (ref -5–5)
BASE EXCESS BLDA CALC-SCNC: -2 MMOL/L (ref -5–5)
BASE EXCESS BLDA CALC-SCNC: -2 MMOL/L (ref 0–2)
BASE EXCESS BLDA CALC-SCNC: -3 MMOL/L (ref -5–5)
BASE EXCESS BLDA CALC-SCNC: -3 MMOL/L (ref 0–2)
BASE EXCESS BLDA CALC-SCNC: 0 MMOL/L (ref -5–5)
BASE EXCESS BLDA CALC-SCNC: 1 MMOL/L (ref -5–5)
BASE EXCESS BLDA CALC-SCNC: 4 MMOL/L (ref -5–5)
BASE EXCESS BLDA CALC-SCNC: >30 MMOL/L (ref -5–5)
BASOPHILS # BLD AUTO: 0.01 10*3/MM3 (ref 0–0.2)
BASOPHILS # BLD AUTO: 0.03 10*3/MM3 (ref 0–0.2)
BASOPHILS NFR BLD AUTO: 0.1 % (ref 0–1.5)
BASOPHILS NFR BLD AUTO: 0.5 % (ref 0–1.5)
BDY SITE: ABNORMAL
BDY SITE: ABNORMAL
BILIRUB SERPL-MCNC: 0.4 MG/DL (ref 0–1.2)
BUN SERPL-MCNC: 20 MG/DL (ref 8–23)
BUN SERPL-MCNC: 20 MG/DL (ref 8–23)
BUN SERPL-MCNC: 21 MG/DL (ref 8–23)
BUN SERPL-MCNC: 21 MG/DL (ref 8–23)
BUN/CREAT SERPL: 16.3 (ref 7–25)
BUN/CREAT SERPL: 17.5 (ref 7–25)
BUN/CREAT SERPL: 18.9 (ref 7–25)
BUN/CREAT SERPL: 21.4 (ref 7–25)
CA-I BLD-MCNC: 5 MG/DL (ref 4.6–5.4)
CA-I BLDA-SCNC: ABNORMAL MMOL/L
CA-I SERPL ISE-MCNC: 1.26 MMOL/L (ref 1.15–1.35)
CALCIUM SPEC-SCNC: 8.7 MG/DL (ref 8.6–10.5)
CALCIUM SPEC-SCNC: 8.8 MG/DL (ref 8.6–10.5)
CALCIUM SPEC-SCNC: 9.1 MG/DL (ref 8.6–10.5)
CALCIUM SPEC-SCNC: 9.1 MG/DL (ref 8.6–10.5)
CHLORIDE SERPL-SCNC: 106 MMOL/L (ref 98–107)
CHLORIDE SERPL-SCNC: 108 MMOL/L (ref 98–107)
CHLORIDE SERPL-SCNC: 109 MMOL/L (ref 98–107)
CHLORIDE SERPL-SCNC: 109 MMOL/L (ref 98–107)
CO2 BLDA-SCNC: 24 MMOL/L (ref 24–29)
CO2 BLDA-SCNC: 25 MMOL/L (ref 24–29)
CO2 BLDA-SCNC: 25 MMOL/L (ref 24–29)
CO2 BLDA-SCNC: 28 MMOL/L (ref 24–29)
CO2 BLDA-SCNC: 28 MMOL/L (ref 24–29)
CO2 BLDA-SCNC: 31 MMOL/L (ref 24–29)
CO2 BLDA-SCNC: >50 MMOL/L (ref 24–29)
CO2 SERPL-SCNC: 19.8 MMOL/L (ref 22–29)
CO2 SERPL-SCNC: 20.1 MMOL/L (ref 22–29)
CO2 SERPL-SCNC: 20.7 MMOL/L (ref 22–29)
CO2 SERPL-SCNC: 21.2 MMOL/L (ref 22–29)
CREAT SERPL-MCNC: 0.98 MG/DL (ref 0.76–1.27)
CREAT SERPL-MCNC: 1.06 MG/DL (ref 0.76–1.27)
CREAT SERPL-MCNC: 1.14 MG/DL (ref 0.76–1.27)
CREAT SERPL-MCNC: 1.29 MG/DL (ref 0.76–1.27)
DEPRECATED RDW RBC AUTO: 50.9 FL (ref 37–54)
DEPRECATED RDW RBC AUTO: 51.7 FL (ref 37–54)
DEPRECATED RDW RBC AUTO: 51.9 FL (ref 37–54)
DEPRECATED RDW RBC AUTO: 52 FL (ref 37–54)
DEPRECATED RDW RBC AUTO: 53.8 FL (ref 37–54)
EOSINOPHIL # BLD AUTO: 0.11 10*3/MM3 (ref 0–0.4)
EOSINOPHIL # BLD AUTO: 0.22 10*3/MM3 (ref 0–0.4)
EOSINOPHIL NFR BLD AUTO: 1.6 % (ref 0.3–6.2)
EOSINOPHIL NFR BLD AUTO: 3.7 % (ref 0.3–6.2)
ERYTHROCYTE [DISTWIDTH] IN BLOOD BY AUTOMATED COUNT: 15.7 % (ref 12.3–15.4)
ERYTHROCYTE [DISTWIDTH] IN BLOOD BY AUTOMATED COUNT: 15.7 % (ref 12.3–15.4)
ERYTHROCYTE [DISTWIDTH] IN BLOOD BY AUTOMATED COUNT: 15.8 % (ref 12.3–15.4)
ERYTHROCYTE [DISTWIDTH] IN BLOOD BY AUTOMATED COUNT: 15.9 % (ref 12.3–15.4)
ERYTHROCYTE [DISTWIDTH] IN BLOOD BY AUTOMATED COUNT: 16 % (ref 12.3–15.4)
FIBRINOGEN PPP-MCNC: 236 MG/DL (ref 219–464)
GFR SERPL CREATININE-BSD FRML MDRD: 55 ML/MIN/1.73
GFR SERPL CREATININE-BSD FRML MDRD: 63 ML/MIN/1.73
GFR SERPL CREATININE-BSD FRML MDRD: 68 ML/MIN/1.73
GFR SERPL CREATININE-BSD FRML MDRD: 75 ML/MIN/1.73
GLOBULIN UR ELPH-MCNC: 2.5 GM/DL
GLUCOSE BLDC GLUCOMTR-MCNC: 101 MG/DL (ref 70–130)
GLUCOSE BLDC GLUCOMTR-MCNC: 110 MG/DL (ref 70–130)
GLUCOSE BLDC GLUCOMTR-MCNC: 110 MG/DL (ref 70–130)
GLUCOSE BLDC GLUCOMTR-MCNC: 123 MG/DL (ref 70–130)
GLUCOSE BLDC GLUCOMTR-MCNC: 129 MG/DL (ref 70–130)
GLUCOSE BLDC GLUCOMTR-MCNC: 146 MG/DL (ref 70–130)
GLUCOSE BLDC GLUCOMTR-MCNC: 146 MG/DL (ref 70–130)
GLUCOSE BLDC GLUCOMTR-MCNC: 147 MG/DL (ref 70–130)
GLUCOSE BLDC GLUCOMTR-MCNC: 149 MG/DL (ref 70–130)
GLUCOSE BLDC GLUCOMTR-MCNC: 152 MG/DL (ref 70–130)
GLUCOSE BLDC GLUCOMTR-MCNC: 153 MG/DL (ref 70–130)
GLUCOSE BLDC GLUCOMTR-MCNC: 159 MG/DL (ref 70–130)
GLUCOSE BLDC GLUCOMTR-MCNC: 174 MG/DL (ref 70–130)
GLUCOSE BLDC GLUCOMTR-MCNC: 193 MG/DL (ref 70–130)
GLUCOSE BLDC GLUCOMTR-MCNC: 71 MG/DL (ref 70–130)
GLUCOSE BLDC GLUCOMTR-MCNC: 91 MG/DL (ref 70–130)
GLUCOSE BLDC GLUCOMTR-MCNC: 98 MG/DL (ref 70–130)
GLUCOSE SERPL-MCNC: 137 MG/DL (ref 65–99)
GLUCOSE SERPL-MCNC: 186 MG/DL (ref 65–99)
GLUCOSE SERPL-MCNC: 65 MG/DL (ref 65–99)
GLUCOSE SERPL-MCNC: 88 MG/DL (ref 65–99)
HCO3 BLDA-SCNC: 21.1 MMOL/L (ref 22–28)
HCO3 BLDA-SCNC: 22.8 MMOL/L (ref 22–26)
HCO3 BLDA-SCNC: 23.6 MMOL/L (ref 22–26)
HCO3 BLDA-SCNC: 23.7 MMOL/L (ref 22–28)
HCO3 BLDA-SCNC: 24 MMOL/L (ref 22–26)
HCO3 BLDA-SCNC: 26.2 MMOL/L (ref 22–26)
HCO3 BLDA-SCNC: 26.9 MMOL/L (ref 22–26)
HCO3 BLDA-SCNC: 29.8 MMOL/L (ref 22–26)
HCO3 BLDA-SCNC: 67.5 MMOL/L (ref 22–26)
HCT VFR BLD AUTO: 22.9 % (ref 37.5–51)
HCT VFR BLD AUTO: 26.2 % (ref 37.5–51)
HCT VFR BLD AUTO: 26.2 % (ref 37.5–51)
HCT VFR BLD AUTO: 29.9 % (ref 37.5–51)
HCT VFR BLD AUTO: 33.6 % (ref 37.5–51)
HCT VFR BLDA CALC: 23 % (ref 38–51)
HCT VFR BLDA CALC: 24 % (ref 38–51)
HCT VFR BLDA CALC: 25 % (ref 38–51)
HCT VFR BLDA CALC: 26 % (ref 38–51)
HCT VFR BLDA CALC: 27 % (ref 38–51)
HCT VFR BLDA CALC: 27 % (ref 38–51)
HCT VFR BLDA CALC: 30 % (ref 38–51)
HGB BLD-MCNC: 10.2 G/DL (ref 13–17.7)
HGB BLD-MCNC: 11.4 G/DL (ref 13–17.7)
HGB BLD-MCNC: 7.9 G/DL (ref 13–17.7)
HGB BLD-MCNC: 8.9 G/DL (ref 13–17.7)
HGB BLD-MCNC: 9 G/DL (ref 13–17.7)
HGB BLDA-MCNC: 10.2 G/DL (ref 12–17)
HGB BLDA-MCNC: 7.8 G/DL (ref 12–17)
HGB BLDA-MCNC: 8.2 G/DL (ref 12–17)
HGB BLDA-MCNC: 8.5 G/DL (ref 12–17)
HGB BLDA-MCNC: 8.8 G/DL (ref 12–17)
HGB BLDA-MCNC: 9.2 G/DL (ref 12–17)
HGB BLDA-MCNC: 9.2 G/DL (ref 12–17)
IMM GRANULOCYTES # BLD AUTO: 0.04 10*3/MM3 (ref 0–0.05)
IMM GRANULOCYTES NFR BLD AUTO: 0.6 % (ref 0–0.5)
INHALED O2 CONCENTRATION: 100 %
INHALED O2 CONCENTRATION: 40 %
INR PPP: 1.21 (ref 0.9–1.1)
LYMPHOCYTES # BLD AUTO: 0.92 10*3/MM3 (ref 0.7–3.1)
LYMPHOCYTES # BLD AUTO: 1.99 10*3/MM3 (ref 0.7–3.1)
LYMPHOCYTES NFR BLD AUTO: 13 % (ref 19.6–45.3)
LYMPHOCYTES NFR BLD AUTO: 33.6 % (ref 19.6–45.3)
MAGNESIUM SERPL-MCNC: 2.1 MG/DL (ref 1.6–2.4)
MAGNESIUM SERPL-MCNC: 2.3 MG/DL (ref 1.6–2.4)
MCH RBC QN AUTO: 30.2 PG (ref 26.6–33)
MCH RBC QN AUTO: 30.5 PG (ref 26.6–33)
MCH RBC QN AUTO: 30.6 PG (ref 26.6–33)
MCH RBC QN AUTO: 31 PG (ref 26.6–33)
MCH RBC QN AUTO: 31.3 PG (ref 26.6–33)
MCHC RBC AUTO-ENTMCNC: 33.9 G/DL (ref 31.5–35.7)
MCHC RBC AUTO-ENTMCNC: 34 G/DL (ref 31.5–35.7)
MCHC RBC AUTO-ENTMCNC: 34.1 G/DL (ref 31.5–35.7)
MCHC RBC AUTO-ENTMCNC: 34.4 G/DL (ref 31.5–35.7)
MCHC RBC AUTO-ENTMCNC: 34.5 G/DL (ref 31.5–35.7)
MCV RBC AUTO: 88.8 FL (ref 79–97)
MCV RBC AUTO: 88.9 FL (ref 79–97)
MCV RBC AUTO: 89.8 FL (ref 79–97)
MCV RBC AUTO: 90 FL (ref 79–97)
MCV RBC AUTO: 91.7 FL (ref 79–97)
MODALITY: ABNORMAL
MODALITY: ABNORMAL
MONOCYTES # BLD AUTO: 0.41 10*3/MM3 (ref 0.1–0.9)
MONOCYTES # BLD AUTO: 0.41 10*3/MM3 (ref 0.1–0.9)
MONOCYTES NFR BLD AUTO: 5.8 % (ref 5–12)
MONOCYTES NFR BLD AUTO: 6.9 % (ref 5–12)
NEUTROPHILS NFR BLD AUTO: 3.27 10*3/MM3 (ref 1.7–7)
NEUTROPHILS NFR BLD AUTO: 5.57 10*3/MM3 (ref 1.7–7)
NEUTROPHILS NFR BLD AUTO: 55.1 % (ref 42.7–76)
NEUTROPHILS NFR BLD AUTO: 78.9 % (ref 42.7–76)
NRBC BLD AUTO-RTO: 0 /100 WBC (ref 0–0.2)
O2 A-A PPRESDIFF RESPIRATORY: 0.1 MMHG
O2 A-A PPRESDIFF RESPIRATORY: 0.4 MMHG
PCO2 BLDA: 32.7 MM HG (ref 35–45)
PCO2 BLDA: 39.6 MM HG (ref 35–45)
PCO2 BLDA: 40.6 MM HG (ref 35–45)
PCO2 BLDA: 44.4 MM HG (ref 35–45)
PCO2 BLDA: 46.2 MM HG (ref 35–45)
PCO2 BLDA: 47.6 MM HG (ref 35–45)
PCO2 BLDA: 49.6 MM HG (ref 35–45)
PCO2 BLDA: 52.8 MM HG (ref 35–45)
PCO2 BLDA: 95.1 MM HG (ref 35–45)
PEEP RESPIRATORY: 7.5 CM[H2O]
PEEP RESPIRATORY: 7.5 CM[H2O]
PH BLDA: 7.32 PH UNITS (ref 7.35–7.6)
PH BLDA: 7.33 PH UNITS (ref 7.35–7.6)
PH BLDA: 7.34 PH UNITS (ref 7.35–7.45)
PH BLDA: 7.36 PH UNITS (ref 7.35–7.6)
PH BLDA: 7.36 PH UNITS (ref 7.35–7.6)
PH BLDA: 7.37 PH UNITS (ref 7.35–7.6)
PH BLDA: 7.38 PH UNITS (ref 7.35–7.6)
PH BLDA: 7.42 PH UNITS (ref 7.35–7.45)
PH BLDA: 7.46 PH UNITS (ref 7.35–7.6)
PHOSPHATE SERPL-MCNC: 2.2 MG/DL (ref 2.5–4.5)
PHOSPHATE SERPL-MCNC: 2.3 MG/DL (ref 2.5–4.5)
PHOSPHATE SERPL-MCNC: 4.4 MG/DL (ref 2.5–4.5)
PLATELET # BLD AUTO: 111 10*3/MM3 (ref 140–450)
PLATELET # BLD AUTO: 70 10*3/MM3 (ref 140–450)
PLATELET # BLD AUTO: 83 10*3/MM3 (ref 140–450)
PLATELET # BLD AUTO: 86 10*3/MM3 (ref 140–450)
PLATELET # BLD AUTO: 89 10*3/MM3 (ref 140–450)
PMV BLD AUTO: 8.9 FL (ref 6–12)
PMV BLD AUTO: 9.1 FL (ref 6–12)
PMV BLD AUTO: 9.2 FL (ref 6–12)
PMV BLD AUTO: 9.2 FL (ref 6–12)
PMV BLD AUTO: 9.4 FL (ref 6–12)
PO2 BLDA: 106.2 MM HG (ref 80–100)
PO2 BLDA: 272 MMHG (ref 80–105)
PO2 BLDA: 363 MMHG (ref 80–105)
PO2 BLDA: 425 MMHG (ref 80–105)
PO2 BLDA: 497 MMHG (ref 80–105)
PO2 BLDA: 509 MMHG (ref 80–105)
PO2 BLDA: 510 MMHG (ref 80–105)
PO2 BLDA: 56 MMHG (ref 80–105)
PO2 BLDA: 90.3 MM HG (ref 80–100)
POTASSIUM BLDA-SCNC: 4.5 MMOL/L (ref 3.5–4.9)
POTASSIUM BLDA-SCNC: 4.6 MMOL/L (ref 3.5–4.9)
POTASSIUM BLDA-SCNC: 5.5 MMOL/L (ref 3.5–4.9)
POTASSIUM BLDA-SCNC: 5.7 MMOL/L (ref 3.5–4.9)
POTASSIUM BLDA-SCNC: 5.8 MMOL/L (ref 3.5–4.9)
POTASSIUM BLDA-SCNC: 5.9 MMOL/L (ref 3.5–4.9)
POTASSIUM BLDA-SCNC: 6.3 MMOL/L (ref 3.5–4.9)
POTASSIUM SERPL-SCNC: 4.4 MMOL/L (ref 3.5–5.2)
POTASSIUM SERPL-SCNC: 4.6 MMOL/L (ref 3.5–5.2)
POTASSIUM SERPL-SCNC: 4.8 MMOL/L (ref 3.5–5.2)
POTASSIUM SERPL-SCNC: 5.1 MMOL/L (ref 3.5–5.2)
PROT SERPL-MCNC: 6.4 G/DL (ref 6–8.5)
PROTHROMBIN TIME: 15.1 SECONDS (ref 11.7–14.2)
PSV: 8 CMH2O
QT INTERVAL: 428 MS
RBC # BLD AUTO: 2.55 10*6/MM3 (ref 4.14–5.8)
RBC # BLD AUTO: 2.91 10*6/MM3 (ref 4.14–5.8)
RBC # BLD AUTO: 2.95 10*6/MM3 (ref 4.14–5.8)
RBC # BLD AUTO: 3.26 10*6/MM3 (ref 4.14–5.8)
RBC # BLD AUTO: 3.78 10*6/MM3 (ref 4.14–5.8)
SAO2 % BLDA: 100 % (ref 95–98)
SAO2 % BLDA: 86 % (ref 95–98)
SAO2 % BLDCOA: 97.2 % (ref 92–99)
SAO2 % BLDCOA: 97.7 % (ref 92–99)
SET MECH RESP RATE: 14
SET MECH RESP RATE: 24
SODIUM SERPL-SCNC: 136 MMOL/L (ref 136–145)
SODIUM SERPL-SCNC: 139 MMOL/L (ref 136–145)
SODIUM SERPL-SCNC: 140 MMOL/L (ref 136–145)
SODIUM SERPL-SCNC: 140 MMOL/L (ref 136–145)
TOTAL RATE: 14 BREATHS/MINUTE
VENTILATOR MODE: ABNORMAL
VENTILATOR MODE: ABNORMAL
VT ON VENT VENT: 550 ML
VT ON VENT VENT: 850 ML
WBC # BLD AUTO: 5.45 10*3/MM3 (ref 3.4–10.8)
WBC # BLD AUTO: 5.93 10*3/MM3 (ref 3.4–10.8)
WBC # BLD AUTO: 6.11 10*3/MM3 (ref 3.4–10.8)
WBC # BLD AUTO: 6.59 10*3/MM3 (ref 3.4–10.8)
WBC # BLD AUTO: 7.06 10*3/MM3 (ref 3.4–10.8)

## 2021-05-20 PROCEDURE — 85014 HEMATOCRIT: CPT

## 2021-05-20 PROCEDURE — 85027 COMPLETE CBC AUTOMATED: CPT | Performed by: THORACIC SURGERY (CARDIOTHORACIC VASCULAR SURGERY)

## 2021-05-20 PROCEDURE — 25010000002 PROPOFOL 10 MG/ML EMULSION: Performed by: NURSE PRACTITIONER

## 2021-05-20 PROCEDURE — 63710000001 INSULIN REGULAR HUMAN PER 5 UNITS: Performed by: ANESTHESIOLOGY

## 2021-05-20 PROCEDURE — 80053 COMPREHEN METABOLIC PANEL: CPT | Performed by: NURSE PRACTITIONER

## 2021-05-20 PROCEDURE — 33533 CABG ARTERIAL SINGLE: CPT | Performed by: THORACIC SURGERY (CARDIOTHORACIC VASCULAR SURGERY)

## 2021-05-20 PROCEDURE — C1713 ANCHOR/SCREW BN/BN,TIS/BN: HCPCS | Performed by: THORACIC SURGERY (CARDIOTHORACIC VASCULAR SURGERY)

## 2021-05-20 PROCEDURE — 85027 COMPLETE CBC AUTOMATED: CPT | Performed by: NURSE PRACTITIONER

## 2021-05-20 PROCEDURE — 85018 HEMOGLOBIN: CPT

## 2021-05-20 PROCEDURE — 25010000002 PAPAVERINE PER 60 MG: Performed by: THORACIC SURGERY (CARDIOTHORACIC VASCULAR SURGERY)

## 2021-05-20 PROCEDURE — 80069 RENAL FUNCTION PANEL: CPT | Performed by: THORACIC SURGERY (CARDIOTHORACIC VASCULAR SURGERY)

## 2021-05-20 PROCEDURE — 93318 ECHO TRANSESOPHAGEAL INTRAOP: CPT | Performed by: ANESTHESIOLOGY

## 2021-05-20 PROCEDURE — 25010000002 METOCLOPRAMIDE PER 10 MG: Performed by: NURSE PRACTITIONER

## 2021-05-20 PROCEDURE — 33521 CABG ARTERY-VEIN FOUR: CPT | Performed by: PHYSICIAN ASSISTANT

## 2021-05-20 PROCEDURE — C1751 CATH, INF, PER/CENT/MIDLINE: HCPCS | Performed by: ANESTHESIOLOGY

## 2021-05-20 PROCEDURE — 36430 TRANSFUSION BLD/BLD COMPNT: CPT

## 2021-05-20 PROCEDURE — 25010000003 CEFAZOLIN IN DEXTROSE 2-4 GM/100ML-% SOLUTION: Performed by: NURSE PRACTITIONER

## 2021-05-20 PROCEDURE — 85025 COMPLETE CBC W/AUTO DIFF WBC: CPT | Performed by: NURSE PRACTITIONER

## 2021-05-20 PROCEDURE — P9041 ALBUMIN (HUMAN),5%, 50ML: HCPCS | Performed by: NURSE PRACTITIONER

## 2021-05-20 PROCEDURE — C1889 IMPLANT/INSERT DEVICE, NOC: HCPCS | Performed by: THORACIC SURGERY (CARDIOTHORACIC VASCULAR SURGERY)

## 2021-05-20 PROCEDURE — 71045 X-RAY EXAM CHEST 1 VIEW: CPT

## 2021-05-20 PROCEDURE — 82803 BLOOD GASES ANY COMBINATION: CPT

## 2021-05-20 PROCEDURE — B246ZZ4 ULTRASONOGRAPHY OF RIGHT AND LEFT HEART, TRANSESOPHAGEAL: ICD-10-PCS | Performed by: ANESTHESIOLOGY

## 2021-05-20 PROCEDURE — 25010000002 MAGNESIUM SULFATE IN D5W 1G/100ML (PREMIX) 1-5 GM/100ML-% SOLUTION: Performed by: NURSE PRACTITIONER

## 2021-05-20 PROCEDURE — 25010000002 ALBUMIN HUMAN 25% PER 50 ML

## 2021-05-20 PROCEDURE — 94002 VENT MGMT INPAT INIT DAY: CPT

## 2021-05-20 PROCEDURE — 25010000002 VANCOMYCIN 1 G RECONSTITUTED SOLUTION

## 2021-05-20 PROCEDURE — 33508 ENDOSCOPIC VEIN HARVEST: CPT | Performed by: PHYSICIAN ASSISTANT

## 2021-05-20 PROCEDURE — 25010000002 PROPOFOL 10 MG/ML EMULSION: Performed by: ANESTHESIOLOGY

## 2021-05-20 PROCEDURE — 94799 UNLISTED PULMONARY SVC/PX: CPT

## 2021-05-20 PROCEDURE — 5A1221Z PERFORMANCE OF CARDIAC OUTPUT, CONTINUOUS: ICD-10-PCS | Performed by: THORACIC SURGERY (CARDIOTHORACIC VASCULAR SURGERY)

## 2021-05-20 PROCEDURE — 25010000002 PHENYLEPHRINE PER 1 ML: Performed by: ANESTHESIOLOGY

## 2021-05-20 PROCEDURE — 85610 PROTHROMBIN TIME: CPT | Performed by: NURSE PRACTITIONER

## 2021-05-20 PROCEDURE — 33533 CABG ARTERIAL SINGLE: CPT | Performed by: PHYSICIAN ASSISTANT

## 2021-05-20 PROCEDURE — 83735 ASSAY OF MAGNESIUM: CPT | Performed by: NURSE PRACTITIONER

## 2021-05-20 PROCEDURE — 82947 ASSAY GLUCOSE BLOOD QUANT: CPT

## 2021-05-20 PROCEDURE — 85347 COAGULATION TIME ACTIVATED: CPT

## 2021-05-20 PROCEDURE — 25010000002 MAGNESIUM SULFATE PER 500 MG OF MAGNESIUM: Performed by: ANESTHESIOLOGY

## 2021-05-20 PROCEDURE — 25010000002 MIDAZOLAM PER 1 MG: Performed by: ANESTHESIOLOGY

## 2021-05-20 PROCEDURE — 25010000002 FENTANYL CITRATE (PF) 50 MCG/ML SOLUTION: Performed by: ANESTHESIOLOGY

## 2021-05-20 PROCEDURE — P9047 ALBUMIN (HUMAN), 25%, 50ML: HCPCS

## 2021-05-20 PROCEDURE — 021309W BYPASS CORONARY ARTERY, FOUR OR MORE ARTERIES FROM AORTA WITH AUTOLOGOUS VENOUS TISSUE, OPEN APPROACH: ICD-10-PCS | Performed by: THORACIC SURGERY (CARDIOTHORACIC VASCULAR SURGERY)

## 2021-05-20 PROCEDURE — 25010000002 PROTAMINE SULFATE PER 10 MG: Performed by: ANESTHESIOLOGY

## 2021-05-20 PROCEDURE — 93005 ELECTROCARDIOGRAM TRACING: CPT | Performed by: NURSE PRACTITIONER

## 2021-05-20 PROCEDURE — 85730 THROMBOPLASTIN TIME PARTIAL: CPT | Performed by: NURSE PRACTITIONER

## 2021-05-20 PROCEDURE — 80069 RENAL FUNCTION PANEL: CPT | Performed by: NURSE PRACTITIONER

## 2021-05-20 PROCEDURE — 06BQ4ZZ EXCISION OF LEFT SAPHENOUS VEIN, PERCUTANEOUS ENDOSCOPIC APPROACH: ICD-10-PCS | Performed by: THORACIC SURGERY (CARDIOTHORACIC VASCULAR SURGERY)

## 2021-05-20 PROCEDURE — 82330 ASSAY OF CALCIUM: CPT | Performed by: NURSE PRACTITIONER

## 2021-05-20 PROCEDURE — 25010000002 HEPARIN (PORCINE) PER 1000 UNITS: Performed by: THORACIC SURGERY (CARDIOTHORACIC VASCULAR SURGERY)

## 2021-05-20 PROCEDURE — 25010000002 HEPARIN (PORCINE) PER 1000 UNITS: Performed by: ANESTHESIOLOGY

## 2021-05-20 PROCEDURE — 33521 CABG ARTERY-VEIN FOUR: CPT | Performed by: THORACIC SURGERY (CARDIOTHORACIC VASCULAR SURGERY)

## 2021-05-20 PROCEDURE — C1729 CATH, DRAINAGE: HCPCS | Performed by: THORACIC SURGERY (CARDIOTHORACIC VASCULAR SURGERY)

## 2021-05-20 PROCEDURE — 85384 FIBRINOGEN ACTIVITY: CPT | Performed by: NURSE PRACTITIONER

## 2021-05-20 PROCEDURE — 25010000002 LORAZEPAM PER 2 MG: Performed by: ANESTHESIOLOGY

## 2021-05-20 PROCEDURE — 25010000002 NEOSTIGMINE 5 MG/10ML SOLUTION: Performed by: ANESTHESIOLOGY

## 2021-05-20 PROCEDURE — 25010000002 HEPARIN (PORCINE) PER 1000 UNITS

## 2021-05-20 PROCEDURE — 02100Z9 BYPASS CORONARY ARTERY, ONE ARTERY FROM LEFT INTERNAL MAMMARY, OPEN APPROACH: ICD-10-PCS | Performed by: THORACIC SURGERY (CARDIOTHORACIC VASCULAR SURGERY)

## 2021-05-20 PROCEDURE — 25010000002 ALBUMIN HUMAN 5% PER 50 ML: Performed by: NURSE PRACTITIONER

## 2021-05-20 PROCEDURE — 33508 ENDOSCOPIC VEIN HARVEST: CPT | Performed by: THORACIC SURGERY (CARDIOTHORACIC VASCULAR SURGERY)

## 2021-05-20 PROCEDURE — P9035 PLATELET PHERES LEUKOREDUCED: HCPCS

## 2021-05-20 PROCEDURE — A4648 IMPLANTABLE TISSUE MARKER: HCPCS | Performed by: THORACIC SURGERY (CARDIOTHORACIC VASCULAR SURGERY)

## 2021-05-20 PROCEDURE — 25010000002 MORPHINE PER 10 MG: Performed by: NURSE PRACTITIONER

## 2021-05-20 PROCEDURE — 82962 GLUCOSE BLOOD TEST: CPT

## 2021-05-20 PROCEDURE — 25010000003 PROTAMINE SULFATE PER 10 MG: Performed by: THORACIC SURGERY (CARDIOTHORACIC VASCULAR SURGERY)

## 2021-05-20 DEVICE — ABSORBABLE HEMOSTAT (OXIDIZED REGENERATED CELLULOSE, U.S.P.)
Type: IMPLANTABLE DEVICE | Site: STERNUM | Status: FUNCTIONAL
Brand: SURGICEL

## 2021-05-20 DEVICE — SS SUTURE, 3 PER SLEEVE
Type: IMPLANTABLE DEVICE | Site: STERNUM | Status: FUNCTIONAL
Brand: MYO/WIRE II

## 2021-05-20 DEVICE — SS SUTURE, 4 PER SLEEVE
Type: IMPLANTABLE DEVICE | Site: STERNUM | Status: FUNCTIONAL
Brand: MYO/WIRE II

## 2021-05-20 RX ORDER — AMINOCAPROIC ACID 250 MG/ML
INJECTION, SOLUTION INTRAVENOUS AS NEEDED
Status: DISCONTINUED | OUTPATIENT
Start: 2021-05-20 | End: 2021-05-20 | Stop reason: SURG

## 2021-05-20 RX ORDER — LORAZEPAM 2 MG/ML
1 INJECTION INTRAMUSCULAR ONCE
Status: COMPLETED | OUTPATIENT
Start: 2021-05-20 | End: 2021-05-20

## 2021-05-20 RX ORDER — POTASSIUM CHLORIDE 29.8 MG/ML
20 INJECTION INTRAVENOUS
Status: DISCONTINUED | OUTPATIENT
Start: 2021-05-20 | End: 2021-05-31 | Stop reason: HOSPADM

## 2021-05-20 RX ORDER — MIDAZOLAM HYDROCHLORIDE 1 MG/ML
2 INJECTION INTRAMUSCULAR; INTRAVENOUS
Status: DISCONTINUED | OUTPATIENT
Start: 2021-05-20 | End: 2021-05-25

## 2021-05-20 RX ORDER — PROTAMINE SULFATE 10 MG/ML
INJECTION, SOLUTION INTRAVENOUS AS NEEDED
Status: DISCONTINUED | OUTPATIENT
Start: 2021-05-20 | End: 2021-05-20 | Stop reason: SURG

## 2021-05-20 RX ORDER — FENTANYL CITRATE 50 UG/ML
INJECTION, SOLUTION INTRAMUSCULAR; INTRAVENOUS AS NEEDED
Status: DISCONTINUED | OUTPATIENT
Start: 2021-05-20 | End: 2021-05-20 | Stop reason: SURG

## 2021-05-20 RX ORDER — ASPIRIN 81 MG/1
81 TABLET ORAL DAILY
Status: DISCONTINUED | OUTPATIENT
Start: 2021-05-21 | End: 2021-05-31 | Stop reason: HOSPADM

## 2021-05-20 RX ORDER — CYCLOBENZAPRINE HCL 10 MG
10 TABLET ORAL EVERY 8 HOURS PRN
Status: DISCONTINUED | OUTPATIENT
Start: 2021-05-21 | End: 2021-05-31 | Stop reason: HOSPADM

## 2021-05-20 RX ORDER — ROCURONIUM BROMIDE 10 MG/ML
INJECTION, SOLUTION INTRAVENOUS AS NEEDED
Status: DISCONTINUED | OUTPATIENT
Start: 2021-05-20 | End: 2021-05-20 | Stop reason: SURG

## 2021-05-20 RX ORDER — POTASSIUM CHLORIDE 7.45 MG/ML
10 INJECTION INTRAVENOUS
Status: DISCONTINUED | OUTPATIENT
Start: 2021-05-20 | End: 2021-05-31 | Stop reason: HOSPADM

## 2021-05-20 RX ORDER — ACETAMINOPHEN 650 MG/1
650 SUPPOSITORY RECTAL EVERY 4 HOURS PRN
Status: DISCONTINUED | OUTPATIENT
Start: 2021-05-21 | End: 2021-05-31 | Stop reason: HOSPADM

## 2021-05-20 RX ORDER — PROTAMINE SULFATE 10 MG/ML
INJECTION, SOLUTION INTRAVENOUS AS NEEDED
Status: DISCONTINUED | OUTPATIENT
Start: 2021-05-20 | End: 2021-05-20 | Stop reason: HOSPADM

## 2021-05-20 RX ORDER — HEPARIN SODIUM 5000 [USP'U]/ML
INJECTION, SOLUTION INTRAVENOUS; SUBCUTANEOUS AS NEEDED
Status: DISCONTINUED | OUTPATIENT
Start: 2021-05-20 | End: 2021-05-20 | Stop reason: HOSPADM

## 2021-05-20 RX ORDER — PANTOPRAZOLE SODIUM 40 MG/10ML
40 INJECTION, POWDER, LYOPHILIZED, FOR SOLUTION INTRAVENOUS ONCE
Status: DISCONTINUED | OUTPATIENT
Start: 2021-05-20 | End: 2021-05-31 | Stop reason: HOSPADM

## 2021-05-20 RX ORDER — NEOSTIGMINE METHYLSULFATE 0.5 MG/ML
INJECTION, SOLUTION INTRAVENOUS AS NEEDED
Status: DISCONTINUED | OUTPATIENT
Start: 2021-05-20 | End: 2021-05-20 | Stop reason: SURG

## 2021-05-20 RX ORDER — OXYCODONE HYDROCHLORIDE 5 MG/1
10 TABLET ORAL EVERY 4 HOURS PRN
Status: DISPENSED | OUTPATIENT
Start: 2021-05-20 | End: 2021-05-30

## 2021-05-20 RX ORDER — SODIUM CHLORIDE 0.9 % (FLUSH) 0.9 %
30 SYRINGE (ML) INJECTION ONCE AS NEEDED
Status: DISCONTINUED | OUTPATIENT
Start: 2021-05-20 | End: 2021-05-31 | Stop reason: HOSPADM

## 2021-05-20 RX ORDER — ONDANSETRON 2 MG/ML
4 INJECTION INTRAMUSCULAR; INTRAVENOUS EVERY 6 HOURS PRN
Status: DISCONTINUED | OUTPATIENT
Start: 2021-05-20 | End: 2021-05-31 | Stop reason: HOSPADM

## 2021-05-20 RX ORDER — MORPHINE SULFATE 2 MG/ML
4 INJECTION, SOLUTION INTRAMUSCULAR; INTRAVENOUS
Status: DISPENSED | OUTPATIENT
Start: 2021-05-20 | End: 2021-05-30

## 2021-05-20 RX ORDER — BISACODYL 5 MG/1
10 TABLET, DELAYED RELEASE ORAL DAILY PRN
Status: DISCONTINUED | OUTPATIENT
Start: 2021-05-20 | End: 2021-05-31 | Stop reason: HOSPADM

## 2021-05-20 RX ORDER — ACETAMINOPHEN 160 MG/5ML
650 SOLUTION ORAL EVERY 4 HOURS
Status: ACTIVE | OUTPATIENT
Start: 2021-05-20 | End: 2021-05-21

## 2021-05-20 RX ORDER — ALBUMIN, HUMAN INJ 5% 5 %
1500 SOLUTION INTRAVENOUS AS NEEDED
Status: DISPENSED | OUTPATIENT
Start: 2021-05-20 | End: 2021-05-21

## 2021-05-20 RX ORDER — NICARDIPINE HYDROCHLORIDE 2.5 MG/ML
INJECTION INTRAVENOUS AS NEEDED
Status: DISCONTINUED | OUTPATIENT
Start: 2021-05-20 | End: 2021-05-20

## 2021-05-20 RX ORDER — SODIUM CHLORIDE 9 MG/ML
INJECTION, SOLUTION INTRAVENOUS CONTINUOUS PRN
Status: DISCONTINUED | OUTPATIENT
Start: 2021-05-20 | End: 2021-05-20 | Stop reason: SURG

## 2021-05-20 RX ORDER — SODIUM CHLORIDE 9 MG/ML
30 INJECTION, SOLUTION INTRAVENOUS CONTINUOUS
Status: DISCONTINUED | OUTPATIENT
Start: 2021-05-20 | End: 2021-05-31 | Stop reason: HOSPADM

## 2021-05-20 RX ORDER — BUPIVACAINE HCL/0.9 % NACL/PF 0.125 %
PLASTIC BAG, INJECTION (ML) EPIDURAL AS NEEDED
Status: DISCONTINUED | OUTPATIENT
Start: 2021-05-20 | End: 2021-05-20 | Stop reason: SURG

## 2021-05-20 RX ORDER — FAMOTIDINE 10 MG/ML
20 INJECTION, SOLUTION INTRAVENOUS ONCE
Status: COMPLETED | OUTPATIENT
Start: 2021-05-20 | End: 2021-05-20

## 2021-05-20 RX ORDER — CEFAZOLIN SODIUM 2 G/100ML
2 INJECTION, SOLUTION INTRAVENOUS EVERY 8 HOURS
Status: COMPLETED | OUTPATIENT
Start: 2021-05-20 | End: 2021-05-22

## 2021-05-20 RX ORDER — PAPAVERINE HYDROCHLORIDE 30 MG/ML
INJECTION INTRAMUSCULAR; INTRAVENOUS AS NEEDED
Status: DISCONTINUED | OUTPATIENT
Start: 2021-05-20 | End: 2021-05-20 | Stop reason: HOSPADM

## 2021-05-20 RX ORDER — POLYETHYLENE GLYCOL 3350 17 G/17G
17 POWDER, FOR SOLUTION ORAL DAILY
Status: DISCONTINUED | OUTPATIENT
Start: 2021-05-21 | End: 2021-05-31 | Stop reason: HOSPADM

## 2021-05-20 RX ORDER — ACETAMINOPHEN 650 MG/1
650 SUPPOSITORY RECTAL EVERY 4 HOURS
Status: ACTIVE | OUTPATIENT
Start: 2021-05-20 | End: 2021-05-21

## 2021-05-20 RX ORDER — MEPERIDINE HYDROCHLORIDE 25 MG/ML
25 INJECTION INTRAMUSCULAR; INTRAVENOUS; SUBCUTANEOUS EVERY 4 HOURS PRN
Status: ACTIVE | OUTPATIENT
Start: 2021-05-20 | End: 2021-05-20

## 2021-05-20 RX ORDER — SODIUM CHLORIDE 9 MG/ML
30 INJECTION, SOLUTION INTRAVENOUS CONTINUOUS PRN
Status: DISCONTINUED | OUTPATIENT
Start: 2021-05-20 | End: 2021-05-31 | Stop reason: HOSPADM

## 2021-05-20 RX ORDER — BISACODYL 10 MG
10 SUPPOSITORY, RECTAL RECTAL DAILY PRN
Status: DISCONTINUED | OUTPATIENT
Start: 2021-05-21 | End: 2021-05-31 | Stop reason: HOSPADM

## 2021-05-20 RX ORDER — MAGNESIUM HYDROXIDE 1200 MG/15ML
LIQUID ORAL AS NEEDED
Status: DISCONTINUED | OUTPATIENT
Start: 2021-05-20 | End: 2021-05-20 | Stop reason: HOSPADM

## 2021-05-20 RX ORDER — GLYCOPYRROLATE 0.2 MG/ML
INJECTION INTRAMUSCULAR; INTRAVENOUS AS NEEDED
Status: DISCONTINUED | OUTPATIENT
Start: 2021-05-20 | End: 2021-05-20 | Stop reason: SURG

## 2021-05-20 RX ORDER — MAGNESIUM SULFATE 1 G/100ML
1 INJECTION INTRAVENOUS EVERY 8 HOURS
Status: DISPENSED | OUTPATIENT
Start: 2021-05-20 | End: 2021-05-21

## 2021-05-20 RX ORDER — MORPHINE SULFATE 2 MG/ML
1 INJECTION, SOLUTION INTRAMUSCULAR; INTRAVENOUS EVERY 4 HOURS PRN
Status: DISPENSED | OUTPATIENT
Start: 2021-05-20 | End: 2021-05-27

## 2021-05-20 RX ORDER — NOREPINEPHRINE BIT/0.9 % NACL 8 MG/250ML
.02-.3 INFUSION BOTTLE (ML) INTRAVENOUS CONTINUOUS PRN
Status: DISCONTINUED | OUTPATIENT
Start: 2021-05-20 | End: 2021-05-25

## 2021-05-20 RX ORDER — MAGNESIUM SULFATE HEPTAHYDRATE 500 MG/ML
INJECTION, SOLUTION INTRAMUSCULAR; INTRAVENOUS AS NEEDED
Status: DISCONTINUED | OUTPATIENT
Start: 2021-05-20 | End: 2021-05-20 | Stop reason: SURG

## 2021-05-20 RX ORDER — CHLORHEXIDINE GLUCONATE 0.12 MG/ML
15 RINSE ORAL EVERY 12 HOURS
Status: DISCONTINUED | OUTPATIENT
Start: 2021-05-20 | End: 2021-05-25

## 2021-05-20 RX ORDER — POTASSIUM CHLORIDE 1.5 G/1.77G
40 POWDER, FOR SOLUTION ORAL AS NEEDED
Status: DISCONTINUED | OUTPATIENT
Start: 2021-05-20 | End: 2021-05-31 | Stop reason: HOSPADM

## 2021-05-20 RX ORDER — ALPRAZOLAM 0.25 MG/1
0.25 TABLET ORAL EVERY 8 HOURS PRN
Status: DISPENSED | OUTPATIENT
Start: 2021-05-20 | End: 2021-05-30

## 2021-05-20 RX ORDER — MIDAZOLAM HYDROCHLORIDE 1 MG/ML
INJECTION INTRAMUSCULAR; INTRAVENOUS AS NEEDED
Status: DISCONTINUED | OUTPATIENT
Start: 2021-05-20 | End: 2021-05-20 | Stop reason: SURG

## 2021-05-20 RX ORDER — AMOXICILLIN 250 MG
2 CAPSULE ORAL NIGHTLY
Status: DISCONTINUED | OUTPATIENT
Start: 2021-05-21 | End: 2021-05-31 | Stop reason: HOSPADM

## 2021-05-20 RX ORDER — NALOXONE HCL 0.4 MG/ML
0.4 VIAL (ML) INJECTION
Status: DISCONTINUED | OUTPATIENT
Start: 2021-05-20 | End: 2021-05-31 | Stop reason: HOSPADM

## 2021-05-20 RX ORDER — MILRINONE LACTATE 0.2 MG/ML
.25-.375 INJECTION, SOLUTION INTRAVENOUS CONTINUOUS PRN
Status: DISCONTINUED | OUTPATIENT
Start: 2021-05-20 | End: 2021-05-25

## 2021-05-20 RX ORDER — HYDROCODONE BITARTRATE AND ACETAMINOPHEN 5; 325 MG/1; MG/1
2 TABLET ORAL EVERY 4 HOURS PRN
Status: DISPENSED | OUTPATIENT
Start: 2021-05-20 | End: 2021-05-30

## 2021-05-20 RX ORDER — METOCLOPRAMIDE HYDROCHLORIDE 5 MG/ML
10 INJECTION INTRAMUSCULAR; INTRAVENOUS EVERY 6 HOURS
Status: COMPLETED | OUTPATIENT
Start: 2021-05-20 | End: 2021-05-21

## 2021-05-20 RX ORDER — PANTOPRAZOLE SODIUM 40 MG/1
40 TABLET, DELAYED RELEASE ORAL EVERY MORNING
Status: DISCONTINUED | OUTPATIENT
Start: 2021-05-21 | End: 2021-05-31 | Stop reason: HOSPADM

## 2021-05-20 RX ORDER — ACETAMINOPHEN 325 MG/1
650 TABLET ORAL EVERY 4 HOURS PRN
Status: DISCONTINUED | OUTPATIENT
Start: 2021-05-21 | End: 2021-05-31 | Stop reason: HOSPADM

## 2021-05-20 RX ORDER — NITROGLYCERIN 5 MG/ML
INJECTION, SOLUTION INTRAVENOUS AS NEEDED
Status: DISCONTINUED | OUTPATIENT
Start: 2021-05-20 | End: 2021-05-20 | Stop reason: SURG

## 2021-05-20 RX ORDER — NICARDIPINE HYDROCHLORIDE 2.5 MG/ML
INJECTION INTRAVENOUS AS NEEDED
Status: DISCONTINUED | OUTPATIENT
Start: 2021-05-20 | End: 2021-05-20 | Stop reason: SURG

## 2021-05-20 RX ORDER — ACETAMINOPHEN 160 MG/5ML
650 SOLUTION ORAL EVERY 4 HOURS PRN
Status: DISCONTINUED | OUTPATIENT
Start: 2021-05-21 | End: 2021-05-31 | Stop reason: HOSPADM

## 2021-05-20 RX ORDER — ACETAMINOPHEN 325 MG/1
650 TABLET ORAL EVERY 4 HOURS
Status: DISPENSED | OUTPATIENT
Start: 2021-05-20 | End: 2021-05-21

## 2021-05-20 RX ORDER — DOPAMINE HYDROCHLORIDE 160 MG/100ML
2-20 INJECTION, SOLUTION INTRAVENOUS CONTINUOUS PRN
Status: DISCONTINUED | OUTPATIENT
Start: 2021-05-20 | End: 2021-05-25

## 2021-05-20 RX ORDER — PROPOFOL 10 MG/ML
VIAL (ML) INTRAVENOUS CONTINUOUS PRN
Status: DISCONTINUED | OUTPATIENT
Start: 2021-05-20 | End: 2021-05-20 | Stop reason: SURG

## 2021-05-20 RX ORDER — POTASSIUM CHLORIDE 750 MG/1
40 TABLET, FILM COATED, EXTENDED RELEASE ORAL AS NEEDED
Status: DISCONTINUED | OUTPATIENT
Start: 2021-05-20 | End: 2021-05-31 | Stop reason: HOSPADM

## 2021-05-20 RX ORDER — ATORVASTATIN CALCIUM 20 MG/1
40 TABLET, FILM COATED ORAL NIGHTLY
Status: DISCONTINUED | OUTPATIENT
Start: 2021-05-20 | End: 2021-05-31 | Stop reason: HOSPADM

## 2021-05-20 RX ORDER — NITROGLYCERIN 20 MG/100ML
5-200 INJECTION INTRAVENOUS
Status: DISCONTINUED | OUTPATIENT
Start: 2021-05-20 | End: 2021-05-25

## 2021-05-20 RX ORDER — HEPARIN SODIUM 1000 [USP'U]/ML
INJECTION, SOLUTION INTRAVENOUS; SUBCUTANEOUS AS NEEDED
Status: DISCONTINUED | OUTPATIENT
Start: 2021-05-20 | End: 2021-05-20 | Stop reason: SURG

## 2021-05-20 RX ORDER — LIDOCAINE HYDROCHLORIDE 20 MG/ML
INJECTION, SOLUTION INFILTRATION; PERINEURAL AS NEEDED
Status: DISCONTINUED | OUTPATIENT
Start: 2021-05-20 | End: 2021-05-20 | Stop reason: SURG

## 2021-05-20 RX ORDER — PROPOFOL 10 MG/ML
VIAL (ML) INTRAVENOUS AS NEEDED
Status: DISCONTINUED | OUTPATIENT
Start: 2021-05-20 | End: 2021-05-20 | Stop reason: SURG

## 2021-05-20 RX ADMIN — PROPOFOL 50 MG: 10 INJECTION, EMULSION INTRAVENOUS at 07:27

## 2021-05-20 RX ADMIN — AMINOCAPROIC ACID 10 G: 250 INJECTION, SOLUTION INTRAVENOUS at 08:35

## 2021-05-20 RX ADMIN — CHLORHEXIDINE GLUCONATE 15 ML: 1.2 RINSE ORAL at 06:00

## 2021-05-20 RX ADMIN — CEFAZOLIN SODIUM 2 G: 2 INJECTION, SOLUTION INTRAVENOUS at 10:21

## 2021-05-20 RX ADMIN — ALBUMIN HUMAN 500 ML: 0.05 INJECTION, SOLUTION INTRAVENOUS at 15:10

## 2021-05-20 RX ADMIN — ATORVASTATIN CALCIUM 40 MG: 20 TABLET, FILM COATED ORAL at 20:01

## 2021-05-20 RX ADMIN — MORPHINE SULFATE 1 MG: 2 INJECTION, SOLUTION INTRAMUSCULAR; INTRAVENOUS at 21:03

## 2021-05-20 RX ADMIN — MIDAZOLAM 2 MG: 1 INJECTION INTRAMUSCULAR; INTRAVENOUS at 06:36

## 2021-05-20 RX ADMIN — NICARDIPINE HYDROCHLORIDE 0.2 MG: 2.5 INJECTION, SOLUTION INTRAVENOUS at 07:44

## 2021-05-20 RX ADMIN — CYCLOBENZAPRINE 10 MG: 10 TABLET, FILM COATED ORAL at 21:03

## 2021-05-20 RX ADMIN — PHENYLEPHRINE HYDROCHLORIDE 0.3 MCG/KG/MIN: 10 INJECTION, SOLUTION INTRAMUSCULAR; INTRAVENOUS; SUBCUTANEOUS at 06:54

## 2021-05-20 RX ADMIN — NEOSTIGMINE METHYLSULFATE 5 MG: 0.5 INJECTION INTRAVENOUS at 11:41

## 2021-05-20 RX ADMIN — CHLORHEXIDINE GLUCONATE 15 ML: 1.2 RINSE ORAL at 20:01

## 2021-05-20 RX ADMIN — MUPIROCIN 1 APPLICATION: 20 OINTMENT TOPICAL at 20:01

## 2021-05-20 RX ADMIN — METOCLOPRAMIDE HYDROCHLORIDE 10 MG: 5 INJECTION INTRAMUSCULAR; INTRAVENOUS at 17:36

## 2021-05-20 RX ADMIN — PHENYLEPHRINE-NACL PREF SYR 1 MG/10ML-0.9% (100 MCG/ML) 100 MCG: 1-0.9/1 SOLUTION PREFILLED SYRINGE at 07:20

## 2021-05-20 RX ADMIN — PROPOFOL 40 MCG/KG/MIN: 10 INJECTION, EMULSION INTRAVENOUS at 13:21

## 2021-05-20 RX ADMIN — MAGNESIUM SULFATE HEPTAHYDRATE 2 G: 500 INJECTION, SOLUTION INTRAMUSCULAR; INTRAVENOUS at 10:03

## 2021-05-20 RX ADMIN — ALBUMIN HUMAN 500 ML: 0.05 INJECTION, SOLUTION INTRAVENOUS at 13:56

## 2021-05-20 RX ADMIN — GLYCOPYRROLATE 0.8 MG: 0.2 INJECTION INTRAMUSCULAR; INTRAVENOUS at 11:41

## 2021-05-20 RX ADMIN — FAMOTIDINE 20 MG: 10 INJECTION INTRAVENOUS at 05:56

## 2021-05-20 RX ADMIN — PROTAMINE SULFATE 250 MG: 10 INJECTION, SOLUTION INTRAVENOUS at 10:21

## 2021-05-20 RX ADMIN — FENTANYL CITRATE 100 MCG: 50 INJECTION INTRAMUSCULAR; INTRAVENOUS at 07:42

## 2021-05-20 RX ADMIN — HYDROCODONE BITARTRATE AND ACETAMINOPHEN 2 TABLET: 5; 325 TABLET ORAL at 19:44

## 2021-05-20 RX ADMIN — FENTANYL CITRATE 100 MCG: 50 INJECTION INTRAMUSCULAR; INTRAVENOUS at 09:40

## 2021-05-20 RX ADMIN — NICARDIPINE HYDROCHLORIDE 0.6 MG: 2.5 INJECTION, SOLUTION INTRAVENOUS at 07:51

## 2021-05-20 RX ADMIN — CEFAZOLIN SODIUM 2 G: 2 INJECTION, SOLUTION INTRAVENOUS at 07:00

## 2021-05-20 RX ADMIN — Medication 0.5 MCG/KG/HR: at 07:11

## 2021-05-20 RX ADMIN — ROCURONIUM BROMIDE 80 MG: 50 INJECTION INTRAVENOUS at 06:51

## 2021-05-20 RX ADMIN — METOPROLOL TARTRATE 12.5 MG: 25 TABLET, FILM COATED ORAL at 05:56

## 2021-05-20 RX ADMIN — LIDOCAINE HYDROCHLORIDE 100 MG: 20 INJECTION, SOLUTION INFILTRATION; PERINEURAL at 06:51

## 2021-05-20 RX ADMIN — HEPARIN SODIUM 30000 UNITS: 1000 INJECTION INTRAVENOUS; SUBCUTANEOUS at 08:30

## 2021-05-20 RX ADMIN — SODIUM CHLORIDE 2 UNITS/HR: 9 INJECTION, SOLUTION INTRAVENOUS at 09:07

## 2021-05-20 RX ADMIN — ROCURONIUM BROMIDE 20 MG: 50 INJECTION INTRAVENOUS at 08:36

## 2021-05-20 RX ADMIN — CEFAZOLIN SODIUM 2 G: 2 INJECTION, SOLUTION INTRAVENOUS at 17:36

## 2021-05-20 RX ADMIN — MAGNESIUM SULFATE HEPTAHYDRATE 1 G: 1 INJECTION, SOLUTION INTRAVENOUS at 19:47

## 2021-05-20 RX ADMIN — ACETAMINOPHEN 650 MG: 325 TABLET, FILM COATED ORAL at 23:50

## 2021-05-20 RX ADMIN — FENTANYL CITRATE 150 MCG: 50 INJECTION INTRAMUSCULAR; INTRAVENOUS at 10:33

## 2021-05-20 RX ADMIN — SODIUM CHLORIDE: 9 INJECTION, SOLUTION INTRAVENOUS at 06:33

## 2021-05-20 RX ADMIN — NICARDIPINE HYDROCHLORIDE 5 MG/HR: 2.5 INJECTION, SOLUTION INTRAVENOUS at 07:46

## 2021-05-20 RX ADMIN — NITROGLYCERIN 200 MCG: 5 INJECTION, SOLUTION INTRAVENOUS at 07:52

## 2021-05-20 RX ADMIN — LORAZEPAM 1 MG: 2 INJECTION INTRAMUSCULAR; INTRAVENOUS at 05:56

## 2021-05-20 RX ADMIN — NITROGLYCERIN 50 MCG: 5 INJECTION, SOLUTION INTRAVENOUS at 11:04

## 2021-05-20 RX ADMIN — PHENYLEPHRINE-NACL PREF SYR 1 MG/10ML-0.9% (100 MCG/ML) 100 MCG: 1-0.9/1 SOLUTION PREFILLED SYRINGE at 07:07

## 2021-05-20 RX ADMIN — SODIUM CHLORIDE 30 ML/HR: 9 INJECTION, SOLUTION INTRAVENOUS at 12:15

## 2021-05-20 RX ADMIN — AMINOCAPROIC ACID 10 G: 250 INJECTION, SOLUTION INTRAVENOUS at 10:27

## 2021-05-20 RX ADMIN — PHENYLEPHRINE-NACL PREF SYR 1 MG/10ML-0.9% (100 MCG/ML) 200 MCG: 1-0.9/1 SOLUTION PREFILLED SYRINGE at 07:26

## 2021-05-20 RX ADMIN — FENTANYL CITRATE 150 MCG: 50 INJECTION INTRAMUSCULAR; INTRAVENOUS at 06:51

## 2021-05-20 RX ADMIN — MUPIROCIN 1 APPLICATION: 20 OINTMENT TOPICAL at 05:57

## 2021-05-20 RX ADMIN — DEXMEDETOMIDINE HYDROCHLORIDE 0.5 MCG/KG/HR: 100 INJECTION, SOLUTION, CONCENTRATE INTRAVENOUS at 13:30

## 2021-05-20 RX ADMIN — NICARDIPINE HYDROCHLORIDE 0.4 MG: 2.5 INJECTION, SOLUTION INTRAVENOUS at 07:46

## 2021-05-20 RX ADMIN — DEXTROSE MONOHYDRATE 25 G: 25 INJECTION, SOLUTION INTRAVENOUS at 04:39

## 2021-05-20 RX ADMIN — ROCURONIUM BROMIDE 30 MG: 50 INJECTION INTRAVENOUS at 09:40

## 2021-05-20 RX ADMIN — METOCLOPRAMIDE HYDROCHLORIDE 10 MG: 5 INJECTION INTRAMUSCULAR; INTRAVENOUS at 12:17

## 2021-05-20 RX ADMIN — PHENYLEPHRINE-NACL PREF SYR 1 MG/10ML-0.9% (100 MCG/ML) 100 MCG: 1-0.9/1 SOLUTION PREFILLED SYRINGE at 07:06

## 2021-05-20 RX ADMIN — MORPHINE SULFATE 4 MG: 2 INJECTION, SOLUTION INTRAMUSCULAR; INTRAVENOUS at 14:43

## 2021-05-20 RX ADMIN — METOCLOPRAMIDE HYDROCHLORIDE 10 MG: 5 INJECTION INTRAMUSCULAR; INTRAVENOUS at 23:52

## 2021-05-20 RX ADMIN — PROPOFOL 25 MCG/KG/MIN: 10 INJECTION, EMULSION INTRAVENOUS at 07:13

## 2021-05-20 RX ADMIN — SODIUM CHLORIDE 30 ML/HR: 9 INJECTION, SOLUTION INTRAVENOUS at 12:14

## 2021-05-20 RX ADMIN — PROPOFOL 60 MG: 10 INJECTION, EMULSION INTRAVENOUS at 06:51

## 2021-05-20 RX ADMIN — OXYCODONE 10 MG: 5 TABLET ORAL at 23:50

## 2021-05-20 NOTE — ANESTHESIA PROCEDURE NOTES
Central Line      Patient reassessed immediately prior to procedure    Patient location during procedure: OR  Start time: 5/20/2021 7:03 AM  Stop Time:5/20/2021 7:10 AM  Indications: central pressure monitoring  Staff  Anesthesiologist: Lee Ann Mike MD  Preanesthetic Checklist  Completed: patient identified, IV checked, site marked, risks and benefits discussed, surgical consent, monitors and equipment checked, pre-op evaluation and timeout performed  Central Line Prep  Sterile Tech:cap, gloves, gown, mask and sterile barriers  Prep: chloraprep  Patient monitoring: blood pressure monitoring, EKG and continuous pulse oximetry  Central Line Procedure  Laterality:right  Location:internal jugular  Catheter Type:[place]-Joey  Assessment  Complications:no  Patient Tolerance:patient tolerated the procedure well with no apparent complications

## 2021-05-20 NOTE — ANESTHESIA PROCEDURE NOTES
Airway  Urgency: elective    Date/Time: 5/20/2021 6:52 AM  Airway not difficult    General Information and Staff    Patient location during procedure: OR  Anesthesiologist: Lee Ann Mike MD    Indications and Patient Condition  Indications for airway management: airway protection    Preoxygenated: yes  MILS maintained throughout  Mask difficulty assessment: 1 - vent by mask    Final Airway Details  Final airway type: endotracheal airway      Successful airway: ETT  Cuffed: yes   Successful intubation technique: direct laryngoscopy  Blade: Nereida  Blade size: 3  ETT size (mm): 8.0  Cormack-Lehane Classification: grade IIb - view of arytenoids or posterior of glottis only  Placement verified by: chest auscultation and capnometry   Measured from: teeth  Number of attempts at approach: 1  Assessment: atraumatic intubation    Additional Comments  Neck ROM is severely limited, Small upper lip laceration noted after blade removal, dentition intact

## 2021-05-20 NOTE — ANESTHESIA PROCEDURE NOTES
Arterial Line      Patient reassessed immediately prior to procedure    Start time: 5/20/2021 6:39 AM  Stop Time:5/20/2021 6:43 AM       Line placed for hemodynamic monitoring and ABGs/Labs/ISTAT.  Performed By   Anesthesiologist: Lee Ann Mike MD  Preanesthetic Checklist  Completed: patient identified, IV checked, site marked, risks and benefits discussed, surgical consent, monitors and equipment checked, pre-op evaluation and timeout performed  Arterial Line Prep   Sterile Tech: gloves, mask and cap  Prep: ChloraPrep  Patient monitoring: blood pressure monitoring, continuous pulse oximetry and EKG  Arterial Line Procedure   Laterality:left  Location:  radial artery  Catheter size: 20 G   Guidance: ultrasound guided  PROCEDURE NOTE/ULTRASOUND INTERPRETATION.  Using ultrasound guidance the potential vascular sites for insertion of the catheter were visualized to determine the patency of the vessel to be used for vascular access.  After selecting the appropriate site for insertion, the needle was visualized under ultrasound being inserted into the radial artery, followed by ultrasound confirmation of wire and catheter placement. There were no abnormalities seen on ultrasound; an image was taken; and the patient tolerated the procedure with no complications.   Number of attempts: 1  Successful placement: yes  Post Assessment   Dressing Type: secured with tape and occlusive dressing applied.   Complications no  Circ/Move/Sens Assessment: normal and unchanged.   Patient Tolerance: patient tolerated the procedure well with no apparent complications

## 2021-05-20 NOTE — ANESTHESIA POSTPROCEDURE EVALUATION
"Patient: Cosmo Gauthier    Procedure Summary     Date: 05/20/21 Room / Location: [facility] OR [address] [place] [address] MAIN OR    Anesthesia Start: 0633 Anesthesia Stop: 1148    Procedures:       MIDLINE STERNOTOMY,CORONARY ARTERY BYPASS GRAFTING X 5, UTILIZING THE LEFT COMPA AND LEFT SAPHENOUS VEIN, ABHIJEET, PRP (N/A Chest)      TRANSESOPHAGEAL ECHOCARDIOGRAM WITH ANESTHESIA (N/A Chest) Diagnosis:       Coronary artery disease of native heart with stable angina pectoris, unspecified vessel or lesion type (CMS/HCC)      (Coronary artery disease of native heart with stable angina pectoris, unspecified vessel or lesion type (CMS/HCC) [I25.118])    Surgeons: Jr Tom Tubbs MD Provider: Lee Ann Mike MD    Anesthesia Type: general ASA Status: 4          Anesthesia Type: general    Vitals  Vitals Value Taken Time   /64 05/20/21 1600   Temp 37.3 °C (99.14 °F) 05/20/21 1613   Pulse 85 05/20/21 1613   Resp     SpO2 100 % 05/20/21 1613   Vitals shown include unvalidated device data.        Post Anesthesia Care and Evaluation    Patient location during evaluation: ICU  Patient participation: complete - patient participated  Level of consciousness: awake  Pain score: 2  Pain management: adequate  Airway patency: patent  Anesthetic complications: No anesthetic complications  PONV Status: none  Cardiovascular status: acceptable  Respiratory status: acceptable and nasal cannula  Hydration status: acceptable    Comments: /74 (BP Location: Right arm)   Pulse 85   Temp 36.4 °C (97.5 °F) (Core)   Resp 18   Ht 182.9 cm (72\")   Wt 116 kg (255 lb 14.4 oz)   SpO2 100%   BMI 34.71 kg/m²       "

## 2021-05-20 NOTE — ANESTHESIA PREPROCEDURE EVALUATION
Anesthesia Evaluation     Patient summary reviewed and Nursing notes reviewed   NPO Solid Status: > 8 hours  NPO Liquid Status: > 2 hours           Airway   Mallampati: II  TM distance: >3 FB  Neck ROM: full  No difficulty expected  Dental - normal exam   (+) poor dentition    Pulmonary - normal exam   (+) sleep apnea on CPAP,   Cardiovascular - normal exam  Exercise tolerance: poor (<4 METS)    ECG reviewed  Patient on routine beta blocker    (+) hypertension 2 medications or greater, CAD, angina, hyperlipidemia,       Neuro/Psych  (+) psychiatric history, dementia,     GI/Hepatic/Renal/Endo    (+) obesity,  GERD well controlled,  diabetes mellitus type 2 well controlled,     Musculoskeletal     Abdominal  - normal exam    Bowel sounds: normal.   Substance History - negative use     OB/GYN negative ob/gyn ROS         Other   arthritis,                    Anesthesia Plan    ASA 4     general   (A line, Central line and ABHIJEET)  intravenous induction   Postoperative Plan: Expected vent after surgery  Anesthetic plan, all risks, benefits, and alternatives have been provided, discussed and informed consent has been obtained with: patient.  Use of blood products discussed with patient  Consented to blood products.      Liliana would like a call back regarding a follow up appointment for raciel for a right reverse total shoulder.  She needs to follow up in two weeks. Raciel has an appointment scheduled for 8-28-17.

## 2021-05-20 NOTE — ANESTHESIA PROCEDURE NOTES
Central Line      Patient reassessed immediately prior to procedure    Patient location during procedure: OR  Start time: 5/20/2021 6:55 AM  Stop Time:5/20/2021 7:03 AM  Indications: central pressure monitoring, vascular access and MD/Surgeon request  Staff  Anesthesiologist: Lee Ann Mike MD  Preanesthetic Checklist  Completed: patient identified, IV checked, site marked, risks and benefits discussed, surgical consent, monitors and equipment checked, pre-op evaluation and timeout performed  Central Line Prep  Sterile Tech:cap, gloves, gown, mask and sterile barriers  Prep: chloraprep  Patient monitoring: blood pressure monitoring, EKG and continuous pulse oximetry  Central Line Procedure  Laterality:right  Location:internal jugular  Catheter Type:Cordis (MAC Introducer)  Catheter Size:9 Fr  Guidance:ultrasound guided  PROCEDURE NOTE/ULTRASOUND INTERPRETATION.  Using ultrasound guidance the potential vascular sites for insertion of the catheter were visualized to determine the patency of the vessel to be used for vascular access.  After selecting the appropriate site for insertion, the needle was visualized under ultrasound being inserted into the internal jugular vein, followed by ultrasound confirmation of wire and catheter placement. There were no abnormalities seen on ultrasound; an image was taken; and the patient tolerated the procedure with no complications. Images: still images not obtained  Assessment  Post procedure:biopatch applied, line sutured, occlusive dressing applied and secured with tape  Assessement:blood return through all ports, free fluid flow, chest x-ray ordered and Eulalio Test  Complications:no  Patient Tolerance:patient tolerated the procedure well with no apparent complications

## 2021-05-20 NOTE — ANESTHESIA PROCEDURE NOTES
Preanesthesia Checklist:  Patient identified, IV assessed, risks and benefits discussed, monitors and equipment assessed and procedure being performed at surgeon's request.    General Procedure Information  Diagnostic Indications for Echo:  assessment of ascending aorta, assessment of surgical repair, defect repair evaluation and hemodynamic monitoring  Physician Requesting Echo: Jr Tom Tubbs MD  ICD Code for Medical Necessity:  Aortic Insufficiency   Location performed:  OR  Intubated  Bite block placed  Heart visualized  Probe Insertion:  Easy  Probe Type:  Multiplane  Modalities:  Color flow mapping, 2D only, continuous wave Doppler and pulse wave Doppler    Echocardiographic and Doppler Measurements    Ventricles    Right Ventricle:  Cavity size normal.  Hypertrophy not present.    Left Ventricle:  Diastolic dimension 4.7 cm.  Hypertrophy not present.  Thrombus not present.  Global Function mildly impaired.  Ejection Fraction 50%.      Ventricular Regional Function:  13- Apical Anterior:  hypokinetic  14- Apical Lateral:  hypokinetic  16- Apical Septal:  hypokinetic  17- Hammond:  hypokinetic      Valves    Aortic Valve:  Annulus normal.  Stenosis not present.  Pressure Half-time: 910 ms.  Regurgitation mild.  Leaflets normal.  Leaflet motions normal.      Mitral Valve:  Annulus normal.  Stenosis not present.  Regurgitation trace.      Tricuspid Valve:  Annulus normal.  Stenosis not present.  Regurgitation mild.    Pulmonic Valve:  Annulus normal.  Regurgitation trace.          Aorta    Ascending Aorta:  Size normal.  Diameter 3.6 cm.  Dissection not present.  Plaque thickness less than 3 mm.  Mobile plaque not present.    Aortic Arch:  Size normal.  Diameter 3.1 cm.  Dissection not present.  Plaque thickness less than 3 mm.  Mobile plaque not present.    Descending Aorta:  Size normal.  Diameter 2.5 cm.  Dissection not present.  Plaque thickness less than 3 mm.  Mobile plaque not present.           Atria    Right Atrium:  Size normal.  Spontaneous echo contrast not present.  Thrombus not present.  Tumor not present.  Device present.    Left Atrium:  Size normal.  Spontaneous echo contrast not present.  Thrombus not present.  Tumor not present.  Device not present.    Left atrial appendage normal.      Septa    Atrial Septum:  Intra-atrial septal morphology lipomatous hypertrophy.          Diastolic Function Measurements:  Diastolic Dysfunction Grade= II  E= 40.6 ms  A= 35.3 ms  E/A Ratio=  1.2  DT=  296 ms  S/D=   IVRT=    Other Findings  Pericardium:  normal  Pleural Effusion:  none  Pulmonary Arteries:  normal  Pulmonary Venous Flow:  normal    Anesthesia Information  Performed Personally      Echocardiogram Comments:       Post CPB:  LVEF much improved, 60%, apex no longer hypokinetic.  No aortic dissection post decannulation.  AI unchanged.

## 2021-05-21 ENCOUNTER — APPOINTMENT (OUTPATIENT)
Dept: GENERAL RADIOLOGY | Facility: HOSPITAL | Age: 76
End: 2021-05-21

## 2021-05-21 LAB
ALBUMIN SERPL-MCNC: 4.2 G/DL (ref 3.5–5.2)
ANION GAP SERPL CALCULATED.3IONS-SCNC: 9.7 MMOL/L (ref 5–15)
BASOPHILS # BLD AUTO: 0.02 10*3/MM3 (ref 0–0.2)
BASOPHILS NFR BLD AUTO: 0.2 % (ref 0–1.5)
BH BB BLOOD EXPIRATION DATE: NORMAL
BH BB BLOOD EXPIRATION DATE: NORMAL
BH BB BLOOD TYPE BARCODE: 6200
BH BB BLOOD TYPE BARCODE: 6200
BH BB DISPENSE STATUS: NORMAL
BH BB DISPENSE STATUS: NORMAL
BH BB PRODUCT CODE: NORMAL
BH BB PRODUCT CODE: NORMAL
BH BB UNIT NUMBER: NORMAL
BH BB UNIT NUMBER: NORMAL
BUN SERPL-MCNC: 20 MG/DL (ref 8–23)
BUN/CREAT SERPL: 17.1 (ref 7–25)
CA-I BLD-MCNC: 5.1 MG/DL (ref 4.6–5.4)
CA-I SERPL ISE-MCNC: 1.28 MMOL/L (ref 1.15–1.35)
CALCIUM SPEC-SCNC: 8.5 MG/DL (ref 8.6–10.5)
CHLORIDE SERPL-SCNC: 110 MMOL/L (ref 98–107)
CO2 SERPL-SCNC: 21.3 MMOL/L (ref 22–29)
CREAT SERPL-MCNC: 1.17 MG/DL (ref 0.76–1.27)
DEPRECATED RDW RBC AUTO: 51.7 FL (ref 37–54)
EOSINOPHIL # BLD AUTO: 0.16 10*3/MM3 (ref 0–0.4)
EOSINOPHIL NFR BLD AUTO: 1.9 % (ref 0.3–6.2)
ERYTHROCYTE [DISTWIDTH] IN BLOOD BY AUTOMATED COUNT: 15.7 % (ref 12.3–15.4)
GFR SERPL CREATININE-BSD FRML MDRD: 61 ML/MIN/1.73
GLUCOSE BLDC GLUCOMTR-MCNC: 126 MG/DL (ref 70–130)
GLUCOSE BLDC GLUCOMTR-MCNC: 129 MG/DL (ref 70–130)
GLUCOSE BLDC GLUCOMTR-MCNC: 129 MG/DL (ref 70–130)
GLUCOSE BLDC GLUCOMTR-MCNC: 136 MG/DL (ref 70–130)
GLUCOSE BLDC GLUCOMTR-MCNC: 148 MG/DL (ref 70–130)
GLUCOSE BLDC GLUCOMTR-MCNC: 161 MG/DL (ref 70–130)
GLUCOSE BLDC GLUCOMTR-MCNC: 225 MG/DL (ref 70–130)
GLUCOSE SERPL-MCNC: 123 MG/DL (ref 65–99)
HCT VFR BLD AUTO: 25.4 % (ref 37.5–51)
HGB BLD-MCNC: 8.7 G/DL (ref 13–17.7)
IMM GRANULOCYTES # BLD AUTO: 0.04 10*3/MM3 (ref 0–0.05)
IMM GRANULOCYTES NFR BLD AUTO: 0.5 % (ref 0–0.5)
INR PPP: 1.21 (ref 0.9–1.1)
LYMPHOCYTES # BLD AUTO: 0.69 10*3/MM3 (ref 0.7–3.1)
LYMPHOCYTES NFR BLD AUTO: 8.3 % (ref 19.6–45.3)
MAGNESIUM SERPL-MCNC: 2.1 MG/DL (ref 1.6–2.4)
MCH RBC QN AUTO: 30.9 PG (ref 26.6–33)
MCHC RBC AUTO-ENTMCNC: 34.3 G/DL (ref 31.5–35.7)
MCV RBC AUTO: 90.1 FL (ref 79–97)
MONOCYTES # BLD AUTO: 0.63 10*3/MM3 (ref 0.1–0.9)
MONOCYTES NFR BLD AUTO: 7.6 % (ref 5–12)
NEUTROPHILS NFR BLD AUTO: 6.78 10*3/MM3 (ref 1.7–7)
NEUTROPHILS NFR BLD AUTO: 81.5 % (ref 42.7–76)
NRBC BLD AUTO-RTO: 0 /100 WBC (ref 0–0.2)
PHOSPHATE SERPL-MCNC: 5 MG/DL (ref 2.5–4.5)
PLATELET # BLD AUTO: 79 10*3/MM3 (ref 140–450)
PMV BLD AUTO: 9.6 FL (ref 6–12)
POTASSIUM SERPL-SCNC: 4.6 MMOL/L (ref 3.5–5.2)
PROTHROMBIN TIME: 15.1 SECONDS (ref 11.7–14.2)
QT INTERVAL: 375 MS
RBC # BLD AUTO: 2.82 10*6/MM3 (ref 4.14–5.8)
SODIUM SERPL-SCNC: 141 MMOL/L (ref 136–145)
UNIT  ABO: NORMAL
UNIT  ABO: NORMAL
UNIT  RH: NORMAL
UNIT  RH: NORMAL
WBC # BLD AUTO: 8.32 10*3/MM3 (ref 3.4–10.8)

## 2021-05-21 PROCEDURE — 82962 GLUCOSE BLOOD TEST: CPT

## 2021-05-21 PROCEDURE — 25010000002 METOCLOPRAMIDE PER 10 MG: Performed by: NURSE PRACTITIONER

## 2021-05-21 PROCEDURE — 25010000002 MAGNESIUM SULFATE IN D5W 1G/100ML (PREMIX) 1-5 GM/100ML-% SOLUTION: Performed by: NURSE PRACTITIONER

## 2021-05-21 PROCEDURE — 71045 X-RAY EXAM CHEST 1 VIEW: CPT

## 2021-05-21 PROCEDURE — 94799 UNLISTED PULMONARY SVC/PX: CPT

## 2021-05-21 PROCEDURE — 80069 RENAL FUNCTION PANEL: CPT | Performed by: NURSE PRACTITIONER

## 2021-05-21 PROCEDURE — 85025 COMPLETE CBC W/AUTO DIFF WBC: CPT | Performed by: NURSE PRACTITIONER

## 2021-05-21 PROCEDURE — 94640 AIRWAY INHALATION TREATMENT: CPT

## 2021-05-21 PROCEDURE — 25010000003 CEFAZOLIN IN DEXTROSE 2-4 GM/100ML-% SOLUTION: Performed by: NURSE PRACTITIONER

## 2021-05-21 PROCEDURE — 83735 ASSAY OF MAGNESIUM: CPT | Performed by: NURSE PRACTITIONER

## 2021-05-21 PROCEDURE — 63710000001 INSULIN LISPRO (HUMAN) PER 5 UNITS: Performed by: NURSE PRACTITIONER

## 2021-05-21 PROCEDURE — 97110 THERAPEUTIC EXERCISES: CPT

## 2021-05-21 PROCEDURE — 82330 ASSAY OF CALCIUM: CPT | Performed by: NURSE PRACTITIONER

## 2021-05-21 PROCEDURE — 25010000002 MORPHINE PER 10 MG: Performed by: NURSE PRACTITIONER

## 2021-05-21 PROCEDURE — 97162 PT EVAL MOD COMPLEX 30 MIN: CPT

## 2021-05-21 PROCEDURE — 25010000002 CALCIUM GLUCONATE-NACL 1-0.675 GM/50ML-% SOLUTION: Performed by: NURSE PRACTITIONER

## 2021-05-21 PROCEDURE — 93005 ELECTROCARDIOGRAM TRACING: CPT | Performed by: NURSE PRACTITIONER

## 2021-05-21 PROCEDURE — 85610 PROTHROMBIN TIME: CPT | Performed by: NURSE PRACTITIONER

## 2021-05-21 RX ORDER — SODIUM CHLORIDE FOR INHALATION 7 %
4 VIAL, NEBULIZER (ML) INHALATION
Status: DISPENSED | OUTPATIENT
Start: 2021-05-21 | End: 2021-05-24

## 2021-05-21 RX ORDER — IPRATROPIUM BROMIDE AND ALBUTEROL SULFATE 2.5; .5 MG/3ML; MG/3ML
3 SOLUTION RESPIRATORY (INHALATION)
Status: DISPENSED | OUTPATIENT
Start: 2021-05-21 | End: 2021-05-24

## 2021-05-21 RX ORDER — CALCIUM GLUCONATE 20 MG/ML
2 INJECTION, SOLUTION INTRAVENOUS ONCE
Status: COMPLETED | OUTPATIENT
Start: 2021-05-21 | End: 2021-05-21

## 2021-05-21 RX ORDER — INSULIN LISPRO 100 [IU]/ML
0-14 INJECTION, SOLUTION INTRAVENOUS; SUBCUTANEOUS
Status: DISCONTINUED | OUTPATIENT
Start: 2021-05-21 | End: 2021-05-31 | Stop reason: HOSPADM

## 2021-05-21 RX ORDER — NICOTINE POLACRILEX 4 MG
15 LOZENGE BUCCAL
Status: DISCONTINUED | OUTPATIENT
Start: 2021-05-21 | End: 2021-05-31 | Stop reason: HOSPADM

## 2021-05-21 RX ORDER — GABAPENTIN 300 MG/1
300 CAPSULE ORAL 3 TIMES DAILY
Status: DISCONTINUED | OUTPATIENT
Start: 2021-05-21 | End: 2021-05-21 | Stop reason: SDUPTHER

## 2021-05-21 RX ORDER — GABAPENTIN 100 MG/1
100 CAPSULE ORAL EVERY 12 HOURS SCHEDULED
Status: DISPENSED | OUTPATIENT
Start: 2021-05-21 | End: 2021-05-24

## 2021-05-21 RX ORDER — DEXTROSE MONOHYDRATE 25 G/50ML
25 INJECTION, SOLUTION INTRAVENOUS
Status: DISCONTINUED | OUTPATIENT
Start: 2021-05-21 | End: 2021-05-31 | Stop reason: HOSPADM

## 2021-05-21 RX ORDER — GUAIFENESIN 600 MG/1
1200 TABLET, EXTENDED RELEASE ORAL EVERY 12 HOURS SCHEDULED
Status: DISCONTINUED | OUTPATIENT
Start: 2021-05-21 | End: 2021-05-31 | Stop reason: HOSPADM

## 2021-05-21 RX ADMIN — PANTOPRAZOLE SODIUM 40 MG: 40 TABLET, DELAYED RELEASE ORAL at 06:00

## 2021-05-21 RX ADMIN — MORPHINE SULFATE 2 MG: 2 INJECTION, SOLUTION INTRAMUSCULAR; INTRAVENOUS at 07:44

## 2021-05-21 RX ADMIN — GABAPENTIN 100 MG: 100 CAPSULE ORAL at 21:12

## 2021-05-21 RX ADMIN — MAGNESIUM SULFATE HEPTAHYDRATE 1 G: 1 INJECTION, SOLUTION INTRAVENOUS at 04:46

## 2021-05-21 RX ADMIN — MORPHINE SULFATE 1 MG: 2 INJECTION, SOLUTION INTRAMUSCULAR; INTRAVENOUS at 03:50

## 2021-05-21 RX ADMIN — INSULIN LISPRO 5 UNITS: 100 INJECTION, SOLUTION INTRAVENOUS; SUBCUTANEOUS at 21:59

## 2021-05-21 RX ADMIN — GUAIFENESIN 1200 MG: 600 TABLET, EXTENDED RELEASE ORAL at 09:50

## 2021-05-21 RX ADMIN — IPRATROPIUM BROMIDE AND ALBUTEROL SULFATE 3 ML: 2.5; .5 SOLUTION RESPIRATORY (INHALATION) at 14:26

## 2021-05-21 RX ADMIN — INSULIN LISPRO 3 UNITS: 100 INJECTION, SOLUTION INTRAVENOUS; SUBCUTANEOUS at 17:59

## 2021-05-21 RX ADMIN — MUPIROCIN: 20 OINTMENT TOPICAL at 21:12

## 2021-05-21 RX ADMIN — POLYETHYLENE GLYCOL 3350 17 G: 17 POWDER, FOR SOLUTION ORAL at 21:12

## 2021-05-21 RX ADMIN — HYDROCODONE BITARTRATE AND ACETAMINOPHEN 2 TABLET: 5; 325 TABLET ORAL at 13:55

## 2021-05-21 RX ADMIN — CYCLOBENZAPRINE 10 MG: 10 TABLET, FILM COATED ORAL at 19:43

## 2021-05-21 RX ADMIN — DOCUSATE SODIUM 50MG AND SENNOSIDES 8.6MG 2 TABLET: 8.6; 5 TABLET, FILM COATED ORAL at 21:11

## 2021-05-21 RX ADMIN — OXYCODONE 10 MG: 5 TABLET ORAL at 04:46

## 2021-05-21 RX ADMIN — CEFAZOLIN SODIUM 2 G: 2 INJECTION, SOLUTION INTRAVENOUS at 09:52

## 2021-05-21 RX ADMIN — CHLORHEXIDINE GLUCONATE 15 ML: 1.2 RINSE ORAL at 21:12

## 2021-05-21 RX ADMIN — HYDROCODONE BITARTRATE AND ACETAMINOPHEN 2 TABLET: 5; 325 TABLET ORAL at 21:58

## 2021-05-21 RX ADMIN — HYDROCODONE BITARTRATE AND ACETAMINOPHEN 2 TABLET: 5; 325 TABLET ORAL at 18:00

## 2021-05-21 RX ADMIN — GABAPENTIN 300 MG: 300 CAPSULE ORAL at 07:06

## 2021-05-21 RX ADMIN — CEFAZOLIN SODIUM 2 G: 2 INJECTION, SOLUTION INTRAVENOUS at 18:00

## 2021-05-21 RX ADMIN — Medication 4 ML: at 20:32

## 2021-05-21 RX ADMIN — CEFAZOLIN SODIUM 2 G: 2 INJECTION, SOLUTION INTRAVENOUS at 01:08

## 2021-05-21 RX ADMIN — MUPIROCIN 1 APPLICATION: 20 OINTMENT TOPICAL at 09:51

## 2021-05-21 RX ADMIN — CHLORHEXIDINE GLUCONATE 15 ML: 1.2 RINSE ORAL at 09:51

## 2021-05-21 RX ADMIN — Medication 4 ML: at 14:26

## 2021-05-21 RX ADMIN — ATORVASTATIN CALCIUM 40 MG: 20 TABLET, FILM COATED ORAL at 21:58

## 2021-05-21 RX ADMIN — OXYCODONE 10 MG: 5 TABLET ORAL at 09:57

## 2021-05-21 RX ADMIN — METOCLOPRAMIDE HYDROCHLORIDE 10 MG: 5 INJECTION INTRAMUSCULAR; INTRAVENOUS at 06:00

## 2021-05-21 RX ADMIN — CYCLOBENZAPRINE 10 MG: 10 TABLET, FILM COATED ORAL at 05:38

## 2021-05-21 RX ADMIN — CALCIUM GLUCONATE 2 G: 20 INJECTION, SOLUTION INTRAVENOUS at 08:28

## 2021-05-21 RX ADMIN — GUAIFENESIN 1200 MG: 600 TABLET, EXTENDED RELEASE ORAL at 21:12

## 2021-05-21 RX ADMIN — IPRATROPIUM BROMIDE AND ALBUTEROL SULFATE 3 ML: 2.5; .5 SOLUTION RESPIRATORY (INHALATION) at 20:32

## 2021-05-21 RX ADMIN — ACETAMINOPHEN 650 MG: 325 TABLET, FILM COATED ORAL at 04:46

## 2021-05-22 ENCOUNTER — APPOINTMENT (OUTPATIENT)
Dept: GENERAL RADIOLOGY | Facility: HOSPITAL | Age: 76
End: 2021-05-22

## 2021-05-22 LAB
ANION GAP SERPL CALCULATED.3IONS-SCNC: 7.9 MMOL/L (ref 5–15)
BUN SERPL-MCNC: 20 MG/DL (ref 8–23)
BUN/CREAT SERPL: 16 (ref 7–25)
CALCIUM SPEC-SCNC: 8.9 MG/DL (ref 8.6–10.5)
CHLORIDE SERPL-SCNC: 104 MMOL/L (ref 98–107)
CO2 SERPL-SCNC: 23.1 MMOL/L (ref 22–29)
CREAT SERPL-MCNC: 1.25 MG/DL (ref 0.76–1.27)
DEPRECATED RDW RBC AUTO: 51.8 FL (ref 37–54)
ERYTHROCYTE [DISTWIDTH] IN BLOOD BY AUTOMATED COUNT: 15.7 % (ref 12.3–15.4)
GFR SERPL CREATININE-BSD FRML MDRD: 57 ML/MIN/1.73
GLUCOSE BLDC GLUCOMTR-MCNC: 185 MG/DL (ref 70–130)
GLUCOSE BLDC GLUCOMTR-MCNC: 214 MG/DL (ref 70–130)
GLUCOSE BLDC GLUCOMTR-MCNC: 216 MG/DL (ref 70–130)
GLUCOSE BLDC GLUCOMTR-MCNC: 235 MG/DL (ref 70–130)
GLUCOSE SERPL-MCNC: 183 MG/DL (ref 65–99)
HCT VFR BLD AUTO: 26.8 % (ref 37.5–51)
HGB BLD-MCNC: 9 G/DL (ref 13–17.7)
MCH RBC QN AUTO: 30.4 PG (ref 26.6–33)
MCHC RBC AUTO-ENTMCNC: 33.6 G/DL (ref 31.5–35.7)
MCV RBC AUTO: 90.5 FL (ref 79–97)
PLATELET # BLD AUTO: 79 10*3/MM3 (ref 140–450)
PMV BLD AUTO: 9.1 FL (ref 6–12)
POTASSIUM SERPL-SCNC: 4.3 MMOL/L (ref 3.5–5.2)
QT INTERVAL: 347 MS
RBC # BLD AUTO: 2.96 10*6/MM3 (ref 4.14–5.8)
SODIUM SERPL-SCNC: 135 MMOL/L (ref 136–145)
WBC # BLD AUTO: 10.87 10*3/MM3 (ref 3.4–10.8)

## 2021-05-22 PROCEDURE — 82962 GLUCOSE BLOOD TEST: CPT

## 2021-05-22 PROCEDURE — 94660 CPAP INITIATION&MGMT: CPT

## 2021-05-22 PROCEDURE — 97116 GAIT TRAINING THERAPY: CPT

## 2021-05-22 PROCEDURE — 25010000002 AMIODARONE IN DEXTROSE 5% 360-4.14 MG/200ML-% SOLUTION: Performed by: THORACIC SURGERY (CARDIOTHORACIC VASCULAR SURGERY)

## 2021-05-22 PROCEDURE — 93005 ELECTROCARDIOGRAM TRACING: CPT | Performed by: THORACIC SURGERY (CARDIOTHORACIC VASCULAR SURGERY)

## 2021-05-22 PROCEDURE — 63710000001 INSULIN LISPRO (HUMAN) PER 5 UNITS: Performed by: NURSE PRACTITIONER

## 2021-05-22 PROCEDURE — 71045 X-RAY EXAM CHEST 1 VIEW: CPT

## 2021-05-22 PROCEDURE — 25010000002 AMIODARONE IN DEXTROSE 5% 150-4.21 MG/100ML-% SOLUTION: Performed by: THORACIC SURGERY (CARDIOTHORACIC VASCULAR SURGERY)

## 2021-05-22 PROCEDURE — 85027 COMPLETE CBC AUTOMATED: CPT | Performed by: NURSE PRACTITIONER

## 2021-05-22 PROCEDURE — 25010000003 CEFAZOLIN IN DEXTROSE 2-4 GM/100ML-% SOLUTION: Performed by: NURSE PRACTITIONER

## 2021-05-22 PROCEDURE — 97530 THERAPEUTIC ACTIVITIES: CPT

## 2021-05-22 PROCEDURE — 93005 ELECTROCARDIOGRAM TRACING: CPT | Performed by: NURSE PRACTITIONER

## 2021-05-22 PROCEDURE — 93010 ELECTROCARDIOGRAM REPORT: CPT | Performed by: INTERNAL MEDICINE

## 2021-05-22 PROCEDURE — 25010000002 MAGNESIUM SULFATE 2 GM/50ML SOLUTION: Performed by: THORACIC SURGERY (CARDIOTHORACIC VASCULAR SURGERY)

## 2021-05-22 PROCEDURE — 80048 BASIC METABOLIC PNL TOTAL CA: CPT | Performed by: NURSE PRACTITIONER

## 2021-05-22 PROCEDURE — 99024 POSTOP FOLLOW-UP VISIT: CPT | Performed by: THORACIC SURGERY (CARDIOTHORACIC VASCULAR SURGERY)

## 2021-05-22 PROCEDURE — 94799 UNLISTED PULMONARY SVC/PX: CPT

## 2021-05-22 RX ORDER — MAGNESIUM SULFATE HEPTAHYDRATE 40 MG/ML
2 INJECTION, SOLUTION INTRAVENOUS ONCE
Status: COMPLETED | OUTPATIENT
Start: 2021-05-22 | End: 2021-05-22

## 2021-05-22 RX ADMIN — HYDROCODONE BITARTRATE AND ACETAMINOPHEN 2 TABLET: 5; 325 TABLET ORAL at 02:48

## 2021-05-22 RX ADMIN — Medication 4 ML: at 19:50

## 2021-05-22 RX ADMIN — HYDROCODONE BITARTRATE AND ACETAMINOPHEN 2 TABLET: 5; 325 TABLET ORAL at 11:03

## 2021-05-22 RX ADMIN — GUAIFENESIN 1200 MG: 600 TABLET, EXTENDED RELEASE ORAL at 21:48

## 2021-05-22 RX ADMIN — HYDROCODONE BITARTRATE AND ACETAMINOPHEN 2 TABLET: 5; 325 TABLET ORAL at 18:04

## 2021-05-22 RX ADMIN — CHLORHEXIDINE GLUCONATE 15 ML: 1.2 RINSE ORAL at 08:42

## 2021-05-22 RX ADMIN — IPRATROPIUM BROMIDE AND ALBUTEROL SULFATE 3 ML: 2.5; .5 SOLUTION RESPIRATORY (INHALATION) at 19:50

## 2021-05-22 RX ADMIN — HYDROCODONE BITARTRATE AND ACETAMINOPHEN 2 TABLET: 5; 325 TABLET ORAL at 07:01

## 2021-05-22 RX ADMIN — MAGNESIUM SULFATE HEPTAHYDRATE 2 G: 2 INJECTION, SOLUTION INTRAVENOUS at 21:59

## 2021-05-22 RX ADMIN — CEFAZOLIN SODIUM 2 G: 2 INJECTION, SOLUTION INTRAVENOUS at 02:48

## 2021-05-22 RX ADMIN — CHLORHEXIDINE GLUCONATE 15 ML: 1.2 RINSE ORAL at 21:48

## 2021-05-22 RX ADMIN — IPRATROPIUM BROMIDE AND ALBUTEROL SULFATE 3 ML: 2.5; .5 SOLUTION RESPIRATORY (INHALATION) at 14:25

## 2021-05-22 RX ADMIN — IPRATROPIUM BROMIDE AND ALBUTEROL SULFATE 3 ML: 2.5; .5 SOLUTION RESPIRATORY (INHALATION) at 08:35

## 2021-05-22 RX ADMIN — INSULIN LISPRO 5 UNITS: 100 INJECTION, SOLUTION INTRAVENOUS; SUBCUTANEOUS at 18:04

## 2021-05-22 RX ADMIN — INSULIN LISPRO 3 UNITS: 100 INJECTION, SOLUTION INTRAVENOUS; SUBCUTANEOUS at 07:01

## 2021-05-22 RX ADMIN — MUPIROCIN 1 APPLICATION: 20 OINTMENT TOPICAL at 21:47

## 2021-05-22 RX ADMIN — MUPIROCIN 1 APPLICATION: 20 OINTMENT TOPICAL at 08:42

## 2021-05-22 RX ADMIN — INSULIN LISPRO 5 UNITS: 100 INJECTION, SOLUTION INTRAVENOUS; SUBCUTANEOUS at 21:47

## 2021-05-22 RX ADMIN — PANTOPRAZOLE SODIUM 40 MG: 40 TABLET, DELAYED RELEASE ORAL at 07:01

## 2021-05-22 RX ADMIN — AMIODARONE HYDROCHLORIDE 1 MG/MIN: 1.8 INJECTION, SOLUTION INTRAVENOUS at 22:10

## 2021-05-22 RX ADMIN — GABAPENTIN 100 MG: 100 CAPSULE ORAL at 21:48

## 2021-05-22 RX ADMIN — DOCUSATE SODIUM 50MG AND SENNOSIDES 8.6MG 2 TABLET: 8.6; 5 TABLET, FILM COATED ORAL at 21:48

## 2021-05-22 RX ADMIN — ATORVASTATIN CALCIUM 40 MG: 20 TABLET, FILM COATED ORAL at 21:48

## 2021-05-22 RX ADMIN — METOPROLOL TARTRATE 12.5 MG: 25 TABLET, FILM COATED ORAL at 08:43

## 2021-05-22 RX ADMIN — ACETAMINOPHEN 650 MG: 325 TABLET, FILM COATED ORAL at 22:20

## 2021-05-22 RX ADMIN — INSULIN LISPRO 5 UNITS: 100 INJECTION, SOLUTION INTRAVENOUS; SUBCUTANEOUS at 11:03

## 2021-05-22 RX ADMIN — AMIODARONE HYDROCHLORIDE 150 MG: 1.5 INJECTION, SOLUTION INTRAVENOUS at 21:59

## 2021-05-22 RX ADMIN — Medication 4 ML: at 08:34

## 2021-05-22 RX ADMIN — GUAIFENESIN 1200 MG: 600 TABLET, EXTENDED RELEASE ORAL at 08:42

## 2021-05-22 RX ADMIN — GABAPENTIN 100 MG: 100 CAPSULE ORAL at 09:50

## 2021-05-23 ENCOUNTER — APPOINTMENT (OUTPATIENT)
Dept: GENERAL RADIOLOGY | Facility: HOSPITAL | Age: 76
End: 2021-05-23

## 2021-05-23 LAB
ANION GAP SERPL CALCULATED.3IONS-SCNC: 8.6 MMOL/L (ref 5–15)
BH BB BLOOD EXPIRATION DATE: NORMAL
BH BB BLOOD EXPIRATION DATE: NORMAL
BH BB BLOOD TYPE BARCODE: 2800
BH BB BLOOD TYPE BARCODE: 2800
BH BB DISPENSE STATUS: NORMAL
BH BB DISPENSE STATUS: NORMAL
BH BB PRODUCT CODE: NORMAL
BH BB PRODUCT CODE: NORMAL
BH BB UNIT NUMBER: NORMAL
BH BB UNIT NUMBER: NORMAL
BUN SERPL-MCNC: 27 MG/DL (ref 8–23)
BUN/CREAT SERPL: 21.6 (ref 7–25)
CALCIUM SPEC-SCNC: 8.8 MG/DL (ref 8.6–10.5)
CHLORIDE SERPL-SCNC: 102 MMOL/L (ref 98–107)
CO2 SERPL-SCNC: 22.4 MMOL/L (ref 22–29)
CREAT SERPL-MCNC: 1.25 MG/DL (ref 0.76–1.27)
CROSSMATCH INTERPRETATION: NORMAL
CROSSMATCH INTERPRETATION: NORMAL
DEPRECATED RDW RBC AUTO: 52.1 FL (ref 37–54)
ERYTHROCYTE [DISTWIDTH] IN BLOOD BY AUTOMATED COUNT: 15.6 % (ref 12.3–15.4)
GFR SERPL CREATININE-BSD FRML MDRD: 57 ML/MIN/1.73
GLUCOSE BLDC GLUCOMTR-MCNC: 187 MG/DL (ref 70–130)
GLUCOSE BLDC GLUCOMTR-MCNC: 188 MG/DL (ref 70–130)
GLUCOSE BLDC GLUCOMTR-MCNC: 226 MG/DL (ref 70–130)
GLUCOSE BLDC GLUCOMTR-MCNC: 234 MG/DL (ref 70–130)
GLUCOSE SERPL-MCNC: 207 MG/DL (ref 65–99)
HCT VFR BLD AUTO: 25.2 % (ref 37.5–51)
HGB BLD-MCNC: 8.6 G/DL (ref 13–17.7)
MCH RBC QN AUTO: 30.9 PG (ref 26.6–33)
MCHC RBC AUTO-ENTMCNC: 34.1 G/DL (ref 31.5–35.7)
MCV RBC AUTO: 90.6 FL (ref 79–97)
PLATELET # BLD AUTO: 78 10*3/MM3 (ref 140–450)
PMV BLD AUTO: 9.4 FL (ref 6–12)
POTASSIUM SERPL-SCNC: 4.1 MMOL/L (ref 3.5–5.2)
QT INTERVAL: 308 MS
QT INTERVAL: 321 MS
QT INTERVAL: 347 MS
RBC # BLD AUTO: 2.78 10*6/MM3 (ref 4.14–5.8)
SODIUM SERPL-SCNC: 133 MMOL/L (ref 136–145)
UNIT  ABO: NORMAL
UNIT  ABO: NORMAL
UNIT  RH: NORMAL
UNIT  RH: NORMAL
WBC # BLD AUTO: 8.92 10*3/MM3 (ref 3.4–10.8)

## 2021-05-23 PROCEDURE — 63710000001 INSULIN LISPRO (HUMAN) PER 5 UNITS: Performed by: NURSE PRACTITIONER

## 2021-05-23 PROCEDURE — 94799 UNLISTED PULMONARY SVC/PX: CPT

## 2021-05-23 PROCEDURE — 82962 GLUCOSE BLOOD TEST: CPT

## 2021-05-23 PROCEDURE — 93010 ELECTROCARDIOGRAM REPORT: CPT | Performed by: INTERNAL MEDICINE

## 2021-05-23 PROCEDURE — 85027 COMPLETE CBC AUTOMATED: CPT | Performed by: NURSE PRACTITIONER

## 2021-05-23 PROCEDURE — 25010000002 AMIODARONE IN DEXTROSE 5% 360-4.14 MG/200ML-% SOLUTION: Performed by: THORACIC SURGERY (CARDIOTHORACIC VASCULAR SURGERY)

## 2021-05-23 PROCEDURE — 93005 ELECTROCARDIOGRAM TRACING: CPT | Performed by: THORACIC SURGERY (CARDIOTHORACIC VASCULAR SURGERY)

## 2021-05-23 PROCEDURE — 80048 BASIC METABOLIC PNL TOTAL CA: CPT | Performed by: NURSE PRACTITIONER

## 2021-05-23 PROCEDURE — 99024 POSTOP FOLLOW-UP VISIT: CPT | Performed by: THORACIC SURGERY (CARDIOTHORACIC VASCULAR SURGERY)

## 2021-05-23 PROCEDURE — 71046 X-RAY EXAM CHEST 2 VIEWS: CPT

## 2021-05-23 PROCEDURE — 97530 THERAPEUTIC ACTIVITIES: CPT

## 2021-05-23 PROCEDURE — 25010000002 MAGNESIUM SULFATE 2 GM/50ML SOLUTION: Performed by: THORACIC SURGERY (CARDIOTHORACIC VASCULAR SURGERY)

## 2021-05-23 RX ORDER — MAGNESIUM SULFATE HEPTAHYDRATE 40 MG/ML
2 INJECTION, SOLUTION INTRAVENOUS ONCE
Status: COMPLETED | OUTPATIENT
Start: 2021-05-23 | End: 2021-05-23

## 2021-05-23 RX ORDER — TAMSULOSIN HYDROCHLORIDE 0.4 MG/1
0.4 CAPSULE ORAL DAILY
Status: DISCONTINUED | OUTPATIENT
Start: 2021-05-23 | End: 2021-05-31 | Stop reason: HOSPADM

## 2021-05-23 RX ADMIN — METOPROLOL TARTRATE 12.5 MG: 25 TABLET, FILM COATED ORAL at 20:50

## 2021-05-23 RX ADMIN — IPRATROPIUM BROMIDE AND ALBUTEROL SULFATE 3 ML: 2.5; .5 SOLUTION RESPIRATORY (INHALATION) at 10:43

## 2021-05-23 RX ADMIN — IPRATROPIUM BROMIDE AND ALBUTEROL SULFATE 3 ML: 2.5; .5 SOLUTION RESPIRATORY (INHALATION) at 19:40

## 2021-05-23 RX ADMIN — HYDROCODONE BITARTRATE AND ACETAMINOPHEN 2 TABLET: 5; 325 TABLET ORAL at 16:01

## 2021-05-23 RX ADMIN — IPRATROPIUM BROMIDE AND ALBUTEROL SULFATE 3 ML: 2.5; .5 SOLUTION RESPIRATORY (INHALATION) at 13:54

## 2021-05-23 RX ADMIN — CHLORHEXIDINE GLUCONATE 15 ML: 1.2 RINSE ORAL at 20:50

## 2021-05-23 RX ADMIN — GUAIFENESIN 1200 MG: 600 TABLET, EXTENDED RELEASE ORAL at 20:50

## 2021-05-23 RX ADMIN — INSULIN LISPRO 3 UNITS: 100 INJECTION, SOLUTION INTRAVENOUS; SUBCUTANEOUS at 06:57

## 2021-05-23 RX ADMIN — HYDROCODONE BITARTRATE AND ACETAMINOPHEN 2 TABLET: 5; 325 TABLET ORAL at 07:01

## 2021-05-23 RX ADMIN — AMIODARONE HYDROCHLORIDE 0.5 MG/MIN: 1.8 INJECTION, SOLUTION INTRAVENOUS at 04:34

## 2021-05-23 RX ADMIN — INSULIN LISPRO 3 UNITS: 100 INJECTION, SOLUTION INTRAVENOUS; SUBCUTANEOUS at 20:50

## 2021-05-23 RX ADMIN — ASPIRIN 81 MG: 81 TABLET, COATED ORAL at 09:15

## 2021-05-23 RX ADMIN — GUAIFENESIN 1200 MG: 600 TABLET, EXTENDED RELEASE ORAL at 09:15

## 2021-05-23 RX ADMIN — CHLORHEXIDINE GLUCONATE 15 ML: 1.2 RINSE ORAL at 09:15

## 2021-05-23 RX ADMIN — CYCLOBENZAPRINE 10 MG: 10 TABLET, FILM COATED ORAL at 04:41

## 2021-05-23 RX ADMIN — INSULIN LISPRO 5 UNITS: 100 INJECTION, SOLUTION INTRAVENOUS; SUBCUTANEOUS at 11:42

## 2021-05-23 RX ADMIN — POLYETHYLENE GLYCOL 3350 17 G: 17 POWDER, FOR SOLUTION ORAL at 09:15

## 2021-05-23 RX ADMIN — HYDROCODONE BITARTRATE AND ACETAMINOPHEN 2 TABLET: 5; 325 TABLET ORAL at 20:58

## 2021-05-23 RX ADMIN — Medication 4 ML: at 19:40

## 2021-05-23 RX ADMIN — ATORVASTATIN CALCIUM 40 MG: 20 TABLET, FILM COATED ORAL at 20:50

## 2021-05-23 RX ADMIN — DOCUSATE SODIUM 50MG AND SENNOSIDES 8.6MG 2 TABLET: 8.6; 5 TABLET, FILM COATED ORAL at 20:50

## 2021-05-23 RX ADMIN — MUPIROCIN 1 APPLICATION: 20 OINTMENT TOPICAL at 09:15

## 2021-05-23 RX ADMIN — PANTOPRAZOLE SODIUM 40 MG: 40 TABLET, DELAYED RELEASE ORAL at 06:57

## 2021-05-23 RX ADMIN — GABAPENTIN 100 MG: 100 CAPSULE ORAL at 20:50

## 2021-05-23 RX ADMIN — INSULIN LISPRO 5 UNITS: 100 INJECTION, SOLUTION INTRAVENOUS; SUBCUTANEOUS at 16:01

## 2021-05-23 RX ADMIN — MAGNESIUM SULFATE HEPTAHYDRATE 2 G: 2 INJECTION, SOLUTION INTRAVENOUS at 09:57

## 2021-05-23 RX ADMIN — AMIODARONE HYDROCHLORIDE 0.5 MG/MIN: 1.8 INJECTION, SOLUTION INTRAVENOUS at 16:01

## 2021-05-23 RX ADMIN — GABAPENTIN 100 MG: 100 CAPSULE ORAL at 09:15

## 2021-05-23 RX ADMIN — TAMSULOSIN HYDROCHLORIDE 0.4 MG: 0.4 CAPSULE ORAL at 12:31

## 2021-05-24 ENCOUNTER — APPOINTMENT (OUTPATIENT)
Dept: GENERAL RADIOLOGY | Facility: HOSPITAL | Age: 76
End: 2021-05-24

## 2021-05-24 LAB
ANION GAP SERPL CALCULATED.3IONS-SCNC: 11.1 MMOL/L (ref 5–15)
BUN SERPL-MCNC: 30 MG/DL (ref 8–23)
BUN/CREAT SERPL: 25.6 (ref 7–25)
CALCIUM SPEC-SCNC: 8.5 MG/DL (ref 8.6–10.5)
CHLORIDE SERPL-SCNC: 101 MMOL/L (ref 98–107)
CO2 SERPL-SCNC: 21.9 MMOL/L (ref 22–29)
CREAT SERPL-MCNC: 1.17 MG/DL (ref 0.76–1.27)
DEPRECATED RDW RBC AUTO: 52.7 FL (ref 37–54)
ERYTHROCYTE [DISTWIDTH] IN BLOOD BY AUTOMATED COUNT: 15.7 % (ref 12.3–15.4)
GFR SERPL CREATININE-BSD FRML MDRD: 61 ML/MIN/1.73
GLUCOSE BLDC GLUCOMTR-MCNC: 143 MG/DL (ref 70–130)
GLUCOSE BLDC GLUCOMTR-MCNC: 173 MG/DL (ref 70–130)
GLUCOSE BLDC GLUCOMTR-MCNC: 179 MG/DL (ref 70–130)
GLUCOSE BLDC GLUCOMTR-MCNC: 223 MG/DL (ref 70–130)
GLUCOSE SERPL-MCNC: 172 MG/DL (ref 65–99)
HCT VFR BLD AUTO: 24.1 % (ref 37.5–51)
HGB BLD-MCNC: 8.1 G/DL (ref 13–17.7)
MCH RBC QN AUTO: 30.8 PG (ref 26.6–33)
MCHC RBC AUTO-ENTMCNC: 33.6 G/DL (ref 31.5–35.7)
MCV RBC AUTO: 91.6 FL (ref 79–97)
PLATELET # BLD AUTO: 80 10*3/MM3 (ref 140–450)
PMV BLD AUTO: 9.4 FL (ref 6–12)
POTASSIUM SERPL-SCNC: 4.1 MMOL/L (ref 3.5–5.2)
QT INTERVAL: 368 MS
RBC # BLD AUTO: 2.63 10*6/MM3 (ref 4.14–5.8)
SODIUM SERPL-SCNC: 134 MMOL/L (ref 136–145)
WBC # BLD AUTO: 6.43 10*3/MM3 (ref 3.4–10.8)

## 2021-05-24 PROCEDURE — 80048 BASIC METABOLIC PNL TOTAL CA: CPT | Performed by: NURSE PRACTITIONER

## 2021-05-24 PROCEDURE — 94799 UNLISTED PULMONARY SVC/PX: CPT

## 2021-05-24 PROCEDURE — 85027 COMPLETE CBC AUTOMATED: CPT | Performed by: NURSE PRACTITIONER

## 2021-05-24 PROCEDURE — 93005 ELECTROCARDIOGRAM TRACING: CPT | Performed by: THORACIC SURGERY (CARDIOTHORACIC VASCULAR SURGERY)

## 2021-05-24 PROCEDURE — 97530 THERAPEUTIC ACTIVITIES: CPT

## 2021-05-24 PROCEDURE — 63710000001 INSULIN GLARGINE PER 5 UNITS: Performed by: NURSE PRACTITIONER

## 2021-05-24 PROCEDURE — 97166 OT EVAL MOD COMPLEX 45 MIN: CPT

## 2021-05-24 PROCEDURE — 71045 X-RAY EXAM CHEST 1 VIEW: CPT

## 2021-05-24 PROCEDURE — 97110 THERAPEUTIC EXERCISES: CPT

## 2021-05-24 PROCEDURE — 25010000002 ENOXAPARIN PER 10 MG: Performed by: NURSE PRACTITIONER

## 2021-05-24 PROCEDURE — 25010000002 AMIODARONE IN DEXTROSE 5% 360-4.14 MG/200ML-% SOLUTION: Performed by: THORACIC SURGERY (CARDIOTHORACIC VASCULAR SURGERY)

## 2021-05-24 PROCEDURE — 93010 ELECTROCARDIOGRAM REPORT: CPT | Performed by: INTERNAL MEDICINE

## 2021-05-24 PROCEDURE — 63710000001 INSULIN LISPRO (HUMAN) PER 5 UNITS: Performed by: NURSE PRACTITIONER

## 2021-05-24 PROCEDURE — 82962 GLUCOSE BLOOD TEST: CPT

## 2021-05-24 RX ORDER — AMIODARONE HYDROCHLORIDE 200 MG/1
300 TABLET ORAL EVERY 12 HOURS SCHEDULED
Status: DISCONTINUED | OUTPATIENT
Start: 2021-05-24 | End: 2021-05-25

## 2021-05-24 RX ORDER — LEVOTHYROXINE SODIUM 0.05 MG/1
50 TABLET ORAL
Status: DISCONTINUED | OUTPATIENT
Start: 2021-05-24 | End: 2021-05-31 | Stop reason: HOSPADM

## 2021-05-24 RX ORDER — GLIPIZIDE 10 MG/1
10 TABLET ORAL
Status: DISCONTINUED | OUTPATIENT
Start: 2021-05-24 | End: 2021-05-31 | Stop reason: HOSPADM

## 2021-05-24 RX ORDER — NADOLOL 40 MG/1
40 TABLET ORAL DAILY
Status: DISCONTINUED | OUTPATIENT
Start: 2021-05-24 | End: 2021-05-26

## 2021-05-24 RX ORDER — LEVOTHYROXINE SODIUM 0.1 MG/1
200 TABLET ORAL EVERY MORNING
Status: DISCONTINUED | OUTPATIENT
Start: 2021-05-24 | End: 2021-05-24

## 2021-05-24 RX ORDER — INSULIN GLARGINE 100 [IU]/ML
30 INJECTION, SOLUTION SUBCUTANEOUS NIGHTLY
Status: DISCONTINUED | OUTPATIENT
Start: 2021-05-24 | End: 2021-05-31 | Stop reason: HOSPADM

## 2021-05-24 RX ORDER — DONEPEZIL HYDROCHLORIDE 10 MG/1
10 TABLET, FILM COATED ORAL NIGHTLY
Status: DISCONTINUED | OUTPATIENT
Start: 2021-05-24 | End: 2021-05-31 | Stop reason: HOSPADM

## 2021-05-24 RX ORDER — FUROSEMIDE 40 MG/1
40 TABLET ORAL DAILY
Status: DISCONTINUED | OUTPATIENT
Start: 2021-05-24 | End: 2021-05-27

## 2021-05-24 RX ORDER — FERROUS SULFATE 325(65) MG
325 TABLET ORAL
Status: DISCONTINUED | OUTPATIENT
Start: 2021-05-24 | End: 2021-05-31 | Stop reason: HOSPADM

## 2021-05-24 RX ORDER — POTASSIUM CHLORIDE 750 MG/1
20 TABLET, FILM COATED, EXTENDED RELEASE ORAL DAILY
Status: DISCONTINUED | OUTPATIENT
Start: 2021-05-24 | End: 2021-05-31 | Stop reason: HOSPADM

## 2021-05-24 RX ADMIN — NADOLOL 40 MG: 40 TABLET ORAL at 10:04

## 2021-05-24 RX ADMIN — AMIODARONE HYDROCHLORIDE 300 MG: 200 TABLET ORAL at 11:59

## 2021-05-24 RX ADMIN — IPRATROPIUM BROMIDE AND ALBUTEROL SULFATE 3 ML: 2.5; .5 SOLUTION RESPIRATORY (INHALATION) at 06:24

## 2021-05-24 RX ADMIN — ASPIRIN 81 MG: 81 TABLET, COATED ORAL at 09:00

## 2021-05-24 RX ADMIN — ATORVASTATIN CALCIUM 40 MG: 20 TABLET, FILM COATED ORAL at 20:41

## 2021-05-24 RX ADMIN — AMIODARONE HYDROCHLORIDE 300 MG: 200 TABLET ORAL at 20:41

## 2021-05-24 RX ADMIN — GLIPIZIDE 10 MG: 10 TABLET ORAL at 17:24

## 2021-05-24 RX ADMIN — POLYETHYLENE GLYCOL 3350 17 G: 17 POWDER, FOR SOLUTION ORAL at 09:00

## 2021-05-24 RX ADMIN — DONEPEZIL HYDROCHLORIDE 10 MG: 10 TABLET, FILM COATED ORAL at 20:41

## 2021-05-24 RX ADMIN — TAMSULOSIN HYDROCHLORIDE 0.4 MG: 0.4 CAPSULE ORAL at 09:00

## 2021-05-24 RX ADMIN — GUAIFENESIN 1200 MG: 600 TABLET, EXTENDED RELEASE ORAL at 20:41

## 2021-05-24 RX ADMIN — FERROUS SULFATE TAB 325 MG (65 MG ELEMENTAL FE) 325 MG: 325 (65 FE) TAB at 10:04

## 2021-05-24 RX ADMIN — ENOXAPARIN SODIUM 40 MG: 40 INJECTION SUBCUTANEOUS at 09:00

## 2021-05-24 RX ADMIN — INSULIN LISPRO 3 UNITS: 100 INJECTION, SOLUTION INTRAVENOUS; SUBCUTANEOUS at 17:24

## 2021-05-24 RX ADMIN — LEVOTHYROXINE SODIUM 50 MCG: 0.05 TABLET ORAL at 10:04

## 2021-05-24 RX ADMIN — GLIPIZIDE 10 MG: 10 TABLET ORAL at 10:04

## 2021-05-24 RX ADMIN — INSULIN GLARGINE 30 UNITS: 100 INJECTION, SOLUTION SUBCUTANEOUS at 20:36

## 2021-05-24 RX ADMIN — POTASSIUM CHLORIDE 20 MEQ: 750 TABLET, EXTENDED RELEASE ORAL at 12:00

## 2021-05-24 RX ADMIN — DOCUSATE SODIUM 50MG AND SENNOSIDES 8.6MG 2 TABLET: 8.6; 5 TABLET, FILM COATED ORAL at 20:41

## 2021-05-24 RX ADMIN — FUROSEMIDE 40 MG: 40 TABLET ORAL at 11:59

## 2021-05-24 RX ADMIN — HYDROCODONE BITARTRATE AND ACETAMINOPHEN 2 TABLET: 5; 325 TABLET ORAL at 08:59

## 2021-05-24 RX ADMIN — GUAIFENESIN 1200 MG: 600 TABLET, EXTENDED RELEASE ORAL at 09:00

## 2021-05-24 RX ADMIN — PANTOPRAZOLE SODIUM 40 MG: 40 TABLET, DELAYED RELEASE ORAL at 06:45

## 2021-05-24 RX ADMIN — INSULIN LISPRO 5 UNITS: 100 INJECTION, SOLUTION INTRAVENOUS; SUBCUTANEOUS at 12:00

## 2021-05-24 RX ADMIN — AMIODARONE HYDROCHLORIDE 0.5 MG/MIN: 1.8 INJECTION, SOLUTION INTRAVENOUS at 03:37

## 2021-05-24 RX ADMIN — INSULIN LISPRO 3 UNITS: 100 INJECTION, SOLUTION INTRAVENOUS; SUBCUTANEOUS at 06:45

## 2021-05-25 LAB
ANION GAP SERPL CALCULATED.3IONS-SCNC: 9 MMOL/L (ref 5–15)
BUN SERPL-MCNC: 38 MG/DL (ref 8–23)
BUN/CREAT SERPL: 26.6 (ref 7–25)
CALCIUM SPEC-SCNC: 8.6 MG/DL (ref 8.6–10.5)
CHLORIDE SERPL-SCNC: 101 MMOL/L (ref 98–107)
CO2 SERPL-SCNC: 21 MMOL/L (ref 22–29)
CREAT SERPL-MCNC: 1.43 MG/DL (ref 0.76–1.27)
GFR SERPL CREATININE-BSD FRML MDRD: 48 ML/MIN/1.73
GLUCOSE BLDC GLUCOMTR-MCNC: 123 MG/DL (ref 70–130)
GLUCOSE BLDC GLUCOMTR-MCNC: 159 MG/DL (ref 70–130)
GLUCOSE BLDC GLUCOMTR-MCNC: 172 MG/DL (ref 70–130)
GLUCOSE BLDC GLUCOMTR-MCNC: 181 MG/DL (ref 70–130)
GLUCOSE SERPL-MCNC: 143 MG/DL (ref 65–99)
POTASSIUM SERPL-SCNC: 3.9 MMOL/L (ref 3.5–5.2)
QT INTERVAL: 415 MS
QT INTERVAL: 468 MS
SODIUM SERPL-SCNC: 131 MMOL/L (ref 136–145)

## 2021-05-25 PROCEDURE — 94799 UNLISTED PULMONARY SVC/PX: CPT

## 2021-05-25 PROCEDURE — 93005 ELECTROCARDIOGRAM TRACING: CPT | Performed by: THORACIC SURGERY (CARDIOTHORACIC VASCULAR SURGERY)

## 2021-05-25 PROCEDURE — 97110 THERAPEUTIC EXERCISES: CPT

## 2021-05-25 PROCEDURE — 93010 ELECTROCARDIOGRAM REPORT: CPT | Performed by: INTERNAL MEDICINE

## 2021-05-25 PROCEDURE — 80048 BASIC METABOLIC PNL TOTAL CA: CPT | Performed by: NURSE PRACTITIONER

## 2021-05-25 PROCEDURE — 82962 GLUCOSE BLOOD TEST: CPT

## 2021-05-25 PROCEDURE — 25010000002 ENOXAPARIN PER 10 MG: Performed by: NURSE PRACTITIONER

## 2021-05-25 PROCEDURE — 63710000001 INSULIN GLARGINE PER 5 UNITS: Performed by: NURSE PRACTITIONER

## 2021-05-25 PROCEDURE — 25010000002 MORPHINE PER 10 MG: Performed by: NURSE PRACTITIONER

## 2021-05-25 PROCEDURE — 63710000001 INSULIN LISPRO (HUMAN) PER 5 UNITS: Performed by: NURSE PRACTITIONER

## 2021-05-25 PROCEDURE — 97530 THERAPEUTIC ACTIVITIES: CPT

## 2021-05-25 RX ORDER — BISACODYL 10 MG
10 SUPPOSITORY, RECTAL RECTAL ONCE
Status: COMPLETED | OUTPATIENT
Start: 2021-05-25 | End: 2021-05-25

## 2021-05-25 RX ORDER — TRAMADOL HYDROCHLORIDE 50 MG/1
50 TABLET ORAL EVERY 6 HOURS PRN
Status: DISCONTINUED | OUTPATIENT
Start: 2021-05-25 | End: 2021-05-31 | Stop reason: HOSPADM

## 2021-05-25 RX ADMIN — BISACODYL 10 MG: 10 SUPPOSITORY RECTAL at 17:09

## 2021-05-25 RX ADMIN — AMIODARONE HYDROCHLORIDE 300 MG: 200 TABLET ORAL at 08:27

## 2021-05-25 RX ADMIN — INSULIN LISPRO 3 UNITS: 100 INJECTION, SOLUTION INTRAVENOUS; SUBCUTANEOUS at 12:53

## 2021-05-25 RX ADMIN — FUROSEMIDE 40 MG: 40 TABLET ORAL at 08:27

## 2021-05-25 RX ADMIN — PANTOPRAZOLE SODIUM 40 MG: 40 TABLET, DELAYED RELEASE ORAL at 06:11

## 2021-05-25 RX ADMIN — DONEPEZIL HYDROCHLORIDE 10 MG: 10 TABLET, FILM COATED ORAL at 21:34

## 2021-05-25 RX ADMIN — NADOLOL 40 MG: 40 TABLET ORAL at 08:27

## 2021-05-25 RX ADMIN — HYDROCODONE BITARTRATE AND ACETAMINOPHEN 2 TABLET: 5; 325 TABLET ORAL at 03:35

## 2021-05-25 RX ADMIN — GLIPIZIDE 10 MG: 10 TABLET ORAL at 17:08

## 2021-05-25 RX ADMIN — TAMSULOSIN HYDROCHLORIDE 0.4 MG: 0.4 CAPSULE ORAL at 08:26

## 2021-05-25 RX ADMIN — HYDROCODONE BITARTRATE AND ACETAMINOPHEN 2 TABLET: 5; 325 TABLET ORAL at 13:50

## 2021-05-25 RX ADMIN — FERROUS SULFATE TAB 325 MG (65 MG ELEMENTAL FE) 325 MG: 325 (65 FE) TAB at 08:26

## 2021-05-25 RX ADMIN — INSULIN LISPRO 3 UNITS: 100 INJECTION, SOLUTION INTRAVENOUS; SUBCUTANEOUS at 21:34

## 2021-05-25 RX ADMIN — GUAIFENESIN 1200 MG: 600 TABLET, EXTENDED RELEASE ORAL at 21:34

## 2021-05-25 RX ADMIN — LEVOTHYROXINE SODIUM 50 MCG: 0.05 TABLET ORAL at 06:12

## 2021-05-25 RX ADMIN — MORPHINE SULFATE 1 MG: 2 INJECTION, SOLUTION INTRAMUSCULAR; INTRAVENOUS at 13:04

## 2021-05-25 RX ADMIN — POLYETHYLENE GLYCOL 3350 17 G: 17 POWDER, FOR SOLUTION ORAL at 08:27

## 2021-05-25 RX ADMIN — POTASSIUM CHLORIDE 20 MEQ: 750 TABLET, EXTENDED RELEASE ORAL at 08:29

## 2021-05-25 RX ADMIN — DOCUSATE SODIUM 50MG AND SENNOSIDES 8.6MG 2 TABLET: 8.6; 5 TABLET, FILM COATED ORAL at 21:34

## 2021-05-25 RX ADMIN — ATORVASTATIN CALCIUM 40 MG: 20 TABLET, FILM COATED ORAL at 21:34

## 2021-05-25 RX ADMIN — GUAIFENESIN 1200 MG: 600 TABLET, EXTENDED RELEASE ORAL at 08:27

## 2021-05-25 RX ADMIN — ENOXAPARIN SODIUM 40 MG: 40 INJECTION SUBCUTANEOUS at 08:26

## 2021-05-25 RX ADMIN — INSULIN GLARGINE 30 UNITS: 100 INJECTION, SOLUTION SUBCUTANEOUS at 21:34

## 2021-05-25 RX ADMIN — ASPIRIN 81 MG: 81 TABLET, COATED ORAL at 08:27

## 2021-05-26 ENCOUNTER — TELEPHONE (OUTPATIENT)
Dept: CARDIAC SURGERY | Facility: CLINIC | Age: 76
End: 2021-05-26

## 2021-05-26 LAB
ANION GAP SERPL CALCULATED.3IONS-SCNC: 11.9 MMOL/L (ref 5–15)
BUN SERPL-MCNC: 42 MG/DL (ref 8–23)
BUN/CREAT SERPL: 27.3 (ref 7–25)
CALCIUM SPEC-SCNC: 8.8 MG/DL (ref 8.6–10.5)
CHLORIDE SERPL-SCNC: 100 MMOL/L (ref 98–107)
CO2 SERPL-SCNC: 20.1 MMOL/L (ref 22–29)
CREAT SERPL-MCNC: 1.54 MG/DL (ref 0.76–1.27)
GFR SERPL CREATININE-BSD FRML MDRD: 45 ML/MIN/1.73
GLUCOSE BLDC GLUCOMTR-MCNC: 128 MG/DL (ref 70–130)
GLUCOSE BLDC GLUCOMTR-MCNC: 142 MG/DL (ref 70–130)
GLUCOSE BLDC GLUCOMTR-MCNC: 143 MG/DL (ref 70–130)
GLUCOSE BLDC GLUCOMTR-MCNC: 99 MG/DL (ref 70–130)
GLUCOSE SERPL-MCNC: 127 MG/DL (ref 65–99)
POTASSIUM SERPL-SCNC: 4.3 MMOL/L (ref 3.5–5.2)
SODIUM SERPL-SCNC: 132 MMOL/L (ref 136–145)

## 2021-05-26 PROCEDURE — 94799 UNLISTED PULMONARY SVC/PX: CPT

## 2021-05-26 PROCEDURE — 80048 BASIC METABOLIC PNL TOTAL CA: CPT | Performed by: NURSE PRACTITIONER

## 2021-05-26 PROCEDURE — 82962 GLUCOSE BLOOD TEST: CPT

## 2021-05-26 PROCEDURE — 97530 THERAPEUTIC ACTIVITIES: CPT

## 2021-05-26 PROCEDURE — 63710000001 INSULIN GLARGINE PER 5 UNITS: Performed by: NURSE PRACTITIONER

## 2021-05-26 PROCEDURE — 25010000002 ENOXAPARIN PER 10 MG: Performed by: NURSE PRACTITIONER

## 2021-05-26 RX ORDER — FINASTERIDE 5 MG/1
5 TABLET, FILM COATED ORAL DAILY
Status: DISCONTINUED | OUTPATIENT
Start: 2021-05-26 | End: 2021-05-31 | Stop reason: HOSPADM

## 2021-05-26 RX ORDER — NADOLOL 20 MG/1
20 TABLET ORAL DAILY
Status: DISCONTINUED | OUTPATIENT
Start: 2021-05-27 | End: 2021-05-31 | Stop reason: HOSPADM

## 2021-05-26 RX ADMIN — ATORVASTATIN CALCIUM 40 MG: 20 TABLET, FILM COATED ORAL at 21:46

## 2021-05-26 RX ADMIN — TAMSULOSIN HYDROCHLORIDE 0.4 MG: 0.4 CAPSULE ORAL at 08:45

## 2021-05-26 RX ADMIN — DONEPEZIL HYDROCHLORIDE 10 MG: 10 TABLET, FILM COATED ORAL at 21:46

## 2021-05-26 RX ADMIN — POTASSIUM CHLORIDE 20 MEQ: 750 TABLET, EXTENDED RELEASE ORAL at 08:44

## 2021-05-26 RX ADMIN — GLIPIZIDE 10 MG: 10 TABLET ORAL at 06:42

## 2021-05-26 RX ADMIN — FUROSEMIDE 40 MG: 40 TABLET ORAL at 08:48

## 2021-05-26 RX ADMIN — GUAIFENESIN 1200 MG: 600 TABLET, EXTENDED RELEASE ORAL at 08:43

## 2021-05-26 RX ADMIN — ENOXAPARIN SODIUM 40 MG: 40 INJECTION SUBCUTANEOUS at 08:43

## 2021-05-26 RX ADMIN — HYDROCODONE BITARTRATE AND ACETAMINOPHEN 2 TABLET: 5; 325 TABLET ORAL at 08:44

## 2021-05-26 RX ADMIN — GUAIFENESIN 1200 MG: 600 TABLET, EXTENDED RELEASE ORAL at 21:46

## 2021-05-26 RX ADMIN — HYDROCODONE BITARTRATE AND ACETAMINOPHEN 2 TABLET: 5; 325 TABLET ORAL at 22:03

## 2021-05-26 RX ADMIN — NADOLOL 40 MG: 40 TABLET ORAL at 08:45

## 2021-05-26 RX ADMIN — INSULIN GLARGINE 30 UNITS: 100 INJECTION, SOLUTION SUBCUTANEOUS at 21:46

## 2021-05-26 RX ADMIN — DOCUSATE SODIUM 50MG AND SENNOSIDES 8.6MG 2 TABLET: 8.6; 5 TABLET, FILM COATED ORAL at 21:46

## 2021-05-26 RX ADMIN — GLIPIZIDE 10 MG: 10 TABLET ORAL at 17:04

## 2021-05-26 RX ADMIN — FINASTERIDE 5 MG: 5 TABLET, FILM COATED ORAL at 12:26

## 2021-05-26 RX ADMIN — POLYETHYLENE GLYCOL 3350 17 G: 17 POWDER, FOR SOLUTION ORAL at 08:43

## 2021-05-26 RX ADMIN — LEVOTHYROXINE SODIUM 50 MCG: 0.05 TABLET ORAL at 07:27

## 2021-05-26 RX ADMIN — PANTOPRAZOLE SODIUM 40 MG: 40 TABLET, DELAYED RELEASE ORAL at 06:42

## 2021-05-26 RX ADMIN — ASPIRIN 81 MG: 81 TABLET, COATED ORAL at 08:44

## 2021-05-26 NOTE — TELEPHONE ENCOUNTER
currently admitted, advised Carla she would need to discuss with , Carla states she will and is waiting for  to return her call.     ----- Message from Davonte Bonilla sent at 5/26/2021  2:09 PM EDT -----  Regarding: Rehab  Pt's wife Carla would like a call back at [phone]. She wants to discuss her  Cosmo going to rehab and having our drs call an ambulance to have him transported if possible. She said she called the insurance company and was told if the dr requested the transport they would cover it.

## 2021-05-27 ENCOUNTER — APPOINTMENT (OUTPATIENT)
Dept: GENERAL RADIOLOGY | Facility: HOSPITAL | Age: 76
End: 2021-05-27

## 2021-05-27 LAB
ANION GAP SERPL CALCULATED.3IONS-SCNC: 10.5 MMOL/L (ref 5–15)
BUN SERPL-MCNC: 38 MG/DL (ref 8–23)
BUN/CREAT SERPL: 30.6 (ref 7–25)
CALCIUM SPEC-SCNC: 9 MG/DL (ref 8.6–10.5)
CHLORIDE SERPL-SCNC: 104 MMOL/L (ref 98–107)
CO2 SERPL-SCNC: 22.5 MMOL/L (ref 22–29)
CREAT SERPL-MCNC: 1.24 MG/DL (ref 0.76–1.27)
GFR SERPL CREATININE-BSD FRML MDRD: 57 ML/MIN/1.73
GLUCOSE BLDC GLUCOMTR-MCNC: 110 MG/DL (ref 70–130)
GLUCOSE BLDC GLUCOMTR-MCNC: 171 MG/DL (ref 70–130)
GLUCOSE BLDC GLUCOMTR-MCNC: 172 MG/DL (ref 70–130)
GLUCOSE BLDC GLUCOMTR-MCNC: 81 MG/DL (ref 70–130)
GLUCOSE BLDC GLUCOMTR-MCNC: 87 MG/DL (ref 70–130)
GLUCOSE SERPL-MCNC: 114 MG/DL (ref 65–99)
POTASSIUM SERPL-SCNC: 3.9 MMOL/L (ref 3.5–5.2)
SODIUM SERPL-SCNC: 137 MMOL/L (ref 136–145)

## 2021-05-27 PROCEDURE — 99024 POSTOP FOLLOW-UP VISIT: CPT | Performed by: NURSE PRACTITIONER

## 2021-05-27 PROCEDURE — 25010000002 FUROSEMIDE PER 20 MG: Performed by: NURSE PRACTITIONER

## 2021-05-27 PROCEDURE — 25010000002 ENOXAPARIN PER 10 MG: Performed by: NURSE PRACTITIONER

## 2021-05-27 PROCEDURE — 97530 THERAPEUTIC ACTIVITIES: CPT

## 2021-05-27 PROCEDURE — 82962 GLUCOSE BLOOD TEST: CPT

## 2021-05-27 PROCEDURE — 71045 X-RAY EXAM CHEST 1 VIEW: CPT

## 2021-05-27 PROCEDURE — 63710000001 INSULIN GLARGINE PER 5 UNITS: Performed by: NURSE PRACTITIONER

## 2021-05-27 PROCEDURE — 80048 BASIC METABOLIC PNL TOTAL CA: CPT | Performed by: NURSE PRACTITIONER

## 2021-05-27 RX ORDER — FUROSEMIDE 40 MG/1
40 TABLET ORAL DAILY
Status: DISCONTINUED | OUTPATIENT
Start: 2021-05-28 | End: 2021-05-31 | Stop reason: HOSPADM

## 2021-05-27 RX ORDER — FUROSEMIDE 10 MG/ML
40 INJECTION INTRAMUSCULAR; INTRAVENOUS ONCE
Status: COMPLETED | OUTPATIENT
Start: 2021-05-27 | End: 2021-05-27

## 2021-05-27 RX ADMIN — FINASTERIDE 5 MG: 5 TABLET, FILM COATED ORAL at 09:40

## 2021-05-27 RX ADMIN — NADOLOL 20 MG: 20 TABLET ORAL at 09:41

## 2021-05-27 RX ADMIN — FUROSEMIDE 40 MG: 10 INJECTION, SOLUTION INTRAMUSCULAR; INTRAVENOUS at 09:39

## 2021-05-27 RX ADMIN — GUAIFENESIN 1200 MG: 600 TABLET, EXTENDED RELEASE ORAL at 21:24

## 2021-05-27 RX ADMIN — GLIPIZIDE 10 MG: 10 TABLET ORAL at 18:07

## 2021-05-27 RX ADMIN — GLIPIZIDE 10 MG: 10 TABLET ORAL at 06:07

## 2021-05-27 RX ADMIN — POLYETHYLENE GLYCOL 3350 17 G: 17 POWDER, FOR SOLUTION ORAL at 09:39

## 2021-05-27 RX ADMIN — DONEPEZIL HYDROCHLORIDE 10 MG: 10 TABLET, FILM COATED ORAL at 21:24

## 2021-05-27 RX ADMIN — PANTOPRAZOLE SODIUM 40 MG: 40 TABLET, DELAYED RELEASE ORAL at 06:07

## 2021-05-27 RX ADMIN — TAMSULOSIN HYDROCHLORIDE 0.4 MG: 0.4 CAPSULE ORAL at 09:39

## 2021-05-27 RX ADMIN — LEVOTHYROXINE SODIUM 50 MCG: 0.05 TABLET ORAL at 06:08

## 2021-05-27 RX ADMIN — FERROUS SULFATE TAB 325 MG (65 MG ELEMENTAL FE) 325 MG: 325 (65 FE) TAB at 09:40

## 2021-05-27 RX ADMIN — INSULIN GLARGINE 30 UNITS: 100 INJECTION, SOLUTION SUBCUTANEOUS at 22:00

## 2021-05-27 RX ADMIN — HYDROCODONE BITARTRATE AND ACETAMINOPHEN 2 TABLET: 5; 325 TABLET ORAL at 09:39

## 2021-05-27 RX ADMIN — ACETAMINOPHEN 650 MG: 325 TABLET, FILM COATED ORAL at 21:24

## 2021-05-27 RX ADMIN — POTASSIUM CHLORIDE 20 MEQ: 750 TABLET, EXTENDED RELEASE ORAL at 09:49

## 2021-05-27 RX ADMIN — ASPIRIN 81 MG: 81 TABLET, COATED ORAL at 09:39

## 2021-05-27 RX ADMIN — ALPRAZOLAM 0.25 MG: 0.25 TABLET ORAL at 21:24

## 2021-05-27 RX ADMIN — ATORVASTATIN CALCIUM 40 MG: 20 TABLET, FILM COATED ORAL at 21:24

## 2021-05-27 RX ADMIN — GUAIFENESIN 1200 MG: 600 TABLET, EXTENDED RELEASE ORAL at 09:40

## 2021-05-27 RX ADMIN — DOCUSATE SODIUM 50MG AND SENNOSIDES 8.6MG 2 TABLET: 8.6; 5 TABLET, FILM COATED ORAL at 21:24

## 2021-05-27 RX ADMIN — ENOXAPARIN SODIUM 40 MG: 40 INJECTION SUBCUTANEOUS at 09:38

## 2021-05-28 VITALS
HEIGHT: 71 IN | TEMPERATURE: 98.5 F | HEART RATE: 72 BPM | BODY MASS INDEX: 37.03 KG/M2 | OXYGEN SATURATION: 97 % | DIASTOLIC BLOOD PRESSURE: 97 MMHG | BODY MASS INDEX: 37.52 KG/M2 | HEART RATE: 63 BPM | HEART RATE: 82 BPM | HEIGHT: 71 IN | WEIGHT: 268 LBS | RESPIRATION RATE: 20 BRPM | OXYGEN SATURATION: 97 % | DIASTOLIC BLOOD PRESSURE: 74 MMHG | OXYGEN SATURATION: 95 % | HEIGHT: 71 IN | BODY MASS INDEX: 38.5 KG/M2 | DIASTOLIC BLOOD PRESSURE: 71 MMHG | WEIGHT: 262.25 LBS | WEIGHT: 264 LBS | OXYGEN SATURATION: 96 % | TEMPERATURE: 97.7 F | BODY MASS INDEX: 36.85 KG/M2 | TEMPERATURE: 98.3 F | SYSTOLIC BLOOD PRESSURE: 134 MMHG | HEART RATE: 72 BPM | HEART RATE: 86 BPM | RESPIRATION RATE: 16 BRPM | OXYGEN SATURATION: 100 % | HEART RATE: 64 BPM | TEMPERATURE: 98.2 F | TEMPERATURE: 98.2 F | HEIGHT: 71 IN | RESPIRATION RATE: 16 BRPM | DIASTOLIC BLOOD PRESSURE: 62 MMHG | SYSTOLIC BLOOD PRESSURE: 151 MMHG | DIASTOLIC BLOOD PRESSURE: 69 MMHG | OXYGEN SATURATION: 96 % | BODY MASS INDEX: 37.52 KG/M2 | RESPIRATION RATE: 16 BRPM | OXYGEN SATURATION: 96 % | BODY MASS INDEX: 37.83 KG/M2 | BODY MASS INDEX: 36.71 KG/M2 | HEIGHT: 71 IN | HEIGHT: 71 IN | HEIGHT: 71 IN | HEART RATE: 53 BPM | TEMPERATURE: 98.1 F | TEMPERATURE: 98.4 F | SYSTOLIC BLOOD PRESSURE: 140 MMHG | RESPIRATION RATE: 16 BRPM | WEIGHT: 270.25 LBS | SYSTOLIC BLOOD PRESSURE: 134 MMHG | BODY MASS INDEX: 36.96 KG/M2 | OXYGEN SATURATION: 97 % | DIASTOLIC BLOOD PRESSURE: 65 MMHG | SYSTOLIC BLOOD PRESSURE: 124 MMHG | TEMPERATURE: 98.4 F | WEIGHT: 275 LBS | SYSTOLIC BLOOD PRESSURE: 137 MMHG | RESPIRATION RATE: 16 BRPM | HEART RATE: 72 BPM | SYSTOLIC BLOOD PRESSURE: 141 MMHG | RESPIRATION RATE: 24 BRPM | DIASTOLIC BLOOD PRESSURE: 74 MMHG | WEIGHT: 264.5 LBS | SYSTOLIC BLOOD PRESSURE: 137 MMHG | WEIGHT: 268 LBS | HEIGHT: 71 IN | DIASTOLIC BLOOD PRESSURE: 71 MMHG | WEIGHT: 263.25 LBS

## 2021-05-28 LAB
ANION GAP SERPL CALCULATED.3IONS-SCNC: 12.8 MMOL/L (ref 5–15)
BUN SERPL-MCNC: 27 MG/DL (ref 8–23)
BUN/CREAT SERPL: 20.5 (ref 7–25)
CALCIUM SPEC-SCNC: 9.4 MG/DL (ref 8.6–10.5)
CHLORIDE SERPL-SCNC: 104 MMOL/L (ref 98–107)
CO2 SERPL-SCNC: 22.2 MMOL/L (ref 22–29)
CREAT SERPL-MCNC: 1.32 MG/DL (ref 0.76–1.27)
DEPRECATED RDW RBC AUTO: 50.8 FL (ref 37–54)
ERYTHROCYTE [DISTWIDTH] IN BLOOD BY AUTOMATED COUNT: 15.7 % (ref 12.3–15.4)
GFR SERPL CREATININE-BSD FRML MDRD: 53 ML/MIN/1.73
GLUCOSE BLDC GLUCOMTR-MCNC: 102 MG/DL (ref 70–130)
GLUCOSE BLDC GLUCOMTR-MCNC: 136 MG/DL (ref 70–130)
GLUCOSE BLDC GLUCOMTR-MCNC: 186 MG/DL (ref 70–130)
GLUCOSE BLDC GLUCOMTR-MCNC: 80 MG/DL (ref 70–130)
GLUCOSE SERPL-MCNC: 58 MG/DL (ref 65–99)
HCT VFR BLD AUTO: 26.5 % (ref 37.5–51)
HGB BLD-MCNC: 8.8 G/DL (ref 13–17.7)
MCH RBC QN AUTO: 29.7 PG (ref 26.6–33)
MCHC RBC AUTO-ENTMCNC: 33.2 G/DL (ref 31.5–35.7)
MCV RBC AUTO: 89.5 FL (ref 79–97)
PLATELET # BLD AUTO: 168 10*3/MM3 (ref 140–450)
PMV BLD AUTO: 8.7 FL (ref 6–12)
POTASSIUM SERPL-SCNC: 3.9 MMOL/L (ref 3.5–5.2)
RBC # BLD AUTO: 2.96 10*6/MM3 (ref 4.14–5.8)
SODIUM SERPL-SCNC: 139 MMOL/L (ref 136–145)
WBC # BLD AUTO: 8.69 10*3/MM3 (ref 3.4–10.8)

## 2021-05-28 PROCEDURE — 63710000001 INSULIN GLARGINE PER 5 UNITS: Performed by: NURSE PRACTITIONER

## 2021-05-28 PROCEDURE — 63710000001 INSULIN LISPRO (HUMAN) PER 5 UNITS: Performed by: NURSE PRACTITIONER

## 2021-05-28 PROCEDURE — 99024 POSTOP FOLLOW-UP VISIT: CPT | Performed by: NURSE PRACTITIONER

## 2021-05-28 PROCEDURE — 82962 GLUCOSE BLOOD TEST: CPT

## 2021-05-28 PROCEDURE — 97110 THERAPEUTIC EXERCISES: CPT

## 2021-05-28 PROCEDURE — 80048 BASIC METABOLIC PNL TOTAL CA: CPT | Performed by: NURSE PRACTITIONER

## 2021-05-28 PROCEDURE — 25010000002 ENOXAPARIN PER 10 MG: Performed by: NURSE PRACTITIONER

## 2021-05-28 PROCEDURE — 97535 SELF CARE MNGMENT TRAINING: CPT | Performed by: OCCUPATIONAL THERAPIST

## 2021-05-28 PROCEDURE — 85027 COMPLETE CBC AUTOMATED: CPT | Performed by: NURSE PRACTITIONER

## 2021-05-28 RX ORDER — CHOLECALCIFEROL (VITAMIN D3) 125 MCG
5 CAPSULE ORAL NIGHTLY PRN
Status: DISCONTINUED | OUTPATIENT
Start: 2021-05-28 | End: 2021-05-31 | Stop reason: HOSPADM

## 2021-05-28 RX ADMIN — POTASSIUM CHLORIDE 20 MEQ: 750 TABLET, EXTENDED RELEASE ORAL at 08:01

## 2021-05-28 RX ADMIN — INSULIN GLARGINE 30 UNITS: 100 INJECTION, SOLUTION SUBCUTANEOUS at 20:37

## 2021-05-28 RX ADMIN — DONEPEZIL HYDROCHLORIDE 10 MG: 10 TABLET, FILM COATED ORAL at 20:37

## 2021-05-28 RX ADMIN — PANTOPRAZOLE SODIUM 40 MG: 40 TABLET, DELAYED RELEASE ORAL at 08:02

## 2021-05-28 RX ADMIN — ATORVASTATIN CALCIUM 40 MG: 20 TABLET, FILM COATED ORAL at 20:37

## 2021-05-28 RX ADMIN — ASPIRIN 81 MG: 81 TABLET, COATED ORAL at 08:00

## 2021-05-28 RX ADMIN — GUAIFENESIN 1200 MG: 600 TABLET, EXTENDED RELEASE ORAL at 08:01

## 2021-05-28 RX ADMIN — TRAMADOL HYDROCHLORIDE 50 MG: 50 TABLET, FILM COATED ORAL at 23:36

## 2021-05-28 RX ADMIN — ACETAMINOPHEN 650 MG: 325 TABLET, FILM COATED ORAL at 06:17

## 2021-05-28 RX ADMIN — FINASTERIDE 5 MG: 5 TABLET, FILM COATED ORAL at 08:01

## 2021-05-28 RX ADMIN — FERROUS SULFATE TAB 325 MG (65 MG ELEMENTAL FE) 325 MG: 325 (65 FE) TAB at 08:01

## 2021-05-28 RX ADMIN — DOCUSATE SODIUM 50MG AND SENNOSIDES 8.6MG 2 TABLET: 8.6; 5 TABLET, FILM COATED ORAL at 20:37

## 2021-05-28 RX ADMIN — LEVOTHYROXINE SODIUM 50 MCG: 0.05 TABLET ORAL at 06:17

## 2021-05-28 RX ADMIN — GLIPIZIDE 10 MG: 10 TABLET ORAL at 06:17

## 2021-05-28 RX ADMIN — ALPRAZOLAM 0.25 MG: 0.25 TABLET ORAL at 16:55

## 2021-05-28 RX ADMIN — ACETAMINOPHEN 650 MG: 325 TABLET, FILM COATED ORAL at 20:37

## 2021-05-28 RX ADMIN — ENOXAPARIN SODIUM 40 MG: 40 INJECTION SUBCUTANEOUS at 08:03

## 2021-05-28 RX ADMIN — POLYETHYLENE GLYCOL 3350 17 G: 17 POWDER, FOR SOLUTION ORAL at 08:02

## 2021-05-28 RX ADMIN — INSULIN LISPRO 2 UNITS: 100 INJECTION, SOLUTION INTRAVENOUS; SUBCUTANEOUS at 11:18

## 2021-05-28 RX ADMIN — TAMSULOSIN HYDROCHLORIDE 0.4 MG: 0.4 CAPSULE ORAL at 08:01

## 2021-05-28 RX ADMIN — GUAIFENESIN 1200 MG: 600 TABLET, EXTENDED RELEASE ORAL at 20:37

## 2021-05-28 NOTE — PROGRESS NOTES
Patient: SHELL JARAMILLO     Acct: [id number]     Report: #[id number]  UNIT #: [id number]     : 1945    Encounter Date:2018  PRIMARY CARE: PREM SRINIVASAN  ***Signed***  --------------------------------------------------------------------------------------------------------------------  Progress Note      DATE: 3/13/18      Referring Physician      BRIAN Srinivasan      Chief Complaint      Follow up Thrombocytopenia            Subjective      72-year-old white male has history of immune thrombocytopenia purpura.  Patient     was seen in the emergency room because he didn't feel well andaccount was 50,    000 which is much lower than his a usual platelet counts.            Additional workup was performed to determine cause of his decreasing platelet     count.            Past Med/Surg History            Past Med/Surg History:   Hypertension             Diabetes Mellitus             Heart Disease (status post stent placement)             Other (hypothyroidism, hyperlipidemia, reflux disease, depression, bilateral     knee replacement, shoulder surgery)            Social History      Social History:  No Tobacco Use, Alcohol Use (recovering alcoholic), No     Recreational Drug use, Other (he lives with his wife)            Allergies      Coded Allergies:             NO KNOWN DRUG ALLERGIES (Verified  Allergy, Unknown, 3/13/18)            Medications      Medications    Last Reconciled on 3/14/18 18:46 by HAYDEN ZAMUDIO MD      Gabapentin (Gabapentin) 300 Mg Capsule      300 MG PO HS, #30 CAP 0 Refills         Reported         3/13/18       Pantoprazole (Protonix*) 40 Mg Tablet.dr      40 MG PO QDAY@07, #30 TAB 0 Refills         Reported         3/13/18       Donepezil Hcl (Donepezil*) 10 Mg Tablet      10 MG PO HS, TAB         Reported         18       Insulin Glargine (Lantus VIAL) 100 Units/Ml Vial      20 UNITS SUBQ QPM, #1 VIAL 0 Refills         Reported         17       Saxagliptin Hcl (Onglyza)  5 Mg Tablet      5 MG PO QDAY, #30 TAB         Reported         12/1/17       Cholecalciferol (Vitamin D3*) 2,000 Unit Tablet      2000 UNITS PO QDAY, #30 TAB         Reported         12/1/17       Cyanocobalamin (Vitamin B-12*) 500 Mcg Tab      500 MCG PO BID, #60 TAB         Reported         3/7/17       Simvastatin (Simvastatin*) 40 Mg Tablet      20 MG PO HS, #30 TAB 0 Refills         Reported         3/7/17       Clopidogrel Bisulfate (Plavix) 75 Mg Tablet      75 MG PO QDAY, #30 TAB 0 Refills         Reported         10/28/16       glipiZIDE XL (glipiZIDE XL*) 10 Mg Tab.er.24      10 MG PO BID, #30 TAB.ER 0 Refills         Reported         8/30/16       Isosorbide Mononitrate ER (Isosorbide Mononitrate ER) 60 Mg Tab.er.24h      60 MG PO QDAY, TAB         Reported         12/17/15       Metformin Hcl (Metformin Hcl*) 1,000 Mg Tablet      1000 MG PO BID, TAB         Reported         10/30/13       Sertraline Hcl (Zoloft*) 100 Mg Tablet      100 MG PO HS         Reported         12/17/12       Levothyroxine Sodium (Levothroid*) 0.2 Mg Tablet      200 MCG PO QAM         Reported         2/22/11      Current Medications      Current Medications Reviewed 3/13/18            Pain Assessment      Pain Intensity:  0 (none)      Description:  None            Review of Systems      General:  No Anxiety, Fatigue Scale: (6), No Pain Scale:, No Fever, No Other      HEENT:  No Dysphagia, No Hearing Changes, No Rhinorrhea, No Tinnitus, Visual     Changes, No Nasal Congestion, No Epistaxis, No Other      Respiratory:  No Cough, No Shortness of Air, No Wheezing, No Hemoptysis, No     Congestion, No Other      Cardiovascular:  No Chest Pain, No Pedal Edema, No Orthopnea, No Palpitations,     No Chest Pressure, No Dizziness, No Other      Gastrointestinal:  No Nausea, No Vomiting, No Dysphagia, No Constipation, No     Diarrhea, Appetite Good, No Appetite Fair, No Appetite Poor, No Early Satiety,     No Other      Genitourinary:   Nocturia (3 x), No Dysuria, No Other      Musculoskeletal:  No Joint Effusions, Joint Tenderness, Joint Stiffness, No     Myalgias, Aches, Pains, No Other      Endocrine:  No Heat Intolerance, No Cold Intolerance, No Fatigue, No Blood     Sugar Control, No Other      Hematologic:  No Bleeding, No Bruising, No Swollen Glands, No Other      Allergic/Immunologic:  No Hives, No Throat closing off, Nasal drip, No Itchy     eyes, No Hay fever, No Other      Psychological:  Anxiety, Depression (mild), No Other      Neurological:  No Headaches, No Dizziness, No Weakness, No Numbness, No Other      Skin:  No Rash, No Open Wounds, No Edema, No Other            Vitals      Height 71 in / 180.34 cm      Weight 262 lbs 4 oz / 118.007096 kg      BSA 2.49 m2      BMI 36.6 kg/m2      Temperature 98.3 F / 36.83 C - Oral      Pulse 72      Respirations 16      Blood Pressure 134/71 Sitting, Left Arm      Pulse Oximetry 96%, Room air            Exam      Constitutional:  No acute distress, Conversant, Pleasant, No Weakness      Eyes:  Anicteric sclerae, Palpebral Conjunctivae (pink with no petechiae), CHIQUI      HENT:  Oropharynx clear, No Erythema, Buccal mucosae (pink with no petechiae)      Neck:  Supple, Full Range of Motion      Cardiovascular:  RRR, No Murmurs, Normal PMI, No Peripheral Edema      Lungs:  Clear to Ausculation, Normal Respiratory Effort      Abdomen:  Soft, NABS, No Tenderness      Chest:  Other (symmetrical and equal)      Lymphatic:  No Neck      Extremities:  Normal gait, Other (no deformity, no limitation in range of motion    )      Neurological:  Cranial Nerve II-XII, No Focal Sensory deficits      Psychological:  Intact judgement      Skin:  Normal temperature, Other            Lab Results      Laboratory Tests      2/17/18 15:47            3/1/18 08:42            Laboratory Tests            Test        12/1/17      13:17 1/2/18      19:40 1/2/18      22:29 1/11/18      09:54             Urine Collection  Type Unknown                 Urine Color YELLOW                 Urine Appearance CLEAR                 Urine pH        5.0 pH UNIT      (5.0-8.0)                      Urine Specific Gravity        1.020      (1.005-1.030)                      Urine Protein        NEGATIVE mg/dl      (NEGATIVE)                      Urine Glucose (UA)        100 mg/dl      (NEGATIVE)                      Urine Ketones        NEGATIVE mg/dl      (NEGATIVE)                      Urine Occult Blood        NEGATIVE      (NEGATIVE)                      Urine Nitrite        NEGATIVE      (NEGATIVE)                      Urine Bilirubin        NEGATIVE      (NEGATIVE)                      Urine Urobilinogen        1.0 EU/dl      (0.1-1.0)                      Urine Leukocyte Esterase        NEGATIVE      (NEGATIVE)                      Prothrombin Time                10.1 Seconds      (9.4-12.0)                      International Ratio (Anticoag      Ther)         0.91      (2.00-3.00)                      Activated Partial      Thromboplast Time         22.3 seconds      (22.2-34.2)                      Magnesium Level                1.77 mg/dL      (1.60-2.30)                      Total Creatine Kinase                74 U/L      ()                      Bedside Troponin I                0.00 ng/ml      (0.00-0.08)              Glomerular Filtration Rate                >60      ml/min/1.73m2             Hemoglobin A1c                7.4 %      (3.5-5.7)             Estimated Average Glucose      (eAG)                 166 mg/dL              Triglycerides Level                159 mg/dL      ()             Cholesterol Level                132 mg/dL      (107-200)             LDL Cholesterol                59 mg/dL      ()             VLDL Cholesterol                32 mg/dl      (5-37)             HDL Cholesterol                41 mg/dL      (40-60)             Cholesterol/HDL Ratio    3.2 (3.0-6.0)              Thyroid  Stimulating Hormone      (TSH)                 0.176 mIU/L      (0.270-4.200)             Test        2/17/18      15:39 2/17/18      15:47 3/1/18      08:23 3/1/18      08:42             Capillary Blood Glucose (Chem)        216 mg/dL      (70-99)                      Sodium Level                141 mmol/L      (135-147)                      Potassium Level                4.4 mmol/L      (3.5-5.3)                      Chloride Level                103 mmol/L      ()                      Carbon Dioxide Level                25 mmol/L      (22-32)                      Anion Gap                17 mmol/L      (8-19)                      Blood Urea Nitrogen                17 mg/dL      (5-25)                      Creatinine                0.94 mg/dL      (0.70-1.20)                      Glomerular Filtration Rate      Calc         >60      mL/min/{1.73_m2}                      BUN/Creatinine Ratio                18 {ratio}      (6-20)                      Glucose Level                218 mg/dL      (70-99)                      Serum Osmolality  300 (273-304)                Calcium Level                9.0 mg/dL      (8.7-10.4)                      Total Bilirubin                0.60 mg/dL      (0.20-1.30)                      Aspartate Amino Transf      (AST/SGOT)         23 U/L (15-50)                      Alanine Aminotransferase      (ALT/SGPT)         30 U/L (10-40)                      Alkaline Phosphatase                92 U/L      ()                      Troponin T                <0.01 ng/mL      (0.00-0.10)                      Total Protein                6.9 g/dL      (6.3-8.2)                      Urine Total Protein                15.8 mg/dL      (Not Estab.)              Urine Albumin   9.3 %               Urine Alpha-1-Globulin   3.1 %               Urine Alpha-2-Globulins   18.7 %               Urine Beta Globulin   37.0 %               Urine Gamma Globulin   32.0 %               Ur Protein  Electrophoresis      M-Nii                 Not Observed %      (Not Observed)              Urine Protein Electrophoresis      Intrp                 Comment NA                      Urine Immunofixation   Comment NA               Free Kappa Light Chains                28.6 mg/L      (3.3-19.4)              Free Lambda Light Chains                25.8 mg/L      (5.7-26.3)              Free Kappa/Lambda Light Chain      Ratio                 1.11 NA      (0.26-1.65)              White Blood Count                6.32 10*3/uL      (4.80-10.80)             Red Blood Count                4.58 10*6/uL      (4.70-6.10)             Hemoglobin                14.10 g/dL      (14.00-18.00)             Hematocrit                41.3 %      (42.0-52.0)             Mean Corpuscular Volume                90.2 fL      (80.0-96.0)             Mean Corpuscular Hemoglobin                30.8 pg      (27.0-31.0)             Mean Corpuscular Hemoglobin      Concent                 34.1 %      (33.0-37.0)             Red Cell Distribution Width                14.0 %      (11.5-14.5)             Platelet Count                92.20 10*3/uL      (130..00)             Mean Platelet Volume                7.3 fL      (7.4-10.4)             Granulocytes (%)                60.5 %      (30.0-85.0)             Lymphocytes (%) (Auto)                28.60 %      (20.00-45.00)             Monocytes (%) (Auto)                6.17 %      (3.00-10.00)             Eosinophils (%) (Auto)                4.17 %      (0.00-7.00)             Basophils (%) (Auto)                0.63 %      (0.00-3.00)             Granulocytes #                3.82 10*3/uL      (2.00-8.00)             Lymphocytes # (Auto)                1.81 10*3/uL      (1.00-5.00)             Monocytes # (Auto)                0.39 10*3/uL      (0.20-1.20)             Eosinophils # (Auto)                0.26 10*3/uL      (0.00-0.70)             Basophils # (Auto)                0.04  10*3/uL      (0.00-0.20)             Nucleated Red Blood Cells %                0.00 %      (0.00-0.01)             Serum Total Protein                6.7 g/dL      (6.0-8.5)             Albumin                3.8 g/dL      (2.9-4.4)             Globulin                2.9 g/dL      (2.2-3.9)             Albumin/Globulin Ratio                1.3 NA      (0.7-1.7)             Alpha-1-Globulins                0.2 g/dL      (0.0-0.4)             Alpha-2-Globulins                0.6 g/dL      (0.4-1.0)             Beta Globulins                0.9 g/dL      (0.7-1.3)             Beta-2-Microglobulin                2.6 mg/L      (0.6-2.4)             Gamma Globulins                1.2 g/dL      (0.4-1.8)             Protein Electrophoresis Note    Comment NA              Protein Electrophoresis      Interpret                 Comment NA              Immunoglobulin G                1062 mg/dL      (700-1600)             Immunoglobulin A                165 mg/dL      ()             Immunoglobulin M                74 mg/dL      ()             Serum Immunofixation    Comment NA              M-Nii (BATSHEVA)                Not Observed      g/dL (Not             Test        3/1/18      08:43 3/8/18      21:57                      Fish Group Allergens (RAST) SEE BELOW NA                 Lymphoma/Leukemia      Immunophenotyping SEE BELOW NA                      Lab Scanned Report                LAB FINAL      CUMULATIVES -                            Impression/Problem List      Anemia resolved      Thrombocytopenia.Bone marrow showed evidence of  adequate megakaryocyte     production.      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      Vitamin D deficiency      Notes      New Medications      * Gabapentin 300 MG CAPSULE: 300 MG PO HS #30      Changed Medications      * PANTOPRAZOLE (Protonix*) 40 MG TABLET.DR: 40 MG PO QDAY@07 #30       Replaced 20 MG TABLET.DR: 20 MG PO QDAY       Instructions: Take on an empty stomach.            Plan      As the following studies to the patient: No evidence of multiple myeloma workup.            Lymphoma leukemia panel showed the following: A typical phenotype for chronic     lymphocytic       leukemia/small lymphocytic  lymphoma, mantle cell lymphoma, hairy cell leukemia    , or follicular center      cell lymphoma is not detected. There is no loss of, or aberrant expression of,     the pan T       cell antigens to suggest a neoplastic T cell process      No circulating blasts are detected.      There is no immunophenotypic  evidence of abnormal myeloid       maturation.            Fish for CLL showed normal results with no evidence of trisomy 12 or deletion     of GEORGI, 13 q 14, orT p53.            Patient's drop in platelet count may be secondary to a viral infection.            Patient will continue follow-up every 3 months with a CBC.            Patient Education:        High Blood Pressure            Hx Influenza Vaccination:  Yes (2015)      Date Influenza Vaccine Given:  Nov 13, 2017      2 or More Falls Past Year?:  Yes      Fall Past Year with Injury?:  No      Hx Pneumococcal Vaccination:  Yes (2015)      Encouraged to follow-up with:  PCP regarding preventative exams.      Chart initiated by      Jeri Boss MA                 Disclaimer: Converted document may not contain table formatting or lab diagrams. Please see [facility] for the authenticated document.

## 2021-05-28 NOTE — PROGRESS NOTES
Patient: SHELL JARAMILLO     Acct: [id number]     Report: #[id number]  UNIT #: [id number]     : 1945    Encounter Date:2018  PRIMARY CARE: PREM SRINIVASAN  ***Signed***  --------------------------------------------------------------------------------------------------------------------  Progress Note      DATE: 18      Primary Care Provider:  PREM SRINIVASAN      Referring Provider:  PREM SRINIVASAN      Chief Complaint      Follow up Thrombocytopenia            Subjective      72-year-old white male has a history of immune from 624 pleura and an abnormal     bowel monitor study due to possible monoclonal gammopathy versus important     plasmacytic proliferation.  Initial workup was negative.      Further workup was done on the last encounter because the patient had a     significant drop in platelet count.      Patient has no complaints of bleeding.            Past Med/Surg History            Past Med/Surg History:   Hypertension             Diabetes Mellitus             Heart Disease (status post stent placement)             Other (hypothyroidism, hyperlipidemia, reflux disease, depression, bilateral     knee replacement, shoulder surgery)            Social History      Social History:  No Tobacco Use, Alcohol Use (recovering alcoholic), No     Recreational Drug use, Other (he lives with his wife)            Allergies      Coded Allergies:             NO KNOWN DRUG ALLERGIES (Verified  Allergy, Unknown, 18)            Medications      Medications    Last Reconciled on 18 20:38 by HAYDEN ZAMUDIO MD      Lactulose (Lactulose*) 10 Gm/15 Ml Solution      30 ML PO BID, #1800 ML 0 Refills         Reported         18       Nadolol (Corgard) 40 Mg Tab      40 MG PO QDAY for 30 Days, #30 TAB         Reported         18       Gabapentin (Gabapentin) 300 Mg Capsule      300 MG PO HS, #30 CAP 0 Refills         Reported         3/13/18       Donepezil Hcl (Donepezil*) 10 Mg Tablet      10 MG  PO HS, TAB         Reported         1/22/18       Insulin Glargine (Lantus VIAL) 100 Units/Ml Vial      20 UNITS SUBQ QPM, #1 VIAL 0 Refills         Reported         12/1/17       Saxagliptin Hcl (Onglyza) 5 Mg Tablet      5 MG PO QDAY, #30 TAB         Reported         12/1/17       Cholecalciferol (Vitamin D3*) 2,000 Unit Tablet      2000 UNITS PO QDAY, #30 TAB         Reported         12/1/17       Cyanocobalamin (Vitamin B-12*) 500 Mcg Tab      500 MCG PO BID, #60 TAB         Reported         3/7/17       Simvastatin (Simvastatin*) 40 Mg Tablet      20 MG PO HS, #30 TAB 0 Refills         Reported         3/7/17       Clopidogrel Bisulfate (Plavix) 75 Mg Tablet      75 MG PO QDAY, #30 TAB 0 Refills         Reported         10/28/16       glipiZIDE XL (glipiZIDE XL*) 10 Mg Tab.er.24      10 MG PO BID, #30 TAB.ER 0 Refills         Reported         8/30/16       Isosorbide Mononitrate ER (Isosorbide Mononitrate ER) 60 Mg Tab.er.24h      60 MG PO QDAY, TAB         Reported         12/17/15       Metformin Hcl (Metformin Hcl*) 1,000 Mg Tablet      1000 MG PO BID, TAB         Reported         10/30/13       Sertraline Hcl (Zoloft*) 100 Mg Tablet      100 MG PO HS         Reported         12/17/12       Levothyroxine Sodium (Levothroid*) 0.2 Mg Tablet      200 MCG PO QAM         Reported         2/22/11      Current Medications      Current Medications Reviewed 6/12/18            Pain Assessment      Pain Intensity:  0 (none)      Description:  None            Review of Systems      General:  No Anxiety, Fatigue Scale: (6), No Pain Scale:, No Fever, No Other      HEENT:  No Dysphagia, No Hearing Changes, No Rhinorrhea, No Tinnitus, No Visual     Changes, No Nasal Congestion, No Epistaxis, No Other      Respiratory:  No Cough, No Shortness of Air, No Sputum Changes, No Wheezing, No     Hemoptysis, No Congestion, No Other      Cardiovascular:  No Chest Pain, No Pedal Edema, No Orthopnea, No Palpitations,     No Chest  Pressure, No Dizziness, No Other      Gastrointestinal:  No Nausea, No Vomiting, No Dysphagia, No Constipation, No     Diarrhea, Appetite Good, No Appetite Fair, No Appetite Poor, No Early Satiety,     No Other      Genitourinary:  Nocturia (4x), No Dysuria, No Other      Musculoskeletal:  No Joint Effusions, Joint Tenderness, Joint Stiffness, No     Myalgias, Aches, Pains, No Other      Endocrine:  No Heat Intolerance, No Cold Intolerance, No Fatigue, No Blood     Sugar Control, No Other      Hematologic:  Bleeding, Bruising, No Swollen Glands, No Other      Allergic/Immunologic:  No Hives, No Throat closing off, No Nasal drip, No Itchy     eyes, No Hay fever, No Other      Psychological:  Anxiety, Depression, No Other      Neurological:  No Headaches, No Dizziness, Weakness, Numbness, No Other      Skin:  No Rash, No Open Wounds, No Edema, No Other            Vitals      Height 71 in / 180.34 cm      Weight 264 lbs 8 oz / 119.11878 kg      BSA 2.50 m2      BMI 36.9 kg/m2      Temperature 98.4 F / 36.89 C - Oral      Pulse 64      Respirations 16      Blood Pressure 134/62 Sitting      Pulse Oximetry 96%, room air            Exam      Constitutional:  No acute distress, Conversant, Pleasant      Eyes:  Anicteric sclerae, Palpebral Conjunctivae (no petechia), CHIQUI      HENT:  Oropharynx clear, No Erythema, No Tonsils, Buccal mucosae (no petechiae)      Neck:  Supple, Full Range of Motion      Cardiovascular:  RRR, No Murmurs, Normal PMI, No Peripheral Edema      Lungs:  Clear to Ausculation, Normal Respiratory Effort      Abdomen:  Soft, NABS, No Masses, No Tenderness      Chest:  Other (symmetrical and equal)      Lymphatic:  No Neck, No Axillae      Extremities:  No digital cyanosis, Normal gait, Other (no deformity, no     limitation range of motion)      Neurological:  Cranial Nerve II-XII (Intact), No Focal Sensory deficits      Psychological:  Appropriate affect, Intact judgement, Alert      Skin:  Normal  temperature, Other (no dermatosis)            Lab Results      Laboratory Tests      6/12/18 06:54            Laboratory Tests            Test        2/17/18      15:39 2/17/18      15:47 3/1/18      08:23 3/1/18      08:42             Capillary Blood Glucose (Chem)        216 mg/dL      (70-99)                      Troponin T                <0.01 ng/mL      (0.00-0.10)                      Urine Total Protein                15.8 mg/dL      (Not Estab.)              Urine Albumin   9.3 %               Urine Alpha-1-Globulin   3.1 %               Urine Alpha-2-Globulins   18.7 %               Urine Beta Globulin   37.0 %               Urine Gamma Globulin   32.0 %               Ur Protein Electrophoresis      M-Nii                 Not Observed %      (Not Observed)              Urine Protein Electrophoresis      Intrp                 Comment NA                      Urine Immunofixation   Comment NA               Free Kappa Light Chains                28.6 mg/L      (3.3-19.4)              Free Lambda Light Chains                25.8 mg/L      (5.7-26.3)              Free Kappa/Lambda Light Chain      Ratio                 1.11 NA      (0.26-1.65)              Serum Total Protein                6.7 g/dL      (6.0-8.5)             Alpha-1-Globulins                0.2 g/dL      (0.0-0.4)             Alpha-2-Globulins                0.6 g/dL      (0.4-1.0)             Beta Globulins                0.9 g/dL      (0.7-1.3)             Beta-2-Microglobulin                2.6 mg/L      (0.6-2.4)             Gamma Globulins                1.2 g/dL      (0.4-1.8)             Protein Electrophoresis Note    Comment NA              Protein Electrophoresis      Interpret                 Comment NA              Immunoglobulin G                1062 mg/dL      (700-1600)             Immunoglobulin A                165 mg/dL      ()             Immunoglobulin M                74 mg/dL      ()             Serum  Immunofixation    Comment NA              M-Nii (BATSHEVA)                Not Observed      g/dL (Not             Test        3/1/18      08:43 4/16/18      07:23 5/10/18      09:15 5/15/18      21:56             Fish Group Allergens (RAST) SEE BELOW NA                 Lymphoma/Leukemia      Immunophenotyping SEE BELOW NA                      Hemoglobin A1c                7.5 %      (3.5-5.7)                      Estimated Average Glucose      (eAG)         169 mg/dL                      Thyroid Stimulating Hormone      (TSH)         0.689 m(iU)/L      (0.270-4.200)                      Prothrombin Time                10.5 s      (9.4-12.0)              International Ratio (Anticoag      Ther)                 0.95 NA      (2.00-3.00)              Alpha Fetoprotein                2.4 ng/mL      (0.0-8.7)              HCV Liver Fibrosis Test   SEE BELOW NA               Lab Scanned Report                LAB FINAL      CUMULATIVES -             Test        6/12/18      06:54                      White Blood Count        7.95 10*3/uL      (4.80-10.80)                      Red Blood Count        4.49 10*6/uL      (4.70-6.10)                      Hemoglobin        14.40 g/dL      (14.00-18.00)                      Hematocrit        39.8 %      (42.0-52.0)                      Mean Corpuscular Volume        88.6 fL      (80.0-96.0)                      Mean Corpuscular Hemoglobin        32.0 pg      (27.0-31.0)                      Mean Corpuscular Hemoglobin      Concent 36.1 %      (33.0-37.0)                      Red Cell Distribution Width        14.1 %      (11.5-14.5)                      Platelet Count        88.10 10*3/uL      (130..00)                      Mean Platelet Volume        7.1 fL      (7.4-10.4)                      Granulocytes (%)        46.1 %      (30.0-85.0)                      Lymphocytes (%) (Auto)        43.00 %      (20.00-45.00)                      Monocytes (%) (Auto)        6.41 %       (3.00-10.00)                      Eosinophils (%) (Auto)        4.12 %      (0.00-7.00)                      Basophils (%) (Auto)        0.44 %      (0.00-3.00)                      Granulocytes #        3.66 10*3/uL      (2.00-8.00)                      Lymphocytes # (Auto)        3.41 10*3/uL      (1.00-5.00)                      Monocytes # (Auto)        0.51 10*3/uL      (0.20-1.20)                      Eosinophils # (Auto)        0.33 10*3/uL      (0.00-0.70)                      Basophils # (Auto)        0.04 10*3/uL      (0.00-0.20)                      Nucleated Red Blood Cells %        0.00 %      (0.00-0.01)                      Sodium Level        141 mmol/L      (135-147)                      Potassium Level        4.4 mmol/L      (3.5-5.3)                      Chloride Level        103 mmol/L      ()                      Carbon Dioxide Level        24 mmol/L      (22-32)                      Anion Gap        18 mmol/L      (8-19)                      Blood Urea Nitrogen        17 mg/dL      (5-25)                      Creatinine        0.87 mg/dL      (0.70-1.20)                      Glomerular Filtration Rate      Calc >60      mL/min/{1.73_m2}                      BUN/Creatinine Ratio        20 {ratio}      (6-20)                      Glucose Level        154 mg/dL      (70-99)                      Serum Osmolality 297 (273-304)                 Calcium Level        9.3 mg/dL      (8.7-10.4)                      Total Bilirubin        0.42 mg/dL      (0.20-1.30)                      Aspartate Amino Transf      (AST/SGOT) 15 U/L (15-50)                      Alanine Aminotransferase      (ALT/SGPT) 20 U/L (10-40)                      Alkaline Phosphatase        100 U/L      ()                      Total Protein        7.1 g/dL      (6.3-8.2)                      Albumin        4.3 g/dL      (3.5-5.0)                      Globulin        2.8 g/dL      (2.0-3.5)                       Albumin/Globulin Ratio        1.5 {ratio}      (1.4-2.6)                            Lymphoma and leukemia panel for CLL showed normal fish results lymphoma     leukemia immunophenotype showed the following atypical phenotype for chronic     lymphocytic leukemia/small lymphocytic lymphoma, mantle cell lymphoma, who     recently leukemia, follicular center cell lymphoma is not detected.  There is     no immunophenotypic evidence of abnormal myeloid maturation            Impression/Problem List      Anemia resolved      Thrombocytopenia.Bone marrow showed evidence of  adequate megakaryocyte     production.      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      Vitamin D deficiency      Notes      New Medications      * NADOLOL (Corgard) 40 MG TAB: 40 MG PO QDAY 30 Days #30      * Lactulose (Lactulose*) 10 GM/15 ML SOLUTION: 30 ML PO BID #1800            Plan            Patient will continue follow-up every 3 to 4 months with a CBC.            Patient Education:        High Blood Pressure      Immune Thrombocytopenia Purpura            PREVENTION      Hx Influenza Vaccination:  Yes (2015)      Date Influenza Vaccine Given:  Nov 13, 2017      2 or More Falls Past Year?:  Yes      Hx Pneumococcal Vaccination:  No      Encouraged to follow-up with:  PCP regarding preventative exams.      Chart initiated by      Jeri Boss MA                 Disclaimer: Converted document may not contain table formatting or lab diagrams. Please see [employer] for the authenticated document.

## 2021-05-28 NOTE — PROGRESS NOTES
Patient: SHELL JARAMILLO     Acct: [id number]     Report: #[id number]  UNIT #: [id number]     : 1945    Encounter Date:2019  PRIMARY CARE: PREM SRINIVASAN  ***Signed***  --------------------------------------------------------------------------------------------------------------------  Progress Note      DATE: 19      Primary Care Provider:  PREM SRINIVASAN      Referring Provider:  PREM SRINIVASAN      Chief Complaint      FU/ THROMBOCYTOPENIA, mild anemia            Subjective      74-year-old white male is followed up in the office for immune thrombocytopenic     purpura and mild anemia.  Patient's bone marrow studies done in  showed     possibility of monoclonal gammopathy of unknown significance.            Patient is doing well except for mild fatigue.  Patient has low back pain that     is chronic.            Past Med/Surg History            Past Med/Surg History:   Hypertension             Diabetes Mellitus             Heart Disease (status post stent placement)             No Blood Clots             No Cancer             No Lung Disease             No Kidney Disease             Other (hypothyroidism, hyperlipidemia, reflux disease, depression, bilateral     knee replacement, shoulder surgery)            Social History      Social History:  No Tobacco Use; Alcohol Use (recovering alcoholic); No     Recreational Drug use; Other (he lives with his wife)            Allergies      Coded Allergies:             NO KNOWN DRUG ALLERGIES (Verified  Allergy, Unknown, 19)            Medications      Medications    Last Reconciled on 19 19:45 by HAYDEN ZAMUDIO MD      Levothyroxine (Levothyroxine) 0.025 Mg Tablet      0.25 MG PO QDAY@07, #30 TAB 0 Refills         Reported         19       Pantoprazole (Protonix*) 20 Mg Tablet      40 MG PO QDAY@07, #60 TAB 0 Refills         Reported         19       Amitriptyline HCl (Amitriptyline HCl) 25 Mg Tablet      50 MG PO HS, #30 TAB          Reported         5/19/19       Insulin Glargine (Lantus SOLOSTAR) 100 Unit/1 Ml Insuln.pen      30 UNITS SUBQ HS, #1 BOX 0 Refills         Reported         5/19/19       Insulin Lispro (HumaLOG VIAL) 100 Units/Ml Vial      15 UNITS SUBQ BID, #1 VIAL 0 Refills         Reported         5/19/19       Ferrous Sulfate (Ferrous Sulfate*) 325 Mg Tablet      325 MG PO TID for 90 Days, #270 TAB 3 Refills         Prov: [place]         3/6/19       Donepezil Hcl (Donepezil*) 10 Mg Tablet      10 MG PO HS, TAB         Reported         1/22/18       Cholecalciferol (Vitamin D3*) 2,000 Unit Tablet      2000 UNITS PO QDAY, #30 TAB         Reported         12/1/17       Cyanocobalamin (Vitamin B-12*) 500 Mcg Tab      500 MCG PO BID, #60 TAB         Reported         3/7/17       Simvastatin (Simvastatin*) 40 Mg Tablet      20 MG PO HS, #30 TAB 0 Refills         Reported         3/7/17       Clopidogrel Bisulfate (Plavix) 75 Mg Tablet      75 MG PO QDAY, #30 TAB 0 Refills         Reported         10/28/16       glipiZIDE XL (glipiZIDE XL*) 10 Mg Tab.er.24      10 MG PO BID, #30 TAB.ER 0 Refills         Reported         8/30/16       Metformin HCl (Metformin HCl) 1,000 Mg Tablet      1000 MG PO BID, TAB         Reported         10/30/13       Sertraline Hcl (Zoloft*) 100 Mg Tablet      150 MG PO QDAY         Reported         12/17/12      Current Medications      Current Medications Reviewed 12/9/19            Pain Assessment      Pain Intensity:  2      Description:  Throbbing, None      Duration:  Intermittent            Review of Systems      General:  Fatigue Scale: (6), Pain Scale:      HEENT:  No Hearing Changes; Visual Changes (CATARACT SURGERY 2 MONTHS AGO BOTH     EYES)      Respiratory:  Shortness of Air; No Sputum Changes, No Wheezing      Cardiovascular:  No Pedal Edema, No Palpitations, No Chest Pressure      Gastrointestinal:  No Nausea, No Vomiting, No Dysphagia, No Diarrhea; Appetite     Good      Genitourinary:   No Dysuria      Musculoskeletal:  Aches (LOW BACK ), Pains (LOW BACK)      Endocrine:  No Cold Intolerance; Fatigue      Hematologic:  No Bruising, No Swollen Glands      Allergic/Immunologic:  No Throat closing off, No Nasal drip      Psychological:  No Depression      Neurological:  Dizziness, Weakness (LEGS)      Skin:  No Open Wounds, No Edema            Vitals      Height 71 in / 180.34 cm      Weight 268 lbs 0 oz / 121.115289 kg      BSA 2.39 m2      BMI 37.4 kg/m2      Temperature 98.4 F / 36.89 C      Pulse 53      Blood Pressure 137/97      Pulse Oximetry 95%            Exam      Constitutional:  No acute distress, Conversant, Pleasant      Eyes:  Anicteric sclerae, Palpebral Conjunctivae (Pink), CHIQUI      HENT:  Oropharynx clear; No Erythema; Buccal mucosae (Pink)      Neck:  Supple, Full Range of Motion      Cardiovascular:  RRR; No Murmurs; Normal PMI; No Peripheral Edema      Lungs:  Clear to Ausculation, Normal Respiratory Effort      Abdomen:  Soft, NABS; No Masses, No Tenderness      Chest:  Other (Symmetrical)      Extremities:  No digital cyanosis, No digital ischemia, Other (No deformity)      Neurological:  Cranial Nerve II-XII (Intact); No Focal Sensory deficits      Psychological:  Appropriate affect, Appropriate mood, Intact judgement, Alert      Skin:  Other (No dermatosis)            Lab Results      Laboratory Tests      1/31/20 07:05            3/9/20 06:33            Laboratory Tests            Test       12/6/19      08:30 1/31/20      07:05 2/2/20      21:57 3/9/20      06:33             Iron Level       79 ug/dL      ()                           Total Iron Binding Capacity       332 ug/dL      (245-450)                           Percent Iron Saturation 24 % (20-55)                 Transferrin       232.00 mg/dL      (215..00)                           Vitamin B12 Level       1981 pg/mL      (211-911)                           Folate       10.1 ng/mL      (4.8-20.0)                            Sodium Level              140 mmol/L      (135-147)                           Potassium Level              4.4 mmol/L      (3.5-5.3)                           Chloride Level              103 mmol/L      ()                           Carbon Dioxide Level              24 mmol/L      (22-32)                           Anion Gap              17 mmol/L      (8-19)                           Blood Urea Nitrogen              25 mg/dL      (5-25)                           Creatinine              1.09 mg/dL      (0.70-1.20)                           Glomerular Filtration Rate      Calc        >60      mL/min/{1.73_m2}                           BUN/Creatinine Ratio              23 {ratio}      (6-20)                           Glucose Level              160 mg/dL      (70-99)                           Hemoglobin A1c              7.4 %      (3.5-5.7)                           Estimated Average Glucose      (eAG)        166 mg/dL                           Serum Osmolality  298 (273-304)                Calcium Level              9.4 mg/dL      (8.7-10.4)                           Total Bilirubin              0.31 mg/dL      (0.20-1.30)                           Aspartate Amino Transf      (AST/SGOT)        17 U/L (15-50)                           Alanine Aminotransferase      (ALT/SGPT)        23 U/L (10-40)                           Alkaline Phosphatase              96 U/L      ()                           Total Protein              7.0 g/dL      (6.3-8.2)                           Triglycerides Level              198 mg/dL      ()                           Cholesterol Level              136 mg/dL      (107-200)                           LDL Cholesterol              58 mg/dL      ()                           VLDL Cholesterol              40 mg/dL      (5-37)                           HDL Cholesterol              38 mg/dL      (40-60)                           Cholesterol/HDL Ratio               3.6 NA      (3.0-6.0)                           Thyroid Stimulating Hormone      (TSH)        1.480 m(iU)/L      (0.270-4.200)                           Lab Scanned Report              LAB FINAL      CUMULATIVES -                    White Blood Count              9.41 10*3/uL      (4.80-10.80)             Red Blood Count              4.37 10*6/uL      (4.70-6.10)             Hemoglobin              13.7 g/dL      (14.0-18.0)             Hematocrit              40.6 %      (42.0-52.0)             Mean Corpuscular Volume              92.9 fL      (80.0-96.0)             Mean Corpuscular Hemoglobin              31.4 pg      (27.0-31.0)             Mean Corpuscular Hemoglobin      Concent               33.7      (33.0-37.0)             Red Cell Distribution Width              14.8 %      (11.6-14.4)             RDW Standard Deviation              50.4 fL      (35.1-43.9)             Platelet Count              76 10*3/uL      (130-400)             Mean Platelet Volume              9.4 fL      (9.4-12.4)             Granulocytes (%)              33.9 %      (30.0-85.0)             Granulocytes #              3.19 10*3/uL      (2.00-8.00)             Lymphocytes # (Auto)              5.01 10*3/uL      (1.00-5.00)             Monocytes # (Auto)              0.46 10*3/uL      (0.20-1.20)             Eosinophils # (Auto)              0.64 10*3/uL      (0.00-0.70)             Basophils # (Auto)              0.08 10*3/uL      (0.00-0.20)             Immature Granulocytes %              0.3 %      (0.0-1.8)             Segmented Neutrophils %    36 % (45-70)              Band Neutrophils %    0 % (1-5)              Lymphocytes %              53.2 %      (20.0-45.0)             Lymphocytes % (Manual)    44 % (20-45)              Atypical Lymphocytes %    8 % (0-5)              Monocytes %              4.9 %      (3.0-10.0)             Monocytes % (Manual)    3 % (3-10)              Eosinophils %              6.8 %       (0.0-7.0)             Eosinophils % (Manual)    8 % (0-7)              Basophils %              0.9 %      (0.0-3.0)             Basophils % (Manual)    1 % (0-3)              Nucleated Red Blood Cells %              0.00 %      (0.00-0.70)             Immature Granulocytes #              0.03 10*3/uL      (0.00-0.20)             Nucleated Red Blood Cells    0 /100{WBCs}              Platelet Estimate    Decreased NA              Large Platelets    SLIGHT NA              Platelet Morphology Comment    NORMAL NA              Polychromasia    RARE NA              Hypochromasia    SLIGHT NA              Poikilocytosis    RARE NA              Anisocytosis    RARE NA              Macrocytosis    RARE NA              Tear Drop Cells    RARE NA              Ovalocytes    RARE NA              Serum Total Protein              6.8 g/dL      (6.0-8.5)             Albumin              4.0 g/dL      (2.9-4.4)             Globulin              2.8 g/dL      (2.2-3.9)             Albumin/Globulin Ratio              1.4 NA      (0.7-1.7)             Alpha-1-Globulins              0.2 g/dL      (0.0-0.4)             Alpha-2-Globulins              0.6 g/dL      (0.4-1.0)             Beta Globulins              1.0 g/dL      (0.7-1.3)             Gamma Globulins              1.1 g/dL      (0.4-1.8)             Protein Electrophoresis Note    Comment NA              Protein Electrophoresis      Interpret               Comment NA                    Immunoglobulin G              1113 mg/dL      (700-1600)             Immunoglobulin A              130 mg/dL      ()             Immunoglobulin M              81 mg/dL      ()             Serum Immunofixation    Comment NA              M-Nii (BATSHEVA)              Not Observed      g/dL (Not             Free Kappa Light Chains              36.7 mg/L      (3.3-19.4)             Free Lambda Light Chains              24.4 mg/L      (5.7-26.3)             Free Kappa/Lambda Light Chain       Ratio               1.50 NA      (0.26-1.65)            Impression/Problem List      Anemia, mild      Thrombocytopenia. Bone marrow showed evidence of  adequate megakaryocyte     production.      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      Vitamin D deficiency      Notes      Changed Medications      * Amitriptyline HCl 25 MG TABLET: 50 MG PO HS #30      Problems:        (1) Hyperlipidemia      Qualifiers:           Qualified Codes:  E78.5 - Hyperlipidemia, unspecified      (2) Hypertension      Qualifiers:           Qualified Codes:  I10 - Essential (primary) hypertension      (3) Hypothyroidism, unspecified      (4) Monoclonal gammopathy of unknown significance      (5) Mild chronic anemia      (6) Thrombocytopenia            Plan      Follow-up in March with a CBC, serum protein electrophoresis with immunofixation    , serum free light chains, lymphoma leukemia panel from the peripheral blood.            Patient Education:        High Blood Pressure (Hypertension) (Alternative Therapy)            PREVENTION      Hx Influenza Vaccination:  Yes      Date Influenza Vaccine Given:  Nov 12, 2019      Influenza Vaccine Declined:  No      2 or More Falls Past Year?:  No      Fall Past Year with Injury?:  No      Encouraged to follow-up with:  PCP regarding preventative exams.      Chart initiated by      KELSI ROBERSON.            Electronically signed by Leslye Chaudhry  03/10/2020 18:31       Disclaimer: Converted document may not contain table formatting or lab diagrams. Please see [facility] for the authenticated document.

## 2021-05-28 NOTE — PROGRESS NOTES
Patient: SHELL JARAMILLO     Acct: [id number]     Report: #[id number]  UNIT #: [id number]     : 1945    Encounter Date:2020  PRIMARY CARE: PREM SRINIVASAN  ***Signed***  --------------------------------------------------------------------------------------------------------------------  Progress Note      DATE: 3/9/20      Primary Care Provider:  PREM SRINIVASAN      Referring Provider:  PREM SRINIVASAN      Chief Complaint      FU Thrombocytopenia            Subjective      74-year-old white male is followed up in my office for chronic anemia and     thrombocytopenia.  In 2016 patient had a bone marrow biopsy and aspiration that     showed mildly hypercellular marrow with relatively orderly trilineage maturation    and adequate megakaryocytes.  There was minimally increased population of small     B lymphocytes (CD20) and marrow per immunohistochemical staining.  On flow     cytometry there was a small population of atypical CD20 positive monoclonal     kappa expressing plasma cells (0.1%) indicative of plasma cell dyscrasia and sma    ll population of CD5 negative, CD10 negative Expressing monoclonal B cells     (1.5%).  Patient may have monoclonal gammopathy of unknown significance or an     absent lymphoplasmacytic proliferation.            Patient's complaint is mild fatigue.            Past Med/Surg History            Past Med/Surg History:   Hypertension             Diabetes Mellitus             Heart Disease (status post stent placement)             No Blood Clots             No Cancer             No Lung Disease             No Kidney Disease             Other (hypothyroidism, hyperlipidemia, reflux disease, depression, bilateral     knee replacement, shoulder surgery)            Social History      Social History:  No Tobacco Use; Alcohol Use (recovering alcoholic); No Re    creational Drug use; Other (he lives with his wife)            Allergies      Coded Allergies:             NO KNOWN DRUG  ALLERGIES (Verified  Allergy, Unknown, 7/29/19)            Medications      Medications    Last Reconciled on 3/10/20 18:28 by HAYDEN CHAUDHRY MD      Levothyroxine (Levothyroxine) 0.2 Mg Tablet      0.2 MG PO QDAY@07, #30 TAB 0 Refills         Reported         3/9/20       Gabapentin (Gabapentin) 600 Mg Tablet      300 MG PO HS, #30 TAB 0 Refills         Reported         3/9/20       Lisinopril* (Lisinopril*) 10 Mg Tablet      10 MG PO QDAY, #30 TAB 0 Refills         Reported         3/9/20       Nadolol (NADOLOL) 40 Mg Tablet      40 MG PO QDAY for 30 Days, #30 TAB         Reported         3/9/20       Levothyroxine (Levothyroxine) 0.05 Mg Tablet      0.05 MG PO QDAY@07, #30 TAB 0 Refills         Reported         3/9/20       Pantoprazole (Protonix) 40 Mg Tablet.dr      40 MG PO QDAY@07, #30 TAB 0 Refills         Reported         3/9/20       Amitriptyline HCl (Amitriptyline HCl) 25 Mg Tablet      50 MG PO HS, #30 TAB         Reported         5/19/19       Insulin Glargine (Lantus SOLOSTAR) 100 Unit/1 Ml Insuln.pen      25 UNITS SUBQ HS, #1 BOX 0 Refills         Reported         5/19/19       Insulin Lispro (HumaLOG VIAL) 100 Units/Ml Vial      15 UNITS SUBQ TIDAC, #1 VIAL 0 Refills         Reported         5/19/19       Ferrous Sulfate (Ferrous Sulfate*) 325 Mg Tablet      325 MG PO TID for 90 Days, #270 TAB 3 Refills         Prov: Leslye Chaudhry         3/6/19       Donepezil Hcl (Donepezil*) 10 Mg Tablet      10 MG PO HS, TAB         Reported         1/22/18       Cholecalciferol (Vitamin D3*) 2,000 Unit Tablet      2000 UNITS PO QDAY, #30 TAB         Reported         12/1/17       Cyanocobalamin (Vitamin B-12*) 500 Mcg Tab      500 MCG PO BID, #60 TAB         Reported         3/7/17       Simvastatin (Simvastatin*) 40 Mg Tablet      20 MG PO HS, #30 TAB 0 Refills         Reported         3/7/17       Clopidogrel Bisulfate (Plavix) 75 Mg Tablet      75 MG PO QDAY, #30 TAB 0 Refills         Reported         10/28/16        glipiZIDE XL (glipiZIDE XL) 10 Mg Tab.er.24      10 MG PO BID, #30 TAB.ER 0 Refills         Reported         8/30/16       metFORMIN HCl (metFORMIN HCl) 1,000 Mg Tablet      1000 MG PO BID, TAB         Reported         10/30/13       Sertraline Hcl (Zoloft*) 100 Mg Tablet      150 MG PO QDAY         Reported         12/17/12      Current Medications      Current Medications Reviewed 3/9/20            Pain Assessment      Pain Intensity:  0      Description:  None            Review of Systems      General:  Anxiety, Fatigue Scale: (4), Pain Scale: (0)      HEENT:  No Dysphagia      Respiratory:  No Cough; Shortness of Air      Cardiovascular:  No Chest Pain      Gastrointestinal:  No Nausea; Appetite Good (Good)      Genitourinary:  Nocturia (3x); No Dysuria      Musculoskeletal:  No Joint Effusions      Endocrine:  No Heat Intolerance      Hematologic:  No Bleeding      Allergic/Immunologic:  No Hives      Psychological:  Anxiety; No Depression      Neurological:  No Headaches; Dizziness, Weakness      Skin:  No Rash, No Open Wounds            Vitals      Height 71 in / 180.34 cm      Weight 275 lbs 0 oz / 124.80553 kg      BSA 2.41 m2      BMI 38.4 kg/m2      Temperature 97.7 F / 36.5 C      Pulse 63      Respirations 16      Blood Pressure 141/69      Pulse Oximetry 97%            Exam      Constitutional:  No acute distress, Conversant, Pleasant      Eyes:  Anicteric sclerae, Palpebral Conjunctivae (Pink), CHIQUI      HENT:  Oropharynx clear; No Erythema; Buccal mucosae (Pain)      Neck:  Supple, Full Range of Motion      Cardiovascular:  RRR; No Murmurs; Normal PMI; No Peripheral Edema      Lungs:  Clear to Ausculation, Normal Respiratory Effort      Abdomen:  Soft, NABS; No Masses, No Tenderness      Chest:  Other (Symmetrical)      Extremities:  No digital cyanosis, No digital ischemia, Normal gait, Other (No     deformity)      Neurological:  Cranial Nerve II-XII (Intact); No Focal Sensory deficits       Psychological:  Appropriate affect, Appropriate mood, Intact judgement      Skin:  Other (No dermatosis)            Lab Results      Laboratory Tests      1/31/20 07:05            3/9/20 06:33            Laboratory Tests            Test       12/6/19      08:30 1/31/20      07:05 2/2/20      21:57 3/9/20      06:33             Iron Level       79 ug/dL      ()                           Total Iron Binding Capacity       332 ug/dL      (245-450)                           Percent Iron Saturation 24 % (20-55)                 Transferrin       232.00 mg/dL      (215..00)                           Vitamin B12 Level       1981 pg/mL      (211-911)                           Folate       10.1 ng/mL      (4.8-20.0)                           Sodium Level              140 mmol/L      (135-147)                           Potassium Level              4.4 mmol/L      (3.5-5.3)                           Chloride Level              103 mmol/L      ()                           Carbon Dioxide Level              24 mmol/L      (22-32)                           Anion Gap              17 mmol/L      (8-19)                           Blood Urea Nitrogen              25 mg/dL      (5-25)                           Creatinine              1.09 mg/dL      (0.70-1.20)                           Glomerular Filtration Rate      Calc        >60      mL/min/{1.73_m2}                           BUN/Creatinine Ratio              23 {ratio}      (6-20)                           Glucose Level              160 mg/dL      (70-99)                           Hemoglobin A1c              7.4 %      (3.5-5.7)                           Estimated Average Glucose      (eAG)        166 mg/dL                           Serum Osmolality  298 (273-304)                Calcium Level              9.4 mg/dL      (8.7-10.4)                           Total Bilirubin              0.31 mg/dL      (0.20-1.30)                           Aspartate Amino Transf       (AST/SGOT)        17 U/L (15-50)                           Alanine Aminotransferase      (ALT/SGPT)        23 U/L (10-40)                           Alkaline Phosphatase              96 U/L      ()                           Total Protein              7.0 g/dL      (6.3-8.2)                           Triglycerides Level              198 mg/dL      ()                           Cholesterol Level              136 mg/dL      (107-200)                           LDL Cholesterol              58 mg/dL      ()                           VLDL Cholesterol              40 mg/dL      (5-37)                           HDL Cholesterol              38 mg/dL      (40-60)                           Cholesterol/HDL Ratio              3.6 NA      (3.0-6.0)                           Thyroid Stimulating Hormone      (TSH)        1.480 m(iU)/L      (0.270-4.200)                           Lab Scanned Report              LAB FINAL      CUMULATIVES -                    White Blood Count              9.41 10*3/uL      (4.80-10.80)             Red Blood Count              4.37 10*6/uL      (4.70-6.10)             Hemoglobin              13.7 g/dL      (14.0-18.0)             Hematocrit              40.6 %      (42.0-52.0)             Mean Corpuscular Volume              92.9 fL      (80.0-96.0)             Mean Corpuscular Hemoglobin              31.4 pg      (27.0-31.0)             Mean Corpuscular Hemoglobin      Concent               33.7      (33.0-37.0)             Red Cell Distribution Width              14.8 %      (11.6-14.4)             RDW Standard Deviation              50.4 fL      (35.1-43.9)             Platelet Count              76 10*3/uL      (130-400)             Mean Platelet Volume              9.4 fL      (9.4-12.4)             Granulocytes (%)              33.9 %      (30.0-85.0)             Granulocytes #              3.19 10*3/uL      (2.00-8.00)             Lymphocytes # (Auto)              5.01 10*3/uL       (1.00-5.00)             Monocytes # (Auto)              0.46 10*3/uL      (0.20-1.20)             Eosinophils # (Auto)              0.64 10*3/uL      (0.00-0.70)             Basophils # (Auto)              0.08 10*3/uL      (0.00-0.20)             Immature Granulocytes %              0.3 %      (0.0-1.8)             Segmented Neutrophils %    36 % (45-70)              Band Neutrophils %    0 % (1-5)              Lymphocytes %              53.2 %      (20.0-45.0)             Lymphocytes % (Manual)    44 % (20-45)              Atypical Lymphocytes %    8 % (0-5)              Monocytes %              4.9 %      (3.0-10.0)             Monocytes % (Manual)    3 % (3-10)              Eosinophils %              6.8 %      (0.0-7.0)             Eosinophils % (Manual)    8 % (0-7)              Basophils %              0.9 %      (0.0-3.0)             Basophils % (Manual)    1 % (0-3)              Nucleated Red Blood Cells %              0.00 %      (0.00-0.70)             Immature Granulocytes #              0.03 10*3/uL      (0.00-0.20)             Nucleated Red Blood Cells    0 /100{WBCs}              Platelet Estimate    Decreased NA              Large Platelets    SLIGHT NA              Platelet Morphology Comment    NORMAL NA              Polychromasia    RARE NA              Hypochromasia    SLIGHT NA              Poikilocytosis    RARE NA              Anisocytosis    RARE NA              Macrocytosis    RARE NA              Tear Drop Cells    RARE NA              Ovalocytes    RARE NA              Serum Total Protein              6.8 g/dL      (6.0-8.5)             Albumin              4.0 g/dL      (2.9-4.4)             Globulin              2.8 g/dL      (2.2-3.9)             Albumin/Globulin Ratio              1.4 NA      (0.7-1.7)             Alpha-1-Globulins              0.2 g/dL      (0.0-0.4)             Alpha-2-Globulins              0.6 g/dL      (0.4-1.0)             Beta Globulins              1.0  g/dL      (0.7-1.3)             Gamma Globulins              1.1 g/dL      (0.4-1.8)             Protein Electrophoresis Note    Comment NA              Protein Electrophoresis      Interpret               Comment NA                    Immunoglobulin G              1113 mg/dL      (700-1600)             Immunoglobulin A              130 mg/dL      ()             Immunoglobulin M              81 mg/dL      ()             Serum Immunofixation    Comment NA              M-Nii (BATSHEVA)              Not Observed      g/dL (Not             Free Kappa Light Chains              36.7 mg/L      (3.3-19.4)             Free Lambda Light Chains              24.4 mg/L      (5.7-26.3)             Free Kappa/Lambda Light Chain      Ratio               1.50 NA      (0.26-1.65)            Impression/Problem List      Anemia, mild      Thrombocytopenia. Bone marrow showed evidence of  adequate megakaryocyte produc    tion.      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      Vitamin D deficiency      Mild chronic anemia - D64.9            Monoclonal gammopathy of unknown significance - D47.2            Hypertension - I10            Hyperlipidemia - E78.5            Hypothyroidism, unspecified - E03.9            Notes      New Medications      * PANTOPRAZOLE (Protonix) 40 MG TABLET.DR: 40 MG PO QDAY@07 #30         Instructions: Take on an empty stomach.      * NADOLOL 40 MG TABLET: 40 MG PO QDAY 30 Days #30      * Lisinopril* 10 MG TABLET: 10 MG PO QDAY #30      * Gabapentin 600 MG TABLET: 300 MG PO HS #30         Instructions: 0.5 tab      * Levothyroxine 0.2 MG  TABLET: 0.2 MG PO QDAY@07 #30      Changed Medications      * SERTRALINE HCL (Zoloft*) 100 MG TABLET: 150 MG PO QDAY         Instructions: 1.5 TAB      * Clopidogrel Bisulfate (Plavix) 75 MG TABLET: 75 MG PO QDAY #30      * INSULIN LISPRO (HumaLOG VIAL) 100 UNITS/ML VIAL: 15 UNITS SUBQ TIDAC #1      * INSULIN GLARGINE (Lantus SOLOSTAR) 100 UNIT/1 ML INSULN.PEN: 25 UNITS SUBQ HS       #1         Instructions: Dispense 1 box of 100 pen needles also.      * Levothyroxine 0.05 MG TABLET: 0.05 MG PO QDAY@07 #30         Replaced 0.025 MG TABLET: 0.25 MG PO QDAY@07 #30         Instructions: Take on an empty stomach.      Problems:        (1) Hypothyroidism, unspecified      (2) Hyperlipidemia      Qualifiers:           Qualified Codes:  E78.5 - Hyperlipidemia, unspecified      (3) Hypertension      Qualifiers:           Qualified Codes:  I10 - Essential (primary) hypertension      (4) Monoclonal gammopathy of unknown significance      (5) Mild chronic anemia      (6) Thrombocytopenia            Plan      6 months follow-up with CBC, CMP, iron profile, serum protein electrophoresis     with immunofixation, kappa and lambda light chain            Patient Education:        Platelet Count            PREVENTION      Hx Influenza Vaccination:  Yes      Influenza Vaccine Declined:  No      2 or More Falls Past Year?:  Yes      Fall Past Year with Injury?:  No      Encouraged to follow-up with:  PCP regarding preventative exams.      Chart initiated by      LEANNA Mercado MA            Electronically signed by Leslye Chaudhry  03/10/2020 18:28       Disclaimer: Converted document may not contain table formatting or lab diagrams. Please see [employer] for the authenticated document.

## 2021-05-28 NOTE — PROGRESS NOTES
Patient: SHELL JARAMILLO     Acct: [id number]     Report: #[id number]  UNIT #: [id number]     : 1945    Encounter Date:2018  PRIMARY CARE: PREM SRINIVASAN  ***Signed***  --------------------------------------------------------------------------------------------------------------------  Progress Note      DATE: 18      Referring Physician      BRIAN Srinivasan      Chief Complaint      Follow up  Thrombocytopenia            Subjective      72-year-old white male was referred by emergency room because of a low platelet     count that was lower than usual.  Patient usually runs platelet count around 70,    000 however in the emergency room it was 51,000.  Patient denies any bleeding     in any orifices.  He is tired but feels fine            Past Med/Surg History            Past Med/Surg History:   Hypertension             Diabetes Mellitus             Heart Disease (status post stent placement)             Other (hypothyroidism, hyperlipidemia, reflux disease, depression, bilateral     knee replacement, shoulder surgery)            Social History      Social History:  No Tobacco Use, Alcohol Use (recovering alcoholic), No     Recreational Drug use, Other (he lives with his wife)            Allergies      Coded Allergies:             NO KNOWN DRUG ALLERGIES (Verified  Allergy, Unknown, 18)            Medications      Medications    Last Reconciled on 18 18:13 by HAYDEN ZAMUDIO MD      Donepezil Hcl (Donepezil*) 10 Mg Tablet      10 MG PO HS, TAB         Reported         18       Pantoprazole (Protonix*) 20 Mg Tablet.      20 MG PO QDAY, TAB         Reported         17       Insulin Glargine (Lantus VIAL) 100 Units/Ml Vial      20 UNITS SUBQ QPM, #1 VIAL 0 Refills         Reported         17       Saxagliptin Hcl (Onglyza) 5 Mg Tablet      5 MG PO QDAY, #30 TAB         Reported         17       Cholecalciferol (Vitamin D3*) 2,000 Unit Tablet      2000 UNITS PO QDAY, #30 TAB          Reported         12/1/17       Lisinopril* (Lisinopril*) 5 Mg Tablet      5 MG PO QDAY         Reported         12/1/17       Cyanocobalamin (Vitamin B-12*) 500 Mcg Tab      500 MCG PO BID, #60 TAB         Reported         3/7/17       Simvastatin (Simvastatin*) 40 Mg Tablet      20 MG PO HS, #30 TAB 0 Refills         Reported         3/7/17       Clopidogrel Bisulfate (Plavix) 75 Mg Tablet      75 MG PO QDAY, #30 TAB 0 Refills         Reported         10/28/16       glipiZIDE XL (glipiZIDE XL*) 10 Mg Tab.er.24      10 MG PO BID, #30 TAB.ER 0 Refills         Reported         8/30/16       Isosorbide Mononitrate ER (Isosorbide Mononitrate ER) 60 Mg Tab.er.24h      60 MG PO QDAY, TAB         Reported         12/17/15       Metformin Hcl (Metformin Hcl*) 1,000 Mg Tablet      1000 MG PO BID, TAB         Reported         10/30/13       Sertraline Hcl (Zoloft*) 100 Mg Tablet      100 MG PO HS         Reported         12/17/12       Levothyroxine Sodium (Levothroid*) 0.2 Mg Tablet      200 MCG PO QAM         Reported         2/22/11      Current Medications      Current Medications Reviewed 2/28/18            Pain Assessment      Pain Intensity:  0 (none)      Description:  None            Review of Systems      General:  No Anxiety, Fatigue Scale: (3), No Pain Scale:, No Fever, No Other      HEENT:  No Dysphagia, No Hearing Changes, No Rhinorrhea, No Tinnitus, Visual     Changes (eyes blurry), No Nasal Congestion, No Epistaxis, No Other      Respiratory:  No Cough, No Shortness of Air, No Sputum Changes, No Wheezing, No     Hemoptysis, No Congestion, No Other      Cardiovascular:  No Chest Pain, No Pedal Edema, No Orthopnea, No Palpitations,     No Chest Pressure, No Dizziness, No Other      Gastrointestinal:  No Nausea, No Vomiting, No Dysphagia, No Constipation, No     Diarrhea, No Appetite Good, Appetite Fair, No Appetite Poor, No Early Satiety,     No Other      Genitourinary:  Nocturia (3 x), No Dysuria, No  Other      Musculoskeletal:  No Joint Effusions, Joint Tenderness, Joint Stiffness, No     Myalgias, Aches, Pains, No Other      Endocrine:  No Heat Intolerance, No Cold Intolerance, No Fatigue, No Blood     Sugar Control, No Other      Hematologic:  Bleeding, Bruising, No Swollen Glands, No Other      Allergic/Immunologic:  No Hives, No Throat closing off, No Nasal drip, No Itchy     eyes, No Hay fever, No Other      Psychological:  Anxiety, Depression (mild), No Other      Neurological:  No Headaches, Dizziness, Weakness, No Numbness, No Other      Skin:  No Rash, No Open Wounds, No Edema, No Other            Vitals      Height 71 in / 180.34 cm      Weight 263 lbs 4 oz / 119.008588 kg      BSA 2.49 m2      BMI 36.7 kg/m2      Temperature 98.5 F / 36.94 C - Oral      Pulse 72      Respirations 16      Blood Pressure 140/74 Sitting, Left Arm      Pulse Oximetry 96%, room air            Exam      Constitutional:  No acute distress, Conversant, Pleasant, No Weakness      Eyes:  Anicteric sclerae, Palpebral Conjunctivae (pink, no petechiae), CHIQUI      HENT:  Oropharynx clear, No Erythema, Buccal mucosae (pink with no petechiae)      Neck:  Supple, Full Range of Motion      Cardiovascular:  RRR, No Murmurs, Normal PMI, No Peripheral Edema      Lungs:  Clear to Ausculation, Normal Respiratory Effort      Abdomen:  Soft, NABS, No Tenderness      Chest:  Other (symmetrical and equal)      Lymphatic:  No Neck      Extremities:  No digital cyanosis, Normal gait, Other (no deformity no     limitation in range of motion)      Neurological:  Cranial Nerve II-XII, No Focal Sensory deficits      Psychological:  Appropriate affect, Intact judgement, Alert      Skin:  Normal temperature, Other (no dermatosis, no hematoma no petechiae)            Lab Results      Laboratory Tests      2/17/18 15:47            Laboratory Tests            Test        12/1/17      13:17 1/2/18      19:40 1/2/18      22:29 1/11/18      09:54              Urine Collection Type Unknown                 Urine Color YELLOW                 Urine Appearance CLEAR                 Urine pH        5.0 pH UNIT      (5.0-8.0)                      Urine Specific Gravity        1.020      (1.005-1.030)                      Urine Protein        NEGATIVE mg/dl      (NEGATIVE)                      Urine Glucose (UA)        100 mg/dl      (NEGATIVE)                      Urine Ketones        NEGATIVE mg/dl      (NEGATIVE)                      Urine Occult Blood        NEGATIVE      (NEGATIVE)                      Urine Nitrite        NEGATIVE      (NEGATIVE)                      Urine Bilirubin        NEGATIVE      (NEGATIVE)                      Urine Urobilinogen        1.0 EU/dl      (0.1-1.0)                      Urine Leukocyte Esterase        NEGATIVE      (NEGATIVE)                      Prothrombin Time                10.1 Seconds      (9.4-12.0)                      International Ratio (Anticoag      Ther)         0.91      (2.00-3.00)                      Activated Partial      Thromboplast Time         22.3 seconds      (22.2-34.2)                      Magnesium Level                1.77 mg/dL      (1.60-2.30)                      Total Creatine Kinase                74 U/L      ()                      Bedside Troponin I                0.00 ng/ml      (0.00-0.08)              Mean Corpuscular Hemoglobin      Concent                 35.5 %      (33.0-37.0)             Glomerular Filtration Rate                >60      ml/min/1.73m2             Hemoglobin A1c                7.4 %      (3.5-5.7)             Estimated Average Glucose      (eAG)                 166 mg/dL              Triglycerides Level                159 mg/dL      ()             Cholesterol Level                132 mg/dL      (107-200)             LDL Cholesterol                59 mg/dL      ()             VLDL Cholesterol                32 mg/dl      (5-37)             HDL Cholesterol                 41 mg/dL      (40-60)             Cholesterol/HDL Ratio    3.2 (3.0-6.0)              Thyroid Stimulating Hormone      (TSH)                 0.176 mIU/L      (0.270-4.200)             Test        2/17/18      15:39 2/17/18      15:47 2/18/18      21:55              Capillary Blood Glucose (Chem)        216 mg/dL      (70-99)                      White Blood Count                6.30 10*3/uL      (4.80-10.80)                      Red Blood Count                4.49 10*6/uL      (4.70-6.10)                      Hemoglobin                14.10 g/dL      (14.00-18.00)                      Hematocrit                41.0 %      (42.0-52.0)                      Mean Corpuscular Volume                91.4 fL      (80.0-96.0)                      Mean Corpuscular Hemoglobin                31.4 pg      (27.0-31.0)                      Red Cell Distribution Width                14.2 %      (11.5-14.5)                      Platelet Count                51.40 10*3/uL      (130..00)                      Mean Platelet Volume                7.7 fL      (7.4-10.4)                      Granulocytes (%)                52.4 %      (30.0-85.0)                      Lymphocytes (%) (Auto)                35.70 %      (20.00-45.00)                      Monocytes (%) (Auto)                7.73 %      (3.00-10.00)                      Eosinophils (%) (Auto)                3.46 %      (0.00-7.00)                      Basophils (%) (Auto)                0.70 %      (0.00-3.00)                      Granulocytes #                3.30 10*3/uL      (2.00-8.00)                      Lymphocytes # (Auto)                2.25 10*3/uL      (1.00-5.00)                      Monocytes # (Auto)                0.49 10*3/uL      (0.20-1.20)                      Eosinophils # (Auto)                0.22 10*3/uL      (0.00-0.70)                      Basophils # (Auto)                0.04 10*3/uL      (0.00-0.20)                      Nucleated Red  Blood Cells %                0.00 %      (0.00-0.01)                      Sodium Level                141 mmol/L      (135-147)                      Potassium Level                4.4 mmol/L      (3.5-5.3)                      Chloride Level                103 mmol/L      ()                      Carbon Dioxide Level                25 mmol/L      (22-32)                      Anion Gap                17 mmol/L      (8-19)                      Blood Urea Nitrogen                17 mg/dL      (5-25)                      Creatinine                0.94 mg/dL      (0.70-1.20)                      Glomerular Filtration Rate      Calc         >60      mL/min/{1.73_m2}                      BUN/Creatinine Ratio                18 {ratio}      (6-20)                      Glucose Level                218 mg/dL      (70-99)                      Serum Osmolality  300 (273-304)                Calcium Level                9.0 mg/dL      (8.7-10.4)                      Total Bilirubin                0.60 mg/dL      (0.20-1.30)                      Aspartate Amino Transf      (AST/SGOT)         23 U/L (15-50)                      Alanine Aminotransferase      (ALT/SGPT)         30 U/L (10-40)                      Alkaline Phosphatase                92 U/L      ()                      Troponin T                <0.01 ng/mL      (0.00-0.10)                      Total Protein                6.9 g/dL      (6.3-8.2)                      Albumin                4.3 g/dL      (3.5-5.0)                      Globulin                2.6 g/dL      (2.0-3.5)                      Albumin/Globulin Ratio                1.7 {ratio}      (1.4-2.6)                      Lab Scanned Report                LAB FINAL      CUMULATIVES -                    Impression/Problem List      Anemia resolved      Thrombocytopenia.Bone marrow showed evidence of  adequate megakaryocyte     production.      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      Vitamin D deficiency            Plan      Since the patient's bone marrow showed possibility of either CLL or multiple     myeloma patient will have multiple myeloma workup as well as Oncofish for CLL.      Other tests will be a CBC, and leukemia lymphoma panel.  Patient will stop his     pantoprazole because it can cause thrombocytopenia.      Return in 2 weeks.            Patient Education:        Anemia      DI for Diabetes Type 2      High Blood Pressure            Hx Influenza Vaccination:  Yes (2015)      Date Influenza Vaccine Given:  Nov 13, 2017      2 or More Falls Past Year?:  Yes      Fall Past Year with Injury?:  No      Hx Pneumococcal Vaccination:  Yes (2015)      Encouraged to follow-up with:  PCP regarding preventative exams.      Chart initiated by      Jeri Boss MA                 Disclaimer: Converted document may not contain table formatting or lab diagrams. Please see [facility] for the authenticated document.

## 2021-05-28 NOTE — PROGRESS NOTES
Patient: SHELL JARAMILLO     Acct: [id number]     Report: #[id number]  UNIT #: [id number]     : 1945    Encounter Date:2018  PRIMARY CARE: PREM SRINIVASAN  ***Signed***  --------------------------------------------------------------------------------------------------------------------  Progress Note      DATE: 18      Referring Physician      BRIAN Srinivasan      Chief Complaint      Follow up Anemia, Thrombocytopenia            Subjective      Patient is a 72-year-old white male with a history of anemia which is now     resolved, thrombocytopenia , and monoclonal gammopathy of unknown significance.      Patient comes in every 6 months.  He is doing well right now except for blurred     vision.            Past Med/Surg History            Past Med/Surg History:   Hypertension             Diabetes Mellitus             Heart Disease (status post stent placement)             Other (hypothyroidism, hyperlipidemia, reflux disease, depression, bilateral     knee replacement, shoulder surgery)            Social History      Social History:  No Tobacco Use, Alcohol Use (recovering alcoholic), No     Recreational Drug use, Other (he lives with his wife)            Allergies      Coded Allergies:             NO KNOWN DRUG ALLERGIES (Verified  Allergy, Unknown, 18)            Medications      Medications    Last Reconciled on 18 10:22 by HAYDEN ZAMUDIO MD      Donepezil Hcl (Donepezil*) 10 Mg Tablet      10 MG PO HS, TAB         Reported         18       Pantoprazole (Protonix*) 20 Mg Tablet.dr      20 MG PO QDAY, TAB         Reported         17       Insulin Glargine (Lantus VIAL) 100 Units/Ml Vial      20 UNITS SUBQ QPM, #1 VIAL 0 Refills         Reported         17       Saxagliptin Hcl (Onglyza) 5 Mg Tablet      5 MG PO QDAY, #30 TAB         Reported         17       Cholecalciferol (Vitamin D3*) 2,000 Unit Tablet      2000 UNITS PO QDAY, #30 TAB         Reported         17        Lisinopril* (Lisinopril*) 5 Mg Tablet      5 MG PO QDAY         Reported         12/1/17       Cyanocobalamin (Vitamin B-12*) 500 Mcg Tab      500 MCG PO BID, #60 TAB         Reported         3/7/17       Simvastatin (Simvastatin*) 40 Mg Tablet      20 MG PO HS, #30 TAB 0 Refills         Reported         3/7/17       Clopidogrel Bisulfate (Plavix) 75 Mg Tablet      75 MG PO QDAY, #30 TAB 0 Refills         Reported         10/28/16       glipiZIDE XL (glipiZIDE XL*) 10 Mg Tab.er.24      10 MG PO BID, #30 TAB.ER 0 Refills         Reported         8/30/16       Isosorbide Mononitrate ER (Isosorbide Mononitrate ER) 60 Mg Tab.er.24h      60 MG PO QDAY, TAB         Reported         12/17/15       Metformin Hcl (Metformin Hcl*) 1,000 Mg Tablet      1000 MG PO BID, TAB         Reported         10/30/13       Sertraline Hcl (Zoloft*) 100 Mg Tablet      100 MG PO HS         Reported         12/17/12       Levothyroxine Sodium (Levothroid*) 0.2 Mg Tablet      200 MCG PO QAM         Reported         2/22/11      Current Medications      Current Medications Reviewed 1/22/18            Pain Assessment      Pain Intensity:  0 (none)      Description:  None            Review of Systems      General:  No Anxiety, Fatigue Scale: (5), No Pain Scale:, No Fever, No Other      HEENT:  No Dysphagia, No Hearing Changes, No Rhinorrhea, No Tinnitus, Visual     Changes (blurred vision), No Nasal Congestion, No Epistaxis, No Other      Respiratory:  No Cough, Shortness of Air, No Sputum Changes, No Wheezing, No     Hemoptysis, No Congestion, No Other      Cardiovascular:  No Chest Pain, No Pedal Edema, No Orthopnea, No Palpitations,     No Chest Pressure, No Dizziness, No Other      Gastrointestinal:  No Nausea, No Vomiting, No Dysphagia, No Constipation, No     Diarrhea, Appetite Good, No Appetite Fair, No Appetite Poor, No Early Satiety,     No Other      Genitourinary:  No Nocturia, No Dysuria, No Other      Musculoskeletal:  No Joint  Effusions, Joint Tenderness, Joint Stiffness, No     Myalgias, Aches, Pains, No Other      Endocrine:  No Heat Intolerance, No Cold Intolerance, No Fatigue, No Blood     Sugar Control, No Other      Hematologic:  No Bleeding, No Bruising, No Swollen Glands, No Other      Allergic/Immunologic:  No Hives, No Throat closing off, Nasal drip, No Itchy     eyes, No Hay fever, No Other      Psychological:  Anxiety (mild), Depression, No Other      Neurological:  No Headaches, No Dizziness, Weakness (all over), No Numbness, No     Other      Skin:  No Rash, No Open Wounds, No Edema, No Other            Vitals      Height 71 in / 180.34 cm      Weight 264 lbs 0 oz / 119.078015 kg      BSA 2.49 m2      BMI 36.8 kg/m2      Temperature 98.2 F / 36.78 C - Oral      Pulse 72      Respirations 16      Blood Pressure 151/74 Sitting, Left Arm      Pulse Oximetry 97%, room air            Exam      Constitutional:  No acute distress, Conversant, Pleasant      Eyes:  Anicteric sclerae, Palpebral Conjunctivae (pink), CHIQUI      HENT:  Oropharynx clear, No Erythema, Buccal mucosae (pink)      Neck:  Supple, Full Range of Motion      Cardiovascular:  Normal PMI, No Peripheral Edema      Lungs:  Clear to Ausculation, Normal Respiratory Effort      Abdomen:  Soft, NABS, No Tenderness      Chest:  Other (symmetrical and equal)      Lymphatic:  No Neck      Extremities:  No digital cyanosis, Normal gait, Other (no deformity no     limitation in range of motion)      Neurological:  Cranial Nerve II-XII, No Focal Sensory deficits      Psychological:  Appropriate affect, Intact judgement, Alert      Skin:  Normal temperature, Other (no dermatosis)            Lab Results      Laboratory Tests      1/11/18 09:54            Laboratory Tests            Test        10/19/17      11:17 12/1/17      11:54 12/1/17      13:17 1/2/18      19:40             Vitamin B12 Level        933 pg/mL      (211-911)                      Homocysteine        9.9  umol/L      (3.7-13.9)                      Troponin T                <0.01 ng/mL      (0.00-0.10)                      Urine Collection Type   Unknown               Urine Color   YELLOW               Urine Appearance   CLEAR               Urine pH                5.0 pH UNIT      (5.0-8.0)              Urine Specific Gravity                1.020      (1.005-1.030)              Urine Protein                NEGATIVE mg/dl      (NEGATIVE)              Urine Glucose (UA)                100 mg/dl      (NEGATIVE)              Urine Ketones                NEGATIVE mg/dl      (NEGATIVE)              Urine Occult Blood                NEGATIVE      (NEGATIVE)              Urine Nitrite                NEGATIVE      (NEGATIVE)              Urine Bilirubin                NEGATIVE      (NEGATIVE)              Urine Urobilinogen                1.0 EU/dl      (0.1-1.0)              Urine Leukocyte Esterase                NEGATIVE      (NEGATIVE)              Prothrombin Time                10.1 Seconds      (9.4-12.0)             International Ratio (Anticoag      Ther)                 0.91      (2.00-3.00)             Activated Partial      Thromboplast Time                 22.3 seconds      (22.2-34.2)             Magnesium Level                1.77 mg/dL      (1.60-2.30)             Total Creatine Kinase                74 U/L      ()             Test        1/2/18      21:12 1/2/18      22:29 1/11/18      09:54 1/13/18      22:01             Capillary Blood Glucose (Chem)        259 mg/dl      (70-99)                      Bedside Troponin I                0.00 ng/ml      (0.00-0.08)                      White Blood Count                6.06 K/ul      (4.80-10.80)              Red Blood Count                4.64 M/ul      (4.70-6.10)              Hemoglobin                14.70 g/dl      (14.00-18.00)              Hematocrit                41.5 %      (42.0-52.0)              Mean Corpuscular Volume                89.5 fl       (80.0-96.0)              Mean Corpuscular Hemoglobin                31.7 pcg      (27.0-31.0)              Mean Corpuscular Hemoglobin      Concent                 35.5 %      (33.0-37.0)              Red Cell Distribution Width                13.8 %      (11.5-14.5)              Platelet Count                72.20 K/ul      (130..00)              Mean Platelet Volume                7.0 fl      (7.4-10.4)              Granulocytes (%)                55.4 %      (30.0-85.0)              Lymphocytes (%) (Auto)                32.70 %      (20.00-45.00)              Monocytes (%) (Auto)                7.47 %      (3.00-10.00)              Eosinophils (%) (Auto)                4.20 %      (0.00-7.00)              Basophils (%) (Auto)                0.30 %      (0.00-3.00)              Granulocytes #                3.35 K/ul      (2.00-8.00)              Lymphocytes # (Auto)                1.98 K/ul      (1.00-5.00)              Monocytes # (Auto)                0.45 K/ul      (0.20-1.20)              Eosinophils # (Auto)                0.25 K/ul      (0.00-0.70)              Basophils # (Auto)                0.02 K/ul      (0.00-0.20)              Nucleated Red Blood Cells %                0.00 %      (0.00-0.01)              Sodium Level                140 mmol/L      (135-147)              Potassium Level                4.5 mmol/L      (3.5-5.3)              Chloride Level                102 mmol/L      ()              Carbon Dioxide Level                25 mmol/L      (22-32)              Anion Gap                18 mmol/l      (8-19)              Blood Urea Nitrogen                14 mg/dl      (5-25)              Creatinine                1.04 mg/dl      (0.70-1.20)              Glomerular Filtration Rate                >60      ml/min/1.73m2              BUN/Creatinine Ratio                13 Ratio      (6-20)              Glucose Level                148 mg/dL      (70-99)              Hemoglobin  A1c                7.4 %      (3.5-5.7)              Estimated Average Glucose      (eAG)                 166 mg/dL                      Serum Osmolality                293 mOsm/kg      (273-304)              Calcium Level                9.3 mg/dl      (8.7-10.4)              Total Bilirubin                0.68 mg/dL      (0.20-1.30)              Aspartate Amino Transf      (AST/SGOT)                 25 U/L (15-50)                      Alanine Aminotransferase      (ALT/SGPT)                 29 U/L (10-40)                      Alkaline Phosphatase                91 U/L      ()              Total Protein                7.2 g/dl      (6.3-8.2)              Albumin                4.1 g/dl      (3.5-5.0)              Globulin                3.1 g/dl      (2.0-3.5)              Albumin/Globulin Ratio                1.3 RATIO      (1.4-2.6)              Triglycerides Level                159 mg/dL      ()              Cholesterol Level                132 mg/dL      (107-200)              LDL Cholesterol                59 mg/dL      ()              VLDL Cholesterol                32 mg/dl      (5-37)              HDL Cholesterol                41 mg/dL      (40-60)              Cholesterol/HDL Ratio   3.2 (3.0-6.0)               Thyroid Stimulating Hormone      (TSH)                 0.176 mIU/L      (0.270-4.200)              Lab Scanned Report                LAB FINAL      CUMULATIVES -            Impression/Problem List      Anemia resolved      Thrombocytopenia - stable.Bone marrow showed evidence of  adequate     megakaryocyte production.      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      Vitamin D deficiency      Notes      New Medications      * Donepezil Hcl (Donepezil*) 10 MG TABLET: 10 MG PO HS            Plan      The patient will come back in 4 months at which time we will do a CBC.      Discussed increased activity, less fatty food intake to help with fatigue and     fatty liver respectively.            Patient Education:        Anemia            Hx Influenza Vaccination:  Yes (2015)      Date Influenza Vaccine Given:  Nov 13, 2017      2 or More Falls Past Year?:  Yes      Fall Past Year with Injury?:  No      Hx Pneumococcal Vaccination:  Yes (2015)      Encouraged to follow-up with:  PCP regarding preventative exams.      Chart initiated by      Jeri Boss MA                 Disclaimer: Converted document may not contain table formatting or lab diagrams. Please see [employer] for the authenticated document.

## 2021-05-28 NOTE — PROGRESS NOTES
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            Patient has been complaining of dizziness for the past 2 to 3 weeks.  Closing     his eyes makes it worse.  Patient complains of worsening shortness of air on     walking.  Patient also feels like he will faint and feels shaky.            Past Med/Surg History            Past Med/Surg History:   Hypertension             Diabetes Mellitus             Heart Disease (status post stent placement)             No Blood Clots             No Cancer             No Lung Disease             No Kidney Disease             Other (hypothyroidism, hyperlipidemia, reflux disease, depression, bilateral     knee replacement, shoulder surgery)            Social History      Social History:  No Tobacco Use; Alcohol Use (recovering alcoholic); No     Recreational Drug use; Other (he lives with his wife)            Allergies      Coded Allergies:             NO KNOWN DRUG ALLERGIES (Verified  Allergy, Unknown, 19)            Medications      Medications    Last Reconciled on 19 19:45 by HAYDEN ZAMUDIO MD      Levothyroxine (Levothyroxine) 0.025 Mg Tablet      0.25 MG PO QDAY@07, #30 TAB 0 Refills         Reported         19       Saxagliptin Hcl (Onglyza) 2.5 Mg Tablet      2.5 MG PO QDAY, #30 TAB         Reported         19       Pantoprazole  (Protonix*) 20 Mg Tablet      40 MG PO QDAY@07, #60 TAB 0 Refills         Reported         5/19/19       Amitriptyline HCl (Amitriptyline HCl) 25 Mg Tablet      10 MG PO HS, #30 TAB         Reported         5/19/19       Insulin Glargine (Lantus SOLOSTAR) 100 Unit/1 Ml Insuln.pen      30 UNITS SUBQ HS, #1 BOX 0 Refills         Reported         5/19/19       Insulin Lispro (HumaLOG VIAL*) 100 Units/Ml Vial      15 UNITS SUBQ BID, #1 VIAL 0 Refills         Reported         5/19/19       Ferrous Sulfate (Ferrous Sulfate*) 325 Mg Tablet      325 MG PO TID for 90 Days, #270 TAB 3 Refills         Prov: [place]         3/6/19       Donepezil Hcl (Donepezil*) 10 Mg Tablet      10 MG PO HS, TAB         Reported         1/22/18       Cholecalciferol (Vitamin D3*) 2,000 Unit Tablet      2000 UNITS PO QDAY, #30 TAB         Reported         12/1/17       Cyanocobalamin (Vitamin B-12*) 500 Mcg Tab      500 MCG PO BID, #60 TAB         Reported         3/7/17       Simvastatin (Simvastatin*) 40 Mg Tablet      20 MG PO HS, #30 TAB 0 Refills         Reported         3/7/17       Clopidogrel Bisulfate (Plavix) 75 Mg Tablet      75 MG PO QDAY, #30 TAB 0 Refills         Reported         10/28/16       glipiZIDE XL (glipiZIDE XL*) 10 Mg Tab.er.24      10 MG PO BID, #30 TAB.ER 0 Refills         Reported         8/30/16       Isosorbide Mononitrate ER (Isosorbide Mononitrate ER) 60 Mg Tab.er.24h      60 MG PO QDAY, TAB         Reported         12/17/15       Metformin HCl (Metformin HCl) 1,000 Mg Tablet      1000 MG PO BID, TAB         Reported         10/30/13       Sertraline Hcl (Zoloft*) 100 Mg Tablet      150 MG PO QDAY         Reported         12/17/12      Current Medications      Current Medications Reviewed 8/7/19            Pain Assessment      Pain Intensity:  0      Description:  None            Review of Systems      General:  Fatigue Scale: (8); No Pain Scale:      HEENT:  No Dysphagia, No Hearing Changes; Visual Changes  (Eyes red and watery,     c/o blurred vision, cataracts)      Respiratory:  No Cough; Shortness of Air      Cardiovascular:  No Chest Pain; Dizziness      Gastrointestinal:  No Nausea, No Vomiting, No Constipation; Diarrhea, Appetite     Good      Genitourinary:  No Nocturia, No Dysuria      Musculoskeletal:  No Aches, No Pains      Endocrine:  No Heat Intolerance; Fatigue, Blood Sugar Control      Hematologic:  No Bleeding, No Bruising      Allergic/Immunologic:  No Hives, No Nasal drip      Psychological:  No Anxiety, No Depression      Neurological:  No Headaches; Dizziness, Weakness, Other (-feels faint, -shaking,    -has been falling down)      Skin:  No Rash, No Edema            Vitals      Height 5 ft 11 in / 180.34 cm      Weight 270 lbs 4 oz / 122.423785 kg      BSA 2.40 m2      BMI 37.7 kg/m2      Temperature 98.2 F / 36.78 C      Pulse 86      Respirations 20      Blood Pressure 137/71      Pulse Oximetry 100%            Exam      Constitutional:  No acute distress, Conversant, Pleasant, Other (Ill-looking)      Eyes:  Anicteric sclerae, Palpebral Conjunctivae (Pink with no petechiae), CHIQUI      HENT:  Oropharynx clear; No Erythema; Buccal mucosae (Pink, no petechia)      Neck:  Supple, Full Range of Motion      Cardiovascular:  RRR; No Murmurs; Normal PMI      Lungs:  Clear to Ausculation, Normal Respiratory Effort      Abdomen:  Soft, NABS; No Masses, No Tenderness      Chest:  Other (Symmetrical)      Lymphatic:  No Neck      Extremities:  No digital cyanosis, No digital ischemia, Normal gait, Other (No     deformity)      Neurological:  Cranial Nerve II-XII; No Focal Sensory deficits      Psychological:  Appropriate affect, Appropriate mood, Intact judgement, Alert      Skin:  Normal temperature, Other (No dermatoses)            Lab Results      Laboratory Tests      8/5/19 08:12            8/7/19 12:49            Laboratory Tests            Test       5/19/19      11:37 5/19/19      11:42 5/19/19       15:14 5/20/19      09:27             Bedside Troponin I       0.01 ng/mL      (0.00-0.08)                           Lymphocytes (%) (Auto)              28.90 %      (20.00-45.00)                           Monocytes (%) (Auto)              6.90 %      (3.00-10.00)                           Eosinophils (%) (Auto)              4.10 %      (0.00-7.00)                           Basophils (%) (Auto)              0.60 %      (0.00-3.00)                           Prothrombin Time              10.3 s      (9.4-12.0)                           International Ratio (Anticoag      Ther)        0.99 NA      (2.00-3.00)                           Activated Partial      Thromboplast Time        23.2      (22.2-34.2)                           Magnesium Level              1.88 mg/dL      (1.60-2.30)                           Total Creatine Kinase              49 U/L      ()                    Troponin T              <0.01 ng/mL      (0.00-0.10)             Test       7/23/19      08:56 7/29/19      06:59 8/5/19      08:12 8/7/19      12:49             Hemoglobin A1c       6.9 %      (3.5-5.7)                           Estimated Average Glucose      (eAG) 151 mg/dL                           Total Bilirubin       0.43 mg/dL      (0.20-1.30)                           Aspartate Amino Transf      (AST/SGOT) 17 U/L (15-50)                           Alanine Aminotransferase      (ALT/SGPT) 19 U/L (10-40)                           Alkaline Phosphatase       100 U/L      ()                           Total Protein       7.2 g/dL      (6.3-8.2)                           Albumin       4.5 g/dL      (3.5-5.0)                           Globulin       2.7 g/dL      (2.0-3.5)                           Albumin/Globulin Ratio       1.7 {ratio}      (1.4-2.6)                           Triglycerides Level       171 mg/dL      ()                           Cholesterol Level       128 mg/dL      (107-200)                           LDL  Cholesterol       57 mg/dL      ()                           VLDL Cholesterol       34 mg/dL      (5-37)                           HDL Cholesterol       37 mg/dL      (40-60)                           Cholesterol/HDL Ratio       3.5 NA      (3.0-6.0)                           Thyroid Stimulating Hormone      (TSH) 1.030 m(iU)/L      (0.270-4.200)                           Capillary Blood Glucose (Chem)              107 mg/dL      (70-99)                           White Blood Count              8.56 10*3/uL      (4.80-10.80)                    Red Blood Count              4.29 10*6/uL      (4.70-6.10)                    Hemoglobin              13.4 g/dL      (14.0-18.0)                    Hematocrit              39.5 %      (42.0-52.0)                    Mean Corpuscular Volume              92.1 fL      (80.0-96.0)                    Mean Corpuscular Hemoglobin              31.2 pg      (27.0-31.0)                    Mean Corpuscular Hemoglobin      Concent               33.9      (33.0-37.0)                    Red Cell Distribution Width              15.0 %      (11.6-14.4)                    RDW Standard Deviation              50.7 fL      (35.1-43.9)                    Platelet Count              77 10*3/uL      (130-400)                    Mean Platelet Volume              9.5 fL      (9.4-12.4)                    Granulocytes (%)              40.8 %      (30.0-85.0)                    Granulocytes #              3.49 10*3/uL      (2.00-8.00)                    Lymphocytes # (Auto)              3.76 10*3/uL      (1.00-5.00)                    Monocytes # (Auto)              0.56 10*3/uL      (0.20-1.20)                    Eosinophils # (Auto)              0.64 10*3/uL      (0.00-0.70)                    Basophils # (Auto)              0.08 10*3/uL      (0.00-0.20)                    Immature Granulocytes %              0.4 %      (0.0-1.8)                    Lymphocytes %              43.9 %       (20.0-45.0)                    Monocytes %              6.5 %      (3.0-10.0)                    Eosinophils %              7.5 %      (0.0-7.0)                    Basophils %              0.9 %      (0.0-3.0)                    Nucleated Red Blood Cells %              0.00 %      (0.00-0.70)                    Immature Granulocytes #              0.03 10*3/uL      (0.00-0.20)                    Absolute Reticulocyte Count              0.1094 10*6/uL      (0.0260-0.0950)                    Percent Reticulocyte Count              2.55 %      (0.51-1.81)                    Iron Level              85 ug/dL      ()                    Total Iron Binding Capacity              325 ug/dL      (245-450)                    Percent Iron Saturation   26 % (20-55)               Transferrin              227.00 mg/dL      (215..00)                    Sodium Level              136 mmol/L      (135-147)             Potassium Level              5.5 mmol/L      (3.5-5.3)             Chloride Level              105 mmol/L      ()             Carbon Dioxide Level              22 mmol/L      (22-32)             Anion Gap              15 mmol/L      (8-19)             Blood Urea Nitrogen              29 mg/dL      (5-25)             Creatinine              1.54 mg/dL      (0.70-1.20)             Glomerular Filtration Rate      Calc               44      mL/min/{1.73_m2}             BUN/Creatinine Ratio              19 {ratio}      (6-20)             Glucose Level              258 mg/dL      (70-99)             Serum Osmolality    297 (273-304)              Calcium Level              9.2 mg/dL      (8.7-10.4)             NT-Pro-B-Type Natriuretic      Peptide               11 pg/mL      (0-900)             Test       8/7/19      21:57                           Lab Scanned Report       LAB FINAL      CUMULATIVES -                    Impression/Problem List      Anemia, mild      Thrombocytopenia. Bone marrow showed  evidence of  adequate megakaryocyte     production.      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      Vitamin D deficiency      Shortness of      Dizziness            Plan      Discussed patient's symptoms with Dr. Buckley      Chest x-ray today, BNP today rule out congestive heart failure      Return to clinic in 3 months with CBC, iron profile, B12 and folate levels.      Glucose checks 3 times a day before meals and at bedtime            Patient Education:        DI for Dizziness-Nonvertigo      DI for Fatigue      DI for Syncope in Adults (Fainting)            PREVENTION      Hx Influenza Vaccination:  Yes      Influenza Vaccine Declined:  No      2 or More Falls Past Year?:  Yes      Fall Past Year with Injury?:  No      Encouraged to follow-up with:  PCP regarding preventative exams.      Chart initiated by      TREE Ramsey RN                 Disclaimer: Converted document may not contain table formatting or lab diagrams. Please see [facility] for the authenticated document.

## 2021-05-28 NOTE — PROGRESS NOTES
Patient: SHELL JARAMILLO     Acct: [id number]     Report: #[id number]  UNIT #: [id number]     : 1945    Encounter Date:2019  PRIMARY CARE: PREM SRINIVASAN  ***Signed***  --------------------------------------------------------------------------------------------------------------------  Progress Note      DATE: 3/6/19      Primary Care Provider:  PREM SRINIVASAN      Referring Provider:  PREM SRINIVASAN      Chief Complaint      FU Thrombocytopenia            Subjective      73-year-old white male with history of idiopathic thrombocytopenic purpura which    apparently has been diagnosed in 2011.  Patient had bone marrow studies     done in 2016 which showed a mildly hypercellular marrow (45%     cellularity) with relatively orderly trilineage maturation and adequate     megakaryocytes.  There was questionable minimally increased population of small     B lymphocytes (CD20 positive) and marrow.  Flow cytometry showed a small     population of atypical CD20 positive monoclonal kappa expressing plasma cells     (0.1%) indicative of plasma cell dyscrasia and small population of CD5 negative     CD10 negative kappa expressing monoclonal B cells (1.5%).  Monoclonal gammopathy    of unknown significance or a nascent lymphoplasmacytic proliferation is     possible.            Patient claims that he had left knee replacement last 2019 and is     presently taking antibiotic to prevent infection.  He complains of fatigue 7 out    of 10.            Past Med/Surg History            Past Med/Surg History:   Hypertension             Diabetes Mellitus             Heart Disease (status post stent placement)             No Blood Clots             No Cancer             No Lung Disease             No Kidney Disease             Other (hypothyroidism, hyperlipidemia, reflux disease, depression, bilateral     knee replacement, shoulder surgery)            Left total knee replacement 2019.       History of Pretty      Splenomegaly            Social History      Social History:  No Tobacco Use; Alcohol Use (recovering alcoholic); No     Recreational Drug use; Other (he lives with his wife)            Allergies      Coded Allergies:             NO KNOWN DRUG ALLERGIES (Verified  Allergy, Unknown, 8/19/18)            Medications      Medications    Last Reconciled on 3/12/19 20:24 by HAYDEN ZAMUDIO MD      Ascorbic Acid (Vitamin C*) 500 Mg Tablet      500 MG PO BID for 90 Days, #180 TAB 3 Refills         Prov: Veza,Leslye         3/6/19       Ferrous Sulfate (Ferrous Sulfate*) 325 Mg Tablet      325 MG PO TID for 90 Days, #270 TAB 3 Refills         Prov: Veza,Lima         3/6/19       Gabapentin (Gabapentin) 300 Mg Capsule      300 MG PO HS, #30 CAP 0 Refills         Reported         3/13/18       Donepezil Hcl (Donepezil*) 10 Mg Tablet      10 MG PO HS, TAB         Reported         1/22/18       Saxagliptin Hcl (Onglyza) 5 Mg Tablet      5 MG PO QDAY, #30 TAB         Reported         12/1/17       Cholecalciferol (Vitamin D3*) 2,000 Unit Tablet      2000 UNITS PO QDAY, #30 TAB         Reported         12/1/17       Cyanocobalamin (Vitamin B-12*) 500 Mcg Tab      500 MCG PO BID, #60 TAB         Reported         3/7/17       Simvastatin (Simvastatin*) 40 Mg Tablet      20 MG PO HS, #30 TAB 0 Refills         Reported         3/7/17       Clopidogrel Bisulfate (Plavix) 75 Mg Tablet      75 MG PO QDAY, #30 TAB 0 Refills         Reported         10/28/16       glipiZIDE XL (glipiZIDE XL*) 10 Mg Tab.er.24      10 MG PO BID, #30 TAB.ER 0 Refills         Reported         8/30/16       Isosorbide Mononitrate ER (Isosorbide Mononitrate ER) 60 Mg Tab.er.24h      60 MG PO QDAY, TAB         Reported         12/17/15       Metformin HCl (Metformin HCl) 1,000 Mg Tablet      1000 MG PO BID, TAB         Reported         10/30/13       Sertraline Hcl (Zoloft*) 100 Mg Tablet      100 MG PO HS         Reported         12/17/12        Levothyroxine Sodium (Levothroid*) 0.2 Mg Tablet      200 MCG PO QAM         Reported         2/22/11      Current Medications      Current Medications Reviewed 3/6/19            Pain Assessment      Pain Intensity:  0 (none)      Description:  None            Review of Systems      General:  Anxiety, Fatigue Scale: (7), Pain Scale: (2)      HEENT:  No Dysphagia, No Hearing Changes; Nasal Congestion      Respiratory:  Cough (dry), Shortness of Air      Cardiovascular:  No Chest Pain      Gastrointestinal:  No Nausea, No Vomiting; Appetite Good (good)      Genitourinary:  Nocturia (2x); No Dysuria      Musculoskeletal:  No Joint Effusions, No Joint Tenderness; Aches, Pains ((L)     knee)      Endocrine:  No Heat Intolerance      Hematologic:  No Bleeding, No Bruising      Allergic/Immunologic:  No Throat closing off; Other (Sinus drainage)      Psychological:  Anxiety; No Depression      Neurological:  No Headaches; Dizziness      Skin:  No Rash, No Open Wounds            Vitals      Height 71 in / 180.34 cm      Weight 268 lbs 0 oz / 121.143896 kg      BSA 2.39 m2      BMI 37.4 kg/m2      Temperature 98.1 F / 36.72 C      Pulse 82      Respirations 24      Blood Pressure 124/65      Pulse Oximetry 97%            Exam      Constitutional:  No acute distress, Conversant, Pleasant; No Weakness      Eyes:  Anicteric sclerae, Palpebral Conjunctivae (Pink), CHIQUI      HENT:  Oropharynx clear; No Erythema; Buccal mucosae (Pink)      Neck:  Supple, Full Range of Motion      Cardiovascular:  RRR; No Murmurs; Normal PMI      Lungs:  Clear to Ausculation, Normal Respiratory Effort      Abdomen:  Soft, NABS; No Masses, No Tenderness      Chest:  Other (Symmetrical)      Lymphatic:  No Neck      Extremities:  No digital cyanosis, No digital ischemia      Neurological:  Cranial Nerve II-XII; No Focal Sensory deficits      Psychological:  Appropriate affect, Appropriate mood, Intact judgement, Alert      Skin:  Other (No  dermatosis)            Lab Results      Laboratory Tests      2/26/19 09:11            2/26/19 09:27            Laboratory Tests            Test       1/21/19      06:59 1/24/19      06:27 2/26/19      08:47 2/26/19      09:11             Triglycerides Level       174 mg/dL      ()                           Cholesterol Level       142 mg/dL      (107-200)                           LDL Cholesterol       62 mg/dL      ()                           VLDL Cholesterol       35 mg/dL      (5-37)                           HDL Cholesterol       45 mg/dL      (40-60)                           Cholesterol/HDL Ratio       3.2 NA      (3.0-6.0)                           Hemoglobin A1c              8.0 %      (3.5-5.7)                           Estimated Average Glucose      (eAG)        183 mg/dL                           Fructosamine              298 umol/L      (0-285)                    Serum Total Protein              6.9 g/dL      (6.0-8.5)                    Alpha-1-Globulins              0.2 g/dL      (0.0-0.4)                    Alpha-2-Globulins              0.6 g/dL      (0.4-1.0)                    Beta Globulins              1.0 g/dL      (0.7-1.3)                    Gamma Globulins              1.3 g/dL      (0.4-1.8)                    Protein Electrophoresis Note   Comment NA               Protein Electrophoresis      Interpret               Comment NA                           Immunoglobulin G              1180 mg/dL      (700-1600)                    Immunoglobulin A              169 mg/dL      ()                    Immunoglobulin M              66 mg/dL      ()                    Serum Immunofixation   Comment NA               M-Nii (BATSHEVA)              Not Observed      g/dL (Not                    Free Kappa Light Chains              42.0 mg/L      (3.3-19.4)                    Free Lambda Light Chains              26.0 mg/L      (5.7-26.3)                    Free Kappa/Lambda Light  Chain      Ratio               1.62 NA      (0.26-1.65)                    White Blood Count              9.12 10*3/uL      (4.80-10.80)             Red Blood Count              4.10 10*6/uL      (4.70-6.10)             Hemoglobin              12.70 g/dL      (14.00-18.00)             Hematocrit              38.1 %      (42.0-52.0)             Mean Corpuscular Volume              92.9 fL      (80.0-96.0)             Mean Corpuscular Hemoglobin              31.0 pg      (27.0-31.0)             Mean Corpuscular Hemoglobin      Concent               33.3 %      (33.0-37.0)             Red Cell Distribution Width              16.2 %      (11.6-14.4)             RDW Standard Deviation              54.3 fL      (35.1-43.9)             Platelet Count              156.00 10*3/uL      (130..00)             Mean Platelet Volume              9.1 fL      (9.4-12.4)             Granulocytes (%)              51.4 %      (30.0-85.0)             Lymphocytes (%) (Auto)              35.00 %      (20.00-45.00)             Monocytes (%) (Auto)              4.60 %      (3.00-10.00)             Eosinophils (%) (Auto)              7.30 %      (0.00-7.00)             Basophils (%) (Auto)              0.90 %      (0.00-3.00)             Granulocytes #              4.69 10*3/uL      (2.00-8.00)             Lymphocytes # (Auto)              3.19 10*3/uL      (1.00-5.00)             Monocytes # (Auto)              0.42 10*3/uL      (0.20-1.20)             Eosinophils # (Auto)              0.67 10*3/uL      (0.00-0.70)             Basophils # (Auto)              0.08 10*3/uL      (0.00-0.20)             Immature Granulocytes %              0.8 %      (0.0-1.8)             Nucleated Red Blood Cells %              0.00 %      (0.00-0.70)             Immature Granulocytes #              0.07 10*3/uL      (0.00-0.20)             Test       2/26/19      09:27 2/26/19      09:49 2/28/19      21:57                    Sodium Level       140  mmol/L      (135-147)                           Potassium Level       5.2 mmol/L      (3.5-5.3)                           Chloride Level       101 mmol/L      ()                           Carbon Dioxide Level       26 mmol/L      (22-32)                           Anion Gap       18 mmol/L      (8-19)                           Blood Urea Nitrogen       19 mg/dL      (5-25)                           Creatinine       1.31 mg/dL      (0.70-1.20)                           Glomerular Filtration Rate      Calc 54      mL/min/{1.73_m2}                           BUN/Creatinine Ratio       15 {ratio}      (6-20)                           Glucose Level       145 mg/dL      (70-99)                           Serum Osmolality 295 (273-304)                 Calcium Level       9.8 mg/dL      (8.7-10.4)                           Total Bilirubin       0.33 mg/dL      (0.20-1.30)                           Aspartate Amino Transf      (AST/SGOT) 16 U/L (15-50)                           Alanine Aminotransferase      (ALT/SGPT) 17 U/L (10-40)                           Alkaline Phosphatase       100 U/L      ()                           Total Protein       7.3 g/dL      (6.3-8.2)                           Albumin       4.2 g/dL      (3.5-5.0)                           Globulin       3.1 g/dL      (2.0-3.5)                           Albumin/Globulin Ratio       1.4 {ratio}      (1.4-2.6)                           Iron Level              72 ug/dL      ()                           Total Iron Binding Capacity              382 ug/dL      (245-450)                           Percent Iron Saturation  19 % (20-55)                Transferrin              267.00 mg/dL      (215..00)                           Thyroid Stimulating Hormone      (TSH)        64.080 m(iU)/L      (0.270-4.200)                           Lab Scanned Report              LAB FINAL      CUMULATIVES -             Radiology Impressions      Ultrasound of  the abdomen done on November 1, 2018 showed enlarged liver     measuring 20 cm in size.      Spleen is enlarged measuring 16.1 x 4.2 x 4.5 cm in size      Both liver and spleen have increased in size since May 1, 2018.            Impression/Problem List      Anemia probably from recent left total knee replacement, iron saturation is low      Thrombocytopenia.Bone marrow showed evidence of  adequate megakaryocyte     production.      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      Vitamin D deficiency      Notes      New Medications      * Ferrous Sulfate (Ferrous Sulfate*) 325 MG TABLET: 325 MG PO TID 90 Days #270      * ASCORBIC ACID (Vitamin C*) 500 MG TABLET: 500 MG PO BID 90 Days #180            Plan      Started on ferrous sulfate 1 tablet 3 times a day with vitamin C 500 mg 2 times     a day.      In 8 weeks get a CBC, iron profile, reticulocyte count.            Patient Education:        DI for Anxiety -- Adult            PREVENTION      Date Influenza Vaccine Given:  Nov 13, 2017      Encouraged to follow-up with:  PCP regarding preventative exams.                 Disclaimer: Converted document may not contain table formatting or lab diagrams. Please see [employer] [facility] for the authenticated document.

## 2021-05-29 LAB
ANION GAP SERPL CALCULATED.3IONS-SCNC: 11.4 MMOL/L (ref 5–15)
BUN SERPL-MCNC: 20 MG/DL (ref 8–23)
BUN/CREAT SERPL: 17.9 (ref 7–25)
CALCIUM SPEC-SCNC: 8.9 MG/DL (ref 8.6–10.5)
CHLORIDE SERPL-SCNC: 106 MMOL/L (ref 98–107)
CO2 SERPL-SCNC: 20.6 MMOL/L (ref 22–29)
CREAT SERPL-MCNC: 1.12 MG/DL (ref 0.76–1.27)
GFR SERPL CREATININE-BSD FRML MDRD: 64 ML/MIN/1.73
GLUCOSE BLDC GLUCOMTR-MCNC: 121 MG/DL (ref 70–130)
GLUCOSE BLDC GLUCOMTR-MCNC: 125 MG/DL (ref 70–130)
GLUCOSE BLDC GLUCOMTR-MCNC: 153 MG/DL (ref 70–130)
GLUCOSE BLDC GLUCOMTR-MCNC: 71 MG/DL (ref 70–130)
GLUCOSE SERPL-MCNC: 58 MG/DL (ref 65–99)
POTASSIUM SERPL-SCNC: 3.8 MMOL/L (ref 3.5–5.2)
SODIUM SERPL-SCNC: 138 MMOL/L (ref 136–145)

## 2021-05-29 PROCEDURE — 93005 ELECTROCARDIOGRAM TRACING: CPT | Performed by: THORACIC SURGERY (CARDIOTHORACIC VASCULAR SURGERY)

## 2021-05-29 PROCEDURE — 97110 THERAPEUTIC EXERCISES: CPT

## 2021-05-29 PROCEDURE — 25010000002 ENOXAPARIN PER 10 MG: Performed by: NURSE PRACTITIONER

## 2021-05-29 PROCEDURE — 80048 BASIC METABOLIC PNL TOTAL CA: CPT | Performed by: NURSE PRACTITIONER

## 2021-05-29 PROCEDURE — 63710000001 INSULIN GLARGINE PER 5 UNITS: Performed by: NURSE PRACTITIONER

## 2021-05-29 PROCEDURE — 63710000001 INSULIN LISPRO (HUMAN) PER 5 UNITS: Performed by: NURSE PRACTITIONER

## 2021-05-29 PROCEDURE — 82962 GLUCOSE BLOOD TEST: CPT

## 2021-05-29 RX ADMIN — GUAIFENESIN 1200 MG: 600 TABLET, EXTENDED RELEASE ORAL at 09:17

## 2021-05-29 RX ADMIN — INSULIN GLARGINE 30 UNITS: 100 INJECTION, SOLUTION SUBCUTANEOUS at 21:43

## 2021-05-29 RX ADMIN — TRAMADOL HYDROCHLORIDE 50 MG: 50 TABLET, FILM COATED ORAL at 16:19

## 2021-05-29 RX ADMIN — DOCUSATE SODIUM 50MG AND SENNOSIDES 8.6MG 2 TABLET: 8.6; 5 TABLET, FILM COATED ORAL at 21:42

## 2021-05-29 RX ADMIN — ENOXAPARIN SODIUM 40 MG: 40 INJECTION SUBCUTANEOUS at 09:16

## 2021-05-29 RX ADMIN — ATORVASTATIN CALCIUM 40 MG: 20 TABLET, FILM COATED ORAL at 21:36

## 2021-05-29 RX ADMIN — FINASTERIDE 5 MG: 5 TABLET, FILM COATED ORAL at 09:16

## 2021-05-29 RX ADMIN — POTASSIUM CHLORIDE 20 MEQ: 750 TABLET, EXTENDED RELEASE ORAL at 09:16

## 2021-05-29 RX ADMIN — DONEPEZIL HYDROCHLORIDE 10 MG: 10 TABLET, FILM COATED ORAL at 21:42

## 2021-05-29 RX ADMIN — FUROSEMIDE 40 MG: 40 TABLET ORAL at 09:16

## 2021-05-29 RX ADMIN — GUAIFENESIN 1200 MG: 600 TABLET, EXTENDED RELEASE ORAL at 21:37

## 2021-05-29 RX ADMIN — FERROUS SULFATE TAB 325 MG (65 MG ELEMENTAL FE) 325 MG: 325 (65 FE) TAB at 09:17

## 2021-05-29 RX ADMIN — PANTOPRAZOLE SODIUM 40 MG: 40 TABLET, DELAYED RELEASE ORAL at 06:11

## 2021-05-29 RX ADMIN — LEVOTHYROXINE SODIUM 50 MCG: 0.05 TABLET ORAL at 06:38

## 2021-05-29 RX ADMIN — NADOLOL 20 MG: 20 TABLET ORAL at 09:17

## 2021-05-29 RX ADMIN — ASPIRIN 81 MG: 81 TABLET, COATED ORAL at 09:16

## 2021-05-29 RX ADMIN — INSULIN LISPRO 3 UNITS: 100 INJECTION, SOLUTION INTRAVENOUS; SUBCUTANEOUS at 11:26

## 2021-05-29 RX ADMIN — TAMSULOSIN HYDROCHLORIDE 0.4 MG: 0.4 CAPSULE ORAL at 09:16

## 2021-05-30 LAB
ANION GAP SERPL CALCULATED.3IONS-SCNC: 9.1 MMOL/L (ref 5–15)
BUN SERPL-MCNC: 18 MG/DL (ref 8–23)
BUN/CREAT SERPL: 15.5 (ref 7–25)
CALCIUM SPEC-SCNC: 8.7 MG/DL (ref 8.6–10.5)
CHLORIDE SERPL-SCNC: 106 MMOL/L (ref 98–107)
CO2 SERPL-SCNC: 21.9 MMOL/L (ref 22–29)
CREAT SERPL-MCNC: 1.16 MG/DL (ref 0.76–1.27)
GFR SERPL CREATININE-BSD FRML MDRD: 62 ML/MIN/1.73
GLUCOSE BLDC GLUCOMTR-MCNC: 115 MG/DL (ref 70–130)
GLUCOSE BLDC GLUCOMTR-MCNC: 119 MG/DL (ref 70–130)
GLUCOSE BLDC GLUCOMTR-MCNC: 121 MG/DL (ref 70–130)
GLUCOSE BLDC GLUCOMTR-MCNC: 198 MG/DL (ref 70–130)
GLUCOSE SERPL-MCNC: 102 MG/DL (ref 65–99)
MAGNESIUM SERPL-MCNC: 1.6 MG/DL (ref 1.6–2.4)
POTASSIUM SERPL-SCNC: 4 MMOL/L (ref 3.5–5.2)
QT INTERVAL: 445 MS
SODIUM SERPL-SCNC: 137 MMOL/L (ref 136–145)
TROPONIN T SERPL-MCNC: 0.04 NG/ML (ref 0–0.03)

## 2021-05-30 PROCEDURE — 93010 ELECTROCARDIOGRAM REPORT: CPT | Performed by: INTERNAL MEDICINE

## 2021-05-30 PROCEDURE — 83735 ASSAY OF MAGNESIUM: CPT | Performed by: THORACIC SURGERY (CARDIOTHORACIC VASCULAR SURGERY)

## 2021-05-30 PROCEDURE — 82962 GLUCOSE BLOOD TEST: CPT

## 2021-05-30 PROCEDURE — 84484 ASSAY OF TROPONIN QUANT: CPT | Performed by: THORACIC SURGERY (CARDIOTHORACIC VASCULAR SURGERY)

## 2021-05-30 PROCEDURE — 99024 POSTOP FOLLOW-UP VISIT: CPT | Performed by: THORACIC SURGERY (CARDIOTHORACIC VASCULAR SURGERY)

## 2021-05-30 PROCEDURE — 80048 BASIC METABOLIC PNL TOTAL CA: CPT | Performed by: NURSE PRACTITIONER

## 2021-05-30 PROCEDURE — 25010000002 ENOXAPARIN PER 10 MG: Performed by: NURSE PRACTITIONER

## 2021-05-30 PROCEDURE — 63710000001 INSULIN LISPRO (HUMAN) PER 5 UNITS: Performed by: NURSE PRACTITIONER

## 2021-05-30 RX ADMIN — ENOXAPARIN SODIUM 40 MG: 40 INJECTION SUBCUTANEOUS at 08:10

## 2021-05-30 RX ADMIN — NADOLOL 20 MG: 20 TABLET ORAL at 08:09

## 2021-05-30 RX ADMIN — FINASTERIDE 5 MG: 5 TABLET, FILM COATED ORAL at 08:09

## 2021-05-30 RX ADMIN — TRAMADOL HYDROCHLORIDE 50 MG: 50 TABLET, FILM COATED ORAL at 09:59

## 2021-05-30 RX ADMIN — ASPIRIN 81 MG: 81 TABLET, COATED ORAL at 08:10

## 2021-05-30 RX ADMIN — ATORVASTATIN CALCIUM 40 MG: 20 TABLET, FILM COATED ORAL at 21:32

## 2021-05-30 RX ADMIN — FERROUS SULFATE TAB 325 MG (65 MG ELEMENTAL FE) 325 MG: 325 (65 FE) TAB at 09:59

## 2021-05-30 RX ADMIN — GUAIFENESIN 1200 MG: 600 TABLET, EXTENDED RELEASE ORAL at 21:32

## 2021-05-30 RX ADMIN — GUAIFENESIN 1200 MG: 600 TABLET, EXTENDED RELEASE ORAL at 08:10

## 2021-05-30 RX ADMIN — INSULIN LISPRO 3 UNITS: 100 INJECTION, SOLUTION INTRAVENOUS; SUBCUTANEOUS at 11:59

## 2021-05-30 RX ADMIN — FUROSEMIDE 40 MG: 40 TABLET ORAL at 08:10

## 2021-05-30 RX ADMIN — DONEPEZIL HYDROCHLORIDE 10 MG: 10 TABLET, FILM COATED ORAL at 21:32

## 2021-05-30 RX ADMIN — LEVOTHYROXINE SODIUM 50 MCG: 0.05 TABLET ORAL at 06:45

## 2021-05-30 RX ADMIN — POTASSIUM CHLORIDE 20 MEQ: 750 TABLET, EXTENDED RELEASE ORAL at 08:09

## 2021-05-30 RX ADMIN — TAMSULOSIN HYDROCHLORIDE 0.4 MG: 0.4 CAPSULE ORAL at 09:59

## 2021-05-30 RX ADMIN — PANTOPRAZOLE SODIUM 40 MG: 40 TABLET, DELAYED RELEASE ORAL at 06:45

## 2021-05-30 RX ADMIN — GLIPIZIDE 10 MG: 10 TABLET ORAL at 09:59

## 2021-05-31 VITALS
BODY MASS INDEX: 33.29 KG/M2 | OXYGEN SATURATION: 98 % | WEIGHT: 245.8 LBS | HEART RATE: 60 BPM | DIASTOLIC BLOOD PRESSURE: 68 MMHG | SYSTOLIC BLOOD PRESSURE: 118 MMHG | RESPIRATION RATE: 16 BRPM | TEMPERATURE: 98.5 F | HEIGHT: 72 IN

## 2021-05-31 LAB
ANION GAP SERPL CALCULATED.3IONS-SCNC: 8.4 MMOL/L (ref 5–15)
BUN SERPL-MCNC: 14 MG/DL (ref 8–23)
BUN/CREAT SERPL: 11.4 (ref 7–25)
CALCIUM SPEC-SCNC: 9.1 MG/DL (ref 8.6–10.5)
CHLORIDE SERPL-SCNC: 104 MMOL/L (ref 98–107)
CO2 SERPL-SCNC: 25.6 MMOL/L (ref 22–29)
CREAT SERPL-MCNC: 1.23 MG/DL (ref 0.76–1.27)
GFR SERPL CREATININE-BSD FRML MDRD: 58 ML/MIN/1.73
GLUCOSE BLDC GLUCOMTR-MCNC: 150 MG/DL (ref 70–130)
GLUCOSE BLDC GLUCOMTR-MCNC: 96 MG/DL (ref 70–130)
GLUCOSE SERPL-MCNC: 105 MG/DL (ref 65–99)
POTASSIUM SERPL-SCNC: 4.8 MMOL/L (ref 3.5–5.2)
SODIUM SERPL-SCNC: 138 MMOL/L (ref 136–145)

## 2021-05-31 PROCEDURE — 94799 UNLISTED PULMONARY SVC/PX: CPT

## 2021-05-31 PROCEDURE — 25010000002 ENOXAPARIN PER 10 MG: Performed by: NURSE PRACTITIONER

## 2021-05-31 PROCEDURE — 82962 GLUCOSE BLOOD TEST: CPT

## 2021-05-31 PROCEDURE — 80048 BASIC METABOLIC PNL TOTAL CA: CPT | Performed by: NURSE PRACTITIONER

## 2021-05-31 RX ORDER — FINASTERIDE 5 MG/1
5 TABLET, FILM COATED ORAL DAILY
Qty: 60 TABLET | Refills: 5 | Status: SHIPPED | OUTPATIENT
Start: 2021-06-01

## 2021-05-31 RX ORDER — POTASSIUM CHLORIDE 20 MEQ/1
20 TABLET, EXTENDED RELEASE ORAL DAILY
Qty: 30 TABLET | Refills: 0 | Status: SHIPPED | OUTPATIENT
Start: 2021-06-01 | End: 2021-06-15 | Stop reason: SDUPTHER

## 2021-05-31 RX ORDER — FUROSEMIDE 40 MG/1
40 TABLET ORAL DAILY
Qty: 30 TABLET | Refills: 0 | Status: SHIPPED | OUTPATIENT
Start: 2021-06-01 | End: 2021-06-15 | Stop reason: SDUPTHER

## 2021-05-31 RX ORDER — TRAMADOL HYDROCHLORIDE 50 MG/1
50 TABLET ORAL EVERY 6 HOURS PRN
Qty: 42 TABLET | Refills: 0 | Status: SHIPPED | OUTPATIENT
Start: 2021-05-31 | End: 2021-06-07

## 2021-05-31 RX ADMIN — ENOXAPARIN SODIUM 40 MG: 40 INJECTION SUBCUTANEOUS at 08:44

## 2021-05-31 RX ADMIN — ASPIRIN 81 MG: 81 TABLET, COATED ORAL at 08:43

## 2021-05-31 RX ADMIN — PANTOPRAZOLE SODIUM 40 MG: 40 TABLET, DELAYED RELEASE ORAL at 05:28

## 2021-05-31 RX ADMIN — GLIPIZIDE 10 MG: 10 TABLET ORAL at 08:43

## 2021-05-31 RX ADMIN — FERROUS SULFATE TAB 325 MG (65 MG ELEMENTAL FE) 325 MG: 325 (65 FE) TAB at 08:43

## 2021-05-31 RX ADMIN — TAMSULOSIN HYDROCHLORIDE 0.4 MG: 0.4 CAPSULE ORAL at 08:43

## 2021-05-31 RX ADMIN — NADOLOL 20 MG: 20 TABLET ORAL at 08:43

## 2021-05-31 RX ADMIN — FINASTERIDE 5 MG: 5 TABLET, FILM COATED ORAL at 08:43

## 2021-05-31 RX ADMIN — POTASSIUM CHLORIDE 20 MEQ: 750 TABLET, EXTENDED RELEASE ORAL at 08:43

## 2021-05-31 RX ADMIN — LEVOTHYROXINE SODIUM 50 MCG: 0.05 TABLET ORAL at 05:28

## 2021-05-31 RX ADMIN — FUROSEMIDE 40 MG: 40 TABLET ORAL at 08:43

## 2021-05-31 RX ADMIN — GUAIFENESIN 1200 MG: 600 TABLET, EXTENDED RELEASE ORAL at 08:43

## 2021-06-01 ENCOUNTER — READMISSION MANAGEMENT (OUTPATIENT)
Dept: CALL CENTER | Facility: HOSPITAL | Age: 76
End: 2021-06-01

## 2021-06-01 RX ORDER — FUROSEMIDE 40 MG/1
40 TABLET ORAL DAILY
Qty: 90 TABLET | OUTPATIENT
Start: 2021-06-01

## 2021-06-01 RX ORDER — POTASSIUM CHLORIDE 20 MEQ/1
20 TABLET, EXTENDED RELEASE ORAL DAILY
Qty: 90 TABLET | OUTPATIENT
Start: 2021-06-01

## 2021-06-01 NOTE — OUTREACH NOTE
Prep Survey      Responses   [facility] patient discharged from?  [place]   Is LACE score < 7 ?  No   Emergency Room discharge w/ pulse ox?  No   Eligibility  Readm Mgmt   Discharge diagnosis  CABG   Does the patient have one of the following disease processes/diagnoses(primary or secondary)?  Cardiothoracic surgery   Does the patient have Home health ordered?  No   Is there a DME ordered?  No   Prep survey completed?  Yes          Dottie Lazar RN

## 2021-06-01 NOTE — CASE MANAGEMENT/SOCIAL WORK
Case Management Discharge Note      Final Note: CCP received a message from Neva Rayo/Kathy  [phone] at 10:03 AM advising they would now be able to accept and follow Pt at home.  CCP updated MAXWELL Hicks.  SS/CCP         Selected Continued Care - Discharged on 5/31/2021 Admission date: 5/18/2021 - Discharge disposition: Home or Self Care    Destination    No services have been selected for the patient.              Durable Medical Equipment    No services have been selected for the patient.              Dialysis/Infusion    No services have been selected for the patient.              Home Medical Care Coordination complete    Service Provider Selected Services Address Phone Fax Patient Preferred    [facility] [place]  [facility] [address] [place] [address] [phone] --          Therapy    No services have been selected for the patient.              Community Resources    No services have been selected for the patient.                Selected Continued Care - Prior Encounters Includes selections from prior encounters from 2/17/2021 to 5/31/2021    Discharged on 2/20/2021 Admission date: 2/15/2021 - Discharge disposition: Home-Health Care c    Home Medical Care     Service Provider Selected Services Address Phone Fax Patient Preferred    [facility] [address] [place] [id number] [phone] --                         Final Discharge Disposition Code: 06 - home with home health care

## 2021-06-02 ENCOUNTER — CONVERSION ENCOUNTER (OUTPATIENT)
Dept: UROLOGY | Facility: CLINIC | Age: 76
End: 2021-06-02

## 2021-06-02 ENCOUNTER — OFFICE VISIT CONVERTED (OUTPATIENT)
Dept: UROLOGY | Facility: CLINIC | Age: 76
End: 2021-06-02
Attending: UROLOGY

## 2021-06-02 LAB
BILIRUB UR QL STRIP: NEGATIVE
COLOR UR: NORMAL
CONV BACTERIA IN URINE MICRO: 0
CONV CALCIUM OXALATE CRYSTALS /HPF IN URINE SEDIMENT BY MICROSCOPY: NORMAL
CONV CLARITY OF URINE: NORMAL
CONV PROTEIN IN URINE BY AUTOMATED TEST STRIP: NEGATIVE
CONV UROBILINOGEN IN URINE BY AUTOMATED TEST STRIP: NORMAL
GLUCOSE UR QL: NEGATIVE
HGB UR QL STRIP: NORMAL
KETONES UR QL STRIP: NEGATIVE
LEUKOCYTE ESTERASE UR QL STRIP: NEGATIVE
NITRITE UR QL STRIP: NEGATIVE
PH UR STRIP.AUTO: 6.5 [PH]
RBC #/AREA URNS HPF: NORMAL /[HPF]
RENAL EPI CELLS #/AREA URNS HPF: 0 /[HPF]
SP GR UR: 1.01
SQUAMOUS SPT QL MICRO: 0
WBC #/AREA URNS HPF: 0 /[HPF]

## 2021-06-04 ENCOUNTER — READMISSION MANAGEMENT (OUTPATIENT)
Dept: CALL CENTER | Facility: HOSPITAL | Age: 76
End: 2021-06-04

## 2021-06-04 NOTE — OUTREACH NOTE
CT Surgery Week 1 Survey      Responses   [facility] patient discharged from?  [place]   Does the patient have one of the following disease processes/diagnoses(primary or secondary)?  Cardiothoracic surgery   Week 1 attempt successful?  Yes   Call start time  1527   Call end time  1531   Discharge diagnosis  CABG   Meds reviewed with patient/caregiver?  Yes   Is the patient having any side effects they believe may be caused by any medication additions or changes?  No   Does the patient have all medications related to this admission filled (includes all antibiotics, pain medications, cardiac medications, etc.)  Yes   Is the patient taking all medications as directed (includes completed medication regime)?  Yes   Does the patient have a primary care provider?   Yes   Does the patient have an appointment scheduled with their C/T surgeon?  Yes   Has the patient kept scheduled appointments due by today?  N/A   Comments  Surgeon appt on 6/25/2021 and cardiology on 6/15/2021   Has home health visited the patient within 72 hours of discharge?  N/A   Psychosocial issues?  No   Did the patient receive a copy of their discharge instructions?  Yes   Nursing interventions  Reviewed instructions with patient   What is the patient's perception of their health status since discharge?  Improving   Nursing interventions  Nurse provided patient education   Is the patient/caregiver able to teach back normal signs of recovery?  Pain or discomfort at incisional site   Nursing interventions  Reassured on normal signs of recovery   Is the patient /caregiver able to teach back basic post-op care?  Take showers only when approved by MD-sponge bathe until then, No tub bath, swimming, or hot tub until instructed by MD, Keep incision areas clean, dry and protected, Do not remove steri-strips, Lifting as instructed by MD in discharge instructions   Is the patient/caregiver able to teach back signs and symptoms of incisional infection?   Increased redness, swelling or pain at the incisonal site, Increased drainage or bleeding, Incisional warmth, Pus or odor from incision, Fever   Is the patient/caregiver able to teach back steps to recovery at home?  Set small, achievable goals for return to baseline health, Rest and rebuild strength, gradually increase activity, Make a list of questions for surgeon's appointment   If the patient is a current smoker, are they able to teach back resources for cessation?  Not a smoker   Is the patient/caregiver able to teach back the hierarchy of who to call/visit for symptoms/problems? PCP, Specialist, Home health nurse, Urgent Care, ED, 911  Yes   Week 1 call completed?  Yes            Sonny Rollins RN

## 2021-06-05 NOTE — PROGRESS NOTES
Progress Note      Patient Name: Cosmo Gauthier   Patient ID: [id number]   Sex: Male   YOB: 1945    Primary Care Provider: Alex Buckley MD   Referring Provider: Tom Tubbs Jr. MD    Visit Date: June 2, 2021    Provider: Morgan Dill MD   Location: [facility]   Location Address: [address] [place] [address], [address] [place] [address]  [id number]   Location Phone: [phone]          Chief Complaint  · I cannot urinate      History Of Present Illness  The patient is a 75 year old /White male presents today in office to be seen for urinary retention , he has a chairez catheter in, he had a recent CABG. patient had open heart surgery about 13 days ago and is in urinary retention. Patient is on finasteride and tamsulosin       Past Medical History  Aftercare following left knee joint replacement surgery; Arthritis; Chronic Back Pain; Cirrhosis; Depression; Diabetes mellitus; Essential hypertension; Family history of colon cancer; Fatty liver; Gastric Ulcer; GERD (gastroesophageal reflux disease); Heart Disease; High cholesterol; Hyperthyroidism; Memory change; Mood disorder; Pain: Knee; Primary osteoarthritis of left knee; Sleep apnea; Thrombocytopenia; Thyroid disease         Past Surgical History  Colonoscopy; Coronary artery bypass graft; Endoscopy; Inguinal Hernia Repair; Knee Replacement; PTCA with Stent; shoulder repair         Medication List  amitriptyline 10 mg oral tablet; clopidogrel 75 mg oral tablet; cyanocobalamin (vitamin B-12) 500 mcg oral tablet; donepezil 10 mg oral tablet; ferrous sulfate 325 mg (65 mg iron) oral tablet; finasteride 5 mg oral tablet; furosemide 20 mg oral tablet; gabapentin 300 mg oral capsule; glipizide 10 mg oral tablet; Humalog U-100 Insulin 100 unit/mL subcutaneous solution; Lantus Solostar 100 unit/mL (3 mL) subcutaneous insulin pen; levothyroxine 175 mcg oral tablet; metformin 1,000 mg oral tablet; nadolol 40 mg oral tablet; pantoprazole 20 mg  oral tablet,delayed release (DR/EC); sertraline 100 mg oral tablet; simvastatin 40 mg oral tablet; Vitamin D3 2,000 unit oral capsule; Xifaxan 550 mg oral tablet         Allergy List  NO KNOWN DRUG ALLERGIES         Family Medical History  Diabetes, unspecified type; Hypertension; Family history of colon cancer; Family history of Gastrointestinal Cancer         Social History  Alcohol (Never); Denies illicit substance abuse (Never); lives with spouse; ; Retired; Tobacco (Never)         Review of Systems  · Constitutional  o Denies  o : fever, headache, chills  · Eyes  o Denies  o : eye pain, double vision, blurred vision  · HENT  o Denies  o : sinus problems, sore throat, ear infection  · Cardiovascular  o Denies  o : chest pain, high blood pressure, varicosities  · Respiratory  o Denies  o : shortness of breath, wheezing, frequent cough  · Gastrointestinal  o Denies  o : nausea, vomiting, heartburn, indigestion, abdominal pain  · Genitourinary  o Denies  o : urgency, frequency, urinary retention, painful urination  · Integument  o Denies  o : rash, itching, boils  · Neurologic  o Denies  o : tingling or numbness, tremors, dizzy spells  · Musculoskeletal  o Denies  o : joint pain, neck pain, back pain  · Endocrine  o Denies  o : cold intolerance, heat intolerance, tired, excessive thirst, sluggish  · Psychiatric  o Admits  o : feels satisfied with life  o Denies  o : severe depression, concerns with hurting themselves  · Heme-Lymph  o Denies  o : swollen glands, blood clotting problems  · Allergic-Immunologic  o Denies  o : sinus allergy symptoms, hay fever      Vitals  Date Time BP Position Site L\R Cuff Size HR RR TEMP (F) WT  HT  BMI kg/m2 BSA m2 O2 Sat FR L/min FiO2        06/02/2021 10:12 /57 Sitting    61 - R  97.3                 Physical Examination  · Constitutional  o Appearance  o : 75-year-old white male who is pale l and obviously anemic  · Respiratory  o Respiratory Effort  o : breathing  labored  o Inspection of Chest  o : normal appearance, no retractions  o Auscultation of Lungs  o : normal breath sounds throughout  · Gastrointestinal  o Abdominal Examination  o : abdomen nontender to palpation, tone normal without rigidity or guarding, no masses present, abdominal contour obese  o Liver and spleen  o : no abnormalities  o Hernias  o : absent   · Genitourinary  o Bladder  o : no abnormalities  o Penis  o : Has a Kaur catheter in place  o Urethral Meatus  o : no abnormalities  o Scrotum and Scrotal Contents  o :   § Scrotum  § : no abnormalities  § Epididymides  § : no abnormalities  § Testes  § : no abnormalities  o Digital Rectal Examination  o :   § Prostate  § : Patient has enlarged prostate on previous examination done a few months ago  · Skin and Subcutaneous Tissue  o General Inspection  o : no lesions present, no areas of discoloration, skin turgor normal, texture normal  · Neurologic  o Mental Status Examination  o : grossly oriented to person, place and time  o Gait and Station  o : Walking with a walker  · Psychiatric  o Mood and Affect  o : mood normal, affect appropriate          Results  · In-Office Procedures  o Lab procedure  § Automated dipstick urinalysis with microscopy ([id number])   § Color Ur: Dark yellow   § Clarity Ur: Slightly cloudy   § Glucose Ur Ql Strip: Negative   § Bilirub Ur Ql Strip: Negative   § Ketones Ur Ql Strip: Negative   § Sp Gr Ur Qn: 1.010   § Hgb Ur Ql Strip: Moderate   § pH Ur-LsCnc: 6.5   § Prot Ur Ql Strip: Negative   § Urobilinogen Ur Strip-mCnc: 0.2 E.U./dL   § Nitrite Ur Ql Strip: Negative   § WBC Est Ur Ql Strip: Negative   § RBC UrnS Qn HPF: 0-1   § WBC UrnS Qn HPF: 0   § Bacteria UrnS Qn HPF: 0   § Crystals UrnS Qn HPF: Crystals   § Epithelial Cells (non renal): 0 /HPF  § Epithelial Cells (renal): 0       Assessment  · Urinary Retention     788.20/R33.9      Plan  · Instructions  o We will give him a trial of catheter removal  o Patient emptied his  bladder completely and available to recheck him in 3 months time. Once his general condition improves he does not need greenlight laser or TUR prostate            Electronically Signed by: Morgan Dill MD -Author on June 2, 2021 11:33:21 AM

## 2021-06-05 NOTE — PROCEDURES
Procedure Note      Patient Name: Cosmo Gauthier   Patient ID: [id number]   Sex: Male   YOB: 1945    Primary Care Provider: Alex Buckley MD   Referring Provider: Tom Tubbs Jr. MD    Visit Date: June 2, 2021    Provider: Morgan Dill MD   Location: [facility]   Location Address: [address] [place] [address], [address]  [id number]   Location Phone: [phone]          Bladder Scan  I filled his bladder via his chairez catheter to 300ml of sterile water. I removed the catheter and sent him to urinate. The patient voided and was placed in the supine position.   The ultrasound machine was used at 3.5 mHz and the bladder was scanned. The bladder volume was estimated at 0.   The patient tolerated the procedure well.           Assessment  · Incomplete bladder emptying     788.21/R33.9      Plan  · Orders  o Bladder Scan, post void (70284) - 788.21/R33.9 - 06/02/2021  · Medications  o Medications have been Reconciled  o Transition of Care or Provider Policy            Electronically Signed by: PONCHO Merida -Author on June 2, 2021 11:19:45 AM  Electronically Co-signed by: Morgan Dill MD -Reviewer on June 2, 2021 01:37:44 PM

## 2021-06-10 ENCOUNTER — READMISSION MANAGEMENT (OUTPATIENT)
Dept: CALL CENTER | Facility: HOSPITAL | Age: 76
End: 2021-06-10

## 2021-06-10 NOTE — OUTREACH NOTE
CT Surgery Week 2 Survey      Responses   [facility] patient discharged from?  [place]   Does the patient have one of the following disease processes/diagnoses(primary or secondary)?  Cardiothoracic surgery   Week 2 attempt successful?  Yes   Call start time  1049   Call end time  1058   General alerts for this patient  Wife requests that home phone number be called first.   Is patient permission given to speak with other caregiver?  Yes   List who call center can speak with  Carla-wife   Meds reviewed with patient/caregiver?  Yes   Is the patient having any side effects they believe may be caused by any medication additions or changes?  No   Does the patient have all medications related to this admission filled (includes all antibiotics, pain medications, cardiac medications, etc.)  Yes   Is the patient taking all medications as directed (includes completed medication regime)?  Yes   Does the patient have a primary care provider?   Yes   Does the patient have an appointment scheduled with their C/T surgeon?  Yes   Has the patient kept scheduled appointments due by today?  N/A   Has home health visited the patient within 72 hours of discharge?  Yes   Home health comments  States having HH PT.   Psychosocial issues?  No   What is the patient's perception of their health status since discharge?  Improving   Nursing interventions  Nurse provided patient education   Is the patient/caregiver able to teach back signs and symptoms of incisional infection?  Increased redness, swelling or pain at the incisonal site, Incisional warmth, Fever, Increased drainage or bleeding, Pus or odor from incision   Is the patient /caregiver able to teach back the importance of cardiac rehab?  Yes   Nursing interventions  Provided education on importance of cardiac rehab   Is the patient/caregiver able to teach back the hierarchy of who to call/visit for symptoms/problems? PCP, Specialist, Home health nurse, Urgent Care, ED, 911   Yes   Additional teach back comments  States weighs himself every other day. States knows to report weight gain of 3# over 24 hours or 5# over one week.   Week 2 call completed?  Yes   Wrap up additional comments  States is feeling better-denies any s/s of infection at incisions or any edema. States appetite improved. Denies any needs today.          Jaqueline Raymond, RN

## 2021-06-11 ENCOUNTER — LAB (OUTPATIENT)
Dept: LAB | Facility: HOSPITAL | Age: 76
End: 2021-06-11

## 2021-06-11 ENCOUNTER — TRANSCRIBE ORDERS (OUTPATIENT)
Dept: LAB | Facility: HOSPITAL | Age: 76
End: 2021-06-11

## 2021-06-11 DIAGNOSIS — E11.649 UNCONTROLLED TYPE 2 DIABETES MELLITUS WITH HYPOGLYCEMIA, UNSPECIFIED HYPOGLYCEMIA COMA STATUS (HCC): Primary | ICD-10-CM

## 2021-06-11 DIAGNOSIS — E11.649 UNCONTROLLED TYPE 2 DIABETES MELLITUS WITH HYPOGLYCEMIA, UNSPECIFIED HYPOGLYCEMIA COMA STATUS (HCC): ICD-10-CM

## 2021-06-11 DIAGNOSIS — E78.2 MIXED HYPERLIPIDEMIA: ICD-10-CM

## 2021-06-11 LAB
ALBUMIN SERPL-MCNC: 4.1 G/DL (ref 3.5–5.2)
ALBUMIN/GLOB SERPL: 1.6 G/DL
ALP SERPL-CCNC: 98 U/L (ref 39–117)
ALT SERPL W P-5'-P-CCNC: 9 U/L (ref 1–41)
ANION GAP SERPL CALCULATED.3IONS-SCNC: 11.5 MMOL/L (ref 5–15)
AST SERPL-CCNC: 16 U/L (ref 1–40)
BILIRUB SERPL-MCNC: 0.4 MG/DL (ref 0–1.2)
BUN SERPL-MCNC: 25 MG/DL (ref 8–23)
BUN/CREAT SERPL: 19.4 (ref 7–25)
CALCIUM SPEC-SCNC: 9.9 MG/DL (ref 8.6–10.5)
CHLORIDE SERPL-SCNC: 102 MMOL/L (ref 98–107)
CHOLEST SERPL-MCNC: 125 MG/DL (ref 0–200)
CO2 SERPL-SCNC: 25.5 MMOL/L (ref 22–29)
CREAT SERPL-MCNC: 1.29 MG/DL (ref 0.76–1.27)
GFR SERPL CREATININE-BSD FRML MDRD: 55 ML/MIN/1.73
GLOBULIN UR ELPH-MCNC: 2.6 GM/DL
GLUCOSE SERPL-MCNC: 151 MG/DL (ref 65–99)
HBA1C MFR BLD: 5.6 % (ref 4.8–5.6)
HDLC SERPL-MCNC: 39 MG/DL (ref 40–60)
IRON 24H UR-MRATE: 95 MCG/DL (ref 59–158)
IRON SATN MFR SERPL: 27 % (ref 20–50)
LDLC SERPL CALC-MCNC: 46 MG/DL (ref 0–100)
LDLC/HDLC SERPL: 0.9 {RATIO}
POTASSIUM SERPL-SCNC: 5.2 MMOL/L (ref 3.5–5.2)
PROT SERPL-MCNC: 6.7 G/DL (ref 6–8.5)
SODIUM SERPL-SCNC: 139 MMOL/L (ref 136–145)
TIBC SERPL-MCNC: 349 MCG/DL (ref 298–536)
TRANSFERRIN SERPL-MCNC: 234 MG/DL (ref 200–360)
TRIGL SERPL-MCNC: 254 MG/DL (ref 0–150)
VLDLC SERPL-MCNC: 40 MG/DL (ref 5–40)

## 2021-06-11 PROCEDURE — 80061 LIPID PANEL: CPT

## 2021-06-11 PROCEDURE — 36415 COLL VENOUS BLD VENIPUNCTURE: CPT

## 2021-06-11 PROCEDURE — 83540 ASSAY OF IRON: CPT

## 2021-06-11 PROCEDURE — 80053 COMPREHEN METABOLIC PANEL: CPT

## 2021-06-11 PROCEDURE — 83036 HEMOGLOBIN GLYCOSYLATED A1C: CPT

## 2021-06-11 PROCEDURE — 84466 ASSAY OF TRANSFERRIN: CPT

## 2021-06-12 PROBLEM — Z95.1 HX OF CABG: Status: ACTIVE | Noted: 2021-06-12

## 2021-06-12 PROBLEM — R06.02 SHORTNESS OF BREATH: Status: ACTIVE | Noted: 2021-06-12

## 2021-06-12 PROBLEM — E78.2 HYPERLIPEMIA, MIXED: Status: ACTIVE | Noted: 2021-06-12

## 2021-06-15 ENCOUNTER — OFFICE VISIT (OUTPATIENT)
Dept: CARDIOLOGY | Facility: CLINIC | Age: 76
End: 2021-06-15

## 2021-06-15 VITALS
DIASTOLIC BLOOD PRESSURE: 70 MMHG | SYSTOLIC BLOOD PRESSURE: 118 MMHG | HEIGHT: 76 IN | WEIGHT: 240 LBS | HEART RATE: 110 BPM | BODY MASS INDEX: 29.22 KG/M2

## 2021-06-15 DIAGNOSIS — Z95.1 HX OF CABG: ICD-10-CM

## 2021-06-15 DIAGNOSIS — E78.2 HYPERLIPEMIA, MIXED: ICD-10-CM

## 2021-06-15 DIAGNOSIS — E66.01 MORBID (SEVERE) OBESITY DUE TO EXCESS CALORIES (HCC): ICD-10-CM

## 2021-06-15 DIAGNOSIS — E11.8 TYPE 2 DIABETES MELLITUS WITH COMPLICATIONS (HCC): ICD-10-CM

## 2021-06-15 DIAGNOSIS — R06.02 SHORTNESS OF BREATH: ICD-10-CM

## 2021-06-15 DIAGNOSIS — I25.10 CORONARY ARTERY DISEASE INVOLVING NATIVE CORONARY ARTERY OF NATIVE HEART WITHOUT ANGINA PECTORIS: Primary | ICD-10-CM

## 2021-06-15 PROCEDURE — 99214 OFFICE O/P EST MOD 30 MIN: CPT | Performed by: SPECIALIST

## 2021-06-15 RX ORDER — POTASSIUM CHLORIDE 20 MEQ/1
20 TABLET, EXTENDED RELEASE ORAL DAILY
Qty: 90 TABLET | Refills: 3 | Status: SHIPPED | OUTPATIENT
Start: 2021-06-15 | End: 2021-08-02

## 2021-06-15 RX ORDER — POTASSIUM CHLORIDE 20 MEQ/1
20 TABLET, EXTENDED RELEASE ORAL DAILY
Qty: 30 TABLET | Refills: 0 | Status: SHIPPED | OUTPATIENT
Start: 2021-06-15 | End: 2021-06-15

## 2021-06-15 RX ORDER — FUROSEMIDE 40 MG/1
40 TABLET ORAL DAILY
Qty: 30 TABLET | Refills: 0 | Status: SHIPPED | OUTPATIENT
Start: 2021-06-15 | End: 2021-06-15

## 2021-06-15 RX ORDER — FUROSEMIDE 40 MG/1
40 TABLET ORAL DAILY
Qty: 90 TABLET | Refills: 3 | Status: SHIPPED | OUTPATIENT
Start: 2021-06-15 | End: 2022-07-06

## 2021-06-15 NOTE — PROGRESS NOTES
[facility]  Cardiology progress Note    Patient Name: Cosmo Gauthier  : 1945    CC: Recent CABG    Subjective   Subjective       HPI:  Cosmo Gauthier is a 75 y.o. male with history of recent CABG comes in for a follow-up visit.  Denies any chest pain or shortness of breath.    Review of Systems:   Constitutional no fever,  no weight loss   Skin no rash   Otolaryngeal no difficulty swallowing   Cardiovascular See HPI   Pulmonary no cough, no sputum production   Gastrointestinal no constipation, no diarrhea   Genitourinary no dysuria, no hematuria   Hematologic no easy bruisability, no abnormal bleeding   Musculoskeletal no muscle pain   Neurologic no dizziness, no falls         Personal History     Social History:  reports that he has never smoked. He has never used smokeless tobacco. He reports previous alcohol use. He reports that he does not use drugs.    Home Medications:  Current Outpatient Medications on File Prior to Visit   Medication Sig   • DONEPEZIL HCL PO Take 10 mg by mouth every night at bedtime.   • ferrous sulfate 325 (65 FE) MG tablet Take 325 mg by mouth 2 (two) times a day.   • finasteride (PROSCAR) 5 MG tablet Take 1 tablet by mouth Daily.   • gabapentin (NEURONTIN) 300 MG capsule Take 300 mg by mouth Every Night.   • insulin glargine (LANTUS, SEMGLEE) 100 UNIT/ML injection Inject 30 Units under the skin into the appropriate area as directed Every Night.   • insulin lispro (humaLOG, ADMELOG) 100 UNIT/ML injection Inject 15 Units under the skin into the appropriate area as directed 3 (Three) Times a Day Before Meals.   • levothyroxine (SYNTHROID, LEVOTHROID) 200 MCG tablet Take 200 mcg by mouth Every Morning.   • levothyroxine (SYNTHROID, LEVOTHROID) 50 MCG tablet Take 50 mcg by mouth Daily.   • metFORMIN (GLUCOPHAGE) 1000 MG tablet Take 1,000 mg by mouth 2 (Two) Times a Day With Meals.   • simvastatin (ZOCOR) 40 MG tablet Take 40 mg by mouth Every Night. Take 0.5 tablets  every night   • tamsulosin (FLOMAX) 0.4 MG capsule 24 hr capsule Take 1 capsule by mouth Daily.   • vitamin B-12 (CYANOCOBALAMIN) 1000 MCG tablet Take 2,000 mcg by mouth Every Night.   • vitamin D3 125 MCG (5000 UT) capsule capsule Take 5,000 Units by mouth Daily.   • [DISCONTINUED] furosemide (LASIX) 40 MG tablet Take 1 tablet by mouth Daily.   • [DISCONTINUED] potassium chloride (K-DUR,KLOR-CON) 20 MEQ CR tablet Take 1 tablet by mouth Daily.   • amitriptyline (ELAVIL) 50 MG tablet Take 50 mg by mouth Every Night.   • aspirin 81 MG chewable tablet Chew 81 mg Daily.   • glipizide (GLUCOTROL) 10 MG tablet Take 10 mg by mouth 2 (Two) Times a Day Before Meals.   • nadolol (CORGARD) 40 MG tablet Take 40 mg by mouth Daily.   • pantoprazole (PROTONIX) 40 MG EC tablet Take 40 mg by mouth Daily.   • sertraline (ZOLOFT) 100 MG tablet Take 100 mg by mouth Daily. Take 1.5 tablets daily     No current facility-administered medications on file prior to visit.     Allergies:  No Known Allergies    Objective    Objective       Vitals:   Heart Rate:  [110] 110  BP: (118)/(70) 118/70  Body mass index is 29.21 kg/m².     Physical Exam:   Constitutional: Awake, alert, No acute distress    Eyes: PERRLA, sclerae anicteric, no conjunctival injection   HENT: NCAT, mucous membranes moist   Neck: Supple, no thyromegaly, no lymphadenopathy, trachea midline   Respiratory: Clear to auscultation bilaterally, nonlabored respirations    Cardiovascular: RRR, no murmurs, rubs, or gallops, palpable pedal pulses bilaterally   Gastrointestinal: Positive bowel sounds, soft, nontender, nondistended   Musculoskeletal: No bilateral ankle edema, no clubbing or cyanosis to extremities   Psychiatric: Appropriate affect, cooperative   Neurologic: Oriented x 3, strength symmetric in all extremities, Cranial Nerves grossly intact to confrontation, speech clear   Skin: No rashes.    Result Review    Result Review:  I have personally reviewed the available  results from  [x]  Laboratory  [x]  EKG  [x]  Cardiology  [x]  Medications  [x]  Old records  []  Other:   Procedures  Lab Results   Component Value Date    CHOL 125 06/11/2021    CHOL 125 05/18/2021    CHOL 150 02/15/2021     Lab Results   Component Value Date    TRIG 254 (H) 06/11/2021    TRIG 208 (H) 05/18/2021    TRIG 241 (H) 02/15/2021     Lab Results   Component Value Date    HDL 39 (L) 06/11/2021    HDL 40 05/18/2021    HDL 36 (L) 02/15/2021     Lab Results   Component Value Date    LDL 46 06/11/2021    LDL 51 05/18/2021    LDL 74 02/15/2021     Lab Results   Component Value Date    VLDL 40 06/11/2021    VLDL 34 05/18/2021    VLDL 40 02/15/2021           Lab Results   Component Value Date    PROBNP 101.1 05/18/2021         Impression/Plan    1.  Coronary artery disease recent CABG: Continue aspirin 81 mg once a day.  Advised to increase exercise tolerance.  2.  Hyperlipidemia: Last LDL was 46.  Continue simvastatin 40 mg once a day.  Check lipid profile next visit.  3.  Chronic thrombocytopenia: Managed by Dr. Saldana.  4.  Morbid obesity: Low-fat diet, regular exercise advised.  5.  Chronic diastolic heart failure: Continue Lasix and potassium replacement.  Check BMP in 1 week  Follow-up in 3 months.          Electronically signed by Sven Duran MD, 06/15/21, 2:02 PM EDT.

## 2021-06-15 NOTE — PATIENT INSTRUCTIONS
Cholesterol Content in Foods  Cholesterol is a waxy, fat-like substance that helps to carry fat in the blood. The body needs cholesterol in small amounts, but too much cholesterol can cause damage to the arteries and heart.  Most people should eat less than 200 milligrams (mg) of cholesterol a day.  Foods with cholesterol    Cholesterol is found in animal-based foods, such as meat, seafood, and dairy. Generally, low-fat dairy and lean meats have less cholesterol than full-fat dairy and fatty meats. The milligrams of cholesterol per serving (mg per serving) of common cholesterol-containing foods are listed below.  Meat and other proteins  · Egg -- one large whole egg has 186 mg.  · Veal shank -- 4 oz has 141 mg.  · Lean ground turkey (93% lean) -- 4 oz has 118 mg.  · Fat-trimmed lamb loin -- 4 oz has 106 mg.  · Lean ground beef (90% lean) -- 4 oz has 100 mg.  · Lobster -- 3.5 oz has 90 mg.  · Pork loin chops -- 4 oz has 86 mg.  · Canned salmon -- 3.5 oz has 83 mg.  · Fat-trimmed beef top loin -- 4 oz has 78 mg.  · Frankfurter -- 1 analilia (3.5 oz) has 77 mg.  · Crab -- 3.5 oz has 71 mg.  · Roasted chicken without skin, white meat -- 4 oz has 66 mg.  · Light bologna -- 2 oz has 45 mg.  · Deli-cut turkey -- 2 oz has 31 mg.  · Canned tuna -- 3.5 oz has 31 mg.  · Bass -- 1 oz has 29 mg.  · Oysters and mussels (raw) -- 3.5 oz has 25 mg.  · Mackerel -- 1 oz has 22 mg.  · Trout -- 1 oz has 20 mg.  · Pork sausage -- 1 link (1 oz) has 17 mg.  · [place] -- 1 oz has 16 mg.  · Tilapia -- 1 oz has 14 mg.  Dairy  · Soft-serve ice cream -- ½ cup (4 oz) has 103 mg.  · Whole-milk yogurt -- 1 cup (8 oz) has 29 mg.  · Cheddar cheese -- 1 oz has 28 mg.  · American cheese -- 1 oz has 28 mg.  · Whole milk -- 1 cup (8 oz) has 23 mg.  · 2% milk -- 1 cup (8 oz) has 18 mg.  · Cream cheese -- 1 tablespoon (Tbsp) has 15 mg.  · Cottage cheese -- ½ cup (4 oz) has 14 mg.  · Low-fat (1%) milk -- 1 cup (8 oz) has 10 mg.  · Sour cream -- 1 Tbsp has 8.5  mg.  · Low-fat yogurt -- 1 cup (8 oz) has 8 mg.  · Nonfat Greek yogurt -- 1 cup (8 oz) has 7 mg.  · Half-and-half cream -- 1 Tbsp has 5 mg.  Fats and oils  · Cod liver oil -- 1 tablespoon (Tbsp) has 82 mg.  · Butter -- 1 Tbsp has 15 mg.  · Lard -- 1 Tbsp has 14 mg.  · Bass grease -- 1 Tbsp has 14 mg.  · Mayonnaise -- 1 Tbsp has 5-10 mg.  · Margarine -- 1 Tbsp has 3-10 mg.  Exact amounts of cholesterol in these foods may vary depending on specific ingredients and brands.  Foods without cholesterol  Most plant-based foods do not have cholesterol unless you combine them with a food that has cholesterol. Foods without cholesterol include:  · Grains and cereals.  · Vegetables.  · Fruits.  · Vegetable oils, such as olive, canola, and sunflower oil.  · Legumes, such as peas, beans, and lentils.  · Nuts and seeds.  · Egg whites.  Summary  · The body needs cholesterol in small amounts, but too much cholesterol can cause damage to the arteries and heart.  · Most people should eat less than 200 milligrams (mg) of cholesterol a day.  This information is not intended to replace advice given to you by your health care provider. Make sure you discuss any questions you have with your health care provider.  Document Revised: 11/30/2018 Document Reviewed: 08/14/2018  [employer] Patient Education © 2021 [employer]

## 2021-06-21 ENCOUNTER — READMISSION MANAGEMENT (OUTPATIENT)
Dept: CALL CENTER | Facility: HOSPITAL | Age: 76
End: 2021-06-21

## 2021-06-21 NOTE — OUTREACH NOTE
CT Surgery Week 3 Survey      Responses   [facility] patient discharged from?  [place]   Does the patient have one of the following disease processes/diagnoses(primary or secondary)?  Cardiothoracic surgery   Week 3 attempt successful?  Yes   Call start time  1702   Call end time  1704   General alerts for this patient  Wife requests that home phone number be called first.   Discharge diagnosis  CABG   Is patient permission given to speak with other caregiver?  Yes   List who call center can speak with  Carla-wife   Meds reviewed with patient/caregiver?  Yes   Is the patient having any side effects they believe may be caused by any medication additions or changes?  No   Does the patient have all medications related to this admission filled (includes all antibiotics, pain medications, cardiac medications, etc.)  Yes   Is the patient taking all medications as directed (includes completed medication regime)?  Yes   Does the patient have a primary care provider?   Yes   Does the patient have an appointment scheduled with their C/T surgeon?  Yes   Has the patient kept scheduled appointments due by today?  Yes   Has home health visited the patient within 72 hours of discharge?  Yes   Home health comments  States having HH PT.   Psychosocial issues?  No   Did the patient receive a copy of their discharge instructions?  Yes   Nursing interventions  Reviewed instructions with patient   What is the patient's perception of their health status since discharge?  Improving   Nursing interventions  Nurse provided patient education   Nursing interventions  Reassured on normal signs of recovery   Is the patient /caregiver able to teach back basic post-op care?  Continue use of incentive spirometry at least 1 week post discharge, Practice 'cough and deep breath', Drive as instructed by MD in discharge instructions, Take showers only when approved by MD-sponge bathe until then, Keep incision areas clean, dry and protected,  Third message left to reschedule 04/03 appointment.   Lifting as instructed by MD in discharge instructions   Is the patient/caregiver able to teach back signs and symptoms of incisional infection?  Increased redness, swelling or pain at the incisonal site, Increased drainage or bleeding, Incisional warmth, Pus or odor from incision, Fever   Is the patient/caregiver able to teach back steps to recovery at home?  Set small, achievable goals for return to baseline health, Rest and rebuild strength, gradually increase activity, Eat a well-balance diet, Make a list of questions for surgeon's appointment   Is the patient /caregiver able to teach back the importance of cardiac rehab?  Yes   Nursing interventions  Provided education on importance of cardiac rehab   If the patient is a current smoker, are they able to teach back resources for cessation?  Not a smoker   Is the patient/caregiver able to teach back the hierarchy of who to call/visit for symptoms/problems? PCP, Specialist, Home health nurse, Urgent Care, ED, 911  Yes   Additional teach back comments  His appetite is still low she says, he doesn't want to gain the weight back.   Week 3 call completed?  Yes   Wrap up additional comments  He is improving she says.          Diana Neri RN

## 2021-06-22 ENCOUNTER — OFFICE VISIT (OUTPATIENT)
Dept: CARDIAC SURGERY | Facility: CLINIC | Age: 76
End: 2021-06-22

## 2021-06-22 VITALS
RESPIRATION RATE: 20 BRPM | HEIGHT: 72 IN | HEART RATE: 86 BPM | BODY MASS INDEX: 32.51 KG/M2 | TEMPERATURE: 97.1 F | OXYGEN SATURATION: 99 % | DIASTOLIC BLOOD PRESSURE: 77 MMHG | SYSTOLIC BLOOD PRESSURE: 146 MMHG | WEIGHT: 240 LBS

## 2021-06-22 DIAGNOSIS — Z95.1 S/P CABG X 5: Primary | ICD-10-CM

## 2021-06-22 PROCEDURE — 99024 POSTOP FOLLOW-UP VISIT: CPT | Performed by: NURSE PRACTITIONER

## 2021-06-22 NOTE — PROGRESS NOTES
"CARDIOVASCULAR SURGERY FOLLOW-UP PROGRESS NOTE  Chief Complaint:  Post op        HPI:   Dear Alex García MD and colleagues:    It was nice to see Cosmo Gauthier in follow up 4 weeks  after surgery.  As you know, he is a 75 y.o. male with CAD who underwent CABGx5 LIMA. He did well postoperatively and continues to do well. He comes in today complaining of nothing.  His activity level has been good.   No popping or clicking when he coughs or sneezes.    Physical Exam:         /77 (BP Location: Left arm, Patient Position: Sitting, Cuff Size: Adult)   Pulse 86   Temp 97.1 °F (36.2 °C)   Resp 20   Ht 182.9 cm (72\")   Wt 109 kg (240 lb)   SpO2 99%   BMI 32.55 kg/m²   Heart:  regular rate and rhythm  Lungs:  clear to auscultation bilaterally  Extremities:  no edema  Incision(s):  sternum stable, incisions healing well    Assessment/Plan:     S/P CABG. Overall, he is doing well.    Slow post-op rehabilitation progress    Keep incisions clean and dry  OK to drive if not taking narcotic pain medicine  OK to begin cardiac rehab  Follow-up as scheduled with cardiology  Follow-up as scheduled with PCP      Thank you for allowing me to participate in the care of your   patient.  Regards,  MAXWELL N[place]      "

## 2021-06-25 ENCOUNTER — LAB (OUTPATIENT)
Dept: LAB | Facility: HOSPITAL | Age: 76
End: 2021-06-25

## 2021-06-25 DIAGNOSIS — R06.02 SHORTNESS OF BREATH: ICD-10-CM

## 2021-06-25 LAB
ANION GAP SERPL CALCULATED.3IONS-SCNC: 11.6 MMOL/L (ref 5–15)
BUN SERPL-MCNC: 12 MG/DL (ref 8–23)
BUN/CREAT SERPL: 11.8 (ref 7–25)
CALCIUM SPEC-SCNC: 9.3 MG/DL (ref 8.6–10.5)
CHLORIDE SERPL-SCNC: 108 MMOL/L (ref 98–107)
CO2 SERPL-SCNC: 23.4 MMOL/L (ref 22–29)
CREAT SERPL-MCNC: 1.02 MG/DL (ref 0.76–1.27)
GFR SERPL CREATININE-BSD FRML MDRD: 72 ML/MIN/1.73
GLUCOSE SERPL-MCNC: 96 MG/DL (ref 65–99)
POTASSIUM SERPL-SCNC: 4.5 MMOL/L (ref 3.5–5.2)
SODIUM SERPL-SCNC: 143 MMOL/L (ref 136–145)

## 2021-06-25 PROCEDURE — 80048 BASIC METABOLIC PNL TOTAL CA: CPT

## 2021-06-25 PROCEDURE — 36415 COLL VENOUS BLD VENIPUNCTURE: CPT

## 2021-06-30 ENCOUNTER — READMISSION MANAGEMENT (OUTPATIENT)
Dept: CALL CENTER | Facility: HOSPITAL | Age: 76
End: 2021-06-30

## 2021-06-30 NOTE — OUTREACH NOTE
CT Surgery Week 3 Survey      Responses   [facility] patient discharged from?  [place]   Does the patient have one of the following disease processes/diagnoses(primary or secondary)?  Cardiothoracic surgery   Call start time  1508   Call end time  1509   General alerts for this patient  Wife requests that home phone number be called first.   Person spoke with today (if not patient) and relationship  Carla   Meds reviewed with patient/caregiver?  Yes   Is the patient taking all medications as directed (includes completed medication regime)?  Yes   Has the patient kept scheduled appointments due by today?  Yes   Home health comments  discharged from  on 6/30/21   What is the patient's perception of their health status since discharge?  Improving   Is the patient /caregiver able to teach back the importance of cardiac rehab?  -- [Rehab will start on 7/29/21]   Is the patient interested in additional calls from an ambulatory ?  NOTE:  applies to high risk patients requiring additional follow-up.  No          Alona Dobbs RN

## 2021-07-02 ENCOUNTER — OUTSIDE FACILITY SERVICE (OUTPATIENT)
Dept: CARDIAC SURGERY | Facility: CLINIC | Age: 76
End: 2021-07-02

## 2021-07-02 PROCEDURE — G0179 MD RECERTIFICATION HHA PT: HCPCS | Performed by: THORACIC SURGERY (CARDIOTHORACIC VASCULAR SURGERY)

## 2021-07-15 VITALS — TEMPERATURE: 97.3 F | SYSTOLIC BLOOD PRESSURE: 121 MMHG | HEART RATE: 61 BPM | DIASTOLIC BLOOD PRESSURE: 57 MMHG

## 2021-07-22 PROBLEM — G47.30 SLEEP APNEA: Status: ACTIVE | Noted: 2021-07-22

## 2021-07-22 PROBLEM — M51.369 DDD (DEGENERATIVE DISC DISEASE), LUMBAR: Status: ACTIVE | Noted: 2018-11-20

## 2021-07-22 PROBLEM — F32.A DEPRESSION: Status: ACTIVE | Noted: 2021-07-22

## 2021-07-22 PROBLEM — I10 ESSENTIAL HYPERTENSION: Status: ACTIVE | Noted: 2021-07-22

## 2021-07-22 PROBLEM — I51.9 HEART DISEASE: Status: ACTIVE | Noted: 2021-07-22

## 2021-07-22 PROBLEM — M51.36 DDD (DEGENERATIVE DISC DISEASE), LUMBAR: Status: ACTIVE | Noted: 2018-11-20

## 2021-07-22 PROBLEM — R26.81 UNSTABLE GAIT: Status: ACTIVE | Noted: 2018-11-20

## 2021-07-22 PROBLEM — M43.10 DEGENERATIVE SPONDYLOLISTHESIS: Status: ACTIVE | Noted: 2019-01-15

## 2021-07-22 PROBLEM — M48.062 SPINAL STENOSIS OF LUMBAR REGION WITH NEUROGENIC CLAUDICATION: Status: ACTIVE | Noted: 2019-01-15

## 2021-07-22 PROBLEM — Q06.8 TETHERED SPINAL CORD: Status: ACTIVE | Noted: 2018-11-20

## 2021-07-22 PROBLEM — Q06.8 LOW LYING CONUS MEDULLARIS: Status: ACTIVE | Noted: 2018-11-20

## 2021-07-22 PROBLEM — G89.29 CHRONIC BACK PAIN: Status: ACTIVE | Noted: 2021-07-22

## 2021-07-22 PROBLEM — K76.0 FATTY LIVER: Status: ACTIVE | Noted: 2021-07-22

## 2021-07-22 PROBLEM — E03.4 ACQUIRED ATROPHY OF THYROID: Status: ACTIVE | Noted: 2021-07-22

## 2021-07-22 PROBLEM — M19.90 ARTHRITIS: Status: ACTIVE | Noted: 2021-07-22

## 2021-07-22 PROBLEM — Z80.0 FAMILY HISTORY OF COLON CANCER: Status: ACTIVE | Noted: 2021-07-22

## 2021-07-22 PROBLEM — D50.9 IRON DEFICIENCY ANEMIA: Status: ACTIVE | Noted: 2021-07-22

## 2021-07-22 PROBLEM — K25.9 GASTRIC ULCER: Status: ACTIVE | Noted: 2021-07-22

## 2021-07-22 PROBLEM — M54.9 CHRONIC BACK PAIN: Status: ACTIVE | Noted: 2021-07-22

## 2021-07-22 PROBLEM — K74.60 NON-ALCOHOLIC CIRRHOSIS: Status: ACTIVE | Noted: 2021-07-22

## 2021-07-22 PROBLEM — R41.3 MEMORY CHANGE: Status: ACTIVE | Noted: 2017-10-18

## 2021-07-22 PROBLEM — M25.569 PAIN IN JOINT, LOWER LEG: Status: ACTIVE | Noted: 2021-07-22

## 2021-07-22 PROBLEM — F39 MOOD DISORDER (HCC): Status: ACTIVE | Noted: 2018-05-22

## 2021-07-22 RX ORDER — NADOLOL 40 MG/1
TABLET ORAL
COMMUNITY
Start: 2021-06-23 | End: 2022-01-18

## 2021-07-22 RX ORDER — GABAPENTIN 600 MG/1
TABLET ORAL
COMMUNITY
Start: 2021-06-22 | End: 2021-08-16

## 2021-07-29 ENCOUNTER — OFFICE VISIT (OUTPATIENT)
Dept: CARDIAC REHAB | Facility: HOSPITAL | Age: 76
End: 2021-07-29

## 2021-07-29 VITALS — HEIGHT: 72 IN | BODY MASS INDEX: 33.41 KG/M2 | WEIGHT: 246.69 LBS

## 2021-07-29 DIAGNOSIS — Z95.1 S/P CABG (CORONARY ARTERY BYPASS GRAFT): Primary | ICD-10-CM

## 2021-07-29 LAB
GLUCOSE BLDC GLUCOMTR-MCNC: 264 MG/DL (ref 70–99)
GLUCOSE BLDC GLUCOMTR-MCNC: 285 MG/DL (ref 70–99)

## 2021-07-29 PROCEDURE — 93798 PHYS/QHP OP CAR RHAB W/ECG: CPT

## 2021-07-29 PROCEDURE — 82962 GLUCOSE BLOOD TEST: CPT

## 2021-07-29 RX ORDER — AMITRIPTYLINE HYDROCHLORIDE 50 MG/1
50 TABLET, FILM COATED ORAL NIGHTLY
COMMUNITY
End: 2022-01-18

## 2021-07-29 RX ORDER — LISINOPRIL 5 MG/1
5 TABLET ORAL DAILY
COMMUNITY
End: 2021-11-02 | Stop reason: SDUPTHER

## 2021-07-29 RX ORDER — PANTOPRAZOLE SODIUM 40 MG/1
40 TABLET, DELAYED RELEASE ORAL DAILY
COMMUNITY
End: 2022-01-18

## 2021-07-29 RX ORDER — GLIPIZIDE 10 MG/1
10 TABLET ORAL
COMMUNITY
End: 2021-08-02

## 2021-07-29 RX ORDER — ISOSORBIDE MONONITRATE 60 MG/1
60 TABLET, EXTENDED RELEASE ORAL DAILY
COMMUNITY
End: 2022-01-18

## 2021-07-29 NOTE — PROGRESS NOTES
Insurance: Spoke with pt regarding insurance coverage.  Instructed him to call his insurance to see what it covers for Cardiac Rehab, co-pay etc.  Verbalizes understanding.

## 2021-07-29 NOTE — PROGRESS NOTES
Pt tolerated exercises see scanned report.Phase II and III Education    Discipline Teaching:  Cardiac Rehab Phase II [x]      Cardiac Rehab Phase III []      Pulmonary Rehab II []      Pulmonary Rehab III []      Peripheral Artery Disease []        Class Given:  Asthma Management []      Beginners Cardiac Rehab []      Beginners Pulmonary Rehab []      CAD I []      CAD II []      CHF []      Diabetes Mellitus []     Diet & Cholesterol []     Diet Consult []      Energy Conservation []     Exercise []     Grocery Store Tour []     Harmonica Therapy []     High Blood Pressure []     Living with COPD []     Medication Safety []     Peripheral Artery Disease []     S & S of Infection []      Stress []        Education Topics:  Angina []      Arrhythmias []      Asthma Management []      Beginning Cardiac Rehab [x]      Blood Pressure [x]     Blood Sugar [x]     Breathing Retrainment []     CHF [x]     Cooking []     Daily Weight [x]     Diabetes Mellitus [x]      Diet [x]     Energy Conservation []     Exercise []      Foot Care []      Grocery Store Tour []      Heart Disease []      Heart Function []      Incision Care []     Living with COPD []     Medication Safety []     PAD Symptoms/Treatment []     Reconditioning Exercises []     S & S Infection []     Smoking Consult []     Stress Management []     Test Results []       Teaching Aids:  Video [x]      PAD Handout []      Pulmonary Workbook []      CAD Teaching Packet [x]      Stress Teaching Packet [x]     Exercise Teaching Packet [x]      Diet Teaching Packet []      CHF Teaching Packet []      Blood Pressure Teaching Packet [x]      Smoking Cessation Packet []      Medication Safety Handout []      Pflex Training []      Diabetes Teaching Packet [x]      Harmonica Therapy Packet []        Teaching Method:  Discussion [x]      Written Information [x]      Audio Visual [x]      Demonstration []        Teaching Recipient:  Patient [x]      Family []      Friend  []      Legal Guardian []      Primary Care Giver []      Significant Other []        Barriers To Learning:  None [x]      Auditory []      Cultural []      Emotional []      Financial []      Language []    Mental []      Motivation []      Physical []      Reading Skills []      Scientology []      Verbal/Cognitive []      Visual []      Written/Cognitive []        Teaching Response:  Verified Via Teach back [x]      Return Demo Via Teach Back []      Reinforcement Needed []      Unable to Return Demo []      Unable to Comprehend []      Declines Teaching  []        Additional Education Topics:Pt to watch educational videos on smartphone at home.Verbalizes understanding    Follow Up Instruction Comment:  Chief Complaint  Cardiac Rehab Phase II    Cosmo Gauthier presents to [facility] CARDIOPULMONARY REHABILITATION    Physical Exam  Cardiovascular:      Rate and Rhythm: Normal rate and regular rhythm.   Pulmonary:      Breath sounds: Normal breath sounds.      Comments: States a little short of breath at rest and with activity  Skin:     General: Skin is warm and dry.      Comments: No edema   Neurological:      Mental Status: He is alert.        Result Review :          Assessment and Plan    Diagnoses and all orders for this visit:    1. S/P CABG (coronary artery bypass graft) (Primary)        Dottie Campos RN

## 2021-07-29 NOTE — PROGRESS NOTES
Chief Complaint  Cardiac Rehab Phase II    Cosmo Gauthier presents to [facility] CARDIOPULMONARY REHABILITATION    Physical Exam  Chest:      Comments: Complains of soreness to mid chest incision states Dr. Tubbs aware rates 4. States tolerable       Result Review :          Assessment and Plan    Diagnoses and all orders for this visit:    1. S/P CABG (coronary artery bypass graft) (Primary)    Other orders  -     POC Glucose  -     POC Glucose        Dottie Campos RN

## 2021-07-30 ENCOUNTER — TRANSCRIBE ORDERS (OUTPATIENT)
Dept: LAB | Facility: HOSPITAL | Age: 76
End: 2021-07-30

## 2021-07-30 ENCOUNTER — LAB (OUTPATIENT)
Dept: LAB | Facility: HOSPITAL | Age: 76
End: 2021-07-30

## 2021-07-30 ENCOUNTER — APPOINTMENT (OUTPATIENT)
Dept: CARDIAC REHAB | Facility: HOSPITAL | Age: 76
End: 2021-07-30

## 2021-07-30 ENCOUNTER — TELEPHONE (OUTPATIENT)
Dept: CARDIOLOGY | Facility: CLINIC | Age: 76
End: 2021-07-30

## 2021-07-30 DIAGNOSIS — D69.6 THROMBOCYTOPENIA (HCC): ICD-10-CM

## 2021-07-30 DIAGNOSIS — D69.6 THROMBOCYTOPENIA (HCC): Primary | ICD-10-CM

## 2021-07-30 DIAGNOSIS — E11.65 TYPE 2 DIABETES MELLITUS WITH HYPERGLYCEMIA, UNSPECIFIED WHETHER LONG TERM INSULIN USE (HCC): ICD-10-CM

## 2021-07-30 DIAGNOSIS — E03.4 ATROPHY OF THYROID: ICD-10-CM

## 2021-07-30 PROBLEM — Z80.0 FAMILY HISTORY OF COLON CANCER: Status: RESOLVED | Noted: 2021-07-22 | Resolved: 2021-07-30

## 2021-07-30 PROBLEM — I25.118 CORONARY ARTERY DISEASE OF NATIVE HEART WITH STABLE ANGINA PECTORIS: Status: RESOLVED | Noted: 2021-03-09 | Resolved: 2021-07-30

## 2021-07-30 PROBLEM — M51.36 DDD (DEGENERATIVE DISC DISEASE), LUMBAR: Status: RESOLVED | Noted: 2018-11-20 | Resolved: 2021-07-30

## 2021-07-30 PROBLEM — I25.10 CAD (CORONARY ARTERY DISEASE), NATIVE CORONARY ARTERY: Status: RESOLVED | Noted: 2021-02-15 | Resolved: 2021-07-30

## 2021-07-30 PROBLEM — F03.90 DEMENTIA WITHOUT BEHAVIORAL DISTURBANCE (HCC): Status: ACTIVE | Noted: 2021-07-30

## 2021-07-30 PROBLEM — D50.9 IRON DEFICIENCY ANEMIA: Status: ACTIVE | Noted: 2021-07-30

## 2021-07-30 PROBLEM — I25.10 CAD (CORONARY ARTERY DISEASE): Status: RESOLVED | Noted: 2021-02-12 | Resolved: 2021-07-30

## 2021-07-30 PROBLEM — K25.9 GASTRIC ULCER: Status: RESOLVED | Noted: 2021-07-22 | Resolved: 2021-07-30

## 2021-07-30 PROBLEM — M51.369 DDD (DEGENERATIVE DISC DISEASE), LUMBAR: Status: RESOLVED | Noted: 2018-11-20 | Resolved: 2021-07-30

## 2021-07-30 LAB
ANION GAP SERPL CALCULATED.3IONS-SCNC: 11.5 MMOL/L (ref 5–15)
BASOPHILS # BLD AUTO: 0.07 10*3/MM3 (ref 0–0.2)
BASOPHILS NFR BLD AUTO: 1 % (ref 0–1.5)
BUN SERPL-MCNC: 23 MG/DL (ref 8–23)
BUN/CREAT SERPL: 22.8 (ref 7–25)
CALCIUM SPEC-SCNC: 9.8 MG/DL (ref 8.6–10.5)
CHLORIDE SERPL-SCNC: 104 MMOL/L (ref 98–107)
CO2 SERPL-SCNC: 23.5 MMOL/L (ref 22–29)
CREAT SERPL-MCNC: 1.01 MG/DL (ref 0.76–1.27)
DEPRECATED RDW RBC AUTO: 48 FL (ref 37–54)
EOSINOPHIL # BLD AUTO: 0.69 10*3/MM3 (ref 0–0.4)
EOSINOPHIL NFR BLD AUTO: 10.2 % (ref 0.3–6.2)
ERYTHROCYTE [DISTWIDTH] IN BLOOD BY AUTOMATED COUNT: 14.3 % (ref 12.3–15.4)
GFR SERPL CREATININE-BSD FRML MDRD: 72 ML/MIN/1.73
GLUCOSE SERPL-MCNC: 162 MG/DL (ref 65–99)
HBA1C MFR BLD: 5.6 % (ref 4.8–5.6)
HCT VFR BLD AUTO: 37.4 % (ref 37.5–51)
HGB BLD-MCNC: 12.7 G/DL (ref 13–17.7)
IMM GRANULOCYTES # BLD AUTO: 0.01 10*3/MM3 (ref 0–0.05)
IMM GRANULOCYTES NFR BLD AUTO: 0.1 % (ref 0–0.5)
LYMPHOCYTES # BLD AUTO: 1.75 10*3/MM3 (ref 0.7–3.1)
LYMPHOCYTES NFR BLD AUTO: 25.8 % (ref 19.6–45.3)
MCH RBC QN AUTO: 31.3 PG (ref 26.6–33)
MCHC RBC AUTO-ENTMCNC: 34 G/DL (ref 31.5–35.7)
MCV RBC AUTO: 92.1 FL (ref 79–97)
MONOCYTES # BLD AUTO: 0.43 10*3/MM3 (ref 0.1–0.9)
MONOCYTES NFR BLD AUTO: 6.3 % (ref 5–12)
NEUTROPHILS NFR BLD AUTO: 3.83 10*3/MM3 (ref 1.7–7)
NEUTROPHILS NFR BLD AUTO: 56.6 % (ref 42.7–76)
NRBC BLD AUTO-RTO: 0.1 /100 WBC (ref 0–0.2)
PLATELET # BLD AUTO: 101 10*3/MM3 (ref 140–450)
PMV BLD AUTO: 10 FL (ref 6–12)
POTASSIUM SERPL-SCNC: 4.7 MMOL/L (ref 3.5–5.2)
RBC # BLD AUTO: 4.06 10*6/MM3 (ref 4.14–5.8)
SODIUM SERPL-SCNC: 139 MMOL/L (ref 136–145)
TSH SERPL DL<=0.05 MIU/L-ACNC: 0.48 UIU/ML (ref 0.27–4.2)
WBC # BLD AUTO: 6.78 10*3/MM3 (ref 3.4–10.8)

## 2021-07-30 PROCEDURE — 80048 BASIC METABOLIC PNL TOTAL CA: CPT

## 2021-07-30 PROCEDURE — 85025 COMPLETE CBC W/AUTO DIFF WBC: CPT

## 2021-07-30 PROCEDURE — 36415 COLL VENOUS BLD VENIPUNCTURE: CPT

## 2021-07-30 PROCEDURE — 84443 ASSAY THYROID STIM HORMONE: CPT

## 2021-07-30 PROCEDURE — 83036 HEMOGLOBIN GLYCOSYLATED A1C: CPT

## 2021-07-30 NOTE — TELEPHONE ENCOUNTER
"Spoke with patient.    He states his incision feels tight and is sore.  This is not constant.  Patient recently started cardiac rehab and has been exercising in his pool.  Symptoms described sound muscle-skeletal.  Explaining likely caused by the exercises he has just started at rehab, and the muscle have not been used.    Reports a small \"lump\" at the top of incision.  Patient description sounds like the normal healing process of the skin.  Denies redness or swelling.    Denies clicking or popping in his chest.      Advised if he does hear popping or clicking to call our office or proceed to ER.    Patient has cardiac rehab on Monday.  Advised to have one of the nurses to look at incision.    Patient verbalized understanding.   "

## 2021-07-30 NOTE — TELEPHONE ENCOUNTER
Received VM from patient.  Re: C/o chest tightness.  Patient is s/p CABG x 5.    Attempted to reach patient.  No answer, unable to LVM.

## 2021-08-02 ENCOUNTER — TREATMENT (OUTPATIENT)
Dept: CARDIAC REHAB | Facility: HOSPITAL | Age: 76
End: 2021-08-02

## 2021-08-02 ENCOUNTER — OFFICE VISIT (OUTPATIENT)
Dept: INTERNAL MEDICINE | Facility: CLINIC | Age: 76
End: 2021-08-02

## 2021-08-02 VITALS
OXYGEN SATURATION: 95 % | HEIGHT: 71 IN | DIASTOLIC BLOOD PRESSURE: 75 MMHG | WEIGHT: 247.2 LBS | TEMPERATURE: 99.2 F | SYSTOLIC BLOOD PRESSURE: 130 MMHG | BODY MASS INDEX: 34.61 KG/M2

## 2021-08-02 DIAGNOSIS — K74.60 NON-ALCOHOLIC CIRRHOSIS (HCC): ICD-10-CM

## 2021-08-02 DIAGNOSIS — I50.32 CHRONIC DIASTOLIC (CONGESTIVE) HEART FAILURE (HCC): ICD-10-CM

## 2021-08-02 DIAGNOSIS — F03.90 DEMENTIA WITHOUT BEHAVIORAL DISTURBANCE, UNSPECIFIED DEMENTIA TYPE: ICD-10-CM

## 2021-08-02 DIAGNOSIS — E11.8 TYPE 2 DIABETES MELLITUS WITH COMPLICATIONS (HCC): ICD-10-CM

## 2021-08-02 DIAGNOSIS — N18.32 STAGE 3B CHRONIC KIDNEY DISEASE (HCC): ICD-10-CM

## 2021-08-02 DIAGNOSIS — D69.6 THROMBOCYTOPENIA (HCC): ICD-10-CM

## 2021-08-02 DIAGNOSIS — E03.4 ATROPHY OF THYROID: ICD-10-CM

## 2021-08-02 DIAGNOSIS — Z95.1 S/P CABG (CORONARY ARTERY BYPASS GRAFT): Primary | ICD-10-CM

## 2021-08-02 DIAGNOSIS — E78.2 MIXED HYPERLIPIDEMIA: ICD-10-CM

## 2021-08-02 DIAGNOSIS — D50.9 IRON DEFICIENCY ANEMIA, UNSPECIFIED IRON DEFICIENCY ANEMIA TYPE: ICD-10-CM

## 2021-08-02 DIAGNOSIS — I10 ESSENTIAL HYPERTENSION: ICD-10-CM

## 2021-08-02 LAB
GLUCOSE BLDC GLUCOMTR-MCNC: 166 MG/DL (ref 70–99)
GLUCOSE BLDC GLUCOMTR-MCNC: 179 MG/DL (ref 70–99)

## 2021-08-02 PROCEDURE — 82962 GLUCOSE BLOOD TEST: CPT

## 2021-08-02 PROCEDURE — 93798 PHYS/QHP OP CAR RHAB W/ECG: CPT

## 2021-08-02 PROCEDURE — 99214 OFFICE O/P EST MOD 30 MIN: CPT | Performed by: INTERNAL MEDICINE

## 2021-08-02 RX ORDER — MELOXICAM 15 MG/1
15 TABLET ORAL DAILY
Qty: 10 TABLET | Refills: 0 | Status: SHIPPED | OUTPATIENT
Start: 2021-08-02 | End: 2022-01-18

## 2021-08-02 NOTE — PROGRESS NOTES
"CARDIAC/PULMONARY REHAB NUTRITION EDUCATION/ASSESSMENT      75 y.o.         Height: 72  in    Weight: 246  lb     BMI: 33.45 IBW:   178    %IBW: 138   Diet Survey Score: 50  Cardiac Risk Factors: Waist circ 44\"/112cm  Weight Assessment: Obese  Weight Change:  weight loss of 15 lbs over the past 2 month(s)    Intentional?  Yes  Usual Weight: 260 lb  Desired Weight: 200 lb     Current Diet: heart healthy  Appetite: good   Factors limiting PO intake   Taste/smell changes:  No Food records reviewed? Yes  Pt reports trying to eat better, has given up late snacks and is not eating after 6pm. He is incorporating fruit for daytime snacks. He is avoiding extra servings and using smaller plates. He feels satisfied except does miss some favorites like pizza and spaghetti.      Occupation:  retired  Job Activity Level:    Routine Exercise: moderate     Who does the patient live with: wife  Who does the cooking at home:  wife  Spouse/significant other's name:    Spouse/significant other present for diet instruction today? No  Patient actively receiving lifestyle support from others at home? Yes       Pertinent Lab Values:   Total: No components found for: CHOLESTEROL  HDL:   HDL Cholesterol   Date Value Ref Range Status   06/11/2021 39 (L) 40 - 60 mg/dL Final     LDL:  LDL Cholesterol    Date Value Ref Range Status   06/11/2021 46 0 - 100 mg/dL Final     Triglyceride: No components found for: TRIGLYCERIDE 208H  Last HGBA1C with date if applicable:No components found for: A1C 6.1 controlled DM  Glucose:   Glucose   Date Value Ref Range Status   07/30/2021 162 (H) 65 - 99 mg/dL Final    Nutritional Supplements:     Pertinent Nutrition-Related Medications:  Simvastatin, furosemide, glipizide, metformin, lanutus         Stated Problem Areas / Concerns: perserverence     Assessment / Recommendations: Regular meal schedule. Answered pt questions re: eating out, specific foods such as juices, nuts, eggs.   Estimated calorie needs " 2600kcal, reduce for wt loss to 2000kcal daily  Motivation level toward diet compliance: strong       Education:  [employer]  Education Level:  HS  Previous cardiac diet education prior to coming to Cardiac Rehab?  Yes , years ago  Instructed on:  Cardiac diet  Diabetic diet  Phase II and III Education    Discipline Teaching:  Cardiac Rehab Phase II [x]      Cardiac Rehab Phase III []      Pulmonary Rehab II []      Pulmonary Rehab III []      Peripheral Artery Disease []        Class Given:  Asthma Management []      Beginners Cardiac Rehab []      Beginners Pulmonary Rehab []      CAD I []      CAD II []      CHF []      Diabetes Mellitus []     Diet & Cholesterol []     Diet Consult [x]      Energy Conservation []     Exercise []     Grocery Store Tour []     Harmonica Therapy []     High Blood Pressure []     Living with COPD []     Medication Safety []     Peripheral Artery Disease []     S & S of Infection []      Stress []        Education Topics:  Angina []      Arrhythmias []      Asthma Management []      Beginning Cardiac Rehab []      Blood Pressure []     Blood Sugar []     Breathing Retrainment []     CHF []     Cooking []     Daily Weight []     Diabetes Mellitus []      Diet [x]     Energy Conservation []     Exercise []      Foot Care []      Grocery Store Tour []      Heart Disease []      Heart Function []      Incision Care []     Living with COPD []     Medication Safety []     PAD Symptoms/Treatment []     Reconditioning Exercises []     S & S Infection []     Smoking Consult []     Stress Management []     Test Results []       Teaching Aids:  Video []      PAD Handout []      Pulmonary Workbook []      CAD Teaching Packet []      Stress Teaching Packet []     Exercise Teaching Packet []      Diet Teaching Packet [x]      CHF Teaching Packet []      Blood Pressure Teaching Packet []      Smoking Cessation Packet []      Medication Safety Handout []      Pflex Training []      Diabetes  Teaching Packet []      Harmonica Therapy Packet []        Teaching Method:  Discussion []      Written Information [x]      Audio Visual [x]      Demonstration []        Teaching Recipient:  Patient [x]      Family []      Friend []      Legal Guardian []      Primary Care Giver []      Significant Other []        Barriers To Learning:  None []      Auditory [x]      Cultural []      Emotional []      Financial []      Language []    Mental []      Motivation []      Physical []      Reading Skills []      Adventism []      Verbal/Cognitive []      Visual []      Written/Cognitive []        Teaching Response:  Verified Via Teach back [x]      Return Demo Via Teach Back []      Reinforcement Needed [x]      Unable to Return Demo []      Unable to Comprehend []      Declines Teaching  []        Additional Education Topics:  American Heart Association Heart   Check program & recipe bank.    Follow Up Instruction Comment:  prn      Written materials given: High Triglyceride Nutrition Therapy, Quick and easy meals, Healthy breakfast options.         Goals:  Pt desires wt loss to 200#. Reduce Triglycerides to 150 or less.                 08:49 EDT  8/2/2021  Shayy Trevizo RD

## 2021-08-04 ENCOUNTER — TREATMENT (OUTPATIENT)
Dept: CARDIAC REHAB | Facility: HOSPITAL | Age: 76
End: 2021-08-04

## 2021-08-04 DIAGNOSIS — Z95.1 S/P CABG (CORONARY ARTERY BYPASS GRAFT): Primary | ICD-10-CM

## 2021-08-04 LAB
GLUCOSE BLDC GLUCOMTR-MCNC: 190 MG/DL (ref 70–99)
GLUCOSE BLDC GLUCOMTR-MCNC: 200 MG/DL (ref 70–99)

## 2021-08-04 PROCEDURE — 82962 GLUCOSE BLOOD TEST: CPT

## 2021-08-04 PROCEDURE — 93798 PHYS/QHP OP CAR RHAB W/ECG: CPT

## 2021-08-06 ENCOUNTER — TREATMENT (OUTPATIENT)
Dept: CARDIAC REHAB | Facility: HOSPITAL | Age: 76
End: 2021-08-06

## 2021-08-06 DIAGNOSIS — Z95.1 S/P CABG (CORONARY ARTERY BYPASS GRAFT): Primary | ICD-10-CM

## 2021-08-06 LAB
GLUCOSE BLDC GLUCOMTR-MCNC: 137 MG/DL (ref 70–99)
GLUCOSE BLDC GLUCOMTR-MCNC: 161 MG/DL (ref 70–99)

## 2021-08-06 PROCEDURE — 93798 PHYS/QHP OP CAR RHAB W/ECG: CPT

## 2021-08-06 PROCEDURE — 82962 GLUCOSE BLOOD TEST: CPT

## 2021-08-09 ENCOUNTER — TREATMENT (OUTPATIENT)
Dept: CARDIAC REHAB | Facility: HOSPITAL | Age: 76
End: 2021-08-09

## 2021-08-09 DIAGNOSIS — Z95.1 S/P CABG (CORONARY ARTERY BYPASS GRAFT): Primary | ICD-10-CM

## 2021-08-09 LAB
GLUCOSE BLDC GLUCOMTR-MCNC: 113 MG/DL (ref 70–99)
GLUCOSE BLDC GLUCOMTR-MCNC: 135 MG/DL (ref 70–99)

## 2021-08-09 PROCEDURE — 82962 GLUCOSE BLOOD TEST: CPT

## 2021-08-09 PROCEDURE — 93798 PHYS/QHP OP CAR RHAB W/ECG: CPT

## 2021-08-11 ENCOUNTER — TREATMENT (OUTPATIENT)
Dept: CARDIAC REHAB | Facility: HOSPITAL | Age: 76
End: 2021-08-11

## 2021-08-11 DIAGNOSIS — Z95.1 S/P CABG (CORONARY ARTERY BYPASS GRAFT): Primary | ICD-10-CM

## 2021-08-11 LAB — GLUCOSE BLDC GLUCOMTR-MCNC: 125 MG/DL (ref 70–99)

## 2021-08-11 PROCEDURE — 82962 GLUCOSE BLOOD TEST: CPT

## 2021-08-13 ENCOUNTER — TREATMENT (OUTPATIENT)
Dept: CARDIAC REHAB | Facility: HOSPITAL | Age: 76
End: 2021-08-13

## 2021-08-13 DIAGNOSIS — Z95.1 S/P CABG (CORONARY ARTERY BYPASS GRAFT): Primary | ICD-10-CM

## 2021-08-13 LAB
GLUCOSE BLDC GLUCOMTR-MCNC: 138 MG/DL (ref 70–99)
GLUCOSE BLDC GLUCOMTR-MCNC: 143 MG/DL (ref 70–99)

## 2021-08-13 PROCEDURE — 93798 PHYS/QHP OP CAR RHAB W/ECG: CPT

## 2021-08-13 PROCEDURE — 82962 GLUCOSE BLOOD TEST: CPT

## 2021-08-16 ENCOUNTER — APPOINTMENT (OUTPATIENT)
Dept: CARDIAC REHAB | Facility: HOSPITAL | Age: 76
End: 2021-08-16

## 2021-08-16 ENCOUNTER — TREATMENT (OUTPATIENT)
Dept: CARDIAC REHAB | Facility: HOSPITAL | Age: 76
End: 2021-08-16

## 2021-08-16 DIAGNOSIS — Z95.1 S/P CABG (CORONARY ARTERY BYPASS GRAFT): Primary | ICD-10-CM

## 2021-08-16 LAB
GLUCOSE BLDC GLUCOMTR-MCNC: 135 MG/DL (ref 70–99)
GLUCOSE BLDC GLUCOMTR-MCNC: 137 MG/DL (ref 70–99)

## 2021-08-16 PROCEDURE — 93798 PHYS/QHP OP CAR RHAB W/ECG: CPT

## 2021-08-16 PROCEDURE — 82962 GLUCOSE BLOOD TEST: CPT

## 2021-08-16 RX ORDER — GABAPENTIN 600 MG/1
TABLET ORAL
Qty: 45 TABLET | Refills: 0 | Status: SHIPPED | OUTPATIENT
Start: 2021-08-16

## 2021-08-18 ENCOUNTER — TREATMENT (OUTPATIENT)
Dept: CARDIAC REHAB | Facility: HOSPITAL | Age: 76
End: 2021-08-18

## 2021-08-18 ENCOUNTER — APPOINTMENT (OUTPATIENT)
Dept: CARDIAC REHAB | Facility: HOSPITAL | Age: 76
End: 2021-08-18

## 2021-08-18 DIAGNOSIS — Z95.1 S/P CABG (CORONARY ARTERY BYPASS GRAFT): Primary | ICD-10-CM

## 2021-08-18 LAB
GLUCOSE BLDC GLUCOMTR-MCNC: 118 MG/DL (ref 70–99)
GLUCOSE BLDC GLUCOMTR-MCNC: 141 MG/DL (ref 70–99)

## 2021-08-18 PROCEDURE — 93798 PHYS/QHP OP CAR RHAB W/ECG: CPT

## 2021-08-18 PROCEDURE — 82962 GLUCOSE BLOOD TEST: CPT

## 2021-08-20 ENCOUNTER — APPOINTMENT (OUTPATIENT)
Dept: CARDIAC REHAB | Facility: HOSPITAL | Age: 76
End: 2021-08-20

## 2021-08-23 ENCOUNTER — APPOINTMENT (OUTPATIENT)
Dept: CARDIAC REHAB | Facility: HOSPITAL | Age: 76
End: 2021-08-23

## 2021-08-25 ENCOUNTER — APPOINTMENT (OUTPATIENT)
Dept: CARDIAC REHAB | Facility: HOSPITAL | Age: 76
End: 2021-08-25

## 2021-08-27 ENCOUNTER — APPOINTMENT (OUTPATIENT)
Dept: CARDIAC REHAB | Facility: HOSPITAL | Age: 76
End: 2021-08-27

## 2021-08-27 RX ORDER — NITROGLYCERIN 0.4 MG/1
TABLET SUBLINGUAL
COMMUNITY
Start: 2021-08-16

## 2021-08-30 ENCOUNTER — APPOINTMENT (OUTPATIENT)
Dept: CARDIAC REHAB | Facility: HOSPITAL | Age: 76
End: 2021-08-30

## 2021-09-01 ENCOUNTER — APPOINTMENT (OUTPATIENT)
Dept: CARDIAC REHAB | Facility: HOSPITAL | Age: 76
End: 2021-09-01

## 2021-09-02 ENCOUNTER — OFFICE VISIT (OUTPATIENT)
Dept: UROLOGY | Facility: CLINIC | Age: 76
End: 2021-09-02

## 2021-09-02 VITALS
DIASTOLIC BLOOD PRESSURE: 60 MMHG | HEART RATE: 92 BPM | SYSTOLIC BLOOD PRESSURE: 131 MMHG | WEIGHT: 247 LBS | TEMPERATURE: 97.3 F | HEIGHT: 71 IN | BODY MASS INDEX: 34.58 KG/M2

## 2021-09-02 DIAGNOSIS — R35.1 BENIGN PROSTATIC HYPERPLASIA WITH NOCTURIA: Primary | ICD-10-CM

## 2021-09-02 DIAGNOSIS — N40.1 BENIGN PROSTATIC HYPERPLASIA WITH NOCTURIA: Primary | ICD-10-CM

## 2021-09-02 LAB
BACTERIA UR QL AUTO: NORMAL /HPF
BILIRUB BLD-MCNC: NEGATIVE MG/DL
CLARITY, POC: CLEAR
COLOR UR: YELLOW
EPI CELLS #/AREA URNS HPF: 0 /[HPF]
GLUCOSE UR STRIP-MCNC: NEGATIVE MG/DL
KETONES UR QL: NEGATIVE
LEUKOCYTE EST, POC: NEGATIVE
NITRITE UR-MCNC: NEGATIVE MG/ML
PH UR: 5 [PH] (ref 5–8)
PROT UR STRIP-MCNC: NEGATIVE MG/DL
RBC # UR STRIP: NEGATIVE /UL
RBC # UR STRIP: NORMAL /HPF
RENAL EPITHELIAL, POC: 0
SP GR UR: 1.02 (ref 1–1.03)
UNIDENT CRYS URNS QL MICRO: NORMAL /HPF
UROBILINOGEN UR QL: NORMAL
WBC # UR STRIP: NORMAL /HPF

## 2021-09-02 PROCEDURE — 81003 URINALYSIS AUTO W/O SCOPE: CPT | Performed by: UROLOGY

## 2021-09-02 PROCEDURE — 99212 OFFICE O/P EST SF 10 MIN: CPT | Performed by: UROLOGY

## 2021-09-02 NOTE — PROGRESS NOTES
"Chief Complaint  Follow-up (3 month )    History of urinary retention.    Patient is urinating fine now    Subjective Patient is happy and has no urological problems        Cosmo Gauthier presents to [facility] UROLOGY  History of Present Illness    Patient has urinary tract infection and urinary retention prior to his open heart surgery.  He is on finasteride and Flomax and has been urinating without difficulties.  He does not get up at night to urinate and daytime is has no frequency and urinating fine    Objective Patient is in no acute distress  Vital Signs:   /60 (BP Location: Left arm, Patient Position: Sitting, Cuff Size: Adult)   Pulse 92   Temp 97.3 °F (36.3 °C)   Ht 180.3 cm (71\")   Wt 112 kg (247 lb)   BMI 34.45 kg/m²     No Known Allergies   Review of Systems   Constitutional: Negative.    HENT: Negative.    Eyes: Negative.    Respiratory: Negative.    Cardiovascular: Negative.    Gastrointestinal: Negative.    Endocrine: Negative.    Genitourinary: Negative.    Musculoskeletal: Negative.    Skin: Negative.    Allergic/Immunologic: Negative.    Neurological: Negative.    Hematological: Negative.    Psychiatric/Behavioral: Negative.    All other systems reviewed and are negative.       Physical Exam  Constitutional:       Appearance: Normal appearance.   HENT:      Head: Normocephalic and atraumatic.   Eyes:      Pupils: Pupils are equal, round, and reactive to light.   Cardiovascular:      Rate and Rhythm: Normal rate and regular rhythm.      Pulses: Normal pulses.      Heart sounds: Normal heart sounds. No murmur heard.     Pulmonary:      Effort: Pulmonary effort is normal.      Breath sounds: No rhonchi or rales.   Abdominal:      General: Abdomen is flat. Bowel sounds are normal. There is no distension.      Palpations: Abdomen is soft. There is no mass.      Tenderness: There is no abdominal tenderness. There is no right CVA tenderness or left CVA tenderness. "   Genitourinary:     Penis: Normal.       Testes: Normal.      Prostate: Normal.      Rectum: Normal.      Comments: Scrotum on both sides is normal  Musculoskeletal:         General: No swelling.      Cervical back: Normal range of motion and neck supple. No rigidity or tenderness.   Lymphadenopathy:      Cervical: No cervical adenopathy.   Skin:     General: Skin is warm.      Coloration: Skin is not jaundiced.   Neurological:      General: No focal deficit present.      Mental Status: He is oriented to person, place, and time.      Motor: No weakness.      Gait: Gait normal.   Psychiatric:         Mood and Affect: Mood normal.         Behavior: Behavior normal.         Thought Content: Thought content normal.         Judgment: Judgment normal.        Result Review :                 Assessment and Plan    Diagnoses and all orders for this visit:    1. Benign prostatic hyperplasia with nocturia (Primary)  -     POC Urinalysis Dipstick, Automated    Patient is in remission and is on finasteride and Flomax    Follow Up     We will continue his Flomax and finasteride and will be happy to see him on as needed basis.  His PCP can continue his medication    Patient was given instructions and counseling regarding his condition or for health maintenance advice. Please see specific information pulled into the AVS if appropriate.     Morgan Dill MD

## 2021-09-03 ENCOUNTER — APPOINTMENT (OUTPATIENT)
Dept: CARDIAC REHAB | Facility: HOSPITAL | Age: 76
End: 2021-09-03

## 2021-09-06 ENCOUNTER — APPOINTMENT (OUTPATIENT)
Dept: CARDIAC REHAB | Facility: HOSPITAL | Age: 76
End: 2021-09-06

## 2021-09-07 RX ORDER — LISINOPRIL 10 MG/1
TABLET ORAL
Qty: 90 TABLET | Refills: 1 | Status: SHIPPED | OUTPATIENT
Start: 2021-09-07 | End: 2022-01-18

## 2021-09-08 ENCOUNTER — APPOINTMENT (OUTPATIENT)
Dept: CARDIAC REHAB | Facility: HOSPITAL | Age: 76
End: 2021-09-08

## 2021-09-10 ENCOUNTER — APPOINTMENT (OUTPATIENT)
Dept: CARDIAC REHAB | Facility: HOSPITAL | Age: 76
End: 2021-09-10

## 2021-09-13 ENCOUNTER — APPOINTMENT (OUTPATIENT)
Dept: CARDIAC REHAB | Facility: HOSPITAL | Age: 76
End: 2021-09-13

## 2021-09-15 ENCOUNTER — APPOINTMENT (OUTPATIENT)
Dept: CARDIAC REHAB | Facility: HOSPITAL | Age: 76
End: 2021-09-15

## 2021-09-17 ENCOUNTER — APPOINTMENT (OUTPATIENT)
Dept: CARDIAC REHAB | Facility: HOSPITAL | Age: 76
End: 2021-09-17

## 2021-09-20 ENCOUNTER — APPOINTMENT (OUTPATIENT)
Dept: CARDIAC REHAB | Facility: HOSPITAL | Age: 76
End: 2021-09-20

## 2021-09-22 ENCOUNTER — APPOINTMENT (OUTPATIENT)
Dept: CARDIAC REHAB | Facility: HOSPITAL | Age: 76
End: 2021-09-22

## 2021-09-24 ENCOUNTER — APPOINTMENT (OUTPATIENT)
Dept: CARDIAC REHAB | Facility: HOSPITAL | Age: 76
End: 2021-09-24

## 2021-09-27 ENCOUNTER — APPOINTMENT (OUTPATIENT)
Dept: CARDIAC REHAB | Facility: HOSPITAL | Age: 76
End: 2021-09-27

## 2021-09-28 ENCOUNTER — OFFICE VISIT (OUTPATIENT)
Dept: CARDIOLOGY | Facility: CLINIC | Age: 76
End: 2021-09-28

## 2021-09-28 VITALS
SYSTOLIC BLOOD PRESSURE: 132 MMHG | HEIGHT: 72 IN | DIASTOLIC BLOOD PRESSURE: 74 MMHG | BODY MASS INDEX: 33.46 KG/M2 | HEART RATE: 96 BPM | WEIGHT: 247 LBS

## 2021-09-28 DIAGNOSIS — Z95.1 HX OF CABG: ICD-10-CM

## 2021-09-28 DIAGNOSIS — I25.10 CORONARY ARTERY DISEASE INVOLVING NATIVE CORONARY ARTERY OF NATIVE HEART WITHOUT ANGINA PECTORIS: Primary | ICD-10-CM

## 2021-09-28 DIAGNOSIS — I10 HYPERTENSION, ESSENTIAL: ICD-10-CM

## 2021-09-28 DIAGNOSIS — E78.2 HYPERLIPEMIA, MIXED: ICD-10-CM

## 2021-09-28 PROCEDURE — 99214 OFFICE O/P EST MOD 30 MIN: CPT | Performed by: SPECIALIST

## 2021-09-28 RX ORDER — CLOPIDOGREL BISULFATE 75 MG/1
75 TABLET ORAL DAILY
COMMUNITY
End: 2022-01-18

## 2021-09-28 RX ORDER — INSULIN GLARGINE 100 [IU]/ML
INJECTION, SOLUTION SUBCUTANEOUS
COMMUNITY
Start: 2021-09-18 | End: 2021-11-02

## 2021-09-28 NOTE — PROGRESS NOTES
[facility]  Cardiology progress Note    Patient Name: Cosmo Gauthier  : 1945    CHIEF COMPLAINT  Coronary artery disease, shortness of breath.      Subjective   Subjective     HISTORY OF PRESENT ILLNESS    Cosmo Gauthier is a 76 y.o. male with history of coronary disease recent CABG.  Denies any chest pain.  Shortness of breath is improved.    Review of Systems:   Constitutional no fever,  no weight loss   Skin no rash   Otolaryngeal no difficulty swallowing   Cardiovascular See HPI   Pulmonary no cough, no sputum production   Gastrointestinal no constipation, no diarrhea   Genitourinary no dysuria, no hematuria   Hematologic no easy bruisability, no abnormal bleeding   Musculoskeletal no muscle pain   Neurologic no dizziness, no falls         Personal History     Social History:  reports that he has never smoked. He has never used smokeless tobacco. He reports previous alcohol use. He reports that he does not use drugs.    Home Medications:  Current Outpatient Medications on File Prior to Visit   Medication Sig   • amitriptyline (ELAVIL) 50 MG tablet Take 50 mg by mouth Every Night.   • aspirin 81 MG chewable tablet Chew 81 mg Daily.   • clopidogrel (PLAVIX) 75 MG tablet Take 75 mg by mouth Daily.   • DONEPEZIL HCL PO Take 10 mg by mouth every night at bedtime.   • ferrous sulfate 325 (65 FE) MG tablet Take 325 mg by mouth 2 (two) times a day.   • finasteride (PROSCAR) 5 MG tablet Take 1 tablet by mouth Daily.   • furosemide (LASIX) 40 MG tablet Take 1 tablet by mouth Daily.   • gabapentin (NEURONTIN) 600 MG tablet TAKE 1/2 TABLET BY MOUTH EVERY DAY   • insulin glargine (LANTUS, SEMGLEE) 100 UNIT/ML injection Inject 30 Units under the skin into the appropriate area as directed Every Night.   • insulin lispro (humaLOG, ADMELOG) 100 UNIT/ML injection Inject 12 Units under the skin into the appropriate area as directed 3 (Three) Times a Day Before Meals.   • isosorbide mononitrate (IMDUR) 60  MG 24 hr tablet Take 60 mg by mouth Daily.   • levothyroxine (SYNTHROID, LEVOTHROID) 50 MCG tablet Take 50 mcg by mouth Daily.   • lisinopril (PRINIVIL,ZESTRIL) 10 MG tablet TAKE 1 TABLET DAILY   • meloxicam (Mobic) 15 MG tablet Take 1 tablet by mouth Daily.   • metFORMIN (GLUCOPHAGE) 1000 MG tablet Take 1,000 mg by mouth 2 (Two) Times a Day With Meals.   • nadolol (CORGARD) 40 MG tablet    • nitroglycerin (NITROSTAT) 0.4 MG SL tablet    • pantoprazole (PROTONIX) 40 MG EC tablet Take 40 mg by mouth Daily.   • sertraline (ZOLOFT) 100 MG tablet Take 100 mg by mouth Daily. Take 1.5 tablets daily   • simvastatin (ZOCOR) 40 MG tablet Take 40 mg by mouth Every Night. Take 0.5 tablets every night   • tamsulosin (FLOMAX) 0.4 MG capsule 24 hr capsule Take 1 capsule by mouth Daily.   • vitamin B-12 (CYANOCOBALAMIN) 1000 MCG tablet Take 2,000 mcg by mouth Every Night.   • vitamin D3 125 MCG (5000 UT) capsule capsule Take 5,000 Units by mouth Daily.   • Lantus SoloStar 100 UNIT/ML injection pen    • levothyroxine (SYNTHROID, LEVOTHROID) 200 MCG tablet Take 200 mcg by mouth Every Morning.   • lisinopril (PRINIVIL,ZESTRIL) 5 MG tablet Take 5 mg by mouth Daily.     No current facility-administered medications on file prior to visit.     Allergies:  No Known Allergies    Objective    Objective       Vitals:   Heart Rate:  [96] 96  BP: (132)/(74) 132/74  Body mass index is 33.5 kg/m².     Physical Exam:   Constitutional: Awake, alert, No acute distress    Eyes: PERRLA, sclerae anicteric, no conjunctival injection   HENT: NCAT, mucous membranes moist   Neck: Supple, no thyromegaly, no lymphadenopathy, trachea midline   Respiratory: Clear to auscultation bilaterally, nonlabored respirations    Cardiovascular: RRR, no murmurs or rubs. Palpable pedal pulses bilaterally   Musculoskeletal: No bilateral ankle edema, no cyanosis to extremities   Psychiatric: Appropriate affect, cooperative   Neurologic: Oriented x 3, strength symmetric in  all extremities, Cranial Nerves grossly intact to confrontation, speech clear   Skin: No rashes.    Result Review    Result Review:  I have personally reviewed the available results from  [x]  Laboratory  [x]  EKG  [x]  Cardiology  [x]  Medications  [x]  Old records  []  Other:   Procedures  Lab Results   Component Value Date    CHOL 125 06/11/2021    CHOL 125 05/18/2021    CHOL 150 02/15/2021     Lab Results   Component Value Date    TRIG 254 (H) 06/11/2021    TRIG 208 (H) 05/18/2021    TRIG 241 (H) 02/15/2021     Lab Results   Component Value Date    HDL 39 (L) 06/11/2021    HDL 40 05/18/2021    HDL 36 (L) 02/15/2021     Lab Results   Component Value Date    LDL 46 06/11/2021    LDL 51 05/18/2021    LDL 74 02/15/2021     Lab Results   Component Value Date    VLDL 40 06/11/2021    VLDL 34 05/18/2021    VLDL 40 02/15/2021         Impression/Plan:  1. Coronary artery disease s/p CABG: Continue aspirin.  Continue nadolol.  Continue Imdur.    2. Chronic diastolic heart failure stable: Continue Lasix.  Low-salt diet advised.  3.  Essential hypertension controlled: Continue lisinopril.  4.  Hypothyroidism: Continue levothyroxine.  5.  Hyperlipidemia: Continue simvastatin.  Lipid profile reviewed.  Shows an LDL of 46.        Sven Duran MD   09/28/21   13:10 EDT

## 2021-09-29 ENCOUNTER — APPOINTMENT (OUTPATIENT)
Dept: CARDIAC REHAB | Facility: HOSPITAL | Age: 76
End: 2021-09-29

## 2021-10-01 ENCOUNTER — APPOINTMENT (OUTPATIENT)
Dept: CARDIAC REHAB | Facility: HOSPITAL | Age: 76
End: 2021-10-01

## 2021-10-04 ENCOUNTER — APPOINTMENT (OUTPATIENT)
Dept: CARDIAC REHAB | Facility: HOSPITAL | Age: 76
End: 2021-10-04

## 2021-10-06 ENCOUNTER — APPOINTMENT (OUTPATIENT)
Dept: CARDIAC REHAB | Facility: HOSPITAL | Age: 76
End: 2021-10-06

## 2021-10-08 ENCOUNTER — APPOINTMENT (OUTPATIENT)
Dept: CARDIAC REHAB | Facility: HOSPITAL | Age: 76
End: 2021-10-08

## 2021-10-08 ENCOUNTER — TELEPHONE (OUTPATIENT)
Dept: INTERNAL MEDICINE | Facility: CLINIC | Age: 76
End: 2021-10-08

## 2021-10-08 NOTE — TELEPHONE ENCOUNTER
Pt has sinus drainage it is making his throat sore. He is wanting something called in or if you could recommend something OTC. Pharmacy is [facility]

## 2021-10-11 ENCOUNTER — APPOINTMENT (OUTPATIENT)
Dept: CARDIAC REHAB | Facility: HOSPITAL | Age: 76
End: 2021-10-11

## 2021-10-13 ENCOUNTER — APPOINTMENT (OUTPATIENT)
Dept: CARDIAC REHAB | Facility: HOSPITAL | Age: 76
End: 2021-10-13

## 2021-10-15 ENCOUNTER — APPOINTMENT (OUTPATIENT)
Dept: CARDIAC REHAB | Facility: HOSPITAL | Age: 76
End: 2021-10-15

## 2021-10-18 ENCOUNTER — APPOINTMENT (OUTPATIENT)
Dept: CARDIAC REHAB | Facility: HOSPITAL | Age: 76
End: 2021-10-18

## 2021-10-20 ENCOUNTER — APPOINTMENT (OUTPATIENT)
Dept: CARDIAC REHAB | Facility: HOSPITAL | Age: 76
End: 2021-10-20

## 2021-10-25 ENCOUNTER — CLINICAL SUPPORT (OUTPATIENT)
Dept: INTERNAL MEDICINE | Facility: CLINIC | Age: 76
End: 2021-10-25

## 2021-10-25 DIAGNOSIS — Z23 NEED FOR VACCINATION: ICD-10-CM

## 2021-10-25 PROCEDURE — 90662 IIV NO PRSV INCREASED AG IM: CPT | Performed by: INTERNAL MEDICINE

## 2021-10-25 PROCEDURE — G0008 ADMIN INFLUENZA VIRUS VAC: HCPCS | Performed by: INTERNAL MEDICINE

## 2021-10-28 ENCOUNTER — LAB (OUTPATIENT)
Dept: LAB | Facility: HOSPITAL | Age: 76
End: 2021-10-28

## 2021-10-28 DIAGNOSIS — I50.32 CHRONIC DIASTOLIC (CONGESTIVE) HEART FAILURE (HCC): ICD-10-CM

## 2021-10-28 DIAGNOSIS — E78.2 MIXED HYPERLIPIDEMIA: ICD-10-CM

## 2021-10-28 DIAGNOSIS — D50.9 IRON DEFICIENCY ANEMIA, UNSPECIFIED IRON DEFICIENCY ANEMIA TYPE: ICD-10-CM

## 2021-10-28 DIAGNOSIS — E11.8 TYPE 2 DIABETES MELLITUS WITH COMPLICATIONS (HCC): ICD-10-CM

## 2021-10-28 DIAGNOSIS — I10 ESSENTIAL HYPERTENSION: ICD-10-CM

## 2021-10-28 LAB
ALBUMIN SERPL-MCNC: 4.5 G/DL (ref 3.5–5.2)
ALBUMIN/GLOB SERPL: 2 G/DL
ALP SERPL-CCNC: 89 U/L (ref 39–117)
ALT SERPL W P-5'-P-CCNC: 14 U/L (ref 1–41)
ANION GAP SERPL CALCULATED.3IONS-SCNC: 11 MMOL/L (ref 5–15)
AST SERPL-CCNC: 15 U/L (ref 1–40)
BASOPHILS # BLD AUTO: 0.06 10*3/MM3 (ref 0–0.2)
BASOPHILS NFR BLD AUTO: 1 % (ref 0–1.5)
BILIRUB SERPL-MCNC: 0.4 MG/DL (ref 0–1.2)
BUN SERPL-MCNC: 23 MG/DL (ref 8–23)
BUN/CREAT SERPL: 17.7 (ref 7–25)
CALCIUM SPEC-SCNC: 9.4 MG/DL (ref 8.6–10.5)
CHLORIDE SERPL-SCNC: 102 MMOL/L (ref 98–107)
CHOLEST SERPL-MCNC: 133 MG/DL (ref 0–200)
CO2 SERPL-SCNC: 28 MMOL/L (ref 22–29)
CREAT SERPL-MCNC: 1.3 MG/DL (ref 0.76–1.27)
DEPRECATED RDW RBC AUTO: 46.8 FL (ref 37–54)
EOSINOPHIL # BLD AUTO: 0.7 10*3/MM3 (ref 0–0.4)
EOSINOPHIL NFR BLD AUTO: 11.3 % (ref 0.3–6.2)
ERYTHROCYTE [DISTWIDTH] IN BLOOD BY AUTOMATED COUNT: 13.9 % (ref 12.3–15.4)
FERRITIN SERPL-MCNC: 118 NG/ML (ref 30–400)
GFR SERPL CREATININE-BSD FRML MDRD: 54 ML/MIN/1.73
GLOBULIN UR ELPH-MCNC: 2.3 GM/DL
GLUCOSE SERPL-MCNC: 128 MG/DL (ref 65–99)
HBA1C MFR BLD: 7.1 % (ref 4.8–5.6)
HCT VFR BLD AUTO: 38 % (ref 37.5–51)
HDLC SERPL-MCNC: 42 MG/DL (ref 40–60)
HGB BLD-MCNC: 12.8 G/DL (ref 13–17.7)
IMM GRANULOCYTES # BLD AUTO: 0.01 10*3/MM3 (ref 0–0.05)
IMM GRANULOCYTES NFR BLD AUTO: 0.2 % (ref 0–0.5)
IRON 24H UR-MRATE: 95 MCG/DL (ref 59–158)
IRON SATN MFR SERPL: 28 % (ref 20–50)
LDLC SERPL CALC-MCNC: 63 MG/DL (ref 0–100)
LDLC/HDLC SERPL: 1.39 {RATIO}
LYMPHOCYTES # BLD AUTO: 1.91 10*3/MM3 (ref 0.7–3.1)
LYMPHOCYTES NFR BLD AUTO: 31 % (ref 19.6–45.3)
MCH RBC QN AUTO: 30.8 PG (ref 26.6–33)
MCHC RBC AUTO-ENTMCNC: 33.7 G/DL (ref 31.5–35.7)
MCV RBC AUTO: 91.3 FL (ref 79–97)
MONOCYTES # BLD AUTO: 0.42 10*3/MM3 (ref 0.1–0.9)
MONOCYTES NFR BLD AUTO: 6.8 % (ref 5–12)
NEUTROPHILS NFR BLD AUTO: 3.07 10*3/MM3 (ref 1.7–7)
NEUTROPHILS NFR BLD AUTO: 49.7 % (ref 42.7–76)
NRBC BLD AUTO-RTO: 0 /100 WBC (ref 0–0.2)
NT-PROBNP SERPL-MCNC: 144 PG/ML (ref 0–900)
PLATELET # BLD AUTO: 90 10*3/MM3 (ref 140–450)
PMV BLD AUTO: 9.6 FL (ref 6–12)
POTASSIUM SERPL-SCNC: 4.4 MMOL/L (ref 3.5–5.2)
PROT SERPL-MCNC: 6.8 G/DL (ref 6–8.5)
RBC # BLD AUTO: 4.16 10*6/MM3 (ref 4.14–5.8)
SODIUM SERPL-SCNC: 141 MMOL/L (ref 136–145)
TIBC SERPL-MCNC: 334 MCG/DL (ref 298–536)
TRANSFERRIN SERPL-MCNC: 224 MG/DL (ref 200–360)
TRIGL SERPL-MCNC: 163 MG/DL (ref 0–150)
VLDLC SERPL-MCNC: 28 MG/DL (ref 5–40)
WBC # BLD AUTO: 6.17 10*3/MM3 (ref 3.4–10.8)

## 2021-10-28 PROCEDURE — 36415 COLL VENOUS BLD VENIPUNCTURE: CPT

## 2021-10-28 PROCEDURE — 85025 COMPLETE CBC W/AUTO DIFF WBC: CPT

## 2021-10-28 PROCEDURE — 80053 COMPREHEN METABOLIC PANEL: CPT

## 2021-10-28 PROCEDURE — 84466 ASSAY OF TRANSFERRIN: CPT

## 2021-10-28 PROCEDURE — 80061 LIPID PANEL: CPT

## 2021-10-28 PROCEDURE — 83880 ASSAY OF NATRIURETIC PEPTIDE: CPT

## 2021-10-28 PROCEDURE — 82728 ASSAY OF FERRITIN: CPT

## 2021-10-28 PROCEDURE — 83540 ASSAY OF IRON: CPT

## 2021-10-28 PROCEDURE — 83036 HEMOGLOBIN GLYCOSYLATED A1C: CPT

## 2021-10-29 PROBLEM — E03.9 HYPOTHYROIDISM, ADULT: Status: ACTIVE | Noted: 2021-10-29

## 2021-11-01 ENCOUNTER — TREATMENT (OUTPATIENT)
Dept: CARDIAC REHAB | Facility: HOSPITAL | Age: 76
End: 2021-11-01

## 2021-11-01 DIAGNOSIS — Z95.1 S/P CABG (CORONARY ARTERY BYPASS GRAFT): Primary | ICD-10-CM

## 2021-11-01 LAB
GLUCOSE BLDC GLUCOMTR-MCNC: 130 MG/DL (ref 70–99)
GLUCOSE BLDC GLUCOMTR-MCNC: 140 MG/DL (ref 70–99)

## 2021-11-01 PROCEDURE — 82962 GLUCOSE BLOOD TEST: CPT

## 2021-11-01 PROCEDURE — 93798 PHYS/QHP OP CAR RHAB W/ECG: CPT

## 2021-11-02 ENCOUNTER — OFFICE VISIT (OUTPATIENT)
Dept: INTERNAL MEDICINE | Facility: CLINIC | Age: 76
End: 2021-11-02

## 2021-11-02 VITALS
OXYGEN SATURATION: 96 % | HEART RATE: 95 BPM | TEMPERATURE: 97.3 F | SYSTOLIC BLOOD PRESSURE: 127 MMHG | DIASTOLIC BLOOD PRESSURE: 76 MMHG | HEIGHT: 72 IN | WEIGHT: 255.4 LBS | BODY MASS INDEX: 34.59 KG/M2

## 2021-11-02 DIAGNOSIS — E78.2 HYPERLIPEMIA, MIXED: ICD-10-CM

## 2021-11-02 DIAGNOSIS — N18.32 STAGE 3B CHRONIC KIDNEY DISEASE (HCC): ICD-10-CM

## 2021-11-02 DIAGNOSIS — F03.90 DEMENTIA WITHOUT BEHAVIORAL DISTURBANCE, UNSPECIFIED DEMENTIA TYPE: ICD-10-CM

## 2021-11-02 DIAGNOSIS — D50.9 IRON DEFICIENCY ANEMIA, UNSPECIFIED IRON DEFICIENCY ANEMIA TYPE: ICD-10-CM

## 2021-11-02 DIAGNOSIS — E03.9 HYPOTHYROIDISM, ADULT: ICD-10-CM

## 2021-11-02 DIAGNOSIS — E11.8 TYPE 2 DIABETES MELLITUS WITH COMPLICATIONS (HCC): Primary | ICD-10-CM

## 2021-11-02 DIAGNOSIS — D69.6 THROMBOCYTOPENIA (HCC): ICD-10-CM

## 2021-11-02 DIAGNOSIS — Z95.1 HX OF CABG: ICD-10-CM

## 2021-11-02 DIAGNOSIS — K74.60 NON-ALCOHOLIC CIRRHOSIS (HCC): ICD-10-CM

## 2021-11-02 DIAGNOSIS — I10 HYPERTENSION, ESSENTIAL: ICD-10-CM

## 2021-11-02 PROCEDURE — 99214 OFFICE O/P EST MOD 30 MIN: CPT | Performed by: INTERNAL MEDICINE

## 2021-11-03 ENCOUNTER — TREATMENT (OUTPATIENT)
Dept: CARDIAC REHAB | Facility: HOSPITAL | Age: 76
End: 2021-11-03

## 2021-11-03 DIAGNOSIS — Z95.1 S/P CABG (CORONARY ARTERY BYPASS GRAFT): Primary | ICD-10-CM

## 2021-11-03 LAB
GLUCOSE BLDC GLUCOMTR-MCNC: 104 MG/DL (ref 70–99)
GLUCOSE BLDC GLUCOMTR-MCNC: 145 MG/DL (ref 70–99)
GLUCOSE BLDC GLUCOMTR-MCNC: 169 MG/DL (ref 70–99)

## 2021-11-03 PROCEDURE — 82962 GLUCOSE BLOOD TEST: CPT

## 2021-11-03 PROCEDURE — 93798 PHYS/QHP OP CAR RHAB W/ECG: CPT

## 2021-11-05 ENCOUNTER — TREATMENT (OUTPATIENT)
Dept: CARDIAC REHAB | Facility: HOSPITAL | Age: 76
End: 2021-11-05

## 2021-11-05 DIAGNOSIS — Z95.1 S/P CABG (CORONARY ARTERY BYPASS GRAFT): Primary | ICD-10-CM

## 2021-11-05 LAB
GLUCOSE BLDC GLUCOMTR-MCNC: 127 MG/DL (ref 70–99)
GLUCOSE BLDC GLUCOMTR-MCNC: 139 MG/DL (ref 70–99)

## 2021-11-05 PROCEDURE — 93798 PHYS/QHP OP CAR RHAB W/ECG: CPT

## 2021-11-05 PROCEDURE — 82962 GLUCOSE BLOOD TEST: CPT

## 2021-11-08 ENCOUNTER — APPOINTMENT (OUTPATIENT)
Dept: CARDIAC REHAB | Facility: HOSPITAL | Age: 76
End: 2021-11-08

## 2021-11-10 ENCOUNTER — TREATMENT (OUTPATIENT)
Dept: CARDIAC REHAB | Facility: HOSPITAL | Age: 76
End: 2021-11-10

## 2021-11-10 DIAGNOSIS — Z95.1 S/P CABG (CORONARY ARTERY BYPASS GRAFT): Primary | ICD-10-CM

## 2021-11-10 LAB
GLUCOSE BLDC GLUCOMTR-MCNC: 134 MG/DL (ref 70–99)
GLUCOSE BLDC GLUCOMTR-MCNC: 141 MG/DL (ref 70–99)

## 2021-11-10 PROCEDURE — 93798 PHYS/QHP OP CAR RHAB W/ECG: CPT

## 2021-11-10 PROCEDURE — 82962 GLUCOSE BLOOD TEST: CPT

## 2021-11-12 ENCOUNTER — TREATMENT (OUTPATIENT)
Dept: CARDIAC REHAB | Facility: HOSPITAL | Age: 76
End: 2021-11-12

## 2021-11-12 DIAGNOSIS — Z95.1 S/P CABG (CORONARY ARTERY BYPASS GRAFT): Primary | ICD-10-CM

## 2021-11-12 LAB
GLUCOSE BLDC GLUCOMTR-MCNC: 120 MG/DL (ref 70–99)
GLUCOSE BLDC GLUCOMTR-MCNC: 135 MG/DL (ref 70–99)

## 2021-11-12 PROCEDURE — 82962 GLUCOSE BLOOD TEST: CPT

## 2021-11-12 PROCEDURE — 93798 PHYS/QHP OP CAR RHAB W/ECG: CPT

## 2021-11-17 ENCOUNTER — APPOINTMENT (OUTPATIENT)
Dept: CARDIAC REHAB | Facility: HOSPITAL | Age: 76
End: 2021-11-17

## 2021-11-22 ENCOUNTER — APPOINTMENT (OUTPATIENT)
Dept: CARDIAC REHAB | Facility: HOSPITAL | Age: 76
End: 2021-11-22

## 2021-11-24 ENCOUNTER — APPOINTMENT (OUTPATIENT)
Dept: CARDIAC REHAB | Facility: HOSPITAL | Age: 76
End: 2021-11-24

## 2021-11-26 ENCOUNTER — APPOINTMENT (OUTPATIENT)
Dept: CARDIAC REHAB | Facility: HOSPITAL | Age: 76
End: 2021-11-26

## 2021-11-29 ENCOUNTER — APPOINTMENT (OUTPATIENT)
Dept: CARDIAC REHAB | Facility: HOSPITAL | Age: 76
End: 2021-11-29

## 2021-12-01 ENCOUNTER — APPOINTMENT (OUTPATIENT)
Dept: CARDIAC REHAB | Facility: HOSPITAL | Age: 76
End: 2021-12-01

## 2021-12-03 ENCOUNTER — APPOINTMENT (OUTPATIENT)
Dept: CARDIAC REHAB | Facility: HOSPITAL | Age: 76
End: 2021-12-03

## 2021-12-06 ENCOUNTER — APPOINTMENT (OUTPATIENT)
Dept: CARDIAC REHAB | Facility: HOSPITAL | Age: 76
End: 2021-12-06

## 2021-12-08 ENCOUNTER — APPOINTMENT (OUTPATIENT)
Dept: CARDIAC REHAB | Facility: HOSPITAL | Age: 76
End: 2021-12-08

## 2021-12-10 ENCOUNTER — APPOINTMENT (OUTPATIENT)
Dept: CARDIAC REHAB | Facility: HOSPITAL | Age: 76
End: 2021-12-10

## 2021-12-13 ENCOUNTER — APPOINTMENT (OUTPATIENT)
Dept: CARDIAC REHAB | Facility: HOSPITAL | Age: 76
End: 2021-12-13

## 2021-12-15 ENCOUNTER — APPOINTMENT (OUTPATIENT)
Dept: CARDIAC REHAB | Facility: HOSPITAL | Age: 76
End: 2021-12-15

## 2021-12-17 ENCOUNTER — APPOINTMENT (OUTPATIENT)
Dept: CARDIAC REHAB | Facility: HOSPITAL | Age: 76
End: 2021-12-17

## 2021-12-20 ENCOUNTER — APPOINTMENT (OUTPATIENT)
Dept: CARDIAC REHAB | Facility: HOSPITAL | Age: 76
End: 2021-12-20

## 2021-12-22 ENCOUNTER — APPOINTMENT (OUTPATIENT)
Dept: CARDIAC REHAB | Facility: HOSPITAL | Age: 76
End: 2021-12-22

## 2021-12-24 ENCOUNTER — APPOINTMENT (OUTPATIENT)
Dept: CARDIAC REHAB | Facility: HOSPITAL | Age: 76
End: 2021-12-24

## 2021-12-27 ENCOUNTER — APPOINTMENT (OUTPATIENT)
Dept: CARDIAC REHAB | Facility: HOSPITAL | Age: 76
End: 2021-12-27

## 2021-12-29 ENCOUNTER — APPOINTMENT (OUTPATIENT)
Dept: CARDIAC REHAB | Facility: HOSPITAL | Age: 76
End: 2021-12-29

## 2021-12-31 ENCOUNTER — APPOINTMENT (OUTPATIENT)
Dept: CARDIAC REHAB | Facility: HOSPITAL | Age: 76
End: 2021-12-31

## 2022-01-13 ENCOUNTER — LAB (OUTPATIENT)
Dept: LAB | Facility: HOSPITAL | Age: 77
End: 2022-01-13

## 2022-01-13 DIAGNOSIS — E11.8 TYPE 2 DIABETES MELLITUS WITH COMPLICATIONS: ICD-10-CM

## 2022-01-13 DIAGNOSIS — E03.9 HYPOTHYROIDISM, ADULT: ICD-10-CM

## 2022-01-13 DIAGNOSIS — K74.60 NON-ALCOHOLIC CIRRHOSIS: ICD-10-CM

## 2022-01-13 DIAGNOSIS — I10 HYPERTENSION, ESSENTIAL: ICD-10-CM

## 2022-01-13 LAB
ALPHA-FETOPROTEIN: 2.2 NG/ML (ref 0–8.3)
ANION GAP SERPL CALCULATED.3IONS-SCNC: 11.2 MMOL/L (ref 5–15)
BUN SERPL-MCNC: 27 MG/DL (ref 8–23)
BUN/CREAT SERPL: 18.9 (ref 7–25)
CALCIUM SPEC-SCNC: 9.2 MG/DL (ref 8.6–10.5)
CHLORIDE SERPL-SCNC: 100 MMOL/L (ref 98–107)
CO2 SERPL-SCNC: 25.8 MMOL/L (ref 22–29)
CREAT SERPL-MCNC: 1.43 MG/DL (ref 0.76–1.27)
GFR SERPL CREATININE-BSD FRML MDRD: 48 ML/MIN/1.73
GLUCOSE SERPL-MCNC: 203 MG/DL (ref 65–99)
HBA1C MFR BLD: 8.23 % (ref 4.8–5.6)
POTASSIUM SERPL-SCNC: 4.5 MMOL/L (ref 3.5–5.2)
SODIUM SERPL-SCNC: 137 MMOL/L (ref 136–145)
T4 FREE SERPL-MCNC: 1.55 NG/DL (ref 0.93–1.7)
TSH SERPL DL<=0.05 MIU/L-ACNC: 0.12 UIU/ML (ref 0.27–4.2)

## 2022-01-13 PROCEDURE — 83036 HEMOGLOBIN GLYCOSYLATED A1C: CPT

## 2022-01-13 PROCEDURE — 80048 BASIC METABOLIC PNL TOTAL CA: CPT

## 2022-01-13 PROCEDURE — 82105 ALPHA-FETOPROTEIN SERUM: CPT

## 2022-01-13 PROCEDURE — 84439 ASSAY OF FREE THYROXINE: CPT

## 2022-01-13 PROCEDURE — 36415 COLL VENOUS BLD VENIPUNCTURE: CPT

## 2022-01-13 PROCEDURE — 84443 ASSAY THYROID STIM HORMONE: CPT

## 2022-01-18 ENCOUNTER — OFFICE VISIT (OUTPATIENT)
Dept: INTERNAL MEDICINE | Facility: CLINIC | Age: 77
End: 2022-01-18

## 2022-01-18 VITALS
SYSTOLIC BLOOD PRESSURE: 102 MMHG | OXYGEN SATURATION: 98 % | WEIGHT: 249.8 LBS | DIASTOLIC BLOOD PRESSURE: 64 MMHG | HEIGHT: 72 IN | BODY MASS INDEX: 33.83 KG/M2 | HEART RATE: 105 BPM | TEMPERATURE: 97.9 F

## 2022-01-18 DIAGNOSIS — D69.6 THROMBOCYTOPENIA: ICD-10-CM

## 2022-01-18 DIAGNOSIS — H61.23 BILATERAL IMPACTED CERUMEN: ICD-10-CM

## 2022-01-18 DIAGNOSIS — E11.8 TYPE 2 DIABETES MELLITUS WITH COMPLICATIONS: Primary | ICD-10-CM

## 2022-01-18 DIAGNOSIS — F03.90 DEMENTIA WITHOUT BEHAVIORAL DISTURBANCE, UNSPECIFIED DEMENTIA TYPE: ICD-10-CM

## 2022-01-18 DIAGNOSIS — E78.2 HYPERLIPEMIA, MIXED: ICD-10-CM

## 2022-01-18 DIAGNOSIS — K74.60 NON-ALCOHOLIC CIRRHOSIS: ICD-10-CM

## 2022-01-18 DIAGNOSIS — N18.32 STAGE 3B CHRONIC KIDNEY DISEASE: ICD-10-CM

## 2022-01-18 DIAGNOSIS — E03.9 HYPOTHYROIDISM, ADULT: ICD-10-CM

## 2022-01-18 DIAGNOSIS — I10 HYPERTENSION, ESSENTIAL: ICD-10-CM

## 2022-01-18 PROCEDURE — 99214 OFFICE O/P EST MOD 30 MIN: CPT | Performed by: INTERNAL MEDICINE

## 2022-01-18 NOTE — ASSESSMENT & PLAN NOTE
Right greater than left as of 1/22.  Debrox being sent, patient to come back in a week for irrigation.

## 2022-01-18 NOTE — ASSESSMENT & PLAN NOTE
Platelet count is stable at 90 as of 11/21 office visit.  Patient with no reports of any bleeding.  Follow-up with Dr. Saldana as well.---> Patient with no reports of any bleeding or excessive bruising, he follows up with Dr. Saldana every 3 months.

## 2022-01-18 NOTE — PROGRESS NOTES
"Chief Complaint  Hyperlipidemia and Follow-up (Right ear is bothering him and he has alot of drainage)    Subjective          Cosmo Gauthier presents to [facility]     History of Present Illness  Patient is pleasant but unfortunate 75-year-old male with multiple medical issues, status post 5 vessel bypass last year, with underlying hypertension, hyperlipidemia, and diabetes, coming in 1/22 for routine 2-month follow-up. We will review all his labs and address any new concerns.    Review of Systems   Constitutional: Negative for appetite change, fatigue and fever.   HENT: Negative for congestion and ear pain.    Eyes: Negative for blurred vision.   Respiratory: Negative for cough, chest tightness, shortness of breath and wheezing.    Cardiovascular: Negative for chest pain, palpitations and leg swelling.   Gastrointestinal: Negative for abdominal pain.   Genitourinary: Negative for difficulty urinating, dysuria and hematuria.   Musculoskeletal: Negative for arthralgias and gait problem.   Skin: Negative for skin lesions.   Neurological: Negative for syncope, memory problem and confusion.   Psychiatric/Behavioral: Negative for self-injury and depressed mood.       Objective   Vital Signs:   /64   Pulse 105   Temp 97.9 °F (36.6 °C)   Ht 182.9 cm (72.01\")   Wt 113 kg (249 lb 12.8 oz)   SpO2 98%   BMI 33.87 kg/m²           Physical Exam  Vitals and nursing note reviewed.   Constitutional:       General: He is not in acute distress.     Appearance: Normal appearance. He is not toxic-appearing.   HENT:      Head: Atraumatic.      Right Ear: External ear normal.      Left Ear: External ear normal.      Nose: Nose normal.      Mouth/Throat:      Mouth: Mucous membranes are moist.   Eyes:      General:         Right eye: No discharge.         Left eye: No discharge.      Extraocular Movements: Extraocular movements intact.      Pupils: Pupils are equal, round, and reactive to " light.   Cardiovascular:      Rate and Rhythm: Normal rate and regular rhythm.      Pulses: Normal pulses.      Heart sounds: Normal heart sounds. No murmur heard.  No gallop.    Pulmonary:      Effort: Pulmonary effort is normal. No respiratory distress.      Breath sounds: No wheezing, rhonchi or rales.   Abdominal:      General: There is no distension.      Palpations: Abdomen is soft. There is no mass.      Tenderness: There is no abdominal tenderness. There is no guarding.   Musculoskeletal:         General: No swelling or tenderness.      Cervical back: No tenderness.      Right lower leg: No edema.      Left lower leg: No edema.   Skin:     General: Skin is warm and dry.      Findings: No rash.   Neurological:      General: No focal deficit present.      Mental Status: He is alert and oriented to person, place, and time. Mental status is at baseline.      Motor: No weakness.      Gait: Gait normal.   Psychiatric:         Mood and Affect: Mood normal.         Thought Content: Thought content normal.          Result Review :   The following data was reviewed by: Alex Buckley MD on 08/02/2021:                  Assessment and Plan    Diagnoses and all orders for this visit:    1. 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  I discussed for him to lower Humalog from 15 to 12 units with meals now... A1c is 7.1 as of 11/21 office visit, so this is a better ballpark for him to be in.---> A1c is up to 8.2 as of 1/22, and this is despite patient losing about 5 or 6 pounds.  Patient will increase Humalog to 14 units with each meal and call if he has any low sugars.  His fasting sugars at home are much better than the one we got in the lab as of this appointment.    Orders:  -     Hemoglobin A1c; Future    2. Stage 3b chronic kidney disease (HCC)  Assessment & Plan:  GFR 54 as of 11/21 is still better than his prior baseline.  Additionally, his potassium is  stable off of supplementation.  So it looks like he is stable to stay on Mobic for the time being.---> GFR 48 is fairly stable as of 1/22, we'll certainly follow this up on return to office.      Orders:  -     Comprehensive Metabolic Panel; Future    3. Thrombocytopenia (HCC)  Assessment & Plan:  Platelet count is stable at 90 as of 11/21 office visit.  Patient with no reports of any bleeding.  Follow-up with Dr. Saldana as well.---> Patient with no reports of any bleeding or excessive bruising, he follows up with Dr. Saldana every 3 months.        4. Hyperlipemia, mixed  Assessment & Plan:  LDL is 63 as of 11/21 office visit.  Patient is stable on moderate dose simvastatin, continue same.---> Ditto as of 1/22, labs on return to office.      Orders:  -     Lipid Panel; Future    5. Hypertension, essential  Assessment & Plan:  Blood pressure remains well controlled as of 1/22 office visit.  Patient is stable on low-dose lisinopril, low-dose nadolol, as well as diuretic.  Continue same.        6. Non-alcoholic cirrhosis (HCC)  Assessment & Plan:  Patient was previously followed by Dr. Bradshaw for this, but there is been no need for that recently.  His LFTs are stable, his platelet count is being followed by Dr. Saldana, and I will repeat his AFP on return to office.---> AFP was very normal, only 2.2 as of 1/22.  LFTs will be on return to office.        7. Dementia without behavioral disturbance, unspecified dementia type (HCC)  Assessment & Plan:  Patient appears stable this regard as of 1/22 office visit.  He is on low-dose Aricept only, continue same.        8. Hypothyroidism, adult  Assessment & Plan:  TSH is normal as of August 21 office.  Continue same dose.---> TSH is 0.1 as of 1/22, patient with no side effects, he is on high dose, if same on return to office or certainly higher, will wean it slightly.      Orders:  -     TSH; Future  -     T4, free; Future    9. Bilateral impacted cerumen  Assessment &  Plan:  Right greater than left as of 1/22.  Debrox being sent, patient to come back in a week for irrigation.      Other orders  -     carbamide peroxide (Debrox) 6.5 % otic solution; Administer 5 drops into both ears 3 (Three) Times a Day for 7 days.  Dispense: 15 mL; Refill: 1    --  --  Older notes:  HOSP F/U 6/21 = S/P 5V CABG 5/21 per Dr Milligan=I reviewed records sent and the med list provided by wife.  TELEVISIT 2/25/21:  HOSP F/U 10/20 = I reviewed 9/20 [facility] records; I d/w pt labs/meds in detail:  1) CHEST PAIN and pt is being admitted to R/O MI; cardiology was notified...SPECT was neg, so will f/u with cards in a few weeks; may still need a cath=no new sxs and is gideon to see him next week as of 10/20 OV...to BE for CABG per Dr Milligan, but PLT's too low; has f/u with cards.  2) CAD/MI with prior stents; ? last stress was done in cardiology office 5/19; will continue home meds for now...no rest angina; ? increase Imdur=defer to cards appt he has on 3/9/21---> S/P 5V CABG as above; looks great.    3) HTN will be followed closely on home meds=stable 10/20 OV, BUT is orthostatic, so drink more and lower ACEI to 5 mg---> stable 6/21.  4) HYPERLIPIDEMIA=continue statin=LDL 61 (9/20)---> 46 in 6/21.    5) CIRRHOSIS per Dr Bradshaw; watch volume status.  6) THROMBOCYTOPENIA is related to the cirrhosis and is stable around 70K...on Prednisone per Dr Saldana---> off this; labs on RTO.    7) DM per orders...A1C was only 7.3 in 1/21; she d/w me BS fine despite prednisone as of 2/21 OV; ? lost 20+ lbs---> 5.6 and I d/w stop glipizide 10 bid and stay on Humalog 15 tid, but then again not totally sure he's on it?    8) HYPOTHYROIDISM is wnl as of 2/21 OV---> RTO.    9) BPH on flomax after retenion issues in BE as of 2/21 OV; to see Dr Dill---> saw him for chairez post-op = it's out now.    (lost friend/valeria 60's to covid)    VISIT 6/20:  ANNUAL MEDICARE WELLNESS EXAM 10/19 = reviewed all forms with pt; he is much more  depressed, so zoloft was increased.  H.M. ISSUES:  DM = A1C as below; Optho=q 12 mo with last 4/18 = early diabetic retinopathy and ? laser next visit?  --  HTN...needs ACEI now at 6/17 OV; repeat urine micro as well...remains controlled...ACEI fell off his list; resume now 1/19...better already---> stable.  ? CKD3 noted 6/20 = no new meds; needs repeat few weeks.  --  DM...max out januvia...8.1 an has f/u with DE...on lantus 20, d/w increase 2-4 units every week to get FBS to 110...7.1 is great...7.5 is stuck and I d/w again to increase Lantus=told him 24 now... 8.8 is horrible, so increase glucotrol to whole tab in AM...8.9, so try whole tab bid before increase lantus...9.5 is worse and needs Humalog before meals; d/w qid testing; stop glucotrol and increase lantus to 30 qhs...BS noted per phone and in office; rec 16/20/16 and stay on Lantus 30 qhs; d/w NO SSI for now...A1C down to 8.0 already and Fructosamine of 300=A1C closer to 7.5 actually...6.3 is great with TSH back down...6.8 is ok for now=7/19...7.3 in 10/19 and I d/w take 8 units with breakfast since he has been skipping this and only taking 15 with lunch/supper---> 7.3 great 6/20.   HYPOTHYROIDSIM stable 6/17...0.2 at 1/18 OV...0.7 better...increase 1/19 for 3.5 given above...? n/c, b/c now=10; will add 50 as of 1/19 OV...TSH 64 and apparently he missed them past week; I rec 250 qd for now and close f/u...0.7 and will leave this alone for now---> 1.5 in 2/20.  --  CAD per Dr Pritchett; s/p Spect '15 per pt and was neg---> still no CP/SOB and sees cards q 6 mo=no recent as of 6/20 OV.  LIPIDS with LDL 72...83 ok for now...LDL 57---> 55 in 2/20.  --  THROMBOCYTOPENIA is new 4/16 OV=99, so to HEME...off ASA but plavix ok per Dr Chaudhry; had BM bx=?...75 holding...on iron per her---> CBC stable 6/20 per her.  --  HOSP F/U 8/16 for FUO.  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  --  DJD s/p R TKR and then L TKR per Dr Eckert 1/16 and rehab with  [facility] still on-going = 3x/wk at 4/16 OV...still with issues L knee 4/18...to have redo L TKR per Dr Zayas as of 10/18 OV=Dr Duran cleared him already...is gideon for 2/4/19, but apparently wants his A1C below 7 for this?? = d/w me 1/19...I d/w not going to get there that soon, but current blood sugars excellent and pt cleared for surgery from my standpoint as well=reviewed all their pre-op labs as well as EKG and CXR...s/p redo L TKR on 2/4/19 and looks great at 2/27/19 OV...finishing up as of 4/19 OV.  --  GERD per Dr Bradshaw.  ? CIRRHOSIS=tried on Nadolol per Dr Bradshaw=stopped it 6/20 due to diarrhea?; ? has CT/NH3 gideon.  --  OBESITY up 3 more to 263...258--->270 is stuck 2/20 and I d/w no meds=must go to [facility] ( Cards said no to surgery).  --  DEPRESSION on maint med...? Dementia per pt, sent to Dr Kim; he sent for NeuroPsych as of 4/18 OV...will address depression 8/18 = increase zoloft   [facility]---> c/w as of 2/21 OV; neg O2 in [facility] recently;   --  --  PSA 0.5 (4/7/16)...defer.  Colon 7/19...2 inflam/hyper polyps per Dr Bradshaw.  Prevnar 11/16; Pneumovax # 1 9/17;  COVID x 2 with Pfizer, last was in 4/21, so he is aware he can get the booster anytime.  Flu shot for 2021 season done here.  (, retired [employer] '97, 3 kids)    Follow Up   Return in about 3 months (around 4/18/2022).  Patient was given instructions and counseling regarding his condition or for health maintenance advice. Please see specific information pulled into the AVS if appropriate.

## 2022-01-18 NOTE — ASSESSMENT & PLAN NOTE
LDL is 63 as of 11/21 office visit.  Patient is stable on moderate dose simvastatin, continue same.---> Ditto as of 1/22, labs on return to office.

## 2022-01-18 NOTE — ASSESSMENT & PLAN NOTE
Patient was previously followed by Dr. Bradshaw for this, but there is been no need for that recently.  His LFTs are stable, his platelet count is being followed by Dr. Saldana, and I will repeat his AFP on return to office.---> AFP was very normal, only 2.2 as of 1/22.  LFTs will be on return to office.

## 2022-01-18 NOTE — ASSESSMENT & PLAN NOTE
Patient appears stable this regard as of 1/22 office visit.  He is on low-dose Aricept only, continue same.

## 2022-01-18 NOTE — ASSESSMENT & PLAN NOTE
GFR 54 as of 11/21 is still better than his prior baseline.  Additionally, his potassium is stable off of supplementation.  So it looks like he is stable to stay on Mobic for the time being.---> GFR 48 is fairly stable as of 1/22, we'll certainly follow this up on return to office.

## 2022-01-18 NOTE — ASSESSMENT & PLAN NOTE
Blood pressure remains well controlled as of 1/22 office visit.  Patient is stable on low-dose lisinopril, low-dose nadolol, as well as diuretic.  Continue same.

## 2022-01-18 NOTE — ASSESSMENT & PLAN NOTE
TSH is normal as of August 21 office.  Continue same dose.---> TSH is 0.1 as of 1/22, patient with no side effects, he is on high dose, if same on return to office or certainly higher, will wean it slightly.

## 2022-01-18 NOTE — ASSESSMENT & PLAN NOTE
A1c is still only 5.6.  I discussed for him to lower Humalog from 15 to 12 units with meals now... A1c is 7.1 as of 11/21 office visit, so this is a better ballpark for him to be in.---> A1c is up to 8.2 as of 1/22, and this is despite patient losing about 5 or 6 pounds.  Patient will increase Humalog to 14 units with each meal and call if he has any low sugars.  His fasting sugars at home are much better than the one we got in the lab as of this appointment.

## 2022-01-25 ENCOUNTER — OFFICE VISIT (OUTPATIENT)
Dept: INTERNAL MEDICINE | Facility: CLINIC | Age: 77
End: 2022-01-25

## 2022-01-25 VITALS
HEIGHT: 72 IN | OXYGEN SATURATION: 97 % | HEART RATE: 114 BPM | DIASTOLIC BLOOD PRESSURE: 69 MMHG | TEMPERATURE: 97.3 F | WEIGHT: 246.2 LBS | SYSTOLIC BLOOD PRESSURE: 109 MMHG | BODY MASS INDEX: 33.35 KG/M2

## 2022-01-25 DIAGNOSIS — H61.23 BILATERAL IMPACTED CERUMEN: Primary | ICD-10-CM

## 2022-01-25 DIAGNOSIS — I10 HYPERTENSION, ESSENTIAL: ICD-10-CM

## 2022-01-25 PROCEDURE — 99213 OFFICE O/P EST LOW 20 MIN: CPT | Performed by: INTERNAL MEDICINE

## 2022-01-25 PROCEDURE — 69210 REMOVE IMPACTED EAR WAX UNI: CPT | Performed by: INTERNAL MEDICINE

## 2022-01-25 RX ORDER — NADOLOL 20 MG/1
20 TABLET ORAL DAILY
Qty: 90 TABLET | Refills: 1 | Status: SHIPPED | OUTPATIENT
Start: 2022-01-25 | End: 2022-07-06 | Stop reason: SDUPTHER

## 2022-01-25 NOTE — PROGRESS NOTES
"Chief Complaint  Earache (His right ear is still bothering him, he is dizzy and had no appetite, He states if he walks a long distance he gets exhausted)    Subjective          Cosmo Gauthier presents to [facility]     History of Present Illness  Patient is pleasant but unfortunate 75-year-old male with multiple medical issues, status post 5 vessel bypass last year, with underlying hypertension, hyperlipidemia, and diabetes, coming in 1/22 for routine 2-month follow-up. We will review all his labs and address any new concerns.---> Patient coming in a week later for bilateral ear irrigation.    Review of Systems   Constitutional: Negative for appetite change, fatigue and fever.   HENT: Negative for congestion and ear pain.    Eyes: Negative for blurred vision.   Respiratory: Negative for cough, chest tightness, shortness of breath and wheezing.    Cardiovascular: Negative for chest pain, palpitations and leg swelling.   Gastrointestinal: Negative for abdominal pain.   Genitourinary: Negative for difficulty urinating, dysuria and hematuria.   Musculoskeletal: Negative for arthralgias and gait problem.   Skin: Negative for skin lesions.   Neurological: Negative for syncope, memory problem and confusion.   Psychiatric/Behavioral: Negative for self-injury and depressed mood.       Objective   Vital Signs:   /69   Pulse 114   Temp 97.3 °F (36.3 °C)   Ht 182.9 cm (72.01\")   Wt 112 kg (246 lb 3.2 oz)   SpO2 97%   BMI 33.38 kg/m²           Physical Exam  Vitals and nursing note reviewed.   Constitutional:       General: He is not in acute distress.     Appearance: Normal appearance. He is not toxic-appearing.   HENT:      Head: Atraumatic.      Right Ear: External ear normal.      Left Ear: External ear normal.      Nose: Nose normal.      Mouth/Throat:      Mouth: Mucous membranes are moist.   Eyes:      General:         Right eye: No discharge.         Left eye: No " discharge.      Extraocular Movements: Extraocular movements intact.      Pupils: Pupils are equal, round, and reactive to light.   Cardiovascular:      Rate and Rhythm: Normal rate and regular rhythm.      Pulses: Normal pulses.      Heart sounds: Normal heart sounds. No murmur heard.  No gallop.    Pulmonary:      Effort: Pulmonary effort is normal. No respiratory distress.      Breath sounds: No wheezing, rhonchi or rales.   Abdominal:      General: There is no distension.      Palpations: Abdomen is soft. There is no mass.      Tenderness: There is no abdominal tenderness. There is no guarding.   Musculoskeletal:         General: No swelling or tenderness.      Cervical back: No tenderness.      Right lower leg: No edema.      Left lower leg: No edema.   Skin:     General: Skin is warm and dry.      Findings: No rash.   Neurological:      General: No focal deficit present.      Mental Status: He is alert and oriented to person, place, and time. Mental status is at baseline.      Motor: No weakness.      Gait: Gait normal.   Psychiatric:         Mood and Affect: Mood normal.         Thought Content: Thought content normal.          Result Review :   The following data was reviewed by: Alex Buckley MD on 08/02/2021:                  Assessment and Plan    Diagnoses and all orders for this visit:    1. Bilateral impacted cerumen (Primary)  Assessment & Plan:  Both ears to be irrigated today 1/25/2022, the wax has certainly been softened up.    Orders:  -     Ear Cerumen Removal    2. Hypertension, essential  Assessment & Plan:  Blood pressure remains well controlled 1/25/2022 follow-up visit, in fact he is not on either lisinopril nor nadolol.  He does need to get back on at least low-dose nadolol given his cirrhosis, and he is a little bit tachycardic presently as well.  Heart rate was better on my exam, it was in mid 80s.      Other orders  -     nadolol (Corgard) 20 MG tablet; Take 1 tablet by mouth Daily.   Dispense: 90 tablet; Refill: 1    --  --  Older notes:  HOSP F/U 6/21 = S/P 5V CABG 5/21 per Dr Milligan=I reviewed records sent and the med list provided by wife.  TELEVISIT 2/25/21:  HOSP F/U 10/20 = I reviewed 9/20 [facility] records; I d/w pt labs/meds in detail:  1) CHEST PAIN and pt is being admitted to R/O MI; cardiology was notified...SPECT was neg, so will f/u with cards in a few weeks; may still need a cath=no new sxs and is gideon to see him next week as of 10/20 OV...to [facility] for CABG per Dr Milligan, but PLT's too low; has f/u with cards.  2) CAD/MI with prior stents; ? last stress was done in cardiology office 5/19; will continue home meds for now...no rest angina; ? increase Imdur=defer to cards appt he has on 3/9/21---> S/P 5V CABG as above; looks great.    3) HTN will be followed closely on home meds=stable 10/20 OV, BUT is orthostatic, so drink more and lower ACEI to 5 mg---> stable 6/21.  4) HYPERLIPIDEMIA=continue statin=LDL 61 (9/20)---> 46 in 6/21.    5) CIRRHOSIS per Dr Bradshaw; watch volume status.  6) THROMBOCYTOPENIA is related to the cirrhosis and is stable around 70K...on Prednisone per Dr Saldana---> off this; labs on RTO.    7) DM per orders...A1C was only 7.3 in 1/21; she d/w me BS fine despite prednisone as of 2/21 OV; ? lost 20+ lbs---> 5.6 and I d/w stop glipizide 10 bid and stay on Humalog 15 tid, but then again not totally sure he's on it?    8) HYPOTHYROIDISM is wnl as of 2/21 OV---> RTO.    9) BPH on flomax after retenion issues in BE as of 2/21 OV; to see Dr Dill---> saw him for chairez post-op = it's out now.    (lost friend/ 60's to covid)    VISIT 6/20:  ANNUAL MEDICARE WELLNESS EXAM 10/19 = reviewed all forms with pt; he is much more depressed, so zoloft was increased.  H.M. ISSUES:  DM = A1C as below; Optho=q 12 mo with last 4/18 = early diabetic retinopathy and ? laser next visit?  --  HTN...needs ACEI now at 6/17 OV; repeat urine micro as well...remains controlled...ACEI fell off  his list; resume now 1/19...better already---> stable.  ? CKD3 noted 6/20 = no new meds; needs repeat few weeks.  --  DM...max out januvia...8.1 an has f/u with [facility]...on lantus 20, d/w increase 2-4 units every week to get FBS to 110...7.1 is great...7.5 is stuck and I d/w again to increase Lantus=told him 24 now... 8.8 is horrible, so increase glucotrol to whole tab in AM...8.9, so try whole tab bid before increase lantus...9.5 is worse and needs Humalog before meals; d/w qid testing; stop glucotrol and increase lantus to 30 qhs...BS noted per phone and in office; rec 16/20/16 and stay on Lantus 30 qhs; d/w NO SSI for now...A1C down to 8.0 already and Fructosamine of 300=A1C closer to 7.5 actually...6.3 is great with TSH back down...6.8 is ok for now=7/19...7.3 in 10/19 and I d/w take 8 units with breakfast since he has been skipping this and only taking 15 with lunch/supper---> 7.3 great 6/20.   HYPOTHYROIDSIM stable 6/17...0.2 at 1/18 OV...0.7 better...increase 1/19 for 3.5 given above...? n/c, b/c now=10; will add 50 as of 1/19 OV...TSH 64 and apparently he missed them past week; I rec 250 qd for now and close f/u...0.7 and will leave this alone for now---> 1.5 in 2/20.  --  CAD per Dr Pritchett; s/p Spect '15 per pt and was neg---> still no CP/SOB and sees cards q 6 mo=no recent as of 6/20 OV.  LIPIDS with LDL 72...83 ok for now...LDL 57---> 55 in 2/20.  --  THROMBOCYTOPENIA is new 4/16 OV=99, so to HEME...off ASA but plavix ok per Dr Chaudhry; had BM bx=?...75 holding...on iron per her---> CBC stable 6/20 per her.  --  HOSP F/U 8/16 for FUO.  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  --  DJD s/p R TKR and then L TKR per Dr Eckert 1/16 and rehab with [facility] still on-going = 3x/wk at 4/16 OV...still with issues L knee 4/18...to have redo L TKR per Dr Zayas as of 10/18 OV=Dr Duran cleared him already...is gideon for 2/4/19, but apparently wants his A1C below 7 for this?? = d/w me 1/19...I d/w not  going to get there that soon, but current blood sugars excellent and pt cleared for surgery from my standpoint as well=reviewed all their pre-op labs as well as EKG and CXR...s/p redo L TKR on 2/4/19 and looks great at 2/27/19 OV...finishing up as of 4/19 OV.  --  GERD per Dr Bradshaw.  ? CIRRHOSIS=tried on Nadolol per Dr Bradshaw=stopped it 6/20 due to diarrhea?; ? has CT/NH3 gideon.  --  OBESITY up 3 more to 263...258--->270 is stuck 2/20 and I d/w no meds=must go to [facility] ( [facility] said no to surgery).  --  DEPRESSION on maint med...? Dementia per pt, sent to Dr Kim; he sent for NeuroPsych as of 4/18 OV...will address depression 8/18 = increase zoloft   [facility]---> c/w as of 2/21 OV; neg O2 in [facility] recently;   --  --  PSA 0.5 (4/7/16)...defer.  Colon 7/19...2 inflam/hyper polyps per Dr Bradshaw.  Prevnar 11/16; Pneumovax # 1 9/17;  COVID x 2 with Pfizer, last was in 4/21, so he is aware he can get the booster anytime.  Flu shot for 2021 season done here.  (, retired [employer] '97, 3 kids)    Follow Up   No follow-ups on file.  Patient was given instructions and counseling regarding his condition or for health maintenance advice. Please see specific information pulled into the AVS if appropriate.

## 2022-01-25 NOTE — ASSESSMENT & PLAN NOTE
Blood pressure remains well controlled 1/25/2022 follow-up visit, in fact he is not on either lisinopril nor nadolol.  He does need to get back on at least low-dose nadolol given his cirrhosis, and he is a little bit tachycardic presently as well.  Heart rate was better on my exam, it was in mid 80s.

## 2022-02-14 RX ORDER — LISINOPRIL 10 MG/1
TABLET ORAL
Qty: 90 TABLET | Refills: 3 | OUTPATIENT
Start: 2022-02-14

## 2022-02-24 NOTE — PROGRESS NOTES
[facility]  Cardiology progress Note    Patient Name: Cosmo Gauthier  : 1945    CHIEF COMPLAINT  CORONARY ARTERY DISEASE      Subjective   Subjective     HISTORY OF PRESENT ILLNESS    Cosmo Gauthier is a 76 y.o. male with history of coronary disease status post CABG.  No chest pain or shortness of breath.    Review of Systems:   Constitutional no fever,  no weight loss   Skin no rash   Otolaryngeal no difficulty swallowing   Cardiovascular See HPI   Pulmonary no cough, no sputum production   Gastrointestinal no constipation, no diarrhea   Genitourinary no dysuria, no hematuria   Hematologic no easy bruisability, no abnormal bleeding   Musculoskeletal no muscle pain   Neurologic no dizziness, no falls         Personal History     Social History:  reports that he has never smoked. He has never used smokeless tobacco. He reports previous alcohol use. He reports that he does not use drugs.    Home Medications:  Current Outpatient Medications on File Prior to Visit   Medication Sig   • aspirin (aspirin) 81 MG EC tablet Take 81 mg by mouth Daily.   • donepezil (ARICEPT) 10 MG tablet Take 10 mg by mouth every night at bedtime.   • ferrous sulfate 325 (65 FE) MG tablet Take 325 mg by mouth Daily With Breakfast.   • finasteride (PROSCAR) 5 MG tablet Take 1 tablet by mouth Daily.   • furosemide (LASIX) 40 MG tablet Take 1 tablet by mouth Daily.   • gabapentin (NEURONTIN) 600 MG tablet TAKE 1/2 TABLET BY MOUTH EVERY DAY (Patient taking differently: Take 300 mg by mouth Every Night.)   • insulin glargine (LANTUS, SEMGLEE) 100 UNIT/ML injection Inject 30 Units under the skin into the appropriate area as directed Every Night.   • insulin lispro (humaLOG, ADMELOG) 100 UNIT/ML injection Inject 15 Units under the skin into the appropriate area as directed 3 (Three) Times a Day Before Meals.   • levothyroxine (SYNTHROID, LEVOTHROID) 125 MCG tablet Take 250 mcg by mouth Every Morning.   • metFORMIN  (GLUCOPHAGE) 1000 MG tablet Take 1,000 mg by mouth 2 (Two) Times a Day With Meals.   • nadolol (Corgard) 20 MG tablet Take 1 tablet by mouth Daily.   • nitroglycerin (NITROSTAT) 0.4 MG SL tablet    • sertraline (ZOLOFT) 100 MG tablet Take 150 mg by mouth Daily.   • simvastatin (ZOCOR) 40 MG tablet Take 20 mg by mouth Every Night.   • tamsulosin (FLOMAX) 0.4 MG capsule 24 hr capsule Take 1 capsule by mouth Daily.   • vitamin B-12 (CYANOCOBALAMIN) 1000 MCG tablet Take 5,000 mcg by mouth Every Night.   • vitamin D3 125 MCG (5000 UT) capsule capsule Take 5,000 Units by mouth Daily.     No current facility-administered medications on file prior to visit.     Allergies:  No Known Allergies    Objective    Objective       Vitals:   Heart Rate:  [56] 56  BP: (120)/(62) 120/62  Body mass index is 33.91 kg/m².     Physical Exam:   Constitutional: Awake, alert, No acute distress    Eyes: PERRLA, sclerae anicteric, no conjunctival injection   HENT: NCAT, mucous membranes moist   Neck: Supple, no thyromegaly, no lymphadenopathy, trachea midline   Respiratory: Clear to auscultation bilaterally, nonlabored respirations    Cardiovascular: RRR, no murmurs or rubs. Palpable pedal pulses bilaterally   Musculoskeletal: No bilateral ankle edema, no cyanosis to extremities   Psychiatric: Appropriate affect, cooperative   Neurologic: Oriented x 3, strength symmetric in all extremities, Cranial Nerves grossly intact to confrontation, speech clear   Skin: No rashes.    Result Review    Result Review:  I have personally reviewed the available results from  [x]  Laboratory  [x]  EKG  [x]  Cardiology  [x]  Medications  [x]  Old records  []  Other:     ECG 12 Lead    Date/Time: 2/25/2022 1:56 PM  Performed by: Sven Duran MD  Authorized by: Sven Duran MD   Comparison: compared with previous ECG   Similar to previous ECG  Rhythm: sinus rhythm  Rate: normal  QRS axis: normal    Clinical impression: normal ECG  Comments: Normal  sinus rhythm.  No significant changes compared to previous EKG.          Lab Results   Component Value Date    CHOL 133 10/28/2021    CHOL 125 06/11/2021    CHOL 125 05/18/2021     Lab Results   Component Value Date    TRIG 163 (H) 10/28/2021    TRIG 254 (H) 06/11/2021    TRIG 208 (H) 05/18/2021     Lab Results   Component Value Date    HDL 42 10/28/2021    HDL 39 (L) 06/11/2021    HDL 40 05/18/2021     Lab Results   Component Value Date    LDL 63 10/28/2021    LDL 46 06/11/2021    LDL 51 05/18/2021     Lab Results   Component Value Date    VLDL 28 10/28/2021    VLDL 40 06/11/2021    VLDL 34 05/18/2021     Results for orders placed in visit on 05/20/21    Emergent/Open-Heart Anesthesia ABHIJEET    Narrative  Preanesthesia Checklist:  Patient identified, IV assessed, risks and benefits discussed, monitors and equipment assessed and procedure being performed at surgeon's request.    General Procedure Information  Diagnostic Indications for Echo:  assessment of ascending aorta, assessment of surgical repair, defect repair evaluation and hemodynamic monitoring  Physician Requesting Echo: Jr Tom Tubbs MD  ICD Code for Medical Necessity:  Aortic Insufficiency  Location performed:  OR  Intubated  Bite block placed  Heart visualized  Probe Insertion:  Easy  Probe Type:  Multiplane  Modalities:  Color flow mapping, 2D only, continuous wave Doppler and pulse wave Doppler    Echocardiographic and Doppler Measurements    Ventricles    Right Ventricle:  Cavity size normal.  Hypertrophy not present.  Left Ventricle:  Diastolic dimension 4.7 cm.  Hypertrophy not present.  Thrombus not present.  Global Function mildly impaired.  Ejection Fraction 50%.    Ventricular Regional Function:  13- Apical Anterior:  hypokinetic  14- Apical Lateral:  hypokinetic  16- Apical Septal:  hypokinetic  17- [place]:  hypokinetic      Valves    Aortic Valve:  Annulus normal.  Stenosis not present.  Pressure Half-time: 910 ms.  Regurgitation mild.   Leaflets normal.  Leaflet motions normal.    Mitral Valve:  Annulus normal.  Stenosis not present.  Regurgitation trace.    Tricuspid Valve:  Annulus normal.  Stenosis not present.  Regurgitation mild.  Pulmonic Valve:  Annulus normal.  Regurgitation trace.        Aorta    Ascending Aorta:  Size normal.  Diameter 3.6 cm.  Dissection not present.  Plaque thickness less than 3 mm.  Mobile plaque not present.  Aortic Arch:  Size normal.  Diameter 3.1 cm.  Dissection not present.  Plaque thickness less than 3 mm.  Mobile plaque not present.  Descending Aorta:  Size normal.  Diameter 2.5 cm.  Dissection not present.  Plaque thickness less than 3 mm.  Mobile plaque not present.        Atria    Right Atrium:  Size normal.  Spontaneous echo contrast not present.  Thrombus not present.  Tumor not present.  Device present.    Left Atrium:  Size normal.  Spontaneous echo contrast not present.  Thrombus not present.  Tumor not present.  Device not present.  Left atrial appendage normal.      Septa    Atrial Septum:  Intra-atrial septal morphology lipomatous hypertrophy.        Diastolic Function Measurements:  Diastolic Dysfunction Grade= II  E= 40.6 ms  A= 35.3 ms  E/A Ratio=  1.2  DT=  296 ms  S/D=  IVRT=    Other Findings  Pericardium:  normal  Pleural Effusion:  none  Pulmonary Arteries:  normal  Pulmonary Venous Flow:  normal    Anesthesia Information  Performed Personally      Echocardiogram Comments:  Post CPB:  LVEF much improved, 60%, apex no longer hypokinetic.  No aortic dissection post decannulation.  AI unchanged.     Impression/Plan:  1.  Coronary disease s/p CABG: Continue aspirin 81 mg a day.  Continue Corgard 20 mg once a day.  2.  Chronic diastolic heart failure stable: Continue Lasix 40 mg a day.  Low-salt diet and fluid restriction advised.  3.  Essential hypertension controlled: Continue lisinopril 20 mg once a day.  4.  Hyperlipidemia: Continue simvastatin 40 mg a day.  Check lipid and hepatic profile.  5.   Morbid obesity: Low-fat diet and exercise advised        Sven Duran MD   02/25/22   13:55 EST

## 2022-02-25 ENCOUNTER — OFFICE VISIT (OUTPATIENT)
Dept: CARDIOLOGY | Facility: CLINIC | Age: 77
End: 2022-02-25

## 2022-02-25 VITALS
BODY MASS INDEX: 33.86 KG/M2 | HEIGHT: 72 IN | DIASTOLIC BLOOD PRESSURE: 62 MMHG | WEIGHT: 250 LBS | SYSTOLIC BLOOD PRESSURE: 120 MMHG | HEART RATE: 56 BPM

## 2022-02-25 DIAGNOSIS — E78.2 HYPERLIPEMIA, MIXED: ICD-10-CM

## 2022-02-25 DIAGNOSIS — I25.10 CORONARY ARTERY DISEASE INVOLVING NATIVE CORONARY ARTERY OF NATIVE HEART WITHOUT ANGINA PECTORIS: Primary | ICD-10-CM

## 2022-02-25 DIAGNOSIS — Z95.1 HX OF CABG: ICD-10-CM

## 2022-02-25 DIAGNOSIS — I50.32 DIASTOLIC CHF, CHRONIC: ICD-10-CM

## 2022-02-25 PROCEDURE — 99214 OFFICE O/P EST MOD 30 MIN: CPT | Performed by: SPECIALIST

## 2022-02-25 PROCEDURE — 93000 ELECTROCARDIOGRAM COMPLETE: CPT | Performed by: SPECIALIST

## 2022-04-20 ENCOUNTER — OFFICE VISIT (OUTPATIENT)
Dept: INTERNAL MEDICINE | Facility: CLINIC | Age: 77
End: 2022-04-20

## 2022-04-20 ENCOUNTER — LAB (OUTPATIENT)
Dept: LAB | Facility: HOSPITAL | Age: 77
End: 2022-04-20

## 2022-04-20 VITALS
HEART RATE: 66 BPM | TEMPERATURE: 98.5 F | DIASTOLIC BLOOD PRESSURE: 80 MMHG | BODY MASS INDEX: 34.86 KG/M2 | WEIGHT: 257.4 LBS | HEIGHT: 72 IN | OXYGEN SATURATION: 97 % | SYSTOLIC BLOOD PRESSURE: 144 MMHG

## 2022-04-20 DIAGNOSIS — E11.8 TYPE 2 DIABETES MELLITUS WITH COMPLICATIONS: Primary | ICD-10-CM

## 2022-04-20 DIAGNOSIS — E78.2 HYPERLIPEMIA, MIXED: ICD-10-CM

## 2022-04-20 DIAGNOSIS — E03.9 HYPOTHYROIDISM, ADULT: ICD-10-CM

## 2022-04-20 DIAGNOSIS — I10 HYPERTENSION, ESSENTIAL: ICD-10-CM

## 2022-04-20 DIAGNOSIS — Z00.00 WELL ADULT EXAM: ICD-10-CM

## 2022-04-20 DIAGNOSIS — F01.50 VASCULAR DEMENTIA WITHOUT BEHAVIORAL DISTURBANCE: ICD-10-CM

## 2022-04-20 DIAGNOSIS — N18.32 STAGE 3B CHRONIC KIDNEY DISEASE: ICD-10-CM

## 2022-04-20 DIAGNOSIS — R06.09 DYSPNEA ON EXERTION: ICD-10-CM

## 2022-04-20 DIAGNOSIS — Z23 NEED FOR PNEUMOCOCCAL VACCINE: ICD-10-CM

## 2022-04-20 DIAGNOSIS — E11.8 TYPE 2 DIABETES MELLITUS WITH COMPLICATIONS: ICD-10-CM

## 2022-04-20 PROBLEM — R26.81 UNSTABLE GAIT: Status: RESOLVED | Noted: 2018-11-20 | Resolved: 2022-04-20

## 2022-04-20 PROBLEM — D64.9 ANEMIA: Status: RESOLVED | Noted: 2021-02-16 | Resolved: 2022-04-20

## 2022-04-20 LAB
ALBUMIN SERPL-MCNC: 4.3 G/DL (ref 3.5–5.2)
ALBUMIN/GLOB SERPL: 1.7 G/DL
ALP SERPL-CCNC: 99 U/L (ref 39–117)
ALT SERPL W P-5'-P-CCNC: 17 U/L (ref 1–41)
ANION GAP SERPL CALCULATED.3IONS-SCNC: 11 MMOL/L (ref 5–15)
AST SERPL-CCNC: 13 U/L (ref 1–40)
BILIRUB SERPL-MCNC: 0.3 MG/DL (ref 0–1.2)
BUN SERPL-MCNC: 17 MG/DL (ref 8–23)
BUN/CREAT SERPL: 13.9 (ref 7–25)
CALCIUM SPEC-SCNC: 9.4 MG/DL (ref 8.6–10.5)
CHLORIDE SERPL-SCNC: 102 MMOL/L (ref 98–107)
CHOLEST SERPL-MCNC: 126 MG/DL (ref 0–200)
CO2 SERPL-SCNC: 27 MMOL/L (ref 22–29)
CREAT SERPL-MCNC: 1.22 MG/DL (ref 0.76–1.27)
EGFRCR SERPLBLD CKD-EPI 2021: 62.2 ML/MIN/1.73
GLOBULIN UR ELPH-MCNC: 2.6 GM/DL
GLUCOSE SERPL-MCNC: 138 MG/DL (ref 65–99)
HBA1C MFR BLD: 7.1 % (ref 4.8–5.6)
HDLC SERPL-MCNC: 44 MG/DL (ref 40–60)
LDLC SERPL CALC-MCNC: 53 MG/DL (ref 0–100)
LDLC/HDLC SERPL: 1.09 {RATIO}
POTASSIUM SERPL-SCNC: 4.3 MMOL/L (ref 3.5–5.2)
PROT SERPL-MCNC: 6.9 G/DL (ref 6–8.5)
SODIUM SERPL-SCNC: 140 MMOL/L (ref 136–145)
T4 FREE SERPL-MCNC: 1.38 NG/DL (ref 0.93–1.7)
TRIGL SERPL-MCNC: 171 MG/DL (ref 0–150)
TSH SERPL DL<=0.05 MIU/L-ACNC: 0.15 UIU/ML (ref 0.27–4.2)
VLDLC SERPL-MCNC: 29 MG/DL (ref 5–40)

## 2022-04-20 PROCEDURE — G0009 ADMIN PNEUMOCOCCAL VACCINE: HCPCS | Performed by: INTERNAL MEDICINE

## 2022-04-20 PROCEDURE — 99214 OFFICE O/P EST MOD 30 MIN: CPT | Performed by: INTERNAL MEDICINE

## 2022-04-20 PROCEDURE — 36415 COLL VENOUS BLD VENIPUNCTURE: CPT

## 2022-04-20 PROCEDURE — 90732 PPSV23 VACC 2 YRS+ SUBQ/IM: CPT | Performed by: INTERNAL MEDICINE

## 2022-04-20 PROCEDURE — 84443 ASSAY THYROID STIM HORMONE: CPT

## 2022-04-20 PROCEDURE — 80053 COMPREHEN METABOLIC PANEL: CPT

## 2022-04-20 PROCEDURE — 80061 LIPID PANEL: CPT

## 2022-04-20 PROCEDURE — 83036 HEMOGLOBIN GLYCOSYLATED A1C: CPT

## 2022-04-20 PROCEDURE — 84439 ASSAY OF FREE THYROXINE: CPT

## 2022-04-20 NOTE — ASSESSMENT & PLAN NOTE
Patient appears very appropriate this regards as of 4/22, no altered mental status etc.  Patient stable to continue low-dose Aricept.

## 2022-04-20 NOTE — ASSESSMENT & PLAN NOTE
As noted below, his A1c was up from 7.1 to 8.23 months ago.  We increased his mealtime Humalog to 14 units.  He was also maintained on Lantus 30 units nightly and full dose metformin.  Currently his fasting blood sugars are still around 120-140, and his evening sugars around 150.  We will see what his A1c reveals later tonight or in the morning, and contact him if changes need to be made.

## 2022-04-20 NOTE — PROGRESS NOTES
"Chief Complaint  Hypothyroidism (Pt is here for routine follow up, has no new issues or concerns.)    Subjective          Cosmo Gauthier presents to [facility]     History of Present Illness  Patient is pleasant but unfortunate 76-year-old male with multiple medical issues, status post 5 vessel bypass 5/21, with underlying hypertension, hyperlipidemia, and diabetes, coming in 4/22 for routine 3-month follow-up. We will review all his labs and address any new concerns.    Review of Systems   Constitutional: Negative for appetite change, fatigue and fever.   HENT: Negative for congestion and ear pain.    Eyes: Negative for blurred vision.   Respiratory: Negative for cough, chest tightness, shortness of breath and wheezing.    Cardiovascular: Negative for chest pain, palpitations and leg swelling.   Gastrointestinal: Negative for abdominal pain.   Genitourinary: Negative for difficulty urinating, dysuria and hematuria.   Musculoskeletal: Negative for arthralgias and gait problem.   Skin: Negative for skin lesions.   Neurological: Negative for syncope, memory problem and confusion.   Psychiatric/Behavioral: Negative for self-injury and depressed mood.       Objective   Vital Signs:   /80   Pulse 66   Temp 98.5 °F (36.9 °C)   Ht 182.9 cm (72.01\")   Wt 117 kg (257 lb 6.4 oz)   SpO2 97%   BMI 34.90 kg/m²           Physical Exam  Vitals and nursing note reviewed.   Constitutional:       General: He is not in acute distress.     Appearance: Normal appearance. He is not toxic-appearing.   HENT:      Head: Atraumatic.      Right Ear: External ear normal.      Left Ear: External ear normal.      Nose: Nose normal.      Mouth/Throat:      Mouth: Mucous membranes are moist.   Eyes:      General:         Right eye: No discharge.         Left eye: No discharge.      Extraocular Movements: Extraocular movements intact.      Pupils: Pupils are equal, round, and reactive to light. "   Cardiovascular:      Rate and Rhythm: Normal rate and regular rhythm.      Pulses: Normal pulses.      Heart sounds: Normal heart sounds. No murmur heard.    No gallop.   Pulmonary:      Effort: Pulmonary effort is normal. No respiratory distress.      Breath sounds: No wheezing, rhonchi or rales.   Abdominal:      General: There is no distension.      Palpations: Abdomen is soft. There is no mass.      Tenderness: There is no abdominal tenderness. There is no guarding.   Musculoskeletal:         General: No swelling or tenderness.      Cervical back: No tenderness.      Right lower leg: No edema.      Left lower leg: No edema.   Skin:     General: Skin is warm and dry.      Findings: No rash.   Neurological:      General: No focal deficit present.      Mental Status: He is alert and oriented to person, place, and time. Mental status is at baseline.      Motor: No weakness.      Gait: Gait normal.   Psychiatric:         Mood and Affect: Mood normal.         Thought Content: Thought content normal.          Result Review :   The following data was reviewed by: Alex Buckley MD on 08/02/2021:                  Assessment and Plan    Diagnoses and all orders for this visit:    1. 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  We increased his mealtime Humalog to 14 units.  He was also maintained on Lantus 30 units nightly and full dose metformin.  Currently his fasting blood sugars are still around 120-140, and his evening sugars around 150.  We will see what his A1c reveals later tonight or in the morning, and contact him if changes need to be made.    Orders:  -     Hemoglobin A1c; Future  -     Microalbumin / Creatinine Urine Ratio - Urine, Clean Catch; Future    2. Stage 3b chronic kidney disease (HCC)  Assessment & Plan:  As noted below, GFR is typically around 50, will see what these labs show later tonight or  tomorrow.  Patient is aware to avoid nonsteroidals other than Tylenol if able, and to keep hydrated.    Orders:  -     Basic Metabolic Panel; Future    3. Hyperlipemia, mixed  Assessment & Plan:  LDL is pending as of his 4/22 office visit, previous LDL was less than 70, so patient is stable to continue with moderate dose simvastatin until we see these results.      4. Hypertension, essential  Assessment & Plan:  Blood pressure remains well controlled as of his 4/22 office visit.  The patient is stable on low-dose Corgard and moderate dose diuretic only.  Continue same.  Of note, he was on lisinopril previously, as much is 20 mg daily, this was weaned a year or so ago down to 5 mg due to some relative hypotension, and has since fallen off his list.  So we will obviously resume low-dose treatment with this if his blood pressure trends up.      5. Vascular dementia without behavioral disturbance (HCC)  Assessment & Plan:  Patient appears very appropriate this regards as of 4/22, no altered mental status etc.  Patient stable to continue low-dose Aricept.      6. Hypothyroidism, adult  Assessment & Plan:  Lab is pending as of his 4/22 office visit, but patient is clinically euthyroid.  He is not having any palpitations, and his heart rate is in the 60s.  Regardless, if his TSH is the same as previous, it was 0.1 at that time, we would want to back him down some.---> TSH came back about the same, pt is on 250 qd, so will have him take 1 1/2 of the 125 mcg one day a week for a total of around 240 mcg qd instead of 250 mcg qd.    Orders:  -     TSH; Future  -     T4, free; Future    7. Well adult exam  -     Hepatitis C Antibody; Future  -     pneumococcal polysaccharide 23-valent (PNEUMOVAX-23) vaccine 0.5 mL    8. Dyspnea on exertion  -     BNP; Future    9. Need for pneumococcal vaccine    --  --  Older notes:  HOSP F/U 6/21 = S/P 5V CABG 5/21 per Dr Milligan=I reviewed records sent and the med list provided by  wife.  TELEVISIT 2/25/21:  HOSP F/U 10/20 = I reviewed 9/[facility] records; I d/w pt labs/meds in detail:  1) CHEST PAIN and pt is being admitted to R/O MI; cardiology was notified...SPECT was neg, so will f/u with cards in a few weeks; may still need a cath=no new sxs and is gideon to see him next week as of 10/20 OV...to [facility] for CABG per Dr Milligan, but PLT's too low; has f/u with cards.  2) CAD/MI with prior stents; ? last stress was done in cardiology office 5/19; will continue home meds for now...no rest angina; ? increase Imdur=defer to cards appt he has on 3/9/21---> S/P 5V CABG as above; looks great.    3) HTN will be followed closely on home meds=stable 10/20 OV, BUT is orthostatic, so drink more and lower ACEI to 5 mg---> stable 6/21.  4) HYPERLIPIDEMIA=continue statin=LDL 61 (9/20)---> 46 in 6/21.    5) CIRRHOSIS per Dr Bradshaw; watch volume status.  6) THROMBOCYTOPENIA is related to the cirrhosis and is stable around 70K...on Prednisone per Dr Saldana---> off this; labs on RTO.    7) DM per orders...A1C was only 7.3 in 1/21; she d/w me BS fine despite prednisone as of 2/21 OV; ? lost 20+ lbs---> 5.6 and I d/w stop glipizide 10 bid and stay on Humalog 15 tid, but then again not totally sure he's on it?    8) HYPOTHYROIDISM is wnl as of 2/21 OV---> RTO.    9) BPH on flomax after retenion issues in BE as of 2/21 OV; to see Dr Dill---> saw him for chairez post-op = it's out now.    (lost friend/valeria 60's to covid)    VISIT 6/20:  ANNUAL MEDICARE WELLNESS EXAM 10/19 = reviewed all forms with pt; he is much more depressed, so zoloft was increased.  H.M. ISSUES:  DM = A1C as below; Optho=q 12 mo with last 4/18 = early diabetic retinopathy and ? laser next visit?  --  HTN...needs ACEI now at 6/17 OV; repeat urine micro as well...remains controlled...ACEI fell off his list; resume now 1/19...better already---> stable.  ? CKD3 noted 6/20 = no new meds; needs repeat few weeks.  --  DM...max out januvia...8.1 an has f/u  with [facility]...on lantus 20, d/w increase 2-4 units every week to get FBS to 110...7.1 is great...7.5 is stuck and I d/w again to increase Lantus=told him 24 now... 8.8 is horrible, so increase glucotrol to whole tab in AM...8.9, so try whole tab bid before increase lantus...9.5 is worse and needs Humalog before meals; d/w qid testing; stop glucotrol and increase lantus to 30 qhs...BS noted per phone and in office; rec 16/20/16 and stay on Lantus 30 qhs; d/w NO SSI for now...A1C down to 8.0 already and Fructosamine of 300=A1C closer to 7.5 actually...6.3 is great with TSH back down...6.8 is ok for now=7/19...7.3 in 10/19 and I d/w take 8 units with breakfast since he has been skipping this and only taking 15 with lunch/supper---> 7.3 great 6/20.   HYPOTHYROIDSIM stable 6/17...0.2 at 1/18 OV...0.7 better...increase 1/19 for 3.5 given above...? n/c, b/c now=10; will add 50 as of 1/19 OV...TSH 64 and apparently he missed them past week; I rec 250 qd for now and close f/u...0.7 and will leave this alone for now---> 1.5 in 2/20.  --  CAD per Dr Pritchett; s/p Spect '15 per pt and was neg---> still no CP/SOB and sees cards q 6 mo=no recent as of 6/20 OV.  LIPIDS with LDL 72...83 ok for now...LDL 57---> 55 in 2/20.  --  THROMBOCYTOPENIA is new 4/16 OV=99, so to HEME...off ASA but plavix ok per Dr Chaudhry; had BM bx=?...75 holding...on iron per her---> CBC stable 6/20 per her.  --  HOSP F/U 8/16 for FUO.  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  --  DJD s/p R TKR and then L TKR per Dr Eckert 1/16 and rehab with [facility] still on-going = 3x/wk at 4/16 OV...still with issues L knee 4/18...to have redo L TKR per Dr Zayas as of 10/18 OV=Dr Duran cleared him already...is gideon for 2/4/19, but apparently wants his A1C below 7 for this?? = d/w me 1/19...I d/w not going to get there that soon, but current blood sugars excellent and pt cleared for surgery from my standpoint as well=reviewed all their pre-op labs as well as  EKG and CXR...s/p redo L TKR on 2/4/19 and looks great at 2/27/19 OV...finishing up as of 4/19 OV.  --  GERD per Dr Bradshaw.  ? CIRRHOSIS=tried on Nadolol per Dr Bradshaw=stopped it 6/20 due to diarrhea?; ? has CT/NH3 gideon.  --  OBESITY up 3 more to 263...258--->270 is stuck 2/20 and I d/w no meds=must go to [facility] ( Cards said no to surgery).  --  DEPRESSION on maint med...? Dementia per pt, sent to Dr Kim; he sent for [facility] as of 4/18 OV...will address depression 8/18 = increase zoloft   [facility] as of [facility]17 [facility]---> c/w as of 2/21 OV; neg O2 in [facility] recently;   --  --  PSA 0.5 (4/7/16)...defer.  Colon 7/19...2 inflam/hyper polyps per Dr Bradshaw.  Prevnar 11/16; Pneumovax # 1 9/17;  COVID x 2 with Pfizer, last was in 4/21, so he is aware he can get the booster anytime.  Flu shot for 2021 season done here.  (, retired [employer] '97, 3 kids)    Follow Up   Return in about 3 months (around 7/20/2022).  Patient was given instructions and counseling regarding his condition or for health maintenance advice. Please see specific information pulled into the AVS if appropriate.

## 2022-04-20 NOTE — ASSESSMENT & PLAN NOTE
Blood pressure remains well controlled as of his 4/22 office visit.  The patient is stable on low-dose Corgard and moderate dose diuretic only.  Continue same.  Of note, he was on lisinopril previously, as much is 20 mg daily, this was weaned a year or so ago down to 5 mg due to some relative hypotension, and has since fallen off his list.  So we will obviously resume low-dose treatment with this if his blood pressure trends up.

## 2022-04-20 NOTE — ASSESSMENT & PLAN NOTE
LDL is pending as of his 4/22 office visit, previous LDL was less than 70, so patient is stable to continue with moderate dose simvastatin until we see these results.

## 2022-04-20 NOTE — ASSESSMENT & PLAN NOTE
Lab is pending as of his 4/22 office visit, but patient is clinically euthyroid.  He is not having any palpitations, and his heart rate is in the 60s.  Regardless, if his TSH is the same as previous, it was 0.1 at that time, we would want to back him down some.---> TSH came back about the same, pt is on 250 qd, so will have him take 1 1/2 of the 125 mcg one day a week for a total of around 240 mcg qd instead of 250 mcg qd.

## 2022-07-02 ENCOUNTER — HOSPITAL ENCOUNTER (EMERGENCY)
Facility: HOSPITAL | Age: 77
Discharge: HOME | End: 2022-07-03
Attending: EMERGENCY MEDICINE | Admitting: EMERGENCY MEDICINE

## 2022-07-02 ENCOUNTER — APPOINTMENT (OUTPATIENT)
Dept: CT IMAGING | Facility: HOSPITAL | Age: 77
End: 2022-07-02
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
## 2022-07-02 DIAGNOSIS — S06.6X9A SUBARACHNOID HEMORRHAGE FOLLOWING INJURY, WITH LOSS OF CONSCIOUSNESS, INITIAL ENCOUNTER: ICD-10-CM

## 2022-07-02 DIAGNOSIS — S00.03XA CONTUSION OF SCALP, INITIAL ENCOUNTER: ICD-10-CM

## 2022-07-02 DIAGNOSIS — B34.9 VIRAL ILLNESS: ICD-10-CM

## 2022-07-02 LAB
ALBUMIN SERPL-MCNC: 4.5 G/DL (ref 3.5–5.2)
ALBUMIN/GLOB SERPL: 1.7 G/DL
ALP SERPL-CCNC: 118 U/L (ref 39–117)
ALT SERPL W P-5'-P-CCNC: 20 U/L (ref 1–41)
ANION GAP SERPL CALCULATED.3IONS-SCNC: 15 MMOL/L (ref 5–15)
AST SERPL-CCNC: 18 U/L (ref 1–40)
BASOPHILS # BLD AUTO: 0.06 10*3/MM3 (ref 0–0.2)
BASOPHILS NFR BLD AUTO: 0.8 % (ref 0–1.5)
BILIRUB SERPL-MCNC: 0.3 MG/DL (ref 0–1.2)
BUN SERPL-MCNC: 44 MG/DL (ref 8–23)
BUN/CREAT SERPL: 24.3 (ref 7–25)
CALCIUM SPEC-SCNC: 9.6 MG/DL (ref 8.6–10.5)
CHLORIDE SERPL-SCNC: 98 MMOL/L (ref 98–107)
CO2 SERPL-SCNC: 26 MMOL/L (ref 22–29)
CREAT SERPL-MCNC: 1.81 MG/DL (ref 0.76–1.27)
DEPRECATED RDW RBC AUTO: 48 FL (ref 37–54)
EGFRCR SERPLBLD CKD-EPI 2021: 38.8 ML/MIN/1.73
EOSINOPHIL # BLD AUTO: 0.45 10*3/MM3 (ref 0–0.4)
EOSINOPHIL NFR BLD AUTO: 6.3 % (ref 0.3–6.2)
ERYTHROCYTE [DISTWIDTH] IN BLOOD BY AUTOMATED COUNT: 14.6 % (ref 12.3–15.4)
GLOBULIN UR ELPH-MCNC: 2.7 GM/DL
GLUCOSE SERPL-MCNC: 350 MG/DL (ref 65–99)
HCT VFR BLD AUTO: 37.7 % (ref 37.5–51)
HGB BLD-MCNC: 13.2 G/DL (ref 13–17.7)
HOLD SPECIMEN: NORMAL
HOLD SPECIMEN: NORMAL
IMM GRANULOCYTES # BLD AUTO: 0.01 10*3/MM3 (ref 0–0.05)
IMM GRANULOCYTES NFR BLD AUTO: 0.1 % (ref 0–0.5)
LYMPHOCYTES # BLD AUTO: 1.71 10*3/MM3 (ref 0.7–3.1)
LYMPHOCYTES NFR BLD AUTO: 23.9 % (ref 19.6–45.3)
MAGNESIUM SERPL-MCNC: 1.6 MG/DL (ref 1.6–2.4)
MCH RBC QN AUTO: 31.5 PG (ref 26.6–33)
MCHC RBC AUTO-ENTMCNC: 35 G/DL (ref 31.5–35.7)
MCV RBC AUTO: 90 FL (ref 79–97)
MONOCYTES # BLD AUTO: 0.51 10*3/MM3 (ref 0.1–0.9)
MONOCYTES NFR BLD AUTO: 7.1 % (ref 5–12)
NEUTROPHILS NFR BLD AUTO: 4.42 10*3/MM3 (ref 1.7–7)
NEUTROPHILS NFR BLD AUTO: 61.8 % (ref 42.7–76)
NRBC BLD AUTO-RTO: 0 /100 WBC (ref 0–0.2)
PLATELET # BLD AUTO: 121 10*3/MM3 (ref 140–450)
PMV BLD AUTO: 8.9 FL (ref 6–12)
POTASSIUM SERPL-SCNC: 4.7 MMOL/L (ref 3.5–5.2)
PROT SERPL-MCNC: 7.2 G/DL (ref 6–8.5)
RBC # BLD AUTO: 4.19 10*6/MM3 (ref 4.14–5.8)
SODIUM SERPL-SCNC: 139 MMOL/L (ref 136–145)
TROPONIN T SERPL-MCNC: <0.01 NG/ML (ref 0–0.03)
WBC NRBC COR # BLD: 7.16 10*3/MM3 (ref 3.4–10.8)
WHOLE BLOOD HOLD COAG: NORMAL
WHOLE BLOOD HOLD SPECIMEN: NORMAL

## 2022-07-02 PROCEDURE — 70450 CT HEAD/BRAIN W/O DYE: CPT

## 2022-07-02 PROCEDURE — 99284 EMERGENCY DEPT VISIT MOD MDM: CPT

## 2022-07-02 PROCEDURE — 93005 ELECTROCARDIOGRAM TRACING: CPT | Performed by: EMERGENCY MEDICINE

## 2022-07-02 PROCEDURE — 84484 ASSAY OF TROPONIN QUANT: CPT

## 2022-07-02 PROCEDURE — 93005 ELECTROCARDIOGRAM TRACING: CPT

## 2022-07-02 PROCEDURE — 85025 COMPLETE CBC W/AUTO DIFF WBC: CPT

## 2022-07-02 PROCEDURE — 83735 ASSAY OF MAGNESIUM: CPT

## 2022-07-02 PROCEDURE — 80053 COMPREHEN METABOLIC PANEL: CPT

## 2022-07-02 RX ORDER — SODIUM CHLORIDE 0.9 % (FLUSH) 0.9 %
10 SYRINGE (ML) INJECTION AS NEEDED
Status: DISCONTINUED | OUTPATIENT
Start: 2022-07-02 | End: 2022-07-03 | Stop reason: HOSPADM

## 2022-07-03 ENCOUNTER — APPOINTMENT (OUTPATIENT)
Dept: CT IMAGING | Facility: HOSPITAL | Age: 77
End: 2022-07-03

## 2022-07-03 VITALS
DIASTOLIC BLOOD PRESSURE: 83 MMHG | BODY MASS INDEX: 33.83 KG/M2 | RESPIRATION RATE: 20 BRPM | OXYGEN SATURATION: 95 % | HEIGHT: 72 IN | HEART RATE: 77 BPM | SYSTOLIC BLOOD PRESSURE: 142 MMHG | WEIGHT: 249.78 LBS | TEMPERATURE: 97.9 F

## 2022-07-03 LAB
QT INTERVAL: 351 MS
SARS-COV-2 RNA PNL SPEC NAA+PROBE: NOT DETECTED

## 2022-07-03 PROCEDURE — 0 IOPAMIDOL PER 1 ML: Performed by: EMERGENCY MEDICINE

## 2022-07-03 PROCEDURE — U0004 COV-19 TEST NON-CDC HGH THRU: HCPCS | Performed by: EMERGENCY MEDICINE

## 2022-07-03 PROCEDURE — 70496 CT ANGIOGRAPHY HEAD: CPT

## 2022-07-03 PROCEDURE — 70450 CT HEAD/BRAIN W/O DYE: CPT

## 2022-07-03 RX ADMIN — IOPAMIDOL 100 ML: 755 INJECTION, SOLUTION INTRAVENOUS at 06:18

## 2022-07-03 NOTE — DISCHARGE INSTRUCTIONS
Please do not take your baby aspirin for the next 2 weeks.  Please follow-up with your primary care provider.  We discussed return precautions including worsening symptoms or any additional concerns.    COVID-19 was not detected on your swab today.

## 2022-07-03 NOTE — ED PROVIDER NOTES
Time: 11:38 PM EDT    Chief Complaint: LOSS OF TASTE     History of Present Illness:  Patient is a 76 y.o. year old male that presents to the emergency department accompanied by family with moderate LOSS OF TASTE that started abruptly today. It is still present when seen in the ED and has been constant. Nothing improves or worsens the loss of taste. Pt c/o generalized weakness and SOB with exertion. Family wants pt to have a COVID-19 test due to the loss of taste.     Family adds that pt fell last night and did strike his head during the fall. He has had some dizziness and c/o congestion. While at  today for COVID-19 testing, provider was concerned about head injury and referred pt to the ED for further evaluation. Pt was not tested for COVID-19 at .     History provided by:  Patient and relative    Similar Symptoms Previously: no  Recently seen: Pt was seen at  today and was referred to the ED.     Patient Care Team  Primary Care Provider: Alex Buckley MD    Past Medical History:     No Known Allergies  Past Medical History:   Diagnosis Date   • Anxiety and depression    • Arthritis    • Chronic back pain    • Cirrhosis (HCC)    • Coronary artery disease    • Dementia (HCC)    • Depression    • Diabetes mellitus (HCC)    • Disease of thyroid gland    • Elevated cholesterol    • Family history of colon cancer    • Fatty liver    • Gastric ulcer    • GERD (gastroesophageal reflux disease)    • Heart disease    • High cholesterol    • Hypertension    • Hyperthyroidism    • Knee pain    • Lymphoma (HCC)     History of lymphoma, has been in remission   • Memory change 10/18/2017   • 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    • Primary osteoarthritis of left knee    • Sleep apnea    • Sleep apnea    • Thrombocytopenia (HCC) 05/22/2018   • Thyroid disease      Past Surgical History:   Procedure Laterality Date   • COLONOSCOPY  2015   • CORONARY ANGIOPLASTY WITH STENT PLACEMENT     • CORONARY  ARTERY BYPASS GRAFT N/A 5/20/2021    Procedure: MIDLINE STERNOTOMY,CORONARY ARTERY BYPASS GRAFTING X 5, UTILIZING THE LEFT COMPA AND LEFT SAPHENOUS VEIN, ABHIJEET, PRP;  Surgeon: Jr Tom Tubbs MD;  Location: [facility];  Service: Cardiothoracic;  Laterality: N/A;   • ENDOSCOPY     • HERNIA REPAIR     • JOINT REPLACEMENT     • SHOULDER SURGERY      SHOULDER REPAIR   • TOTAL KNEE ARTHROPLASTY     • TRANSESOPHAGEAL ECHOCARDIOGRAM (ABHIJEET) N/A 5/20/2021    Procedure: TRANSESOPHAGEAL ECHOCARDIOGRAM WITH ANESTHESIA;  Surgeon: Jr Tom Tubbs MD;  Location: [facility];  Service: Cardiothoracic;  Laterality: N/A;     Family History   Problem Relation Age of Onset   • Colon cancer Father    • Diabetes Mother        Home Medications:  Prior to Admission medications    Medication Sig Start Date End Date Taking? Authorizing Provider   aspirin 81 MG EC tablet Take 81 mg by mouth Daily.    Michael Snowden MD   donepezil (ARICEPT) 10 MG tablet Take 10 mg by mouth every night at bedtime.    Michael Snowden MD   ferrous sulfate 325 (65 FE) MG tablet Take 325 mg by mouth Daily With Breakfast.    Michael Snowden MD   finasteride (PROSCAR) 5 MG tablet Take 1 tablet by mouth Daily. 6/1/21   Jr Tom Tubbs MD   furosemide (LASIX) 40 MG tablet Take 1 tablet by mouth Daily. 6/15/21   Sven Duran MD   gabapentin (NEURONTIN) 600 MG tablet TAKE 1/2 TABLET BY MOUTH EVERY DAY  Patient taking differently: Take 300 mg by mouth Every Night. 8/16/21   Alex Buckley MD   insulin glargine (LANTUS, SEMGLEE) 100 UNIT/ML injection Inject 30 Units under the skin into the appropriate area as directed Every Night.    Michael Snowden MD   insulin lispro (humaLOG, ADMELOG) 100 UNIT/ML injection Inject 15 Units under the skin into the appropriate area as directed 3 (Three) Times a Day Before Meals.    Michael Snowden MD   levothyroxine (SYNTHROID, LEVOTHROID) 125 MCG tablet Take 250 mcg by mouth Every  Morning.    Michael Snowden MD   metFORMIN (GLUCOPHAGE) 1000 MG tablet Take 1,000 mg by mouth 2 (Two) Times a Day With Meals.    Michael Snowden MD   nadolol (Corgard) 20 MG tablet Take 1 tablet by mouth Daily. 1/25/22   Alex Buckley MD   nitroglycerin (NITROSTAT) 0.4 MG SL tablet  8/16/21   Michael Snowden MD   sertraline (ZOLOFT) 100 MG tablet Take 150 mg by mouth Daily.    Michael Snowden MD   simvastatin (ZOCOR) 40 MG tablet Take 20 mg by mouth Every Night.    Michael Snowden MD   tamsulosin (FLOMAX) 0.4 MG capsule 24 hr capsule Take 1 capsule by mouth Daily. 2/20/21   Jr Tom Tubbs MD   vitamin B-12 (CYANOCOBALAMIN) 1000 MCG tablet Take 5,000 mcg by mouth Every Night.    Michael Snowden MD   vitamin D3 125 MCG (5000 UT) capsule capsule Take 5,000 Units by mouth Daily.    Michael Snowden MD        Social History:   Social History     Tobacco Use   • Smoking status: Never Smoker   • Smokeless tobacco: Never Used   Vaping Use   • Vaping Use: Never used   Substance Use Topics   • Alcohol use: Not Currently     Comment: QUIT DRINKING 32 YEARS AGO   • Drug use: Never       Record Review:  I have reviewed the patient's records in [employer].     Review of Systems:  Review of Systems   Constitutional: Negative for chills, diaphoresis and fever.   HENT: Positive for congestion. Negative for ear discharge and nosebleeds.    Eyes: Negative for photophobia.   Respiratory: Positive for shortness of breath (with exertion).    Cardiovascular: Negative for chest pain.   Gastrointestinal: Negative for diarrhea, nausea and vomiting.   Genitourinary: Negative for dysuria.   Musculoskeletal: Negative for back pain and neck pain.   Skin: Negative for rash.   Neurological: Positive for dizziness and weakness (generalzied). Negative for headaches.        Head injury due to fall last night.    All other systems reviewed and are negative.       Physical Exam:  /72   Pulse 72   Temp  "97.9 °F (36.6 °C)   Resp 20   Ht 182.9 cm (72\")   Wt 113 kg (249 lb 12.5 oz)   SpO2 95%   BMI 33.88 kg/m²     Physical Exam  Vitals and nursing note reviewed.   Constitutional:       General: He is not in acute distress.     Appearance: Normal appearance.   HENT:      Head: Normocephalic.      Comments: Hematoma over right parietal scalp.      Nose: Congestion present.   Eyes:      General: No scleral icterus.  Cardiovascular:      Rate and Rhythm: Normal rate and regular rhythm.      Heart sounds: Normal heart sounds.   Pulmonary:      Effort: Pulmonary effort is normal. No respiratory distress.      Breath sounds: Normal breath sounds.   Abdominal:      Palpations: Abdomen is soft.      Tenderness: There is no abdominal tenderness.   Musculoskeletal:         General: Normal range of motion.      Cervical back: Neck supple.      Right lower leg: No edema.      Left lower leg: No edema.   Skin:     General: Skin is warm and dry.   Neurological:      General: No focal deficit present.      Mental Status: He is alert and oriented to person, place, and time.      Sensory: No sensory deficit.      Motor: No weakness.              Medications in the Emergency Department:  Medications   sodium chloride 0.9 % flush 10 mL (has no administration in time range)   iopamidol (ISOVUE-370) 76 % injection 100 mL (100 mL Intravenous Given 7/3/22 0618)        Labs  Lab Results (last 24 hours)     Procedure Component Value Units Date/Time    CBC & Differential [[id number]]  (Abnormal) Collected: 07/02/22 2047    Specimen: Blood Updated: 07/02/22 2111    Narrative:      The following orders were created for panel order CBC & Differential.  Procedure                               Abnormality         Status                     ---------                               -----------         ------                     CBC Auto Differential[[id number]]        Abnormal            Final result               Scan Slide[[id number]]               "                                                      Please view results for these tests on the individual orders.    Comprehensive Metabolic Panel [[id number]]  (Abnormal) Collected: 07/02/22 2047    Specimen: Blood Updated: 07/02/22 2117     Glucose 350 mg/dL      BUN 44 mg/dL      Creatinine 1.81 mg/dL      Sodium 139 mmol/L      Potassium 4.7 mmol/L      Chloride 98 mmol/L      CO2 26.0 mmol/L      Calcium 9.6 mg/dL      Total Protein 7.2 g/dL      Albumin 4.50 g/dL      ALT (SGPT) 20 U/L      AST (SGOT) 18 U/L      Alkaline Phosphatase 118 U/L      Total Bilirubin 0.3 mg/dL      Globulin 2.7 gm/dL      A/G Ratio 1.7 g/dL      BUN/Creatinine Ratio 24.3     Anion Gap 15.0 mmol/L      eGFR 38.8 mL/min/1.73      Comment: National Kidney Foundation and American Society of Nephrology (ASN) Task Force recommended calculation based on the Chronic Kidney Disease Epidemiology Collaboration (CKD-EPI) equation refit without adjustment for race.       Narrative:      GFR Normal >60  Chronic Kidney Disease <60  Kidney Failure <15      Magnesium [[id number]]  (Normal) Collected: 07/02/22 2047    Specimen: Blood Updated: 07/02/22 2117     Magnesium 1.6 mg/dL     Troponin [[id number]]  (Normal) Collected: 07/02/22 2047    Specimen: Blood Updated: 07/02/22 2116     Troponin T <0.010 ng/mL     Narrative:      Troponin T Reference Range:  <= 0.03 ng/mL-   Negative for AMI  >0.03 ng/mL-     Abnormal for myocardial necrosis.  Clinicians would have to utilize clinical acumen, EKG, Troponin and serial changes to determine if it is an Acute Myocardial Infarction or myocardial injury due to an underlying chronic condition.       Results may be falsely decreased if patient taking Biotin.      CBC Auto Differential [[id number]]  (Abnormal) Collected: 07/02/22 2047    Specimen: Blood Updated: 07/02/22 2111     WBC 7.16 10*3/mm3      RBC 4.19 10*6/mm3      Hemoglobin 13.2 g/dL      Hematocrit 37.7 %      MCV 90.0 fL      MCH 31.5 pg      MCHC  35.0 g/dL      RDW 14.6 %      RDW-SD 48.0 fl      MPV 8.9 fL      Platelets 121 10*3/mm3      Neutrophil % 61.8 %      Lymphocyte % 23.9 %      Monocyte % 7.1 %      Eosinophil % 6.3 %      Basophil % 0.8 %      Immature Grans % 0.1 %      Neutrophils, Absolute 4.42 10*3/mm3      Lymphocytes, Absolute 1.71 10*3/mm3      Monocytes, Absolute 0.51 10*3/mm3      Eosinophils, Absolute 0.45 10*3/mm3      Basophils, Absolute 0.06 10*3/mm3      Immature Grans, Absolute 0.01 10*3/mm3      nRBC 0.0 /100 WBC     COVID-19,APTIMA PANTHER([facility]),[facility], NP/OP SWAB IN UTM/VTM/SALINE TRANSPORT MEDIA,24 HR TAT - Swab, Nasopharynx [[id number]]  (Normal) Collected: 07/03/22 0003    Specimen: Swab from Nasopharynx Updated: 07/03/22 0529     COVID19 Not Detected    Narrative:      Fact sheet for providers: https://www.fda.gov/media/355897/download     Fact sheet for patients: https://www.fda.gov/media/065395/download    Test performed by RT PCR.           Imaging:  CT Head Without Contrast    Result Date: 7/3/2022  PROCEDURE: CT HEAD WO CONTRAST  COMPARISONS: [facility], CT, CT ANGIOGRAM HEAD, 7/03/2022, 6:10.   [facility], CT, HEAD W/O CONTRAST, 3/12/2021, 11:13.   [facility], CT, CT HEAD WO CONTRAST, 7/02/2022, 23:59.  INDICATIONS: interval repeat CT after SAH; h/o head trauma/injury; h/o non-Hodgkin's lymphoma  PROTOCOL:   Standard CT imaging protocol performed.    RADIATION:   Total DLP: 1,087 mGy*cm.   MA and/or KV were/was adjusted to minimize radiation dose.    TECHNIQUE: After obtaining the patient's consent, 139 CT images were obtained without non-ionic intravenous contrast material.  FINDINGS: Again identified is a small amount of acute subarachnoid hemorrhage in the anterior right sylvian fissure, measuring about 0.4 x 1.3 x 1.8 cm in short-axis axial, long-axis axial, and craniocaudal extent, respectively, as seen on images 31 and 32 of series 201 and image 52 of series 502 and  adjacent images.  No new acute intracranial hemorrhage is seen.  A large acute scalp contusion is seen in the left parietal region.  The extra-axial spaces are mildly prominent.  The ventricular size and configuration are within normal limits for the patient's age.  It is suspected that the patient has undergone prior paranasal sinus surgery with probably chronic mucoperiosteal thickening involving the remaining paranasal sinuses.  The imaged middle ear clefts (that is, the bilateral mastoid air cell complexes, bilateral middle ear cavities, and bilateral external auditory canals) are well aerated.  Arterial calcifications are seen.  The patient has undergone median sternotomy as seen on the  topogram.  The patient has undergone bilateral cataract extractions with intra-ocular lens implants.  There are suspected additional postoperative changes along the superolateral aspect of the left optic globe, such as seen on image 20 of series 201 and adjacent images.       No significant change in the small volume of acute subarachnoid hemorrhage involving the anterior right sylvian fissure since the prior CT exam from 2359 hours on 7/2/2022.  No new acute intracranial hemorrhage is seen.  No other acute brain findings are seen.  There is an associated moderate-to-large acute left parietal scalp contusion.  No acute skull fracture.     COMMENT:  Part of this note is an electronic transcription of spoken language to printed text. The electronic translation/transcription may permit erroneous, or at times, nonsensical (or even sensical) words or phrases to be inadvertently transcribed or omitted; this  has reviewed the note for such errors (as well as additional errors); however, some may still exist.  YONAS LUQUE JR, MD       Electronically Signed and Approved By: YONAS LUQUE JR, MD on 7/03/2022 at 6:25              CT Head Without Contrast    Result Date: 7/3/2022  PROCEDURE: CT HEAD WO CONTRAST   COMPARISON: [facility], CT, HEAD W/O CONTRAST, 3/12/2021, 11:13.  INDICATIONS: HEAD TRAUMA, MODERATE-SEVERE, FALL, LEFT PARIETAL HEMATOMA.  PROTOCOL:   Standard imaging protocol performed    RADIATION:   Total DLP: 1,145.2 mGy*cm   MA and/or KV were/was adjusted to minimize radiation dose.    TECHNIQUE: After obtaining the patient's consent, 147 CT images were obtained without non-ionic intravenous contrast material.  FINDINGS: There is a small amount of acute subarachnoid hemorrhage involving the anterior right sylvian fissure.  It measures about 3 x 0.6 cm on image 32 of series 201 and adjacent images.  It may represent an acute contrecoup injury.  There is a moderate to large hemorrhagic scalp contusion in the left parietal region.  No acute displaced or depressed skull fracture seen.  There is suspected postoperative change of the paranasal sinuses with chronic mucoperiosteal thickening, probably moderate to severe in degree, suggestive of chronic sinusitis.  There is slight motion artifact the study.  There may be cerebellar tonsillar ectopia.  The patient has undergone bilateral cataract extractions.  There may be postoperative change additional postoperative change of the left optic globe.  No acute infarct is seen.  No midline shift or acute intracranial herniation syndrome.  Mild prominence of the extra-axial spaces is noted.  There may be minimal prominence of the ventricular size and configuration, seen previously.       There is a small amount of acute subarachnoid hemorrhage involving the anterior right sylvian fissure, such as seen on image 31 of series 201 and adjacent images.  No acute intracranial herniation syndrome.  No acute infarct.  No midline shift.  No acute skull fracture.  Please see above comments for further detail.     Pertinent findings were phoned to Dr. Merino (ordering/attending [facility] ED Physician) at approximately 0104 hours on 7/3/2022.  COMMENT:  Part of this note is an  electronic transcription of spoken language to printed text. The electronic translation/transcription may permit erroneous, or at times, nonsensical (or even sensical) words or phrases to be inadvertently transcribed or omitted; this  has reviewed the note for such errors (as well as additional errors); however, some may still exist.  YONAS LUQUE JR, MD       Electronically Signed and Approved By: YONAS LUQUE JR, MD on 7/03/2022 at 1:05                Procedures:  Procedures    Progress                            Medical Decision Making:  MDM     Patient CT head reveals a traumatic subarachnoid hemorrhage within the right sylvian fissure.  These findings along with the patient's clinical exam and baby aspirin usage were discussed with Dr. Yao of neurosurgery at [facility].  He recommends repeat CT head at a 6-hour interval.  Along with a CT angiogram to ensure no evidence of sentinel bleed in the setting of cerebral aneurysm.    CT head at 6-hour interval shows no interval increase in the patient's bleed.  Patient remains neurologically intact with no focal deficits.  He understands and agrees with plan to hold his baby aspirin for the next 2 weeks.    COVID 19 test performed in ED, results pending.     I have provided education on COVID-19 and viral illnesses to this patient, and educated them on when they should return to their primary care provider or the emergency department itself should their symptoms worsen.  Based on the history, exam, diagnostic testing and reassessment, the patient has no signs of meningitis, significant pneumonia, pyelonephritis, sepsis or other acute serious bacterial infections, or other significant pathology to warrant further testing, continued ED treatment, admission or specialist evaluation. They verbalized understanding of this diagnosis, my treatment plant, and recommendations for follow up. The patient was counseled to return to the ED for  reevaluation for worsening cough, shortness of breath, uncontrollable headache, uncontrollable fever, altered mental status, or any symptoms which caused them concern.     Discussed importance of self-quarantine until results are obtained.    Final diagnoses:   Fall, initial encounter   Viral illness   Contusion of scalp, initial encounter   Subarachnoid hemorrhage following injury, with loss of consciousness, initial encounter ([facility])        Disposition:  ED Disposition     ED Disposition   Discharge    Condition   Stable    Comment   --             Dictated Utilizing Dragon Dictation    Documentation assistance provided by Daphne Fragoso acting as scribe for Ponce Merino MD. Information recorded by the scribe was done at my direction and has been verified and validated by me.         Daphne Fragoso  07/02/22 9341       Daphne Fragoso  07/02/22 9108       Ponce Merino MD  07/08/22 0600

## 2022-07-03 NOTE — ED TRIAGE NOTES
Patient presents to ED with complaints of syncope last night, states that he was dizzy and fell, hitting his head on a wall on the way down. Patient states that he had another syncopal episode today and that he feels short of air, also has a sinus infection and states that he lost his taste this morning.

## 2022-07-06 ENCOUNTER — OFFICE VISIT (OUTPATIENT)
Dept: INTERNAL MEDICINE | Facility: CLINIC | Age: 77
End: 2022-07-06

## 2022-07-06 VITALS
BODY MASS INDEX: 33.75 KG/M2 | SYSTOLIC BLOOD PRESSURE: 134 MMHG | HEART RATE: 94 BPM | OXYGEN SATURATION: 96 % | HEIGHT: 72 IN | DIASTOLIC BLOOD PRESSURE: 78 MMHG | WEIGHT: 249.2 LBS | TEMPERATURE: 98.4 F

## 2022-07-06 DIAGNOSIS — N18.32 STAGE 3B CHRONIC KIDNEY DISEASE: ICD-10-CM

## 2022-07-06 DIAGNOSIS — I10 HYPERTENSION, ESSENTIAL: ICD-10-CM

## 2022-07-06 DIAGNOSIS — S06.6X1D TRAUMATIC SUBARACHNOID HEMORRHAGE WITH LOSS OF CONSCIOUSNESS OF 30 MINUTES OR LESS, SUBSEQUENT ENCOUNTER: Primary | ICD-10-CM

## 2022-07-06 PROBLEM — S06.6XAA SUBARACHNOID HEMORRHAGE FOLLOWING INJURY: Status: ACTIVE | Noted: 2022-07-06

## 2022-07-06 PROBLEM — S06.6X1A TRAUMATIC SUBARACHNOID HEMORRHAGE WITH LOSS OF CONSCIOUSNESS OF 30 MINUTES OR LESS (HCC): Status: ACTIVE | Noted: 2022-07-06

## 2022-07-06 PROCEDURE — 99214 OFFICE O/P EST MOD 30 MIN: CPT | Performed by: INTERNAL MEDICINE

## 2022-07-06 RX ORDER — NADOLOL 20 MG/1
20 TABLET ORAL DAILY
Qty: 90 TABLET | Refills: 1 | Status: SHIPPED | OUTPATIENT
Start: 2022-07-06 | End: 2022-12-14

## 2022-07-06 RX ORDER — SERTRALINE HYDROCHLORIDE 100 MG/1
200 TABLET, FILM COATED ORAL DAILY
Qty: 180 TABLET | Refills: 1 | Status: SHIPPED | OUTPATIENT
Start: 2022-07-06

## 2022-07-06 RX ORDER — FUROSEMIDE 40 MG/1
20 TABLET ORAL DAILY
Qty: 90 TABLET | Refills: 3 | Status: SHIPPED | OUTPATIENT
Start: 2022-07-06 | End: 2022-09-13 | Stop reason: SDUPTHER

## 2022-07-06 NOTE — PATIENT INSTRUCTIONS
1.  Cut the furosemide/Lasix 40 mg tablet in half, and just take half a tablet once a day until your follow-up appointment.    2.  Increase the sertraline from 150 mg to 200 mg once daily.  Just take 2 of the 100 mg tablets at the same time.

## 2022-07-06 NOTE — PROGRESS NOTES
"Chief Complaint  Hospital Follow Up Visit (Pt has been dealing with family issues and is having a difficult time sleeping. )    Subjective          Cosmo Gauthier presents to [facility]     History of Present Illness  Patient is pleasant but unfortunate 76-year-old male with multiple medical issues, status post 5 vessel bypass 5/21, with underlying hypertension, hyperlipidemia, and diabetes, coming in 4/22 for routine 3-month follow-up. We will review all his labs and address any new concerns.---> Patient had a fall at home, struck his head,+ LOC, so he was evaluated in the emergency room.  He had a small SAH so he was observed overnight, had repeat imaging the following morning.  Had a CT as well as a CTA.  He is here now 7/6/2022 for ER follow-up.    Review of Systems   Constitutional: Negative for appetite change, fatigue and fever.   HENT: Negative for congestion and ear pain.    Eyes: Negative for blurred vision.   Respiratory: Negative for cough, chest tightness, shortness of breath and wheezing.    Cardiovascular: Negative for chest pain, palpitations and leg swelling.   Gastrointestinal: Negative for abdominal pain.   Genitourinary: Negative for difficulty urinating, dysuria and hematuria.   Musculoskeletal: Negative for arthralgias and gait problem.   Skin: Negative for skin lesions.   Neurological: Negative for syncope, memory problem and confusion.   Psychiatric/Behavioral: Negative for self-injury and depressed mood.       Objective   Vital Signs:   /78   Pulse 94   Temp 98.4 °F (36.9 °C)   Ht 182.9 cm (72.01\")   Wt 113 kg (249 lb 3.2 oz)   SpO2 96%   BMI 33.79 kg/m²           Physical Exam  Vitals and nursing note reviewed.   Constitutional:       General: He is not in acute distress.     Appearance: Normal appearance. He is not toxic-appearing.   HENT:      Head: Atraumatic.      Right Ear: External ear normal.      Left Ear: External ear normal.      " Nose: Nose normal.      Mouth/Throat:      Mouth: Mucous membranes are moist.   Eyes:      General:         Right eye: No discharge.         Left eye: No discharge.      Extraocular Movements: Extraocular movements intact.      Pupils: Pupils are equal, round, and reactive to light.   Cardiovascular:      Rate and Rhythm: Normal rate and regular rhythm.      Pulses: Normal pulses.      Heart sounds: Normal heart sounds. No murmur heard.    No gallop.   Pulmonary:      Effort: Pulmonary effort is normal. No respiratory distress.      Breath sounds: No wheezing, rhonchi or rales.   Abdominal:      General: There is no distension.      Palpations: Abdomen is soft. There is no mass.      Tenderness: There is no abdominal tenderness. There is no guarding.   Musculoskeletal:         General: No swelling or tenderness.      Cervical back: No tenderness.      Right lower leg: No edema.      Left lower leg: No edema.   Skin:     General: Skin is warm and dry.      Findings: No rash.   Neurological:      General: No focal deficit present.      Mental Status: He is alert and oriented to person, place, and time. Mental status is at baseline.      Motor: No weakness.      Gait: Gait normal.   Psychiatric:         Mood and Affect: Mood normal.         Thought Content: Thought content normal.          Result Review :   The following data was reviewed by: Alex Buckley MD on 08/02/2021:                  Assessment and Plan    Diagnoses and all orders for this visit:    1. Traumatic subarachnoid hemorrhage with loss of consciousness of 30 minutes or less, subsequent encounter (Primary)  Overview:  CTA 7/22:  No emergent large vessel occlusion is identified.  No cerebral aneurysms are seen.  No significant interval change in the small volume of acute subarachnoid hemorrhage in the anterior right sylvian fissure.           Assessment & Plan:  As noted patient had small subarachnoid hemorrhage, follow-up scan in the morning was without  change.  They did a CTA as well to ensure there was no aneurysms, results as above are unrevealing.  He is only on baby aspirin, and he is appropriately holding it for the next 2 weeks.  Patient with no focal motor deficits secondary to this event.      2. Hypertension, essential  Assessment & Plan:  Patient stable on low-dose nadolol and moderate dose furosemide only as of 7/22.  Previously on ACEI, will resume if BP trends up.      3. Stage 3b chronic kidney disease (HCC)  Assessment & Plan:  Patient with acute kidney injury when he was seen in the ER this past weekend.  BUN and creatinine were up from 17/1.2 to 44/1.8.  Patient denies any change in his fluid intake etc., no recent diarrhea either.  He is maintained on moderate dose furosemide, so until we see his labs later this month, going to have him lower the Lasix from 40 to 20 mg.      Other orders  -     nadolol (Corgard) 20 MG tablet; Take 1 tablet by mouth Daily.  Dispense: 90 tablet; Refill: 1  -     furosemide (LASIX) 40 MG tablet; Take 0.5 tablets by mouth Daily.  Dispense: 90 tablet; Refill: 3  -     sertraline (ZOLOFT) 100 MG tablet; Take 2 tablets by mouth Daily.  Dispense: 180 tablet; Refill: 1    --  --  Older notes:  HOSP F/U 6/21 = S/P 5V CABG 5/21 per Dr Milligan=I reviewed records sent and the med list provided by wife.  TELEVISIT 2/25/21:  HOSP F/U 10/20 = I reviewed 9/20 [facility] records; I d/w pt labs/meds in detail:  1) CHEST PAIN and pt is being admitted to R/O MI; cardiology was notified...SPECT was neg, so will f/u with cards in a few weeks; may still need a cath=no new sxs and is gideon to see him next week as of 10/20 OV...to [facility] for CABG per Dr Milligan, but PLT's too low; has f/u with cards.  2) CAD/MI with prior stents; ? last stress was done in cardiology office 5/19; will continue home meds for now...no rest angina; ? increase Imdur=defer to cards appt he has on 3/9/21---> S/P 5V CABG as above; looks great.    3) HTN will be followed  closely on home meds=stable 10/20 OV, BUT is orthostatic, so drink more and lower ACEI to 5 mg---> stable 6/21.  4) HYPERLIPIDEMIA=continue statin=LDL 61 (9/20)---> 46 in 6/21.    5) CIRRHOSIS per Dr Bradshaw; watch volume status.  6) THROMBOCYTOPENIA is related to the cirrhosis and is stable around 70K...on Prednisone per Dr Saldana---> off this; labs on RTO.    7) DM per orders...A1C was only 7.3 in 1/21; she d/w me BS fine despite prednisone as of 2/21 OV; ? lost 20+ lbs---> 5.6 and I d/w stop glipizide 10 bid and stay on Humalog 15 tid, but then again not totally sure he's on it?    8) HYPOTHYROIDISM is wnl as of 2/21 OV---> RTO.    9) BPH on flomax after retenion issues in BE as of 2/21 OV; to see Dr Dill---> saw him for chairez post-op = it's out now.    (lost friend/ 60's to covid)    VISIT 6/20:  ANNUAL MEDICARE WELLNESS EXAM 10/19 = reviewed all forms with pt; he is much more depressed, so zoloft was increased.  H.M. ISSUES:  DM = A1C as below; Optho=q 12 mo with last 4/18 = early diabetic retinopathy and ? laser next visit?  --  HTN...needs ACEI now at 6/17 OV; repeat urine micro as well...remains controlled...ACEI fell off his list; resume now 1/19...better already---> stable.  ? CKD3 noted 6/20 = no new meds; needs repeat few weeks.  --  DM...max out januvia...8.1 an has f/u with [facility]...on lantus 20, d/w increase 2-4 units every week to get FBS to 110...7.1 is great...7.5 is stuck and I d/w again to increase Lantus=told him 24 now... 8.8 is horrible, so increase glucotrol to whole tab in AM...8.9, so try whole tab bid before increase lantus...9.5 is worse and needs Humalog before meals; d/w qid testing; stop glucotrol and increase lantus to 30 qhs...BS noted per phone and in office; rec 16/20/16 and stay on Lantus 30 qhs; d/w NO SSI for now...A1C down to 8.0 already and Fructosamine of 300=A1C closer to 7.5 actually...6.3 is great with TSH back down...6.8 is ok for now=7/19...7.3 in 10/19 and I d/w  take 8 units with breakfast since he has been skipping this and only taking 15 with lunch/supper---> 7.3 great 6/20.   HYPOTHYROIDSIM stable 6/17...0.2 at 1/18 OV...0.7 better...increase 1/19 for 3.5 given above...? n/c, b/c now=10; will add 50 as of 1/19 OV...TSH 64 and apparently he missed them past week; I rec 250 qd for now and close f/u...0.7 and will leave this alone for now---> 1.5 in 2/20.  --  CAD per Dr Pritchett; s/p Spect '15 per pt and was neg---> still no CP/SOB and sees cards q 6 mo=no recent as of 6/20 OV.  LIPIDS with LDL 72...83 ok for now...LDL 57---> 55 in 2/20.  --  THROMBOCYTOPENIA is new 4/16 OV=99, so to HEME...off ASA but plavix ok per Dr hCaudhry; had BM bx=?...75 holding...on iron per her---> CBC stable 6/20 per her.  --  HOSP F/U 8/16 for FUO.  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  --  DJD s/p R TKR and then L TKR per Dr Eckert 1/16 and rehab with [facility] still on-going = 3x/wk at 4/16 OV...still with issues L knee 4/18...to have redo L TKR per Dr Zayas as of 10/18 OV=Dr Duran cleared him already...is gideon for 2/4/19, but apparently wants his A1C below 7 for this?? = d/w me 1/19...I d/w not going to get there that soon, but current blood sugars excellent and pt cleared for surgery from my standpoint as well=reviewed all their pre-op labs as well as EKG and CXR...s/p redo L TKR on 2/4/19 and looks great at 2/27/19 OV...finishing up as of 4/19 OV.  --  GERD per Dr Bradshaw.  ? CIRRHOSIS=tried on Nadolol per Dr Bradshaw=stopped it 6/20 due to diarrhea?; ? has CT/NH3 gideon.  --  OBESITY up 3 more to 263...258--->270 is stuck 2/20 and I d/w no meds=must go to [facility] ( Cards said no to surgery).  --  DEPRESSION on maint med...? Dementia per pt, sent to Dr Kim; he sent for [facility] as of 4/18 OV...will address depression 8/18 = increase zoloft   [facility] as of [facility]/17 [facility]---> c/w as of 2/21 OV; neg O2 in [facility] recently;   --  --  PSA 0.5  (4/7/16)...defer.  Colon 7/19...2 inflam/hyper polyps per Dr Bradshaw.  Prevnar 11/16; Pneumovax # 1 9/17;  COVID x 2 with Pfizer, last was in 4/21, so he is aware he can get the booster anytime.  Flu shot for 2021 season done here.  (, retired [employer] '97, 3 kids)    Follow Up   Return for Next scheduled follow up.  Patient was given instructions and counseling regarding his condition or for health maintenance advice. Please see specific information pulled into the AVS if appropriate.

## 2022-07-06 NOTE — ASSESSMENT & PLAN NOTE
Patient stable on low-dose nadolol and moderate dose furosemide only as of 7/22.  Previously on ACEI, will resume if BP trends up.

## 2022-07-06 NOTE — ASSESSMENT & PLAN NOTE
Patient with acute kidney injury when he was seen in the ER this past weekend.  BUN and creatinine were up from 17/1.2 to 44/1.8.  Patient denies any change in his fluid intake etc., no recent diarrhea either.  He is maintained on moderate dose furosemide, so until we see his labs later this month, going to have him lower the Lasix from 40 to 20 mg.

## 2022-07-06 NOTE — ASSESSMENT & PLAN NOTE
As noted patient had small subarachnoid hemorrhage, follow-up scan in the morning was without change.  They did a CTA as well to ensure there was no aneurysms, results as above are unrevealing.  He is only on baby aspirin, and he is appropriately holding it for the next 2 weeks.  Patient with no focal motor deficits secondary to this event.

## 2022-07-13 ENCOUNTER — LAB (OUTPATIENT)
Dept: LAB | Facility: HOSPITAL | Age: 77
End: 2022-07-13

## 2022-07-13 DIAGNOSIS — E03.9 HYPOTHYROIDISM, ADULT: ICD-10-CM

## 2022-07-13 DIAGNOSIS — E11.8 TYPE 2 DIABETES MELLITUS WITH COMPLICATIONS: ICD-10-CM

## 2022-07-13 DIAGNOSIS — R06.09 DYSPNEA ON EXERTION: ICD-10-CM

## 2022-07-13 DIAGNOSIS — N18.32 STAGE 3B CHRONIC KIDNEY DISEASE: ICD-10-CM

## 2022-07-13 DIAGNOSIS — Z00.00 WELL ADULT EXAM: ICD-10-CM

## 2022-07-13 LAB
ALBUMIN UR-MCNC: <1.2 MG/DL
ANION GAP SERPL CALCULATED.3IONS-SCNC: 9.3 MMOL/L (ref 5–15)
BUN SERPL-MCNC: 22 MG/DL (ref 8–23)
BUN/CREAT SERPL: 16.5 (ref 7–25)
CALCIUM SPEC-SCNC: 9.1 MG/DL (ref 8.6–10.5)
CHLORIDE SERPL-SCNC: 104 MMOL/L (ref 98–107)
CO2 SERPL-SCNC: 26.7 MMOL/L (ref 22–29)
CREAT SERPL-MCNC: 1.33 MG/DL (ref 0.76–1.27)
CREAT UR-MCNC: 96 MG/DL
EGFRCR SERPLBLD CKD-EPI 2021: 56.1 ML/MIN/1.73
GLUCOSE SERPL-MCNC: 93 MG/DL (ref 65–99)
HBA1C MFR BLD: 7.4 % (ref 4.8–5.6)
HCV AB SER DONR QL: NORMAL
MICROALBUMIN/CREAT UR: NORMAL MG/G{CREAT}
NT-PROBNP SERPL-MCNC: 911 PG/ML (ref 0–900)
POTASSIUM SERPL-SCNC: 4.6 MMOL/L (ref 3.5–5.2)
SODIUM SERPL-SCNC: 140 MMOL/L (ref 136–145)
T4 FREE SERPL-MCNC: 1.29 NG/DL (ref 0.93–1.7)
TSH SERPL DL<=0.05 MIU/L-ACNC: 0.54 UIU/ML (ref 0.27–4.2)

## 2022-07-13 PROCEDURE — 86803 HEPATITIS C AB TEST: CPT

## 2022-07-13 PROCEDURE — 84439 ASSAY OF FREE THYROXINE: CPT

## 2022-07-13 PROCEDURE — 36415 COLL VENOUS BLD VENIPUNCTURE: CPT

## 2022-07-13 PROCEDURE — 80048 BASIC METABOLIC PNL TOTAL CA: CPT

## 2022-07-13 PROCEDURE — 82570 ASSAY OF URINE CREATININE: CPT

## 2022-07-13 PROCEDURE — 83036 HEMOGLOBIN GLYCOSYLATED A1C: CPT

## 2022-07-13 PROCEDURE — 82043 UR ALBUMIN QUANTITATIVE: CPT

## 2022-07-13 PROCEDURE — 84443 ASSAY THYROID STIM HORMONE: CPT

## 2022-07-13 PROCEDURE — 83880 ASSAY OF NATRIURETIC PEPTIDE: CPT

## 2022-07-20 ENCOUNTER — OFFICE VISIT (OUTPATIENT)
Dept: INTERNAL MEDICINE | Facility: CLINIC | Age: 77
End: 2022-07-20

## 2022-07-20 VITALS
TEMPERATURE: 98.4 F | WEIGHT: 256.4 LBS | OXYGEN SATURATION: 95 % | SYSTOLIC BLOOD PRESSURE: 124 MMHG | HEIGHT: 72 IN | HEART RATE: 66 BPM | DIASTOLIC BLOOD PRESSURE: 82 MMHG | BODY MASS INDEX: 34.73 KG/M2

## 2022-07-20 DIAGNOSIS — Z95.1 HX OF CABG: ICD-10-CM

## 2022-07-20 DIAGNOSIS — N18.32 STAGE 3B CHRONIC KIDNEY DISEASE: ICD-10-CM

## 2022-07-20 DIAGNOSIS — E66.01 MORBID (SEVERE) OBESITY DUE TO EXCESS CALORIES: ICD-10-CM

## 2022-07-20 DIAGNOSIS — F01.50 VASCULAR DEMENTIA WITHOUT BEHAVIORAL DISTURBANCE: ICD-10-CM

## 2022-07-20 DIAGNOSIS — I10 HYPERTENSION, ESSENTIAL: ICD-10-CM

## 2022-07-20 DIAGNOSIS — S06.6X1D TRAUMATIC SUBARACHNOID HEMORRHAGE WITH LOSS OF CONSCIOUSNESS OF 30 MINUTES OR LESS, SUBSEQUENT ENCOUNTER: ICD-10-CM

## 2022-07-20 DIAGNOSIS — E11.8 TYPE 2 DIABETES MELLITUS WITH COMPLICATIONS: ICD-10-CM

## 2022-07-20 DIAGNOSIS — E78.2 HYPERLIPEMIA, MIXED: Primary | ICD-10-CM

## 2022-07-20 DIAGNOSIS — E03.9 HYPOTHYROIDISM, ADULT: ICD-10-CM

## 2022-07-20 PROCEDURE — 99214 OFFICE O/P EST MOD 30 MIN: CPT | Performed by: INTERNAL MEDICINE

## 2022-07-20 PROCEDURE — 90471 IMMUNIZATION ADMIN: CPT | Performed by: INTERNAL MEDICINE

## 2022-07-20 PROCEDURE — 90714 TD VACC NO PRESV 7 YRS+ IM: CPT | Performed by: INTERNAL MEDICINE

## 2022-07-20 RX ORDER — SEMAGLUTIDE 1.34 MG/ML
INJECTION, SOLUTION SUBCUTANEOUS
Qty: 3 PEN | Refills: 1 | Status: SHIPPED | OUTPATIENT
Start: 2022-07-20 | End: 2023-01-18 | Stop reason: SINTOL

## 2022-07-20 NOTE — ASSESSMENT & PLAN NOTE
GFR is improved significantly to 56 as of his 7/22 office visit.  He was a little prerenal earlier in the month, we have backed his Lasix down from 40 to 20 without any bump in his BNP.  Continue same dose diuretic, continue close follow-up labs.

## 2022-07-20 NOTE — ASSESSMENT & PLAN NOTE
A1c had initially trended down from 8.2 to 7.1, currently back up a little bit to 7.4 as of his 7/22 appointment.  He is already on 3 times daily insulin as well as long-acting insulin.  Additionally he is on full dose of metformin.  He is having difficult time losing weight, his BMI would have him in the morbid obese range given his comorbidities.  Seems appropriate to get him started on Ozempic, patient is aware that his sugars may trend down and he will have to back off on his insulin.  Given his need for 4 times daily insulin and frequent blood sugar checks, patient needs to be evaluated for continuous glucose monitor.  Patient is able to adjust his sugars accordingly, so would be a good candidate for 1 of these.

## 2022-07-20 NOTE — ASSESSMENT & PLAN NOTE
Patient with no ischemia as of 7/22 office visit.  He is also followed by Dr Pritchett.  He is stable on aspirin and nadolol for antianginal benefits so continue same plan of care.  Of note, patient previously on low-dose Imdur, very stable off of that presently.  Additionally, BNP obtained given reduction in his Lasix dose from 40 to 20 mg daily, it was only 900, patient no volume overload, continue same dosing.

## 2022-07-20 NOTE — ASSESSMENT & PLAN NOTE
Patient no focal motor deficits, very stable this regards, he has just recently resumed aspirin therapy.  No further follow-up indicated.

## 2022-07-20 NOTE — ASSESSMENT & PLAN NOTE
TSH has improved from 0.1 to 0.5 as of his 7/22 office visit.  He is on he is on 250 mcg daily, except 1 day he only takes 187.5 micrograms, for an average of closer to 240 mcg a day.  Would stick with this for now and repeat TSH on return to office.

## 2022-07-20 NOTE — PROGRESS NOTES
"Chief Complaint  Hyperlipidemia (Pt is here for a follow up on labs. Pt states that he doesn't have any energy.)    Subjective          Cosmo Gauthier presents to [facility]     History of Present Illness  Patient is pleasant but unfortunate 76-year-old male with multiple medical issues, status post 5 vessel bypass 5/21, with underlying hypertension, hyperlipidemia, and diabetes, coming in 7/20/22 for routine 3-month follow-up. We will review all his labs and address any new concerns.    ---> He was seen 7/6/2022 for ER follow-up.  Patient had a fall at home, struck his head,+ LOC, so he was evaluated in the emergency room.  He had a small SAH so he was observed overnight, had repeat imaging the following morning.  Had a CT as well as a CTA.      Review of Systems   Constitutional: Negative for appetite change, fatigue and fever.   HENT: Negative for congestion and ear pain.    Eyes: Negative for blurred vision.   Respiratory: Negative for cough, chest tightness, shortness of breath and wheezing.    Cardiovascular: Negative for chest pain, palpitations and leg swelling.   Gastrointestinal: Negative for abdominal pain.   Genitourinary: Negative for difficulty urinating, dysuria and hematuria.   Musculoskeletal: Negative for arthralgias and gait problem.   Skin: Negative for skin lesions.   Neurological: Negative for syncope, memory problem and confusion.   Psychiatric/Behavioral: Negative for self-injury and depressed mood.       Objective   Vital Signs:   /82   Pulse 66   Temp 98.4 °F (36.9 °C)   Ht 182.9 cm (72.01\")   Wt 116 kg (256 lb 6.4 oz)   SpO2 95%   BMI 34.77 kg/m²           Physical Exam  Vitals and nursing note reviewed.   Constitutional:       General: He is not in acute distress.     Appearance: Normal appearance. He is not toxic-appearing.   HENT:      Head: Atraumatic.      Right Ear: External ear normal.      Left Ear: External ear normal.      Nose: " Nose normal.      Mouth/Throat:      Mouth: Mucous membranes are moist.   Eyes:      General:         Right eye: No discharge.         Left eye: No discharge.      Extraocular Movements: Extraocular movements intact.      Pupils: Pupils are equal, round, and reactive to light.   Cardiovascular:      Rate and Rhythm: Normal rate and regular rhythm.      Pulses: Normal pulses.      Heart sounds: Normal heart sounds. No murmur heard.    No gallop.   Pulmonary:      Effort: Pulmonary effort is normal. No respiratory distress.      Breath sounds: No wheezing, rhonchi or rales.   Abdominal:      General: There is no distension.      Palpations: Abdomen is soft. There is no mass.      Tenderness: There is no abdominal tenderness. There is no guarding.   Musculoskeletal:         General: No swelling or tenderness.      Cervical back: No tenderness.      Right lower leg: No edema.      Left lower leg: No edema.   Skin:     General: Skin is warm and dry.      Findings: No rash.   Neurological:      General: No focal deficit present.      Mental Status: He is alert and oriented to person, place, and time. Mental status is at baseline.      Motor: No weakness.      Gait: Gait normal.   Psychiatric:         Mood and Affect: Mood normal.         Thought Content: Thought content normal.          Result Review :   The following data was reviewed by: Alex Buckley MD on 08/02/2021:                  Assessment and Plan    Diagnoses and all orders for this visit:    1. Hyperlipemia, mixed (Primary)  -     Lipid Panel; Future    2. Hypertension, essential  Assessment & Plan:  Patient stable on low-dose nadolol and moderate dose furosemide only as of his follow-up visit in 7/22.  Previously on ACEI, will resume if BP trends up.    Orders:  -     Comprehensive Metabolic Panel; Future    3. Hypothyroidism, adult  Assessment & Plan:  TSH has improved from 0.1 to 0.5 as of his 7/22 office visit.  He is on he is on 250 mcg daily, except 1  day he only takes 187.5 micrograms, for an average of closer to 240 mcg a day.  Would stick with this for now and repeat TSH on return to office.    Orders:  -     TSH; Future  -     T4, free; Future    4. Stage 3b chronic kidney disease (HCC)  Assessment & Plan:  GFR is improved significantly to 56 as of his 7/22 office visit.  He was a little prerenal earlier in the month, we have backed his Lasix down from 40 to 20 without any bump in his BNP.  Continue same dose diuretic, continue close follow-up labs.      5. Traumatic subarachnoid hemorrhage with loss of consciousness of 30 minutes or less, subsequent encounter  Overview:  CTA 7/22:  No emergent large vessel occlusion is identified.  No cerebral aneurysms are seen.  No significant interval change in the small volume of acute subarachnoid hemorrhage in the anterior right sylvian fissure.           Assessment & Plan:  Patient no focal motor deficits, very stable this regards, he has just recently resumed aspirin therapy.  No further follow-up indicated.      6. 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  He is already on 3 times daily insulin as well as long-acting insulin.  Additionally he is on full dose of metformin.  He is having difficult time losing weight, his BMI would have him in the morbid obese range given his comorbidities.  Seems appropriate to get him started on Ozempic, patient is aware that his sugars may trend down and he will have to back off on his insulin.  Given his need for 4 times daily insulin and frequent blood sugar checks, patient needs to be evaluated for continuous glucose monitor.  Patient is able to adjust his sugars accordingly, so would be a good candidate for 1 of these.    Orders:  -     Hemoglobin A1c; Future    7. Vascular dementia without behavioral disturbance (HCC)    8. Hx of  CABG  Overview:  S/P 5V CABG 5/21 per Dr Milligan.  He is also followed by Dr Pritchett.         Assessment & Plan:  Patient with no ischemia as of 7/22 office visit.  He is also followed by Dr Pritchett.  He is stable on aspirin and nadolol for antianginal benefits so continue same plan of care.  Of note, patient previously on low-dose Imdur, very stable off of that presently.  Additionally, BNP obtained given reduction in his Lasix dose from 40 to 20 mg daily, it was only 900, patient no volume overload, continue same dosing.      9. Morbid (severe) obesity due to excess calories (HCC)  Assessment & Plan:  BMI is 34.8.  Patient has numerous obesity related medical conditions.  Patient is limited in regards to his activity level, additionally has to maintain consistent p.o. intake given 4 times daily insulin use.  Seems appropriate to add Ozempic to his regimen as of his 7/22 appointment, and we will try to back off on the insulin.      Other orders  -     Td (adult) Vaccine Not Adsorbed  -     Semaglutide,0.25 or 0.5MG/DOS, (Ozempic, 0.25 or 0.5 MG/DOSE,) 2 MG/1.5ML solution pen-injector; Give yourself the 0.25 mg dose for 2 weeks, if no side effects, then increase to the 0.5 mg dose.  Dispense: 3 pen; Refill: 1    --  --  Older notes:  HOSP F/U 6/21 = S/P 5V CABG 5/21 per Dr Milligan=I reviewed records sent and the med list provided by wife.  TELEVISIT 2/25/21:  HOSP F/U 10/20 = I reviewed 9/20 [facility] records; I d/w pt labs/meds in detail:  1) CHEST PAIN and pt is being admitted to R/O MI; cardiology was notified...SPECT was neg, so will f/u with cards in a few weeks; may still need a cath=no new sxs and is gideon to see him next week as of 10/20 OV...to BE for CABG per Dr Milligan, but PLT's too low; has f/u with cards.  2) CAD/MI with prior stents; ? last stress was done in cardiology office 5/19; will continue home meds for now...no rest angina; ? increase Imdur=defer to cards appt he has on 3/9/21---> S/P 5V CABG as  above; looks great.    3) HTN will be followed closely on home meds=stable 10/20 OV, BUT is orthostatic, so drink more and lower ACEI to 5 mg---> stable 6/21.  4) HYPERLIPIDEMIA=continue statin=LDL 61 (9/20)---> 46 in 6/21.    5) CIRRHOSIS per Dr Bradshaw; watch volume status.  6) THROMBOCYTOPENIA is related to the cirrhosis and is stable around 70K...on Prednisone per Dr Saldana---> off this; labs on RTO.    7) DM per orders...A1C was only 7.3 in 1/21; she d/w me BS fine despite prednisone as of 2/21 OV; ? lost 20+ lbs---> 5.6 and I d/w stop glipizide 10 bid and stay on Humalog 15 tid, but then again not totally sure he's on it?    8) HYPOTHYROIDISM is wnl as of 2/21 OV---> RTO.    9) BPH on flomax after retenion issues in BE as of 2/21 OV; to see Dr Dill---> saw him for chairez post-op = it's out now.    (lost friend/valeria 60's to covid)    VISIT 6/20:  ANNUAL MEDICARE WELLNESS EXAM 10/19 = reviewed all forms with pt; he is much more depressed, so zoloft was increased.  H.M. ISSUES:  DM = A1C as below; Optho=q 12 mo with last 4/18 = early diabetic retinopathy and ? laser next visit?  --  HTN...needs ACEI now at 6/17 OV; repeat urine micro as well...remains controlled...ACEI fell off his list; resume now 1/19...better already---> stable.  ? CKD3 noted 6/20 = no new meds; needs repeat few weeks.  --  DM...max out januvia...8.1 an has f/u with [facility]...on lantus 20, d/w increase 2-4 units every week to get FBS to 110...7.1 is great...7.5 is stuck and I d/w again to increase Lantus=told him 24 now... 8.8 is horrible, so increase glucotrol to whole tab in AM...8.9, so try whole tab bid before increase lantus...9.5 is worse and needs Humalog before meals; d/w qid testing; stop glucotrol and increase lantus to 30 qhs...BS noted per phone and in office; rec 16/20/16 and stay on Lantus 30 qhs; d/w NO SSI for now...A1C down to 8.0 already and Fructosamine of 300=A1C closer to 7.5 actually...6.3 is great with TSH back down...6.8  is ok for now=7/19...7.3 in 10/19 and I d/w take 8 units with breakfast since he has been skipping this and only taking 15 with lunch/supper---> 7.3 great 6/20.   HYPOTHYROIDSIM stable 6/17...0.2 at 1/18 OV...0.7 better...increase 1/19 for 3.5 given above...? n/c, b/c now=10; will add 50 as of 1/19 OV...TSH 64 and apparently he missed them past week; I rec 250 qd for now and close f/u...0.7 and will leave this alone for now---> 1.5 in 2/20.  --  CAD per Dr Pritchett; s/p Spect '15 per pt and was neg---> still no CP/SOB and sees cards q 6 mo=no recent as of 6/20 OV.  LIPIDS with LDL 72...83 ok for now...LDL 57---> 55 in 2/20.  --  THROMBOCYTOPENIA is new 4/16 OV=99, so to HEME...off ASA but plavix ok per Dr Chaudhry; had BM bx=?...75 holding...on iron per her---> CBC stable 6/20 per her.  --  HOSP F/U 8/16 for FUO.  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  --  DJD s/p R TKR and then L TKR per Dr Eckert 1/16 and rehab with [facility] still on-going = 3x/wk at 4/16 OV...still with issues L knee 4/18...to have redo L TKR per Dr Zayas as of 10/18 OV=Dr Duran cleared him already...is gideon for 2/4/19, but apparently wants his A1C below 7 for this?? = d/w me 1/19...I d/w not going to get there that soon, but current blood sugars excellent and pt cleared for surgery from my standpoint as well=reviewed all their pre-op labs as well as EKG and CXR...s/p redo L TKR on 2/4/19 and looks great at 2/27/19 OV...finishing up as of 4/19 OV.  --  GERD per Dr Bradshaw.  ? CIRRHOSIS=tried on Nadolol per Dr Bradshaw=stopped it 6/20 due to diarrhea?; ? has CT/NH3 gideon.  --  OBESITY up 3 more to 263...258--->270 is stuck 2/20 and I d/w no meds=must go to [facility] ( Cards said no to surgery).  --  DEPRESSION on maint med...? Dementia per pt, sent to Dr Kim; he sent for [facility] as of 4/18 OV...will address depression 8/18 = increase zoloft   [facility] as of [facility]17 [facility]---> c/w as of 2/21 OV; neg O2 in [facility]  recently;   --  --  PSA 0.5 (4/7/16)...defer.  Colon 7/19...2 inflam/hyper polyps per Dr Bradshaw.  Prevnar 11/16; Pneumovax # 1 9/17;  (, retired [employer] '97, 3 kids)    Follow Up   Return in about 3 months (around 10/20/2022).  Patient was given instructions and counseling regarding his condition or for health maintenance advice. Please see specific information pulled into the AVS if appropriate.

## 2022-07-20 NOTE — ASSESSMENT & PLAN NOTE
Patient stable on low-dose nadolol and moderate dose furosemide only as of his follow-up visit in 7/22.  Previously on ACEI, will resume if BP trends up.

## 2022-07-20 NOTE — ASSESSMENT & PLAN NOTE
BMI is 34.8.  Patient has numerous obesity related medical conditions.  Patient is limited in regards to his activity level, additionally has to maintain consistent p.o. intake given 4 times daily insulin use.  Seems appropriate to add Ozempic to his regimen as of his 7/22 appointment, and we will try to back off on the insulin.

## 2022-09-12 NOTE — PROGRESS NOTES
[facility]  Cardiology progress Note    Patient Name: Cosmo Gauthier  : 1945    CHIEF COMPLAINT  CORONARY ARTERY DISEASE        Subjective   Subjective     HISTORY OF PRESENT ILLNESS    Cosmo Gauthier is a 77 y.o. male with coronary artery s/p CABG.  No chest pain.  He has some shortness of breath on exertion for the last few months.    REVIEW OF SYSTEMS    Constitutional:    No fever, no weight loss  Skin:     No rash  Otolaryngeal:    No difficulty swallowing  Cardiovascular:  No chest pain or shortness of breath  Pulmonary:    No cough, no sputum production    Personal History     Social History:    reports that he has never smoked. He has never used smokeless tobacco. He reports previous alcohol use. He reports that he does not use drugs.    Home Medications:  Current Outpatient Medications on File Prior to Visit   Medication Sig   • aspirin 81 MG EC tablet Take 81 mg by mouth Daily.   • donepezil (ARICEPT) 10 MG tablet Take 10 mg by mouth every night at bedtime.   • ferrous sulfate 325 (65 FE) MG tablet Take 325 mg by mouth Daily With Breakfast.   • finasteride (PROSCAR) 5 MG tablet Take 1 tablet by mouth Daily.   • gabapentin (NEURONTIN) 600 MG tablet TAKE 1/2 TABLET BY MOUTH EVERY DAY (Patient taking differently: Take 300 mg by mouth Every Night.)   • insulin glargine (LANTUS, SEMGLEE) 100 UNIT/ML injection Inject 30 Units under the skin into the appropriate area as directed Every Night.   • insulin lispro (humaLOG, ADMELOG) 100 UNIT/ML injection Inject 15 Units under the skin into the appropriate area as directed 3 (Three) Times a Day Before Meals.   • levothyroxine (SYNTHROID, LEVOTHROID) 125 MCG tablet Take 250 mcg by mouth Every Morning.   • metFORMIN (GLUCOPHAGE) 1000 MG tablet Take 1,000 mg by mouth 2 (Two) Times a Day With Meals.   • nadolol (Corgard) 20 MG tablet Take 1 tablet by mouth Daily.   • nitroglycerin (NITROSTAT) 0.4 MG SL tablet    • Semaglutide,0.25 or 0.5MG/DOS,  (Ozempic, 0.25 or 0.5 MG/DOSE,) 2 MG/1.5ML solution pen-injector Give yourself the 0.25 mg dose for 2 weeks, if no side effects, then increase to the 0.5 mg dose.   • sertraline (ZOLOFT) 100 MG tablet Take 2 tablets by mouth Daily.   • simvastatin (ZOCOR) 40 MG tablet Take 20 mg by mouth Every Night.   • tamsulosin (FLOMAX) 0.4 MG capsule 24 hr capsule Take 1 capsule by mouth Daily.   • vitamin B-12 (CYANOCOBALAMIN) 1000 MCG tablet Take 5,000 mcg by mouth Every Night.   • vitamin D3 125 MCG (5000 UT) capsule capsule Take 5,000 Units by mouth Daily.   • [DISCONTINUED] furosemide (LASIX) 40 MG tablet Take 0.5 tablets by mouth Daily.     No current facility-administered medications on file prior to visit.       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     PHYSICAL EXAM:    General Appearance:   · well developed  · well nourished  HENT:   · oropharynx moist  · lips not cyanotic  Neck:  · thyroid not enlarged  · supple  Respiratory:  · no respiratory distress  · normal breath sounds  · no rales  Cardiovascular:  · no jugular venous  distention  · regular rhythm  · apical impulse normal  · S1 normal, S2 normal  · no S3, no S4   · no murmur  · no rub, no thrill  · carotid pulses normal; no bruit  · pedal pulses normal  · lower extremity edema: none    Skin:   · warm, dry  Psychiatric:  · judgement and insight appropriate  · normal mood and affect        Result Review:  I have personally reviewed the available results from  [x]  Laboratory  [x]  EKG  [x]  Cardiology  [x]  Medications  [x]  Old records  []  Other:     Procedures  Lab Results   Component Value Date    CHOL 126 04/20/2022    CHOL 133 10/28/2021    CHOL 125 06/11/2021     Lab Results   Component Value Date    TRIG 171 (H) 04/20/2022    TRIG 163 (H) 10/28/2021    TRIG 254 (H) 06/11/2021     Lab Results   Component Value Date    HDL 44 04/20/2022    HDL 42 10/28/2021    HDL 39 (L) 06/11/2021     Lab Results   Component Value Date    LDL 53 04/20/2022    LDL 63 10/28/2021    LDL 46 06/11/2021     Lab Results   Component Value Date    VLDL 29 04/20/2022    VLDL 28 10/28/2021    VLDL 40 06/11/2021     Results for orders placed in visit on 05/20/21    Emergent/Open-Heart Anesthesia ABHIJEET    Narrative  Preanesthesia Checklist:  Patient identified, IV assessed, risks and benefits discussed, monitors and equipment assessed and procedure being performed at surgeon's request.    General Procedure Information  Diagnostic Indications for Echo:  assessment of ascending aorta, assessment of surgical repair, defect repair evaluation and hemodynamic monitoring  Physician Requesting Echo: Jr Tom Tubbs MD  ICD Code for Medical Necessity:  Aortic Insufficiency  Location performed:  OR  Intubated  Bite block placed  Heart visualized  Probe Insertion:  Easy  Probe Type:  Multiplane  Modalities:  Color flow mapping, 2D only, continuous wave Doppler and pulse wave Doppler    Echocardiographic and Doppler Measurements    Ventricles    Right Ventricle:  Cavity size normal.  Hypertrophy not  present.  Left Ventricle:  Diastolic dimension 4.7 cm.  Hypertrophy not present.  Thrombus not present.  Global Function mildly impaired.  Ejection Fraction 50%.    Ventricular Regional Function:  13- Apical Anterior:  hypokinetic  14- Apical Lateral:  hypokinetic  16- Apical Septal:  hypokinetic  17- [place]:  hypokinetic      Valves    Aortic Valve:  Annulus normal.  Stenosis not present.  Pressure Half-time: 910 ms.  Regurgitation mild.  Leaflets normal.  Leaflet motions normal.    Mitral Valve:  Annulus normal.  Stenosis not present.  Regurgitation trace.    Tricuspid Valve:  Annulus normal.  Stenosis not present.  Regurgitation mild.  Pulmonic Valve:  Annulus normal.  Regurgitation trace.        Aorta    Ascending Aorta:  Size normal.  Diameter 3.6 cm.  Dissection not present.  Plaque thickness less than 3 mm.  Mobile plaque not present.  Aortic Arch:  Size normal.  Diameter 3.1 cm.  Dissection not present.  Plaque thickness less than 3 mm.  Mobile plaque not present.  Descending Aorta:  Size normal.  Diameter 2.5 cm.  Dissection not present.  Plaque thickness less than 3 mm.  Mobile plaque not present.        Atria    Right Atrium:  Size normal.  Spontaneous echo contrast not present.  Thrombus not present.  Tumor not present.  Device present.    Left Atrium:  Size normal.  Spontaneous echo contrast not present.  Thrombus not present.  Tumor not present.  Device not present.  Left atrial appendage normal.      Septa    Atrial Septum:  Intra-atrial septal morphology lipomatous hypertrophy.        Diastolic Function Measurements:  Diastolic Dysfunction Grade= II  E= 40.6 ms  A= 35.3 ms  E/A Ratio=  1.2  DT=  296 ms  S/D=  IVRT=    Other Findings  Pericardium:  normal  Pleural Effusion:  none  Pulmonary Arteries:  normal  Pulmonary Venous Flow:  normal    Anesthesia Information  Performed Personally      Echocardiogram Comments:  Post CPB:  LVEF much improved, 60%, apex no longer hypokinetic.  No aortic dissection  post decannulation.  AI unchanged.     Impression/Plan:  1. CAD post CABG stable: Continue aspirin 81 mg a day.  Continue Corgard 20 mg a day.  2.  Chronic diastolic heart failure stable/increased shortness of breath: Increase Lasix 40 mg a day.  Monitor BMP.  Repeat echocardiogram.  3.  Essential hypertension controlled: Continue Corgard 20 mg a day.  Monitor blood pressure regularly.  4.  Hyperlipidemia: Continue simvastatin 40 mg a day.  Monitor lipid and hepatic profile.           Sven Duran MD   09/13/22   12:12 EDT

## 2022-09-13 ENCOUNTER — OFFICE VISIT (OUTPATIENT)
Dept: CARDIOLOGY | Facility: CLINIC | Age: 77
End: 2022-09-13

## 2022-09-13 VITALS
SYSTOLIC BLOOD PRESSURE: 126 MMHG | HEART RATE: 68 BPM | OXYGEN SATURATION: 99 % | HEIGHT: 72 IN | WEIGHT: 250 LBS | DIASTOLIC BLOOD PRESSURE: 64 MMHG | BODY MASS INDEX: 33.86 KG/M2

## 2022-09-13 DIAGNOSIS — I10 HYPERTENSION, ESSENTIAL: ICD-10-CM

## 2022-09-13 DIAGNOSIS — E78.2 HYPERLIPEMIA, MIXED: ICD-10-CM

## 2022-09-13 DIAGNOSIS — I25.10 CORONARY ARTERY DISEASE INVOLVING NATIVE CORONARY ARTERY OF NATIVE HEART WITHOUT ANGINA PECTORIS: Primary | ICD-10-CM

## 2022-09-13 DIAGNOSIS — Z95.1 HX OF CABG: ICD-10-CM

## 2022-09-13 PROCEDURE — 99214 OFFICE O/P EST MOD 30 MIN: CPT | Performed by: SPECIALIST

## 2022-09-13 RX ORDER — FUROSEMIDE 40 MG/1
40 TABLET ORAL DAILY
Qty: 90 TABLET | Refills: 3 | Status: SHIPPED | OUTPATIENT
Start: 2022-09-13

## 2022-10-11 ENCOUNTER — LAB (OUTPATIENT)
Dept: LAB | Facility: HOSPITAL | Age: 77
End: 2022-10-11

## 2022-10-11 DIAGNOSIS — I10 HYPERTENSION, ESSENTIAL: ICD-10-CM

## 2022-10-11 DIAGNOSIS — E03.9 HYPOTHYROIDISM, ADULT: ICD-10-CM

## 2022-10-11 DIAGNOSIS — E11.8 TYPE 2 DIABETES MELLITUS WITH COMPLICATIONS: ICD-10-CM

## 2022-10-11 DIAGNOSIS — E78.2 HYPERLIPEMIA, MIXED: ICD-10-CM

## 2022-10-11 LAB
ALBUMIN SERPL-MCNC: 4.3 G/DL (ref 3.5–5.2)
ALBUMIN/GLOB SERPL: 1.7 G/DL
ALP SERPL-CCNC: 93 U/L (ref 39–117)
ALT SERPL W P-5'-P-CCNC: 16 U/L (ref 1–41)
ANION GAP SERPL CALCULATED.3IONS-SCNC: 12.6 MMOL/L (ref 5–15)
AST SERPL-CCNC: 18 U/L (ref 1–40)
BILIRUB SERPL-MCNC: 0.3 MG/DL (ref 0–1.2)
BUN SERPL-MCNC: 22 MG/DL (ref 8–23)
BUN/CREAT SERPL: 18 (ref 7–25)
CALCIUM SPEC-SCNC: 9.3 MG/DL (ref 8.6–10.5)
CHLORIDE SERPL-SCNC: 99 MMOL/L (ref 98–107)
CHOLEST SERPL-MCNC: 147 MG/DL (ref 0–200)
CO2 SERPL-SCNC: 26.4 MMOL/L (ref 22–29)
CREAT SERPL-MCNC: 1.22 MG/DL (ref 0.76–1.27)
EGFRCR SERPLBLD CKD-EPI 2021: 61.8 ML/MIN/1.73
GLOBULIN UR ELPH-MCNC: 2.5 GM/DL
GLUCOSE SERPL-MCNC: 158 MG/DL (ref 65–99)
HBA1C MFR BLD: 6.8 % (ref 4.8–5.6)
HDLC SERPL-MCNC: 45 MG/DL (ref 40–60)
LDLC SERPL CALC-MCNC: 75 MG/DL (ref 0–100)
LDLC/HDLC SERPL: 1.57 {RATIO}
POTASSIUM SERPL-SCNC: 4.8 MMOL/L (ref 3.5–5.2)
PROT SERPL-MCNC: 6.8 G/DL (ref 6–8.5)
SODIUM SERPL-SCNC: 138 MMOL/L (ref 136–145)
T4 FREE SERPL-MCNC: 1.49 NG/DL (ref 0.93–1.7)
TRIGL SERPL-MCNC: 157 MG/DL (ref 0–150)
TSH SERPL DL<=0.05 MIU/L-ACNC: 0.45 UIU/ML (ref 0.27–4.2)
VLDLC SERPL-MCNC: 27 MG/DL (ref 5–40)

## 2022-10-11 PROCEDURE — 80061 LIPID PANEL: CPT

## 2022-10-11 PROCEDURE — 84443 ASSAY THYROID STIM HORMONE: CPT

## 2022-10-11 PROCEDURE — 83036 HEMOGLOBIN GLYCOSYLATED A1C: CPT

## 2022-10-11 PROCEDURE — 36415 COLL VENOUS BLD VENIPUNCTURE: CPT

## 2022-10-11 PROCEDURE — 80053 COMPREHEN METABOLIC PANEL: CPT

## 2022-10-11 PROCEDURE — 84439 ASSAY OF FREE THYROXINE: CPT

## 2022-10-13 NOTE — PROGRESS NOTES
"Chief Complaint  Hypothyroidism and Follow-up (Pt states that this a routine appt, he had labs, he stopped taking Ozempic due to it caused him to be dizzy. His BP today was 100/58, he didn't know if that could cause him to be dizzy as well.)    Subjective          Cosmo Gauthier presents to [facility]     History of Present Illness  Patient is pleasant but unfortunate 77-year-old male with multiple medical issues, status post 5 vessel bypass 5/21, with underlying hypertension, hyperlipidemia, and diabetes, coming in 10/22 for routine 3-month follow-up. We will review all his labs and address any new concerns.    ---> He was seen 7/6/2022 for ER follow-up.  Patient had a fall at home, struck his head,+ LOC, so he was evaluated in the emergency room.  He had a small SAH so he was observed overnight, had repeat imaging the following morning.  Had a CT as well as a CTA.      Review of Systems   Constitutional: Negative for appetite change, fatigue and fever.   HENT: Negative for congestion and ear pain.    Eyes: Negative for blurred vision.   Respiratory: Negative for cough, chest tightness, shortness of breath and wheezing.    Cardiovascular: Negative for chest pain, palpitations and leg swelling.   Gastrointestinal: Negative for abdominal pain.   Genitourinary: Negative for difficulty urinating, dysuria and hematuria.   Musculoskeletal: Negative for arthralgias and gait problem.   Skin: Negative for skin lesions.   Neurological: Negative for syncope, memory problem and confusion.   Psychiatric/Behavioral: Negative for self-injury and depressed mood.       Objective   Vital Signs:   /58 (BP Location: Right arm, Patient Position: Sitting, Cuff Size: Adult)   Pulse 69   Temp 97.8 °F (36.6 °C) (Skin)   Ht 182.9 cm (72.01\")   Wt 115 kg (253 lb 6.4 oz)   SpO2 98%   BMI 34.36 kg/m²           Physical Exam  Vitals and nursing note reviewed.   Constitutional:       General: He " is not in acute distress.     Appearance: Normal appearance. He is not toxic-appearing.   HENT:      Head: Atraumatic.      Right Ear: External ear normal.      Left Ear: External ear normal.      Nose: Nose normal.      Mouth/Throat:      Mouth: Mucous membranes are moist.   Eyes:      General:         Right eye: No discharge.         Left eye: No discharge.      Extraocular Movements: Extraocular movements intact.      Pupils: Pupils are equal, round, and reactive to light.   Cardiovascular:      Rate and Rhythm: Normal rate and regular rhythm.      Pulses: Normal pulses.      Heart sounds: Normal heart sounds. No murmur heard.    No gallop.   Pulmonary:      Effort: Pulmonary effort is normal. No respiratory distress.      Breath sounds: No wheezing, rhonchi or rales.   Abdominal:      General: There is no distension.      Palpations: Abdomen is soft. There is no mass.      Tenderness: There is no abdominal tenderness. There is no guarding.   Musculoskeletal:         General: No swelling or tenderness.      Cervical back: No tenderness.      Right lower leg: No edema.      Left lower leg: No edema.   Skin:     General: Skin is warm and dry.      Findings: No rash.   Neurological:      General: No focal deficit present.      Mental Status: He is alert and oriented to person, place, and time. Mental status is at baseline.      Motor: No weakness.      Gait: Gait normal.   Psychiatric:         Mood and Affect: Mood normal.         Thought Content: Thought content normal.          Result Review :   The following data was reviewed by: Alex Buckley MD on 08/02/2021:                  Assessment and Plan    Diagnoses and all orders for this visit:    1. Hyperlipemia, mixed (Primary)  Assessment & Plan:  LDL of 75 remains at goal as of his 10/22 office visit.  We will continue with moderate dose simvastatin for now.      2. Hypertension, essential  Assessment & Plan:  This remains easily controlled on low-dose nadolol  along with diuretic therapy, continue same 10/22.      3. Stage 3b chronic kidney disease (HCC)  Assessment & Plan:  GFR is better still at 62 as of 10/22 office visit.  Looks like he was a little prerenal, reduction in furosemide dose has improved things.  We will continue to monitor.    Orders:  -     Basic Metabolic Panel; Future    4. 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  He had some dizziness with Ozempic, he was only on it for 2 weeks, felt better when he stopped it.  It is possible he was having some hypoglycemia but not sure it would have done much in just 2 weeks time.  Certainly not indicated at this time given much improved sugar control.  We will look into getting him the continuous glucose monitor now.  Otherwise continue with current insulin as written as well as metformin.    Orders:  -     Hemoglobin A1c; Future    5. Vascular dementia without behavioral disturbance (HCC)  Assessment & Plan:  Patient appears very appropriate this regards as of 10/22, no altered mental status etc.  Patient stable to continue low-dose Aricept.      6. Need for vaccination  -     Fluzone High-Dose 65+yrs ([id number])    7. Hypothyroidism, adult  Assessment & Plan:  TSH is normal as of 10/22.  He is stable to continue 250 mcg daily except for the 1 day a week that he takes 1-1/2 of the 125 mcg tablets.      Other orders  -     COVID-19 Bivalent Booster (Pfizer) 12+yrs    --  --  Older notes:  HOSP F/U 6/21 = S/P 5V CABG 5/21 per Dr Milligan=I reviewed records sent and the med list provided by wife.  TELEVISIT 2/25/21:  HOSP F/U 10/20 = I reviewed 9/20 [facility] records; I d/w pt labs/meds in detail:  1) CHEST PAIN and pt is being admitted to R/O MI; cardiology was notified...SPECT was neg, so will f/u with cards in a few weeks; may still need a cath=no new sxs and is gideon to see him next week as of 10/20 OV...to [facility] for CABG per Dr Milligan,  but PLT's too low; has f/u with cards.  2) CAD/MI with prior stents; ? last stress was done in cardiology office 5/19; will continue home meds for now...no rest angina; ? increase Imdur=defer to cards appt he has on 3/9/21---> S/P 5V CABG as above; looks great.    3) HTN will be followed closely on home meds=stable 10/20 OV, BUT is orthostatic, so drink more and lower ACEI to 5 mg---> stable 6/21.  4) HYPERLIPIDEMIA=continue statin=LDL 61 (9/20)---> 46 in 6/21.    5) CIRRHOSIS per Dr Bradshaw; watch volume status.  6) THROMBOCYTOPENIA is related to the cirrhosis and is stable around 70K...on Prednisone per Dr Saldana---> off this; labs on RTO.    7) DM per orders...A1C was only 7.3 in 1/21; she d/w me BS fine despite prednisone as of 2/21 OV; ? lost 20+ lbs---> 5.6 and I d/w stop glipizide 10 bid and stay on Humalog 15 tid, but then again not totally sure he's on it?    8) HYPOTHYROIDISM is wnl as of 2/21 OV---> RTO.    9) BPH on flomax after retenion issues in [place] as of 2/21 OV; to see Dr Dill---> saw him for chairez post-op = it's out now.    (lost friend/ 60's to covid)    VISIT 6/20:  ANNUAL MEDICARE WELLNESS EXAM 10/19 = reviewed all forms with pt; he is much more depressed, so zoloft was increased.  H.M. ISSUES:  DM = A1C as below; Optho=q 12 mo with last 4/18 = early diabetic retinopathy and ? laser next visit?  --  HTN...needs ACEI now at 6/17 OV; repeat urine micro as well...remains controlled...ACEI fell off his list; resume now 1/19...better already---> stable.  ? CKD3 noted 6/20 = no new meds; needs repeat few weeks.  --  DM...max out januvia...8.1 an has f/u with [facility]...on lantus 20, d/w increase 2-4 units every week to get FBS to 110...7.1 is great...7.5 is stuck and I d/w again to increase Lantus=told him 24 now... 8.8 is horrible, so increase glucotrol to whole tab in AM...8.9, so try whole tab bid before increase lantus...9.5 is worse and needs Humalog before meals; d/w qid testing; stop glucotrol  and increase lantus to 30 qhs...BS noted per phone and in office; rec 16/20/16 and stay on Lantus 30 qhs; d/w NO SSI for now...A1C down to 8.0 already and Fructosamine of 300=A1C closer to 7.5 actually...6.3 is great with TSH back down...6.8 is ok for now=7/19...7.3 in 10/19 and I d/w take 8 units with breakfast since he has been skipping this and only taking 15 with lunch/supper---> 7.3 great 6/20.   HYPOTHYROIDSIM stable 6/17...0.2 at 1/18 OV...0.7 better...increase 1/19 for 3.5 given above...? n/c, b/c now=10; will add 50 as of 1/19 OV...TSH 64 and apparently he missed them past week; I rec 250 qd for now and close f/u...0.7 and will leave this alone for now---> 1.5 in 2/20.  --  CAD per Dr Pritchett; s/p Spect '15 per pt and was neg---> still no CP/SOB and sees cards q 6 mo=no recent as of 6/20 OV.  LIPIDS with LDL 72...83 ok for now...LDL 57---> 55 in 2/20.  --  THROMBOCYTOPENIA is new 4/16 OV=99, so to HEME...off ASA but plavix ok per Dr Chaudhry; had BM bx=?...75 holding...on iron per her---> CBC stable 6/20 per her.  --  HOSP F/U 8/16 for FUO.  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  --  DJD s/p R TKR and then L TKR per Dr Eckert 1/16 and rehab with [facility] still on-going = 3x/wk at 4/16 OV...still with issues L knee 4/18...to have redo L TKR per Dr Zayas as of 10/18 OV=Dr Duran cleared him already...is gideon for 2/4/19, but apparently wants his A1C below 7 for this?? = d/w me 1/19...I d/w not going to get there that soon, but current blood sugars excellent and pt cleared for surgery from my standpoint as well=reviewed all their pre-op labs as well as EKG and CXR...s/p redo L TKR on 2/4/19 and looks great at 2/27/19 OV...finishing up as of 4/19 OV.  --  GERD per Dr Bradshaw.  ? CIRRHOSIS=tried on Nadolol per Dr Bradshaw=stopped it 6/20 due to diarrhea?; ? has CT/NH3 gideon.  --  OBESITY up 3 more to 263...258--->270 is stuck 2/20 and I d/w no meds=must go to [facility] ( Cards said no to  surgery).  --  DEPRESSION on maint med...? Dementia per pt, sent to Dr Kim; he sent for [facility] as of 4/18 OV...will address depression 8/18 = increase zoloft   [facility] as of [facility]17 [facility]---> c/w as of 2/21 OV; neg O2 in [facility] [place], operated by [facility] recently;   --  --  PSA 0.5 (4/7/16)...defer.  Colon 7/19...2 inflam/hyper polyps per Dr Bradshaw.  Prevnar 11/16; Pneumovax # 1 9/17;  (, retired [employer] '97, 3 kids)    Follow Up   Return in about 3 months (around 1/17/2023).  Patient was given instructions and counseling regarding his condition or for health maintenance advice. Please see specific information pulled into the AVS if appropriate.

## 2022-10-17 ENCOUNTER — OFFICE VISIT (OUTPATIENT)
Dept: INTERNAL MEDICINE | Facility: CLINIC | Age: 77
End: 2022-10-17

## 2022-10-17 VITALS
BODY MASS INDEX: 34.32 KG/M2 | HEART RATE: 69 BPM | DIASTOLIC BLOOD PRESSURE: 58 MMHG | HEIGHT: 72 IN | TEMPERATURE: 97.8 F | OXYGEN SATURATION: 98 % | SYSTOLIC BLOOD PRESSURE: 100 MMHG | WEIGHT: 253.4 LBS

## 2022-10-17 DIAGNOSIS — E11.8 TYPE 2 DIABETES MELLITUS WITH COMPLICATIONS: ICD-10-CM

## 2022-10-17 DIAGNOSIS — N18.32 STAGE 3B CHRONIC KIDNEY DISEASE: ICD-10-CM

## 2022-10-17 DIAGNOSIS — E78.2 HYPERLIPEMIA, MIXED: Primary | ICD-10-CM

## 2022-10-17 DIAGNOSIS — I10 HYPERTENSION, ESSENTIAL: ICD-10-CM

## 2022-10-17 DIAGNOSIS — Z23 NEED FOR VACCINATION: ICD-10-CM

## 2022-10-17 DIAGNOSIS — F01.50 VASCULAR DEMENTIA WITHOUT BEHAVIORAL DISTURBANCE: ICD-10-CM

## 2022-10-17 DIAGNOSIS — E03.9 HYPOTHYROIDISM, ADULT: ICD-10-CM

## 2022-10-17 PROCEDURE — G0008 ADMIN INFLUENZA VIRUS VAC: HCPCS | Performed by: INTERNAL MEDICINE

## 2022-10-17 PROCEDURE — 90662 IIV NO PRSV INCREASED AG IM: CPT | Performed by: INTERNAL MEDICINE

## 2022-10-17 PROCEDURE — 0124A COVID-19 (PFIZER) BIVALENT BOOSTER 12+YRS: CPT | Performed by: INTERNAL MEDICINE

## 2022-10-17 PROCEDURE — 91312 COVID-19 (PFIZER) BIVALENT BOOSTER 12+YRS: CPT | Performed by: INTERNAL MEDICINE

## 2022-10-17 PROCEDURE — 99214 OFFICE O/P EST MOD 30 MIN: CPT | Performed by: INTERNAL MEDICINE

## 2022-10-17 NOTE — ASSESSMENT & PLAN NOTE
A1c is down from 7.4 to 6.8 as of his 10/22 office visit.  He had some dizziness with Ozempic, he was only on it for 2 weeks, felt better when he stopped it.  It is possible he was having some hypoglycemia but not sure it would have done much in just 2 weeks time.  Certainly not indicated at this time given much improved sugar control.  We will look into getting him the continuous glucose monitor now.  Otherwise continue with current insulin as written as well as metformin.

## 2022-10-17 NOTE — ASSESSMENT & PLAN NOTE
Patient appears very appropriate this regards as of 10/22, no altered mental status etc.  Patient stable to continue low-dose Aricept.

## 2022-10-17 NOTE — ASSESSMENT & PLAN NOTE
GFR is better still at 62 as of 10/22 office visit.  Looks like he was a little prerenal, reduction in furosemide dose has improved things.  We will continue to monitor.

## 2022-10-17 NOTE — ASSESSMENT & PLAN NOTE
LDL of 75 remains at goal as of his 10/22 office visit.  We will continue with moderate dose simvastatin for now.

## 2022-10-17 NOTE — ASSESSMENT & PLAN NOTE
TSH is normal as of 10/22.  He is stable to continue 250 mcg daily except for the 1 day a week that he takes 1-1/2 of the 125 mcg tablets.

## 2022-10-17 NOTE — ASSESSMENT & PLAN NOTE
This remains easily controlled on low-dose nadolol along with diuretic therapy, continue same 10/22.

## 2022-11-01 ENCOUNTER — TELEPHONE (OUTPATIENT)
Dept: INTERNAL MEDICINE | Facility: CLINIC | Age: 77
End: 2022-11-01

## 2022-11-01 NOTE — TELEPHONE ENCOUNTER
----- Message from Alex Buckley MD sent at 10/17/2022  1:33 PM EDT -----  Patient is on long-acting as well as short acting insulin, takes a total of 4 shots a day, please ask [facility] to arrange continuous glucose monitor for him.

## 2022-12-14 RX ORDER — NADOLOL 20 MG/1
20 TABLET ORAL DAILY
Qty: 90 TABLET | Refills: 1 | Status: SHIPPED | OUTPATIENT
Start: 2022-12-14

## 2023-01-01 NOTE — PAYOR COMM NOTE
"Shell Gauthier (75 y.o. Male)                                   ATTENTION;   CONTINUED STAY CLINICALS FOR REVIEW     [phone]                                REPLY TO UR DEPT, DANNIE CHAMBERLAIN LPN  [phone] OR CALL                                        Date of Birth Social Security Number Address Home Phone MRN    1945  [facility] [address] [phone] [id number]    Yazidism Marital Status          Latter-day        Admission Date Admission Type Admitting Provider Attending Provider Department, Room/Bed    5/18/21 Urgent Jr Tom Tubbs MD  [facility] CARDIOV[facility] UNIT, 2227/1    Discharge Date Discharge Disposition Discharge Destination        5/31/2021 Home or Self Care              Attending Provider: (none)   Allergies: No Known Allergies    Isolation: None   Infection: None   Code Status: Prior    Ht: 182.9 cm (72\")   Wt: 111 kg (245 lb 12.8 oz)    Admission Cmt: None   Principal Problem: Coronary artery disease of native heart with stable angina pectoris (CMS/[facility]) [I25.118] More...                 Active Insurance as of 5/18/2021     Primary Coverage     Payor Plan Insurance Group Employer/Plan Group    AETNA MEDICARE REPLACEMENT AETNA MEDICARE REPLACEMENT [id number]     Payor Plan Address Payor Plan Phone Number Payor Plan Fax Number Effective Dates    [address] [phone]  1/1/2018 - None Entered    [facility] [id number]       Subscriber Name Subscriber Birth Date Member ID       SHELL GAUTHIER [id number]                 Emergency Contacts      (Rel.) Home Phone Work Phone Mobile Phone    Carla Gauthier (Spouse) [phone] -- --            Orders (last 72 hrs)      Start     Ordered    06/01/21 0000  finasteride (PROSCAR) 5 MG tablet  Daily      05/31/21 0905    06/01/21 0000  furosemide (LASIX) 40 MG tablet  Daily      05/31/21 0905    06/01/21 0000  potassium chloride (K-DUR,KLOR-CON) 20 " MEQ CR tablet  Daily      05/31/21 0905    05/31/21 1121  POC Glucose Once  Once      05/31/21 1118    05/31/21 0903  Remove Sutures From MT / CT Sites Prior to DIscharge  Once,   Status:  Canceled      05/31/21 0905 05/31/21 0903  Call CTS PA if Patient Does Not Have an Order for ASA, Beta Blocker & Statin / Lipid Lowering Drug  Once,   Status:  Canceled      05/31/21 0905    05/31/21 0903  Provide Patient With Post-Op Cardiac Surgery Home Instructions  Once,   Status:  Canceled      05/31/21 0905    05/31/21 0903  Provide Patient With Additional Pair of Compression Stockings  Once,   Status:  Canceled      05/31/21 0905    05/31/21 0903  Inpatient Case Management  Consult  Once,   Status:  Canceled     Provider:  (Not yet assigned)    05/31/21 0905 05/31/21 0858  Discharge patient  Once      05/31/21 0905 05/31/21 0536  POC Glucose Once  Once      05/31/21 0534    05/31/21 0000  traMADol (ULTRAM) 50 MG tablet  Every 6 Hours PRN      05/31/21 0905    05/31/21 0000  Cardiac Rehab Evaluation and Enrollment     Provider:  (Not yet assigned)    05/31/21 0905    05/31/21 0000  Discharge Follow-up with Specialty: Cardiothoracic Surgery      05/31/21 0905    05/31/21 0000  Ambulatory Referral to Home Health      05/31/21 0905 05/31/21 0000  Discharge Follow-up with Specialty: Cardiology; 2 Weeks      05/31/21 0905    05/31/21 0000  Discharge Follow-up with PCP      05/31/21 0905    05/30/21 2127  POC Glucose Once  Once      05/30/21 2121    05/30/21 1550  POC Glucose Once  Once      05/30/21 1548    05/30/21 1104  POC Glucose Once  Once      05/30/21 1102    05/30/21 0632  POC Glucose Once  Once      05/30/21 0630    05/30/21 0012  Troponin  STAT     Comments: For Chest Pain      05/30/21 0012    05/30/21 0012  Potassium  STAT,   Status:  Canceled     Comments: Four hours after dose given.      05/30/21 0012    05/30/21 0012  Magnesium  STAT     Comments: If potassium stays low after  replacement      05/30/21 0012    05/29/21 2358  ECG 12 Lead  STAT      05/29/21 2357    05/29/21 2047  POC Glucose Once  Once      05/29/21 2043    05/28/21 0900  furosemide (LASIX) tablet 40 mg  Daily,   Status:  Discontinued      05/27/21 0841    05/28/21 0856  melatonin tablet 5 mg  Nightly PRN,   Status:  Discontinued      05/28/21 0856    05/27/21 0900  nadolol (CORGARD) tablet 20 mg  Daily,   Status:  Discontinued      05/26/21 0846    05/26/21 1030  finasteride (PROSCAR) tablet 5 mg  Daily,   Status:  Discontinued      05/26/21 0935    05/25/21 1319  traMADol (ULTRAM) tablet 50 mg  Every 6 Hours PRN,   Status:  Discontinued      05/25/21 1319    05/24/21 2100  donepezil (ARICEPT) tablet 10 mg  Nightly,   Status:  Discontinued      05/24/21 0755    05/24/21 2100  insulin glargine (LANTUS, SEMGLEE) injection 30 Units  Nightly,   Status:  Discontinued      05/24/21 0758    05/24/21 1030  potassium chloride (K-DUR,KLOR-CON) ER tablet 20 mEq  Daily,   Status:  Discontinued      05/24/21 0940    05/24/21 0845  ferrous sulfate tablet 325 mg  Daily With Breakfast,   Status:  Discontinued      05/24/21 0758    05/24/21 0845  glipizide (GLUCOTROL) tablet 10 mg  2 Times Daily Before Meals,   Status:  Discontinued      05/24/21 0758    05/24/21 0800  levothyroxine (SYNTHROID, LEVOTHROID) tablet 50 mcg  Every Early Morning,   Status:  Discontinued      05/24/21 0757    05/23/21 1145  tamsulosin (FLOMAX) 24 hr capsule 0.4 mg  Daily,   Status:  Discontinued      05/23/21 1053    05/23/21 0600  Clean Midsternal Incision & Chest Tube Sites With Hibiclens Beginning on POD 3  Daily,   Status:  Canceled      05/20/21 1139    05/22/21 2143  Monitor QTc  Every Shift,   Status:  Canceled      05/22/21 2143    05/22/21 1030  Bladder Scan if Patient Unable to Void 4-6 Hours After Catheter Removal  As Needed,   Status:  Canceled      05/22/21 1030    05/22/21 1030  If Bladder Scan Volume is Less Than 500mL & Patient is Without  Symptoms of Bladder Discomfort / Distention Monitor Every 1-2 Hours for Spontaneous Void  As Needed,   Status:  Canceled      05/22/21 1030    05/22/21 1030  Straight Cath Every 4-6 Hours As Needed If Patient is Unable to Void After 4-6 Hours, Bladder Scan Volume is Greater Than 500mL & Patient Has Symptoms of Bladder Discomfort / Distention  As Needed,   Status:  Canceled      05/22/21 1030    05/22/21 1030  Schedule / Prompt Voiding For Patients With Urinary Incontinence  As Needed,   Status:  Canceled      05/22/21 1030    05/22/21 0600  Change Chest Dressing Daily While Intubated  Daily,   Status:  Canceled      05/20/21 1139 05/22/21 0600  Incision Care - Cleanse With Normal Saline & 4x4 Gauze Using Sterile Technique  Daily,   Status:  Canceled      05/20/21 1139 05/22/21 0600  Chest Tube & Pacing Wire Sites - Cleanse With Normal Saline & 4x4 Gauze Using Sterile Technique  Daily,   Status:  Canceled      05/20/21 1139 05/22/21 0600  Basic Metabolic Panel  Daily,   Status:  Canceled      05/20/21 1139 05/21/21 2100  sennosides-docusate (PERICOLACE) 8.6-50 MG per tablet 2 tablet  Nightly,   Status:  Discontinued      05/20/21 1139 05/21/21 2100  polyethylene glycol (MIRALAX) packet 17 g  Daily,   Status:  Discontinued      05/20/21 1139 05/21/21 1800  enoxaparin (LOVENOX) syringe 40 mg  Daily,   Status:  Discontinued      05/20/21 1139 05/21/21 1400  Intake & Output  Every Shift,   Status:  Canceled      05/20/21 1139 05/21/21 1306  Troponin  As Needed,   Status:  Canceled     Comments: For Chest Pain      05/21/21 1307    05/21/21 1306  Digoxin Level  As Needed,   Status:  Canceled     Comments: For Atrial Arrhythmias      05/21/21 1307    05/21/21 1306  Telemetry - Pulse Oximetry  Continuous PRN,   Status:  Canceled     Comments: If Patient Develops Unresponsiveness, Acute Dyspnea, Cyanosis or Suspected Hypoxemia Start Continuous Pulse Ox Monitoring, Apply Oxygen & Notify Provider     05/21/21 1307    05/21/21 1200  Vital Signs  Every 4 Hours,   Status:  Canceled     Comments: Perform Vitals Less Frequently Only if Patient Stable      05/20/21 1139 05/21/21 1100  POC Glucose 4x Daily AC & at Bedtime  4 Times Daily Before Meals & at Bedtime,   Status:  Canceled     Comments: If bedtime blood glucose is greater than 350 mg/dl, call MDMelinda      05/21/21 0804    05/21/21 0900  aspirin EC tablet 81 mg  Daily,   Status:  Discontinued      05/20/21 1139 05/21/21 0900  guaiFENesin (MUCINEX) 12 hr tablet 1,200 mg  Every 12 Hours Scheduled,   Status:  Discontinued      05/21/21 0753    05/21/21 0900  insulin lispro (ADMELOG) injection 0-14 Units  4 Times Daily With Meals & Nightly,   Status:  Discontinued      05/21/21 0804    05/21/21 0830  Oscillating Positive Expiratory Pressure (OPEP)  4 Times Daily - RT,   Status:  Canceled      05/21/21 0758    05/21/21 0804  dextrose (GLUTOSE) oral gel 15 g  Every 15 Minutes PRN,   Status:  Discontinued      05/21/21 0804    05/21/21 0804  dextrose (D50W) 25 g/ 50mL Intravenous Solution 25 g  Every 15 Minutes PRN,   Status:  Discontinued      05/21/21 0804    05/21/21 0804  glucagon (human recombinant) (GLUCAGEN DIAGNOSTIC) injection 1 mg  Every 15 Minutes PRN,   Status:  Discontinued      05/21/21 0804    05/21/21 0700  pantoprazole (PROTONIX) EC tablet 40 mg  Every Morning,   Status:  Discontinued      05/20/21 1139 05/21/21 0436  acetaminophen (TYLENOL) tablet 650 mg  Every 4 Hours PRN,   Status:  Discontinued      05/20/21 1139    05/21/21 0436  acetaminophen (TYLENOL) 160 MG/5ML solution 650 mg  Every 4 Hours PRN,   Status:  Discontinued      05/20/21 1139 05/21/21 0436  acetaminophen (TYLENOL) suppository 650 mg  Every 4 Hours PRN,   Status:  Discontinued      05/20/21 1139    05/21/21 0000  bisacodyl (DULCOLAX) suppository 10 mg  Daily PRN,   Status:  Discontinued      05/20/21 1139 05/21/21 0000  magnesium hydroxide (MILK OF MAGNESIA) suspension  2400 mg/10mL 10 mL  Daily PRN,   Status:  Discontinued      05/20/21 1139    05/21/21 0000  cyclobenzaprine (FLEXERIL) tablet 10 mg  Every 8 Hours PRN,   Status:  Discontinued      05/20/21 1139    05/21/21 0000  Ambulatory Referral to Cardiac Rehab      05/21/21 0850    05/20/21 2100  atorvastatin (LIPITOR) tablet 40 mg  Nightly,   Status:  Discontinued      05/20/21 1139    05/20/21 1230  sodium chloride 0.9 % infusion  Continuous,   Status:  Discontinued      05/20/21 1139    05/20/21 1200  Check Peripheral Pulses Every 4 Hours  Every 4 Hours,   Status:  Canceled      05/20/21 1139    05/20/21 1140  Daily Weights  Daily,   Status:  Canceled      05/20/21 1139    05/20/21 1140  Monitor QTc  Every Shift,   Status:  Canceled      05/20/21 1139    05/20/21 1140  Incentive Spirometry  Every Hour While Awake,   Status:  Canceled      05/20/21 1139    05/20/21 1139  naloxone (NARCAN) injection 0.4 mg  Every 5 Minutes PRN,   Status:  Discontinued      05/20/21 1139    05/20/21 1139  potassium chloride 10 mEq in 100 mL IVPB  Every 1 Hour PRN,   Status:  Discontinued      05/20/21 1139    05/20/21 1139  potassium chloride 10 mEq in 100 mL IVPB  Every 1 Hour PRN,   Status:  Discontinued      05/20/21 1139    05/20/21 1139  pantoprazole (PROTONIX) injection 40 mg  Once,   Status:  Discontinued      05/20/21 1139    05/20/21 1139  potassium chloride (K-DUR,KLOR-CON) ER tablet 40 mEq  As Needed,   Status:  Discontinued      05/20/21 1139    05/20/21 1139  potassium chloride (KLOR-CON) packet 40 mEq  As Needed,   Status:  Discontinued      05/20/21 1139    05/20/21 1139  Up in Chair As Tolerated After Extubation  As Needed,   Status:  Canceled      05/20/21 1139    05/20/21 1139  Protime-INR  As Needed,   Status:  Canceled     Comments: Chest Tube Drainage Greater Than 200mL/hr      05/20/21 1139    05/20/21 1139  sodium chloride 0.9 % flush 30 mL  Once As Needed,   Status:  Discontinued      05/20/21 1139    05/20/21 1139   sodium chloride 0.9 % infusion  Continuous PRN,   Status:  Discontinued      05/20/21 1139    05/20/21 1139  potassium chloride 20 mEq in 50 mL IVPB  Every 1 Hour PRN,   Status:  Discontinued      05/20/21 1139    05/20/21 1139  potassium chloride 20 mEq in 50 mL IVPB  Every 1 Hour PRN,   Status:  Discontinued      05/20/21 1139    05/20/21 1139  potassium chloride 20 mEq in 50 mL IVPB  Every 1 Hour PRN,   Status:  Discontinued      05/20/21 1139    05/20/21 1139  Pacemaker Settings - Initiate for Heart Rate Less Than 60 And / Or Hemodynamically Unstable  As Needed,   Status:  Canceled     Comments: Mode: AAI  Atrial rate: 90  Atrial mA: 10  Atrial mV: 0.5    05/20/21 1139    05/20/21 1139  Oxygen Therapy- Nasal Cannula; 2 LPM; Titrate for SPO2: 92%  Continuous PRN,   Status:  Canceled      05/20/21 1139    05/20/21 1139  Patient May Use Home CPAP / BIPAP As Needed  As Needed,   Status:  Canceled      05/20/21 1139    05/20/21 1139  niCARdipine (CARDENE) 25 mg in 250 mL NS infusion kit  Continuous PRN,   Status:  Discontinued      05/20/21 1139    05/20/21 1139  oxyCODONE (ROXICODONE) immediate release tablet 10 mg  Every 4 Hours PRN      05/20/21 1139    05/20/21 1139  morphine injection 4 mg  Every 30 Minutes PRN      05/20/21 1139    05/20/21 1139  Potassium  As Needed,   Status:  Canceled     Comments: Four hours after dose given.      05/20/21 1139    05/20/21 1139  ondansetron (ZOFRAN) injection 4 mg  Every 6 Hours PRN,   Status:  Discontinued      05/20/21 1139    05/20/21 1139  Insert Nasogastric Tube If Indicated & Not Already in Place  As Needed,   Status:  Canceled     Comments: Indications: Nausea, Vomiting, Prolonged Intubation or to Administer Medications  Attach to Low Wall Suction if Any Residual    05/20/21 1139    05/20/21 1139  Fibrinogen  As Needed,   Status:  Canceled     Comments: Chest Tube Drainage Greater Than 200mL/hr      05/20/21 1139    05/20/21 1139  HYDROcodone-acetaminophen (NORCO)  5-325 MG per tablet 2 tablet  Every 4 Hours PRN      05/20/21 1139    05/20/21 1139  insulin regular (HumuLIN R,NovoLIN R) 100 Units in sodium chloride 0.9 % 100 mL (1 Units/mL) infusion  Continuous PRN,   Status:  Discontinued      05/20/21 1139    05/20/21 1139  Magnesium  As Needed,   Status:  Canceled     Comments: If potassium stays low after replacement      05/20/21 1139    05/20/21 1139  Check Peripheral Pulses As Needed  As Needed,   Status:  Canceled      05/20/21 1139    05/20/21 1139  Cleanse Incision With Normal Saline and Redress With Dry 4x4 Gauze if Incision is Draining  As Needed,   Status:  Canceled      05/20/21 1139    05/20/21 1139  Change Chest Tube Dressings PRN to Keep Dry - Do NOT Reinforce  As Needed,   Status:  Canceled      05/20/21 1139    05/20/21 1139  Dangle at Bedside After Extubation  As Needed,   Status:  Canceled      05/20/21 1139    05/20/21 1139  CBC & Differential  As Needed,   Status:  Canceled     Comments: Chest Tube Drainage Greater Than 200mL/hr      05/20/21 1139    05/20/21 1139  Cardiac Output Parameters PRN Until 24 Hours Post-Op  As Needed,   Status:  Canceled      05/20/21 1139    05/20/21 1139  Blood Gas, Arterial -  As Needed,   Status:  Canceled     Comments: 30 Minutes After Ventilator Changes, 30 Minutes After Extubation and PRN      05/20/21 1139    05/20/21 1139  aPTT  As Needed,   Status:  Canceled     Comments: Chest Tube Drainage Greater Than 200mL/hr      05/20/21 1139    05/20/21 1139  bisacodyl (DULCOLAX) EC tablet 10 mg  Daily PRN,   Status:  Discontinued      05/20/21 1139    05/20/21 1139  ALPRAZolam (XANAX) tablet 0.25 mg  Every 8 Hours PRN      05/20/21 1139    --  aspirin 81 MG chewable tablet  Daily      05/18/21 1556    --  levothyroxine (SYNTHROID, LEVOTHROID) 50 MCG tablet  Daily      05/18/21 1556    --  SCANNED - TELEMETRY        05/18/21 0000              Encounter Information     Department Encounter #   5/18/2021  3:08 PM Clarence Hernandez  [facility] [id number]      Dmitri Tomas MD   Physician   Cardiothoracic Surgery   Progress Notes       Signed   Date of Service:  05/30/21 0858   Creation Time:  05/30/21 0858            Signed             Show:Clear all  [x]Manual[]Template[]Copied    Added by:  [x]Dmitri Tomas MD    []Caitie for details  [facility] note  Clinical change  Awaiting bed in rehab             Admission (Discharged) on 5/18/2021     Admission (Discharged) on 5/18/2021        Detailed Report        ROS Info      Note shared with patient       Physician Progress Notes     Encounter Information     Department Encounter #    [facility] [id number]   Glenys Wolf RN   Registered Nurse   Cardiology   Plan of Care   Signed   Date of Service:  05/29/21 0645   Creation Time:  05/29/21 0645            Signed             Show:Clear all  [x]Manual[x]Template[]Copied    Added by:  [x]Glenys Wolf RN    []Caitie for details  Goal Outcome Evaluation:  Pt VSS, SR, HR 70s. Pt had some c/o headache, given tylenol and it did not help, given ultram which helped. No further c/o pain. Kaur in place. WCTM.             Admission (Discharged) on 5/18/2021                                Dmitri Tomas MD at 05/30/21 0858        [facility] note  Clinical change  Awaiting bed in rehab    Electronically signed by Dmitri Tomas MD at 05/30/21 0858    Encounter Information     Department Encounter #   5/18/2021  3:08 PM [facility] [id number]   Glenys Wolf RN   Registered Nurse   Cardiology   Plan of Care   Signed   Date of Service:  05/29/21 0645   Creation Time:  05/29/21 0645            Signed             Show:Clear all  [x]Manual[x]Template[]Copied    Added by:  [x]Glenys Wolf RN    []Caitie for details  Goal Outcome Evaluation:  Pt VSS, SR, HR 70s. Pt had some c/o headache, given tylenol and it did not help, given ultram which helped. No further c/o pain. Kaur in place. WCTM.            Encounter  Information     Department Encounter #   5/18/2021  3:08 PM  [facility] [id number]   Aureliano Phillip, RN   Registered Nurse   Cardiology   Plan of Care   Signed   Date of Service:  05/29/21 1842   Creation Time:  05/29/21 1842            Signed             Show:Clear all  [x]Manual[x]Template[]Copied    Added by:  [x]Aureliano Phillip, RN    []Caitie for details  Goal Outcome Evaluation:  Plan of Care Reviewed With: patient  Progress: improving  Outcome Summary: Pt SR on tele, VSS, pt waiting on tranfer to Rehab facility, not sure timeline for that, WCTM  Headaches controlled with Ultram 50mg.             Admission (Discharged) on 5/18/2021        Encounter Information     Department Encounter #   5/18/2021  3:08 PM [facility] [id number]   Fabiola Burris APRN   Nurse Practitioner   Cardiothoracic Surgery   Progress Notes       Attested   Date of Service:  05/28/21 0835   Creation Time:  05/28/21 0835            Attested        Attestation signed by Jr Tom Tubbs MD at 05/28/21 1131   I have reviewed this documentation and agree.         Expand AllCollapse All  []Expand All by Default    Show:Clear all  [x]Manual[x]Template[x]Copied    Added by:  [x]Fabiola Burris APRN    []Caitie for details   LOS: 10 days   Patient Care Team:  Alex Buckley MD as PCP - General (Internal Medicine)     Chief Complaint: post op     Subjective:  Symptoms:  No shortness of breath, cough or chest pain.    Diet:  Adequate intake.  No nausea or vomiting.    Activity level: Impaired due to weakness.    Pain:  He complains of pain that is mild.  Pain is well controlled.       Shortness of breath improved this morning. Still not sleeping very well     Vital Signs  Temp:  [97.8 °F (36.6 °C)-99 °F (37.2 °C)] 99 °F (37.2 °C)  Heart Rate:  [64-66] 64  Resp:  [18] 18  BP: ()/(67-93) 92/73  Body mass index is 34.75 kg/m².     Intake/Output Summary (Last 24 hours) at 5/28/2021 0835  Last data filed at  "5/28/2021 0627      Gross per 24 hour   Intake 360 ml   Output 2550 ml   Net -2190 ml      No intake/output data recorded.        Vitals               05/25/21  0600 05/26/21  0435 05/27/21  0725   Weight: 119 kg (263 lb 4.8 oz) 120 kg (263 lb 11.2 oz) 116 kg (256 lb 3.2 oz)               Objective:  General Appearance:  Comfortable and in no acute distress.    Vital signs: (most recent): Blood pressure 92/73, pulse 64, temperature 99 °F (37.2 °C), temperature source Oral, resp. rate 18, height 182.9 cm (72\"), weight 116 kg (256 lb 3.2 oz), SpO2 99 %.  Vital signs are normal.  No fever.    Output: Producing urine and producing stool.    Lungs:  Normal effort and normal respiratory rate.  There are rales and decreased breath sounds.    Heart: Normal rate.  Regular rhythm.    Abdomen: Abdomen is soft.  Bowel sounds are normal.     Extremities: There is no dependent edema.    Pulses: Distal pulses are intact.    Neurological: Patient is alert and oriented to person, place and time.    Skin:  Warm and dry.  (Sternal wound clean, dry, and intact)           Results Review:          WBC       WBC   Date Value Ref Range Status   05/28/2021 8.69 3.40 - 10.80 10*3/mm3 Final       HGB       Hemoglobin   Date Value Ref Range Status   05/28/2021 8.8 (L) 13.0 - 17.7 g/dL Final       HCT       Hematocrit   Date Value Ref Range Status   05/28/2021 26.5 (L) 37.5 - 51.0 % Final       Platelets       Platelets   Date Value Ref Range Status   05/28/2021 168 140 - 450 10*3/mm3 Final          PT/INR:  No results found for: PROTIME/No results found for: INR     Sodium       Sodium   Date Value Ref Range Status   05/28/2021 139 136 - 145 mmol/L Final   05/27/2021 137 136 - 145 mmol/L Final   05/26/2021 132 (L) 136 - 145 mmol/L Final       Potassium       Potassium   Date Value Ref Range Status   05/28/2021 3.9 3.5 - 5.2 mmol/L Final   05/27/2021 3.9 3.5 - 5.2 mmol/L Final   05/26/2021 4.3 3.5 - 5.2 mmol/L Final       Chloride     "   Chloride   Date Value Ref Range Status   05/28/2021 104 98 - 107 mmol/L Final   05/27/2021 104 98 - 107 mmol/L Final   05/26/2021 100 98 - 107 mmol/L Final       Bicarbonate       CO2   Date Value Ref Range Status   05/28/2021 22.2 22.0 - 29.0 mmol/L Final   05/27/2021 22.5 22.0 - 29.0 mmol/L Final   05/26/2021 20.1 (L) 22.0 - 29.0 mmol/L Final       BUN       BUN   Date Value Ref Range Status   05/28/2021 27 (H) 8 - 23 mg/dL Final   05/27/2021 38 (H) 8 - 23 mg/dL Final   05/26/2021 42 (H) 8 - 23 mg/dL Final       Creatinine       Creatinine   Date Value Ref Range Status   05/28/2021 1.32 (H) 0.76 - 1.27 mg/dL Final   05/27/2021 1.24 0.76 - 1.27 mg/dL Final   05/26/2021 1.54 (H) 0.76 - 1.27 mg/dL Final       Calcium       Calcium   Date Value Ref Range Status   05/28/2021 9.4 8.6 - 10.5 mg/dL Final   05/27/2021 9.0 8.6 - 10.5 mg/dL Final   05/26/2021 8.8 8.6 - 10.5 mg/dL Final       Magnesium No results found for: MG         aspirin, 81 mg, Oral, Daily  atorvastatin, 40 mg, Oral, Nightly  donepezil, 10 mg, Oral, Nightly  enoxaparin, 40 mg, Subcutaneous, Daily  ferrous sulfate, 325 mg, Oral, Daily With Breakfast  finasteride, 5 mg, Oral, Daily  furosemide, 40 mg, Oral, Daily  glipizide, 10 mg, Oral, BID AC  guaiFENesin, 1,200 mg, Oral, Q12H  insulin glargine, 30 Units, Subcutaneous, Nightly  insulin lispro, 0-14 Units, Subcutaneous, 4x Daily With Meals & Nightly  levothyroxine, 50 mcg, Oral, Q AM  nadolol, 20 mg, Oral, Daily  pantoprazole, 40 mg, Intravenous, Once   Followed by  pantoprazole, 40 mg, Oral, QAM  polyethylene glycol, 17 g, Oral, Daily  potassium chloride, 20 mEq, Oral, Daily  senna-docusate sodium, 2 tablet, Oral, Nightly  tamsulosin, 0.4 mg, Oral, Daily        insulin, 0-50 Units/hr, Last Rate: Stopped (05/21/21 0600)  niCARdipine, 5-15 mg/hr  sodium chloride, 30 mL/hr, Last Rate: 30 mL/hr (05/20/21 1214)  sodium chloride, 30 mL/hr, Last Rate: 30 mL/hr (05/20/21 1215)                      Patient  Active Problem List   Diagnosis Code   • CAD (coronary artery disease) I25.10   • Morbidly obese (CMS/HCC) E66.01   • CAD (coronary artery disease), native coronary artery I25.10   • Lymphoma (CMS/HCC) C85.90   • Diabetes mellitus (CMS/HCC) E11.9   • Dementia (CMS/HCC) F03.90   • Stage 3b chronic kidney disease (CMS/HCC) N18.32   • Thrombocytopenia (CMS/HCC) D69.6   • Disease of thyroid gland E07.9   • Anemia D64.9   • Lymphocytosis D72.820   • Coronary artery disease of native heart with stable angina pectoris (CMS/HCC) I25.118         Assessment & Plan   -Multivessel CAD--s/p CABGx5 LIMA/LSVG- POD#8 Arley  -Obesity  -CKD, stage III  -chronic thrombocytopenia--stable  -Diabetes   -post op anemia- expected acute blood loss--stable  -post op urinary retention--chairez replaced and urology consulted     Feeling better today  Weaned to RA and tolerating  Excellent response to diuretics--continue PO lasix  Encourage pulmonary toilet/mobilize  Chairez replaced due to urinary retention--continue proscar/flomax. Per urology, to be discharged with chairez and f/u as outpatient in 1-2 weeks  Will add some melatonin for sleep  Ready for discharge to SNF when bed available     Fabiola Burris, MAXWELL  05/28/21  08:35 EDT            Cosigned by: Jr Tom Tubbs MD at 05/28/21 1131    A         Discharge Summary      Jr Tom Tubbs MD at 05/31/21 0907          Date of Admission: 5/18/2021  Date of Discharge:  5/31/2021    Discharge Diagnosis:    CAD (coronary artery disease)   • Morbidly obese (CMS/HCC)   • CAD (coronary artery disease), native coronary artery   • Lymphoma (CMS/HCC)   • Diabetes mellitus (CMS/HCC)   • Dementia (CMS/HCC)   • Stage 3b chronic kidney disease (CMS/HCC)   • Thrombocytopenia (CMS/HCC)   • Disease of thyroid gland   • Anemia   • Lymphocytosis   • Coronary artery disease of native heart with stable angina pectoris (CMS/HCC)         Presenting Problem/History of Present Illness  CAD  (coronary artery disease), native coronary artery [I25.10]     Hospital Course  Patient is a 75 y.o. male presented with unstable angina.  We have been following him for a number of weeks with his coronary disease and thrombocytopenia.  He was finally at a point where we thought surgery was advisable.  This he underwent on 5/20/2021 he did well postoperatively.  He had no bleeding issues from the thrombocytopenia and actually on 28 May his platelet count was 168,000 which is the highest has been a long time.  He did have some urinary issues and has had chronic BPH and will go home with his [facility].  He will follow up with his home urologist in a week.  Otherwise he is doing well...      Procedures Performed  Procedure(s):  MIDLINE STERNOTOMY,CORONARY ARTERY BYPASS GRAFTING X 5, UTILIZING THE LEFT COMPA AND LEFT SAPHENOUS VEIN, ABHIJEET, PRP  TRANSESOPHAGEAL ECHOCARDIOGRAM WITH ANESTHESIA    CABG x5.  Skeletonized LIMA to mid LAD.  Sequential vein graft to PDA and distal right coronary artery.  Vein graft OM1.  Vein graft OM 2.  Temporary cardiopulmonary bypass.  Antegrade and retrograde cold blood cardioplegia with warm reperfusion.  Neurologic monitoring.  Transesophageal echo.  Endoscopic vein harvest left greater saphenous vein.    Consults:   Consults     Date and Time Order Name Status Description    5/26/2021  9:39 AM Inpatient Urology Consult Completed           Pertinent Test Results:    Lab Results   Component Value Date    WBC 8.69 05/28/2021    HGB 8.8 (L) 05/28/2021    HCT 26.5 (L) 05/28/2021    MCV 89.5 05/28/2021     05/28/2021      Lab Results   Component Value Date    GLUCOSE 105 (H) 05/31/2021    CALCIUM 9.1 05/31/2021     05/31/2021    K 4.8 05/31/2021    CO2 25.6 05/31/2021     05/31/2021    BUN 14 05/31/2021    CREATININE 1.23 05/31/2021    EGFRIFNONA 58 (L) 05/31/2021    BCR 11.4 05/31/2021    ANIONGAP 8.4 05/31/2021     Lab Results   Component Value Date    INR 1.21 (H) 05/21/2021     PROTIME 15.1 (H) 05/21/2021         Condition on Discharge: Much improved    Vital Signs  Temp:  [97.8 °F (36.6 °C)-98.6 °F (37 °C)] 98.6 °F (37 °C)  Heart Rate:  [59-71] 71  Resp:  [16] 16  BP: (105-129)/(64-81) 124/81  Body mass index is 33.34 kg/m².    Discharge Disposition  Home or Self Care    Discharge Medications     Discharge Medications      New Medications      Instructions Start Date   finasteride 5 MG tablet  Commonly known as: PROSCAR   5 mg, Oral, Daily   Start Date: June 1, 2021     furosemide 40 MG tablet  Commonly known as: LASIX   40 mg, Oral, Daily   Start Date: June 1, 2021     potassium chloride 20 MEQ CR tablet  Commonly known as: K-DUR,KLOR-CON   20 mEq, Oral, Daily   Start Date: June 1, 2021     traMADol 50 MG tablet  Commonly known as: ULTRAM   50 mg, Oral, Every 6 Hours PRN         Continue These Medications      Instructions Start Date   amitriptyline 50 MG tablet  Commonly known as: ELAVIL   50 mg, Oral, Nightly      aspirin 81 MG chewable tablet   81 mg, Oral, Daily      DONEPEZIL HCL PO   10 mg, Oral, Every Night at Bedtime      ferrous sulfate 325 (65 FE) MG tablet   325 mg, Oral, 2 times daily      gabapentin 300 MG capsule  Commonly known as: NEURONTIN   300 mg, Oral, Nightly      glipizide 10 MG tablet  Commonly known as: GLUCOTROL   10 mg, Oral, 2 Times Daily Before Meals      insulin glargine 100 UNIT/ML injection  Commonly known as: LANTUS, SEMGLEE   30 Units, Subcutaneous, Nightly      insulin lispro 100 UNIT/ML injection  Commonly known as: humaLOG   15 Units, Subcutaneous, 3 Times Daily Before Meals      levothyroxine 200 MCG tablet  Commonly known as: SYNTHROID, LEVOTHROID   200 mcg, Oral, Every Morning      levothyroxine 50 MCG tablet  Commonly known as: SYNTHROID, LEVOTHROID   50 mcg, Oral, Daily      metFORMIN 1000 MG tablet  Commonly known as: GLUCOPHAGE   1,000 mg, Oral, 2 Times Daily With Meals      nadolol 40 MG tablet  Commonly known as: CORGARD   40 mg, Oral, Daily       pantoprazole 40 MG EC tablet  Commonly known as: PROTONIX   40 mg, Oral, Daily      sertraline 100 MG tablet  Commonly known as: ZOLOFT   100 mg, Oral, Daily, Take 1.5 tablets daily       simvastatin 40 MG tablet  Commonly known as: ZOCOR   40 mg, Oral, Nightly, Take 0.5 tablets every night       tamsulosin 0.4 MG capsule 24 hr capsule  Commonly known as: FLOMAX   0.4 mg, Oral, Daily      vitamin B-12 1000 MCG tablet  Commonly known as: CYANOCOBALAMIN   2,000 mcg, Oral, Nightly      vitamin D3 125 MCG (5000 UT) capsule capsule   5,000 Units, Oral, Daily         Stop These Medications    isosorbide mononitrate 60 MG 24 hr tablet  Commonly known as: IMDUR     lisinopril 5 MG tablet  Commonly known as: PRINIVIL,ZESTRIL            Discharge Diet: Low carbohydrate    Activity at Discharge: No lifting over 10 pounds for a month.  Driving in 2 weeks.  Ambulate up to 30 minutes a day with home health and then start cardiac rehab.    Follow-up Appointments  Future Appointments   Date Time Provider Department Center   9/28/2021  1:00 PM Sven Duran MD [facility]     Additional Instructions for the Follow-ups that You Need to Schedule     Ambulatory Referral to Cardiac Rehab   As directed      Ambulatory Referral to Home Health   As directed      Face to Face Visit Date: 5/31/2021    Follow-up provider for Plan of Care?: I will be treating the patient on an ongoing basis.  Please send me the Plan of Care for signature.    Follow-up provider: JR FARTUN TENORIO [[id number]]    Reason/Clinical Findings: s/p cabg    Describe mobility limitations that make leaving home difficult: s/p cabg    Nursing/Therapeutic Services Requested: Skilled Nursing    Skilled nursing orders: Post CABG care Monthly catheter care Cardiopulmonary assessments    Frequency: 1 Week 1    Home Health Reason: Physical Therapy    Usage: Short Term Continuous    Delivery Modality: Nasal Cannula (none)    Liters Per Minute: 0    Type of DME: 0          Discharge Follow-up with PCP   As directed       Currently Documented PCP:    Alex Buckley MD    PCP Phone Number:    [phone]     Follow Up Details: Follow Up With PCP Within 7 Days if Not Able to Return to [facility] for Appointment Within 7 Days         Discharge Follow-up with Specialty: Cardiology; 2 Weeks   As directed      Specialty: Cardiology    Follow Up: 2 Weeks         Discharge Follow-up with Specialty: Cardiothoracic Surgery   As directed      Follow Up Details: Follow Up in 2-4 Weeks    Specialty: Cardiothoracic Surgery         Cardiac Rehab Evaluation and Enrollment   Jun 05, 2021      Reason for Consult?: Education               Test Results Pending at Discharge       Tom Tubbs MD  05/31/21  09:07 EDT              Electronically signed by Jr Tom Tubbs MD at 05/31/21 0910        Right ear hearing screen completed date: 2023  Right ear screen method: EOAE (evoked otoacoustic emission)  Right ear screen result: Passed  Right ear screen comment: N/A    Left ear hearing screen completed date: 2023  Left ear screen method: EOAE (evoked otoacoustic emission)  Left ear screen result: Passed  Left ear screen comments: N/A

## 2023-01-16 ENCOUNTER — LAB (OUTPATIENT)
Dept: LAB | Facility: HOSPITAL | Age: 78
End: 2023-01-16
Payer: MEDICARE

## 2023-01-16 DIAGNOSIS — N18.32 STAGE 3B CHRONIC KIDNEY DISEASE: ICD-10-CM

## 2023-01-16 DIAGNOSIS — E11.8 TYPE 2 DIABETES MELLITUS WITH COMPLICATIONS: ICD-10-CM

## 2023-01-16 LAB
ANION GAP SERPL CALCULATED.3IONS-SCNC: 11.4 MMOL/L (ref 5–15)
BUN SERPL-MCNC: 21 MG/DL (ref 8–23)
BUN/CREAT SERPL: 15.6 (ref 7–25)
CALCIUM SPEC-SCNC: 10 MG/DL (ref 8.6–10.5)
CHLORIDE SERPL-SCNC: 103 MMOL/L (ref 98–107)
CO2 SERPL-SCNC: 28.6 MMOL/L (ref 22–29)
CREAT SERPL-MCNC: 1.35 MG/DL (ref 0.76–1.27)
EGFRCR SERPLBLD CKD-EPI 2021: 54.8 ML/MIN/1.73
GLUCOSE SERPL-MCNC: 95 MG/DL (ref 65–99)
HBA1C MFR BLD: 6.7 % (ref 4.8–5.6)
POTASSIUM SERPL-SCNC: 4.9 MMOL/L (ref 3.5–5.2)
SODIUM SERPL-SCNC: 143 MMOL/L (ref 136–145)

## 2023-01-16 PROCEDURE — 36415 COLL VENOUS BLD VENIPUNCTURE: CPT

## 2023-01-16 PROCEDURE — 80048 BASIC METABOLIC PNL TOTAL CA: CPT

## 2023-01-16 PROCEDURE — 83036 HEMOGLOBIN GLYCOSYLATED A1C: CPT

## 2023-01-18 ENCOUNTER — OFFICE VISIT (OUTPATIENT)
Dept: INTERNAL MEDICINE | Facility: CLINIC | Age: 78
End: 2023-01-18
Payer: MEDICARE

## 2023-01-18 VITALS
SYSTOLIC BLOOD PRESSURE: 106 MMHG | OXYGEN SATURATION: 91 % | DIASTOLIC BLOOD PRESSURE: 78 MMHG | HEIGHT: 72 IN | TEMPERATURE: 97.8 F | WEIGHT: 258.2 LBS | HEART RATE: 68 BPM | BODY MASS INDEX: 34.97 KG/M2

## 2023-01-18 DIAGNOSIS — R06.09 DYSPNEA ON EXERTION: ICD-10-CM

## 2023-01-18 DIAGNOSIS — N18.32 STAGE 3B CHRONIC KIDNEY DISEASE: ICD-10-CM

## 2023-01-18 DIAGNOSIS — E03.9 HYPOTHYROIDISM, ADULT: ICD-10-CM

## 2023-01-18 DIAGNOSIS — E78.2 HYPERLIPEMIA, MIXED: ICD-10-CM

## 2023-01-18 DIAGNOSIS — I10 HYPERTENSION, ESSENTIAL: ICD-10-CM

## 2023-01-18 DIAGNOSIS — E11.8 TYPE 2 DIABETES MELLITUS WITH COMPLICATIONS: Primary | ICD-10-CM

## 2023-01-18 DIAGNOSIS — I73.9 CLAUDICATION OF BOTH LOWER EXTREMITIES: ICD-10-CM

## 2023-01-18 DIAGNOSIS — K74.60 NON-ALCOHOLIC CIRRHOSIS: ICD-10-CM

## 2023-01-18 DIAGNOSIS — F01.50 VASCULAR DEMENTIA WITHOUT BEHAVIORAL DISTURBANCE: ICD-10-CM

## 2023-01-18 DIAGNOSIS — D69.6 THROMBOCYTOPENIA: ICD-10-CM

## 2023-01-18 PROCEDURE — 3044F HG A1C LEVEL LT 7.0%: CPT | Performed by: INTERNAL MEDICINE

## 2023-01-18 PROCEDURE — 99214 OFFICE O/P EST MOD 30 MIN: CPT | Performed by: INTERNAL MEDICINE

## 2023-01-18 RX ORDER — TRAMADOL HYDROCHLORIDE 50 MG/1
50 TABLET ORAL 2 TIMES DAILY
COMMUNITY
Start: 2022-10-20 | End: 2023-01-18

## 2023-01-18 NOTE — ASSESSMENT & PLAN NOTE
Patient with reported this as of his 1/23 office visit.  We will get a routine chest x-ray since its been a while, get BNP on return to office as well.  Evaluating for possible PVD with AELE as noted below.

## 2023-01-18 NOTE — ASSESSMENT & PLAN NOTE
GFR remains better than prior baseline at 55 as of 1/23.  Electrolytes are fine, will continue to keep an eye on this, patient is on moderate dose Lasix.

## 2023-01-18 NOTE — ASSESSMENT & PLAN NOTE
Patient denies any burning pain per se, but does report a significant heaviness with attempts at ambulation.  Given his underlying risk factors, will get a AELE with stress.

## 2023-01-18 NOTE — ASSESSMENT & PLAN NOTE
Patient down a little further to 6.7 as of his 1/23 office visit.  This is certainly excellent control particularly over the holidays.  He just got his continuous glucose monitor, so hopefully this will make things easier on him.  He will continue with 30 units of Lantus along with Humalog up to 15 units with his meals.  Additionally given improved renal function, he can maintain on full dose metformin.

## 2023-01-18 NOTE — ASSESSMENT & PLAN NOTE
Blood pressure well controlled as of 1/23 on just low-dose Corgard along with Lasix.  Continue same.

## 2023-01-18 NOTE — PROGRESS NOTES
Chief Complaint  Diabetes, Follow-up (Pt states that this is routine, he had labs. ), and Shortness of Breath (He states that he gets out of breath and it is hard for him to breath. )    Subjective          Cosmo Gauthier presents to [facility]     Diabetes  Pertinent negatives for hypoglycemia include no confusion. Pertinent negatives for diabetes include no blurred vision, no chest pain and no fatigue.   Shortness of Breath  Pertinent negatives include no abdominal pain, chest pain, ear pain, fever, leg swelling or wheezing.     History of present illness:  Patient is pleasant but unfortunate 77-year-old male with multiple medical issues, status post 5 vessel bypass 5/21, with underlying hypertension, hyperlipidemia, and diabetes, coming in 1/23 for routine 3-month follow-up. We will review all his labs and address any new concerns.    ---> He was seen 7/6/2022 for ER follow-up.  Patient had a fall at home, struck his head,+ LOC, so he was evaluated in the emergency room.  He had a small SAH so he was observed overnight, had repeat imaging the following morning.  Had a CT as well as a CTA.      Review of Systems   Constitutional: Negative for appetite change, fatigue and fever.   HENT: Negative for congestion and ear pain.    Eyes: Negative for blurred vision.   Respiratory: Positive for shortness of breath. Negative for cough, chest tightness and wheezing.    Cardiovascular: Negative for chest pain, palpitations and leg swelling.   Gastrointestinal: Negative for abdominal pain.   Genitourinary: Negative for difficulty urinating, dysuria and hematuria.   Musculoskeletal: Negative for arthralgias and gait problem.   Skin: Negative for skin lesions.   Neurological: Negative for syncope, memory problem and confusion.   Psychiatric/Behavioral: Negative for self-injury and depressed mood.       Objective   Vital Signs:   /78   Pulse 68   Temp 97.8 °F (36.6 °C) (Skin)   Ht  "182.9 cm (72.01\")   Wt 117 kg (258 lb 3.2 oz)   SpO2 91%   BMI 35.01 kg/m²           Physical Exam  Vitals and nursing note reviewed.   Constitutional:       General: He is not in acute distress.     Appearance: Normal appearance. He is not toxic-appearing.   HENT:      Head: Atraumatic.      Right Ear: External ear normal.      Left Ear: External ear normal.      Nose: Nose normal.      Mouth/Throat:      Mouth: Mucous membranes are moist.   Eyes:      General:         Right eye: No discharge.         Left eye: No discharge.      Extraocular Movements: Extraocular movements intact.      Pupils: Pupils are equal, round, and reactive to light.   Cardiovascular:      Rate and Rhythm: Normal rate and regular rhythm.      Pulses: Normal pulses.      Heart sounds: Normal heart sounds. No murmur heard.    No gallop.   Pulmonary:      Effort: Pulmonary effort is normal. No respiratory distress.      Breath sounds: No wheezing, rhonchi or rales.   Abdominal:      General: There is no distension.      Palpations: Abdomen is soft. There is no mass.      Tenderness: There is no abdominal tenderness. There is no guarding.   Musculoskeletal:         General: No swelling or tenderness.      Cervical back: No tenderness.      Right lower leg: No edema.      Left lower leg: No edema.   Skin:     General: Skin is warm and dry.      Findings: No rash.   Neurological:      General: No focal deficit present.      Mental Status: He is alert and oriented to person, place, and time. Mental status is at baseline.      Motor: No weakness.      Gait: Gait normal.   Psychiatric:         Mood and Affect: Mood normal.         Thought Content: Thought content normal.          Result Review :   The following data was reviewed by: Alex Buckley MD on 08/02/2021:                  Assessment and Plan    Diagnoses and all orders for this visit:    1. 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  This is certainly excellent control particularly over the holidays.  He just got his continuous glucose monitor, so hopefully this will make things easier on him.  He will continue with 30 units of Lantus along with Humalog up to 15 units with his meals.  Additionally given improved renal function, he can maintain on full dose metformin.    Orders:  -     Hemoglobin A1c; Future    2. Hypertension, essential  Assessment & Plan:  Blood pressure well controlled as of 1/23 on just low-dose Corgard along with Lasix.  Continue same.    Orders:  -     Comprehensive Metabolic Panel; Future    3. Vascular dementia without behavioral disturbance (HCC)  Assessment & Plan:  Patient remains very appropriate this regards as of  1/23 office visit.  There has been no altered mental status etc.  Patient stable to continue low-dose Aricept.      4. Stage 3b chronic kidney disease (HCC)  Assessment & Plan:  GFR remains better than prior baseline at 55 as of 1/23.  Electrolytes are fine, will continue to keep an eye on this, patient is on moderate dose Lasix.      5. Hyperlipemia, mixed  -     Lipid Panel; Future    6. Hypothyroidism, adult  -     TSH; Future    7. Thrombocytopenia (HCC)  -     CBC & Differential; Future    8. Non-alcoholic cirrhosis (HCC)  Assessment & Plan:  Repeat AFP along with CMP on return to office in the spring.    Orders:  -     AFP Tumor Marker; Future    9. Claudication of both lower extremities (HCC)  Assessment & Plan:  Patient denies any burning pain per se, but does report a significant heaviness with attempts at ambulation.  Given his underlying risk factors, will get a AELE with stress.    Orders:  -     Doppler Arterial Lower Extremity Stress CAR; Future    10. Dyspnea on exertion  Assessment & Plan:  Patient with reported this as of his 1/23 office visit.  We will get a routine chest x-ray since its been a while, get BNP on return to office as well.   Evaluating for possible PVD with AELE as noted below.    Orders:  -     BNP; Future  -     XR Chest PA & Lateral; Future    --  --  Older notes:  HOSP F/U 6/21 = S/P 5V CABG 5/21 per Dr Milligan=I reviewed records sent and the med list provided by wife.  TELEVISIT 2/25/21:  HOSP F/U 10/20 = I reviewed 9/20 [facility] records; I d/w pt labs/meds in detail:  1) CHEST PAIN and pt is being admitted to R/O MI; cardiology was notified...SPECT was neg, so will f/u with cards in a few weeks; may still need a cath=no new sxs and is gideon to see him next week as of 10/20 OV...to BE for CABG per Dr Milligan, but PLT's too low; has f/u with cards.  2) CAD/MI with prior stents; ? last stress was done in cardiology office 5/19; will continue home meds for now...no rest angina; ? increase Imdur=defer to cards appt he has on 3/9/21---> S/P 5V CABG as above; looks great.    3) HTN will be followed closely on home meds=stable 10/20 OV, BUT is orthostatic, so drink more and lower ACEI to 5 mg---> stable 6/21.  4) HYPERLIPIDEMIA=continue statin=LDL 61 (9/20)---> 46 in 6/21.    5) CIRRHOSIS per Dr Bradshaw; watch volume status.  6) THROMBOCYTOPENIA is related to the cirrhosis and is stable around 70K...on Prednisone per Dr Saldana---> off this; labs on RTO.    7) DM per orders...A1C was only 7.3 in 1/21; she d/w me BS fine despite prednisone as of 2/21 OV; ? lost 20+ lbs---> 5.6 and I d/w stop glipizide 10 bid and stay on Humalog 15 tid, but then again not totally sure he's on it?    8) HYPOTHYROIDISM is wnl as of 2/21 OV---> RTO.    9) BPH on flomax after retenion issues in BE as of 2/21 OV; to see Dr Dill---> saw him for chairez post-op = it's out now.    (lost friend/valeria 60's to covid)    VISIT 6/20:  ANNUAL MEDICARE WELLNESS EXAM 10/19 = reviewed all forms with pt; he is much more depressed, so zoloft was increased.  H.M. ISSUES:  DM = A1C as below; Optho=q 12 mo with last 4/18 = early diabetic retinopathy and ? laser next  visit?  --  HTN...needs ACEI now at 6/17 OV; repeat urine micro as well...remains controlled...ACEI fell off his list; resume now 1/19...better already---> stable.  ? CKD3 noted 6/20 = no new meds; needs repeat few weeks.  --  DM...max out januvia...8.1 an has f/u with [facility]...on lantus 20, d/w increase 2-4 units every week to get FBS to 110...7.1 is great...7.5 is stuck and I d/w again to increase Lantus=told him 24 now... 8.8 is horrible, so increase glucotrol to whole tab in AM...8.9, so try whole tab bid before increase lantus...9.5 is worse and needs Humalog before meals; d/w qid testing; stop glucotrol and increase lantus to 30 qhs...BS noted per phone and in office; rec 16/20/16 and stay on Lantus 30 qhs; d/w NO SSI for now...A1C down to 8.0 already and Fructosamine of 300=A1C closer to 7.5 actually...6.3 is great with TSH back down...6.8 is ok for now=7/19...7.3 in 10/19 and I d/w take 8 units with breakfast since he has been skipping this and only taking 15 with lunch/supper---> 7.3 great 6/20.   HYPOTHYROIDSIM stable 6/17...0.2 at 1/18 OV...0.7 better...increase 1/19 for 3.5 given above...? n/c, b/c now=10; will add 50 as of 1/19 OV...TSH 64 and apparently he missed them past week; I rec 250 qd for now and close f/u...0.7 and will leave this alone for now---> 1.5 in 2/20.  --  CAD per Dr Pritchett; s/p Spect '15 per pt and was neg---> still no CP/SOB and sees cards q 6 mo=no recent as of 6/20 OV.  LIPIDS with LDL 72...83 ok for now...LDL 57---> 55 in 2/20.  --  THROMBOCYTOPENIA is new 4/16 OV=99, so to HEME...off ASA but plavix ok per Dr Chaudhry; had BM bx=?...75 holding...on iron per her---> CBC stable 6/20 per her.  --  HOSP F/U 8/16 for FUO.  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  --  DJD s/p R TKR and then L TKR per Dr Eckert 1/16 and rehab with [facility] still on-going = 3x/wk at 4/16 OV...still with issues L knee 4/18...to have redo L TKR per Dr Zayas as of 10/18 OV=Dr Duran cleared  him already...is gideon for 2/4/19, but apparently wants his A1C below 7 for this?? = d/w me 1/19...I d/w not going to get there that soon, but current blood sugars excellent and pt cleared for surgery from my standpoint as well=reviewed all their pre-op labs as well as EKG and CXR...s/p redo L TKR on 2/4/19 and looks great at 2/27/19 OV...finishing up as of 4/19 OV.  --  GERD per Dr Bradshaw.  ? CIRRHOSIS=tried on Nadolol per Dr Bradshaw=stopped it 6/20 due to diarrhea?; ? has CT/NH3 gideon.  --  OBESITY up 3 more to 263...258--->270 is stuck 2/20 and I d/w no meds=must go to [facility] ( Cards said no to surgery).  --  DEPRESSION on maint med...? Dementia per pt, sent to Dr Kim; he sent for NeuroPsych as of 4/18 OV...will address depression 8/18 = increase zoloft   [facility] as of [facility]17 [facility]---> c/w as of 2/21 OV; neg O2 in [facility] recently;   --  --  PSA 0.5 (4/7/16)...defer.  Colon 7/19...2 inflam/hyper polyps per Dr Bradshaw.  Prevnar 11/16; Pneumovax # 1 9/17;  (, retired [employer] '97, 3 kids)    Follow Up   Return in about 3 months (around 4/25/2023).  Patient was given instructions and counseling regarding his condition or for health maintenance advice. Please see specific information pulled into the AVS if appropriate.

## 2023-01-18 NOTE — ASSESSMENT & PLAN NOTE
Patient remains very appropriate this regards as of  1/23 office visit.  There has been no altered mental status etc.  Patient stable to continue low-dose Aricept.

## 2023-01-20 ENCOUNTER — HOSPITAL ENCOUNTER (OUTPATIENT)
Dept: GENERAL RADIOLOGY | Facility: HOSPITAL | Age: 78
Discharge: HOME | End: 2023-01-20
Admitting: INTERNAL MEDICINE
Payer: MEDICARE

## 2023-01-20 DIAGNOSIS — R06.09 DYSPNEA ON EXERTION: ICD-10-CM

## 2023-01-20 PROCEDURE — 71046 X-RAY EXAM CHEST 2 VIEWS: CPT

## 2023-01-27 ENCOUNTER — HOSPITAL ENCOUNTER (OUTPATIENT)
Dept: CARDIOLOGY | Facility: HOSPITAL | Age: 78
Discharge: HOME | End: 2023-01-27
Admitting: INTERNAL MEDICINE
Payer: MEDICARE

## 2023-01-27 DIAGNOSIS — I73.9 CLAUDICATION OF BOTH LOWER EXTREMITIES: ICD-10-CM

## 2023-01-27 LAB
BH CV LOWER ARTERIAL LEFT ABI RATIO: 0.98
BH CV LOWER ARTERIAL LEFT DORSALIS PEDIS SYS MAX: 142
BH CV LOWER ARTERIAL LEFT GREAT TOE SYS MAX: 134
BH CV LOWER ARTERIAL LEFT LOW THIGH SYS MAX: 132
BH CV LOWER ARTERIAL LEFT POPLITEAL SYS MAX: 150
BH CV LOWER ARTERIAL LEFT POST TIBIAL SYS MAX: 146
BH CV LOWER ARTERIAL LEFT TBI RATIO: 0.9
BH CV LOWER ARTERIAL RIGHT ABI RATIO: 1.12
BH CV LOWER ARTERIAL RIGHT DORSALIS PEDIS SYS MAX: 157
BH CV LOWER ARTERIAL RIGHT GREAT TOE SYS MAX: 148
BH CV LOWER ARTERIAL RIGHT LOW THIGH SYS MAX: 127
BH CV LOWER ARTERIAL RIGHT POPLITEAL SYS MAX: 150
BH CV LOWER ARTERIAL RIGHT POST TIBIAL SYS MAX: 167
BH CV LOWER ARTERIAL RIGHT TBI RATIO: 0.99
MAXIMAL PREDICTED HEART RATE: 145 BPM
STRESS TARGET HR: 123 BPM
UPPER ARTERIAL LEFT ARM BRACHIAL SYS MAX: 133 MMHG
UPPER ARTERIAL RIGHT ARM BRACHIAL SYS MAX: 149 MMHG

## 2023-01-27 PROCEDURE — 93923 UPR/LXTR ART STDY 3+ LVLS: CPT | Performed by: SURGERY

## 2023-01-27 PROCEDURE — 93923 UPR/LXTR ART STDY 3+ LVLS: CPT

## 2023-03-07 ENCOUNTER — TELEPHONE (OUTPATIENT)
Dept: INTERNAL MEDICINE | Facility: CLINIC | Age: 78
End: 2023-03-07
Payer: MEDICARE

## 2023-03-07 NOTE — TELEPHONE ENCOUNTER
Caller: Carla Gauthier    Relationship: Emergency Contact    Best call back number:    [phone]          Who are you requesting to speak with (clinical staff, provider,  specific staff member): MYNOR    What was the call regarding: PATIENT'S WIFE CALLED TO LET THE OFFICE KNOW THAT [facility] WOULD BE CALLING THE OFFICE ABOUT THE PATIENT'S DIABETES SUPPLIES.    Do you require a callback: NO

## 2023-03-15 ENCOUNTER — TELEPHONE (OUTPATIENT)
Dept: INTERNAL MEDICINE | Facility: CLINIC | Age: 78
End: 2023-03-15
Payer: MEDICARE

## 2023-03-15 NOTE — TELEPHONE ENCOUNTER
Caller: Carla Gauthier    Relationship: Emergency Contact    Best call back number: [phone]    What form or medical record are you requesting: PROOF OF [employer]    Who is requesting this form or medical record from you: PATIENT    How would you like to receive the form or medical records (pick-up, mail, fax): FAX  If fax, what is the fax number: [phone]    Timeframe paperwork needed: ASAP

## 2023-03-29 ENCOUNTER — TELEPHONE (OUTPATIENT)
Dept: INTERNAL MEDICINE | Facility: CLINIC | Age: 78
End: 2023-03-29
Payer: MEDICARE

## 2023-03-29 NOTE — TELEPHONE ENCOUNTER
Caller: Carla Gauthier    Relationship: Emergency Contact    Best call back number: [phone]    What is the best time to reach you: ANY     Who are you requesting to speak with (clinical staff, provider,  specific staff member): CLINICAL     What was the call regarding: PATIENTS WIFE CALLED REQUESTING TO SPEAK WITH EKLUNDS NURSE. SHE STATED IT WAS REGARDING SOME PAPERWORK FROM THE PATIENTS CHART THAT WAS SUPPOSED TO BE FAXED OVER REGARDING THE PATIENTS GLUCOSE MONITOR.     Do you require a callback: YES

## 2023-03-29 NOTE — TELEPHONE ENCOUNTER
I have called [employer] and they state that their fax machine is backed up. I have re-faxed this again. I have let pt's wife know of this as well.

## 2023-04-09 NOTE — PROGRESS NOTES
[facility]  Cardiology progress Note    Patient Name: Cosmo Gauthier  : 1945    CHIEF COMPLAINT  Coronary artery disease        Subjective   Subjective     HISTORY OF PRESENT ILLNESS    Cosmo Gauthier is a 77 y.o. male with history of coronary status post CABG.  No chest pain or shortness of breath.    REVIEW OF SYSTEMS    Constitutional:    No fever, no weight loss  Skin:     No rash  Otolaryngeal:    No difficulty swallowing  Cardiovascular: See HPI.  Pulmonary:    No cough, no sputum production    Personal History     Social History:    reports that he has never smoked. He has never used smokeless tobacco. He reports that he does not currently use alcohol. He reports that he does not use drugs.    Home Medications:  Current Outpatient Medications on File Prior to Visit   Medication Sig   • aspirin 81 MG EC tablet Take 1 tablet by mouth Daily.   • donepezil (ARICEPT) 10 MG tablet Take 1 tablet by mouth every night at bedtime.   • ferrous sulfate 325 (65 FE) MG tablet Take 1 tablet by mouth Daily With Breakfast.   • finasteride (PROSCAR) 5 MG tablet Take 1 tablet by mouth Daily.   • furosemide (LASIX) 40 MG tablet Take 1 tablet by mouth Daily.   • gabapentin (NEURONTIN) 600 MG tablet TAKE 1/2 TABLET BY MOUTH EVERY DAY (Patient taking differently: Take 300 mg by mouth Every Night.)   • insulin glargine (LANTUS, SEMGLEE) 100 UNIT/ML injection Inject 30 Units under the skin into the appropriate area as directed Every Night.   • insulin lispro (humaLOG, ADMELOG) 100 UNIT/ML injection Inject 15 Units under the skin into the appropriate area as directed 3 (Three) Times a Day Before Meals.   • levothyroxine (SYNTHROID, LEVOTHROID) 125 MCG tablet Take 2 tablets by mouth Every Morning.   • metFORMIN (GLUCOPHAGE) 1000 MG tablet Take 1 tablet by mouth 2 (Two) Times a Day With Meals.   • nadolol (CORGARD) 20 MG tablet TAKE 1 TABLET BY MOUTH DAILY   • nitroglycerin (NITROSTAT) 0.4 MG SL tablet    •  sertraline (ZOLOFT) 100 MG tablet Take 2 tablets by mouth Daily.   • simvastatin (ZOCOR) 40 MG tablet Take 20 mg by mouth Every Night.   • tamsulosin (FLOMAX) 0.4 MG capsule 24 hr capsule Take 1 capsule by mouth Daily.   • vitamin B-12 (CYANOCOBALAMIN) 1000 MCG tablet Take 5 tablets by mouth Every Night.   • vitamin D3 125 MCG (5000 UT) capsule capsule Take 1 capsule by mouth Daily.     No current facility-administered medications on file prior to visit.       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     PHYSICAL EXAM:    General Appearance:   · well developed  · well nourished  HENT:   · oropharynx moist  · lips not cyanotic  Neck:  · thyroid not enlarged  · supple  Respiratory:  · no respiratory distress  · normal breath sounds  · no rales  Cardiovascular:  · no jugular venous distention  · regular rhythm  · apical impulse normal  · S1 normal, S2 normal  · no S3, no S4   · no murmur  · no rub, no thrill  · carotid pulses normal; no bruit  · pedal pulses normal  · lower extremity edema: none    Skin:   · warm, dry  Psychiatric:  · judgement and insight  appropriate  · normal mood and affect        Result Review:  I have personally reviewed the available results from  [x]  Laboratory  [x]  EKG  [x]  Cardiology  [x]  Medications  [x]  Old records  []  Other:     Procedures  Lab Results   Component Value Date    CHOL 147 10/11/2022    CHOL 126 04/20/2022    CHOL 133 10/28/2021     Lab Results   Component Value Date    TRIG 157 (H) 10/11/2022    TRIG 171 (H) 04/20/2022    TRIG 163 (H) 10/28/2021     Lab Results   Component Value Date    HDL 45 10/11/2022    HDL 44 04/20/2022    HDL 42 10/28/2021     Lab Results   Component Value Date    LDL 75 10/11/2022    LDL 53 04/20/2022    LDL 63 10/28/2021     Lab Results   Component Value Date    VLDL 27 10/11/2022    VLDL 29 04/20/2022    VLDL 28 10/28/2021     Results for orders placed in visit on 05/20/21    Emergent/Open-Heart Anesthesia ABHIJEET    Narrative  Preanesthesia Checklist:  Patient identified, IV assessed, risks and benefits discussed, monitors and equipment assessed and procedure being performed at surgeon's request.    General Procedure Information  Diagnostic Indications for Echo:  assessment of ascending aorta, assessment of surgical repair, defect repair evaluation and hemodynamic monitoring  Physician Requesting Echo: Jr Tom Tubbs MD  ICD Code for Medical Necessity:  Aortic Insufficiency  Location performed:  OR  Intubated  Bite block placed  Heart visualized  Probe Insertion:  Easy  Probe Type:  Multiplane  Modalities:  Color flow mapping, 2D only, continuous wave Doppler and pulse wave Doppler    Echocardiographic and Doppler Measurements    Ventricles    Right Ventricle:  Cavity size normal.  Hypertrophy not present.  Left Ventricle:  Diastolic dimension 4.7 cm.  Hypertrophy not present.  Thrombus not present.  Global Function mildly impaired.  Ejection Fraction 50%.    Ventricular Regional Function:  13- Apical Anterior:  hypokinetic  14- Apical Lateral:  hypokinetic  16- Apical Septal:   hypokinetic  17- [place]:  hypokinetic      Valves    Aortic Valve:  Annulus normal.  Stenosis not present.  Pressure Half-time: 910 ms.  Regurgitation mild.  Leaflets normal.  Leaflet motions normal.    Mitral Valve:  Annulus normal.  Stenosis not present.  Regurgitation trace.    Tricuspid Valve:  Annulus normal.  Stenosis not present.  Regurgitation mild.  Pulmonic Valve:  Annulus normal.  Regurgitation trace.        Aorta    Ascending Aorta:  Size normal.  Diameter 3.6 cm.  Dissection not present.  Plaque thickness less than 3 mm.  Mobile plaque not present.  Aortic Arch:  Size normal.  Diameter 3.1 cm.  Dissection not present.  Plaque thickness less than 3 mm.  Mobile plaque not present.  Descending Aorta:  Size normal.  Diameter 2.5 cm.  Dissection not present.  Plaque thickness less than 3 mm.  Mobile plaque not present.        Atria    Right Atrium:  Size normal.  Spontaneous echo contrast not present.  Thrombus not present.  Tumor not present.  Device present.    Left Atrium:  Size normal.  Spontaneous echo contrast not present.  Thrombus not present.  Tumor not present.  Device not present.  Left atrial appendage normal.      Septa    Atrial Septum:  Intra-atrial septal morphology lipomatous hypertrophy.        Diastolic Function Measurements:  Diastolic Dysfunction Grade= II  E= 40.6 ms  A= 35.3 ms  E/A Ratio=  1.2  DT=  296 ms  S/D=  IVRT=    Other Findings  Pericardium:  normal  Pleural Effusion:  none  Pulmonary Arteries:  normal  Pulmonary Venous Flow:  normal    Anesthesia Information  Performed Personally      Echocardiogram Comments:  Post CPB:  LVEF much improved, 60%, apex no longer hypokinetic.  No aortic dissection post decannulation.  AI unchanged.     Impression/Plan:  1.  Coronary disease status post CABG stable: Continue aspirin 81 mg once a day.  Continue Corgard 20 mg once a day.  2.  Essential hypertension controlled: Continue Corgard 20 mg once a day.  Monitor blood pressure regularly.  3.   Chronic diastolic heart failure stable: Continue Lasix 40 mg once a day.  Monitor BMP.  4.  Hyperlipidemia: Continue simvastatin 20 mg once a day.  Monitor lipid and hepatic profile.           Sven Duran MD   04/11/23   12:00 EDT

## 2023-04-11 ENCOUNTER — OFFICE VISIT (OUTPATIENT)
Dept: CARDIOLOGY | Facility: CLINIC | Age: 78
End: 2023-04-11
Payer: MEDICARE

## 2023-04-11 VITALS
DIASTOLIC BLOOD PRESSURE: 66 MMHG | SYSTOLIC BLOOD PRESSURE: 123 MMHG | HEIGHT: 72 IN | WEIGHT: 261 LBS | HEART RATE: 58 BPM | BODY MASS INDEX: 35.35 KG/M2

## 2023-04-11 DIAGNOSIS — I10 HYPERTENSION, ESSENTIAL: ICD-10-CM

## 2023-04-11 DIAGNOSIS — E78.2 HYPERLIPEMIA, MIXED: ICD-10-CM

## 2023-04-11 DIAGNOSIS — I50.32 DIASTOLIC CHF, CHRONIC: ICD-10-CM

## 2023-04-11 DIAGNOSIS — Z95.1 HX OF CABG: ICD-10-CM

## 2023-04-11 DIAGNOSIS — I25.10 CORONARY ARTERY DISEASE INVOLVING NATIVE CORONARY ARTERY OF NATIVE HEART WITHOUT ANGINA PECTORIS: Primary | ICD-10-CM

## 2023-04-20 ENCOUNTER — LAB (OUTPATIENT)
Dept: LAB | Facility: HOSPITAL | Age: 78
End: 2023-04-20
Payer: MEDICARE

## 2023-04-20 DIAGNOSIS — R06.09 DYSPNEA ON EXERTION: ICD-10-CM

## 2023-04-20 DIAGNOSIS — I10 HYPERTENSION, ESSENTIAL: ICD-10-CM

## 2023-04-20 DIAGNOSIS — K74.60 NON-ALCOHOLIC CIRRHOSIS: ICD-10-CM

## 2023-04-20 DIAGNOSIS — E78.2 HYPERLIPEMIA, MIXED: ICD-10-CM

## 2023-04-20 DIAGNOSIS — D69.6 THROMBOCYTOPENIA: ICD-10-CM

## 2023-04-20 DIAGNOSIS — E11.8 TYPE 2 DIABETES MELLITUS WITH COMPLICATIONS: ICD-10-CM

## 2023-04-20 DIAGNOSIS — E03.9 HYPOTHYROIDISM, ADULT: ICD-10-CM

## 2023-04-20 LAB
ALBUMIN SERPL-MCNC: 4.4 G/DL (ref 3.5–5.2)
ALBUMIN/GLOB SERPL: 1.5 G/DL
ALP SERPL-CCNC: 84 U/L (ref 39–117)
ALPHA-FETOPROTEIN: <2 NG/ML (ref 0–8.3)
ALT SERPL W P-5'-P-CCNC: 12 U/L (ref 1–41)
ANION GAP SERPL CALCULATED.3IONS-SCNC: 10 MMOL/L (ref 5–15)
AST SERPL-CCNC: 18 U/L (ref 1–40)
BASOPHILS # BLD AUTO: 0.08 10*3/MM3 (ref 0–0.2)
BASOPHILS NFR BLD AUTO: 1.1 % (ref 0–1.5)
BILIRUB CONJ SERPL-MCNC: <0.2 MG/DL (ref 0–0.3)
BILIRUB SERPL-MCNC: 0.3 MG/DL (ref 0–1.2)
BUN SERPL-MCNC: 28 MG/DL (ref 8–23)
BUN/CREAT SERPL: 20.6 (ref 7–25)
CALCIUM SPEC-SCNC: 9.5 MG/DL (ref 8.6–10.5)
CHLORIDE SERPL-SCNC: 104 MMOL/L (ref 98–107)
CHOLEST SERPL-MCNC: 126 MG/DL (ref 0–200)
CO2 SERPL-SCNC: 27 MMOL/L (ref 22–29)
CREAT SERPL-MCNC: 1.36 MG/DL (ref 0.76–1.27)
DEPRECATED RDW RBC AUTO: 45.2 FL (ref 37–54)
EGFRCR SERPLBLD CKD-EPI 2021: 54.3 ML/MIN/1.73
EOSINOPHIL # BLD AUTO: 0.65 10*3/MM3 (ref 0–0.4)
EOSINOPHIL NFR BLD AUTO: 9 % (ref 0.3–6.2)
ERYTHROCYTE [DISTWIDTH] IN BLOOD BY AUTOMATED COUNT: 13.5 % (ref 12.3–15.4)
GLOBULIN UR ELPH-MCNC: 2.9 GM/DL
GLUCOSE SERPL-MCNC: 114 MG/DL (ref 65–99)
HBA1C MFR BLD: 6.7 % (ref 4.8–5.6)
HCT VFR BLD AUTO: 37 % (ref 37.5–51)
HDLC SERPL-MCNC: 40 MG/DL (ref 40–60)
HGB BLD-MCNC: 13 G/DL (ref 13–17.7)
IMM GRANULOCYTES # BLD AUTO: 0.02 10*3/MM3 (ref 0–0.05)
IMM GRANULOCYTES NFR BLD AUTO: 0.3 % (ref 0–0.5)
LDLC SERPL CALC-MCNC: 62 MG/DL (ref 0–100)
LDLC/HDLC SERPL: 1.45 {RATIO}
LYMPHOCYTES # BLD AUTO: 2.45 10*3/MM3 (ref 0.7–3.1)
LYMPHOCYTES NFR BLD AUTO: 34 % (ref 19.6–45.3)
MCH RBC QN AUTO: 31.8 PG (ref 26.6–33)
MCHC RBC AUTO-ENTMCNC: 35.1 G/DL (ref 31.5–35.7)
MCV RBC AUTO: 90.5 FL (ref 79–97)
MONOCYTES # BLD AUTO: 0.49 10*3/MM3 (ref 0.1–0.9)
MONOCYTES NFR BLD AUTO: 6.8 % (ref 5–12)
NEUTROPHILS NFR BLD AUTO: 3.51 10*3/MM3 (ref 1.7–7)
NEUTROPHILS NFR BLD AUTO: 48.8 % (ref 42.7–76)
NRBC BLD AUTO-RTO: 0 /100 WBC (ref 0–0.2)
NT-PROBNP SERPL-MCNC: 395 PG/ML (ref 0–1800)
PLATELET # BLD AUTO: 127 10*3/MM3 (ref 140–450)
PMV BLD AUTO: 9 FL (ref 6–12)
POTASSIUM SERPL-SCNC: 4.8 MMOL/L (ref 3.5–5.2)
PROT SERPL-MCNC: 7.3 G/DL (ref 6–8.5)
RBC # BLD AUTO: 4.09 10*6/MM3 (ref 4.14–5.8)
SODIUM SERPL-SCNC: 141 MMOL/L (ref 136–145)
TRIGL SERPL-MCNC: 140 MG/DL (ref 0–150)
TSH SERPL DL<=0.05 MIU/L-ACNC: 0.86 UIU/ML (ref 0.27–4.2)
VLDLC SERPL-MCNC: 24 MG/DL (ref 5–40)
WBC NRBC COR # BLD: 7.2 10*3/MM3 (ref 3.4–10.8)

## 2023-04-20 PROCEDURE — 82248 BILIRUBIN DIRECT: CPT

## 2023-04-20 PROCEDURE — 80053 COMPREHEN METABOLIC PANEL: CPT

## 2023-04-20 PROCEDURE — 85025 COMPLETE CBC W/AUTO DIFF WBC: CPT

## 2023-04-20 PROCEDURE — 36415 COLL VENOUS BLD VENIPUNCTURE: CPT

## 2023-04-20 PROCEDURE — 82105 ALPHA-FETOPROTEIN SERUM: CPT

## 2023-04-20 PROCEDURE — 83880 ASSAY OF NATRIURETIC PEPTIDE: CPT

## 2023-04-20 PROCEDURE — 84443 ASSAY THYROID STIM HORMONE: CPT

## 2023-04-20 PROCEDURE — 80061 LIPID PANEL: CPT

## 2023-04-20 PROCEDURE — 83036 HEMOGLOBIN GLYCOSYLATED A1C: CPT

## 2023-04-21 ENCOUNTER — TELEPHONE (OUTPATIENT)
Dept: CARDIOLOGY | Facility: CLINIC | Age: 78
End: 2023-04-21
Payer: MEDICARE

## 2023-04-21 NOTE — TELEPHONE ENCOUNTER
----- Message from MAXWELL Mitchell sent at 4/21/2023  8:48 AM EDT -----  Notify pt: Labs are stable, continue current meds

## 2023-04-24 PROBLEM — Q06.8 TETHERED SPINAL CORD: Status: RESOLVED | Noted: 2018-11-20 | Resolved: 2023-04-24

## 2023-04-24 PROBLEM — G89.29 CHRONIC BACK PAIN: Status: RESOLVED | Noted: 2021-07-22 | Resolved: 2023-04-24

## 2023-04-24 PROBLEM — M54.9 CHRONIC BACK PAIN: Status: RESOLVED | Noted: 2021-07-22 | Resolved: 2023-04-24

## 2023-04-24 PROBLEM — Z00.00 MEDICARE ANNUAL WELLNESS VISIT, SUBSEQUENT: Status: ACTIVE | Noted: 2023-04-24

## 2023-04-24 PROBLEM — H61.23 BILATERAL IMPACTED CERUMEN: Status: RESOLVED | Noted: 2022-01-18 | Resolved: 2023-04-24

## 2023-04-24 PROBLEM — D72.820 LYMPHOCYTOSIS: Status: RESOLVED | Noted: 2021-02-16 | Resolved: 2023-04-24

## 2023-04-24 NOTE — PROGRESS NOTES
"Chief Complaint  Diabetes and Follow-up (This is routine, he had labs. /He is out of breath and fatigued, but they feel this is his heart even though he had heart surgery. They have [facility] benefit paperwork)    Subjective          Cosmo Gauthier presents to [facility]     History of present illness:  Patient is pleasant but unfortunate 77-year-old male with multiple medical issues, status post 5 vessel bypass 5/21, with underlying hypertension, hyperlipidemia, and diabetes, coming in 4/23 for routine 3-month follow-up. We will review all his labs and address any new concerns.    ---> He was seen 7/6/2022 for ER follow-up.  Patient had a fall at home, struck his head,+ LOC, so he was evaluated in the emergency room.  He had a small SAH so he was observed overnight, had repeat imaging the following morning.  Had a CT as well as a CTA.      Review of Systems   Constitutional: Negative for appetite change, fatigue and fever.   HENT: Negative for congestion and ear pain.    Eyes: Negative for blurred vision.   Respiratory: Positive for shortness of breath. Negative for cough, chest tightness and wheezing.    Cardiovascular: Negative for chest pain, palpitations and leg swelling.   Gastrointestinal: Negative for abdominal pain.   Genitourinary: Negative for difficulty urinating, dysuria and hematuria.   Musculoskeletal: Negative for arthralgias and gait problem.   Skin: Negative for skin lesions.   Neurological: Negative for syncope, memory problem and confusion.   Psychiatric/Behavioral: Negative for self-injury and depressed mood.       Objective   Vital Signs:   /70   Pulse 59   Temp 97.8 °F (36.6 °C) (Skin)   Ht 182.9 cm (72.01\")   Wt 117 kg (257 lb 6.4 oz)   SpO2 96%   BMI 34.90 kg/m²           Physical Exam  Vitals and nursing note reviewed.   Constitutional:       General: He is not in acute distress.     Appearance: Normal appearance. He is not toxic-appearing.   HENT: "      Head: Atraumatic.      Right Ear: External ear normal.      Left Ear: External ear normal.      Nose: Nose normal.      Mouth/Throat:      Mouth: Mucous membranes are moist.   Eyes:      General:         Right eye: No discharge.         Left eye: No discharge.      Extraocular Movements: Extraocular movements intact.      Pupils: Pupils are equal, round, and reactive to light.   Cardiovascular:      Rate and Rhythm: Normal rate and regular rhythm.      Pulses: Normal pulses.      Heart sounds: Normal heart sounds. No murmur heard.    No gallop.   Pulmonary:      Effort: Pulmonary effort is normal. No respiratory distress.      Breath sounds: No wheezing, rhonchi or rales.   Abdominal:      General: There is no distension.      Palpations: Abdomen is soft. There is no mass.      Tenderness: There is no abdominal tenderness. There is no guarding.   Musculoskeletal:         General: No swelling or tenderness.      Cervical back: No tenderness.      Right lower leg: No edema.      Left lower leg: No edema.   Skin:     General: Skin is warm and dry.      Findings: No rash.   Neurological:      General: No focal deficit present.      Mental Status: He is alert and oriented to person, place, and time. Mental status is at baseline.      Motor: No weakness.      Gait: Gait normal.   Psychiatric:         Mood and Affect: Mood normal.         Thought Content: Thought content normal.          Result Review :   The following data was reviewed by: Alex Buckley MD on 08/02/2021:                  Assessment and Plan    Diagnoses and all orders for this visit:    1. Medicare annual wellness visit, subsequent (Primary)  Assessment & Plan:  AWV completed 4/23.  Patient with progressive decline in ADLs, needing more assistance, increased falls despite rollator, no overnight hospitalizations yet.  Information given regarding advance directive.      2. Non-alcoholic cirrhosis (HCC)  Assessment & Plan:  LFTs are normal and his AFP  is less than 2 as of 4/23.      3. 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  He initially got it under control utilizing the continuous glucose monitor, actually been without that for several months now, but sugars maintaining excellent control.  He will continue with the 30 unit of Lantus and his sliding scale Humalog.  GFR is better here lately, so we will continue with full dose metformin as well    Orders:  -     gabapentin (NEURONTIN) 600 MG tablet; Take 0.5 tablets by mouth 2 (Two) Times a Day.  Dispense: 90 tablet; Refill: 3  -     Hemoglobin A1c; Future    4. Stage 3b chronic kidney disease (HCC)  Assessment & Plan:  GFR is maintaining better than previous baseline, it is in the mid 50s still as of 4/23.  Electrolytes are fine as well    Orders:  -     Basic Metabolic Panel; Future    5. Hypertension, essential  Assessment & Plan:  Blood pressure mary well controlled as of his 4/23 office visit.  He stable to continue with just low dose of Corgard and low to moderate dose Lasix.      6. Hx of CABG  Overview:  S/P 5V CABG 5/21 per Dr Milligan.  He is also followed by Dr Pritchett.         Assessment & Plan:  BMP is stable to improved at 400 as of 4/23.  Patient maintained on low to moderate dose Lasix, and his electrolytes/GFR are stable presently      7. Hypothyroidism, adult  Assessment & Plan:  TSH is stable at 0.8 as of 4/23.  He has 100.5 mcg tablets, he takes 2 daily except on Sunday only 1-1/2, certainly fine to continue same      8. Hyperlipemia, mixed  Assessment & Plan:  LDL is down from 75 to 62 as of his 4/23 office visit.  Patient stable to continue with just low-dose simvastatin      9. Thrombocytopenia (HCC)  Assessment & Plan:  Patient platelet count has improved to 127 as of 4/23.  He still keeps close follow-up with  as well.      10. Vascular dementia without behavioral disturbance  Assessment &  Plan:  Patient remains very appropriate this regards as of  4/23 office visit.  There has been no altered mental status etc.  Patient stable to continue low-dose Aricept.      11. Hereditary and idiopathic neuropathy, unspecified  Assessment & Plan:  Patient with increased issues this regards as of 4/23.  Having increased symptoms in his feet.  He is only on low-dose gabapentin, he is taking half of the 600 mg tablet, certainly reasonable to increase to half tablet twice daily now.    Orders:  -     gabapentin (NEURONTIN) 600 MG tablet; Take 0.5 tablets by mouth 2 (Two) Times a Day.  Dispense: 90 tablet; Refill: 3    12. Claudication of both lower extremities  Overview:  AELE 1/23:  •  Right Conclusion: The right OSCAR is normal. Normal digital pressures.  •  Left Conclusion: The left OSCAR is normal. Normal digital pressures.  •  Normal resting arterial evaluation of both lower extremities.  Patient declined stress component due to back pain and fear of treadmill.      Assessment & Plan:  Reviewed this at his 4/23 office visit.  Patient unable to do the stress component of it, but at rest it looks fine, and patient does have symptoms at rest, so likely does not have significant PAD      --  --  Older notes:  HOSP F/U 6/21 = S/P 5V CABG 5/21 per Dr Milligan=I reviewed records sent and the med list provided by wife.  TELEVISIT 2/25/21:  HOSP F/U 10/20 = I reviewed 9/20 [facility] records; I d/w pt labs/meds in detail:  1) CHEST PAIN and pt is being admitted to R/O MI; cardiology was notified...SPECT was neg, so will f/u with cards in a few weeks; may still need a cath=no new sxs and is gideon to see him next week as of 10/20 OV...to BE for CABG per Dr Milligan, but PLT's too low; has f/u with cards.  2) CAD/MI with prior stents; ? last stress was done in cardiology office 5/19; will continue home meds for now...no rest angina; ? increase Imdur=defer to cards appt he has on 3/9/21---> S/P 5V CABG as above; looks great.    3) HTN will  be followed closely on home meds=stable 10/20 OV, BUT is orthostatic, so drink more and lower ACEI to 5 mg---> stable 6/21.  4) HYPERLIPIDEMIA=continue statin=LDL 61 (9/20)---> 46 in 6/21.    5) CIRRHOSIS per Dr Bradshaw; watch volume status.  6) THROMBOCYTOPENIA is related to the cirrhosis and is stable around 70K...on Prednisone per Dr Saldana---> off this; labs on RTO.    7) DM per orders...A1C was only 7.3 in 1/21; she d/w me BS fine despite prednisone as of 2/21 OV; ? lost 20+ lbs---> 5.6 and I d/w stop glipizide 10 bid and stay on Humalog 15 tid, but then again not totally sure he's on it?    8) HYPOTHYROIDISM is wnl as of 2/21 OV---> RTO.    9) BPH on flomax after retenion issues in BE as of 2/21 OV; to see Dr Dill---> saw him for mihaela post-op = it's out now.    (lost friend/ 60's to covid)    VISIT 6/20:  ANNUAL MEDICARE WELLNESS EXAM 10/19 = reviewed all forms with pt; he is much more depressed, so zoloft was increased.  H.M. ISSUES:  DM = A1C as below; Optho=q 12 mo with last 4/18 = early diabetic retinopathy and ? laser next visit?  --  HTN...needs ACEI now at 6/17 OV; repeat urine micro as well...remains controlled...ACEI fell off his list; resume now 1/19...better already---> stable.  ? CKD3 noted 6/20 = no new meds; needs repeat few weeks.  --  DM...max out januvia...8.1 an has f/u with [facility]...on lantus 20, d/w increase 2-4 units every week to get FBS to 110...7.1 is great...7.5 is stuck and I d/w again to increase Lantus=told him 24 now... 8.8 is horrible, so increase glucotrol to whole tab in AM...8.9, so try whole tab bid before increase lantus...9.5 is worse and needs Humalog before meals; d/w qid testing; stop glucotrol and increase lantus to 30 qhs...BS noted per phone and in office; rec 16/20/16 and stay on Lantus 30 qhs; d/w NO SSI for now...A1C down to 8.0 already and Fructosamine of 300=A1C closer to 7.5 actually...6.3 is great with TSH back down...6.8 is ok for now=7/19...7.3 in 10/19  and I d/w take 8 units with breakfast since he has been skipping this and only taking 15 with lunch/supper---> 7.3 great 6/20.   HYPOTHYROIDSIM stable 6/17...0.2 at 1/18 OV...0.7 better...increase 1/19 for 3.5 given above...? n/c, b/c now=10; will add 50 as of 1/19 OV...TSH 64 and apparently he missed them past week; I rec 250 qd for now and close f/u...0.7 and will leave this alone for now---> 1.5 in 2/20.  --  CAD per Dr Pritchett; s/p Spect '15 per pt and was neg---> still no CP/SOB and sees cards q 6 mo=no recent as of 6/20 OV.  LIPIDS with LDL 72...83 ok for now...LDL 57---> 55 in 2/20.  --  THROMBOCYTOPENIA is new 4/16 OV=99, so to HEME...off ASA but plavix ok per Dr Chaudhry; had BM bx=?...75 holding...on iron per her---> CBC stable 6/20 per her.  --  HOSP F/U 8/16 for FUO.  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  --  DJD s/p R TKR and then L TKR per Dr Eckert 1/16 and rehab with [facility] still on-going = 3x/wk at 4/16 OV...still with issues L knee 4/18...to have redo L TKR per Dr Zayas as of 10/18 OV=Dr Duran cleared him already...is gideon for 2/4/19, but apparently wants his A1C below 7 for this?? = d/w me 1/19...I d/w not going to get there that soon, but current blood sugars excellent and pt cleared for surgery from my standpoint as well=reviewed all their pre-op labs as well as EKG and CXR...s/p redo L TKR on 2/4/19 and looks great at 2/27/19 OV...finishing up as of 4/19 OV.  --  GERD per Dr Bradshaw.  ? CIRRHOSIS=tried on Nadolol per Dr Bradshaw=stopped it 6/20 due to diarrhea?; ? has CT/NH3 gideon.  --  OBESITY up 3 more to 263...258--->270 is stuck 2/20 and I d/w no meds=must go to [facility] ( Cards said no to surgery).  --  DEPRESSION on maint med...? Dementia per pt, sent to Dr Kim; he sent for [facility] as of 4/18 OV...will address depression 8/18 = increase zoloft   YI with [facility] as of 2/17 OV---> c/w as of 2/21 OV; neg O2 in [facility] recently;   --  --  PSA 0.5  (4/7/16)...defer.  Colon 7/19...2 inflam/hyper polyps per Dr Bradshaw.  Prevnar 11/16; Pneumovax # 1 9/17;  (, retired [employer] '97, 3 kids)    Follow Up   Return in about 3 months (around 7/25/2023).  Patient was given instructions and counseling regarding his condition or for health maintenance advice. Please see specific information pulled into the AVS if appropriate.

## 2023-04-25 ENCOUNTER — OFFICE VISIT (OUTPATIENT)
Dept: INTERNAL MEDICINE | Facility: CLINIC | Age: 78
End: 2023-04-25
Payer: MEDICARE

## 2023-04-25 VITALS
BODY MASS INDEX: 34.86 KG/M2 | TEMPERATURE: 97.8 F | HEIGHT: 72 IN | OXYGEN SATURATION: 96 % | SYSTOLIC BLOOD PRESSURE: 130 MMHG | HEART RATE: 59 BPM | WEIGHT: 257.4 LBS | DIASTOLIC BLOOD PRESSURE: 70 MMHG

## 2023-04-25 DIAGNOSIS — E78.2 HYPERLIPEMIA, MIXED: ICD-10-CM

## 2023-04-25 DIAGNOSIS — E03.9 HYPOTHYROIDISM, ADULT: ICD-10-CM

## 2023-04-25 DIAGNOSIS — K74.60 NON-ALCOHOLIC CIRRHOSIS: ICD-10-CM

## 2023-04-25 DIAGNOSIS — G60.9 HEREDITARY AND IDIOPATHIC NEUROPATHY, UNSPECIFIED: ICD-10-CM

## 2023-04-25 DIAGNOSIS — N18.32 STAGE 3B CHRONIC KIDNEY DISEASE: ICD-10-CM

## 2023-04-25 DIAGNOSIS — D69.6 THROMBOCYTOPENIA: ICD-10-CM

## 2023-04-25 DIAGNOSIS — Z95.1 HX OF CABG: ICD-10-CM

## 2023-04-25 DIAGNOSIS — E11.8 TYPE 2 DIABETES MELLITUS WITH COMPLICATIONS: ICD-10-CM

## 2023-04-25 DIAGNOSIS — Z00.00 MEDICARE ANNUAL WELLNESS VISIT, SUBSEQUENT: Primary | ICD-10-CM

## 2023-04-25 DIAGNOSIS — I10 HYPERTENSION, ESSENTIAL: ICD-10-CM

## 2023-04-25 DIAGNOSIS — F01.50 VASCULAR DEMENTIA WITHOUT BEHAVIORAL DISTURBANCE: ICD-10-CM

## 2023-04-25 DIAGNOSIS — I73.9 CLAUDICATION OF BOTH LOWER EXTREMITIES: ICD-10-CM

## 2023-04-25 RX ORDER — GABAPENTIN 600 MG/1
300 TABLET ORAL 2 TIMES DAILY
Qty: 90 TABLET | Refills: 3 | Status: SHIPPED | OUTPATIENT
Start: 2023-04-25 | End: 2023-05-01 | Stop reason: SDUPTHER

## 2023-04-25 NOTE — ASSESSMENT & PLAN NOTE
Patient platelet count has improved to 127 as of 4/23.  He still keeps close follow-up with  as well.

## 2023-04-25 NOTE — ASSESSMENT & PLAN NOTE
LFTs are normal and his AFP is less than 2 as of 4/23.   What Type Of Note Output Would You Prefer (Optional)?: Standard Output Have Your Spot(S) Been Treated In The Past?: has not been treated Hpi Title: Evaluation of Skin Lesions Family Member: grandfather, father & mother Location: L-thigh, L-back Year Removed: 2015

## 2023-04-25 NOTE — ASSESSMENT & PLAN NOTE
Blood pressure mary well controlled as of his 4/23 office visit.  He stable to continue with just low dose of Corgard and low to moderate dose Lasix.

## 2023-04-25 NOTE — ASSESSMENT & PLAN NOTE
Reviewed this at his 4/23 office visit.  Patient unable to do the stress component of it, but at rest it looks fine, and patient does have symptoms at rest, so likely does not have significant PAD

## 2023-04-25 NOTE — ASSESSMENT & PLAN NOTE
GFR is maintaining better than previous baseline, it is in the mid 50s still as of 4/23.  Electrolytes are fine as well

## 2023-04-25 NOTE — ASSESSMENT & PLAN NOTE
A1c remains very well controlled at 6.7 as of 4/23.  He initially got it under control utilizing the continuous glucose monitor, actually been without that for several months now, but sugars maintaining excellent control.  He will continue with the 30 unit of Lantus and his sliding scale Humalog.  GFR is better here lately, so we will continue with full dose metformin as well

## 2023-04-25 NOTE — ASSESSMENT & PLAN NOTE
TSH is stable at 0.8 as of 4/23.  He has 100.5 mcg tablets, he takes 2 daily except on Sunday only 1-1/2, certainly fine to continue same

## 2023-04-25 NOTE — ASSESSMENT & PLAN NOTE
BMP is stable to improved at 400 as of 4/23.  Patient maintained on low to moderate dose Lasix, and his electrolytes/GFR are stable presently

## 2023-04-25 NOTE — ASSESSMENT & PLAN NOTE
Patient remains very appropriate this regards as of  4/23 office visit.  There has been no altered mental status etc.  Patient stable to continue low-dose Aricept.

## 2023-04-25 NOTE — ASSESSMENT & PLAN NOTE
LDL is down from 75 to 62 as of his 4/23 office visit.  Patient stable to continue with just low-dose simvastatin

## 2023-04-25 NOTE — ASSESSMENT & PLAN NOTE
Patient with increased issues this regards as of 4/23.  Having increased symptoms in his feet.  He is only on low-dose gabapentin, he is taking half of the 600 mg tablet, certainly reasonable to increase to half tablet twice daily now.

## 2023-04-25 NOTE — ASSESSMENT & PLAN NOTE
AWV completed 4/23.  Patient with progressive decline in ADLs, needing more assistance, increased falls despite rollator, no overnight hospitalizations yet.  Information given regarding advance directive.

## 2023-05-01 ENCOUNTER — TELEPHONE (OUTPATIENT)
Dept: INTERNAL MEDICINE | Facility: CLINIC | Age: 78
End: 2023-05-01
Payer: MEDICARE

## 2023-05-01 DIAGNOSIS — E11.8 TYPE 2 DIABETES MELLITUS WITH COMPLICATIONS: ICD-10-CM

## 2023-05-01 DIAGNOSIS — G60.9 HEREDITARY AND IDIOPATHIC NEUROPATHY, UNSPECIFIED: ICD-10-CM

## 2023-05-01 RX ORDER — GABAPENTIN 600 MG/1
300 TABLET ORAL 2 TIMES DAILY
Qty: 30 TABLET | Refills: 0 | Status: SHIPPED | OUTPATIENT
Start: 2023-05-01

## 2023-05-01 NOTE — TELEPHONE ENCOUNTER
I have spoke to pt's wife about the Elavil 50mg, she believes the [facility] gave him that.       I have the Gabapentin RX ready for you to send in, they just need a short supply until the [facility] can send it.

## 2023-05-01 NOTE — TELEPHONE ENCOUNTER
Caller: Carla Gauthier    Relationship: Emergency Contact    Best call back number: [phone]    What is the best time to reach you: ANY     Who are you requesting to speak with (clinical staff, provider,  specific staff member): CLINICAL     What was the call regarding: PATIENTS WIFE CALLED NEEDING TO SPEAK WITH TOYINNDS NURSE REGARDING A MEDICATION FOR HER . SHE STATED LAST TIME THEY WERE IN THE OFFICE THEY COULD NOT FIGURE OUT WHAT ONE OF HIS MEDICATIONS WERE FOR BUT DID NOT REMEMBER THE NAME. WIFE STATED THE NAME OF THAT MEDICATION IS AMITRIPTYLN  MG AND SHE IS JUST WANTING  TO KNOW WHAT IT IS FOR. SHE ALSO STATED [facility] PATIENTS GABAPENTIN PRESCRIPTION FOR ANOTHER WEEK AND SHE IS WANTING TO SEE IF CRAIG CAN CALL HIM IN ENOUGH TO [facility] UNTIL THE [facility] IS ABLE TO GET A FULL MONTH CALLED IN. PLEASE ADVISE.     Do you require a callback: YES

## 2023-06-04 ENCOUNTER — HOSPITAL ENCOUNTER (INPATIENT)
Facility: HOSPITAL | Age: 78
LOS: 4 days | DRG: 948 | End: 2023-06-09
Attending: EMERGENCY MEDICINE | Admitting: INTERNAL MEDICINE
Payer: MEDICARE

## 2023-06-04 ENCOUNTER — APPOINTMENT (OUTPATIENT)
Dept: CT IMAGING | Facility: HOSPITAL | Age: 78
DRG: 948 | End: 2023-06-04
Payer: MEDICARE

## 2023-06-04 ENCOUNTER — APPOINTMENT (OUTPATIENT)
Dept: GENERAL RADIOLOGY | Facility: HOSPITAL | Age: 78
DRG: 948 | End: 2023-06-04
Payer: MEDICARE

## 2023-06-04 DIAGNOSIS — Z78.9 DECREASED ACTIVITIES OF DAILY LIVING (ADL): ICD-10-CM

## 2023-06-04 DIAGNOSIS — R26.2 DIFFICULTY IN WALKING: ICD-10-CM

## 2023-06-04 DIAGNOSIS — R53.1 WEAKNESS: Primary | ICD-10-CM

## 2023-06-04 LAB
ALBUMIN SERPL-MCNC: 4.2 G/DL (ref 3.5–5.2)
ALBUMIN/GLOB SERPL: 1.4 G/DL
ALP SERPL-CCNC: 91 U/L (ref 39–117)
ALT SERPL W P-5'-P-CCNC: 16 U/L (ref 1–41)
AMMONIA BLD-SCNC: 14 UMOL/L (ref 16–60)
ANION GAP SERPL CALCULATED.3IONS-SCNC: 11.8 MMOL/L (ref 5–15)
AST SERPL-CCNC: 16 U/L (ref 1–40)
BASOPHILS # BLD AUTO: 0.08 10*3/MM3 (ref 0–0.2)
BASOPHILS NFR BLD AUTO: 0.8 % (ref 0–1.5)
BILIRUB SERPL-MCNC: 0.3 MG/DL (ref 0–1.2)
BUN SERPL-MCNC: 27 MG/DL (ref 8–23)
BUN/CREAT SERPL: 20.5 (ref 7–25)
CALCIUM SPEC-SCNC: 9.3 MG/DL (ref 8.6–10.5)
CHLORIDE SERPL-SCNC: 106 MMOL/L (ref 98–107)
CO2 SERPL-SCNC: 24.2 MMOL/L (ref 22–29)
CREAT SERPL-MCNC: 1.32 MG/DL (ref 0.76–1.27)
DEPRECATED RDW RBC AUTO: 51.9 FL (ref 37–54)
EGFRCR SERPLBLD CKD-EPI 2021: 56.2 ML/MIN/1.73
EOSINOPHIL # BLD AUTO: 1.16 10*3/MM3 (ref 0–0.4)
EOSINOPHIL NFR BLD AUTO: 11 % (ref 0.3–6.2)
ERYTHROCYTE [DISTWIDTH] IN BLOOD BY AUTOMATED COUNT: 15.1 % (ref 12.3–15.4)
GLOBULIN UR ELPH-MCNC: 2.9 GM/DL
GLUCOSE BLDC GLUCOMTR-MCNC: 120 MG/DL (ref 70–99)
GLUCOSE SERPL-MCNC: 56 MG/DL (ref 65–99)
HCT VFR BLD AUTO: 37.4 % (ref 37.5–51)
HGB BLD-MCNC: 12.9 G/DL (ref 13–17.7)
HOLD SPECIMEN: NORMAL
HOLD SPECIMEN: NORMAL
IMM GRANULOCYTES # BLD AUTO: 0.03 10*3/MM3 (ref 0–0.05)
IMM GRANULOCYTES NFR BLD AUTO: 0.3 % (ref 0–0.5)
INR PPP: 1.03 (ref 0.86–1.15)
LYMPHOCYTES # BLD AUTO: 3.01 10*3/MM3 (ref 0.7–3.1)
LYMPHOCYTES NFR BLD AUTO: 28.5 % (ref 19.6–45.3)
MAGNESIUM SERPL-MCNC: 1.6 MG/DL (ref 1.6–2.4)
MCH RBC QN AUTO: 32.2 PG (ref 26.6–33)
MCHC RBC AUTO-ENTMCNC: 34.5 G/DL (ref 31.5–35.7)
MCV RBC AUTO: 93.3 FL (ref 79–97)
MONOCYTES # BLD AUTO: 0.61 10*3/MM3 (ref 0.1–0.9)
MONOCYTES NFR BLD AUTO: 5.8 % (ref 5–12)
NEUTROPHILS NFR BLD AUTO: 5.67 10*3/MM3 (ref 1.7–7)
NEUTROPHILS NFR BLD AUTO: 53.6 % (ref 42.7–76)
NRBC BLD AUTO-RTO: 0 /100 WBC (ref 0–0.2)
PLATELET # BLD AUTO: 118 10*3/MM3 (ref 140–450)
PMV BLD AUTO: 8.8 FL (ref 6–12)
POTASSIUM SERPL-SCNC: 4.7 MMOL/L (ref 3.5–5.2)
PROT SERPL-MCNC: 7.1 G/DL (ref 6–8.5)
PROTHROMBIN TIME: 13.6 SECONDS (ref 11.8–14.9)
QT INTERVAL: 425 MS
RBC # BLD AUTO: 4.01 10*6/MM3 (ref 4.14–5.8)
RBC MORPH BLD: NORMAL
SMALL PLATELETS BLD QL SMEAR: NORMAL
SODIUM SERPL-SCNC: 142 MMOL/L (ref 136–145)
TROPONIN T SERPL HS-MCNC: 13 NG/L
WBC MORPH BLD: NORMAL
WBC NRBC COR # BLD: 10.56 10*3/MM3 (ref 3.4–10.8)
WHOLE BLOOD HOLD COAG: NORMAL
WHOLE BLOOD HOLD SPECIMEN: NORMAL

## 2023-06-04 PROCEDURE — G0378 HOSPITAL OBSERVATION PER HR: HCPCS

## 2023-06-04 PROCEDURE — 83735 ASSAY OF MAGNESIUM: CPT

## 2023-06-04 PROCEDURE — 93005 ELECTROCARDIOGRAM TRACING: CPT | Performed by: EMERGENCY MEDICINE

## 2023-06-04 PROCEDURE — 85007 BL SMEAR W/DIFF WBC COUNT: CPT

## 2023-06-04 PROCEDURE — 84484 ASSAY OF TROPONIN QUANT: CPT

## 2023-06-04 PROCEDURE — 82948 REAGENT STRIP/BLOOD GLUCOSE: CPT

## 2023-06-04 PROCEDURE — 36415 COLL VENOUS BLD VENIPUNCTURE: CPT

## 2023-06-04 PROCEDURE — 71045 X-RAY EXAM CHEST 1 VIEW: CPT

## 2023-06-04 PROCEDURE — 93010 ELECTROCARDIOGRAM REPORT: CPT | Performed by: INTERNAL MEDICINE

## 2023-06-04 PROCEDURE — 85610 PROTHROMBIN TIME: CPT | Performed by: EMERGENCY MEDICINE

## 2023-06-04 PROCEDURE — 93005 ELECTROCARDIOGRAM TRACING: CPT

## 2023-06-04 PROCEDURE — 70450 CT HEAD/BRAIN W/O DYE: CPT

## 2023-06-04 PROCEDURE — 80053 COMPREHEN METABOLIC PANEL: CPT

## 2023-06-04 PROCEDURE — 82140 ASSAY OF AMMONIA: CPT | Performed by: EMERGENCY MEDICINE

## 2023-06-04 PROCEDURE — 85025 COMPLETE CBC W/AUTO DIFF WBC: CPT

## 2023-06-04 PROCEDURE — 99285 EMERGENCY DEPT VISIT HI MDM: CPT

## 2023-06-04 RX ORDER — SODIUM CHLORIDE 0.9 % (FLUSH) 0.9 %
10 SYRINGE (ML) INJECTION AS NEEDED
Status: DISCONTINUED | OUTPATIENT
Start: 2023-06-04 | End: 2023-06-09 | Stop reason: HOSPADM

## 2023-06-04 RX ORDER — CLOPIDOGREL BISULFATE 75 MG/1
75 TABLET ORAL DAILY
COMMUNITY

## 2023-06-04 RX ORDER — AMITRIPTYLINE HYDROCHLORIDE 50 MG/1
50 TABLET, FILM COATED ORAL NIGHTLY
COMMUNITY

## 2023-06-04 RX ADMIN — SODIUM CHLORIDE 1000 ML: 9 INJECTION, SOLUTION INTRAVENOUS at 21:56

## 2023-06-04 RX ADMIN — SODIUM CHLORIDE 500 ML: 9 INJECTION, SOLUTION INTRAVENOUS at 19:52

## 2023-06-04 NOTE — ED PROVIDER NOTES
Time: 6:27 PM EDT  Date of encounter:  6/4/2023  Independent Historian/Clinical History and Information was obtained by:   Patient and Family  Chief Complaint: Weakness and falling    History is limited by: Dementia    History of Present Illness:  Patient is a 77 y.o. year old male who presents to the emergency department for evaluation of weakness and falling.  This patient has a history of multiple medical problems and sees Dr. Alex Buckley in the office.  The patient has had progressive weakness over the last months to weeks to the point where now he is unable to ambulate even with a walker.  The patient has had no recent fever chills or cough however he has had a couple of syncopal episodes and most recently did fall and hit his head.  The patient's wife states that in general the patient will start to try to walk with his walker and then his legs give out and he just slumped to the ground.  The patient currently denies any pain and has no other complaints other than generalized weakness.  He is also being currently worked up by Dr. Saldana for a hematologic issue.    HPI    Patient Care Team  Primary Care Provider: Alex Buckley MD    Past Medical History:     No Known Allergies  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     Primary osteoarthritis of left knee     Sleep apnea     Sleep apnea     Thrombocytopenia 05/22/2018    Thyroid disease      Past Surgical  History:   Procedure Laterality Date    COLONOSCOPY  2015    CORONARY ANGIOPLASTY WITH STENT PLACEMENT      CORONARY ARTERY BYPASS GRAFT N/A 5/20/2021    Procedure: MIDLINE STERNOTOMY,CORONARY ARTERY BYPASS GRAFTING X 5, UTILIZING THE LEFT COMPA AND LEFT SAPHENOUS VEIN, ABHIJEET, PRP;  Surgeon: Jr Tom Tubbs MD;  Location: [facility];  Service: Cardiothoracic;  Laterality: N/A;    ENDOSCOPY      HERNIA REPAIR      JOINT REPLACEMENT      SHOULDER SURGERY      SHOULDER REPAIR    TOTAL KNEE ARTHROPLASTY      TRANSESOPHAGEAL ECHOCARDIOGRAM (ABHIJEET) N/A 5/20/2021    Procedure: TRANSESOPHAGEAL ECHOCARDIOGRAM WITH ANESTHESIA;  Surgeon: Jr Tom Tubbs MD;  Location: [facility];  Service: Cardiothoracic;  Laterality: N/A;     Family History   Problem Relation Age of Onset    Colon cancer Father     Diabetes Mother        Home Medications:  Prior to Admission medications    Medication Sig Start Date End Date Taking? Authorizing Provider   aspirin 81 MG EC tablet Take 1 tablet by mouth Daily.    Michael Snowden MD   donepezil (ARICEPT) 10 MG tablet Take 1 tablet by mouth every night at bedtime.    ProviderMichael MD   ferrous sulfate 325 (65 FE) MG tablet Take 1 tablet by mouth Daily With Breakfast.    Michael Snowden MD   finasteride (PROSCAR) 5 MG tablet Take 1 tablet by mouth Daily. 6/1/21   Jr Tom Tubbs MD   furosemide (LASIX) 40 MG tablet Take 1 tablet by mouth Daily. 9/13/22   Sven Duran MD   gabapentin (NEURONTIN) 600 MG tablet Take 0.5 tablets by mouth 2 (Two) Times a Day. 5/1/23   Alex Buckley MD   insulin glargine (LANTUS, SEMGLEE) 100 UNIT/ML injection Inject 30 Units under the skin into the appropriate area as directed Every Night.    Michael Snowden MD   insulin lispro (humaLOG, ADMELOG) 100 UNIT/ML injection Inject 15 Units under the skin into the appropriate area as directed 3 (Three) Times a Day Before Meals.    Michael Snowden MD   levothyroxine  (SYNTHROID, LEVOTHROID) 125 MCG tablet Take 2 tablets by mouth Every Morning.    Michael Snowden MD   metFORMIN (GLUCOPHAGE) 1000 MG tablet Take 1 tablet by mouth 2 (Two) Times a Day With Meals.    Michael Snowden MD   nadolol (CORGARD) 20 MG tablet TAKE 1 TABLET BY MOUTH DAILY 12/14/22   Alex Buckley MD   nitroglycerin (NITROSTAT) 0.4 MG SL tablet  8/16/21   Michael Snowden MD   sertraline (ZOLOFT) 100 MG tablet Take 2 tablets by mouth Daily. 7/6/22   Alex Buckley MD   simvastatin (ZOCOR) 40 MG tablet Take 20 mg by mouth Every Night.    Michael Snowden MD   tamsulosin (FLOMAX) 0.4 MG capsule 24 hr capsule Take 1 capsule by mouth Daily. 2/20/21   Jr Tom Tubbs MD   vitamin B-12 (CYANOCOBALAMIN) 1000 MCG tablet Take 5 tablets by mouth Every Night.    Michael Snowden MD   vitamin D3 125 MCG (5000 UT) capsule capsule Take 1 capsule by mouth Daily.    Michael Snowden MD        Social History:   Social History     Tobacco Use    Smoking status: Never    Smokeless tobacco: Never   Vaping Use    Vaping Use: Never used   Substance Use Topics    Alcohol use: Not Currently     Comment: QUIT DRINKING 32 YEARS AGO    Drug use: Never         Review of Systems:  Review of Systems   Constitutional:  Negative for chills and fever.   HENT:  Negative for congestion, ear pain and sore throat.    Eyes:  Negative for pain.   Respiratory:  Negative for cough, chest tightness and shortness of breath.    Cardiovascular:  Negative for chest pain.   Gastrointestinal:  Negative for abdominal pain, diarrhea, nausea and vomiting.   Genitourinary:  Negative for flank pain and hematuria.   Musculoskeletal:  Negative for joint swelling.   Skin:  Negative for pallor.   Neurological:  Positive for dizziness, weakness and light-headedness. Negative for seizures and headaches.   Psychiatric/Behavioral: Negative.     All other systems reviewed and are negative.     Physical Exam:  /82   Pulse 60    "Temp 98.7 °F (37.1 °C) (Oral)   Resp 18   Ht 182.9 cm (72\")   Wt 117 kg (257 lb)   SpO2 95%   BMI 34.86 kg/m²     Physical Exam  Vitals and nursing note reviewed.   Constitutional:       General: He is not in acute distress.     Appearance: Normal appearance. He is not toxic-appearing.   HENT:      Head: Normocephalic and atraumatic.      Mouth/Throat:      Mouth: Mucous membranes are moist.   Eyes:      General: No scleral icterus.  Cardiovascular:      Rate and Rhythm: Normal rate and regular rhythm.      Pulses: Normal pulses.      Heart sounds: Normal heart sounds.   Pulmonary:      Effort: Pulmonary effort is normal. No respiratory distress.      Breath sounds: Normal breath sounds.   Abdominal:      General: Abdomen is flat.      Palpations: Abdomen is soft.      Tenderness: There is no abdominal tenderness.   Musculoskeletal:         General: Normal range of motion.      Cervical back: Normal range of motion and neck supple.   Skin:     General: Skin is warm and dry.   Neurological:      Mental Status: He is alert and oriented to person, place, and time. Mental status is at baseline.      Motor: Weakness present.   Psychiatric:         Mood and Affect: Mood normal.         Behavior: Behavior normal.         Thought Content: Thought content normal.                Procedures:  Procedures      Medical Decision Making:      Comorbidities that affect care:    Coronary Artery Disease, Diabetes, Hypertension    External Notes reviewed:    Previous Clinic Note: Clinic note with Dr. Buckley      The following orders were placed and all results were independently analyzed by me:  Orders Placed This Encounter   Procedures    XR Chest 1 View    CT Head Without Contrast    MRI Lumbar Spine With & Without Contrast    [place] Draw    Comprehensive Metabolic Panel    Single High Sensitivity Troponin T    Magnesium    Urinalysis With Culture If Indicated -    CBC Auto Differential    Scan Slide    Protime-INR    Ammonia "    NPO Diet NPO Type: Strict NPO    Undress & Gown    Continuous Pulse Oximetry    Vital Signs    Orthostatic Blood Pressure    IP General Consult (Use specialty-specific consult if known)    Oxygen Therapy- Nasal Cannula; Titrate 1-6 LPM Per SpO2; 90 - 95%    POC Glucose Once    POC Glucose Once    ECG 12 Lead ED Triage Standing Order; Weak / Dizzy / AMS    Insert Peripheral IV    Initiate Observation Status    Fall Precautions    CBC & Differential    Green Top (Gel)    Lavender Top    Gold Top - SST    Light Blue Top       Medications Given in the Emergency Department:  Medications   sodium chloride 0.9 % flush 10 mL (has no administration in time range)   sodium chloride 0.9 % bolus 500 mL (0 mL Intravenous Stopped 6/4/23 2156)   sodium chloride 0.9 % bolus 1,000 mL (1,000 mL Intravenous New Bag 6/4/23 2156)        ED Course:             EKG: Sinus rhythm with rate of 60 bpm  First-degree AV block  Normal QRS  Normal axis normal ST segment  QT QTc interval.        Labs:    Lab Results (last 24 hours)       Procedure Component Value Units Date/Time    CBC & Differential [[id number]]  (Abnormal) Collected: 06/04/23 1631    Specimen: Blood Updated: 06/04/23 1702    Narrative:      The following orders were created for panel order CBC & Differential.  Procedure                               Abnormality         Status                     ---------                               -----------         ------                     CBC Auto Differential[[id number]]        Abnormal            Final result               Scan Slide[[id number]]                                       Final result                 Please view results for these tests on the individual orders.    Comprehensive Metabolic Panel [[id number]]  (Abnormal) Collected: 06/04/23 1631    Specimen: Blood Updated: 06/04/23 1702     Glucose 56 mg/dL      BUN 27 mg/dL      Creatinine 1.32 mg/dL      Sodium 142 mmol/L      Potassium 4.7 mmol/L      Chloride 106 mmol/L       CO2 24.2 mmol/L      Calcium 9.3 mg/dL      Total Protein 7.1 g/dL      Albumin 4.2 g/dL      ALT (SGPT) 16 U/L      AST (SGOT) 16 U/L      Alkaline Phosphatase 91 U/L      Total Bilirubin 0.3 mg/dL      Globulin 2.9 gm/dL      A/G Ratio 1.4 g/dL      BUN/Creatinine Ratio 20.5     Anion Gap 11.8 mmol/L      eGFR 56.2 mL/min/1.73     Narrative:      GFR Normal >60  Chronic Kidney Disease <60  Kidney Failure <15    The GFR formula is only valid for adults with stable renal function between ages 18 and 70.    Single High Sensitivity Troponin T [[id number]]  (Normal) Collected: 06/04/23 1631    Specimen: Blood Updated: 06/04/23 1702     HS Troponin T 13 ng/L     Narrative:      High Sensitive Troponin T Reference Range:  <10.0 ng/L- Negative Female for AMI  <15.0 ng/L- Negative Male for AMI  >=10 - Abnormal Female indicating possible myocardial injury.  >=15 - Abnormal Male indicating possible myocardial injury.   Clinicians would have to utilize clinical acumen, EKG, Troponin, and serial changes to determine if it is an Acute Myocardial Infarction or myocardial injury due to an underlying chronic condition.         Magnesium [[id number]]  (Normal) Collected: 06/04/23 1631    Specimen: Blood Updated: 06/04/23 1702     Magnesium 1.6 mg/dL     CBC Auto Differential [[id number]]  (Abnormal) Collected: 06/04/23 1631    Specimen: Blood Updated: 06/04/23 1702     WBC 10.56 10*3/mm3      RBC 4.01 10*6/mm3      Hemoglobin 12.9 g/dL      Hematocrit 37.4 %      MCV 93.3 fL      MCH 32.2 pg      MCHC 34.5 g/dL      RDW 15.1 %      RDW-SD 51.9 fl      MPV 8.8 fL      Platelets 118 10*3/mm3      Neutrophil % 53.6 %      Lymphocyte % 28.5 %      Monocyte % 5.8 %      Eosinophil % 11.0 %      Basophil % 0.8 %      Immature Grans % 0.3 %      Neutrophils, Absolute 5.67 10*3/mm3      Lymphocytes, Absolute 3.01 10*3/mm3      Monocytes, Absolute 0.61 10*3/mm3      Eosinophils, Absolute 1.16 10*3/mm3      Basophils, Absolute 0.08 10*3/mm3       Immature Grans, Absolute 0.03 10*3/mm3      nRBC 0.0 /100 WBC     Scan Slide [[id number]] Collected: 06/04/23 1631    Specimen: Blood Updated: 06/04/23 1702     RBC Morphology Normal     WBC Morphology Normal     Platelet Estimate Decreased    Protime-INR [[id number]]  (Normal) Collected: 06/04/23 1631    Specimen: Blood Updated: 06/04/23 2055     Protime 13.6 Seconds      INR 1.03    Narrative:      Suggested Therapeutic Ranges For Oral Anticoagulant Therapy:  Level of Therapy                      INR Target Range  Standard Dose                            2.0-3.0  High Dose                                2.5-3.5  Patients not receiving anticoagulant  Therapy Normal Range                     0.86-1.15    POC Glucose Once [[id number]]  (Abnormal) Collected: 06/04/23 1817    Specimen: Blood Updated: 06/04/23 1819     Glucose 120 mg/dL      Comment: Serial Number: [id number]:  [id number]       Ammonia [[id number]]  (Abnormal) Collected: 06/04/23 2051    Specimen: Blood from Arm, Right Updated: 06/04/23 2113     Ammonia 14 umol/L              Imaging:    CT Head Without Contrast    Result Date: 6/4/2023  PROCEDURE: CT HEAD WO CONTRAST  COMPARISON:  [facility], CT, CT HEAD WO CONTRAST, 7/03/2022, 6:05.  [facility], CT, CT ANGIOGRAM HEAD, 7/03/2022, 6:10. INDICATIONS: Fall and altered mental status  PROTOCOL:   Standard imaging protocol performed    RADIATION:   DLP:1081.2 mGy*cm   MA and/or KV was adjusted to minimize radiation dose.     TECHNIQUE: After obtaining the patient's consent, CT images were obtained without non-ionic intravenous contrast material.  FINDINGS:  There is no acute intracranial hemorrhage or extra-axial collection. The ventricles appear normal in caliber, with no evidence of mass effect or midline shift. The basal cisterns are patent. The gray-white differentiation is preserved.  The calvarium is intact.  There is moderate paranasal sinus mucosal disease. The  mastoid air cells are well-aerated.  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       No acute intracranial process or calvarial fracture identified.     DOUG VERDUZCO MD       Electronically Signed and Approved By: DOUG VERDUZCO MD on 6/04/2023 at 20:23             XR Chest 1 View    Result Date: 6/4/2023  PROCEDURE: XR CHEST 1 VW  COMPARISON: [facility], CR, XR CHEST PA AND LATERAL, 1/20/2023, 8:59.  INDICATIONS: Weak/Dizzy/AMS triage protocol  FINDINGS:  Heart size and pulmonary vasculature within normal limits.  Scarring in the left perihilar area.  No acute pulmonary abnormality.  Costophrenic angles sharp.  Bilateral glenohumeral osteoarthritis noted       No active cardiopulmonary disease       IGNACIA AG MD       Electronically Signed and Approved By: IGNACIA AG MD on 6/04/2023 at 16:47                Differential Diagnosis and Discussion:    Syncope: Differential diagnosis includes but is not limited to TIA, hyperventilation, aortic stenosis, pulmonary emboli, myocardial disease, bradycardia arrhythmia, heart block, tachyarrhythmia, vasovagal, orthostatic hypotension, ruptured AAA, aortic dissection, subarachnoid hemorrhage, seizure, hypoglycemia.  Weakness: Based on the patient's history, signs, and symptoms, the diffential diagnosis includes but is not limited to meningitis, stroke, sepsis, subarachnoid hemorrhage, intracranial bleeding, encephalitis, acute uti, dehydration, MS, myasthenia gravis, Guillan [place], migraine variant, neuromuscular disorders vertigo, electrolyte imbalance, and metabolic disorders.    All labs were reviewed and interpreted by me.  EKG was interpreted by me.    MDM     Amount and/or Complexity of Data Reviewed  Clinical lab tests: reviewed  Tests in the radiology section of CPT®: reviewed  Tests in the medicine section of CPT®: reviewed  Decide to obtain previous medical records or to obtain history from someone other than the  patient: yes             Patient Care Considerations:    MRI: I considered ordering an MRI however CT was more appropriate for ED work-up      Consultants/Shared Management Plan:    Hospitalist: I have discussed the case with  who agrees to accept the patient for admission.    Social Determinants of Health:    Patient is unable to carry out activities of daily life. Escalation of care is necessary.       Disposition and Care Coordination:    Admit:   Through independent evaluation of the patient's history, physical, and imperical data, the patient meets criteria for observation/admission to the hospital.        Final diagnoses:   Weakness        ED Disposition       ED Disposition   Decision to Admit    Condition   --    Comment   Level of Care: Telemetry [5]   Diagnosis: Weakness [131712]   Admitting Physician: ZIA WANG [[id number]]   Attending Physician: ZIA WANG [[id number]]                 This medical record created using voice recognition software.             Dalton Farfan,   06/04/23 [id number]

## 2023-06-05 ENCOUNTER — APPOINTMENT (OUTPATIENT)
Dept: MRI IMAGING | Facility: HOSPITAL | Age: 78
DRG: 948 | End: 2023-06-05
Payer: MEDICARE

## 2023-06-05 LAB
ALBUMIN SERPL-MCNC: 3.9 G/DL (ref 3.5–5.2)
ALBUMIN/GLOB SERPL: 1.3 G/DL
ALP SERPL-CCNC: 82 U/L (ref 39–117)
ALT SERPL W P-5'-P-CCNC: 15 U/L (ref 1–41)
ANION GAP SERPL CALCULATED.3IONS-SCNC: 8.7 MMOL/L (ref 5–15)
AST SERPL-CCNC: 16 U/L (ref 1–40)
BILIRUB SERPL-MCNC: 0.5 MG/DL (ref 0–1.2)
BUN SERPL-MCNC: 20 MG/DL (ref 8–23)
BUN/CREAT SERPL: 16.5 (ref 7–25)
CALCIUM SPEC-SCNC: 9.3 MG/DL (ref 8.6–10.5)
CHLORIDE SERPL-SCNC: 104 MMOL/L (ref 98–107)
CO2 SERPL-SCNC: 24.3 MMOL/L (ref 22–29)
CREAT SERPL-MCNC: 1.21 MG/DL (ref 0.76–1.27)
DEPRECATED RDW RBC AUTO: 50.9 FL (ref 37–54)
EGFRCR SERPLBLD CKD-EPI 2021: 62.4 ML/MIN/1.73
ERYTHROCYTE [DISTWIDTH] IN BLOOD BY AUTOMATED COUNT: 15 % (ref 12.3–15.4)
GLOBULIN UR ELPH-MCNC: 2.9 GM/DL
GLUCOSE BLDC GLUCOMTR-MCNC: 125 MG/DL (ref 70–99)
GLUCOSE BLDC GLUCOMTR-MCNC: 168 MG/DL (ref 70–99)
GLUCOSE BLDC GLUCOMTR-MCNC: 180 MG/DL (ref 70–99)
GLUCOSE BLDC GLUCOMTR-MCNC: 300 MG/DL (ref 70–99)
GLUCOSE SERPL-MCNC: 217 MG/DL (ref 65–99)
HCT VFR BLD AUTO: 35.7 % (ref 37.5–51)
HGB BLD-MCNC: 12.2 G/DL (ref 13–17.7)
MCH RBC QN AUTO: 31.7 PG (ref 26.6–33)
MCHC RBC AUTO-ENTMCNC: 34.2 G/DL (ref 31.5–35.7)
MCV RBC AUTO: 92.7 FL (ref 79–97)
PLATELET # BLD AUTO: 87 10*3/MM3 (ref 140–450)
PMV BLD AUTO: 8.9 FL (ref 6–12)
POTASSIUM SERPL-SCNC: 5 MMOL/L (ref 3.5–5.2)
PROT SERPL-MCNC: 6.8 G/DL (ref 6–8.5)
RBC # BLD AUTO: 3.85 10*6/MM3 (ref 4.14–5.8)
SODIUM SERPL-SCNC: 137 MMOL/L (ref 136–145)
WBC NRBC COR # BLD: 6.68 10*3/MM3 (ref 3.4–10.8)

## 2023-06-05 PROCEDURE — 63710000001 INSULIN LISPRO (HUMAN) PER 5 UNITS: Performed by: INTERNAL MEDICINE

## 2023-06-05 PROCEDURE — 0 GADOBENATE DIMEGLUMINE 529 MG/ML SOLUTION: Performed by: INTERNAL MEDICINE

## 2023-06-05 PROCEDURE — 82948 REAGENT STRIP/BLOOD GLUCOSE: CPT

## 2023-06-05 PROCEDURE — 85027 COMPLETE CBC AUTOMATED: CPT | Performed by: INTERNAL MEDICINE

## 2023-06-05 PROCEDURE — 72158 MRI LUMBAR SPINE W/O & W/DYE: CPT

## 2023-06-05 PROCEDURE — 80053 COMPREHEN METABOLIC PANEL: CPT | Performed by: INTERNAL MEDICINE

## 2023-06-05 PROCEDURE — A9577 INJ MULTIHANCE: HCPCS | Performed by: INTERNAL MEDICINE

## 2023-06-05 RX ORDER — INSULIN LISPRO 100 [IU]/ML
2-7 INJECTION, SOLUTION INTRAVENOUS; SUBCUTANEOUS
Status: DISCONTINUED | OUTPATIENT
Start: 2023-06-05 | End: 2023-06-09 | Stop reason: HOSPADM

## 2023-06-05 RX ORDER — NITROGLYCERIN 0.4 MG/1
0.4 TABLET SUBLINGUAL
Status: DISCONTINUED | OUTPATIENT
Start: 2023-06-05 | End: 2023-06-09 | Stop reason: HOSPADM

## 2023-06-05 RX ORDER — SERTRALINE HYDROCHLORIDE 100 MG/1
200 TABLET, FILM COATED ORAL DAILY
Status: DISCONTINUED | OUTPATIENT
Start: 2023-06-05 | End: 2023-06-09 | Stop reason: HOSPADM

## 2023-06-05 RX ORDER — DEXTROSE MONOHYDRATE 25 G/50ML
25 INJECTION, SOLUTION INTRAVENOUS
Status: DISCONTINUED | OUTPATIENT
Start: 2023-06-05 | End: 2023-06-09 | Stop reason: HOSPADM

## 2023-06-05 RX ORDER — ALUMINA, MAGNESIA, AND SIMETHICONE 2400; 2400; 240 MG/30ML; MG/30ML; MG/30ML
15 SUSPENSION ORAL EVERY 6 HOURS PRN
Status: DISCONTINUED | OUTPATIENT
Start: 2023-06-05 | End: 2023-06-09 | Stop reason: HOSPADM

## 2023-06-05 RX ORDER — FINASTERIDE 5 MG/1
5 TABLET, FILM COATED ORAL DAILY
Status: DISCONTINUED | OUTPATIENT
Start: 2023-06-05 | End: 2023-06-09 | Stop reason: HOSPADM

## 2023-06-05 RX ORDER — BISACODYL 10 MG
10 SUPPOSITORY, RECTAL RECTAL DAILY PRN
Status: DISCONTINUED | OUTPATIENT
Start: 2023-06-05 | End: 2023-06-09 | Stop reason: HOSPADM

## 2023-06-05 RX ORDER — CLOPIDOGREL BISULFATE 75 MG/1
75 TABLET ORAL DAILY
Status: DISCONTINUED | OUTPATIENT
Start: 2023-06-05 | End: 2023-06-09 | Stop reason: HOSPADM

## 2023-06-05 RX ORDER — NICOTINE POLACRILEX 4 MG
15 LOZENGE BUCCAL
Status: DISCONTINUED | OUTPATIENT
Start: 2023-06-05 | End: 2023-06-09 | Stop reason: HOSPADM

## 2023-06-05 RX ORDER — LEVOTHYROXINE SODIUM 112 UG/1
224 TABLET ORAL EVERY MORNING
Status: DISCONTINUED | OUTPATIENT
Start: 2023-06-05 | End: 2023-06-09 | Stop reason: HOSPADM

## 2023-06-05 RX ORDER — AMITRIPTYLINE HYDROCHLORIDE 50 MG/1
50 TABLET, FILM COATED ORAL NIGHTLY
Status: DISCONTINUED | OUTPATIENT
Start: 2023-06-05 | End: 2023-06-09 | Stop reason: HOSPADM

## 2023-06-05 RX ORDER — SODIUM CHLORIDE 9 MG/ML
40 INJECTION, SOLUTION INTRAVENOUS AS NEEDED
Status: DISCONTINUED | OUTPATIENT
Start: 2023-06-05 | End: 2023-06-09 | Stop reason: HOSPADM

## 2023-06-05 RX ORDER — SODIUM CHLORIDE 0.9 % (FLUSH) 0.9 %
10 SYRINGE (ML) INJECTION EVERY 12 HOURS SCHEDULED
Status: DISCONTINUED | OUTPATIENT
Start: 2023-06-05 | End: 2023-06-09 | Stop reason: HOSPADM

## 2023-06-05 RX ORDER — POLYETHYLENE GLYCOL 3350 17 G/17G
17 POWDER, FOR SOLUTION ORAL DAILY PRN
Status: DISCONTINUED | OUTPATIENT
Start: 2023-06-05 | End: 2023-06-09 | Stop reason: HOSPADM

## 2023-06-05 RX ORDER — BISACODYL 5 MG/1
5 TABLET, DELAYED RELEASE ORAL DAILY PRN
Status: DISCONTINUED | OUTPATIENT
Start: 2023-06-05 | End: 2023-06-09 | Stop reason: HOSPADM

## 2023-06-05 RX ORDER — ONDANSETRON 2 MG/ML
4 INJECTION INTRAMUSCULAR; INTRAVENOUS EVERY 6 HOURS PRN
Status: DISCONTINUED | OUTPATIENT
Start: 2023-06-05 | End: 2023-06-09 | Stop reason: HOSPADM

## 2023-06-05 RX ORDER — FUROSEMIDE 40 MG/1
40 TABLET ORAL DAILY
Status: DISCONTINUED | OUTPATIENT
Start: 2023-06-05 | End: 2023-06-09 | Stop reason: HOSPADM

## 2023-06-05 RX ORDER — AMOXICILLIN 250 MG
2 CAPSULE ORAL 2 TIMES DAILY
Status: DISCONTINUED | OUTPATIENT
Start: 2023-06-05 | End: 2023-06-09 | Stop reason: HOSPADM

## 2023-06-05 RX ORDER — DONEPEZIL HYDROCHLORIDE 10 MG/1
10 TABLET, FILM COATED ORAL NIGHTLY
Status: DISCONTINUED | OUTPATIENT
Start: 2023-06-05 | End: 2023-06-09 | Stop reason: HOSPADM

## 2023-06-05 RX ORDER — SODIUM CHLORIDE 0.9 % (FLUSH) 0.9 %
10 SYRINGE (ML) INJECTION AS NEEDED
Status: DISCONTINUED | OUTPATIENT
Start: 2023-06-05 | End: 2023-06-09 | Stop reason: HOSPADM

## 2023-06-05 RX ORDER — NADOLOL 20 MG/1
20 TABLET ORAL DAILY
Status: DISCONTINUED | OUTPATIENT
Start: 2023-06-05 | End: 2023-06-09 | Stop reason: HOSPADM

## 2023-06-05 RX ORDER — GABAPENTIN 300 MG/1
300 CAPSULE ORAL 2 TIMES DAILY
Status: DISCONTINUED | OUTPATIENT
Start: 2023-06-05 | End: 2023-06-09 | Stop reason: HOSPADM

## 2023-06-05 RX ORDER — ACETAMINOPHEN 325 MG/1
650 TABLET ORAL EVERY 4 HOURS PRN
Status: DISCONTINUED | OUTPATIENT
Start: 2023-06-05 | End: 2023-06-09 | Stop reason: HOSPADM

## 2023-06-05 RX ORDER — TAMSULOSIN HYDROCHLORIDE 0.4 MG/1
0.4 CAPSULE ORAL DAILY
Status: DISCONTINUED | OUTPATIENT
Start: 2023-06-05 | End: 2023-06-09 | Stop reason: HOSPADM

## 2023-06-05 RX ADMIN — GABAPENTIN 300 MG: 300 CAPSULE ORAL at 20:58

## 2023-06-05 RX ADMIN — LEVOTHYROXINE SODIUM 224 MCG: 0.11 TABLET ORAL at 10:08

## 2023-06-05 RX ADMIN — FUROSEMIDE 40 MG: 40 TABLET ORAL at 10:07

## 2023-06-05 RX ADMIN — FINASTERIDE 5 MG: 5 TABLET, FILM COATED ORAL at 10:07

## 2023-06-05 RX ADMIN — INSULIN LISPRO 5 UNITS: 100 INJECTION, SOLUTION INTRAVENOUS; SUBCUTANEOUS at 20:59

## 2023-06-05 RX ADMIN — GABAPENTIN 300 MG: 300 CAPSULE ORAL at 10:07

## 2023-06-05 RX ADMIN — TAMSULOSIN HYDROCHLORIDE 0.4 MG: 0.4 CAPSULE ORAL at 10:07

## 2023-06-05 RX ADMIN — GADOBENATE DIMEGLUMINE 20 ML: 529 INJECTION, SOLUTION INTRAVENOUS at 07:09

## 2023-06-05 RX ADMIN — INSULIN LISPRO 2 UNITS: 100 INJECTION, SOLUTION INTRAVENOUS; SUBCUTANEOUS at 12:22

## 2023-06-05 RX ADMIN — CLOPIDOGREL BISULFATE 75 MG: 75 TABLET ORAL at 10:07

## 2023-06-05 RX ADMIN — SERTRALINE HYDROCHLORIDE 200 MG: 100 TABLET ORAL at 10:08

## 2023-06-05 RX ADMIN — DONEPEZIL HYDROCHLORIDE 10 MG: 10 TABLET, FILM COATED ORAL at 20:58

## 2023-06-05 RX ADMIN — NADOLOL 20 MG: 20 TABLET ORAL at 10:08

## 2023-06-05 RX ADMIN — AMITRIPTYLINE HYDROCHLORIDE 50 MG: 50 TABLET, FILM COATED ORAL at 20:58

## 2023-06-05 RX ADMIN — INSULIN LISPRO 2 UNITS: 100 INJECTION, SOLUTION INTRAVENOUS; SUBCUTANEOUS at 17:04

## 2023-06-05 RX ADMIN — Medication 10 ML: at 10:08

## 2023-06-05 RX ADMIN — Medication 10 ML: at 21:01

## 2023-06-05 NOTE — H&P
[facility]   HISTORY AND PHYSICAL    Patient Name: Cosmo Gauthier  : 1945  MRN: [id number]  Primary Care Physician:  Alex Buckley MD  Date of admission: 2023    Subjective   Subjective     Chief Complaint:patient known to me with history of cirrhosis of liver as well as a history of lymphoma previously received radiation therapy to the lumbar spine he has now been completed.  All of the sudden loss of his extremities strength cannot stand and he is therefore being admitted to rule out possibility of either spinal cord compression or other etiologies including neuropathy we will get a neurology consultation to get her MRI of the brain has been urged to see lumbar and thoracic spine    HPI:patient with history of lymphoma as well as a history of diabetes mellitus and cirrhosis of liver    Cosmo Gauthier is a 77 y.o. malepatient be cirrhosis of liver lymphoma diabetes sudden loss of strength in the extremities    Review of Systems   All systems were reviewed and negative except for:review    Personal History     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     Primary osteoarthritis of left knee     Sleep apnea     Sleep apnea     Thrombocytopenia 2018    Thyroid disease        Past Surgical History:   Procedure Laterality Date    COLONOSCOPY      CORONARY ANGIOPLASTY WITH STENT PLACEMENT      CORONARY ARTERY BYPASS GRAFT N/A 2021     Procedure: MIDLINE STERNOTOMY,CORONARY ARTERY BYPASS GRAFTING X 5, UTILIZING THE LEFT COMPA AND LEFT SAPHENOUS VEIN, ABHIJEET, PRP;  Surgeon: Jr Tom Tubbs MD;  Location: [facility];  Service: Cardiothoracic;  Laterality: N/A;    ENDOSCOPY      HERNIA REPAIR      JOINT REPLACEMENT      SHOULDER SURGERY      SHOULDER REPAIR    TOTAL KNEE ARTHROPLASTY      TRANSESOPHAGEAL ECHOCARDIOGRAM (ABHIJEET) N/A 5/20/2021    Procedure: TRANSESOPHAGEAL ECHOCARDIOGRAM WITH ANESTHESIA;  Surgeon: Jr Tom Tubbs MD;  Location: [facility];  Service: Cardiothoracic;  Laterality: N/A;       Family History: family history includes Colon cancer in his father; Diabetes in his mother. Otherwise pertinent FHx was reviewed and not pertinent to current issue.    Social History:  reports that he has never smoked. He has never used smokeless tobacco. He reports that he does not currently use alcohol. He reports that he does not use drugs.    Home Medications:  amitriptyline, aspirin, clopidogrel, donepezil, ferrous sulfate, finasteride, furosemide, gabapentin, insulin glargine, levothyroxine, metFORMIN, nadolol, nitroglycerin, sertraline, simvastatin, and tamsulosin      Allergies:  No Known Allergies    Objective   Objective     Vitals:   Temp:  [97.2 °F (36.2 °C)-97.9 °F (36.6 °C)] 97.8 °F (36.6 °C)  Heart Rate:  [59-69] 62  Resp:  [16-20] 16  BP: (122-149)/() 135/73  Physical Exam    Constitutional:alert and oriented not in acute distress   Eyes:pupils equally react to light and accommodation   HENT:normal   Neck:no lymphadenopathy   Respiratory:no rales or rhonchi   Cardiovascular:S1 and S2 normal no S3 or S4   Gastrointestinalno palpable organomegaly   Musculoskeletal:no change   Neurologic:no localizing signs  Skin:no rash    Result Review    Result Review:  I have personally reviewed the results from the time of this admission to 6/5/2023 18:35 EDT and agree with these findings:  [x]  Laboratoryhyperglycemia  []   Microbiology  []  Radiology  []  EKG/Telemetry   []  Cardiology/Vascular   []  Pathology  []  Old records  []  Other:  Most notable findings includehistory of thrombocytopenia with ITP and lymphoma    Assessment & Plan   Assessment / Plan     Brief Patient Summary:  Cosmo Gauthier is a 77 y.o. male whopatient admitted with sudden loss of extremity strength with history of lymphoma arthritis increasing pain in the back    Active Hospital Problems:  Active Hospital Problems    Diagnosis     **Weakness        Plan: I of the lumbar spine brain and get her neurology consultation    DVT prophylaxis:  Mechanical DVT prophylaxis orders are present.    CODE STATUS:    Code Status (Patient has no pulse and is not breathing): CPR (Attempt to Resuscitate)  Medical Interventions (Patient has pulse or is breathing): Full Support      Admission Status:  I believe this patient meets observationstatus.    Electronically signed by Seven Saldana MD, 06/05/23, 6:35 PM EDT.      Part of this note may be an electronic transcription/translation of spoken language to printed text using the Dragon Dictation System.

## 2023-06-05 NOTE — CONSULTS
"Nutrition Services    Patient Name: Cosmo Gauthier  YOB: 1945  MRN: [id number]  Admission date: 6/4/2023      CLINICAL NUTRITION ASSESSMENT      Reason for Assessment   Per nurse admission screening     H&P:    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 Pt eating 100% of meals. 2# increase in greater than 4 months. BMI 35, obese. Receives CCHO diet. Pt is at low risk per nutrition risk screening. No acute nutrition concerns or interventions at this time. RD will continue to follow and monitor per protocol.       Anthropometrics        Current Height, Weight Height: 182.9 cm (72\")  Weight: 118 kg (260 lb 12.9 oz)   Current BMI Body mass index is 35.37 kg/m².       Weight Hx  Wt Readings from Last 30 Encounters:   06/04/23 2253 118 kg (260 lb 12.9 oz)   06/04/23 1600 117 kg (257 lb)   04/25/23 1352 117 kg (257 lb 6.4 oz)   04/11/23 1139 118 kg (261 lb)   01/18/23 1008 117 kg (258 lb 3.2 oz)   10/17/22 1302 115 kg (253 lb 6.4 oz)   09/13/22 1129 113 kg (250 lb)   07/20/22 1159 116 kg (256 lb 6.4 oz)   07/06/22 1155 113 kg (249 lb 3.2 oz)   07/02/22 2040 113 kg (249 lb 12.5 oz)   07/02/22 1946 " 111 kg (245 lb)   04/20/22 1321 117 kg (257 lb 6.4 oz)   03/26/22 1109 117 kg (257 lb 6.4 oz)   02/25/22 1319 113 kg (250 lb)   01/25/22 1316 112 kg (246 lb 3.2 oz)   01/18/22 1320 113 kg (249 lb 12.8 oz)   11/02/21 1027 116 kg (255 lb 6.4 oz)   09/28/21 1303 112 kg (247 lb)   09/02/21 0937 112 kg (247 lb)   08/02/21 1342 112 kg (247 lb 3.2 oz)   07/29/21 0951 112 kg (246 lb 11.1 oz)   06/22/21 1256 109 kg (240 lb)   06/15/21 1315 109 kg (240 lb)   05/31/21 0700 111 kg (245 lb 12.8 oz)   05/30/21 0600 112 kg (247 lb 12.8 oz)   05/29/21 0712 114 kg (251 lb 11.2 oz)   05/27/21 0725 116 kg (256 lb 3.2 oz)   05/26/21 0435 120 kg (263 lb 11.2 oz)   05/25/21 0600 119 kg (263 lb 4.8 oz)   05/24/21 0654 120 kg (265 lb 4.8 oz)   05/23/21 0510 119 kg (262 lb 6.4 oz)   05/22/21 0710 116 kg (255 lb 11.2 oz)   05/21/21 0700 125 kg (275 lb 5.7 oz)   05/19/21 2100 116 kg (255 lb 14.4 oz)   05/18/21 1521 113 kg (250 lb)   05/18/21 1058 113 kg (250 lb)   03/31/21 1044 111 kg (245 lb)   03/11/21 0000 118 kg (261 lb)   02/17/21 0614 111 kg (244 lb 1.6 oz)   02/16/21 1600 66 kg (145 lb 8.1 oz)   02/15/21 2100 66.2 kg (145 lb 14.4 oz)   02/15/21 1404 115 kg (252 lb 8 oz)   02/15/21 1024 118 kg (260 lb)   10/06/20 0000 118 kg (261 lb)   03/09/20 1024 125 kg (275 lb)            Wt Change Observation 2# increase in over 4 months     Estimated/Assessed Needs       Energy Requirements 25-30 kcal/kg adjusted body wt 87.84 kg   EST Needs (kcal/day) 9296-9252       Protein Requirements 1 g/kg adjusted body wt   EST Daily Needs (g/day) 88       Fluid Requirements 1 ml/kcal     Estimated Needs (mL/day) 6148-9357     Labs/Medications         Pertinent Labs Reviewed.   Results from last 7 days   Lab Units 06/05/23  1020 06/04/23  1631   SODIUM mmol/L 137 142   POTASSIUM mmol/L 5.0 4.7   CHLORIDE mmol/L 104 106   CO2 mmol/L 24.3 24.2   BUN mg/dL 20 27*   CREATININE mg/dL 1.21 1.32*   CALCIUM mg/dL 9.3 9.3   BILIRUBIN mg/dL 0.5 0.3   ALK PHOS U/L 82  91   ALT (SGPT) U/L 15 16   AST (SGOT) U/L 16 16   GLUCOSE mg/dL 217* 56*     Results from last 7 days   Lab Units 06/05/23  1020 06/04/23  1631   MAGNESIUM mg/dL  --  1.6   HEMOGLOBIN g/dL 12.2* 12.9*   HEMATOCRIT % 35.7* 37.4*     COVID19   Date Value Ref Range Status   07/03/2022 Not Detected Not Detected - Ref. Range Final     Lab Results   Component Value Date    HGBA1C 6.70 (H) 04/20/2023         Pertinent Medications Reviewed.     Current Nutrition Orders & Evaluation of Intake       Oral Nutrition     Current PO Diet Diet: Diabetic Diets; Consistent Carbohydrate; Texture: Regular Texture (IDDSI 7); Fluid Consistency: Thin (IDDSI 0)   Supplement No active supplement orders       Malnutrition Severity Assessment                Nutrition Diagnosis         Nutrition Dx Problem 1 No nutrition diagnosis at this time.       Nutrition Intervention         No intervention indicated.      Medical Nutrition Therapy/Nutrition Education          Learner     Readiness N/A  N/A     Method     Response N/A  N/A     Monitor/Evaluation        Monitor Per protocol.       Nutrition Discharge Plan         No nutrition needs identified at this time.       Electronically signed by:  Lacy Rivera RD  06/05/23 15:52 EDT

## 2023-06-05 NOTE — PLAN OF CARE
Goal Outcome Evaluation: NPO for MRI 06/05/2023. VSS. No changes this shift.

## 2023-06-05 NOTE — CASE MANAGEMENT/SOCIAL WORK
Discharge Planning Assessment  SARKIS Dhillon     Patient Name: Cosmo Gauthier  MRN: [id number]  Today's Date: 6/5/2023    Admit Date: 6/4/2023    Plan: Pt lives with wife, wife is main caregiver. Pt has a son that lives close and helps when needed. Pt states he has good support. Pt is agreeable to InPt/OP rehab or HHC if needed. PCP: BRIAN Buckley Pharm: [facility], [facility] stated [facility]. Pt denies any issues affording food, utilites or medications.   Discharge Needs Assessment       Row Name 06/05/23 1432       Living Environment    People in Home spouse    Current Living Arrangements home    Duration at Residence 4 years    Potentially Unsafe Housing Conditions none    Primary Care Provided by self;spouse/significant other    Provides Primary Care For no one    Family Caregiver if Needed child(marcelino), adult;spouse    Quality of Family Relationships helpful;involved;supportive       Resource/Environmental Concerns    Resource/Environmental Concerns none    Transportation Concerns none       Food Insecurity    Within the past 12 months, you worried that your food would run out before you got the money to buy more. Never true    Within the past 12 months, the food you bought just didn't last and you didn't have money to get more. Never true       Transition Planning    Patient/Family Anticipates Transition to home with family;home with help/services;inpatient rehabilitation facility    Patient/Family Anticipated Services at Transition none    Transportation Anticipated family or friend will provide       Discharge Needs Assessment    Readmission Within the Last 30 Days no previous admission in last 30 days    Equipment Currently Used at Home cane, quad;rollator    Concerns to be Addressed discharge planning    Anticipated Changes Related to Illness none    Equipment Needed After Discharge none    Discharge Coordination/Progress Pt  lives with wife, wife is main caregiver. Pt has a son that lives close and helps when needed. Pt states he has good support. Pt is agreeable to InPt/OP rehab or HHC if needed. PCP: BRIAN Buckley Pharm: [facility], [facility] Pt stated he is [facility] to get [facility]. Pt denies any issues affording food, utilites or medications.                   Discharge Plan       Row Name 06/05/23 1436       Plan    Plan Pt lives with wife, wife is main caregiver. Pt has a son that lives close and helps when needed. Pt states he has good support. Pt is agreeable to InPt/OP rehab or HHC if needed. PCP: BRIAN Buckley Pharm: [facility], [facility]. Pt stated he is [facility] to get [facility]. Pt denies any issues affording food, utilites or medications.                  Continued Care and Services - Admitted Since 6/4/2023    Coordination has not been started for this encounter.          Demographic Summary       Row Name 06/05/23 1430       General Information    Admission Type inpatient    Arrived From emergency department    Referral Source admission list    Reason for Consult discharge planning    Preferred Language English       Contact Information    Permission Granted to Share Info With lay caregiver    Contact Information Obtained for lay caregiver       Lay Caregiver Information    Name, Lay Caregiver Carla Gauthier    Phone, Lay Caregiver [phone]                   Functional Status       Row Name 06/05/23 1431       Functional Status    Usual Activity Tolerance good    Current Activity Tolerance good       Physical Activity    On average, how many days per week do you engage in moderate to strenuous exercise (like a brisk walk)? 0 days    On average, how many minutes do you engage in exercise at this level? 0 min    Number of minutes of exercise per week 0       Assessment of Health Literacy    How  often do you have someone help you read hospital materials? Occasionally    How often do you have problems learning about your medical condition because of difficulty understanding written information? Occasionally    How often do you have a problem understanding what is told to you about your medical condition? Occasionally    How confident are you filling out medical forms by yourself? Quite a bit    Health Literacy Moderate       Functional Status, IADL    Medications independent;assistive person    Meal Preparation independent;assistive person    Housekeeping independent;assistive person    Laundry independent;assistive person    Shopping independent;assistive person    IADL Comments Pt states that wife is main caregiver.       Mental Status    General Appearance WDL WDL       Mental Status Summary    Recent Changes in Mental Status/Cognitive Functioning no changes       Employment/    Employment Status retired                   Psychosocial    No documentation.                  Abuse/Neglect    No documentation.                  Legal       Row Name 06/05/23 1432       Financial Resource Strain    How hard is it for you to pay for the very basics like food, housing, medical care, and heating? Not hard       Financial/Legal    Source of Income social security    Application for Public Assistance not applied       Legal    Criminal Activity/Legal Involvement none                   Substance Abuse    No documentation.                  Patient Forms    No documentation.                     Fabiola Smith

## 2023-06-06 ENCOUNTER — APPOINTMENT (OUTPATIENT)
Dept: MRI IMAGING | Facility: HOSPITAL | Age: 78
DRG: 948 | End: 2023-06-06
Payer: MEDICARE

## 2023-06-06 LAB
ALBUMIN SERPL-MCNC: 4.2 G/DL (ref 3.5–5.2)
ALBUMIN/GLOB SERPL: 1.6 G/DL
ALP SERPL-CCNC: 86 U/L (ref 39–117)
ALT SERPL W P-5'-P-CCNC: 15 U/L (ref 1–41)
ANION GAP SERPL CALCULATED.3IONS-SCNC: 10.6 MMOL/L (ref 5–15)
AST SERPL-CCNC: 14 U/L (ref 1–40)
BASOPHILS # BLD AUTO: 0.08 10*3/MM3 (ref 0–0.2)
BASOPHILS NFR BLD AUTO: 1 % (ref 0–1.5)
BILIRUB SERPL-MCNC: 0.3 MG/DL (ref 0–1.2)
BILIRUB UR QL STRIP: NEGATIVE
BUN SERPL-MCNC: 23 MG/DL (ref 8–23)
BUN/CREAT SERPL: 15.9 (ref 7–25)
CALCIUM SPEC-SCNC: 9.6 MG/DL (ref 8.6–10.5)
CHLORIDE SERPL-SCNC: 103 MMOL/L (ref 98–107)
CLARITY UR: CLEAR
CO2 SERPL-SCNC: 25.4 MMOL/L (ref 22–29)
COLOR UR: YELLOW
CREAT SERPL-MCNC: 1.45 MG/DL (ref 0.76–1.27)
DEPRECATED RDW RBC AUTO: 51 FL (ref 37–54)
EGFRCR SERPLBLD CKD-EPI 2021: 50.3 ML/MIN/1.73
EOSINOPHIL # BLD AUTO: 0.74 10*3/MM3 (ref 0–0.4)
EOSINOPHIL NFR BLD AUTO: 9.5 % (ref 0.3–6.2)
ERYTHROCYTE [DISTWIDTH] IN BLOOD BY AUTOMATED COUNT: 14.8 % (ref 12.3–15.4)
GLOBULIN UR ELPH-MCNC: 2.6 GM/DL
GLUCOSE BLDC GLUCOMTR-MCNC: 156 MG/DL (ref 70–99)
GLUCOSE BLDC GLUCOMTR-MCNC: 182 MG/DL (ref 70–99)
GLUCOSE BLDC GLUCOMTR-MCNC: 263 MG/DL (ref 70–99)
GLUCOSE SERPL-MCNC: 122 MG/DL (ref 65–99)
GLUCOSE UR STRIP-MCNC: NEGATIVE MG/DL
HCT VFR BLD AUTO: 36.8 % (ref 37.5–51)
HGB BLD-MCNC: 12.5 G/DL (ref 13–17.7)
HGB UR QL STRIP.AUTO: NEGATIVE
IMM GRANULOCYTES # BLD AUTO: 0.02 10*3/MM3 (ref 0–0.05)
IMM GRANULOCYTES NFR BLD AUTO: 0.3 % (ref 0–0.5)
INR PPP: 1.08 (ref 0.86–1.15)
KETONES UR QL STRIP: NEGATIVE
LEUKOCYTE ESTERASE UR QL STRIP.AUTO: NEGATIVE
LYMPHOCYTES # BLD AUTO: 2.75 10*3/MM3 (ref 0.7–3.1)
LYMPHOCYTES NFR BLD AUTO: 35.3 % (ref 19.6–45.3)
MCH RBC QN AUTO: 31.6 PG (ref 26.6–33)
MCHC RBC AUTO-ENTMCNC: 34 G/DL (ref 31.5–35.7)
MCV RBC AUTO: 93.2 FL (ref 79–97)
MONOCYTES # BLD AUTO: 0.54 10*3/MM3 (ref 0.1–0.9)
MONOCYTES NFR BLD AUTO: 6.9 % (ref 5–12)
NEUTROPHILS NFR BLD AUTO: 3.65 10*3/MM3 (ref 1.7–7)
NEUTROPHILS NFR BLD AUTO: 47 % (ref 42.7–76)
NITRITE UR QL STRIP: NEGATIVE
NRBC BLD AUTO-RTO: 0 /100 WBC (ref 0–0.2)
PH UR STRIP.AUTO: 5.5 [PH] (ref 5–8)
PLATELET # BLD AUTO: 99 10*3/MM3 (ref 140–450)
PMV BLD AUTO: 9.1 FL (ref 6–12)
POTASSIUM SERPL-SCNC: 4.5 MMOL/L (ref 3.5–5.2)
PROT SERPL-MCNC: 6.8 G/DL (ref 6–8.5)
PROT UR QL STRIP: NEGATIVE
PROTHROMBIN TIME: 14.1 SECONDS (ref 11.8–14.9)
RBC # BLD AUTO: 3.95 10*6/MM3 (ref 4.14–5.8)
SODIUM SERPL-SCNC: 139 MMOL/L (ref 136–145)
SP GR UR STRIP: 1.01 (ref 1–1.03)
UROBILINOGEN UR QL STRIP: NORMAL
WBC NRBC COR # BLD: 7.78 10*3/MM3 (ref 3.4–10.8)

## 2023-06-06 PROCEDURE — 81003 URINALYSIS AUTO W/O SCOPE: CPT | Performed by: EMERGENCY MEDICINE

## 2023-06-06 PROCEDURE — 72157 MRI CHEST SPINE W/O & W/DYE: CPT

## 2023-06-06 PROCEDURE — 0 GADOBENATE DIMEGLUMINE 529 MG/ML SOLUTION: Performed by: INTERNAL MEDICINE

## 2023-06-06 PROCEDURE — 85025 COMPLETE CBC W/AUTO DIFF WBC: CPT | Performed by: INTERNAL MEDICINE

## 2023-06-06 PROCEDURE — 97161 PT EVAL LOW COMPLEX 20 MIN: CPT

## 2023-06-06 PROCEDURE — 85610 PROTHROMBIN TIME: CPT | Performed by: INTERNAL MEDICINE

## 2023-06-06 PROCEDURE — 63710000001 INSULIN LISPRO (HUMAN) PER 5 UNITS: Performed by: INTERNAL MEDICINE

## 2023-06-06 PROCEDURE — 72156 MRI NECK SPINE W/O & W/DYE: CPT

## 2023-06-06 PROCEDURE — 82948 REAGENT STRIP/BLOOD GLUCOSE: CPT

## 2023-06-06 PROCEDURE — A9577 INJ MULTIHANCE: HCPCS | Performed by: INTERNAL MEDICINE

## 2023-06-06 PROCEDURE — 70553 MRI BRAIN STEM W/O & W/DYE: CPT

## 2023-06-06 PROCEDURE — 80053 COMPREHEN METABOLIC PANEL: CPT | Performed by: INTERNAL MEDICINE

## 2023-06-06 RX ADMIN — DONEPEZIL HYDROCHLORIDE 10 MG: 10 TABLET, FILM COATED ORAL at 20:56

## 2023-06-06 RX ADMIN — INSULIN LISPRO 2 UNITS: 100 INJECTION, SOLUTION INTRAVENOUS; SUBCUTANEOUS at 16:56

## 2023-06-06 RX ADMIN — FINASTERIDE 5 MG: 5 TABLET, FILM COATED ORAL at 09:03

## 2023-06-06 RX ADMIN — SENNOSIDES AND DOCUSATE SODIUM 2 TABLET: 50; 8.6 TABLET ORAL at 20:55

## 2023-06-06 RX ADMIN — CLOPIDOGREL BISULFATE 75 MG: 75 TABLET ORAL at 09:03

## 2023-06-06 RX ADMIN — NADOLOL 20 MG: 20 TABLET ORAL at 09:02

## 2023-06-06 RX ADMIN — Medication 10 ML: at 09:02

## 2023-06-06 RX ADMIN — Medication 10 ML: at 20:56

## 2023-06-06 RX ADMIN — GADOBENATE DIMEGLUMINE 20 ML: 529 INJECTION, SOLUTION INTRAVENOUS at 07:53

## 2023-06-06 RX ADMIN — SENNOSIDES AND DOCUSATE SODIUM 2 TABLET: 50; 8.6 TABLET ORAL at 09:02

## 2023-06-06 RX ADMIN — INSULIN LISPRO 4 UNITS: 100 INJECTION, SOLUTION INTRAVENOUS; SUBCUTANEOUS at 23:15

## 2023-06-06 RX ADMIN — FUROSEMIDE 40 MG: 40 TABLET ORAL at 09:02

## 2023-06-06 RX ADMIN — GABAPENTIN 300 MG: 300 CAPSULE ORAL at 09:03

## 2023-06-06 RX ADMIN — SERTRALINE HYDROCHLORIDE 200 MG: 100 TABLET ORAL at 09:03

## 2023-06-06 RX ADMIN — INSULIN LISPRO 2 UNITS: 100 INJECTION, SOLUTION INTRAVENOUS; SUBCUTANEOUS at 12:13

## 2023-06-06 RX ADMIN — GABAPENTIN 300 MG: 300 CAPSULE ORAL at 20:56

## 2023-06-06 RX ADMIN — AMITRIPTYLINE HYDROCHLORIDE 50 MG: 50 TABLET, FILM COATED ORAL at 20:55

## 2023-06-06 RX ADMIN — TAMSULOSIN HYDROCHLORIDE 0.4 MG: 0.4 CAPSULE ORAL at 09:02

## 2023-06-06 RX ADMIN — LEVOTHYROXINE SODIUM 224 MCG: 0.11 TABLET ORAL at 06:21

## 2023-06-06 NOTE — PLAN OF CARE
Goal Outcome Evaluation:    Patient remained stable through shift. MRI 06/06/2023

## 2023-06-06 NOTE — PLAN OF CARE
Goal Outcome Evaluation:  Plan of Care Reviewed With: patient           Outcome Evaluation: The patient presented to physical therapy with a decrease in lower extremity balance, strength, and ambulation. Skilled physical therapy is indicated at this time to address these deficits. Recommend the patient to inpatient rehabilitation following discharge from acute care setting. During inpatient stay recommend the patient be evaluated for BPPV to assess for any positional dizziness.

## 2023-06-06 NOTE — THERAPY EVALUATION
Acute Care - Physical Therapy Initial Evaluation  SARKIS Alejo     Patient Name: Cosmo Gauthier  : 1945  MRN: [id number]  Today's Date: 2023   Admit date: 2023     Referring Physician: Seven Saldana MD        Visit Dx:     ICD-10-CM ICD-9-CM   1. Weakness  R53.1 780.79   2. 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)    Stage 3b chronic kidney disease (HCC)    Thrombocytopenia (HCC)    Coronary artery disease involving native coronary artery of native heart without angina pectoris    Hx of CABG    Hyperlipemia, mixed    Degenerative spondylolisthesis    Hypertension, essential    Hereditary and idiopathic neuropathy, unspecified    Low lying conus medullaris    Mood disorder (HCC)    Non-alcoholic cirrhosis (HCC)    Sleep apnea    Spinal stenosis of lumbar region with neurogenic claudication    Vascular dementia without behavioral disturbance    Iron deficiency anemia    Hypothyroidism, adult    Traumatic subarachnoid hemorrhage with loss of consciousness of 30 minutes or less    Morbid (severe) obesity due to excess calories    Claudication of both lower extremities    Dyspnea on exertion    Medicare annual wellness visit, subsequent    Weakness     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     Primary osteoarthritis of left knee     Sleep apnea      Sleep apnea     Thrombocytopenia 05/22/2018    Thyroid disease      Past Surgical History:   Procedure Laterality Date    COLONOSCOPY  2015    CORONARY ANGIOPLASTY WITH STENT PLACEMENT      CORONARY ARTERY BYPASS GRAFT N/A 5/20/2021    Procedure: MIDLINE STERNOTOMY,CORONARY ARTERY BYPASS GRAFTING X 5, UTILIZING THE LEFT COMPA AND LEFT SAPHENOUS VEIN, ABHIJEET, PRP;  Surgeon: Jr Tom Tubbs MD;  Location: [facility];  Service: Cardiothoracic;  Laterality: N/A;    ENDOSCOPY      HERNIA REPAIR      JOINT REPLACEMENT      SHOULDER SURGERY      SHOULDER REPAIR    TOTAL KNEE ARTHROPLASTY      TRANSESOPHAGEAL ECHOCARDIOGRAM (ABHIJEET) N/A 5/20/2021    Procedure: TRANSESOPHAGEAL ECHOCARDIOGRAM WITH ANESTHESIA;  Surgeon: Jr Tom Tubbs MD;  Location: [facility];  Service: Cardiothoracic;  Laterality: N/A;     PT Assessment (last 12 hours)       PT Evaluation and Treatment       Row Name 06/06/23 0953          Physical Therapy Time and Intention    Subjective Information complains of;weakness;fatigue;pain;dizziness  -NICCI     Document Type evaluation  -NICCI     Mode of Treatment individual therapy;physical therapy  -NICCI     Patient Effort good  -NICCI     Symptoms Noted During/After Treatment dizziness;fatigue  -NICCI       Row Name 06/06/23 0953          General Information    Patient Profile Reviewed yes  -NICCI     Patient Observations alert;cooperative;agree to therapy  -NICCI     Prior Level of Function independent:;all household mobility;community mobility;gait;transfer;bed mobility;ADL's;home management  -NICCI     Equipment Currently Used at Home grab bar;walker, rolling;cane, quad;rollator  -NICCI     Existing Precautions/Restrictions fall  -NICCI     Barriers to Rehab none identified  -NICCI       Row Name 06/06/23 0953          Living Environment    Current Living Arrangements home  -NICCI     Home Accessibility stairs to enter home  -NICCI     People in Home spouse  -NICCI     Primary Care Provided by self;spouse/significant other  -NICCI        Row Name 06/06/23 0953          Home Main Entrance    Number of Stairs, Main Entrance six  -NICCI     Stair Railings, Main Entrance railings on both sides of stairs  -NICCI     Landing, Stairs, Main Entrance railings present  -NICCI       Row Name 06/06/23 0953          Home Use of Assistive/Adaptive Equipment    Equipment Currently Used at Home cane, quad;rollator  -NICCI       Row Name 06/06/23 0953          Range of Motion (ROM)    Range of Motion bilateral lower extremities;ROM is WFL  -NICCI       Row Name 06/06/23 0953          Strength (Manual Muscle Testing)    Strength (Manual Muscle Testing) bilateral lower extremities  BLE: 4/5 except L hip flex 3+/5  -NICCI       Row Name 06/06/23 0953          Bed Mobility    Bed Mobility supine-sit  -NICCI     Supine-Sit [place] (Bed Mobility) independent  -NICCI       Row Name 06/06/23 0953          Transfers    Transfers sit-stand transfer;stand-sit transfer  -NICCI       Row Name 06/06/23 0953          Sit-Stand Transfer    Sit-Stand [place] (Transfers) standby assist  -NICCI     Assistive Device (Sit-Stand Transfers) walker, front-wheeled  -NICCI       Row Name 06/06/23 0953          Stand-Sit Transfer    Stand-Sit [place] (Transfers) standby assist  -NICCI     Assistive Device (Stand-Sit Transfers) walker, front-wheeled  -NICCI       Row Name 06/06/23 0953          Gait/Stairs (Locomotion)    Gait/Stairs Locomotion gait/ambulation assistive device  -NICCI     [place] Level (Gait) contact guard  -NICCI     Assistive Device (Gait) walker, front-wheeled  -NICCI     Distance in Feet (Gait) 30  -NICCI     Pattern (Gait) step-through  -NICCI     Deviations/Abnormal Patterns (Gait) antalgic;gonzalo decreased;gait speed decreased;stride length decreased  -NICCI     Comment, (Gait/Stairs) Patient experienced 3x LOB with self correction. The patient stated that he felt weak, but had no complaints of dizziness.  -NICCI       Row Name 06/06/23 0953          Safety Issues, Functional Mobility    Impairments  Affecting Function (Mobility) balance;endurance/activity tolerance;pain;strength  -NICCI       Row Name 06/06/23 0953          Balance    Balance Assessment standing dynamic balance  -NICCI     Dynamic Standing Balance contact guard  -NICCI     Position/Device Used, Standing Balance supported;walker, front-wheeled  -NICCI       Row Name 06/06/23 0953          Plan of Care Review    Plan of Care Reviewed With patient  -NICCI     Outcome Evaluation The patient presented to physical therapy with a decrease in lower extremity balance, strength, and ambulation. Skilled physical therapy is indicated at this time to address these deficits. Recommend the patient to inpatient rehabilitation following discharge from acute care setting.  -NICCI       Row Name 06/06/23 0953          Positioning and Restraints    Pre-Treatment Position in bed  -NICCI     Post Treatment Position bed  -NICCI       Row Name 06/06/23 0953          Therapy Assessment/Plan (PT)    Rehab Potential (PT) good, to achieve stated therapy goals  -NICCI     Criteria for Skilled Interventions Met (PT) skilled treatment is necessary  -NICCI     Therapy Frequency (PT) daily  -NICCI     Predicted Duration of Therapy Intervention (PT) 10  -NICCI     Problem List (PT) balance;mobility;strength;pain  -NICCI     Activity Limitations Related to Problem List (PT) unable to ambulate safely;unable to transfer safely  -NICCI       Row Name 06/06/23 0953          Therapy Plan Review/Discharge Plan (PT)    Therapy Plan Review (PT) evaluation/treatment results reviewed;participants included;patient  -NICCI       Row Name 06/06/23 0953          Physical Therapy Goals    Transfer Goal Selection (PT) transfer, PT goal 1  -NICCI     Gait Training Goal Selection (PT) gait training, PT goal 1  -NICCI       Row Name 06/06/23 0953          Transfer Goal 1 (PT)    Activity/Assistive Device (Transfer Goal 1, PT) sit-to-stand/stand-to-sit;walker, rolling  -NICCI     [place] Level/Cues Needed (Transfer Goal 1, PT) modified  independence  -NICCI     Time Frame (Transfer Goal 1, PT) long term goal (LTG);10 days  -NICCI       Row Name 06/06/23 0953          Gait Training Goal 1 (PT)    Activity/Assistive Device (Gait Training Goal 1, PT) gait (walking locomotion);walker, rolling  -NICCI     [place] Level (Gait Training Goal 1, PT) modified independence  -NICCI     Distance (Gait Training Goal 1, PT) 100  -NICCI     Time Frame (Gait Training Goal 1, PT) long term goal (LTG);10 days  -NICCI               User Key  (r) = Recorded By, (t) = Taken By, (c) = Cosigned By      Initials Name Provider Type    Santosh Del Toro, PT Physical Therapist                    Physical Therapy Education       Title: PT OT SLP Therapies (Done)       Topic: Physical Therapy (Done)       Point: Mobility training (Done)       Learning Progress Summary             Patient Acceptance, E,TB, VU by NICCI at 6/6/2023 1006                                         User Key       Initials Effective Dates Name Provider Type Discipline    NICCI 06/03/21 -  Santosh Cannon PT Physical Therapist PT                  PT Recommendation and Plan  Anticipated Discharge Disposition (PT): inpatient rehabilitation facility  Planned Therapy Interventions (PT): balance training, gait training, patient/family education, ROM (range of motion), stair training, strengthening, stretching, transfer training  Therapy Frequency (PT): daily  Plan of Care Reviewed With: patient  Outcome Evaluation: The patient presented to physical therapy with a decrease in lower extremity balance, strength, and ambulation. Skilled physical therapy is indicated at this time to address these deficits. Recommend the patient to inpatient rehabilitation following discharge from acute care setting.   Outcome Measures       Row Name 06/06/23 0900             How much help from another person do you currently need...    Turning from your back to your side while in flat bed without using bedrails? 4  -NICCI      Moving from lying on  back to sitting on the side of a flat bed without bedrails? 4  -NICCI      Moving to and from a bed to a chair (including a wheelchair)? 3  -NICCI      Standing up from a chair using your arms (e.g., wheelchair, bedside chair)? 3  -NICCI      Climbing 3-5 steps with a railing? 3  -NICCI      To walk in hospital room? 3  -NICCI      AM-PAC 6 Clicks Score (PT) 20  -NICCI                User Key  (r) = Recorded By, (t) = Taken By, (c) = Cosigned By      Initials Name Provider Type    Santosh Del Toro, PT Physical Therapist                     Time Calculation:    PT Charges       Row Name 06/06/23 0953             Time Calculation    PT Received On 06/06/23  -NICCI      PT Goal Re-Cert Due Date 06/15/23  -NICCI         Untimed Charges    PT Eval/Re-eval Minutes 25  -NICCI         Total Minutes    Untimed Charges Total Minutes 25  -NICCI       Total Minutes 25  -NICCI                User Key  (r) = Recorded By, (t) = Taken By, (c) = Cosigned By      Initials Name Provider Type    Santosh Del Toro, NASIR Physical Therapist                  Therapy Charges for Today       Code Description Service Date Service Provider Modifiers Qty    78980785461 HC PT EVAL LOW COMPLEXITY 2 6/6/2023 Santosh Cannon, PT GP 1            PT G-Codes  AM-PAC 6 Clicks Score (PT): 20    Santosh Cannon, PT  6/6/2023

## 2023-06-06 NOTE — PLAN OF CARE
Goal Outcome Evaluation:  Plan of Care Reviewed With: patient   No acute events this shift.  VSS.

## 2023-06-06 NOTE — SIGNIFICANT NOTE
06/06/23 1045   Coping/Psychosocial   Observed Emotional State calm;cooperative   Verbalized Emotional State anxiety   Trust Relationship/Rapport empathic listening provided   Involvement in Care interacting with patient   Additional Documentation Spiritual Care (Group)   Spiritual Care   Use of Spiritual Resources non-Taoist use of spiritual care   Spiritual Care Source  initiative   Spiritual Care Follow-Up follow-up, none required as presently assessed   Response to Spiritual Care receptive of support;engaged in conversation   Spiritual Care Interventions supportive conversation provided   Spiritual Care Visit Type initial   Receptivity to Spiritual Care visit welcomed

## 2023-06-06 NOTE — PROGRESS NOTES
[facility]     Progress Note    Patient Name: Cosmo Gauthier  : 1945  MRN: [id number]  Primary Care Physician:  Alex Buckley MD  Date of admission: 2023    Subjective   Subjective     Chief Complaint: Seen in the process of getting MRIs to rule out possibility of involvement of the spinal cord or other reasons and neurology consultation is pending    HPI: Getting the MRIs neurology consultation pending    Review of Systems   All systems were reviewed and negative except for: Reviewed    Objective   Objective     Vitals:   Temp:  [97.6 °F (36.4 °C)-98.2 °F (36.8 °C)] 97.8 °F (36.6 °C)  Heart Rate:  [60-69] 67  Resp:  [16] 16  BP: (114-137)/() 114/50    Physical Exam    Constitutional: Awake, alert   Eyes: PERRLA, sclerae anicteric, no conjunctival injection   HENT: NCAT, mucous membranes moist   Neck: Supple, no thyromegaly, no lymphadenopathy, trachea midline   Respiratory: Clear to auscultation bilaterally, nonlabored respirations    Cardiovascular: RRR, no murmurs, rubs, or gallops, palpable pedal pulses bilaterally   Gastrointestinal: Positive bowel sounds, soft, nontender, nondistended   Musculoskeletal: No bilateral ankle edema, no clubbing or cyanosis to extremities   Psychiatric: Appropriate affect, cooperative   Neurologic: Oriented x 3, strength symmetric in all extremities, Cranial Nerves grossly intact to confrontation, speech clear   Skin: No rashes   No change  Result Review    Result Review:  I have personally reviewed the results from the time of this admission to 2023 08:51 EDT and agree with these findings:  [x]  Laboratory anemia  []  Microbiology  []  Radiology  []  EKG/Telemetry   []  Cardiology/Vascular   []  Pathology  []  Old records  []  Other:  Most notable findings include: Anemia and thrombocytopenia    Assessment & Plan   Assessment / Plan     Brief Patient Summary:  Cosmo Gauthier is a 77 y.o. male who lymphoma cirrhosis of liver ITP    [facility]  Problems:  Active [facility] Problems    Diagnosis     **Weakness        Plan:   MRI neurology consultation    DVT prophylaxis:  Mechanical DVT prophylaxis orders are present.    CODE STATUS:   Code Status (Patient has no pulse and is not breathing): CPR (Attempt to Resuscitate)  Medical Interventions (Patient has pulse or is breathing): Full Support    Disposition:  I expect patient to be discharged after the patient has been stabilized.    Electronically signed by Seven Saldana MD, 06/06/23, 8:51 AM EDT.      Part of this note may be an electronic transcription/translation of spoken language to printed text using the Dragon Dictation System.

## 2023-06-06 NOTE — CONSULTS
[facility]   Consult Note      Patient Name: Cosmo Gauthier  : 1945  MRN: [id number]  Primary Care Physician:  Alex Buckley MD  Date of admission: 2023    Subjective   Subjective     This 77 years old gentleman was seen upon the request of Seven Saldana MD for evaluation    Chief Complaint:   Blackout spells  Weak legs    HPI:  He dated the onset of his illness sometime around March or 2023 when he started having episodes where he would feel dizzy as though he would pass out every time he would stand up from a sitting or lying position.  This came on intermittently until about late May 2023 or early 2023 when he just passed out and fell down for no reason.  There were no report of any shaking or jerking movements noted.  There is no associated numbness.  He has been having back pain for several years.    Review of Systems  There is no difficulty seeing, speaking as well as swallowing.  No difficulty in breathing is no chest pains or abdominal pains.  He is not incontinent of his urine.      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     Primary osteoarthritis of left knee     Sleep apnea     Sleep apnea     Thrombocytopenia 2018    Thyroid disease        Past Surgical History:   Procedure Laterality Date    COLONOSCOPY      CORONARY ANGIOPLASTY WITH STENT PLACEMENT      CORONARY ARTERY BYPASS GRAFT N/A 2021    Procedure:  MIDLINE STERNOTOMY,CORONARY ARTERY BYPASS GRAFTING X 5, UTILIZING THE LEFT COMPA AND LEFT SAPHENOUS VEIN, ABHIJEET, PRP;  Surgeon: Jr Tom Tubbs MD;  Location: [facility];  Service: Cardiothoracic;  Laterality: N/A;    ENDOSCOPY      HERNIA REPAIR      JOINT REPLACEMENT      SHOULDER SURGERY      SHOULDER REPAIR    TOTAL KNEE ARTHROPLASTY      TRANSESOPHAGEAL ECHOCARDIOGRAM (ABHIJEET) N/A 5/20/2021    Procedure: TRANSESOPHAGEAL ECHOCARDIOGRAM WITH ANESTHESIA;  Surgeon: Jr Tom Tubbs MD;  Location: [facility];  Service: Cardiothoracic;  Laterality: N/A;       Family History: family history includes Colon cancer in his father; Diabetes in his mother. Otherwise pertinent FHx was reviewed and not pertinent to current issue.    Social History:  reports that he has never smoked. He has never used smokeless tobacco. He reports that he does not currently use alcohol. He reports that he does not use drugs.    Psychosocial History: He has anxiety and depression    Home Medications:  amitriptyline, aspirin, clopidogrel, donepezil, ferrous sulfate, finasteride, furosemide, gabapentin, insulin glargine, levothyroxine, metFORMIN, nadolol, nitroglycerin, sertraline, simvastatin, and tamsulosin      Allergies:  No Known Allergies    Vitals:   Temp:  [97.6 °F (36.4 °C)-98.2 °F (36.8 °C)] 97.9 °F (36.6 °C)  Heart Rate:  [60-69] 62  Resp:  [16-18] 18  BP: (114-137)/() 123/65  Physical Exam   He was awake and alert was not in any form of distress.  He was fairly developed, and fairly nourished.  The abdomen is his flabby and protuberant.    His heart was regular.  The heart rate was 60/min.  There were no murmurs.  The lungs were clear.    The carotid pulses were 1+ and equal.  There were no bruits on either side.    Neurological Examination:  His responses were coherent and relevant.  He was aware of what was going on around him.  He has an insight to his condition.  He was able to understand and follow verbal  commands.    I did not look into the fundus on either side because of the COVID-19 pandemic social distancing.    The pupils were 3 to 4 mm. They were round and equal reactive to light directly and consensually.  There was no extraocular muscle weakness.    No facial asymmetry.  No facial weakness.  Was able to hear normal conversational speech.    The strength of the sternomastoid and trapezius muscles was normal and symmetrical.  The uvula and the tongue were in the midline.    The strength in both upper and lower extremities was normal and equal.    He has a glove and stocking type of decree sensation to cold temperature from his fingers to the level below the elbow on the left and to the level of the elbow on the right.  In the lower extremities, extends from her history to the level of the knees on both sides.    The vibration sensation is decreased in both upper extremities and absent on both lower extremities.  He felt the vibrating tuning fork for 10 over 30 seconds on each index fingers and 0 over 30 seconds on each big toes.    The deep tendon reflexes were hypoactive to absent on both sides.    Did finger-to-nose test fairly well equal on both sides.      Result Review    Result Review:  I have personally reviewed the results from the time of this admission to 6/6/2023 10:18 EDT and agree with these findings:  []  Laboratory  []  Microbiology  [x]  Radiology  []  EKG/Telemetry   []  Cardiology/Vascular   []  Pathology  []  Old records  []  Other:  Most notable findings include:     6/6/2023  I reviewed the digital images of the MRI of his brain that was done on June 6, 2023.  The study showed no acute changes.  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  There are moderate inflammatory changes in the ethmoid frontal and maxillary sinuses on both sides.    I reviewed the digital images of the MRI of the cervical spine that was done on July 6, 2023.  This showed multilevel disc ossified complex  bulging starting from C3 down to C7 levels worse at C5-C6 level, without evidence of nerve root or spinal cord compression.  There is multilevel degenerative changes from C2 down to C7 levels.    I reviewed the digital images of the MRI of the thoracic spine.  This showed no acute changes.  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  There were diffuse degenerative changes along the cervical spine without evidence of spinal stenosis.  The radiologist mentioned a moderate right-sided neuroforaminal narrowing at T10-T11 level.    I reviewed the digital images of the MRI of the lumbosacral spine that was done on June 5, 2023.  This showed multilevel disc ossified complex bulging without evidence of spinal cord or nerve root compression.  The radiologist mentions severe atrophy of the paraspinal musculature.    Impression:    Syncope  Postural Hypotension  Hypertension  Severe Peripheral Neuropathy  Hypothyroidism  Diabetes mellitus type  Obesity with a body mass index of 35.37        Plan:   The condition was discussed with him and on separate occasion with Seven Saldana MD.  It was mutually agreed to send him to physical therapy.  We will hold off the waiting table test because the treatment will be the same.    Thank you very much for let me see this patient.  See the patient as needed.      Please note that portions of this note were completed with a voice recognition program.  Part of this note is an electric or electronic transcription/translation of spoken language to printed text using the dragon dictating system.    Electronically signed by Abbie Dong Jr., MD, 06/06/23, 10:18 AM EDT.

## 2023-06-07 LAB
GLUCOSE BLDC GLUCOMTR-MCNC: 141 MG/DL (ref 70–99)
GLUCOSE BLDC GLUCOMTR-MCNC: 182 MG/DL (ref 70–99)
GLUCOSE BLDC GLUCOMTR-MCNC: 184 MG/DL (ref 70–99)
GLUCOSE BLDC GLUCOMTR-MCNC: 268 MG/DL (ref 70–99)
T4 FREE SERPL-MCNC: 1.67 NG/DL (ref 0.93–1.7)

## 2023-06-07 PROCEDURE — 84439 ASSAY OF FREE THYROXINE: CPT | Performed by: INTERNAL MEDICINE

## 2023-06-07 PROCEDURE — 81342 TRG GENE REARRANGEMENT ANAL: CPT | Performed by: INTERNAL MEDICINE

## 2023-06-07 PROCEDURE — 81340 TRB@ GENE REARRANGE AMPLIFY: CPT | Performed by: INTERNAL MEDICINE

## 2023-06-07 PROCEDURE — 88185 FLOWCYTOMETRY/TC ADD-ON: CPT | Performed by: INTERNAL MEDICINE

## 2023-06-07 PROCEDURE — 63710000001 INSULIN LISPRO (HUMAN) PER 5 UNITS: Performed by: INTERNAL MEDICINE

## 2023-06-07 PROCEDURE — 82948 REAGENT STRIP/BLOOD GLUCOSE: CPT

## 2023-06-07 PROCEDURE — 88184 FLOWCYTOMETRY/ TC 1 MARKER: CPT

## 2023-06-07 RX ADMIN — SENNOSIDES AND DOCUSATE SODIUM 2 TABLET: 50; 8.6 TABLET ORAL at 20:13

## 2023-06-07 RX ADMIN — Medication 10 ML: at 08:52

## 2023-06-07 RX ADMIN — NADOLOL 20 MG: 20 TABLET ORAL at 08:53

## 2023-06-07 RX ADMIN — DONEPEZIL HYDROCHLORIDE 10 MG: 10 TABLET, FILM COATED ORAL at 20:13

## 2023-06-07 RX ADMIN — GABAPENTIN 300 MG: 300 CAPSULE ORAL at 20:13

## 2023-06-07 RX ADMIN — AMITRIPTYLINE HYDROCHLORIDE 50 MG: 50 TABLET, FILM COATED ORAL at 20:13

## 2023-06-07 RX ADMIN — SERTRALINE HYDROCHLORIDE 200 MG: 100 TABLET ORAL at 08:53

## 2023-06-07 RX ADMIN — INSULIN LISPRO 4 UNITS: 100 INJECTION, SOLUTION INTRAVENOUS; SUBCUTANEOUS at 16:49

## 2023-06-07 RX ADMIN — INSULIN LISPRO 2 UNITS: 100 INJECTION, SOLUTION INTRAVENOUS; SUBCUTANEOUS at 20:12

## 2023-06-07 RX ADMIN — CLOPIDOGREL BISULFATE 75 MG: 75 TABLET ORAL at 08:53

## 2023-06-07 RX ADMIN — Medication 10 ML: at 20:13

## 2023-06-07 RX ADMIN — SENNOSIDES AND DOCUSATE SODIUM 2 TABLET: 50; 8.6 TABLET ORAL at 08:53

## 2023-06-07 RX ADMIN — GABAPENTIN 300 MG: 300 CAPSULE ORAL at 08:52

## 2023-06-07 RX ADMIN — LEVOTHYROXINE SODIUM 224 MCG: 0.11 TABLET ORAL at 06:24

## 2023-06-07 RX ADMIN — FUROSEMIDE 40 MG: 40 TABLET ORAL at 08:52

## 2023-06-07 RX ADMIN — FINASTERIDE 5 MG: 5 TABLET, FILM COATED ORAL at 08:53

## 2023-06-07 RX ADMIN — INSULIN LISPRO 2 UNITS: 100 INJECTION, SOLUTION INTRAVENOUS; SUBCUTANEOUS at 12:18

## 2023-06-07 RX ADMIN — TAMSULOSIN HYDROCHLORIDE 0.4 MG: 0.4 CAPSULE ORAL at 08:53

## 2023-06-07 NOTE — PLAN OF CARE
Goal Outcome Evaluation:  Plan of Care Reviewed With: patient        Progress: improving       Pt has been sleeping comfortably throughout the night with no signs of distress. Will continue with plan of care.

## 2023-06-07 NOTE — PLAN OF CARE
Goal Outcome Evaluation:  Plan of Care Reviewed With: patient, spouse   No acute events this shift.  Wife in to visit.  Pending rehab placement.  VSS.     Progress: improving

## 2023-06-07 NOTE — PROGRESS NOTES
[facility]     Progress Note    Patient Name: Cosmo Gauthier  : 1945  MRN: [id number]  Primary Care Physician:  Alex Buckley MD  Date of admission: 2023    Subjective   Subjective     Chief Complaint: Discussed with the patient regarding his final recommendations as given by neurology with history of peripheral neuropathy and postural hypotension we will require in-house rehabilitation for which he will be transferred rest of the work-up was essentially unremarkable for arthritis and possible involvement with lymphoma    HPI: Cirrhosis diabetic neuropathy    Review of Systems   All systems were reviewed and negative except for: Reviewed    Objective   Objective     Vitals:   Temp:  [97.5 °F (36.4 °C)-98.6 °F (37 °C)] 97.9 °F (36.6 °C)  Heart Rate:  [57-74] 74  Resp:  [18] 18  BP: (100-141)/(57-72) 100/57    Physical Exam    Constitutional: Awake, alert   Eyes: PERRLA, sclerae anicteric, no conjunctival injection   HENT: NCAT, mucous membranes moist   Neck: Supple, no thyromegaly, no lymphadenopathy, trachea midline   Respiratory: Clear to auscultation bilaterally, nonlabored respirations    Cardiovascular: RRR, no murmurs, rubs, or gallops, palpable pedal pulses bilaterally   Gastrointestinal: Positive bowel sounds, soft, nontender, nondistended   Musculoskeletal: No bilateral ankle edema, no clubbing or cyanosis to extremities   Psychiatric: Appropriate affect, cooperative   Neurologic: Oriented x 3, strength symmetric in all extremities, Cranial Nerves grossly intact to confrontation, speech clear   Skin: No rashes   No change  Result Review    Result Review:  I have personally reviewed the results from the time of this admission to 2023 08:41 EDT and agree with these findings:  [x]  Laboratory thrombo-cytopenia because cirrhosis of liver  []  Microbiology  []  Radiology  []  EKG/Telemetry   []  Cardiology/Vascular   []  Pathology  []  Old records  []  Other:  Most notable findings  include: Cytopenia diabetes    Assessment & Plan   Assessment / Plan     Brief Patient Summary:  Cosmo Gauthier is a 77 y.o. male who thrombocytopenia with diabetes peripheral neuropathy    Active Hospital Problems:  Active Hospital Problems    Diagnosis     **Weakness        Plan:   Placement to the rehab continue physical therapy    DVT prophylaxis:  Mechanical DVT prophylaxis orders are present.    CODE STATUS:   Code Status (Patient has no pulse and is not breathing): CPR (Attempt to Resuscitate)  Medical Interventions (Patient has pulse or is breathing): Full Support    Disposition:  I expect patient to be discharged t when rehab bed is available.    Electronically signed by Seven Saldana MD, 06/07/23, 8:41 AM EDT.      Part of this note may be an electronic transcription/translation of spoken language to printed text using the Dragon Dictation System.

## 2023-06-08 LAB
ALBUMIN SERPL-MCNC: 3.9 G/DL (ref 3.5–5.2)
ALBUMIN/GLOB SERPL: 1.4 G/DL
ALP SERPL-CCNC: 86 U/L (ref 39–117)
ALT SERPL W P-5'-P-CCNC: 16 U/L (ref 1–41)
ANION GAP SERPL CALCULATED.3IONS-SCNC: 9.4 MMOL/L (ref 5–15)
AST SERPL-CCNC: 14 U/L (ref 1–40)
BASOPHILS # BLD AUTO: 0.07 10*3/MM3 (ref 0–0.2)
BASOPHILS NFR BLD AUTO: 1 % (ref 0–1.5)
BILIRUB SERPL-MCNC: 0.5 MG/DL (ref 0–1.2)
BUN SERPL-MCNC: 28 MG/DL (ref 8–23)
BUN/CREAT SERPL: 20.6 (ref 7–25)
CALCIUM SPEC-SCNC: 9.3 MG/DL (ref 8.6–10.5)
CHLORIDE SERPL-SCNC: 102 MMOL/L (ref 98–107)
CO2 SERPL-SCNC: 25.6 MMOL/L (ref 22–29)
CORTIS SERPL-MCNC: 3.95 MCG/DL
CREAT SERPL-MCNC: 1.36 MG/DL (ref 0.76–1.27)
DEPRECATED RDW RBC AUTO: 49.9 FL (ref 37–54)
EGFRCR SERPLBLD CKD-EPI 2021: 54.3 ML/MIN/1.73
EOSINOPHIL # BLD AUTO: 0.65 10*3/MM3 (ref 0–0.4)
EOSINOPHIL NFR BLD AUTO: 9.3 % (ref 0.3–6.2)
ERYTHROCYTE [DISTWIDTH] IN BLOOD BY AUTOMATED COUNT: 14.6 % (ref 12.3–15.4)
FOLATE SERPL-MCNC: 8.03 NG/ML (ref 4.78–24.2)
GLOBULIN UR ELPH-MCNC: 2.8 GM/DL
GLUCOSE BLDC GLUCOMTR-MCNC: 139 MG/DL (ref 70–99)
GLUCOSE BLDC GLUCOMTR-MCNC: 156 MG/DL (ref 70–99)
GLUCOSE BLDC GLUCOMTR-MCNC: 173 MG/DL (ref 70–99)
GLUCOSE BLDC GLUCOMTR-MCNC: 246 MG/DL (ref 70–99)
GLUCOSE SERPL-MCNC: 148 MG/DL (ref 65–99)
HCT VFR BLD AUTO: 36.8 % (ref 37.5–51)
HCYS SERPL-MCNC: 15.9 UMOL/L (ref 0–15)
HGB BLD-MCNC: 12.6 G/DL (ref 13–17.7)
IMM GRANULOCYTES # BLD AUTO: 0.02 10*3/MM3 (ref 0–0.05)
IMM GRANULOCYTES NFR BLD AUTO: 0.3 % (ref 0–0.5)
LYMPHOCYTES # BLD AUTO: 2.22 10*3/MM3 (ref 0.7–3.1)
LYMPHOCYTES NFR BLD AUTO: 31.6 % (ref 19.6–45.3)
Lab: NORMAL
MCH RBC QN AUTO: 31.5 PG (ref 26.6–33)
MCHC RBC AUTO-ENTMCNC: 34.2 G/DL (ref 31.5–35.7)
MCV RBC AUTO: 92 FL (ref 79–97)
MONOCYTES # BLD AUTO: 0.46 10*3/MM3 (ref 0.1–0.9)
MONOCYTES NFR BLD AUTO: 6.6 % (ref 5–12)
NEUTROPHILS NFR BLD AUTO: 3.6 10*3/MM3 (ref 1.7–7)
NEUTROPHILS NFR BLD AUTO: 51.2 % (ref 42.7–76)
NRBC BLD AUTO-RTO: 0 /100 WBC (ref 0–0.2)
PLATELET # BLD AUTO: 90 10*3/MM3 (ref 140–450)
PMV BLD AUTO: 8.6 FL (ref 6–12)
POTASSIUM SERPL-SCNC: 4.3 MMOL/L (ref 3.5–5.2)
PROT SERPL-MCNC: 6.7 G/DL (ref 6–8.5)
RBC # BLD AUTO: 4 10*6/MM3 (ref 4.14–5.8)
SODIUM SERPL-SCNC: 137 MMOL/L (ref 136–145)
TSH SERPL DL<=0.05 MIU/L-ACNC: 0.72 UIU/ML (ref 0.27–4.2)
VIT B12 BLD-MCNC: 530 PG/ML (ref 211–946)
WBC NRBC COR # BLD: 7.02 10*3/MM3 (ref 3.4–10.8)

## 2023-06-08 PROCEDURE — 82784 ASSAY IGA/IGD/IGG/IGM EACH: CPT | Performed by: INTERNAL MEDICINE

## 2023-06-08 PROCEDURE — 82948 REAGENT STRIP/BLOOD GLUCOSE: CPT

## 2023-06-08 PROCEDURE — 82607 VITAMIN B-12: CPT | Performed by: INTERNAL MEDICINE

## 2023-06-08 PROCEDURE — 83090 ASSAY OF HOMOCYSTEINE: CPT | Performed by: INTERNAL MEDICINE

## 2023-06-08 PROCEDURE — 83921 ORGANIC ACID SINGLE QUANT: CPT | Performed by: INTERNAL MEDICINE

## 2023-06-08 PROCEDURE — 86334 IMMUNOFIX E-PHORESIS SERUM: CPT | Performed by: INTERNAL MEDICINE

## 2023-06-08 PROCEDURE — 63710000001 INSULIN LISPRO (HUMAN) PER 5 UNITS: Performed by: INTERNAL MEDICINE

## 2023-06-08 PROCEDURE — 85025 COMPLETE CBC W/AUTO DIFF WBC: CPT | Performed by: INTERNAL MEDICINE

## 2023-06-08 PROCEDURE — 84443 ASSAY THYROID STIM HORMONE: CPT | Performed by: INTERNAL MEDICINE

## 2023-06-08 PROCEDURE — 97116 GAIT TRAINING THERAPY: CPT

## 2023-06-08 PROCEDURE — 82533 TOTAL CORTISOL: CPT | Performed by: INTERNAL MEDICINE

## 2023-06-08 PROCEDURE — 82746 ASSAY OF FOLIC ACID SERUM: CPT | Performed by: INTERNAL MEDICINE

## 2023-06-08 PROCEDURE — 80053 COMPREHEN METABOLIC PANEL: CPT | Performed by: INTERNAL MEDICINE

## 2023-06-08 PROCEDURE — 97165 OT EVAL LOW COMPLEX 30 MIN: CPT

## 2023-06-08 RX ADMIN — FUROSEMIDE 40 MG: 40 TABLET ORAL at 08:09

## 2023-06-08 RX ADMIN — Medication 10 ML: at 08:09

## 2023-06-08 RX ADMIN — FINASTERIDE 5 MG: 5 TABLET, FILM COATED ORAL at 08:09

## 2023-06-08 RX ADMIN — DONEPEZIL HYDROCHLORIDE 10 MG: 10 TABLET, FILM COATED ORAL at 20:58

## 2023-06-08 RX ADMIN — SENNOSIDES AND DOCUSATE SODIUM 2 TABLET: 50; 8.6 TABLET ORAL at 08:09

## 2023-06-08 RX ADMIN — NADOLOL 20 MG: 20 TABLET ORAL at 08:09

## 2023-06-08 RX ADMIN — AMITRIPTYLINE HYDROCHLORIDE 50 MG: 50 TABLET, FILM COATED ORAL at 20:58

## 2023-06-08 RX ADMIN — SERTRALINE HYDROCHLORIDE 200 MG: 100 TABLET ORAL at 08:09

## 2023-06-08 RX ADMIN — INSULIN LISPRO 2 UNITS: 100 INJECTION, SOLUTION INTRAVENOUS; SUBCUTANEOUS at 17:05

## 2023-06-08 RX ADMIN — CLOPIDOGREL BISULFATE 75 MG: 75 TABLET ORAL at 08:09

## 2023-06-08 RX ADMIN — Medication 10 ML: at 20:58

## 2023-06-08 RX ADMIN — TAMSULOSIN HYDROCHLORIDE 0.4 MG: 0.4 CAPSULE ORAL at 08:09

## 2023-06-08 RX ADMIN — LEVOTHYROXINE SODIUM 224 MCG: 0.11 TABLET ORAL at 06:00

## 2023-06-08 RX ADMIN — GABAPENTIN 300 MG: 300 CAPSULE ORAL at 20:58

## 2023-06-08 RX ADMIN — INSULIN LISPRO 2 UNITS: 100 INJECTION, SOLUTION INTRAVENOUS; SUBCUTANEOUS at 08:09

## 2023-06-08 RX ADMIN — GABAPENTIN 300 MG: 300 CAPSULE ORAL at 08:09

## 2023-06-08 RX ADMIN — INSULIN LISPRO 3 UNITS: 100 INJECTION, SOLUTION INTRAVENOUS; SUBCUTANEOUS at 12:09

## 2023-06-08 NOTE — PLAN OF CARE
Goal Outcome Evaluation:  Plan of Care Reviewed With: patient   No acute events this shift.  Pending SNF placement.  VSS.     Progress: improving

## 2023-06-08 NOTE — PLAN OF CARE
Goal Outcome Evaluation:  Plan of Care Reviewed With: patient        Progress: no change  Outcome Evaluation: No acute changes overnight. VSS. Call light within reach

## 2023-06-08 NOTE — PROGRESS NOTES
[facility]     Progress Note    Patient Name: Cosmo Gauthier  : 1945  MRN: [id number]  Primary Care Physician:  Alex Buckley MD  Date of admission: 2023    Subjective   Subjective     Chief Complaint: Admitted with history of fall was found to have neuropathy as well as postural hypotension will require physical therapy patient is waiting for placement    HPI: Able and doing well patient is waiting for placement to the rehab    Review of Systems   All systems were reviewed and negative except for: Reviewed    Objective   Objective     Vitals:   Temp:  [97.2 °F (36.2 °C)-98.6 °F (37 °C)] 98.4 °F (36.9 °C)  Heart Rate:  [60-66] 64  Resp:  [18-20] 20  BP: (120-144)/(63-74) 127/73    Physical Exam    Constitutional: Awake, alert   Eyes: PERRLA, sclerae anicteric, no conjunctival injection   HENT: NCAT, mucous membranes moist   Neck: Supple, no thyromegaly, no lymphadenopathy, trachea midline   Respiratory: Clear to auscultation bilaterally, nonlabored respirations    Cardiovascular: RRR, no murmurs, rubs, or gallops, palpable pedal pulses bilaterally   Gastrointestinal: Positive bowel sounds, soft, nontender, nondistended   Musculoskeletal: No bilateral ankle edema, no clubbing or cyanosis to extremities   Psychiatric: Appropriate affect, cooperative   Neurologic: Oriented x 3, strength symmetric in all extremities, Cranial Nerves grossly intact to confrontation, speech clear   Skin: No rashes   No change  Result Review    Result Review:  I have personally reviewed the results from the time of this admission to 2023 08:24 EDT and agree with these findings:  [x]  Laboratory review of lymphoma stable  []  Microbiology  []  Radiology  []  EKG/Telemetry   []  Cardiology/Vascular   []  Pathology  []  Old records  []  Other:  Most notable findings include: Patient with history of cirrhosis of liver    Assessment & Plan   Assessment / Plan     Brief Patient Summary:  Cosmo Gauthier is a 77 y.o. male  who stable and doing better continue current management    Active Hospital Problems:  Active Hospital Problems    Diagnosis     **Weakness        Plan:   Continue current management    DVT prophylaxis:  Mechanical DVT prophylaxis orders are present.    CODE STATUS:   Code Status (Patient has no pulse and is not breathing): CPR (Attempt to Resuscitate)  Medical Interventions (Patient has pulse or is breathing): Full Support    Disposition:  I expect patient to be discharged waiting for rehab placement.    Electronically signed by Seven Saldana MD, 06/08/23, 8:24 AM EDT.      Part of this note may be an electronic transcription/translation of spoken language to printed text using the Dragon Dictation System.

## 2023-06-08 NOTE — PLAN OF CARE
Goal Outcome Evaluation:  Plan of Care Reviewed With: patient        Progress: no change (initial evaluation encounter)  Outcome Evaluation: Patient has experienced decline in function from baseline status, presenting w/ deficits related to strength, balance, safety awareness, transfers and mobility that impede patient independence with activities of daily living.  Patient would benefit from skilled Occupational Therapy intervention to maxamize patient safety, and promote return to baseline independence.    Recommend: Skilled Nursing Facility

## 2023-06-08 NOTE — THERAPY EVALUATION
Patient Name: Cosmo Gauthier  : 1945    MRN: [id number]                              Today's Date: 2023       Admit Date: 2023    Visit Dx:     ICD-10-CM ICD-9-CM   1. Weakness  R53.1 780.79   2. Difficulty in walking  R26.2 719.7   3. 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)    Stage 3b chronic kidney disease (HCC)    Thrombocytopenia (HCC)    Coronary artery disease involving native coronary artery of native heart without angina pectoris    Hx of CABG    Hyperlipemia, mixed    Degenerative spondylolisthesis    Hypertension, essential    Hereditary and idiopathic neuropathy, unspecified    Low lying conus medullaris    Mood disorder (HCC)    Non-alcoholic cirrhosis (HCC)    Sleep apnea    Spinal stenosis of lumbar region with neurogenic claudication    Vascular dementia without behavioral disturbance    Iron deficiency anemia    Hypothyroidism, adult    Traumatic subarachnoid hemorrhage with loss of consciousness of 30 minutes or less    Morbid (severe) obesity due to excess calories    Claudication of both lower extremities    Dyspnea on exertion    Medicare annual wellness visit, subsequent    Weakness     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     Primary osteoarthritis of left knee     Sleep apnea     Sleep apnea      Thrombocytopenia 05/22/2018    Thyroid disease      Past Surgical History:   Procedure Laterality Date    COLONOSCOPY  2015    CORONARY ANGIOPLASTY WITH STENT PLACEMENT      CORONARY ARTERY BYPASS GRAFT N/A 5/20/2021    Procedure: MIDLINE STERNOTOMY,CORONARY ARTERY BYPASS GRAFTING X 5, UTILIZING THE LEFT COMPA AND LEFT SAPHENOUS VEIN, ABHIJEET, PRP;  Surgeon: Jr Tmo Tubbs MD;  Location: [facility];  Service: Cardiothoracic;  Laterality: N/A;    ENDOSCOPY      HERNIA REPAIR      JOINT REPLACEMENT      SHOULDER SURGERY      SHOULDER REPAIR    TOTAL KNEE ARTHROPLASTY      TRANSESOPHAGEAL ECHOCARDIOGRAM (ABHIJEET) N/A 5/20/2021    Procedure: TRANSESOPHAGEAL ECHOCARDIOGRAM WITH ANESTHESIA;  Surgeon: Jr Tom Tubbs MD;  Location: [facility];  Service: Cardiothoracic;  Laterality: N/A;      General Information       Row Name 06/08/23 1013          OT Time and Intention    Document Type evaluation  -ES     Mode of Treatment individual therapy;occupational therapy  -ES       Row Name 06/08/23 1013          General Information    Patient Profile Reviewed yes  -ES     Prior Level of Function independent:;ADL's;all household mobility;community mobility  Patient reports independent with B and IADLs at baseline. Patient reports increased difficulty with ADLs x1 week. Walk in shower with shower chair, grab bars, hand held shower. [place] in stance. CPAP at night. x10 falls in 3 months.  -ES     Existing Precautions/Restrictions fall  -ES     Barriers to Rehab none identified  -ES       Row Name 06/08/23 1013          Occupational Profile    Reason for Services/Referral (Occupational Profile) Patient is 77 yr old male admitted to [facility] on 6/4/2023 with reports of weakness, syncopal episode. OT evaluation and treatment ordered d/t recent decline in ADLs/transfer ability and discharge planning recommendations. No previous OT services for current condition.  -ES       Row Name 06/08/23 1013          Living  Environment    People in Home spouse  -ES       Row Name 06/08/23 1013          Home Main Entrance    Number of Stairs, Main Entrance five  from garage entrance  -ES     Stair Railings, Main Entrance railings safe and in good condition  -ES       Row Name 06/08/23 1013          Stairs Within Home, Primary    Number of Stairs, Within Home, Primary none  -ES       Row Name 06/08/23 1013          Cognition    Orientation Status (Cognition) oriented x 4  patient pleasant and cooperative, agreeable to therapy evaluation  -ES       Row Name 06/08/23 1013          Safety Issues, Functional Mobility    Impairments Affecting Function (Mobility) balance;endurance/activity tolerance;strength  -ES               User Key  (r) = Recorded By, (t) = Taken By, (c) = Cosigned By      Initials Name Provider Type    ES Farhana Perez, ADAMS/L, CSRS Occupational Therapist                     Mobility/ADL's       Row Name 06/08/23 1021          Bed Mobility    Bed Mobility supine-sit;sit-supine  -ES     Supine-Sit [place] (Bed Mobility) supervision  -ES     Sit-Supine [place] (Bed Mobility) supervision  -ES       Row Name 06/08/23 1021          Transfers    Transfers sit-stand transfer;stand-sit transfer  -ES       Row Name 06/08/23 1021          Sit-Stand Transfer    Sit-Stand [place] (Transfers) contact guard;1 person assist  -ES     Assistive Device (Sit-Stand Transfers) walker, front-wheeled  -ES       Row Name 06/08/23 1021          Stand-Sit Transfer    Stand-Sit [place] (Transfers) contact guard;1 person assist  -ES     Assistive Device (Stand-Sit Transfers) walker, front-wheeled  -ES       Row Name 06/08/23 1021          Functional Mobility    Functional Mobility- Ind. Level contact guard assist;1 person  -ES     Functional Mobility- Device walker, front-wheeled  -ES       Row Name 06/08/23 1021          Activities of Daily Living    BADL Assessment/Intervention bathing;upper body dressing;lower body  dressing;grooming;feeding;toileting  -ES       Row Name 06/08/23 1021          Bathing Assessment/Intervention    [place] Level (Bathing) bathing skills;minimum assist (75% patient effort)  -ES       Row Name 06/08/23 1021          Upper Body Dressing Assessment/Training    [place] Level (Upper Body Dressing) upper body dressing skills;standby assist  -ES       Row Name 06/08/23 1021          Lower Body Dressing Assessment/Training    [place] Level (Lower Body Dressing) lower body dressing skills;moderate assist (50% patient effort)  -ES       Row Name 06/08/23 1021          Grooming Assessment/Training    [place] Level (Grooming) grooming skills;set up;standby assist  -ES       Row Name 06/08/23 1021          Self-Feeding Assessment/Training    [place] Level (Feeding) feeding skills;set up  -ES       Row Name 06/08/23 1021          Toileting Assessment/Training    [place] Level (Toileting) toileting skills;minimum assist (75% patient effort)  -ES               User Key  (r) = Recorded By, (t) = Taken By, (c) = Cosigned By      Initials Name Provider Type    ES Farhana Perez, OTR/L, CSRS Occupational Therapist                   Obj/Interventions       Row Name 06/08/23 1023          Sensory Assessment (Somatosensory)    Sensory Assessment (Somatosensory) sensation intact  -ES       Row Name 06/08/23 1023          Vision Assessment/Intervention    Visual Impairment/Limitations WFL  -ES       Row Name 06/08/23 1023          Range of Motion Comprehensive    General Range of Motion bilateral upper extremity ROM WNL  -ES       Row Name 06/08/23 1023          Strength Comprehensive (MMT)    Comment, General Manual Muscle Testing (MMT) Assessment BUEs 4/5  -ES       Row Name 06/08/23 1023          Motor Skills    Motor Skills coordination;functional endurance  -ES     Coordination WFL  -ES     Functional Endurance fair minus  -ES       Row Name 06/08/23 1023          Balance    Balance  Assessment sitting dynamic balance;standing dynamic balance  -ES     Dynamic Sitting Balance standby assist  -ES     Position, Sitting Balance unsupported;sitting edge of bed  -ES     Dynamic Standing Balance contact guard  -ES     Position/Device Used, Standing Balance supported;walker, front-wheeled  -ES               User Key  (r) = Recorded By, (t) = Taken By, (c) = Cosigned By      Initials Name Provider Type    ES Chris, Farhana, OTR/L, CSRS Occupational Therapist                   Goals/Plan       Row Name 06/08/23 1028          Transfer Goal 1 (OT)    Activity/Assistive Device (Transfer Goal 1, OT) transfers, all  -ES     [place] Level/Cues Needed (Transfer Goal 1, OT) modified independence  -ES     Time Frame (Transfer Goal 1, OT) long term goal (LTG);10 days  -ES       Row Name 06/08/23 1028          Bathing Goal 1 (OT)    Activity/Device (Bathing Goal 1, OT) bathing skills, all  -ES     [place] Level/Cues Needed (Bathing Goal 1, OT) modified independence  -ES     Time Frame (Bathing Goal 1, OT) long term goal (LTG);10 days  -ES       Row Name 06/08/23 1028          Dressing Goal 1 (OT)    Activity/Device (Dressing Goal 1, OT) dressing skills, all  -ES     [place]/Cues Needed (Dressing Goal 1, OT) modified independence  -ES     Time Frame (Dressing Goal 1, OT) long term goal (LTG);10 days  -ES       Row Name 06/08/23 1028          Toileting Goal 1 (OT)    Activity/Device (Toileting Goal 1, OT) toileting skills, all  -ES     [place] Level/Cues Needed (Toileting Goal 1, OT) modified independence  -ES     Time Frame (Toileting Goal 1, OT) long term goal (LTG);10 days  -ES       Row Name 06/08/23 1028          Grooming Goal 1 (OT)    Activity/Device (Grooming Goal 1, OT) grooming skills, all  -ES     [place] (Grooming Goal 1, OT) modified independence  -ES     Time Frame (Grooming Goal 1, OT) long term goal (LTG);10 days  -ES       Row Name 06/08/23 1028          Strength Goal 1 (OT)     Strength Goal 1 (OT) Pt will increase BUE strength 1/2 muscle grade for increased UE strength required for ADL transfers and task completion  -ES     Time Frame (Strength Goal 1, OT) long term goal (LTG);10 days  -ES       Row Name 06/08/23 1028          Problem Specific Goal 1 (OT)    Problem Specific Goal 1 (OT) Patient will demonstrate good graded dynamic standing balance with ADL engagement in preperation for independent ADL routine completion at time of discharge  -ES     Time Frame (Problem Specific Goal 1, OT) long term goal (LTG);10 days  -ES       Row Name 06/08/23 1028          Therapy Assessment/Plan (OT)    Planned Therapy Interventions (OT) activity tolerance training;BADL retraining;functional balance retraining;occupation/activity based interventions;patient/caregiver education/training;strengthening exercise;transfer/mobility retraining  -ES               User Key  (r) = Recorded By, (t) = Taken By, (c) = Cosigned By      Initials Name Provider Type    ES Farhana Perez, OTR/L, CSRS Occupational Therapist                   Clinical Impression       Row Name 06/08/23 1028          Plan of Care Review    Plan of Care Reviewed With patient  -ES     Progress no change  initial evaluation encounter  -ES     Outcome Evaluation Patient has experienced decline in function from baseline status, presenting w/ deficits related to strength, balance, safety awareness, transfers and mobility that impede patient independence with activities of daily living.  Patient would benefit from skilled Occupational Therapy intervention to maxamize patient safety, and promote return to baseline independence.  -ES       Row Name 06/08/23 1028          Therapy Assessment/Plan (OT)    Rehab Potential (OT) good, to achieve stated therapy goals  -ES     Criteria for Skilled Therapeutic Interventions Met (OT) yes;meets criteria;skilled treatment is necessary  -ES     Therapy Frequency (OT) 5 times/wk  -ES       Row Name 06/08/23  1028          Therapy Plan Review/Discharge Plan (OT)    Anticipated Discharge Disposition (OT) skilled nursing facility  -ES       Row Name 06/08/23 1028          Vital Signs    O2 Delivery Pre Treatment room air  -ES     O2 Delivery Intra Treatment room air  -ES     O2 Delivery Post Treatment room air  -ES               User Key  (r) = Recorded By, (t) = Taken By, (c) = Cosigned By      Initials Name Provider Type    Farhana Sarabia, OTR/L, CSRS Occupational Therapist                   Outcome Measures       Row Name 06/08/23 1031          How much help from another is currently needed...    Putting on and taking off regular lower body clothing? 2  -ES     Bathing (including washing, rinsing, and drying) 3  -ES     Toileting (which includes using toilet bed pan or urinal) 3  -ES     Putting on and taking off regular upper body clothing 4  -ES     Taking care of personal grooming (such as brushing teeth) 4  -ES     Eating meals 4  -ES     AM-PAC 6 Clicks Score (OT) 20  -ES       Row Name 06/08/23 0900 06/08/23 0727       How much help from another person do you currently need...    Turning from your back to your side while in flat bed without using bedrails? 4  -RH 4  -MS    Moving from lying on back to sitting on the side of a flat bed without bedrails? 4  -RH 4  -MS    Moving to and from a bed to a chair (including a wheelchair)? 3  -RH 3  -MS    Standing up from a chair using your arms (e.g., wheelchair, bedside chair)? 3  -RH 3  -MS    Climbing 3-5 steps with a railing? 2  -RH 2  -MS    To walk in hospital room? 3  -RH 3  -MS    AM-PAC 6 Clicks Score (PT) 19  -RH 19  -MS    Highest level of mobility 6 --> Walked 10 steps or more  -RH 6 --> Walked 10 steps or more  -MS      Row Name 06/08/23 1031          Functional Assessment    Outcome Measure Options AM-PAC 6 Clicks Daily Activity (OT);Optimal Instrument  -ES       Row Name 06/08/23 1031          Optimal Instrument    Optimal Instrument Optimal - 3  -ES      Bending/Stooping 2  -ES     Standing 2  -ES     Reaching 1  -ES     From the list, choose the 3 activities you would most like to be able to do without any difficulty Bending/stooping;Standing;Reaching  -ES     Total Score Optimal - 3 5  -ES               User Key  (r) = Recorded By, (t) = Taken By, (c) = Cosigned By      Initials Name Provider Type    Kell Gaspar, RN Registered Nurse    Joao Adrian PTA Physical Therapist Assistant    Farhana Sarabia, OTR/L, CSRS Occupational Therapist                      OT Recommendation and Plan  Planned Therapy Interventions (OT): activity tolerance training, BADL retraining, functional balance retraining, occupation/activity based interventions, patient/caregiver education/training, strengthening exercise, transfer/mobility retraining  Therapy Frequency (OT): 5 times/wk  Plan of Care Review  Plan of Care Reviewed With: patient  Progress: no change (initial evaluation encounter)  Outcome Evaluation: Patient has experienced decline in function from baseline status, presenting w/ deficits related to strength, balance, safety awareness, transfers and mobility that impede patient independence with activities of daily living.  Patient would benefit from skilled Occupational Therapy intervention to maxamize patient safety, and promote return to baseline independence.     Time Calculation:    Time Calculation- OT       Row Name 06/08/23 1032 06/08/23 0919          Time Calculation- OT    OT Received On 06/08/23  -ES --     OT Goal Re-Cert Due Date 06/17/23  -ES --        Timed Charges    39900 - Gait Training Minutes  -- 6  -RH        Untimed Charges    OT Eval/Re-eval Minutes 34  -ES --        Total Minutes    Timed Charges Total Minutes -- 6  -RH     Untimed Charges Total Minutes 34  -ES --      Total Minutes 34  -ES 6  -RH               User Key  (r) = Recorded By, (t) = Taken By, (c) = Cosigned By      Initials Name Provider Type    Joao Adrian PTA Physical  Therapist Assistant    Farhana Sarabia OTR/L, CSRS Occupational Therapist                  Therapy Charges for Today       Code Description Service Date Service Provider Modifiers Qty    02487416355 HC OT EVAL LOW COMPLEXITY 3 6/8/2023 Farhana Perez OTR/L, CSRS GO 1                 Farhana Perez, OTR/L, CSRS  6/8/2023

## 2023-06-08 NOTE — THERAPY TREATMENT NOTE
Acute Care - Physical Therapy Treatment Note  SARKIS Dhillon     Patient Name: Cosmo Gauthier  : 1945  MRN: [id number]  Today's Date: 2023      Visit Dx:     ICD-10-CM ICD-9-CM   1. Weakness  R53.1 780.79   2. 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)    Stage 3b chronic kidney disease (HCC)    Thrombocytopenia (HCC)    Coronary artery disease involving native coronary artery of native heart without angina pectoris    Hx of CABG    Hyperlipemia, mixed    Degenerative spondylolisthesis    Hypertension, essential    Hereditary and idiopathic neuropathy, unspecified    Low lying conus medullaris    Mood disorder (HCC)    Non-alcoholic cirrhosis (HCC)    Sleep apnea    Spinal stenosis of lumbar region with neurogenic claudication    Vascular dementia without behavioral disturbance    Iron deficiency anemia    Hypothyroidism, adult    Traumatic subarachnoid hemorrhage with loss of consciousness of 30 minutes or less    Morbid (severe) obesity due to excess calories    Claudication of both lower extremities    Dyspnea on exertion    Medicare annual wellness visit, subsequent    Weakness     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     Primary osteoarthritis of left knee     Sleep apnea     Sleep apnea     Thrombocytopenia 2018    Thyroid disease      Past  Surgical History:   Procedure Laterality Date    COLONOSCOPY 2015    CORONARY ANGIOPLASTY WITH STENT PLACEMENT      CORONARY ARTERY BYPASS GRAFT N/A 5/20/2021    Procedure: MIDLINE STERNOTOMY,CORONARY ARTERY BYPASS GRAFTING X 5, UTILIZING THE LEFT COMPA AND LEFT SAPHENOUS VEIN, ABHIJEET, PRP;  Surgeon: Jr Tom Tubbs MD;  Location: [facility];  Service: Cardiothoracic;  Laterality: N/A;    ENDOSCOPY      HERNIA REPAIR      JOINT REPLACEMENT      SHOULDER SURGERY      SHOULDER REPAIR    TOTAL KNEE ARTHROPLASTY      TRANSESOPHAGEAL ECHOCARDIOGRAM (ABHIJEET) N/A 5/20/2021    Procedure: TRANSESOPHAGEAL ECHOCARDIOGRAM WITH ANESTHESIA;  Surgeon: Jr Tom Tubbs MD;  Location: [facility];  Service: Cardiothoracic;  Laterality: N/A;     PT Assessment (last 12 hours)       PT Evaluation and Treatment       Row Name 06/08/23 0919          Physical Therapy Time and Intention    Subjective Information no complaints  -RH     Document Type therapy note (daily note)  -     Mode of Treatment physical therapy;individual therapy  -     Patient Effort adequate  -       Row Name 06/08/23 0919          Pain    Additional Documentation Pain Scale: FACES Pre/Post-Treatment (Group)  -       Row Name 06/08/23 0919          Pain Scale: FACES Pre/Post-Treatment    Pain: FACES Scale, Pretreatment 0-->no hurt  -     Posttreatment Pain Rating 0-->no hurt  -       Row Name 06/08/23 0919          Bed Mobility    Bed Mobility scooting/bridging;supine-sit;sit-supine  -     Scooting/Bridging [place] (Bed Mobility) supervision  -     Supine-Sit [place] (Bed Mobility) supervision  -     Sit-Supine [place] (Bed Mobility) supervision  -       Row Name 06/08/23 0919          Transfers    Transfers sit-stand transfer;stand-sit transfer  -       Row Name 06/08/23 0919          Sit-Stand Transfer    Sit-Stand [place] (Transfers) contact guard  -     Assistive Device (Sit-Stand Transfers) walker,  front-wheeled  -RH       Row Name 06/08/23 0919          Stand-Sit Transfer    Stand-Sit [place] (Transfers) contact guard  -RH     Assistive Device (Stand-Sit Transfers) walker, front-wheeled  -RH       Row Name 06/08/23 0919          Gait/Stairs (Locomotion)    Gait/Stairs Locomotion gait/ambulation independence;gait/ambulation assistive device;distance ambulated;gait pattern;gait deviations  -RH     [place] Level (Gait) contact guard  -RH     Assistive Device (Gait) walker, front-wheeled  -RH     Distance in Feet (Gait) 80  -RH     Pattern (Gait) 3-point;step-through  -RH     Deviations/Abnormal Patterns (Gait) gonzalo decreased;gait speed decreased;stride length decreased  -RH     Bilateral Gait Deviations heel strike decreased  -RH     Gait Assessment/Intervention Pt amb with RW and CGA with 3 point step-through gait pattern with bilateral decreased heel strike.  -RH       Row Name 06/08/23 0919          Balance    Dynamic Standing Balance contact guard  -RH     Position/Device Used, Standing Balance walker, front-wheeled  -RH       Row Name 06/08/23 0919          Progress Summary (PT)    Progress Toward Functional Goals (PT) progress toward functional goals is fair  -RH               User Key  (r) = Recorded By, (t) = Taken By, (c) = Cosigned By      Initials Name Provider Type    RH Joao Campos PTA Physical Therapist Assistant                    Physical Therapy Education       Title: PT OT SLP Therapies (Done)       Topic: Physical Therapy (Done)       Point: Mobility training (Done)       Learning Progress Summary             Patient Acceptance, E,TB, VU by [employer] at 6/6/2023 1006                                         User Key       Initials Effective Dates Name Provider Type Discipline    [facility] 06/03/21 -  Santosh Cannon, PT Physical Therapist PT                  PT Recommendation and Plan     Progress Summary (PT)  Progress Toward Functional Goals (PT): progress toward functional goals is fair    Outcome Measures       Row Name 06/08/23 0900 06/06/23 0900          How much help from another person do you currently need...    Turning from your back to your side while in flat bed without using bedrails? 4  -RH 4  -NICCI     Moving from lying on back to sitting on the side of a flat bed without bedrails? 4  -RH 4  -NICCI     Moving to and from a bed to a chair (including a wheelchair)? 3  -RH 3  -NICCI     Standing up from a chair using your arms (e.g., wheelchair, bedside chair)? 3  -RH 3  -NICCI     Climbing 3-5 steps with a railing? 2  -RH 3  -NICCI     To walk in hospital room? 3  -RH 3  -NICCI     AM-PAC 6 Clicks Score (PT) 19  -RH 20  -NICCI               User Key  (r) = Recorded By, (t) = Taken By, (c) = Cosigned By      Initials Name Provider Type     Joao Campos PTA Physical Therapist Assistant    Santosh Del Toro, PT Physical Therapist                     Time Calculation:    PT Charges       Row Name 06/08/23 0919             Time Calculation    PT Received On 06/08/23  -RH         Timed Charges    68817 - Gait Training Minutes  6  -RH      44319 - PT Therapeutic Activity Minutes 4  -RH         Total Minutes    Timed Charges Total Minutes 10  -RH       Total Minutes 10  -RH                User Key  (r) = Recorded By, (t) = Taken By, (c) = Cosigned By      Initials Name Provider Type     Joao Campos PTA Physical Therapist Assistant                  Therapy Charges for Today       Code Description Service Date Service Provider Modifiers Qty    36900603255 HC GAIT TRAINING EA 15 MIN 6/8/2023 Joao Campos PTA GP 1            PT G-Codes  AM-PAC 6 Clicks Score (PT): 19    Joao Campos PTA  6/8/2023

## 2023-06-09 ENCOUNTER — HOSPITAL ENCOUNTER (INPATIENT)
Facility: HOSPITAL | Age: 78
DRG: 948 | End: 2023-06-09
Attending: INTERNAL MEDICINE | Admitting: INTERNAL MEDICINE
Payer: MEDICARE

## 2023-06-09 VITALS
BODY MASS INDEX: 35.33 KG/M2 | SYSTOLIC BLOOD PRESSURE: 129 MMHG | TEMPERATURE: 97.3 F | WEIGHT: 260.8 LBS | RESPIRATION RATE: 18 BRPM | DIASTOLIC BLOOD PRESSURE: 68 MMHG | HEART RATE: 59 BPM | HEIGHT: 72 IN | OXYGEN SATURATION: 92 %

## 2023-06-09 DIAGNOSIS — Z78.9 DECREASED ACTIVITIES OF DAILY LIVING (ADL): ICD-10-CM

## 2023-06-09 DIAGNOSIS — R26.2 DIFFICULTY WALKING: Primary | ICD-10-CM

## 2023-06-09 LAB
GLUCOSE BLDC GLUCOMTR-MCNC: 157 MG/DL (ref 70–99)
GLUCOSE BLDC GLUCOMTR-MCNC: 159 MG/DL (ref 70–99)
GLUCOSE BLDC GLUCOMTR-MCNC: 186 MG/DL (ref 70–99)
GLUCOSE BLDC GLUCOMTR-MCNC: 219 MG/DL (ref 70–99)
IGA SERPL-MCNC: 161 MG/DL (ref 61–437)
IGG SERPL-MCNC: 1013 MG/DL (ref 603–1613)
IGM SERPL-MCNC: 96 MG/DL (ref 15–143)
PROT PATTERN SERPL IFE-IMP: NORMAL

## 2023-06-09 PROCEDURE — 63710000001 INSULIN LISPRO (HUMAN) PER 5 UNITS: Performed by: INTERNAL MEDICINE

## 2023-06-09 PROCEDURE — 82948 REAGENT STRIP/BLOOD GLUCOSE: CPT

## 2023-06-09 RX ORDER — BISACODYL 5 MG/1
5 TABLET, DELAYED RELEASE ORAL DAILY PRN
Status: CANCELLED | OUTPATIENT
Start: 2023-06-09

## 2023-06-09 RX ORDER — NICOTINE POLACRILEX 4 MG
15 LOZENGE BUCCAL
Status: ACTIVE | OUTPATIENT
Start: 2023-06-09

## 2023-06-09 RX ORDER — NADOLOL 20 MG/1
20 TABLET ORAL DAILY
Status: DISCONTINUED | OUTPATIENT
Start: 2023-06-10 | End: 2023-06-10

## 2023-06-09 RX ORDER — TAMSULOSIN HYDROCHLORIDE 0.4 MG/1
0.4 CAPSULE ORAL DAILY
Status: DISPENSED | OUTPATIENT
Start: 2023-06-10

## 2023-06-09 RX ORDER — SODIUM CHLORIDE 0.9 % (FLUSH) 0.9 %
10 SYRINGE (ML) INJECTION EVERY 12 HOURS SCHEDULED
Status: CANCELLED | OUTPATIENT
Start: 2023-06-09

## 2023-06-09 RX ORDER — FUROSEMIDE 40 MG/1
40 TABLET ORAL DAILY
Status: DISCONTINUED | OUTPATIENT
Start: 2023-06-10 | End: 2023-06-10

## 2023-06-09 RX ORDER — BISACODYL 10 MG
10 SUPPOSITORY, RECTAL RECTAL DAILY PRN
Status: ACTIVE | OUTPATIENT
Start: 2023-06-09

## 2023-06-09 RX ORDER — CLOPIDOGREL BISULFATE 75 MG/1
75 TABLET ORAL DAILY
Status: CANCELLED | OUTPATIENT
Start: 2023-06-10

## 2023-06-09 RX ORDER — INSULIN LISPRO 100 [IU]/ML
2-7 INJECTION, SOLUTION INTRAVENOUS; SUBCUTANEOUS
Status: CANCELLED | OUTPATIENT
Start: 2023-06-09

## 2023-06-09 RX ORDER — ALUMINA, MAGNESIA, AND SIMETHICONE 2400; 2400; 240 MG/30ML; MG/30ML; MG/30ML
15 SUSPENSION ORAL EVERY 6 HOURS PRN
Status: CANCELLED | OUTPATIENT
Start: 2023-06-09

## 2023-06-09 RX ORDER — POLYETHYLENE GLYCOL 3350 17 G/17G
17 POWDER, FOR SOLUTION ORAL DAILY PRN
Status: CANCELLED | OUTPATIENT
Start: 2023-06-09

## 2023-06-09 RX ORDER — GABAPENTIN 300 MG/1
300 CAPSULE ORAL 2 TIMES DAILY
Status: CANCELLED | OUTPATIENT
Start: 2023-06-09

## 2023-06-09 RX ORDER — FINASTERIDE 5 MG/1
5 TABLET, FILM COATED ORAL DAILY
Status: DISPENSED | OUTPATIENT
Start: 2023-06-10

## 2023-06-09 RX ORDER — SODIUM CHLORIDE 0.9 % (FLUSH) 0.9 %
10 SYRINGE (ML) INJECTION EVERY 12 HOURS SCHEDULED
Status: ACTIVE | OUTPATIENT
Start: 2023-06-09

## 2023-06-09 RX ORDER — NITROGLYCERIN 0.4 MG/1
0.4 TABLET SUBLINGUAL
Status: CANCELLED | OUTPATIENT
Start: 2023-06-09

## 2023-06-09 RX ORDER — POLYETHYLENE GLYCOL 3350 17 G/17G
17 POWDER, FOR SOLUTION ORAL DAILY PRN
Status: ACTIVE | OUTPATIENT
Start: 2023-06-09

## 2023-06-09 RX ORDER — NITROGLYCERIN 0.4 MG/1
0.4 TABLET SUBLINGUAL
Status: ACTIVE | OUTPATIENT
Start: 2023-06-09

## 2023-06-09 RX ORDER — ACETAMINOPHEN 325 MG/1
650 TABLET ORAL EVERY 4 HOURS PRN
Status: ACTIVE | OUTPATIENT
Start: 2023-06-09

## 2023-06-09 RX ORDER — DEXTROSE MONOHYDRATE 25 G/50ML
25 INJECTION, SOLUTION INTRAVENOUS
Status: CANCELLED | OUTPATIENT
Start: 2023-06-09

## 2023-06-09 RX ORDER — DEXTROSE MONOHYDRATE 25 G/50ML
25 INJECTION, SOLUTION INTRAVENOUS
Status: ACTIVE | OUTPATIENT
Start: 2023-06-09

## 2023-06-09 RX ORDER — ACETAMINOPHEN 325 MG/1
650 TABLET ORAL EVERY 4 HOURS PRN
Status: CANCELLED | OUTPATIENT
Start: 2023-06-09

## 2023-06-09 RX ORDER — CLOPIDOGREL BISULFATE 75 MG/1
75 TABLET ORAL DAILY
Status: DISPENSED | OUTPATIENT
Start: 2023-06-10

## 2023-06-09 RX ORDER — AMOXICILLIN 250 MG
2 CAPSULE ORAL 2 TIMES DAILY
Status: CANCELLED | OUTPATIENT
Start: 2023-06-09

## 2023-06-09 RX ORDER — BISACODYL 5 MG/1
5 TABLET, DELAYED RELEASE ORAL DAILY PRN
Status: ACTIVE | OUTPATIENT
Start: 2023-06-09

## 2023-06-09 RX ORDER — INSULIN LISPRO 100 [IU]/ML
2-7 INJECTION, SOLUTION INTRAVENOUS; SUBCUTANEOUS
Status: DISPENSED | OUTPATIENT
Start: 2023-06-09

## 2023-06-09 RX ORDER — FUROSEMIDE 40 MG/1
40 TABLET ORAL DAILY
Status: CANCELLED | OUTPATIENT
Start: 2023-06-10

## 2023-06-09 RX ORDER — LEVOTHYROXINE SODIUM 112 UG/1
224 TABLET ORAL EVERY MORNING
Status: CANCELLED | OUTPATIENT
Start: 2023-06-10

## 2023-06-09 RX ORDER — LEVOTHYROXINE SODIUM 112 UG/1
224 TABLET ORAL EVERY MORNING
Status: DISPENSED | OUTPATIENT
Start: 2023-06-10

## 2023-06-09 RX ORDER — DONEPEZIL HYDROCHLORIDE 10 MG/1
10 TABLET, FILM COATED ORAL NIGHTLY
Status: CANCELLED | OUTPATIENT
Start: 2023-06-09

## 2023-06-09 RX ORDER — NADOLOL 20 MG/1
20 TABLET ORAL DAILY
Status: CANCELLED | OUTPATIENT
Start: 2023-06-10

## 2023-06-09 RX ORDER — NICOTINE POLACRILEX 4 MG
15 LOZENGE BUCCAL
Status: CANCELLED | OUTPATIENT
Start: 2023-06-09

## 2023-06-09 RX ORDER — BISACODYL 10 MG
10 SUPPOSITORY, RECTAL RECTAL DAILY PRN
Status: CANCELLED | OUTPATIENT
Start: 2023-06-09

## 2023-06-09 RX ORDER — AMOXICILLIN 250 MG
2 CAPSULE ORAL 2 TIMES DAILY
Status: DISPENSED | OUTPATIENT
Start: 2023-06-09

## 2023-06-09 RX ORDER — TAMSULOSIN HYDROCHLORIDE 0.4 MG/1
0.4 CAPSULE ORAL DAILY
Status: CANCELLED | OUTPATIENT
Start: 2023-06-10

## 2023-06-09 RX ORDER — SERTRALINE HYDROCHLORIDE 100 MG/1
200 TABLET, FILM COATED ORAL DAILY
Status: DISPENSED | OUTPATIENT
Start: 2023-06-10

## 2023-06-09 RX ORDER — FINASTERIDE 5 MG/1
5 TABLET, FILM COATED ORAL DAILY
Status: CANCELLED | OUTPATIENT
Start: 2023-06-10

## 2023-06-09 RX ORDER — DONEPEZIL HYDROCHLORIDE 10 MG/1
10 TABLET, FILM COATED ORAL NIGHTLY
Status: DISPENSED | OUTPATIENT
Start: 2023-06-09

## 2023-06-09 RX ORDER — SERTRALINE HYDROCHLORIDE 100 MG/1
200 TABLET, FILM COATED ORAL DAILY
Status: CANCELLED | OUTPATIENT
Start: 2023-06-10

## 2023-06-09 RX ORDER — AMITRIPTYLINE HYDROCHLORIDE 50 MG/1
50 TABLET, FILM COATED ORAL NIGHTLY
Status: CANCELLED | OUTPATIENT
Start: 2023-06-09

## 2023-06-09 RX ORDER — GABAPENTIN 300 MG/1
300 CAPSULE ORAL 2 TIMES DAILY
Status: DISPENSED | OUTPATIENT
Start: 2023-06-09

## 2023-06-09 RX ORDER — AMITRIPTYLINE HYDROCHLORIDE 50 MG/1
50 TABLET, FILM COATED ORAL NIGHTLY
Status: DISPENSED | OUTPATIENT
Start: 2023-06-09

## 2023-06-09 RX ORDER — ALUMINA, MAGNESIA, AND SIMETHICONE 2400; 2400; 240 MG/30ML; MG/30ML; MG/30ML
15 SUSPENSION ORAL EVERY 6 HOURS PRN
Status: ACTIVE | OUTPATIENT
Start: 2023-06-09

## 2023-06-09 RX ADMIN — FUROSEMIDE 40 MG: 40 TABLET ORAL at 08:18

## 2023-06-09 RX ADMIN — LEVOTHYROXINE SODIUM 224 MCG: 0.11 TABLET ORAL at 08:18

## 2023-06-09 RX ADMIN — TAMSULOSIN HYDROCHLORIDE 0.4 MG: 0.4 CAPSULE ORAL at 08:18

## 2023-06-09 RX ADMIN — SENNOSIDES AND DOCUSATE SODIUM 2 TABLET: 50; 8.6 TABLET ORAL at 21:17

## 2023-06-09 RX ADMIN — SERTRALINE HYDROCHLORIDE 200 MG: 100 TABLET ORAL at 08:18

## 2023-06-09 RX ADMIN — NADOLOL 20 MG: 20 TABLET ORAL at 08:18

## 2023-06-09 RX ADMIN — AMITRIPTYLINE HYDROCHLORIDE 50 MG: 50 TABLET, FILM COATED ORAL at 21:17

## 2023-06-09 RX ADMIN — TUBERCULIN PURIFIED PROTEIN DERIVATIVE 5 UNITS: 5 INJECTION, SOLUTION INTRADERMAL at 21:18

## 2023-06-09 RX ADMIN — DONEPEZIL HYDROCHLORIDE 10 MG: 10 TABLET, FILM COATED ORAL at 21:16

## 2023-06-09 RX ADMIN — CLOPIDOGREL BISULFATE 75 MG: 75 TABLET ORAL at 08:18

## 2023-06-09 RX ADMIN — Medication 10 ML: at 08:19

## 2023-06-09 RX ADMIN — FINASTERIDE 5 MG: 5 TABLET, FILM COATED ORAL at 08:18

## 2023-06-09 RX ADMIN — Medication 10 ML: at 21:17

## 2023-06-09 RX ADMIN — INSULIN LISPRO 2 UNITS: 100 INJECTION, SOLUTION INTRAVENOUS; SUBCUTANEOUS at 17:28

## 2023-06-09 RX ADMIN — INSULIN LISPRO 2 UNITS: 100 INJECTION, SOLUTION INTRAVENOUS; SUBCUTANEOUS at 08:18

## 2023-06-09 RX ADMIN — INSULIN LISPRO 3 UNITS: 100 INJECTION, SOLUTION INTRAVENOUS; SUBCUTANEOUS at 21:17

## 2023-06-09 RX ADMIN — GABAPENTIN 300 MG: 300 CAPSULE ORAL at 21:16

## 2023-06-09 RX ADMIN — GABAPENTIN 300 MG: 300 CAPSULE ORAL at 08:18

## 2023-06-09 RX ADMIN — INSULIN LISPRO 2 UNITS: 100 INJECTION, SOLUTION INTRAVENOUS; SUBCUTANEOUS at 12:30

## 2023-06-09 NOTE — DISCHARGE SUMMARY
[facility]         DISCHARGE SUMMARY    Patient Name: Cosmo Gauthier  : 1945  MRN: [id number]    Date of Admission: 2023  Date of Discharge: 2023  Primary Care Physician: Alex Buckley MD    Consults       Date and Time Order Name Status Description    2023 10:00 AM Inpatient Neurology Consult General      2023  8:45 PM IP General Consult (Use specialty-specific consult if known)              Presenting Problem:   Weakness [R53.1]    Active and Resolved Hospital Problems:  Active Hospital Problems    Diagnosis POA    **Weakness [R53.1] Yes      Resolved Hospital Problems   No resolved problems to display.         Hospital Course     Hospital Course:  Cosmo Gauthier is a 77 y.o. 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 Dong in consultation for neurology and it was felt that his problems were because of the peripheral neuropathy that there was no evidence of any lesion which is causing it he also has postural hypotension his supine blood pressure were high and standing blood pressure had dropped down significantly he is getting physical therapy and it is felt that he will be benefited by getting yearly physical therapy to help him to ambulate he is therefore being transferred to the skilled nursing facility at the [facility]      DISCHARGE Follow Up Recommendations for labs and diagnostics: Patient with history of spinal cord compression history of lymphoma diabetic  neuropathy      Pertinent  and/or Most Recent Results     LAB RESULTS:      Lab 06/08/23 0428 06/06/23  0419 06/05/23  1020 06/04/23  1631   WBC 7.02 7.78 6.68 10.56   HEMOGLOBIN 12.6* 12.5* 12.2* 12.9*   HEMATOCRIT 36.8* 36.8* 35.7* 37.4*   PLATELETS 90* 99* 87* 118*   NEUTROS ABS 3.60 3.65  --  5.67   IMMATURE GRANS (ABS) 0.02 0.02  --  0.03   LYMPHS ABS 2.22 2.75  --  3.01   MONOS ABS 0.46 0.54  --  0.61   EOS ABS 0.65* 0.74*  --  1.16*   MCV 92.0 93.2 92.7 93.3   PROTIME  --  14.1  --  13.6         Lab 06/08/23 0428 06/06/23 0419 06/05/23  1020 06/04/23  1631   SODIUM 137 139 137 142   POTASSIUM 4.3 4.5 5.0 4.7   CHLORIDE 102 103 104 106   CO2 25.6 25.4 24.3 24.2   ANION GAP 9.4 10.6 8.7 11.8   BUN 28* 23 20 27*   CREATININE 1.36* 1.45* 1.21 1.32*   EGFR 54.3* 50.3* 62.4 56.2*   GLUCOSE 148* 122* 217* 56*   CALCIUM 9.3 9.6 9.3 9.3   MAGNESIUM  --   --   --  1.6   TSH 0.725  --   --   --          Lab 06/08/23 0428 06/06/23 0419 06/05/23  1020 06/04/23  1631   TOTAL PROTEIN 6.7 6.8 6.8 7.1   ALBUMIN 3.9 4.2 3.9 4.2   GLOBULIN 2.8 2.6 2.9 2.9   ALT (SGPT) 16 15 15 16   AST (SGOT) 14 14 16 16   BILIRUBIN 0.5 0.3 0.5 0.3   ALK PHOS 86 86 82 91         Lab 06/06/23 0419 06/04/23  1631   HSTROP T  --  13   PROTIME 14.1 13.6   INR 1.08 1.03             Lab 06/08/23  0428   FOLATE 8.03   VITAMIN B 12 530         Brief Urine Lab Results  (Last result in the past 365 days)        Color   Clarity   Blood   Leuk Est   Nitrite   Protein   CREAT   Urine HCG        06/06/23 1617 Yellow   Clear   Negative   Negative   Negative   Negative                 Microbiology Results (last 10 days)       ** No results found for the last 240 hours. **            CT Head Without Contrast    Result Date: 6/4/2023  Impression:  No acute intracranial process or calvarial fracture identified.     DOUG VERDUZCO MD       Electronically Signed and Approved By: DOUG VERDUZCO MD on 6/04/2023 at 20:23             MRI Brain With & Without  Contrast    Result Date: 6/6/2023  Impression:   1. No evidence of metastatic disease 2. No acute infarction or intracranial hemorrhage 3. Chronic right thalamic lacunar infarct 4. Moderate mucosal inflammatory changes in the paranasal sinuses      Markie Rivas M.D.       Electronically Signed and Approved By: Markie Rivas M.D. on 6/06/2023 at 8:41             MRI Cervical Spine With & Without Contrast    Result Date: 6/6/2023  Impression:   1. Diffusely heterogeneous bone marrow signal without a discrete measurable lesion, obvious edema or destructive changes. 2. Moderate cervical spondylosis with varying degrees of neural foraminal narrowing.  No spinal canal stenosis.     NAWAF CÁRDENAS MD       Electronically Signed and Approved By: NAWAF CÁRDENAS MD on 6/06/2023 at 9:27             MRI Thoracic Spine With & Without Contrast    Result Date: 6/6/2023  Impression:   1. Diffusely heterogeneous bone marrow signal without obvious destructive or edematous lesions.  There are hemangiomas within the T6 and T10 vertebral bodies.  No pathologic fracture. 2. Diffuse degenerative changes without spinal canal stenosis.  There is moderate right-sided neural foraminal narrowing at T10-T11.      NAWAF CÁRDENAS MD       Electronically Signed and Approved By: NAWAF CÁRDENAS MD on 6/06/2023 at 9:35             XR Chest 1 View    Result Date: 6/4/2023  Impression:  No active cardiopulmonary disease       IGNACIA AG MD       Electronically Signed and Approved By: IGNACIA AG MD on 6/04/2023 at 16:47             MRI Lumbar Spine With & Without Contrast    Result Date: 6/5/2023  Impression:   1. No acute change from 2016. 2. Stable syrinx at L2 and stable findings of tethered spinal cord, lipoma of the filum terminal a and dysraphic changes at the lumbosacral junction. 3. Progressive severe atrophy of the paraspinal musculature. 4. Stable moderate to severe lumbar spondylosis with varying degrees of neural foraminal narrowing  as discussed.      NAWAF CÁRDENAS MD       Electronically Signed and Approved By: NAWAF CÁRDENAS MD on 6/05/2023 at 7:59              Results for orders placed during the hospital encounter of 01/27/23    Doppler Arterial Multi Level Lower Extremity - Bilateral CAR    Interpretation Summary    Right Conclusion: The right OSCAR is normal. Normal digital pressures.    Left Conclusion: The left OSCAR is normal. Normal digital pressures.    Normal resting arterial evaluation of both lower extremities.  Patient declined stress component due to back pain and fear of treadmill.      Results for orders placed during the hospital encounter of 01/27/23    Doppler Arterial Multi Level Lower Extremity - Bilateral CAR    Interpretation Summary    Right Conclusion: The right OSCAR is normal. Normal digital pressures.    Left Conclusion: The left OSCAR is normal. Normal digital pressures.    Normal resting arterial evaluation of both lower extremities.  Patient declined stress component due to back pain and fear of treadmill.      Results for orders placed in visit on 05/20/21    Emergent/Open-Heart Anesthesia ABHIJETE    Narrative  Preanesthesia Checklist:  Patient identified, IV assessed, risks and benefits discussed, monitors and equipment assessed and procedure being performed at surgeon's request.    General Procedure Information  Diagnostic Indications for Echo:  assessment of ascending aorta, assessment of surgical repair, defect repair evaluation and hemodynamic monitoring  Physician Requesting Echo: Jr Tom Tubbs MD  ICD Code for Medical Necessity:  Aortic Insufficiency  Location performed:  OR  Intubated  Bite block placed  Heart visualized  Probe Insertion:  Easy  Probe Type:  Multiplane  Modalities:  Color flow mapping, 2D only, continuous wave Doppler and pulse wave Doppler    Echocardiographic and Doppler Measurements    Ventricles    Right Ventricle:  Cavity size normal.  Hypertrophy not present.  Left  Ventricle:  Diastolic dimension 4.7 cm.  Hypertrophy not present.  Thrombus not present.  Global Function mildly impaired.  Ejection Fraction 50%.    Ventricular Regional Function:  13- Apical Anterior:  hypokinetic  14- Apical Lateral:  hypokinetic  16- Apical Septal:  hypokinetic  17- [place]:  hypokinetic      Valves    Aortic Valve:  Annulus normal.  Stenosis not present.  Pressure Half-time: 910 ms.  Regurgitation mild.  Leaflets normal.  Leaflet motions normal.    Mitral Valve:  Annulus normal.  Stenosis not present.  Regurgitation trace.    Tricuspid Valve:  Annulus normal.  Stenosis not present.  Regurgitation mild.  Pulmonic Valve:  Annulus normal.  Regurgitation trace.        Aorta    Ascending Aorta:  Size normal.  Diameter 3.6 cm.  Dissection not present.  Plaque thickness less than 3 mm.  Mobile plaque not present.  Aortic Arch:  Size normal.  Diameter 3.1 cm.  Dissection not present.  Plaque thickness less than 3 mm.  Mobile plaque not present.  Descending Aorta:  Size normal.  Diameter 2.5 cm.  Dissection not present.  Plaque thickness less than 3 mm.  Mobile plaque not present.        Atria    Right Atrium:  Size normal.  Spontaneous echo contrast not present.  Thrombus not present.  Tumor not present.  Device present.    Left Atrium:  Size normal.  Spontaneous echo contrast not present.  Thrombus not present.  Tumor not present.  Device not present.  Left atrial appendage normal.      Septa    Atrial Septum:  Intra-atrial septal morphology lipomatous hypertrophy.        Diastolic Function Measurements:  Diastolic Dysfunction Grade= II  E= 40.6 ms  A= 35.3 ms  E/A Ratio=  1.2  DT=  296 ms  S/D=  IVRT=    Other Findings  Pericardium:  normal  Pleural Effusion:  none  Pulmonary Arteries:  normal  Pulmonary Venous Flow:  normal    Anesthesia Information  Performed Personally      Echocardiogram Comments:  Post CPB:  LVEF much improved, 60%, apex no longer hypokinetic.  No aortic dissection post  decannulation.  AI unchanged.      Labs Pending at Discharge:  Pending Labs       Order Current Status    Immunofixation, Serum In process    Methylmalonic Acid, Serum In process              Discharge Details        Discharge Medications        ASK your doctor about these medications        Instructions Start Date   amitriptyline 50 MG tablet  Commonly known as: ELAVIL   50 mg, Oral, Nightly      aspirin 81 MG EC tablet   81 mg, Oral, Daily      clopidogrel 75 MG tablet  Commonly known as: PLAVIX   75 mg, Oral, Daily      donepezil 10 MG tablet  Commonly known as: ARICEPT   10 mg, Oral, Every Night at Bedtime      ferrous sulfate 325 (65 FE) MG tablet   325 mg, Oral, Daily With Breakfast      finasteride 5 MG tablet  Commonly known as: PROSCAR   5 mg, Oral, Daily      furosemide 40 MG tablet  Commonly known as: LASIX   40 mg, Oral, Daily      gabapentin 600 MG tablet  Commonly known as: NEURONTIN   300 mg, Oral, 2 Times Daily      insulin glargine 100 UNIT/ML injection  Commonly known as: LANTUS, SEMGLEE   25 Units, Subcutaneous, Nightly      levothyroxine 112 MCG tablet  Commonly known as: SYNTHROID, LEVOTHROID   224 mcg, Oral, Every Morning      metFORMIN 1000 MG tablet  Commonly known as: GLUCOPHAGE   1,000 mg, Oral, 2 Times Daily With Meals      nadolol 20 MG tablet  Commonly known as: CORGARD   20 mg, Oral, Daily      nitroglycerin 0.4 MG SL tablet  Commonly known as: NITROSTAT   No dose, route, or frequency recorded.      sertraline 100 MG tablet  Commonly known as: ZOLOFT   200 mg, Oral, Daily      simvastatin 40 MG tablet  Commonly known as: ZOCOR   40 mg, Oral, Nightly      tamsulosin 0.4 MG capsule 24 hr capsule  Commonly known as: FLOMAX   0.4 mg, Oral, Daily               No Known Allergies      Discharge Disposition:  Skilled Nursing Facility ([facility])    Diet:  Hospital:  Diet Order   Procedures    Diet: Diabetic Diets; Consistent Carbohydrate; Texture: Regular Texture (IDDSI 7); Fluid  Consistency: Thin (IDDSI 0)         Discharge Activity: As tolerated with assistance        CODE STATUS:  Code Status and Medical Interventions:   Ordered at: 06/05/23 1000     Code Status (Patient has no pulse and is not breathing):    CPR (Attempt to Resuscitate)     Medical Interventions (Patient has pulse or is breathing):    Full Support         Future Appointments   Date Time Provider Department Center   7/25/2023 12:15 PM Alex Buckley MD [facility]   11/16/2023 12:00 PM Sven Duran MD [facility]           Time spent on Discharge including face to face service: 45 minutes    Electronically signed by Seven Saldana MD, 06/09/23, 9:34 AM EDT.    Part of this note may be an electronic transcription/translation of spoken language to printed text using the Dragon Dictation System.

## 2023-06-09 NOTE — PLAN OF CARE
Goal Outcome Evaluation:   Patient to be discharged to Skilled nursing facility

## 2023-06-09 NOTE — PLAN OF CARE
Goal Outcome Evaluation:      Received on SNF new admission from PCU. Alert & oriented x 4. Pleasant with staff. Up with walker & 1 person assist, gait belt. Gait is unsteady. Oriented to room & unit, explained the fall prevention policy, do not get up without calling for nurse assistance. Patient verbalizes understands the policy. Personal items & call light within reach.

## 2023-06-09 NOTE — PLAN OF CARE
Problem: Adult Inpatient Plan of Care  Goal: Plan of Care Review  Outcome: Ongoing, Progressing  Goal: Patient-Specific Goal (Individualized)  Outcome: Ongoing, Progressing  Goal: Absence of Hospital-Acquired Illness or Injury  Outcome: Ongoing, Progressing  Intervention: Prevent Skin Injury  Recent Flowsheet Documentation  Taken 6/8/2023 1919 by Shayy Naidu RN  Skin Protection: tubing/devices free from skin contact  Intervention: Prevent and Manage VTE (Venous Thromboembolism) Risk  Recent Flowsheet Documentation  Taken 6/8/2023 1919 by Shayy Naidu RN  Activity Management: activity encouraged  VTE Prevention/Management:   bilateral   sequential compression devices on  Range of Motion: active ROM (range of motion) encouraged  Intervention: Prevent Infection  Recent Flowsheet Documentation  Taken 6/8/2023 1919 by Shayy Naidu RN  Infection Prevention: cohorting utilized  Goal: Optimal Comfort and Wellbeing  Outcome: Ongoing, Progressing  Intervention: Provide Person-Centered Care  Recent Flowsheet Documentation  Taken 6/8/2023 1919 by Shayy Naidu RN  Trust Relationship/Rapport: care explained  Goal: Readiness for Transition of Care  Outcome: Ongoing, Progressing     Problem: Fall Injury Risk  Goal: Absence of Fall and Fall-Related Injury  Outcome: Ongoing, Progressing  Intervention: Identify and Manage Contributors  Recent Flowsheet Documentation  Taken 6/8/2023 1919 by Shayy Naidu RN  Medication Review/Management: medications reviewed     Problem: Behavioral Health Comorbidity  Goal: Maintenance of Behavioral Health Symptom Control  Outcome: Ongoing, Progressing  Intervention: Maintain Behavioral Health Symptom Control  Recent Flowsheet Documentation  Taken 6/8/2023 1919 by Shayy Naidu RN  Medication Review/Management: medications reviewed     Problem: Diabetes Comorbidity  Goal: Blood Glucose Level Within Targeted Range  Outcome: Ongoing, Progressing  Intervention: Monitor and Manage  Glycemia  Recent Flowsheet Documentation  Taken 6/8/2023 1919 by Shayy Naidu RN  Glycemic Management: blood glucose monitored     Problem: Heart Failure Comorbidity  Goal: Maintenance of Heart Failure Symptom Control  Outcome: Ongoing, Progressing  Intervention: Maintain Heart Failure-Management  Recent Flowsheet Documentation  Taken 6/8/2023 1919 by Shayy Naidu RN  Medication Review/Management: medications reviewed     Problem: Hypertension Comorbidity  Goal: Blood Pressure in Desired Range  Outcome: Ongoing, Progressing  Intervention: Maintain Blood Pressure Management  Recent Flowsheet Documentation  Taken 6/8/2023 1919 by Shayy Naidu RN  Medication Review/Management: medications reviewed     Problem: Pain Chronic (Persistent) (Comorbidity Management)  Goal: Acceptable Pain Control and Functional Ability  Outcome: Ongoing, Progressing  Intervention: Manage Persistent Pain  Recent Flowsheet Documentation  Taken 6/8/2023 1919 by Shayy Naidu RN  Sleep/Rest Enhancement: awakenings minimized  Medication Review/Management: medications reviewed     Problem: Skin Injury Risk Increased  Goal: Skin Health and Integrity  Outcome: Ongoing, Progressing  Intervention: Optimize Skin Protection  Recent Flowsheet Documentation  Taken 6/8/2023 1919 by Shayy Naidu RN  Pressure Reduction Techniques: frequent weight shift encouraged  Pressure Reduction Devices: pressure-redistributing mattress utilized  Skin Protection: tubing/devices free from skin contact   Goal Outcome Evaluation:

## 2023-06-10 LAB
GLUCOSE BLDC GLUCOMTR-MCNC: 121 MG/DL (ref 70–99)
GLUCOSE BLDC GLUCOMTR-MCNC: 135 MG/DL (ref 70–99)
GLUCOSE BLDC GLUCOMTR-MCNC: 190 MG/DL (ref 70–99)
GLUCOSE BLDC GLUCOMTR-MCNC: 193 MG/DL (ref 70–99)

## 2023-06-10 PROCEDURE — 97161 PT EVAL LOW COMPLEX 20 MIN: CPT

## 2023-06-10 PROCEDURE — 82948 REAGENT STRIP/BLOOD GLUCOSE: CPT

## 2023-06-10 PROCEDURE — 97116 GAIT TRAINING THERAPY: CPT

## 2023-06-10 PROCEDURE — 63710000001 INSULIN LISPRO (HUMAN) PER 5 UNITS: Performed by: INTERNAL MEDICINE

## 2023-06-10 RX ORDER — FUROSEMIDE 20 MG/1
20 TABLET ORAL DAILY
Status: DISPENSED | OUTPATIENT
Start: 2023-06-11

## 2023-06-10 RX ADMIN — GABAPENTIN 300 MG: 300 CAPSULE ORAL at 09:37

## 2023-06-10 RX ADMIN — SERTRALINE HYDROCHLORIDE 200 MG: 100 TABLET ORAL at 09:37

## 2023-06-10 RX ADMIN — SENNOSIDES AND DOCUSATE SODIUM 2 TABLET: 50; 8.6 TABLET ORAL at 09:37

## 2023-06-10 RX ADMIN — LEVOTHYROXINE SODIUM 224 MCG: 0.11 TABLET ORAL at 06:16

## 2023-06-10 RX ADMIN — AMITRIPTYLINE HYDROCHLORIDE 50 MG: 50 TABLET, FILM COATED ORAL at 20:17

## 2023-06-10 RX ADMIN — FINASTERIDE 5 MG: 5 TABLET, FILM COATED ORAL at 09:37

## 2023-06-10 RX ADMIN — INSULIN LISPRO 2 UNITS: 100 INJECTION, SOLUTION INTRAVENOUS; SUBCUTANEOUS at 12:05

## 2023-06-10 RX ADMIN — CLOPIDOGREL BISULFATE 75 MG: 75 TABLET ORAL at 09:37

## 2023-06-10 RX ADMIN — TAMSULOSIN HYDROCHLORIDE 0.4 MG: 0.4 CAPSULE ORAL at 09:37

## 2023-06-10 RX ADMIN — INSULIN LISPRO 2 UNITS: 100 INJECTION, SOLUTION INTRAVENOUS; SUBCUTANEOUS at 20:17

## 2023-06-10 RX ADMIN — GABAPENTIN 300 MG: 300 CAPSULE ORAL at 20:17

## 2023-06-10 RX ADMIN — DONEPEZIL HYDROCHLORIDE 10 MG: 10 TABLET, FILM COATED ORAL at 20:17

## 2023-06-10 NOTE — PLAN OF CARE
Goal Outcome Evaluation:   Alert & oriented x 4. Up with walker & 1 person assist. No c/o's voiced this shift. Personal items & call light within reach.

## 2023-06-10 NOTE — PLAN OF CARE
Goal Outcome Evaluation:  Plan of Care Reviewed With: patient        Progress: no change  Outcome Evaluation: Pt presents with limitations that impede their ability to safely and independently transfer and ambulate. The skills of a therapist will be required to safely and effectively implement the following treatment plan to restore maximal level of function.      PT Evaluation Complexity  History, PT Evaluation Complexity: no personal factors and/or comorbidities  Examination of Body Systems (PT Eval Complexity): total of 4 or more elements  Clinical Presentation (PT Evaluation Complexity): stable  Clinical Decision Making (PT Evaluation Complexity): low complexity  Overall Complexity (PT Evaluation Complexity): low complexity

## 2023-06-10 NOTE — H&P
[facility]   HISTORY AND PHYSICAL    Patient Name: Cosmo Gauthier  : 1945  MRN: [id number]  Primary Care Physician:  Alex Buckley MD  Date of admission: 2023    Subjective   Subjective     Chief Complaint: Patient with multiple health issues including non-Hodgkin's lymphoma severe arthritis postural hypotension as well as severe neural neuropathy and diabetes mellitus he is now being admitted to the skilled care nursing facility for continuation of physical therapy because of his neuropathy and extreme weakness hypotension his blood pressures have dropped down so we will stop Corgard hopefully that will stabilize his blood pressure and his episodes of been falling off will improve his postural hypotension may also improve    HPI: With stable doing well Cologuard has been stopped he has diabetic neuropathy as well as history of severe arthritis and status post treatment for compression fracture and radiation therapy to the lumbar spine    Cosmo Gauthier is a 77 y.o. male patient with history of lymphoma cirrhosis of liver    Review of Systems   All systems were reviewed and negative except for: Reviewed    Personal History     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     Primary osteoarthritis of left knee     Sleep apnea     Sleep apnea     Thrombocytopenia 2018    Thyroid disease        Past Surgical History:   Procedure Laterality Date     CARDIAC SURGERY      COLONOSCOPY  2015    CORONARY ANGIOPLASTY WITH STENT PLACEMENT      CORONARY ARTERY BYPASS GRAFT N/A 05/20/2021    Procedure: MIDLINE STERNOTOMY,CORONARY ARTERY BYPASS GRAFTING X 5, UTILIZING THE LEFT COMPA AND LEFT SAPHENOUS VEIN, ABHIJEET, PRP;  Surgeon: Jr Tom Tubbs MD;  Location: [facility];  Service: Cardiothoracic;  Laterality: N/A;    ENDOSCOPY      HERNIA REPAIR      JOINT REPLACEMENT      SHOULDER SURGERY      SHOULDER REPAIR    TOTAL KNEE ARTHROPLASTY      TRANSESOPHAGEAL ECHOCARDIOGRAM (ABHIJEET) N/A 05/20/2021    Procedure: TRANSESOPHAGEAL ECHOCARDIOGRAM WITH ANESTHESIA;  Surgeon: Jr Tom Tubbs MD;  Location: [facility];  Service: Cardiothoracic;  Laterality: N/A;       Family History: family history includes Colon cancer in his father; Diabetes in his mother. Otherwise pertinent FHx was reviewed and not pertinent to current issue.    Social History:  reports that he has never smoked. He has never used smokeless tobacco. He reports that he does not currently use alcohol. He reports that he does not use drugs.    Home Medications:  amitriptyline, aspirin, clopidogrel, donepezil, ferrous sulfate, finasteride, furosemide, gabapentin, insulin glargine, levothyroxine, metFORMIN, nadolol, nitroglycerin, sertraline, simvastatin, and tamsulosin      Allergies:  No Known Allergies    Objective   Objective     Vitals:   Temp:  [97.7 °F (36.5 °C)] 97.7 °F (36.5 °C)  Heart Rate:  [57-60] 60  Resp:  [16] 16  BP: ()/(50-72) 102/60  Physical Exam    Constitutional: Alert oriented not in acute distress   Eyes: Pupils equal reacting to light and accommodation   HENT: Normal   Neck: No palpable lymphadenopathy   Respiratory: No rales or rhonchi   Cardiovascular: S1-S2 normal no S3 or S4   Gastrointestinal: Normal   Musculoskeletal: Noes significant finding   Neurologic: No localizing signs but patient had peripheral neuropathy  Skin: No rash    Result Review    Result Review:  I  have personally reviewed the results from the time of this admission to 6/10/2023 11:50 EDT and agree with these findings:  [x]  Laboratory thrombocytopenia  []  Microbiology  []  Radiology  []  EKG/Telemetry   []  Cardiology/Vascular   []  Pathology  []  Old records  []  Other:  Most notable findings include: Thrombocytopenia    Assessment & Plan   Assessment / Plan     Brief Patient Summary:  Cosmo Gauthier is a 77 y.o. male who with neuropathy postural hypotension diabetes mellitus cirrhosis of liver    Active Hospital Problems:  There are no active hospital problems to display for this patient.      Plan:   We will stop Corgard today we will continue the physical therapy    DVT prophylaxis:  No DVT prophylaxis order currently exists.    CODE STATUS:    Code Status (Patient has no pulse and is not breathing): CPR (Attempt to Resuscitate)  Medical Interventions (Patient has pulse or is breathing): Full Support      Admission Status:  I believe this patient meets inpatient status.    Electronically signed by Seven Saldana MD, 06/10/23, 11:50 AM EDT.      Part of this note may be an electronic transcription/translation of spoken language to printed text using the Dragon Dictation System.

## 2023-06-10 NOTE — THERAPY EVALUATION
SNF - Physical Therapy Initial Evaluation   [place]     Patient Name: Cosmo Gauthier  : 1945  MRN: [id number]  Today's Date: 6/10/2023      Visit Dx:    ICD-10-CM ICD-9-CM   1. 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)   • Stage 3b chronic kidney disease (HCC)   • Thrombocytopenia (HCC)   • Coronary artery disease involving native coronary artery of native heart without angina pectoris   • Hx of CABG   • Hyperlipemia, mixed   • Degenerative spondylolisthesis   • Hypertension, essential   • Hereditary and idiopathic neuropathy, unspecified   • Low lying conus medullaris   • Mood disorder (HCC)   • Non-alcoholic cirrhosis (HCC)   • Sleep apnea   • Spinal stenosis of lumbar region with neurogenic claudication   • Vascular dementia without behavioral disturbance   • Iron deficiency anemia   • Hypothyroidism, adult   • Traumatic subarachnoid hemorrhage with loss of consciousness of 30 minutes or less   • Morbid (severe) obesity due to excess calories   • Claudication of both lower extremities   • Dyspnea on exertion   • Medicare annual wellness visit, subsequent   • Weakness     Past Medical History:   Diagnosis Date   • Anxiety and depression    • Arthritis    • Chronic back pain    • Cirrhosis    • Coronary artery disease    • Dementia    • Depression    • Diabetes mellitus    • Disease of thyroid gland    • Elevated cholesterol    • Family history of colon cancer    • Fatty liver    • Gastric ulcer    • GERD (gastroesophageal reflux disease)    • Heart disease    • High cholesterol    • Hypertension    • Hyperthyroidism    • Knee pain    • Lymphoma     History of lymphoma, has been in remission   • Memory change 10/18/2017   • 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    • Primary osteoarthritis of left knee    • Sleep apnea    • Sleep apnea    • Thrombocytopenia 2018   • Thyroid  disease      Past Surgical History:   Procedure Laterality Date   • CARDIAC SURGERY     • COLONOSCOPY  2015   • CORONARY ANGIOPLASTY WITH STENT PLACEMENT     • CORONARY ARTERY BYPASS GRAFT N/A 05/20/2021    Procedure: MIDLINE STERNOTOMY,CORONARY ARTERY BYPASS GRAFTING X 5, UTILIZING THE LEFT COMPA AND LEFT SAPHENOUS VEIN, ABHIJEET, PRP;  Surgeon: Jr Tom Tubbs MD;  Location: [facility];  Service: Cardiothoracic;  Laterality: N/A;   • ENDOSCOPY     • HERNIA REPAIR     • JOINT REPLACEMENT     • SHOULDER SURGERY      SHOULDER REPAIR   • TOTAL KNEE ARTHROPLASTY     • TRANSESOPHAGEAL ECHOCARDIOGRAM (ABHIJEET) N/A 05/20/2021    Procedure: TRANSESOPHAGEAL ECHOCARDIOGRAM WITH ANESTHESIA;  Surgeon: Jr Tom Tubbs MD;  Location: [facility];  Service: Cardiothoracic;  Laterality: N/A;       PT Assessment (last 12 hours)     PT Evaluation and Treatment     Row Name 06/10/23 1400          Physical Therapy Time and Intention    Subjective Information no complaints  -CS     Document Type evaluation  -CS     Mode of Treatment individual therapy;physical therapy  -CS     Patient Effort good  -CS     Row Name 06/10/23 1400          General Information    Patient Profile Reviewed yes  -CS     Patient Observations alert;cooperative;agree to therapy  -CS     Prior Level of Function independent:;all household mobility;community mobility;gait;transfer;bed mobility;ADL's  -CS     Equipment Currently Used at Home walker, rolling;commode, 3-in-1;grab bar  Patient reports a walk-in shower with shower benches, grab bars, over the commode handles  -CS     Existing Precautions/Restrictions fall  Multiple falls in the last few months  -CS     Barriers to Rehab none identified  -CS     Row Name 06/10/23 1400          Living Environment    Current Living Arrangements home  -CS     Home Accessibility stairs to enter home;stairs within home  -CS     People in Home child(marcelino), adult;spouse  -CS     Primary Care Provided by  self;spouse/significant other  -     Row Name 06/10/23 1400          Home Main Entrance    Number of Stairs, Main Entrance six  -CS     Stair Railings, Main Entrance railings safe and in good condition  -     Row Name 06/10/23 1400          Stairs Within Home, Primary    Number of Stairs, Within Home, Primary none  -     Row Name 06/10/23 1400          Pain    Pretreatment Pain Rating 0/10 - no pain  -CS     Posttreatment Pain Rating 0/10 - no pain  -CS     Row Name 06/10/23 1400          Cognition    Orientation Status (Cognition) oriented x 3  -     Row Name 06/10/23 1400          Range of Motion Comprehensive    General Range of Motion bilateral lower extremity ROM WFL  -     Row Name 06/10/23 1400          Strength Comprehensive (MMT)    General Manual Muscle Testing (MMT) Assessment lower extremity strength deficits identified  -     Comment, General Manual Muscle Testing (MMT) Assessment Bilateral lower extremities assessed at 4/5  -     Row Name 06/10/23 1400          Bed Mobility    Bed Mobility bed mobility (all) activities  -     All Activities, [place] (Bed Mobility) supervision  -     Row Name 06/10/23 1400          Transfers    Transfers sit-stand transfer;stand-sit transfer;toilet transfer  -     Row Name 06/10/23 1400          Sit-Stand Transfer    Sit-Stand [place] (Transfers) verbal cues;standby assist  -     Assistive Device (Sit-Stand Transfers) walker, front-wheeled  -     Row Name 06/10/23 1400          Stand-Sit Transfer    Stand-Sit [place] (Transfers) verbal cues;standby assist  -     Assistive Device (Stand-Sit Transfers) walker, front-wheeled  -     Row Name 06/10/23 1400          Toilet Transfer    Type (Toilet Transfer) sit-stand;stand-sit  -     [place] Level (Toilet Transfer) modified independence  -     Assistive Device (Toilet Transfer) commode, 3-in-1;grab bars/safety frame  -     Row Name 06/10/23 1400          Gait/Stairs  (Locomotion)    Gait/Stairs Locomotion gait/ambulation assistive device  -CS     [place] Level (Gait) verbal cues;standby assist;contact guard  -CS     Assistive Device (Gait) walker, front-wheeled  -CS     Distance in Feet (Gait) 230  -CS     Pattern (Gait) step-through  -CS     Deviations/Abnormal Patterns (Gait) gonzalo decreased;gait speed decreased;stride length decreased  -CS     Comment, (Gait/Stairs) Patient slightly impulsive and has decreased safety with use of rolling walker.  Education provided  -CS     Row Name 06/10/23 1400          Safety Issues, Functional Mobility    Safety Issues Affecting Function (Mobility) insight into deficits/self-awareness;judgment;impulsivity  -CS     Impairments Affecting Function (Mobility) balance;endurance/activity tolerance;strength  -CS     Row Name 06/10/23 1400          Balance    Balance Assessment standing dynamic balance  -CS     Dynamic Standing Balance contact guard;minimal assist  -CS     Position/Device Used, Standing Balance unsupported  -CS     Row Name 06/10/23 1400          Plan of Care Review    Plan of Care Reviewed With patient  -CS     Progress no change  -CS     Outcome Evaluation Pt presents with limitations that impede their ability to safely and independently transfer and ambulate. The skills of a therapist will be required to safely and effectively implement the following treatment plan to restore maximal level of function.  -CS     Row Name 06/10/23 1400          Therapy Assessment/Plan (PT)    Rehab Potential (PT) good, to achieve stated therapy goals  -CS     Criteria for Skilled Interventions Met (PT) yes;skilled treatment is necessary  -CS     Therapy Frequency (PT) 6 times/wk  -CS     Predicted Duration of Therapy Intervention (PT) 30 days  -CS     Problem List (PT) problems related to;balance;mobility;strength;pain  -CS     Activity Limitations Related to Problem List (PT) unable to ambulate safely;unable to transfer safely  -CS      Row Name 06/10/23 1400          PT Evaluation Complexity    History, PT Evaluation Complexity no personal factors and/or comorbidities  -CS     Examination of Body Systems (PT Eval Complexity) total of 4 or more elements  -CS     Clinical Presentation (PT Evaluation Complexity) stable  -CS     Clinical Decision Making (PT Evaluation Complexity) low complexity  -CS     Overall Complexity (PT Evaluation Complexity) low complexity  -CS     Row Name 06/10/23 1400          Therapy Plan Review/Discharge Plan (PT)    Therapy Plan Review (PT) evaluation/treatment results reviewed;patient  -CS     Row Name 06/10/23 1400          Physical Therapy Goals    Bed Mobility Goal Selection (PT) bed mobility, PT goal 1  -CS     Transfer Goal Selection (PT) transfer, PT goal 1  -CS     Gait Training Goal Selection (PT) gait training, PT goal 1  -CS     Stairs Goal Selection (PT) stairs, PT goal 1  -CS     Row Name 06/10/23 1400          Bed Mobility Goal 1 (PT)    Activity/Assistive Device (Bed Mobility Goal 1, PT) bed mobility activities, all  -CS     [place] Level/Cues Needed (Bed Mobility Goal 1, PT) independent  -CS     Time Frame (Bed Mobility Goal 1, PT) long term goal (LTG);30 days  -CS     Row Name 06/10/23 1400          Transfer Goal 1 (PT)    Activity/Assistive Device (Transfer Goal 1, PT) sit-to-stand/stand-to-sit;bed-to-chair/chair-to-bed;walker, rolling  -CS     [place] Level/Cues Needed (Transfer Goal 1, PT) modified independence  -CS     Time Frame (Transfer Goal 1, PT) long term goal (LTG);30 days  -CS     Row Name 06/10/23 1400          Gait Training Goal 1 (PT)    Activity/Assistive Device (Gait Training Goal 1, PT) gait (walking locomotion);assistive device use;walker, rolling  -CS     [place] Level (Gait Training Goal 1, PT) modified independence  -CS     Distance (Gait Training Goal 1, PT) 600  -CS     Time Frame (Gait Training Goal 1, PT) long term goal (LTG);30 days  -CS     Row Name 06/10/23 1400           Stairs Goal 1 (PT)    Activity/Assistive Device (Stairs Goal 1, PT) ascending stairs;descending stairs;using handrail, left;using handrail, right  -CS     [place] Level/Cues Needed (Stairs Goal 1, PT) modified independence  -CS     Number of Stairs (Stairs Goal 1, PT) 6  -CS     Time Frame (Stairs Goal 1, PT) long term goal (LTG);30 days  -CS           User Key  (r) = Recorded By, (t) = Taken By, (c) = Cosigned By    Initials Name Provider Type    CS Alisha Clark, PT Physical Therapist              Section G        Balance  Balance during transitions & walking: Not steady, requires assist to steady  Moving from seated to standing position: Not steady, requires assist to steady  Walking: Not steady, requires assist to steady  Turning around while walking: Not steady, requires assist to steady  Moving on and off toilet: Not steady, requires assist to steady  Surface-to-surface transfer: Not steady, requires assist to steady  Mobility devices: Walker  Range of Motion  Lower Extremity: No impairment  Section GG  SectionGG: Functional Ability/Goals, Adm  Indoor Mobility - Ambulation, Prior Function (HF8243B): 3. Independent  Stairs, Prior Function (UX2176I): 3. Independent  Prior Device Use (AL8885): walker (D)     Mobility, Admission Performance (EK2851)  Roll Left & Right: Mobility Admission Performance (VI8437C4): supervision or touching assistance (04)  Sit to Lying: Mobility Admission Performance (VF1499G2): supervision or touching assistance (04)  Lying to Sitting, Side of Bed: Mobility Admission Performance (YT2159B9): supervision or touching assistance (04)  Sit to Stand: Mobility Admission Performance (XA0106N0): supervision or touching assistance (04)  Chair/Bed-Chair Transfer: Mobility Admission Performance (VS9712U0): supervision or touching assistance (04)  Toilet Transfer: Mobility Admission Performance (WW5819S4): supervision or touching assistance (04)  Car Transfer: Mobility Admission  Performance (MF5417M5): not attempted due to environmental limitations (10)  Walk 10 Feet: Mobility Admission Performance (OH8930S3): supervision or touching assistance (04)  Walk 50 Feet With Two Turns: Mobility Admission Performance (UG5916A6): supervision or touching assistance (04)  Walk 150 Feet: Mobility Admission Performance (FD6895F8): supervision or touching assistance (04)  Walk 10 Ft, Uneven Surfaces: Mobility Admission Performance (ZT5904U1): not applicable (09)  1 Step/Curb: Mobility Admission Performance (VV9630J7): not attempted, medical condition/safety concern (88)  4 Steps: Mobility Admission Performance (FY4607H6): not attempted, medical condition/safety concern (88)  12 Steps: Mobility Admission Performance (CU9728R0): not applicable (09)  Picking up object: Mobility Admission Performance (EE7298Q6): not attempted, medical condition/safety concern (88)  Use a Wheelchair and/or Scooter: Mobility (FA7898A3): no (0)     Mobility (GG), Discharge Goal (YQ3145)  Roll Left & Right: Mobility Discharge Goal (NN9822V4): independent (06)  Sit to Lying: Mobility Discharge Goal (TF1849D7): independent (06)  Lying to Sitting, Side of Bed: Mobility Discharge Goal (KI4170C9): independent (06)  Sit to Stand: Mobility Discharge Goal (JD4504Q1): independent (06)  Chair/Bed-Chair Transfer: Mobility Discharge Goal (FU6405H0): independent (06)  Toilet Transfer: Mobility Discharge Goal (SI9787D4): independent (06)  Car Transfer: Mobility Discharge Goal (BC2647R7): not attempted due to environmental limitations (10)  Walk 10 Feet: Mobility Discharge Goal (XP8422V8): independent (06)  Walk 50 Feet With Two Turns: Mobility Discharge Goal (ZB4615Y2): independent (06)  Walk 150 Feet: Mobility Discharge Goal (GQ6705Y8): independent (06)  Walk 10 Ft, Uneven Surfaces: Mobility Discharge Goal (FZ8412S3): not applicable (09)  1 Step/Curb: Mobility Discharge Goal (ZD5069I6): independent (06)  4 Steps: Mobility Discharge Goal  (OQ0750I0): independent (06)  12 Steps: Mobility Discharge Goal (CI1186O7): not applicable (09)  Picking up object: Mobility Discharge Goal (FA4739Y0): independent (06)  Use a Wheelchair and/or Scooter: Mobility (IR0622G2): no (0)  Wheel 50 Ft Two Turns: Mobility Discharge Goal (YJ8357X1): not applicable (09)  Wheel 150 Feet: Mobility Discharge Goal (LG4384D9): not applicable (09)     Mobility, Discharge Performance (MU3607)  Use a Wheelchair and/or Scooter: Mobility (TF8935L3): no (0)  Physical Therapy Education     Title: PT OT SLP Therapies (In Progress)     Topic: Physical Therapy (In Progress)     Point: Mobility training (In Progress)     Learning Progress Summary           Patient Acceptance, E, NR by  at 6/10/2023 1405                   Point: Home exercise program (Not Started)     Learner Progress:  Not documented in this visit.          Point: Body mechanics (Not Started)     Learner Progress:  Not documented in this visit.          Point: Precautions (In Progress)     Learning Progress Summary           Patient Acceptance, E, NR by  at 6/10/2023 1409                               User Key     Initials Effective Dates Name Provider Type Discipline     21 -  Alisha Clark, PT Physical Therapist PT              PT Recommendation and Plan  Anticipated Discharge Disposition (PT): home with outpatient therapy services, home with home health  Planned Therapy Interventions (PT): balance training, bed mobility training, gait training, transfer training, strengthening  Therapy Frequency (PT): 6 times/wk  Plan of Care Reviewed With: patient  Progress: no change  Outcome Evaluation: Pt presents with limitations that impede their ability to safely and independently transfer and ambulate. The skills of a therapist will be required to safely and effectively implement the following treatment plan to restore maximal level of function.   Outcome Measures     Row Name 06/10/23 1300             How much help  from another person do you currently need...    Turning from your back to your side while in flat bed without using bedrails? 4  -CS      Moving from lying on back to sitting on the side of a flat bed without bedrails? 4  -CS      Moving to and from a bed to a chair (including a wheelchair)? 3  -CS      Standing up from a chair using your arms (e.g., wheelchair, bedside chair)? 3  -CS      Climbing 3-5 steps with a railing? 3  -CS      To walk in hospital room? 3  -CS      AM-PAC 6 Clicks Score (PT) 20  -CS         Functional Assessment    Outcome Measure Options AM-PAC 6 Clicks Basic Mobility (PT)  -CS            User Key  (r) = Recorded By, (t) = Taken By, (c) = Cosigned By    Initials Name Provider Type    Alisha Garrett, PT Physical Therapist                 Time Calculation:    PT Charges     Row Name 06/10/23 1359             Time Calculation    PT Received On 06/10/23  -CS      PT Goal Re-Cert Due Date 07/09/23  -CS         Timed Charges    74733 - Gait Training Minutes  10  -CS      41801 - PT Therapeutic Activity Minutes 5  -CS         Untimed Charges    PT Eval/Re-eval Minutes 35  -CS         SNF Physical Therapy Minutes    Skilled Minutes- PT 15 min  -CS         Total Minutes    Timed Charges Total Minutes 15  -CS      Untimed Charges Total Minutes 35  -CS       Total Minutes 50  -CS            User Key  (r) = Recorded By, (t) = Taken By, (c) = Cosigned By    Initials Name Provider Type    Alisha Garrett, PT Physical Therapist              Therapy Charges for Today     Code Description Service Date Service Provider Modifiers Qty    15603641049 HC GAIT TRAINING EA 15 MIN 6/10/2023 Alisha Clark, PT GP 1    24223857061 HC PT EVAL LOW COMPLEXITY 3 6/10/2023 Alisha Clark, PT GP 1          PT G-Codes  Outcome Measure Options: AM-PAC 6 Clicks Basic Mobility (PT)  AM-PAC 6 Clicks Score (PT): 20    Alisha Clark, PT  6/10/2023

## 2023-06-10 NOTE — PLAN OF CARE
Goal Outcome Evaluation:  Plan of Care Reviewed With: patient        Progress: no change  Outcome Evaluation: Alert and oriented. Vital signs stable. Voided with urinal during night.  Snack of diet putting eaten at HS. Hard of hearing. Currently resting quietly with no signs and symptoms of distress.

## 2023-06-11 LAB
GLUCOSE BLDC GLUCOMTR-MCNC: 161 MG/DL (ref 70–99)
GLUCOSE BLDC GLUCOMTR-MCNC: 171 MG/DL (ref 70–99)
GLUCOSE BLDC GLUCOMTR-MCNC: 172 MG/DL (ref 70–99)
GLUCOSE BLDC GLUCOMTR-MCNC: 183 MG/DL (ref 70–99)

## 2023-06-11 PROCEDURE — 82948 REAGENT STRIP/BLOOD GLUCOSE: CPT

## 2023-06-11 PROCEDURE — 63710000001 INSULIN LISPRO (HUMAN) PER 5 UNITS: Performed by: INTERNAL MEDICINE

## 2023-06-11 RX ADMIN — INSULIN LISPRO 2 UNITS: 100 INJECTION, SOLUTION INTRAVENOUS; SUBCUTANEOUS at 08:07

## 2023-06-11 RX ADMIN — INSULIN LISPRO 2 UNITS: 100 INJECTION, SOLUTION INTRAVENOUS; SUBCUTANEOUS at 21:37

## 2023-06-11 RX ADMIN — AMITRIPTYLINE HYDROCHLORIDE 50 MG: 50 TABLET, FILM COATED ORAL at 21:36

## 2023-06-11 RX ADMIN — LEVOTHYROXINE SODIUM 224 MCG: 0.11 TABLET ORAL at 06:09

## 2023-06-11 RX ADMIN — GABAPENTIN 300 MG: 300 CAPSULE ORAL at 21:36

## 2023-06-11 RX ADMIN — INSULIN LISPRO 2 UNITS: 100 INJECTION, SOLUTION INTRAVENOUS; SUBCUTANEOUS at 17:24

## 2023-06-11 RX ADMIN — GABAPENTIN 300 MG: 300 CAPSULE ORAL at 08:06

## 2023-06-11 RX ADMIN — FUROSEMIDE 20 MG: 20 TABLET ORAL at 08:07

## 2023-06-11 RX ADMIN — SENNOSIDES AND DOCUSATE SODIUM 2 TABLET: 50; 8.6 TABLET ORAL at 08:07

## 2023-06-11 RX ADMIN — TAMSULOSIN HYDROCHLORIDE 0.4 MG: 0.4 CAPSULE ORAL at 08:06

## 2023-06-11 RX ADMIN — DONEPEZIL HYDROCHLORIDE 10 MG: 10 TABLET, FILM COATED ORAL at 21:36

## 2023-06-11 RX ADMIN — INSULIN LISPRO 2 UNITS: 100 INJECTION, SOLUTION INTRAVENOUS; SUBCUTANEOUS at 11:48

## 2023-06-11 RX ADMIN — FINASTERIDE 5 MG: 5 TABLET, FILM COATED ORAL at 08:07

## 2023-06-11 RX ADMIN — CLOPIDOGREL BISULFATE 75 MG: 75 TABLET ORAL at 08:07

## 2023-06-11 RX ADMIN — SERTRALINE HYDROCHLORIDE 200 MG: 100 TABLET ORAL at 08:07

## 2023-06-11 NOTE — PLAN OF CARE
Goal Outcome Evaluation: Alert & oriented x 4. Pleasant & cooperative with staff. Up to BR with walker & 1 person assist. No c/o's of pain or discomfort this shift. Personal items & call light within reach.

## 2023-06-11 NOTE — PLAN OF CARE
Goal Outcome Evaluation:  Plan of Care Reviewed With: patient        Progress: improving  Outcome Evaluation: Vital signs stable. Denied pain. Alert and oriented. Turned and repositioned self during night. Tranfers to bedside commode with one person assistance. Shereen resting quietly on home c-pap.

## 2023-06-11 NOTE — PROGRESS NOTES
[facility]     Progress Note    Patient Name: Cosmo Gauthier  : 1945  MRN: [id number]  Primary Care Physician:  Alex Buckley MD  Date of admission: 2023    Subjective   Subjective     Chief Complaint: Stable doing well C[place]ard has been stopped and blood pressures have improved    HPI: Patient stable not as much pain and physical therapy in progress    Review of Systems   All systems were reviewed and negative except for: Reviewed    Objective   Objective     Vitals:   Temp:  [97.3 °F (36.3 °C)-97.7 °F (36.5 °C)] 97.7 °F (36.5 °C)  Heart Rate:  [60-65] 65  Resp:  [16-18] 18  BP: ()/(55-63) 98/63    Physical Exam    Constitutional: Awake, alert   Eyes: PERRLA, sclerae anicteric, no conjunctival injection   HENT: NCAT, mucous membranes moist   Neck: Supple, no thyromegaly, no lymphadenopathy, trachea midline   Respiratory: Clear to auscultation bilaterally, nonlabored respirations    Cardiovascular: RRR, no murmurs, rubs, or gallops, palpable pedal pulses bilaterally   Gastrointestinal: Positive bowel sounds, soft, nontender, nondistended   Musculoskeletal: No bilateral ankle edema, no clubbing or cyanosis to extremities   Psychiatric: Appropriate affect, cooperative   Neurologic: Oriented x 3, strength symmetric in all extremities, Cranial Nerves grossly intact to confrontation, speech clear   Skin: No rashes   No change  Result Review    Result Review:  I have personally reviewed the results from the time of this admission to 2023 08:12 EDT and agree with these findings:  [x]  Laboratory cirrhosis of liver with thrombocytopenia  []  Microbiology  []  Radiology  []  EKG/Telemetry   []  Cardiology/Vascular   []  Pathology  []  Old records  []  Other:  Most notable findings include: Soft liver with thrombocytopenia history of lymphoma and severe arthritis neuropathy    Assessment & Plan   Assessment / Plan     Brief Patient Summary:  Cosmo Gauthier is a 77 y.o. male who with severe  neuropathy with postural hypotension    Active Hospital Problems:  There are no active hospital problems to display for this patient.      Plan:   Continue current management physical therapy    DVT prophylaxis:  No DVT prophylaxis order currently exists.    CODE STATUS:   Code Status (Patient has no pulse and is not breathing): CPR (Attempt to Resuscitate)  Medical Interventions (Patient has pulse or is breathing): Full Support    Disposition:  I expect patient to be discharged after the patient has been stabilized.    Electronically signed by Seven Saldana MD, 06/11/23, 8:12 AM EDT.      Part of this note may be an electronic transcription/translation of spoken language to printed text using the Dragon Dictation System.

## 2023-06-12 VITALS
BODY MASS INDEX: 33.74 KG/M2 | HEIGHT: 72 IN | SYSTOLIC BLOOD PRESSURE: 138 MMHG | DIASTOLIC BLOOD PRESSURE: 77 MMHG | WEIGHT: 249.12 LBS | RESPIRATION RATE: 18 BRPM | HEART RATE: 73 BPM | TEMPERATURE: 97.7 F | OXYGEN SATURATION: 94 %

## 2023-06-12 LAB
GLUCOSE BLDC GLUCOMTR-MCNC: 159 MG/DL (ref 70–99)
GLUCOSE BLDC GLUCOMTR-MCNC: 172 MG/DL (ref 70–99)
GLUCOSE BLDC GLUCOMTR-MCNC: 184 MG/DL (ref 70–99)
GLUCOSE BLDC GLUCOMTR-MCNC: 202 MG/DL (ref 70–99)
INDURATION: 0 MM (ref 0–10)
Lab: NORMAL
Lab: NORMAL
TB SKIN TEST: NEGATIVE

## 2023-06-12 PROCEDURE — 97530 THERAPEUTIC ACTIVITIES: CPT

## 2023-06-12 PROCEDURE — 97116 GAIT TRAINING THERAPY: CPT

## 2023-06-12 PROCEDURE — 97110 THERAPEUTIC EXERCISES: CPT

## 2023-06-12 PROCEDURE — 97166 OT EVAL MOD COMPLEX 45 MIN: CPT

## 2023-06-12 PROCEDURE — 97535 SELF CARE MNGMENT TRAINING: CPT

## 2023-06-12 PROCEDURE — 63710000001 INSULIN LISPRO (HUMAN) PER 5 UNITS: Performed by: INTERNAL MEDICINE

## 2023-06-12 RX ADMIN — INSULIN LISPRO 3 UNITS: 100 INJECTION, SOLUTION INTRAVENOUS; SUBCUTANEOUS at 21:00

## 2023-06-12 RX ADMIN — INSULIN LISPRO 2 UNITS: 100 INJECTION, SOLUTION INTRAVENOUS; SUBCUTANEOUS at 17:24

## 2023-06-12 RX ADMIN — FINASTERIDE 5 MG: 5 TABLET, FILM COATED ORAL at 08:19

## 2023-06-12 RX ADMIN — INSULIN LISPRO 2 UNITS: 100 INJECTION, SOLUTION INTRAVENOUS; SUBCUTANEOUS at 08:19

## 2023-06-12 RX ADMIN — INSULIN LISPRO 2 UNITS: 100 INJECTION, SOLUTION INTRAVENOUS; SUBCUTANEOUS at 12:21

## 2023-06-12 RX ADMIN — GABAPENTIN 300 MG: 300 CAPSULE ORAL at 21:00

## 2023-06-12 RX ADMIN — TAMSULOSIN HYDROCHLORIDE 0.4 MG: 0.4 CAPSULE ORAL at 08:20

## 2023-06-12 RX ADMIN — GABAPENTIN 300 MG: 300 CAPSULE ORAL at 08:20

## 2023-06-12 RX ADMIN — CLOPIDOGREL BISULFATE 75 MG: 75 TABLET ORAL at 08:19

## 2023-06-12 RX ADMIN — AMITRIPTYLINE HYDROCHLORIDE 50 MG: 50 TABLET, FILM COATED ORAL at 21:00

## 2023-06-12 RX ADMIN — SENNOSIDES AND DOCUSATE SODIUM 2 TABLET: 50; 8.6 TABLET ORAL at 08:20

## 2023-06-12 RX ADMIN — SERTRALINE HYDROCHLORIDE 200 MG: 100 TABLET ORAL at 08:19

## 2023-06-12 RX ADMIN — SENNOSIDES AND DOCUSATE SODIUM 2 TABLET: 50; 8.6 TABLET ORAL at 21:00

## 2023-06-12 RX ADMIN — FUROSEMIDE 20 MG: 20 TABLET ORAL at 08:19

## 2023-06-12 RX ADMIN — DONEPEZIL HYDROCHLORIDE 10 MG: 10 TABLET, FILM COATED ORAL at 21:00

## 2023-06-12 RX ADMIN — LEVOTHYROXINE SODIUM 224 MCG: 0.11 TABLET ORAL at 06:08

## 2023-06-12 NOTE — PLAN OF CARE
Goal Outcome Evaluation:  Plan of Care Reviewed With: patient        Progress: no change  Outcome Evaluation: OT evaluation completed with plan of care established.  Performance barriers include balance, activity tolerance, dizziness, safety and general weakness.  Continued skilled OT services required to maximize independence in ADLs to return home at prior level of function.  Without continued skilled OT services patient is at risk for falls and increased dependency on caregivers.  Patient's main barriers include use dizziness and limited dynamic balance/safety.

## 2023-06-12 NOTE — THERAPY EVALUATION
SNF - Occupational Therapy Initial Evaluation and Treatment Note  SARKIS Dhillon    Patient Name: Cosmo Gauthier  : 1945    MRN: [id number]                              Today's Date: 2023       Admit Date: 2023    Visit Dx:     ICD-10-CM ICD-9-CM   1. Difficulty walking  R26.2 719.7   2. 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)    Stage 3b chronic kidney disease (HCC)    Thrombocytopenia (HCC)    Coronary artery disease involving native coronary artery of native heart without angina pectoris    Hx of CABG    Hyperlipemia, mixed    Degenerative spondylolisthesis    Hypertension, essential    Hereditary and idiopathic neuropathy, unspecified    Low lying conus medullaris    Mood disorder (HCC)    Non-alcoholic cirrhosis (HCC)    Sleep apnea    Spinal stenosis of lumbar region with neurogenic claudication    Vascular dementia without behavioral disturbance    Iron deficiency anemia    Hypothyroidism, adult    Traumatic subarachnoid hemorrhage with loss of consciousness of 30 minutes or less    Morbid (severe) obesity due to excess calories    Claudication of both lower extremities    Dyspnea on exertion    Medicare annual wellness visit, subsequent    Weakness     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     Primary osteoarthritis of  left knee     Sleep apnea     Sleep apnea     Thrombocytopenia 05/22/2018    Thyroid disease      Past Surgical History:   Procedure Laterality Date    CARDIAC SURGERY      COLONOSCOPY  2015    CORONARY ANGIOPLASTY WITH STENT PLACEMENT      CORONARY ARTERY BYPASS GRAFT N/A 05/20/2021    Procedure: MIDLINE STERNOTOMY,CORONARY ARTERY BYPASS GRAFTING X 5, UTILIZING THE LEFT COMPA AND LEFT SAPHENOUS VEIN, ABHIJEET, PRP;  Surgeon: Jr Tom Tubbs MD;  Location: [facility];  Service: Cardiothoracic;  Laterality: N/A;    ENDOSCOPY      HERNIA REPAIR      JOINT REPLACEMENT      SHOULDER SURGERY      SHOULDER REPAIR    TOTAL KNEE ARTHROPLASTY      TRANSESOPHAGEAL ECHOCARDIOGRAM (ABHIJEET) N/A 05/20/2021    Procedure: TRANSESOPHAGEAL ECHOCARDIOGRAM WITH ANESTHESIA;  Surgeon: Jr Tom Tubbs MD;  Location: [facility];  Service: Cardiothoracic;  Laterality: N/A;      General Information       Row Name 06/12/23 1230 06/12/23 1221       OT Time and Intention    Document Type therapy note (daily note)  -GC evaluation  -GC    Mode of Treatment individual therapy;occupational therapy  - individual therapy;occupational therapy  -      Row Name 06/12/23 1221          General Information    Patient Profile Reviewed yes  -     Prior Level of Function independent:;all household mobility;community mobility;ADL's  -     Existing Precautions/Restrictions fall  -GC       Row Name 06/12/23 1221          Occupational Profile    Reason for Services/Referral (Occupational Profile) Patient is a 77-year-old white male admitted to [facility] for general weakness with syncope episodes.  OT referral received to evaluate for ADL concerns and discharged home.  -GC       Row Name 06/12/23 1221          Living Environment    People in Home child(marcelino), adult  -GC       Row Name 06/12/23 1221          Home Main Entrance    Number of Stairs, Main Entrance six  -GC       Row Name 06/12/23 1221          Stairs Within Home, Primary    Number of  Stairs, Within Home, Primary none  -       Row Name 06/12/23 1221          Cognition    Orientation Status (Cognition) oriented to;person;place;situation  -       Row Name 06/12/23 1221          Safety Issues, Functional Mobility    Impairments Affecting Function (Mobility) balance;endurance/activity tolerance;strength;other (see comments)  Dizziness at times with activity  -               User Key  (r) = Recorded By, (t) = Taken By, (c) = Cosigned By      Initials Name Provider Type     Margot Nobles OT Occupational Therapist                     Mobility/ADL's       Row Name 06/12/23 1230 06/12/23 1223       Transfers    Transfers sit-stand transfer;stand-sit transfer;toilet transfer;other (see comments);bed-chair transfer  - sit-stand transfer;stand-sit transfer;toilet transfer;other (see comments);bed-chair transfer  -      Row Name 06/12/23 1230 06/12/23 1223       Bed-Chair Transfer    Bed-Chair [place] (Transfers) standby assist;contact guard  - standby assist;contact guard  -    Assistive Device (Bed-Chair Transfers) walker, 4-wheeled  - walker, 4-wheeled  -      Row Name 06/12/23 1230 06/12/23 1223       Sit-Stand Transfer    Sit-Stand [place] (Transfers) -- standby assist  -    Assistive Device (Sit-Stand Transfers) walker, front-wheeled  - walker, front-wheeled  -      Row Name 06/12/23 1230 06/12/23 1223       Stand-Sit Transfer    Stand-Sit [place] (Transfers) standby assist  - standby assist  -    Assistive Device (Stand-Sit Transfers) walker, front-wheeled  - walker, front-wheeled  -      Row Name 06/12/23 1230 06/12/23 1223       Toilet Transfer    [place] Level (Toilet Transfer) standby assist  - standby assist  -    Assistive Device (Toilet Transfer) raised toilet seat  - raised toilet seat  -      Row Name 06/12/23 1230 06/12/23 1223       Functional Mobility    Functional Mobility- Ind. Level contact guard assist  - contact guard  assist  -    Functional Mobility- Device walker, front-wheeled  - walker, front-wheeled  -    Functional Mobility- Comment -- Patient ambulated to/ from bathroom, therapy gym and around unit with contact-guard assist using a rolling walker  -      Row Name 06/12/23 1223          Activities of Daily Living    BADL Assessment/Intervention bathing;upper body dressing;lower body dressing;grooming;toileting  -       Row Name 06/12/23 1223          Bathing Assessment/Intervention    Assumption Level (Bathing) bathing skills;set up;supervision  -[facility] Name 06/12/23 1223          Upper Body Dressing Assessment/Training    Assumption Level (Upper Body Dressing) upper body dressing skills;standby assist  -       Row Name 06/12/23 1223          Lower Body Dressing Assessment/Training    Assumption Level (Lower Body Dressing) lower body dressing skills;contact guard assist  -       Row Name 06/12/23 1223          Grooming Assessment/Training    Assumption Level (Grooming) grooming skills;set up;standby assist  -[facility] Name 06/12/23 1223          Toileting Assessment/Training    Assumption Level (Toileting) toileting skills;supervision  -               User Key  (r) = Recorded By, (t) = Taken By, (c) = Cosigned By      Initials Name Provider Type     Margot Nobles OT Occupational Therapist                   Obj/Interventions       Row Name 06/12/23 1225          Sensory Assessment (Somatosensory)    Sensory Assessment (Somatosensory) sensation intact  -       Row Name 06/12/23 1225          Vision Assessment/Intervention    Visual Impairment/Limitations WFL  -       Row Name 06/12/23 1225          Strength Comprehensive (MMT)    General Manual Muscle Testing (MMT) Assessment no strength deficits identified  -       Row Name 06/12/23 1230          Elbow/Forearm (Therapeutic Exercise)    Elbow/Forearm (Therapeutic Exercise) strengthening exercise  -     Elbow/Forearm Strengthening  (Therapeutic Exercise) 5 lb free weight;30 repititions;2 sets;bilateral;flexion;extension  -       Row Name 06/12/23 1230 06/12/23 1225       Motor Skills    Motor Skills -- coordination;functional endurance  -    Coordination -- WFL  -    Functional Endurance Endurance training with Omnicycle x20 minutes and NuStep x15 minutes.  - Fair activity tolerance to support ADLs  -    Therapeutic Exercise aerobic;elbow/forearm  - --      Row Name 06/12/23 1225          Balance    Dynamic Standing Balance contact guard  -               User Key  (r) = Recorded By, (t) = Taken By, (c) = Cosigned By      Initials Name Provider Type     Giuliano, Margot, OT Occupational Therapist                   Goals/Plan       Row Name 06/12/23 1227          Transfer Goal 1 (OT)    Activity/Assistive Device (Transfer Goal 1, OT) transfers, all  -GC     [place] Level/Cues Needed (Transfer Goal 1, OT) modified independence  -     Time Frame (Transfer Goal 1, OT) long term goal (LTG);30 days  -       Row Name 06/12/23 1227          Bathing Goal 1 (OT)    Activity/Device (Bathing Goal 1, OT) bathing skills, all  -GC     [place] Level/Cues Needed (Bathing Goal 1, OT) modified independence  -     Time Frame (Bathing Goal 1, OT) long term goal (LTG);30 days  -       Row Name 06/12/23 1227          Dressing Goal 1 (OT)    Activity/Device (Dressing Goal 1, OT) dressing skills, all  -GC     [place]/Cues Needed (Dressing Goal 1, OT) modified independence  -     Time Frame (Dressing Goal 1, OT) long term goal (LTG)  -       Row Name 06/12/23 1227          Toileting Goal 1 (OT)    Activity/Device (Toileting Goal 1, OT) toileting skills, all  -     [place] Level/Cues Needed (Toileting Goal 1, OT) modified independence  -     Time Frame (Toileting Goal 1, OT) long term goal (LTG);30 days  -       Row Name 06/12/23 1227          Grooming Goal 1 (OT)    Activity/Device (Grooming Goal 1, OT) grooming skills,  all  -GC     [place] (Grooming Goal 1, OT) modified independence  -     Time Frame (Grooming Goal 1, OT) long term goal (LTG);30 days  -       Row Name 06/12/23 1227          Problem Specific Goal 1 (OT)    Problem Specific Goal 1 (OT) Patient will demonstrate good graded dynamic standing balance with ADL engagement in preperation for independent ADL routine completion at time of discharge  -     Time Frame (Problem Specific Goal 1, OT) long term goal (LTG);30 days  -       Row Name 06/12/23 1227          Therapy Assessment/Plan (OT)    Planned Therapy Interventions (OT) activity tolerance training;adaptive equipment training;BADL retraining;functional balance retraining;IADL retraining;patient/caregiver education/training;ROM/therapeutic exercise;strengthening exercise;transfer/mobility retraining  -               User Key  (r) = Recorded By, (t) = Taken By, (c) = Cosigned By      Initials Name Provider Type     Margot Nobles, OT Occupational Therapist                   Clinical Impression       Row Name 06/12/23 1225          Pain Scale: FACES Pre/Post-Treatment    Pain: FACES Scale, Pretreatment 0-->no hurt  -     Posttreatment Pain Rating 0-->no hurt  -       Row Name 06/12/23 1225          Plan of Care Review    Plan of Care Reviewed With patient  -     Progress no change  -     Outcome Evaluation OT evaluation completed with plan of care established.  Performance barriers include balance, activity tolerance, dizziness, safety and general weakness.  Continued skilled OT services required to maximize independence in ADLs to return home at prior level of function.  Without continued skilled OT services patient is at risk for falls and increased dependency on caregivers.  Patient's main barriers include use dizziness and limited dynamic balance/safety.  -       Row Name 06/12/23 1225          Therapy Assessment/Plan (OT)    Rehab Potential (OT) good, to achieve stated therapy goals  -      Criteria for Skilled Therapeutic Interventions Met (OT) yes;meets criteria;skilled treatment is necessary  -     Therapy Frequency (OT) 5 times/wk  -     Predicted Duration of Therapy Intervention (OT) X30 days  -       Row Name 06/12/23 1225          Therapy Plan Review/Discharge Plan (OT)    Anticipated Discharge Disposition (OT) home with outpatient therapy services  -               User Key  (r) = Recorded By, (t) = Taken By, (c) = Cosigned By      Initials Name Provider Type     Margot Nobles OT Occupational Therapist                   Outcome Measures       Row Name 06/12/23 1228          How much help from another is currently needed...    Putting on and taking off regular lower body clothing? 3  -GC     Bathing (including washing, rinsing, and drying) 3  -GC     Toileting (which includes using toilet bed pan or urinal) 3  -GC     Putting on and taking off regular upper body clothing 4  -GC     Taking care of personal grooming (such as brushing teeth) 4  -GC     Eating meals 4  -GC     AM-PAC 6 Clicks Score (OT) 21  -GC       Row Name 06/12/23 1000 06/12/23 0800       How much help from another person do you currently need...    Turning from your back to your side while in flat bed without using bedrails? 4  - 4  -CS    Moving from lying on back to sitting on the side of a flat bed without bedrails? 4  - 4  -CS    Moving to and from a bed to a chair (including a wheelchair)? 3  - 3  -CS    Standing up from a chair using your arms (e.g., wheelchair, bedside chair)? 4  - 4  -CS    Climbing 3-5 steps with a railing? 3  - 3  -CS    To walk in hospital room? 3  -FH 3  -CS    AM-PAC 6 Clicks Score (PT) 21  - 21  -CS    Highest level of mobility 6 --> Walked 10 steps or more  -FH 6 --> Walked 10 steps or more  -CS      Row Name 06/12/23 0800          Functional Assessment    Outcome Measure Options AM-PAC 6 Clicks Basic Mobility (PT)  -CS       Row Name 06/12/23 1228          Optimal  Instrument    Optimal Instrument Optimal - 3  -GC     Bending/Stooping 2  -GC     Standing 2  -GC     Reaching 1  -GC               User Key  (r) = Recorded By, (t) = Taken By, (c) = Cosigned By      Initials Name Provider Type    Lisa Flores, RN Registered Nurse    Margot Almendarez OT Occupational Therapist    Jamie Naik, WILBUR Physical Therapist Assistant                  Section G  Mobility  Dressing - self performance: limited assistance (staff provide guided maneuvering of limbs or other non-weight bearing assistance)  Dressing support/assistance: One person assist  Eating - self performance: independent  Eating support/assistance: No setup or physical help  Toileting - self performance: limited assistance (staff provide guided maneuvering of limbs or other non-weight bearing assistance)  Toileting support/assistance: One person assist  Personal hygiene - self performance: supervision (oversight, encourage)  Personal hygiene support/assistance: One person assist  Bathing  Bathing - self performance: Supervision  Bathing support/assistance: One person assist     Range of Motion  Upper Extremity: No impairment  Section GG  SectionGG: Functional Ability/Goals, Adm  Self Care, Prior Functioning (NN9378T): 3. Independent  Functional Cognition, Prior Functioning (YW1335W): 3. Independent  Self Care, Admission (Section GG)  Eating: Self-Care Admission Performance (QD6754B5): independent (06)  Oral Hygiene: Self-Care Admission Performance (PU4846P4): setup or clean-up assistance (05)  Toileting Hygiene: Self-Care Admission Performance (DH9628I5): supervision or touching assistance (04)  Shower/Bathe Self: Self-Care Admission Performance (YC1437O2): supervision or touching assistance (04)  Upper Body Dressing: Self-Care Admission Performance (GL4406A0): supervision or touching assistance (04)  Lower Body Dressing: Self-Care Admission Performance (WZ6105R3): partial/moderate assistance (03)  Putting  On/Taking Off Footwear: Self-Care Admission Performance (FA8840N6): partial/moderate assistance (03)  Mobility, Admission Performance (ME5163)  Toilet Transfer: Mobility Admission Performance (WB4650F7): supervision or touching assistance (04)  Section GG: Functional Ability/Goals, DC  Eating: Self-Care Discharge Goal (NM6764C4): independent (06)  Oral Hygiene: Self-Care Discharge Goal (TM3992W8): independent (06)  Toileting Hygiene: Self-Care Discharge Goal (VQ9149A5): independent (06)  Shower/Bathe Self: Self-Care Discharge Goal (QA1831K1): independent (06)  Upper Body Dressing: Self-Care Discharge Goal (NL8658C9): independent (06)  Lower Body Dressing: Self-Care Discharge Goal (HH4835W3): independent (06)  Putting On/Taking Off Footwear: Self-Care Discharge Goal (PD3548E2): independent (06)  Mobility (GG), Discharge Goal (VG5246)  Toilet Transfer: Mobility Discharge Goal (QW2821M0): independent (06)          Occupational Therapy Education       Title: PT OT SLP Therapies (In Progress)       Topic: Occupational Therapy (Done)       Point: ADL training (Done)       Description:   Instruct learner(s) on proper safety adaptation and remediation techniques during self care or transfers.   Instruct in proper use of assistive devices.                  Learning Progress Summary             Patient Acceptance, E,D, VU,NR by  at 6/12/2023 1228                         Point: Home exercise program (Done)       Description:   Instruct learner(s) on appropriate technique for monitoring, assisting and/or progressing therapeutic exercises/activities.                  Learning Progress Summary             Patient Acceptance, E,D, VU,NR by  at 6/12/2023 1228                         Point: Precautions (Done)       Description:   Instruct learner(s) on prescribed precautions during self-care and functional transfers.                  Learning Progress Summary             Patient Acceptance, E,D, VU,NR by  at 6/12/2023 1228                          Point: Body mechanics (Done)       Description:   Instruct learner(s) on proper positioning and spine alignment during self-care, functional mobility activities and/or exercises.                  Learning Progress Summary             Patient Acceptance, E,D, VU,NR by  at 6/12/2023 1228                                         User Key       Initials Effective Dates Name Provider Type Discipline     06/16/21 -  Margot Nobles OT Occupational Therapist OT                  OT Recommendation and Plan  Planned Therapy Interventions (OT): activity tolerance training, adaptive equipment training, BADL retraining, functional balance retraining, IADL retraining, patient/caregiver education/training, ROM/therapeutic exercise, strengthening exercise, transfer/mobility retraining  Therapy Frequency (OT): 5 times/wk  Plan of Care Review  Plan of Care Reviewed With: patient  Progress: no change  Outcome Evaluation: OT evaluation completed with plan of care established.  Performance barriers include balance, activity tolerance, dizziness, safety and general weakness.  Continued skilled OT services required to maximize independence in ADLs to return home at prior level of function.  Without continued skilled OT services patient is at risk for falls and increased dependency on caregivers.  Patient's main barriers include use dizziness and limited dynamic balance/safety.     Time Calculation:    Time Calculation- OT       Row Name 06/12/23 1231 06/12/23 0825          Time Calculation- OT    OT Received On 06/12/23  -GC --     OT Goal Re-Cert Due Date 07/12/23  -GC --        Timed Charges    76979 - OT Therapeutic Exercise Minutes 38  -GC --     29451 - Gait Training Minutes  -- 12  -CS     28238 - OT Therapeutic Activity Minutes 10  -GC --     92842 - OT Self Care/Mgmt Minutes 15  -GC --        Untimed Charges    OT Eval/Re-eval Minutes 35  -GC --        SNF Occupational Therapy Minutes    Skilled Minutes- OT  63 min  -GC --        Total Minutes    Timed Charges Total Minutes 63  -GC 12  -CS     Untimed Charges Total Minutes 35  -GC --      Total Minutes 98  -GC 12  -CS               User Key  (r) = Recorded By, (t) = Taken By, (c) = Cosigned By      Initials Name Provider Type    GC Margot Nobles OT Occupational Therapist    Jamie Naik PTA Physical Therapist Assistant                  Therapy Charges for Today       Code Description Service Date Service Provider Modifiers Qty    52060076452 HC OT THER PROC EA 15 MIN 6/12/2023 Margot Nobles OT GO 2    37720171036 HC OT THERAPEUTIC ACT EA 15 MIN 6/12/2023 Margot Nobles, OT GO 1    66364609299 HC OT SELF CARE/MGMT/TRAIN EA 15 MIN 6/12/2023 Margot Nobles OT GO 1    [id number]  OT EVAL MOD COMPLEXITY 3 6/12/2023 Margot Nobles, OT GO 1                 Margot Nobles OT  6/12/2023

## 2023-06-12 NOTE — PLAN OF CARE
Goal Outcome Evaluation:  Plan of Care Reviewed With: patient        Progress: improving  Outcome Evaluation: Vital signs stable. Ambulates to bathroom with rolling walker and one person assistance. Alert and oriented. Denied pain. Repositioned self during night. Currently resting quietly on c-pap with no s/s of distress.

## 2023-06-12 NOTE — THERAPY TREATMENT NOTE
SNF - Physical Therapy Progress Note   [place]     Patient Name: Cosmo Gauthier  : 1945  MRN: [id number]  Today's Date: 2023      Visit Dx:    ICD-10-CM ICD-9-CM   1. 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)   • Stage 3b chronic kidney disease (HCC)   • Thrombocytopenia (HCC)   • Coronary artery disease involving native coronary artery of native heart without angina pectoris   • Hx of CABG   • Hyperlipemia, mixed   • Degenerative spondylolisthesis   • Hypertension, essential   • Hereditary and idiopathic neuropathy, unspecified   • Low lying conus medullaris   • Mood disorder (HCC)   • Non-alcoholic cirrhosis (HCC)   • Sleep apnea   • Spinal stenosis of lumbar region with neurogenic claudication   • Vascular dementia without behavioral disturbance   • Iron deficiency anemia   • Hypothyroidism, adult   • Traumatic subarachnoid hemorrhage with loss of consciousness of 30 minutes or less   • Morbid (severe) obesity due to excess calories   • Claudication of both lower extremities   • Dyspnea on exertion   • Medicare annual wellness visit, subsequent   • Weakness     Past Medical History:   Diagnosis Date   • Anxiety and depression    • Arthritis    • Chronic back pain    • Cirrhosis    • Coronary artery disease    • Dementia    • Depression    • Diabetes mellitus    • Disease of thyroid gland    • Elevated cholesterol    • Family history of colon cancer    • Fatty liver    • Gastric ulcer    • GERD (gastroesophageal reflux disease)    • Heart disease    • High cholesterol    • Hypertension    • Hyperthyroidism    • Knee pain    • Lymphoma     History of lymphoma, has been in remission   • Memory change 10/18/2017   • 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    • Primary osteoarthritis of left knee    • Sleep apnea    • Sleep apnea    • Thrombocytopenia 2018   • Thyroid disease       Past Surgical History:   Procedure Laterality Date   • CARDIAC SURGERY     • COLONOSCOPY  2015   • CORONARY ANGIOPLASTY WITH STENT PLACEMENT     • CORONARY ARTERY BYPASS GRAFT N/A 05/20/2021    Procedure: MIDLINE STERNOTOMY,CORONARY ARTERY BYPASS GRAFTING X 5, UTILIZING THE LEFT COMPA AND LEFT SAPHENOUS VEIN, ABHIJEET, PRP;  Surgeon: Jr Tom Tubbs MD;  Location: [facility];  Service: Cardiothoracic;  Laterality: N/A;   • ENDOSCOPY     • HERNIA REPAIR     • JOINT REPLACEMENT     • SHOULDER SURGERY      SHOULDER REPAIR   • TOTAL KNEE ARTHROPLASTY     • TRANSESOPHAGEAL ECHOCARDIOGRAM (ABHIJEET) N/A 05/20/2021    Procedure: TRANSESOPHAGEAL ECHOCARDIOGRAM WITH ANESTHESIA;  Surgeon: Jr Tom Tubbs MD;  Location: [facility];  Service: Cardiothoracic;  Laterality: N/A;       PT Assessment (last 12 hours)     PT Evaluation and Treatment     Row Name 06/12/23 0800          Physical Therapy Time and Intention    Subjective Information no complaints  -CS     Document Type therapy note (daily note)  -CS     Mode of Treatment individual therapy;physical therapy  -CS     Patient Effort good  -CS     Symptoms Noted During/After Treatment fatigue  -CS     Row Name 06/12/23 0800          Bed Mobility    Supine-Sit Dunn (Bed Mobility) standby assist  -CS     Assistive Device (Bed Mobility) bed rails  -CS     Row Name 06/12/23 0800          Sit-Stand Transfer    Sit-Stand Dunn (Transfers) standby assist  -CS     Assistive Device (Sit-Stand Transfers) walker, front-wheeled  -CS     Row Name 06/12/23 0800          Stand-Sit Transfer    Stand-Sit Dunn (Transfers) standby assist  -CS     Assistive Device (Stand-Sit Transfers) walker, front-wheeled  -CS     Row Name 06/12/23 0800          Gait/Stairs (Locomotion)    Dunn Level (Gait) verbal cues;standby assist  -CS     Assistive Device (Gait) walker, front-wheeled  -CS     Distance in Feet (Gait) 450  -CS     Pattern (Gait) 4-point;step-through   -CS     Deviations/Abnormal Patterns (Gait) gait speed decreased;stride length decreased  -CS     Bilateral Gait Deviations heel strike decreased  -CS     Row Name 06/12/23 0800          Motor Skills    Therapeutic Exercise hip;knee;ankle  -CS     Row Name 06/12/23 0800          Hip (Therapeutic Exercise)    Hip (Therapeutic Exercise) strengthening exercise  -CS     Hip Strengthening (Therapeutic Exercise) bilateral;sitting;2 lb free weight;resistance band;green;30 repititions  marches, hip abd/add  -CS     Row Name 06/12/23 0800          Knee (Therapeutic Exercise)    Knee (Therapeutic Exercise) strengthening exercise  -CS     Knee Strengthening (Therapeutic Exercise) bilateral;LAQ (long arc quad);sitting;2 lb free weight;30 repititions  -CS     Row Name 06/12/23 0800          Ankle (Therapeutic Exercise)    Ankle (Therapeutic Exercise) strengthening exercise  -CS     Ankle Strengthening (Therapeutic Exercise) bilateral;dorsiflexion;plantarflexion;sitting;2 lb free weight;30 repititions  -CS     Row Name 06/12/23 0800          Positioning and Restraints    Pre-Treatment Position in bed  -CS     Post Treatment Position chair  -CS     In Chair sitting;call light within reach;encouraged to call for assist  -CS     Row Name 06/12/23 0800          Progress Summary (PT)    Progress Toward Functional Goals (PT) progress toward functional goals is good  -CS           User Key  (r) = Recorded By, (t) = Taken By, (c) = Cosigned By    Initials Name Provider Type    Jamie Naik PTA Physical Therapist Assistant              Section G              Section GG                       Physical Therapy Education     Title: PT OT SLP Therapies (In Progress)     Topic: Physical Therapy (In Progress)     Point: Mobility training (In Progress)     Learning Progress Summary           Patient Acceptance, E, NR by CS at 6/10/2023 7382                   Point: Home exercise program (Not Started)     Learner Progress:  Not documented  in this visit.          Point: Body mechanics (Not Started)     Learner Progress:  Not documented in this visit.          Point: Precautions (In Progress)     Learning Progress Summary           Patient Acceptance, E, NR by  at 6/10/2023 1409                               User Key     Initials Effective Dates Name Provider Type Discipline     04/25/21 -  Alisha Clark, PT Physical Therapist PT              PT Recommendation and Plan     Progress Summary (PT)  Progress Toward Functional Goals (PT): progress toward functional goals is good   Outcome Measures     Row Name 06/12/23 0800 06/10/23 1300          How much help from another person do you currently need...    Turning from your back to your side while in flat bed without using bedrails? 4  -CS 4  -CSA     Moving from lying on back to sitting on the side of a flat bed without bedrails? 4  -CS 4  -CSA     Moving to and from a bed to a chair (including a wheelchair)? 3  -CS 3  -CSA     Standing up from a chair using your arms (e.g., wheelchair, bedside chair)? 4  -CS 3  -CSA     Climbing 3-5 steps with a railing? 3  -CS 3  -CSA     To walk in hospital room? 3  -CS 3  -CSA     AM-PAC 6 Clicks Score (PT) 21  -CS 20  -CSA        Functional Assessment    Outcome Measure Options AM-PAC 6 Clicks Basic Mobility (PT)  -CS AM-PAC 6 Clicks Basic Mobility (PT)  -CSA           User Key  (r) = Recorded By, (t) = Taken By, (c) = Cosigned By    Initials Name Provider Type    CSA Alisha Clark, PT Physical Therapist    Jamie Naik PTA Physical Therapist Assistant                 Time Calculation:    PT Charges     Row Name 06/12/23 0825             Time Calculation    Start Time 0615  -      PT Received On 06/12/23  -         Timed Charges    49521 - PT Therapeutic Exercise Minutes 10  -CS      92577 - Gait Training Minutes  12  -CS      25523 - PT Therapeutic Activity Minutes 3  -CS         SNF Physical Therapy Minutes    Skilled Minutes- PT 25 min  -          Total Minutes    Timed Charges Total Minutes 25  -CS       Total Minutes 25  -CS            User Key  (r) = Recorded By, (t) = Taken By, (c) = Cosigned By    Initials Name Provider Type    CS Jamie Hernandez PTA Physical Therapist Assistant              Therapy Charges for Today     Code Description Service Date Service Provider Modifiers Qty    53236291681 HC PT THER PROC EA 15 MIN 6/12/2023 Jamie Hernandez PTA GP 1    17625176415 HC GAIT TRAINING EA 15 MIN 6/12/2023 Jamie Hernandez PTA GP 1          PT G-Codes  Outcome Measure Options: AM-PAC 6 Clicks Basic Mobility (PT)  AM-PAC 6 Clicks Score (PT): 21    Jamie Hernandez PTA  6/12/2023

## 2023-06-12 NOTE — PROGRESS NOTES
[facility]     Progress Note    Patient Name: Cosmo Gauthier  : 1945  MRN: [id number]  Primary Care Physician:  Alex Buckley MD  Date of admission: 2023    Subjective   Subjective     Chief Complaint: Stable and doing well blood pressures have improved since Corgard were stopped and patient is not getting any dizzy spells physical therapy in progress    HPI: Zickel therapy in progress backache is improved    Review of Systems   All systems were reviewed and negative except for: Reviewed    Objective   Objective     Vitals:   Temp:  [97.2 °F (36.2 °C)-97.7 °F (36.5 °C)] 97.2 °F (36.2 °C)  Heart Rate:  [78-86] 86  Resp:  [16-18] 18  BP: ()/(61-69) 92/61    Physical Exam    Constitutional: Awake, alert   Eyes: PERRLA, sclerae anicteric, no conjunctival injection   HENT: NCAT, mucous membranes moist   Neck: Supple, no thyromegaly, no lymphadenopathy, trachea midline   Respiratory: Clear to auscultation bilaterally, nonlabored respirations    Cardiovascular: RRR, no murmurs, rubs, or gallops, palpable pedal pulses bilaterally   Gastrointestinal: Positive bowel sounds, soft, nontender, nondistended   Musculoskeletal: No bilateral ankle edema, no clubbing or cyanosis to extremities   Psychiatric: Appropriate affect, cooperative   Neurologic: Oriented x 3, strength symmetric in all extremities, Cranial Nerves grossly intact to confrontation, speech clear   Skin: No rashes   No change  Result Review    Result Review:  I have personally reviewed the results from the time of this admission to 2023 08:02 EDT and agree with these findings:  [x]  Laboratory cytopenia  []  Microbiology  []  Radiology  []  EKG/Telemetry   []  Cardiology/Vascular   []  Pathology  []  Old records  []  Other:  Most notable findings include: Thrombocytopenia    Assessment & Plan   Assessment / Plan     Brief Patient Summary:  Cosmo Gauthier is a 77 y.o. male who patient stable with cirrhosis of liver and  lymphoma    Active Hospital Problems:  There are no active hospital problems to display for this patient.      Plan:   Continue physical therapy    DVT prophylaxis:  No DVT prophylaxis order currently exists.    CODE STATUS:   Code Status (Patient has no pulse and is not breathing): CPR (Attempt to Resuscitate)  Medical Interventions (Patient has pulse or is breathing): Full Support    Disposition:  I expect patient to be discharged after the patient has been stabilized.    Electronically signed by Seven Saldana MD, 06/12/23, 8:02 AM EDT.      Part of this note may be an electronic transcription/translation of spoken language to printed text using the Dragon Dictation System.

## 2023-06-12 NOTE — CONSULTS
"Nutrition Services    Patient Name: Cosmo Gauthier  YOB: 1945  MRN: [id number]  Admission date: 6/9/2023      CLINICAL NUTRITION ASSESSMENT      Reason for Assessment  SNF Admission assessment/Nutrition Consult     H&P:    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     Primary osteoarthritis of left knee     Sleep apnea     Sleep apnea     Thrombocytopenia 05/22/2018    Thyroid disease         Current Problems:   There are no active hospital problems to display for this patient.       Nutrition/Diet History         Narrative     77 Year Old Male admitted for Skilled Therapy. Recent hospitalization 06/04/23- 06/09/23 related to weakness.    MST score 1.     Pt feeds self 100% of Consistent Carbohydrate Diet, Regular Texture with thin liquids. Pt reports he has his own natural teeth with no mouth pain, cavities or problems chewing or swallowing.    7 # decrease (2%) in 7 days. 2  increase in greater than 4 months.   BMI 35, obese.   Pt is 146% of     Nutrition Acuity Risk Score: 6 - Low Risk for Decline.    No acute nutrition concerns or interventions at this time.     RD will continue to follow and monitor per protocol.       Anthropometrics        Current Height, Weight Height: 182.9 cm (72.01\")  Weight: 113 kg (250 lb 3.6 oz)   Current BMI Body mass index is 33.93 kg/m².       Weight Hx  Wt Readings from Last 30 Encounters:   06/11/23 0500 113 kg " (250 lb 3.6 oz)   06/10/23 0500 114 kg (251 lb 8.7 oz)   06/09/23 1108 114 kg (251 lb 8.7 oz)   06/04/23 2253 118 kg (260 lb 12.9 oz)   06/04/23 1600 117 kg (257 lb)   04/25/23 1352 117 kg (257 lb 6.4 oz)   04/11/23 1139 118 kg (261 lb)   01/18/23 1008 117 kg (258 lb 3.2 oz)   10/17/22 1302 115 kg (253 lb 6.4 oz)   09/13/22 1129 113 kg (250 lb)   07/20/22 1159 116 kg (256 lb 6.4 oz)   07/06/22 1155 113 kg (249 lb 3.2 oz)   07/02/22 2040 113 kg (249 lb 12.5 oz)   07/02/22 1946 111 kg (245 lb)   04/20/22 1321 117 kg (257 lb 6.4 oz)   03/26/22 1109 117 kg (257 lb 6.4 oz)   02/25/22 1319 113 kg (250 lb)   01/25/22 1316 112 kg (246 lb 3.2 oz)   01/18/22 1320 113 kg (249 lb 12.8 oz)   11/02/21 1027 116 kg (255 lb 6.4 oz)   09/28/21 1303 112 kg (247 lb)   09/02/21 0937 112 kg (247 lb)   08/02/21 1342 112 kg (247 lb 3.2 oz)   07/29/21 0951 112 kg (246 lb 11.1 oz)   06/22/21 1256 109 kg (240 lb)   06/15/21 1315 109 kg (240 lb)   05/31/21 0700 111 kg (245 lb 12.8 oz)   05/30/21 0600 112 kg (247 lb 12.8 oz)   05/29/21 0712 114 kg (251 lb 11.2 oz)   05/27/21 0725 116 kg (256 lb 3.2 oz)   05/26/21 0435 120 kg (263 lb 11.2 oz)   05/25/21 0600 119 kg (263 lb 4.8 oz)   05/24/21 0654 120 kg (265 lb 4.8 oz)   05/23/21 0510 119 kg (262 lb 6.4 oz)   05/22/21 0710 116 kg (255 lb 11.2 oz)   05/21/21 0700 125 kg (275 lb 5.7 oz)   05/19/21 2100 116 kg (255 lb 14.4 oz)   05/18/21 1521 113 kg (250 lb)   05/18/21 1058 113 kg (250 lb)   03/31/21 1044 111 kg (245 lb)   03/11/21 0000 118 kg (261 lb)   02/17/21 0614 111 kg (244 lb 1.6 oz)   02/16/21 1600 66 kg (145 lb 8.1 oz)   02/15/21 2100 66.2 kg (145 lb 14.4 oz)   02/15/21 1404 115 kg (252 lb 8 oz)   02/15/21 1024 118 kg (260 lb)   10/06/20 0000 118 kg (261 lb)            Wt Change Observation 2# increase in over 4 months     Estimated/Assessed Needs       Energy Requirements 25-30 kcal/kg adjusted body wt 87.84 kg   EST Needs (kcal/day) 7335-8476       Protein Requirements 1 g/kg adjusted  body wt   EST Daily Needs (g/day) 88       Fluid Requirements 1 ml/kcal     Estimated Needs (mL/day) 6652-6600     Labs/Medications         Pertinent Labs Reviewed.   Results from last 7 days   Lab Units 06/08/23  0428 06/06/23  0419 06/05/23  1020   SODIUM mmol/L 137 139 137   POTASSIUM mmol/L 4.3 4.5 5.0   CHLORIDE mmol/L 102 103 104   CO2 mmol/L 25.6 25.4 24.3   BUN mg/dL 28* 23 20   CREATININE mg/dL 1.36* 1.45* 1.21   CALCIUM mg/dL 9.3 9.6 9.3   BILIRUBIN mg/dL 0.5 0.3 0.5   ALK PHOS U/L 86 86 82   ALT (SGPT) U/L 16 15 15   AST (SGOT) U/L 14 14 16   GLUCOSE mg/dL 148* 122* 217*       Results from last 7 days   Lab Units 06/08/23  0428   HEMOGLOBIN g/dL 12.6*   HEMATOCRIT % 36.8*       COVID19   Date Value Ref Range Status   07/03/2022 Not Detected Not Detected - Ref. Range Final     Lab Results   Component Value Date    HGBA1C 6.70 (H) 04/20/2023         Pertinent Medications Reviewed.     Current Nutrition Orders & Evaluation of Intake       Oral Nutrition     Current PO Diet Diet: Diabetic Diets; Consistent Carbohydrate; Texture: Regular Texture (IDDSI 7); Fluid Consistency: Thin (IDDSI 0)   Supplement No active supplement orders       Malnutrition Severity Assessment                Nutrition Diagnosis         Nutrition Dx Problem 1 No nutrition diagnosis at this time.       Nutrition Intervention         No intervention indicated.      Medical Nutrition Therapy/Nutrition Education          Learner     Readiness N/A  N/A     Method     Response N/A  N/A     Monitor/Evaluation        Monitor Per protocol.       Nutrition Discharge Plan         No nutrition needs identified at this time.       Electronically signed by:  Pooja García RD  06/12/23 08:51 EDT

## 2023-06-13 LAB
B T CELL RESULT: NORMAL
BILIRUB UR QL STRIP: NEGATIVE
CCV RESULT: NORMAL
CLARITY UR: CLEAR
COLOR UR: YELLOW
GLUCOSE BLDC GLUCOMTR-MCNC: 176 MG/DL (ref 70–99)
GLUCOSE BLDC GLUCOMTR-MCNC: 178 MG/DL (ref 70–99)
GLUCOSE BLDC GLUCOMTR-MCNC: 207 MG/DL (ref 70–99)
GLUCOSE BLDC GLUCOMTR-MCNC: 226 MG/DL (ref 70–99)
GLUCOSE UR STRIP-MCNC: ABNORMAL MG/DL
HGB UR QL STRIP.AUTO: NEGATIVE
KETONES UR QL STRIP: NEGATIVE
LEUKOCYTE ESTERASE UR QL STRIP.AUTO: NEGATIVE
METHYLMALONATE SERPL-SCNC: 256 NMOL/L (ref 0–378)
NITRITE UR QL STRIP: NEGATIVE
PH UR STRIP.AUTO: <=5 [PH] (ref 5–8)
PROT UR QL STRIP: NEGATIVE
SP GR UR STRIP: 1.01 (ref 1–1.03)
UROBILINOGEN UR QL STRIP: ABNORMAL

## 2023-06-13 NOTE — PROGRESS NOTES
[facility]     Progress Note    Patient Name: Cosmo Gauthier  : 1945  MRN: [id number]  Primary Care Physician:  Alex Buckley MD  Date of admission: 2023    Subjective   Subjective     Chief Complaint: Patient making good progress pressures have stabilized does have postural hypotension but is being benefited by physical therapy    HPI: Continue with physical therapy patient with neuropathy postural hypotension    Review of Systems   All systems were reviewed and negative except for: Reviewed    Objective   Objective     Vitals:   Temp:  [97.7 °F (36.5 °C)] 97.7 °F (36.5 °C)  Heart Rate:  [73] 73  Resp:  [18] 18  BP: (138)/(77) 138/77    Physical Exam    Constitutional: Awake, alert   Eyes: PERRLA, sclerae anicteric, no conjunctival injection   HENT: NCAT, mucous membranes moist   Neck: Supple, no thyromegaly, no lymphadenopathy, trachea midline   Respiratory: Clear to auscultation bilaterally, nonlabored respirations    Cardiovascular: RRR, no murmurs, rubs, or gallops, palpable pedal pulses bilaterally   Gastrointestinal: Positive bowel sounds, soft, nontender, nondistended   Musculoskeletal: No bilateral ankle edema, no clubbing or cyanosis to extremities   Psychiatric: Appropriate affect, cooperative   Neurologic: Oriented x 3, strength symmetric in all extremities, Cranial Nerves grossly intact to confrontation, speech clear   Skin: No rashes   No change  Result Review    Result Review:  I have personally reviewed the results from the time of this admission to 2023 07:28 EDT and agree with these findings:  [x]  Laboratory thrombocytopenia  []  Microbiology  []  Radiology  []  EKG/Telemetry   []  Cardiology/Vascular   []  Pathology  []  Old records  []  Other:  Most notable findings include: Thrombocytopenia    Assessment & Plan   Assessment / Plan     Brief Patient Summary:  Cosmo Gauthier is a 77 y.o. male who stable doing better neuropathy and postural hypotension diabetic cirrhosis  of liver    Active Hospital Problems:  There are no active hospital problems to display for this patient.      Plan:   Continue physical therapy    DVT prophylaxis:  No DVT prophylaxis order currently exists.    CODE STATUS:   Code Status (Patient has no pulse and is not breathing): CPR (Attempt to Resuscitate)  Medical Interventions (Patient has pulse or is breathing): Full Support    Disposition:  I expect patient to be discharged after the patient has been stabilized.    Electronically signed by Seven Saldana MD, 06/13/23, 7:28 AM EDT.      Part of this note may be an electronic transcription/translation of spoken language to printed text using the Dragon Dictation System.

## 2023-06-13 NOTE — PLAN OF CARE
Goal Outcome Evaluation:  Plan of Care Reviewed With: patient        Progress: improving  Outcome Evaluation: Resident alert, oriented, able to make needs known to staff. Transfers with assist of one to BR. Bloodglucose elevated for all 3 meals, covered with sliding scale. Participated in therapy as ordered, tolerated well. resident pleasant and cooperative.

## 2023-06-13 NOTE — PLAN OF CARE
Goal Outcome Evaluation:  Plan of Care Reviewed With: patient        Progress: improving  Outcome Evaluation: Resident alert, oriented, able to make needs known to staff. Transfers with assist of one and walker to bathroom. Bloodglucose elevated for all 3 meals, covered with sliding scale. Medicated for prn constipation x1, awaiting results. resident participated in therapy as ordered. U/A obtained as per order. Resident pleasant and cooperative.

## 2023-06-13 NOTE — PLAN OF CARE
Goal Outcome Evaluation:           Progress: improving  Outcome Evaluation: Resident is alert & oriented times 4; calm, cooperative, and pleasant with team members; transfers with assist of one to BRM using walker. LBM noted on 6/10/23. Blood glucose monitored as ordered. Will continue with POC.

## 2023-06-13 NOTE — THERAPY TREATMENT NOTE
SNF - Physical Therapy Treatment Note  SARKIS Dhillon     Patient Name: Cosmo Gauthier  : 1945  MRN: [id number]  Today's Date: 2023      Visit Dx:    ICD-10-CM ICD-9-CM   1. Difficulty walking  R26.2 719.7   2. 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)    Stage 3b chronic kidney disease (HCC)    Thrombocytopenia (HCC)    Coronary artery disease involving native coronary artery of native heart without angina pectoris    Hx of CABG    Hyperlipemia, mixed    Degenerative spondylolisthesis    Hypertension, essential    Hereditary and idiopathic neuropathy, unspecified    Low lying conus medullaris    Mood disorder (HCC)    Non-alcoholic cirrhosis (HCC)    Sleep apnea    Spinal stenosis of lumbar region with neurogenic claudication    Vascular dementia without behavioral disturbance    Iron deficiency anemia    Hypothyroidism, adult    Traumatic subarachnoid hemorrhage with loss of consciousness of 30 minutes or less    Morbid (severe) obesity due to excess calories    Claudication of both lower extremities    Dyspnea on exertion    Medicare annual wellness visit, subsequent    Weakness     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     Primary osteoarthritis of left knee     Sleep apnea     Sleep apnea     Thrombocytopenia 2018    Thyroid  disease      Past Surgical History:   Procedure Laterality Date    CARDIAC SURGERY      COLONOSCOPY  2015    CORONARY ANGIOPLASTY WITH STENT PLACEMENT      CORONARY ARTERY BYPASS GRAFT N/A 05/20/2021    Procedure: MIDLINE STERNOTOMY,CORONARY ARTERY BYPASS GRAFTING X 5, UTILIZING THE LEFT COMPA AND LEFT SAPHENOUS VEIN, ABHIJEET, PRP;  Surgeon: Jr Tom Tubbs MD;  Location: [facility];  Service: Cardiothoracic;  Laterality: N/A;    ENDOSCOPY      HERNIA REPAIR      JOINT REPLACEMENT      SHOULDER SURGERY      SHOULDER REPAIR    TOTAL KNEE ARTHROPLASTY      TRANSESOPHAGEAL ECHOCARDIOGRAM (ABHIJEET) N/A 05/20/2021    Procedure: TRANSESOPHAGEAL ECHOCARDIOGRAM WITH ANESTHESIA;  Surgeon: Jr Tom Tubbs MD;  Location: [facility];  Service: Cardiothoracic;  Laterality: N/A;       PT Assessment (last 12 hours)       PT Evaluation and Treatment       Row Name 06/13/23 0827          Physical Therapy Time and Intention    Document Type therapy note (daily note)  -WM     Mode of Treatment individual therapy;physical therapy  -WM     Patient Effort good  -WM     Symptoms Noted During/After Treatment fatigue  -WM       Row Name 06/13/23 0827          Pain Scale: FACES Pre/Post-Treatment    Pain: FACES Scale, Pretreatment 0-->no hurt  -WM     Posttreatment Pain Rating 0-->no hurt  -WM       Row Name 06/13/23 0827          Sit-Stand Transfer    Sit-Stand Reading (Transfers) standby assist;verbal cues  -WM     Assistive Device (Sit-Stand Transfers) walker, front-wheeled  -WM       Row Name 06/13/23 0827          Stand-Sit Transfer    Stand-Sit Reading (Transfers) standby assist;verbal cues  -WM     Assistive Device (Stand-Sit Transfers) walker, front-wheeled  -WM       Row Name 06/13/23 0827          Gait/Stairs (Locomotion)    Reading Level (Gait) standby assist;verbal cues  -WM     Assistive Device (Gait) walker, front-wheeled  -WM     Distance in Feet (Gait) 300  -WM     Pattern (Gait) 4-point;step-through   -     Deviations/Abnormal Patterns (Gait) gait speed decreased;stride length decreased  -       Row Name 06/13/23 0827          Safety Issues, Functional Mobility    Impairments Affecting Function (Mobility) balance;endurance/activity tolerance;strength  -       Row Name 06/13/23 0827          Balance    Balance Interventions standing;dynamic  Forward/backward walking, side stepping in parallel bars  -       Row Name 06/13/23 0827          Hip (Therapeutic Exercise)    Hip Strengthening (Therapeutic Exercise) bilateral;aBduction;aDduction;marching while seated;marching while standing;sitting;standing;2 lb free weight;resistance band;green;15 repititions;2 sets  -       Row Name 06/13/23 0827          Knee (Therapeutic Exercise)    Knee Strengthening (Therapeutic Exercise) bilateral;LAQ (long arc quad);hamstring curls;sitting;2 lb free weight;resistance band;green;15 repititions;2 sets  -       Row Name 06/13/23 0827          Ankle (Therapeutic Exercise)    Ankle Strengthening (Therapeutic Exercise) bilateral;standing;2 lb free weight;15 repititions;2 sets  Heel raises in parallel bars  -       Row Name 06/13/23 0827          Aerobic Exercise    Type (Aerobic Exercise) --  Nustep x 15 minutes, load 3  -       Row Name 06/13/23 0827          Progress Summary (PT)    Progress Toward Functional Goals (PT) progress toward functional goals is good  -               User Key  (r) = Recorded By, (t) = Taken By, (c) = Cosigned By      Initials Name Provider Type     Cristhian Artis PTA Physical Therapist Assistant                  Section G              Section GG                       Physical Therapy Education       Title: PT OT SLP Therapies (In Progress)       Topic: Physical Therapy (In Progress)       Point: Mobility training (In Progress)       Learning Progress Summary             Patient Acceptance, E, NR by CS at 6/10/2023 6929                         Point: Home exercise program (Not Started)        Learner Progress:  Not documented in this visit.              Point: Body mechanics (Not Started)       Learner Progress:  Not documented in this visit.              Point: Precautions (In Progress)       Learning Progress Summary             Patient Acceptance, E, NR by  at 6/10/2023 1409                                         User Key       Initials Effective Dates Name Provider Type Discipline     04/25/21 -  Alisha Clark, NASIR Physical Therapist PT                  PT Recommendation and Plan     Progress Summary (PT)  Progress Toward Functional Goals (PT): progress toward functional goals is good   Outcome Measures       Row Name 06/13/23 0833 06/12/23 0800 06/10/23 1300       How much help from another person do you currently need...    Turning from your back to your side while in flat bed without using bedrails? 4  -WM 4  -CS 4  -CSA    Moving from lying on back to sitting on the side of a flat bed without bedrails? 4  -WM 4  -CS 4  -CSA    Moving to and from a bed to a chair (including a wheelchair)? 3  -WM 3  -CS 3  -CSA    Standing up from a chair using your arms (e.g., wheelchair, bedside chair)? 4  -WM 4  -CS 3  -CSA    Climbing 3-5 steps with a railing? 3  -WM 3  -CS 3  -CSA    To walk in hospital room? 3  -WM 3  -CS 3  -CSA    AM-PAC 6 Clicks Score (PT) 21  -WM 21  -CS 20  -CSA       Functional Assessment    Outcome Measure Options -- AM-PAC 6 Clicks Basic Mobility (PT)  -CS AM-PAC 6 Clicks Basic Mobility (PT)  -CSA              User Key  (r) = Recorded By, (t) = Taken By, (c) = Cosigned By      Initials Name Provider Type    WM Cristhian Artis, WILBUR Physical Therapist Assistant    Alisha Santiago, NASIR Physical Therapist    Jamie Naik PTA Physical Therapist Assistant                     Time Calculation:    PT Charges       Row Name 06/13/23 0826             Time Calculation    PT Received On 06/13/23  -         Timed Charges    32871 - PT Therapeutic Exercise Minutes 31  -       14909 - Gait Training Minutes  8  -WM      40507 - PT Therapeutic Activity Minutes 3  -WM         SNF Physical Therapy Minutes    Skilled Minutes- PT 42 min  -WM         Total Minutes    Timed Charges Total Minutes 42  -WM       Total Minutes 42  -WM                User Key  (r) = Recorded By, (t) = Taken By, (c) = Cosigned By      Initials Name Provider Type    Cristhian Hawkins PTA Physical Therapist Assistant                      PT G-Codes  Outcome Measure Options: AM-PAC 6 Clicks Daily Activity (OT), Optimal Instrument  AM-PAC 6 Clicks Score (PT): 21  AM-PAC 6 Clicks Score (OT): 21    Cristhian Artis PTA  6/13/2023

## 2023-06-13 NOTE — THERAPY TREATMENT NOTE
SNF - Occupational Therapy Treatment Note  SARKIS Dhillon    Patient Name: Cosmo Gauthier  : 1945    MRN: [id number]                              Today's Date: 2023       Admit Date: 2023    Visit Dx:     ICD-10-CM ICD-9-CM   1. Difficulty walking  R26.2 719.7   2. 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)    Stage 3b chronic kidney disease (HCC)    Thrombocytopenia (HCC)    Coronary artery disease involving native coronary artery of native heart without angina pectoris    Hx of CABG    Hyperlipemia, mixed    Degenerative spondylolisthesis    Hypertension, essential    Hereditary and idiopathic neuropathy, unspecified    Low lying conus medullaris    Mood disorder (HCC)    Non-alcoholic cirrhosis (HCC)    Sleep apnea    Spinal stenosis of lumbar region with neurogenic claudication    Vascular dementia without behavioral disturbance    Iron deficiency anemia    Hypothyroidism, adult    Traumatic subarachnoid hemorrhage with loss of consciousness of 30 minutes or less    Morbid (severe) obesity due to excess calories    Claudication of both lower extremities    Dyspnea on exertion    Medicare annual wellness visit, subsequent    Weakness     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     Primary osteoarthritis of left knee     Sleep apnea      Sleep apnea     Thrombocytopenia 05/22/2018    Thyroid disease      Past Surgical History:   Procedure Laterality Date    CARDIAC SURGERY      COLONOSCOPY  2015    CORONARY ANGIOPLASTY WITH STENT PLACEMENT      CORONARY ARTERY BYPASS GRAFT N/A 05/20/2021    Procedure: MIDLINE STERNOTOMY,CORONARY ARTERY BYPASS GRAFTING X 5, UTILIZING THE LEFT COMPA AND LEFT SAPHENOUS VEIN, ABHIJEET, PRP;  Surgeon: Jr Tom Tubbs MD;  Location: [facility];  Service: Cardiothoracic;  Laterality: N/A;    ENDOSCOPY      HERNIA REPAIR      JOINT REPLACEMENT      SHOULDER SURGERY      SHOULDER REPAIR    TOTAL KNEE ARTHROPLASTY      TRANSESOPHAGEAL ECHOCARDIOGRAM (ABHIJEET) N/A 05/20/2021    Procedure: TRANSESOPHAGEAL ECHOCARDIOGRAM WITH ANESTHESIA;  Surgeon: Jr Tom Tubbs MD;  Location: [facility];  Service: Cardiothoracic;  Laterality: N/A;      General Information       Row Name 06/13/23 0651          OT Time and Intention    Document Type therapy note (daily note)  -EG     Mode of Treatment individual therapy;occupational therapy  -EG       Row Name 06/13/23 0651          General Information    Existing Precautions/Restrictions fall  -EG       Row Name 06/13/23 0651          Cognition    Orientation Status (Cognition) oriented to;person;place;situation  -EG       Row Name 06/13/23 0651          Safety Issues, Functional Mobility    Impairments Affecting Function (Mobility) balance;endurance/activity tolerance;strength;other (see comments)  -EG               User Key  (r) = Recorded By, (t) = Taken By, (c) = Cosigned By      Initials Name Provider Type    EG Lalita Damon OT Occupational Therapist                     Mobility/ADL's       Row Name 06/13/23 0651          Bed Mobility    Bed Mobility bed mobility (all) activities  -EG     All Activities, [place] (Bed Mobility) supervision  -EG     Comment, (Bed Mobility) agreeable to all OOB activities  -EG       Row Name 06/13/23 0651          Transfers    Transfers  sit-stand transfer;stand-sit transfer;toilet transfer;other (see comments);bed-chair transfer  -EG     Comment, (Transfers) walk-in shower transfer; Patient increasingly impulsive, mod cues for safety; decreased insight and requries education and reminders to use walker always due to poor balance and stability  -EG       Row Name 06/13/23 0651          Bed-Chair Transfer    Bed-Chair [place] (Transfers) standby assist;contact guard  -EG     Assistive Device (Bed-Chair Transfers) walker, front-wheeled  -EG       Row Name 06/13/23 0651          Sit-Stand Transfer    Sit-Stand [place] (Transfers) standby assist  -EG     Assistive Device (Sit-Stand Transfers) walker, front-wheeled  -EG       Row Name 06/13/23 0651          Stand-Sit Transfer    Stand-Sit [place] (Transfers) standby assist  -EG     Assistive Device (Stand-Sit Transfers) walker, front-wheeled  -EG       Row Name 06/13/23 0651          Toilet Transfer    Type (Toilet Transfer) sit-stand;stand-sit  -EG     [place] Level (Toilet Transfer) standby assist  -EG     Assistive Device (Toilet Transfer) raised toilet seat  -EG       Row Name 06/13/23 0651          Functional Mobility    Functional Mobility- Ind. Level contact guard assist  -EG     Functional Mobility- Device walker, front-wheeled  -EG     Functional Mobility- Comment Patient able to perform functioanl mobility to and from bathroom; therapy gym and shower room using a rolling walker; highly impulsive and requires cues for safety and to use of AD  -EG       Row Name 06/13/23 0651          Activities of Daily Living    BADL Assessment/Intervention bathing;upper body dressing;lower body dressing;grooming;toileting  -EG       Row Name 06/13/23 0651          Bathing Assessment/Intervention    [place] Level (Bathing) bathing skills;set up;supervision  -EG     Assistive Devices (Bathing) grab bar, tub/shower;hand-held shower spray hose;tub bench  -EG       Row Name 06/13/23 0651           Upper Body Dressing Assessment/Training    [place] Level (Upper Body Dressing) upper body dressing skills;standby assist  -EG       Row Name 06/13/23 0651          Lower Body Dressing Assessment/Training    [place] Level (Lower Body Dressing) lower body dressing skills;contact guard assist  -EG     Comment, (Lower Body Dressing) cues and education on safety and use of seated compensatory techniques  -EG       Row Name 06/13/23 0651          Grooming Assessment/Training    [place] Level (Grooming) grooming skills;set up;standby assist  -EG     Position (Grooming) sink side;supported standing;unsupported standing  -EG       Row Name 06/13/23 0651          Toileting Assessment/Training    [place] Level (Toileting) toileting skills;supervision  -EG               User Key  (r) = Recorded By, (t) = Taken By, (c) = Cosigned By      Initials Name Provider Type    EG Lalita Damon OT Occupational Therapist                   Obj/Interventions       Row Name 06/13/23 0654          Vision Assessment/Intervention    Visual Impairment/Limitations WFL  -EG       [facility] Name 06/13/23 0654          Motor Skills    Motor Skills functional endurance  -EG     Functional Endurance endurance training on omnicycle for 15:00 this date; fair  -EG     Therapeutic Exercise aerobic  -EG       Row Name 06/13/23 0654          Balance    Balance Interventions standing;sit to stand;static;dynamic;minimal challenge;dynamic reaching;occupation based/functional task;weight shifting activity  -EG     Comment, Balance Unsupported standing and attempts at weight shifting tasks occ. requiring [place] to steady self around 30% of time this date; Education and training on activity pacing and base of support adjustments to promote improved balance  -EG       Row Name 06/13/23 0654          Aerobic Exercise    Type (Aerobic Exercise) arm bike  -EG     Comment, Aerobic Exercise (Therapeutic Exercise) Cardiac interval setting on  omnicylce 15:00; no rest break required  -EG               User Key  (r) = Recorded By, (t) = Taken By, (c) = Cosigned By      Initials Name Provider Type    EG Lalita Damon OT Occupational Therapist                   Goals/Plan    No documentation.                  Clinical Impression       Row Name 06/13/23 0655          Pain Scale: FACES Pre/Post-Treatment    Pain: FACES Scale, Pretreatment 0-->no hurt  -EG     Posttreatment Pain Rating 0-->no hurt  -EG       Row Name 06/13/23 0655          Plan of Care Review    Plan of Care Reviewed With patient  -EG     Progress improving  -EG     Outcome Evaluation Patient is pleasant and cooperative; Motivated to return home and participate in therapy; OT services will continue to address deficits and limitations in balance, strength, endurance an safety/insight skills to facilitate a return to [facility] with ADL/IADL and transfers upon discharge to home.  -EG       Row Name 06/13/23 0655          Therapy Assessment/Plan (OT)    Rehab Potential (OT) good, to achieve stated therapy goals  -EG     Criteria for Skilled Therapeutic Interventions Met (OT) yes;meets criteria;skilled treatment is necessary  -EG     Therapy Frequency (OT) 5 times/wk  -EG               User Key  (r) = Recorded By, (t) = Taken By, (c) = Cosigned By      Initials Name Provider Type    EG Lalita Damon OT Occupational Therapist                   Outcome Measures       Row Name 06/13/23 0656          How much help from another is currently needed...    Putting on and taking off regular lower body clothing? 3  -EG     Bathing (including washing, rinsing, and drying) 3  -EG     Toileting (which includes using toilet bed pan or urinal) 3  -EG     Putting on and taking off regular upper body clothing 4  -EG     Taking care of personal grooming (such as brushing teeth) 4  -EG     Eating meals 4  -EG     AM-PAC 6 Clicks Score (OT) 21  -EG       Row Name 06/13/23 0656          Functional Assessment     Outcome Measure Options AM-PAC 6 Clicks Daily Activity (OT);Optimal Instrument  -EG       Row Name 06/13/23 0656          Optimal Instrument    Bending/Stooping 2  -EG     Standing 2  -EG     Reaching 1  -EG               User Key  (r) = Recorded By, (t) = Taken By, (c) = Cosigned By      Initials Name Provider Type    EG Lalita Damon OT Occupational Therapist                  Section G              Section GG                         Occupational Therapy Education       Title: PT OT SLP Therapies (In Progress)       Topic: Occupational Therapy (Done)       Point: ADL training (Done)       Description:   Instruct learner(s) on proper safety adaptation and remediation techniques during self care or transfers.   Instruct in proper use of assistive devices.                  Learning Progress Summary             Patient Acceptance, E,D, VU,NR by  at 6/12/2023 1228                         Point: Home exercise program (Done)       Description:   Instruct learner(s) on appropriate technique for monitoring, assisting and/or progressing therapeutic exercises/activities.                  Learning Progress Summary             Patient Acceptance, E,D, VU,NR by  at 6/12/2023 1228                         Point: Precautions (Done)       Description:   Instruct learner(s) on prescribed precautions during self-care and functional transfers.                  Learning Progress Summary             Patient Acceptance, E,D, VU,NR by  at 6/12/2023 1228                         Point: Body mechanics (Done)       Description:   Instruct learner(s) on proper positioning and spine alignment during self-care, functional mobility activities and/or exercises.                  Learning Progress Summary             Patient Acceptance, E,D, VU,NR by  at 6/12/2023 1228                                         User Key       Initials Effective Dates Name Provider Type [facility] 06/16/21 -  Margot Nobles OT Occupational Therapist OT                   OT Recommendation and Plan  Therapy Frequency (OT): 5 times/wk  Plan of Care Review  Plan of Care Reviewed With: patient  Progress: improving  Outcome Evaluation: Patient is pleasant and cooperative; Motivated to return home and participate in therapy; OT services will continue to address deficits and limitations in balance, strength, endurance an safety/insight skills to facilitate a return to [facility] with ADL/IADL and transfers upon discharge to home.     Time Calculation:    Time Calculation- OT       Row Name 06/13/23 0657             Time Calculation- OT    OT Received On 06/13/23  -EG      OT Goal Re-Cert Due Date 07/12/23  -EG         Timed Charges    96411 - OT Therapeutic Exercise Minutes 15  -EG      06470 -  OT Neuromuscular Reeducation Minutes 10  -EG      01180 - OT Therapeutic Activity Minutes 10  -EG      13723 - OT Self Care/Mgmt Minutes 20  -EG         SNF Occupational Therapy Minutes    Skilled Minutes- OT 55 min  -EG         Total Minutes    Timed Charges Total Minutes 55  -EG       Total Minutes 55  -EG                User Key  (r) = Recorded By, (t) = Taken By, (c) = Cosigned By      Initials Name Provider Type    EG Lalita Damon OT Occupational Therapist                  Therapy Charges for Today       Code Description Service Date Service Provider Modifiers Qty    63967910516 HC OT NEUROMUSC RE EDUCATION EA 15 MIN 6/13/2023 Lalita Damon OT GO 1    56848561683 HC OT THER PROC EA 15 MIN 6/13/2023 Lalita Damon OT GO 1    35514111812 HC OT THERAPEUTIC ACT EA 15 MIN 6/13/2023 Lalita Damon OT GO 1    88952911033 HC OT SELF CARE/MGMT/TRAIN EA 15 MIN 6/13/2023 Lalita Damon OT GO 1                 Lalita Damon OT  6/13/2023

## 2023-06-14 LAB
GLUCOSE BLDC GLUCOMTR-MCNC: 150 MG/DL (ref 70–99)
GLUCOSE BLDC GLUCOMTR-MCNC: 165 MG/DL (ref 70–99)
GLUCOSE BLDC GLUCOMTR-MCNC: 193 MG/DL (ref 70–99)
GLUCOSE BLDC GLUCOMTR-MCNC: 266 MG/DL (ref 70–99)

## 2023-06-14 NOTE — THERAPY TREATMENT NOTE
SNF - Physical Therapy Treatment Note  SARKIS Dhillon     Patient Name: Cosmo Gauthier  : 1945  MRN: [id number]  Today's Date: 2023      Visit Dx:    ICD-10-CM ICD-9-CM   1. Difficulty walking  R26.2 719.7   2. 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)    Stage 3b chronic kidney disease (HCC)    Thrombocytopenia (HCC)    Coronary artery disease involving native coronary artery of native heart without angina pectoris    Hx of CABG    Hyperlipemia, mixed    Degenerative spondylolisthesis    Hypertension, essential    Hereditary and idiopathic neuropathy, unspecified    Low lying conus medullaris    Mood disorder (HCC)    Non-alcoholic cirrhosis (HCC)    Sleep apnea    Spinal stenosis of lumbar region with neurogenic claudication    Vascular dementia without behavioral disturbance    Iron deficiency anemia    Hypothyroidism, adult    Traumatic subarachnoid hemorrhage with loss of consciousness of 30 minutes or less    Morbid (severe) obesity due to excess calories    Claudication of both lower extremities    Dyspnea on exertion    Medicare annual wellness visit, subsequent    Weakness     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     Primary osteoarthritis of left knee     Sleep apnea     Sleep apnea     Thrombocytopenia 2018    Thyroid  disease      Past Surgical History:   Procedure Laterality Date    CARDIAC SURGERY      COLONOSCOPY  2015    CORONARY ANGIOPLASTY WITH STENT PLACEMENT      CORONARY ARTERY BYPASS GRAFT N/A 05/20/2021    Procedure: MIDLINE STERNOTOMY,CORONARY ARTERY BYPASS GRAFTING X 5, UTILIZING THE LEFT COPMA AND LEFT SAPHENOUS VEIN, ABHIJEET, PRP;  Surgeon: Jr Tom Tubbs MD;  Location: [facility];  Service: Cardiothoracic;  Laterality: N/A;    ENDOSCOPY      HERNIA REPAIR      JOINT REPLACEMENT      SHOULDER SURGERY      SHOULDER REPAIR    TOTAL KNEE ARTHROPLASTY      TRANSESOPHAGEAL ECHOCARDIOGRAM (ABHIJEET) N/A 05/20/2021    Procedure: TRANSESOPHAGEAL ECHOCARDIOGRAM WITH ANESTHESIA;  Surgeon: Jr Tom Tubbs MD;  Location: [facility];  Service: Cardiothoracic;  Laterality: N/A;       PT Assessment (last 12 hours)       PT Evaluation and Treatment       Row Name 06/14/23 0812          Physical Therapy Time and Intention    Document Type therapy note (daily note)  -WM     Mode of Treatment individual therapy;physical therapy  -WM     Patient Effort good  -WM     Symptoms Noted During/After Treatment fatigue  -WM       Row Name 06/14/23 0812          Pain Scale: FACES Pre/Post-Treatment    Pain: FACES Scale, Pretreatment 0-->no hurt  -WM     Posttreatment Pain Rating 0-->no hurt  -WM       Row Name 06/14/23 0812          Sit-Stand Transfer    Sit-Stand [place] (Transfers) standby assist;verbal cues  -WM     Assistive Device (Sit-Stand Transfers) walker, front-wheeled  -WM       Row Name 06/14/23 0812          Stand-Sit Transfer    Stand-Sit [place] (Transfers) standby assist;verbal cues  -WM     Assistive Device (Stand-Sit Transfers) walker, front-wheeled  -WM       Row Name 06/14/23 0812          Gait/Stairs (Locomotion)    [place] Level (Gait) standby assist  -WM     Assistive Device (Gait) walker, front-wheeled  -WM     Distance in Feet (Gait) 250 x 2  -WM     Pattern (Gait) 4-point;step-through  -WM      Deviations/Abnormal Patterns (Gait) gait speed decreased;stride length decreased  -       Row Name 06/14/23 0812          Safety Issues, Functional Mobility    Impairments Affecting Function (Mobility) balance;endurance/activity tolerance;strength  -       Row Name 06/14/23 0812          Balance    Balance Interventions standing;dynamic  Forward/backward walking, side stepping in parallel bars  -[facility] Name 06/14/23 0812          Hip (Therapeutic Exercise)    Hip Strengthening (Therapeutic Exercise) bilateral;aBduction;aDduction;marching while seated;marching while standing;sitting;standing;2 lb free weight;resistance band;green;15 repititions;2 sets  -[facility] Name 06/14/23 0812          Knee (Therapeutic Exercise)    Knee Strengthening (Therapeutic Exercise) bilateral;LAQ (long arc quad);hamstring curls;sitting;2 lb free weight;resistance band;green;2 sets  -[facility] Name 06/14/23 0812          Ankle (Therapeutic Exercise)    Ankle Strengthening (Therapeutic Exercise) bilateral;standing;2 lb free weight;15 repititions;2 sets  Heel raises in parallel bars  -[facility] Name 06/14/23 0812          Aerobic Exercise    Type (Aerobic Exercise) --  Nustep x 15 minutes, load 3  -[facility] Name 06/14/23 0812          Progress Summary (PT)    Progress Toward Functional Goals (PT) progress toward functional goals is good  -               User Key  (r) = Recorded By, (t) = Taken By, (c) = Cosigned By      Initials Name Provider Type    Cristhian Hawkins PTA Physical Therapist Assistant                  Section G              Section GG                       Physical Therapy Education       Title: PT OT SLP Therapies (In Progress)       Topic: Physical Therapy (In Progress)       Point: Mobility training (In Progress)       Learning Progress Summary             Patient Acceptance, E, NR by CS at 6/10/2023 2758                         Point: Home exercise program (Not Started)       Learner Progress:   Not documented in this visit.              Point: Body mechanics (Not Started)       Learner Progress:  Not documented in this visit.              Point: Precautions (In Progress)       Learning Progress Summary             Patient Acceptance, E, NR by  at 6/10/2023 1409                                         User Key       Initials Effective Dates Name Provider Type Discipline     04/25/21 -  Alisha Clark, PT Physical Therapist PT                  PT Recommendation and Plan     Progress Summary (PT)  Progress Toward Functional Goals (PT): progress toward functional goals is good   Outcome Measures       Row Name 06/14/23 0816 06/13/23 0833 06/12/23 0800       How much help from another person do you currently need...    Turning from your back to your side while in flat bed without using bedrails? 4  -WM 4  -WM 4  -CS    Moving from lying on back to sitting on the side of a flat bed without bedrails? 4  -WM 4  -WM 4  -CS    Moving to and from a bed to a chair (including a wheelchair)? 3  -WM 3  -WM 3  -CS    Standing up from a chair using your arms (e.g., wheelchair, bedside chair)? 4  -WM 4  -WM 4  -CS    Climbing 3-5 steps with a railing? 3  -WM 3  -WM 3  -CS    To walk in hospital room? 3  -WM 3  -WM 3  -CS    AM-PAC 6 Clicks Score (PT) 21  -WM 21  -WM 21  -CS       Functional Assessment    Outcome Measure Options -- -- AM-PAC 6 Clicks Basic Mobility (PT)  -              User Key  (r) = Recorded By, (t) = Taken By, (c) = Cosigned By      Initials Name Provider Type    WM Cristhian Artis PTA Physical Therapist Assistant     Jamie Hernandez PTA Physical Therapist Assistant                     Time Calculation:    PT Charges       Row Name 06/14/23 0810             Time Calculation    PT Received On 06/14/23  -         Timed Charges    43113 - PT Therapeutic Exercise Minutes 32  -WM      17439 - Gait Training Minutes  12  -WM      22319 - PT Therapeutic Activity Minutes 3  -WM         SNF Physical  Therapy Minutes    Skilled Minutes- PT 47 min  -WM         Total Minutes    Timed Charges Total Minutes 47  -WM       Total Minutes 47  -WM                User Key  (r) = Recorded By, (t) = Taken By, (c) = Cosigned By      Initials Name Provider Type    Cristhian Hawkins PTA Physical Therapist Assistant                  Therapy Charges for Today       Code Description Service Date Service Provider Modifiers Qty    52713077917 HC PT THER PROC EA 15 MIN 6/13/2023 Cristhian Artis PTA GP 2    70866108879 HC GAIT TRAINING EA 15 MIN 6/13/2023 Cristhian Artis PTA GP 1            PT G-Codes  Outcome Measure Options: AM-PAC 6 Clicks Daily Activity (OT), Optimal Instrument  AM-PAC 6 Clicks Score (PT): 21  AM-PAC 6 Clicks Score (OT): 21    Cristhian Artis PTA  6/14/2023

## 2023-06-14 NOTE — PROGRESS NOTES
"Nursing Facility Medication Regimen Review    Potentially Clinically Significant Medication Issues during this review: [x]Identified   []Not identified    Provider acknowledgement required?   [x]Yes   []No    Cosmo Gauthier is a 77 y.o.male admitted by Seven Saldana MD , on 6/9/2023 11:06 AM , for No admission diagnoses are documented for this encounter.    Visit Vitals  /88 (BP Location: Left arm, Patient Position: Sitting)   Pulse 87   Temp 97.9 °F (36.6 °C) (Oral)   Resp 18   Ht 182.9 cm (72.01\")   Wt 117 kg (258 lb 6.1 oz)   SpO2 95%   BMI 35.03 kg/m²        Lab Results   Component Value Date    GLUCOSE 148 (H) 06/08/2023    BUN 28 (H) 06/08/2023    CREATININE 1.36 (H) 06/08/2023    EGFRIFNONA 48 (L) 01/13/2022    BCR 20.6 06/08/2023    K 4.3 06/08/2023    CO2 25.6 06/08/2023    CALCIUM 9.3 06/08/2023    ALBUMIN 3.9 06/08/2023    LABIL2 0.8 (L) 03/15/2021    AST 14 06/08/2023    ALT 16 06/08/2023    WBC 7.02 06/08/2023    HGB 12.6 (L) 06/08/2023    HCT 36.8 (L) 06/08/2023    MCV 92.0 06/08/2023    PLT 90 (L) 06/08/2023        Active Ambulatory Problems     Diagnosis Date Noted    Lymphoma     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)     Stage 3b chronic kidney disease (HCC)     Thrombocytopenia (HCC)     Coronary artery disease involving native coronary artery of native heart without angina pectoris 03/09/2021    Hx of CABG 06/12/2021    Hyperlipemia, mixed 06/12/2021    Degenerative spondylolisthesis 01/15/2019    Hypertension, essential 07/22/2021    Hereditary and idiopathic neuropathy, unspecified 11/17/2011    Low lying conus medullaris 11/20/2018    Mood disorder (HCC) 05/22/2018    Non-alcoholic cirrhosis (HCC) 07/22/2021    Sleep apnea 07/22/2021    Spinal stenosis of lumbar region with neurogenic claudication 01/15/2019    Vascular dementia without behavioral disturbance 07/30/2021    Iron deficiency anemia 07/30/2021    Hypothyroidism, adult 10/29/2021    Traumatic subarachnoid " hemorrhage with loss of consciousness of 30 minutes or less 07/06/2022    Morbid (severe) obesity due to excess calories 07/20/2022    Claudication of both lower extremities 01/18/2023    Dyspnea on exertion 01/18/2023    Medicare annual wellness visit, subsequent 04/24/2023    Weakness 06/04/2023     Resolved Ambulatory Problems     Diagnosis Date Noted    CAD (coronary artery disease) 02/12/2021    CAD (coronary artery disease), native coronary artery 02/15/2021    Anemia 02/16/2021    Lymphocytosis 02/16/2021    Aftercare following left knee joint replacement surgery 03/02/2016    DDD (degenerative disc disease), lumbar 11/20/2018    Family history of colon cancer 07/22/2021    Gastric ulcer 07/22/2021    Chronic back pain 07/22/2021    Osteoarthritis of knee 04/19/2016    Tethered spinal cord 11/20/2018    Unstable gait 11/20/2018    Bilateral impacted cerumen 01/18/2022     Past Medical History:   Diagnosis Date    Anxiety and depression     Arthritis     Cirrhosis     Coronary artery disease     Dementia     Depression     Diabetes mellitus     Disease of thyroid gland     Elevated cholesterol     Fatty liver     GERD (gastroesophageal reflux disease)     Heart disease     High cholesterol     Hypertension     Hyperthyroidism     Knee pain     Memory change 10/18/2017    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     Primary osteoarthritis of left knee     Thyroid disease         Hospital Medications (active)         Dose Frequency Start End Indication    acetaminophen (TYLENOL) tablet 650 mg 650 mg Every 4 Hours PRN 6/9/2023  PRN mild pain    Admin Instructions: Do not exceed 4 grams of acetaminophen in a 24 hr period. Max dose of 2gm for AST/ALT greater than 120 units/L    If given for fever, use fever parameter: fever greater than 100.4 °F.    If given for pain, use the following pain scale:   Mild Pain = Pain Score of 1-3, CPOT 1-2  Moderate Pain = Pain Score of 4-6, CPOT 3-4  Severe Pain =  Pain Score of 7-10, CPOT 5-8     Route: Oral     aluminum-magnesium hydroxide-simethicone (MAALOX MAX) 400-400-40 MG/5ML suspension 15 mL 15 mL Every 6 Hours PRN 6/9/2023  PRN heartburn    Admin Instructions: Maximum 60 mL in 24 hours.     Route: Oral     amitriptyline (ELAVIL) tablet 50 mg 50 mg Nightly 6/9/2023  Mood disorder    Route: Oral     bisacodyl (DULCOLAX) EC tablet 5 mg 5 mg Daily PRN 6/9/2023  PRN constipation    Admin Instructions: Use if no bowel movement after 12 hours.  Swallow whole. Do not crush, split, or chew tablet.     Route: Oral     Linked Group 1: See Hyperspace for full Linked Orders Report.         bisacodyl (DULCOLAX) suppository 10 mg 10 mg Daily PRN 6/9/2023  PRN constipation    Admin Instructions: Use if no bowel movement after 12 hours.  Hold for diarrhea     Route: Rectal     Linked Group 1: See Hyperspace for full Linked Orders Report.         clopidogrel (PLAVIX) tablet 75 mg 75 mg Daily 6/10/2023  H/o CABG, claudication of both lower extremities    Route: Oral     dextrose (D50W) (25 g/50 mL) IV injection 25 g 25 g Every 15 Minutes PRN 6/9/2023  PRN hypoglycemia     Admin Instructions: Blood sugar less than 70; patient has IV access - Unresponsive, NPO or Unable To Safely Swallow     Route: Intravenous     dextrose (GLUTOSE) oral gel 15 g 15 g Every 15 Minutes PRN 6/9/2023  PRN hypoglycemia     Admin Instructions: BS<70, Patient Alert, Is not NPO, Can safely swallow.     Route: Oral     donepezil (ARICEPT) tablet 10 mg 10 mg Nightly 6/9/2023  Dementia     Route: Oral     ferrous sulfate tablet 325 mg 325 mg Daily With Breakfast 6/14/2023  Iron deficiency anemia     Admin Instructions: Swallow whole. Do not crush, split, or chew. Take with food if GI upset occurs.     Route: Oral     finasteride (PROSCAR) tablet 5 mg 5 mg Daily 6/10/2023  BPH    Admin Instructions: Do not crush.  Group 2 ([place]) Hazardous Drug - Reproductive Risk Only - See Handling Guide     Route: Oral      gabapentin (NEURONTIN) capsule 300 mg 300 mg 2 Times Daily 6/9/2023  neuropathy     Admin Instructions:      Route: Oral     glucagon (GLUCAGEN) injection 1 mg 1 mg Every 15 Minutes PRN 6/9/2023  PRN hypoglycemia     Admin Instructions: Blood Glucose Less Than 70 - Patient Without IV Access - Unresponsive, NPO or Unable To Safely Swallow  Reconstitute powder for injection by adding 1 mL of -supplied sterile diluent or sterile water for injection to a vial containing 1 mg of the drug, to provide solutions containing 1 mg/mL. Shake vial gently to dissolve.     Route: Intramuscular     Insulin Lispro (humaLOG) injection 2-7 Units 2-7 Units 4 Times Daily Before Meals & Nightly 6/9/2023  Diabetes mellitus     Admin Instructions: Correction Insulin - Low Dose - Total Insulin Dose Less Than 40 units/day (Lean, Elderly or Renal Patients)    Blood Glucose 150-199 mg/dL - 2 units  Blood Glucose 200-249 mg/dL - 3 units  Blood Glucose 250-299 mg/dL - 4 units  Blood Glucose 300-349 mg/dL - 5 units  Blood Glucose 350-400 mg/dL - 6 units  Blood Glucose Greater Than 400 mg/dL - 7 units & Call Provider   Caution: Look alike/sound alike drug alert     Route: Subcutaneous     levothyroxine (SYNTHROID, LEVOTHROID) tablet 224 mcg 224 mcg Every Morning 6/10/2023  Hypothyroid     Admin Instructions: Take on empty stomach.     Route: Oral     nitroglycerin (NITROSTAT) SL tablet 0.4 mg 0.4 mg Every 5 Minutes PRN 6/9/2023  PRN chest pain     Admin Instructions: May administer up to 3 doses per episode.     Route: Sublingual     Pneumococcal 20-Elizabeth Conj Vacc (PREVNAR-20) vaccine 0.5 mL 0.5 mL Once 6/9/2023  Preventative medicine     Admin Instructions: **Hold For Temperature Greater Than 102 Degrees**   Pneumococcal & Influenza Vaccines May Be Given At The Same Time in SEPARATE Injections     Route: Intramuscular     polyethylene glycol (MIRALAX) packet 17 g 17 g Daily PRN 6/9/2023  Prn constipation     Admin Instructions: Use if  no bowel movement after 12 hours. Mix in 6-8 ounces of water.  Use 4-8 ounces of water, tea, or juice for each 17 gram dose.     Route: Oral     Linked Group 1: See Hyperspace for full Linked Orders Report.         sennosides-docusate (PERICOLACE) 8.6-50 MG per tablet 2 tablet 2 tablet 2 Times Daily 6/9/2023  Bowel regimen     Admin Instructions: HOLD MEDICATION IF PATIENT HAS HAD BOWEL MOVEMENT. Start bowel management regimen if patient has not had a bowel movement after 12 hours.     Route: Oral     Linked Group 1: See Hyperspace for full Linked Orders Report.         sertraline (ZOLOFT) tablet 200 mg 200 mg Daily 6/10/2023  Mood disorder     Route: Oral     sodium chloride 0.9 % flush 10 mL 10 mL Every 12 Hours Scheduled 6/9/2023  Line care    Route: Intravenous     tamsulosin (FLOMAX) 24 hr capsule 0.4 mg 0.4 mg Daily 6/10/2023  BPH    Admin Instructions: Swallow whole. Do not crush or chew.     Route: Oral     tuberculin injection 5 Units 5 Units Once 6/16/2023  SNF admission     Admin Instructions: 2nd Step     Route: Intradermal             Psychotropic Medications  Sertraline 200 mg daily   Amitriptyline 50 mg nightly   Gabapentin 300 mg Twice daily    Potentially Clinically Significant Medication Issues/PharmD Recommendations:   Psychotropic Medication: appropriate use, continued home meds  Opioid Use Review: n/a   Medication without an appropriate indication: n/a  Untreated indication: n/a  Missing Duration: n/a  Excessive or Inadequate Dose: n/a  Ineffective Drug Therapy: n/a  Medication Adverse Reactions/Consequences: n/a  Medication Monitoring: n/a  Drug Interactions: n/a  Duplicate Therapy: n/a  PTA Medication Omissions (not currently ordered): furosemide 40 mg daily, nadolol 20 mg daily, aspirin 81 mg daily, metformin 1000mg twice daily, simvastatin 40 mg nightly   Safety: n/a      Additional notes: may need home medications restarted that have not been, please review PTA medication omissions and  restart appropriate home medications if needed      In completing this Drug Regimen Review for RUDY Nunn, Farhana Funes, have reviewed the electronic medical chart contents, including but not limited to the following: Medication Administration Records (MAR), Prescriber's orders, Progress, nursing and consultants' notes, Laboratory and diagnostic test results, Other sources of information about documented expressions or indications of distress and/or changes in condition.

## 2023-06-14 NOTE — PROGRESS NOTES
[facility]     Progress Note    Patient Name: Cosmo Gauthier  : 1945  MRN: [id number]  Primary Care Physician:  Alex Buckley MD  Date of admission: 2023    Subjective   Subjective     Chief Complaint: Stable doing fine family had concerns about hallucination but I do not find any time that he has and he denies any seem to be stable doing fine alert oriented and doing good physical exercise and seems to be strong    HPI: Patient not in acute distress he does have postural hypotension which we have explained to him and we will keep him off C[place]ard    Review of Systems   All systems were reviewed and negative except for: Reviewed    Objective   Objective     Vitals:   Temp:  [97.3 °F (36.3 °C)-97.9 °F (36.6 °C)] 97.9 °F (36.6 °C)  Heart Rate:  [87-92] 87  Resp:  [16-18] 18  BP: (137-138)/(88-90) 138/88    Physical Exam    Constitutional: Awake, alert   Eyes: PERRLA, sclerae anicteric, no conjunctival injection   HENT: NCAT, mucous membranes moist   Neck: Supple, no thyromegaly, no lymphadenopathy, trachea midline   Respiratory: Clear to auscultation bilaterally, nonlabored respirations    Cardiovascular: RRR, no murmurs, rubs, or gallops, palpable pedal pulses bilaterally   Gastrointestinal: Positive bowel sounds, soft, nontender, nondistended   Musculoskeletal: No bilateral ankle edema, no clubbing or cyanosis to extremities   Psychiatric: Appropriate affect, cooperative   Neurologic: Oriented x 3, strength symmetric in all extremities, Cranial Nerves grossly intact to confrontation, speech clear   Skin: No rashes   No change  Result Review    Result Review:  I have personally reviewed the results from the time of this admission to 2023 08:14 EDT and agree with these findings:  [x]  Laboratory history of thrombocytopenia  []  Microbiology  []  Radiology  []  EKG/Telemetry   []  Cardiology/Vascular   []  Pathology  []  Old records  []  Other:  Most notable findings include: History of  cirrhosis of liver and thrombocytopenia    Assessment & Plan   Assessment / Plan     Brief Patient Summary:  Cosmo Gauthier is a 77 y.o. male who not in any acute distress at this time he came in because of neuropathy and doing fine    Active Hospital Problems:  There are no active hospital problems to display for this patient.      Plan:   Continue physical therapy    DVT prophylaxis:  No DVT prophylaxis order currently exists.    CODE STATUS:   Code Status (Patient has no pulse and is not breathing): CPR (Attempt to Resuscitate)  Medical Interventions (Patient has pulse or is breathing): Full Support    Disposition:  I expect patient to be discharged after the condition has been stabilized.    Electronically signed by Seven Saldana MD, 06/14/23, 8:14 AM EDT.      Part of this note may be an electronic transcription/translation of spoken language to printed text using the Dragon Dictation System.

## 2023-06-14 NOTE — PLAN OF CARE
Goal Outcome Evaluation:           Progress: improving  Outcome Evaluation: Resident is alert & oriented times 4; calm, cooperative, and pleasant with team members; transfers with assist of one to [facility] using walker; LBM noted on 6/11/23. Blood glucose continues to be monitored as ordered (covered with sliding scale, see emar). Resident wears home CPAP at HS.  Will continue with POC.

## 2023-06-14 NOTE — THERAPY TREATMENT NOTE
SNF - Occupational Therapy Treatment Note  SARKIS Dhillon    Patient Name: Cosmo Gauthier  : 1945    MRN: [id number]                              Today's Date: 2023       Admit Date: 2023    Visit Dx:     ICD-10-CM ICD-9-CM   1. Difficulty walking  R26.2 719.7   2. 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)    Stage 3b chronic kidney disease (HCC)    Thrombocytopenia (HCC)    Coronary artery disease involving native coronary artery of native heart without angina pectoris    Hx of CABG    Hyperlipemia, mixed    Degenerative spondylolisthesis    Hypertension, essential    Hereditary and idiopathic neuropathy, unspecified    Low lying conus medullaris    Mood disorder (HCC)    Non-alcoholic cirrhosis (HCC)    Sleep apnea    Spinal stenosis of lumbar region with neurogenic claudication    Vascular dementia without behavioral disturbance    Iron deficiency anemia    Hypothyroidism, adult    Traumatic subarachnoid hemorrhage with loss of consciousness of 30 minutes or less    Morbid (severe) obesity due to excess calories    Claudication of both lower extremities    Dyspnea on exertion    Medicare annual wellness visit, subsequent    Weakness     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     Primary osteoarthritis of left knee     Sleep apnea      Sleep apnea     Thrombocytopenia 05/22/2018    Thyroid disease      Past Surgical History:   Procedure Laterality Date    CARDIAC SURGERY      COLONOSCOPY  2015    CORONARY ANGIOPLASTY WITH STENT PLACEMENT      CORONARY ARTERY BYPASS GRAFT N/A 05/20/2021    Procedure: MIDLINE STERNOTOMY,CORONARY ARTERY BYPASS GRAFTING X 5, UTILIZING THE LEFT COMPA AND LEFT SAPHENOUS VEIN, ABHIJEET, PRP;  Surgeon: Jr Tom Tubbs MD;  Location: [facility];  Service: Cardiothoracic;  Laterality: N/A;    ENDOSCOPY      HERNIA REPAIR      JOINT REPLACEMENT      SHOULDER SURGERY      SHOULDER REPAIR    TOTAL KNEE ARTHROPLASTY      TRANSESOPHAGEAL ECHOCARDIOGRAM (ABHIJEET) N/A 05/20/2021    Procedure: TRANSESOPHAGEAL ECHOCARDIOGRAM WITH ANESTHESIA;  Surgeon: Jr Tom Tubbs MD;  Location: [facility];  Service: Cardiothoracic;  Laterality: N/A;      General Information       Row Name 06/14/23 1049          OT Time and Intention    Document Type therapy note (daily note)  -     Mode of Treatment individual therapy;occupational therapy  -       Row Name 06/14/23 1049          General Information    Patient Profile Reviewed yes  -     Existing Precautions/Restrictions fall  -       Row Name 06/14/23 1049          Safety Issues, Functional Mobility    Impairments Affecting Function (Mobility) balance;endurance/activity tolerance;strength  -               User Key  (r) = Recorded By, (t) = Taken By, (c) = Cosigned By      Initials Name Provider Type     Margot Nobles OT Occupational Therapist                     Mobility/ADL's       Row Name 06/14/23 1054          Transfers    Transfers stand-sit transfer;sit-stand transfer;other (see comments);bed-chair transfer  -       Row Name 06/14/23 1054          Bed-Chair Transfer    Bed-Chair [place] (Transfers) modified independence  -     Assistive Device (Bed-Chair Transfers) walker, front-wheeled  -       Row Name 06/14/23 1054          Sit-Stand Transfer     Sit-Stand [place] (Transfers) modified independence  -     Assistive Device (Sit-Stand Transfers) walker, front-wheeled  -       Row Name 06/14/23 1054          Stand-Sit Transfer    Stand-Sit [place] (Transfers) modified independence  -     Assistive Device (Stand-Sit Transfers) walker, front-wheeled  -       Row Name 06/14/23 1054          Functional Mobility    Functional Mobility- Comment Pt. ambulated to/from therapy gym with SPV and Mod Indep with VCs using RW x3 chair w/ arms transfers.  -               User Key  (r) = Recorded By, (t) = Taken By, (c) = Cosigned By      Initials Name Provider Type     Margot Nobles OT Occupational Therapist                   Obj/Interventions       Row Name 06/14/23 1049          Shoulder (Therapeutic Exercise)    Shoulder (Therapeutic Exercise) strengthening exercise  -     Shoulder Strengthening (Therapeutic Exercise) 3 lb free weight;bilateral;extension;flexion;horizontal aBduction/aDduction;aDduction;aBduction;15 repititions;standing;sitting  -       Row Name 06/14/23 1049          Elbow/Forearm (Therapeutic Exercise)    Elbow/Forearm (Therapeutic Exercise) strengthening exercise  -     Elbow/Forearm Strengthening (Therapeutic Exercise) bilateral;flexion;extension;3 lb free weight;30 repititions;2 sets  Rickshaw x3 sets of 40 with #25  -       Row Name 06/14/23 1049          Motor Skills    Therapeutic Exercise shoulder;elbow/forearm;aerobic  Patient participated in TE to facilitate strength and endurance to support adls.  TE included: Omnicycle x20 minutes, FWs, and Rickshaw.  -       Row Name 06/14/23 1049          Balance    Dynamic Standing Balance contact guard  -       Row Name 06/14/23 1049          Aerobic Exercise    Comment, Aerobic Exercise (Therapeutic Exercise) Omnicycle x20 mintues  -               User Key  (r) = Recorded By, (t) = Taken By, (c) = Cosigned By      Initials Name Provider Type     Margot Nobles, OT  Occupational Therapist                   Goals/Plan    No documentation.                  Clinical Impression       Row Name 06/14/23 1052          Plan of Care Review    Progress improving  -GC     Outcome Evaluation Patient tolerated all of tx with no complaints of pain.  Pt. is impulsive with mobility and use of RW however no LOB observed during tx session.  Plan to continue POC established.  -GC       Row Name 06/14/23 1052          Therapy Plan Review/Discharge Plan (OT)    Anticipated Discharge Disposition (OT) home with outpatient therapy services  -               User Key  (r) = Recorded By, (t) = Taken By, (c) = Cosigned By      Initials Name Provider Type    GC Margot Nobles OT Occupational Therapist                   Outcome Measures       Row Name 06/14/23 1053          How much help from another is currently needed...    Putting on and taking off regular lower body clothing? 4  -GC     Bathing (including washing, rinsing, and drying) 4  -GC     Toileting (which includes using toilet bed pan or urinal) 4  -GC     Putting on and taking off regular upper body clothing 4  -GC     Taking care of personal grooming (such as brushing teeth) 4  -GC     Eating meals 4  -GC     AM-PAC 6 Clicks Score (OT) 24  -GC       Row Name 06/14/23 0816          How much help from another person do you currently need...    Turning from your back to your side while in flat bed without using bedrails? 4  -WM     Moving from lying on back to sitting on the side of a flat bed without bedrails? 4  -WM     Moving to and from a bed to a chair (including a wheelchair)? 3  -WM     Standing up from a chair using your arms (e.g., wheelchair, bedside chair)? 4  -WM     Climbing 3-5 steps with a railing? 3  -WM     To walk in hospital room? 3  -WM     AM-PAC 6 Clicks Score (PT) 21  -WM     Highest level of mobility 6 --> Walked 10 steps or more  -WM       Row Name 06/14/23 1053          Optimal Instrument    Optimal Instrument Optimal -  3  -GC     Bending/Stooping 2  -GC     Standing 1  -GC     Reaching 1  -GC               User Key  (r) = Recorded By, (t) = Taken By, (c) = Cosigned By      Initials Name Provider Type    Cristhian Hawkins PTA Physical Therapist Assistant    Margot Almendarez OT Occupational Therapist                  Section G  Mobility  Dressing - self performance: limited assistance (staff provide guided maneuvering of limbs or other non-weight bearing assistance)  Dressing support/assistance: One person assist  Eating - self performance: independent  Eating support/assistance: No setup or physical help  Toileting - self performance: limited assistance (staff provide guided maneuvering of limbs or other non-weight bearing assistance)  Toileting support/assistance: One person assist  Personal hygiene - self performance: supervision (oversight, encourage)  Personal hygiene support/assistance: One person assist  Bathing  Bathing - self performance: Supervision  Bathing support/assistance: One person assist     Range of Motion  Upper Extremity: No impairment  Section GG  SectionGG: Functional Ability/Goals, Adm  Self Care, Prior Functioning (KL6904L): 3. Independent  Functional Cognition, Prior Functioning (UU9193B): 3. Independent  Self Care, Admission (Section GG)  Eating: Self-Care Admission Performance (VN4523M2): independent (06)  Oral Hygiene: Self-Care Admission Performance (NF1027H5): setup or clean-up assistance (05)  Toileting Hygiene: Self-Care Admission Performance (WL4397Z5): supervision or touching assistance (04)  Shower/Bathe Self: Self-Care Admission Performance (CZ5830Q1): supervision or touching assistance (04)  Upper Body Dressing: Self-Care Admission Performance (IQ6647X5): supervision or touching assistance (04)  Lower Body Dressing: Self-Care Admission Performance (WW8881D2): partial/moderate assistance (03)  Putting On/Taking Off Footwear: Self-Care Admission Performance (VI1016C8): partial/moderate  assistance (03)  Mobility, Admission Performance (PA6443)  Toilet Transfer: Mobility Admission Performance (VT7826S0): supervision or touching assistance (04)  Section GG: Functional Ability/Goals, DC  Eating: Self-Care Discharge Goal (FU7185V1): independent (06)  Oral Hygiene: Self-Care Discharge Goal (KX2471S2): independent (06)  Toileting Hygiene: Self-Care Discharge Goal (ER0173N9): independent (06)  Shower/Bathe Self: Self-Care Discharge Goal (BN8858L3): independent (06)  Upper Body Dressing: Self-Care Discharge Goal (QK3642A1): independent (06)  Lower Body Dressing: Self-Care Discharge Goal (SC8099L7): independent (06)  Putting On/Taking Off Footwear: Self-Care Discharge Goal (SW7276F4): independent (06)  Mobility (GG), Discharge Goal (XT1651)  Toilet Transfer: Mobility Discharge Goal (IP7191J7): independent (06)          Occupational Therapy Education       Title: PT OT SLP Therapies (In Progress)       Topic: Occupational Therapy (Done)       Point: ADL training (Done)       Description:   Instruct learner(s) on proper safety adaptation and remediation techniques during self care or transfers.   Instruct in proper use of assistive devices.                  Learning Progress Summary             Patient Acceptance, E,D, VU,NR by  at 6/12/2023 1228                         Point: Home exercise program (Done)       Description:   Instruct learner(s) on appropriate technique for monitoring, assisting and/or progressing therapeutic exercises/activities.                  Learning Progress Summary             Patient Acceptance, E,D, VU,NR by  at 6/12/2023 1228                         Point: Precautions (Done)       Description:   Instruct learner(s) on prescribed precautions during self-care and functional transfers.                  Learning Progress Summary             Patient Acceptance, E,D, VU,NR by  at 6/12/2023 1228                         Point: Body mechanics (Done)       Description:   Instruct  learner(s) on proper positioning and spine alignment during self-care, functional mobility activities and/or exercises.                  Learning Progress Summary             Patient Acceptance, E,D, VU,NR by  at 6/12/2023 1228                                         User Key       Initials Effective Dates Name Provider Type Discipline     06/16/21 -  Margot Nboles OT Occupational Therapist OT                  OT Recommendation and Plan  Planned Therapy Interventions (OT): activity tolerance training, adaptive equipment training, BADL retraining, functional balance retraining, IADL retraining, patient/caregiver education/training, ROM/therapeutic exercise, strengthening exercise, transfer/mobility retraining  Therapy Frequency (OT): 5 times/wk  Plan of Care Review  Plan of Care Reviewed With: patient  Progress: improving  Outcome Evaluation: Patient tolerated all of tx with no complaints of pain.  Pt. is impulsive with mobility and use of RW however no LOB observed during tx session.  Plan to continue POC established.     Time Calculation:    Time Calculation- OT       Row Name 06/14/23 1053 06/14/23 0810          Time Calculation- OT    OT Received On 06/14/23  - --        Timed Charges    90061 - OT Therapeutic Exercise Minutes 45  -GC --     30544 - Gait Training Minutes  -- 12  -WM     07779 - OT Therapeutic Activity Minutes 8  -GC --        SNF Occupational Therapy Minutes    Skilled Minutes- OT 53 min  - --        Total Minutes    Timed Charges Total Minutes 53  -GC 12  -WM      Total Minutes 53  -GC 12  -WM               User Key  (r) = Recorded By, (t) = Taken By, (c) = Cosigned By      Initials Name Provider Type     Cristhian Artis, PTA Physical Therapist Assistant     Margot Nobles OT Occupational Therapist                  Therapy Charges for Today       Code Description Service Date Service Provider Modifiers Qty    [id number] HC OT THER PROC EA 15 MIN 6/14/2023 Margot Nobles OT GO 3     [id number]  OT THERAPEUTIC ACT EA 15 MIN 6/14/2023 Margot Nobles OT GO 1                 Margot Nobles OT  6/14/2023

## 2023-06-14 NOTE — PLAN OF CARE
Goal Outcome Evaluation:   Resident is A&O, and pleasant with staff.  Ambulates with 1 assist, gait belt and walker.  No c/o pain this shift.  No BM this shift.  Scheduled stool softeners, prune juice, warm coffee, apple juice, and fluids promoted.  PRN sublingual nitroglycerin for chest pain added to medications.  Sitting up in chair with call light in reach.

## 2023-06-15 LAB
GLUCOSE BLDC GLUCOMTR-MCNC: 152 MG/DL (ref 70–99)
GLUCOSE BLDC GLUCOMTR-MCNC: 167 MG/DL (ref 70–99)
GLUCOSE BLDC GLUCOMTR-MCNC: 199 MG/DL (ref 70–99)
GLUCOSE BLDC GLUCOMTR-MCNC: 212 MG/DL (ref 70–99)

## 2023-06-15 NOTE — THERAPY TREATMENT NOTE
SNF - Physical Therapy Treatment Note  SARKIS Dhillon     Patient Name: Cosmo Gauthier  : 1945  MRN: [id number]  Today's Date: 6/15/2023      Visit Dx:    ICD-10-CM ICD-9-CM   1. Difficulty walking  R26.2 719.7   2. 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)    Stage 3b chronic kidney disease (HCC)    Thrombocytopenia (HCC)    Coronary artery disease involving native coronary artery of native heart without angina pectoris    Hx of CABG    Hyperlipemia, mixed    Degenerative spondylolisthesis    Hypertension, essential    Hereditary and idiopathic neuropathy, unspecified    Low lying conus medullaris    Mood disorder (HCC)    Non-alcoholic cirrhosis (HCC)    Sleep apnea    Spinal stenosis of lumbar region with neurogenic claudication    Vascular dementia without behavioral disturbance    Iron deficiency anemia    Hypothyroidism, adult    Traumatic subarachnoid hemorrhage with loss of consciousness of 30 minutes or less    Morbid (severe) obesity due to excess calories    Claudication of both lower extremities    Dyspnea on exertion    Medicare annual wellness visit, subsequent    Weakness     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     Primary osteoarthritis of left knee     Sleep apnea     Sleep apnea     Thrombocytopenia 2018    Thyroid  disease      Past Surgical History:   Procedure Laterality Date    CARDIAC SURGERY      COLONOSCOPY  2015    CORONARY ANGIOPLASTY WITH STENT PLACEMENT      CORONARY ARTERY BYPASS GRAFT N/A 05/20/2021    Procedure: MIDLINE STERNOTOMY,CORONARY ARTERY BYPASS GRAFTING X 5, UTILIZING THE LEFT COMPA AND LEFT SAPHENOUS VEIN, ABHIJEET, PRP;  Surgeon: Jr Tom Tubbs MD;  Location: [facility];  Service: Cardiothoracic;  Laterality: N/A;    ENDOSCOPY      HERNIA REPAIR      JOINT REPLACEMENT      SHOULDER SURGERY      SHOULDER REPAIR    TOTAL KNEE ARTHROPLASTY      TRANSESOPHAGEAL ECHOCARDIOGRAM (ABHIJEET) N/A 05/20/2021    Procedure: TRANSESOPHAGEAL ECHOCARDIOGRAM WITH ANESTHESIA;  Surgeon: Jr Tom Tubbs MD;  Location: [facility];  Service: Cardiothoracic;  Laterality: N/A;       PT Assessment (last 12 hours)       PT Evaluation and Treatment       Row Name 06/15/23 1339 06/15/23 0747       Physical Therapy Time and Intention    Document Type therapy note (daily note)  -WM therapy note (daily note)  -WM    Mode of Treatment individual therapy;physical therapy  -WM individual therapy;physical therapy  -WM    Patient Effort good  -WM good  -WM    Symptoms Noted During/After Treatment fatigue  -WM fatigue  -WM      Row Name 06/15/23 0708          Pain Scale: FACES Pre/Post-Treatment    Pain: FACES Scale, Pretreatment 0-->no hurt  -WM     Posttreatment Pain Rating 0-->no hurt  -WM       Row Name 06/15/23 1339          Bed Mobility    Bed Mobility supine-sit-supine  -WM     Supine-Sit-Supine Free[place] (Bed Mobility) standby assist  -     Assistive Device (Bed Mobility) bed rails  -WM       Row Name 06/15/23 1339 06/15/23 0747       Sit-Stand Transfer    Sit-Stand [place] (Transfers) standby assist;verbal cues  -WM standby assist  -WM    Assistive Device (Sit-Stand Transfers) walker, front-wheeled  -WM walker, front-wheeled  -WM      Row Name 06/15/23 1339 06/15/23 0747       Stand-Sit Transfer    Stand-Sit  Hanson (Transfers) standby assist;verbal cues  - standby assist  -    Assistive Device (Stand-Sit Transfers) walker, front-wheeled  -WM walker, front-wheeled  -WM      Row Name 06/15/23 1339 06/15/23 0747       Gait/Stairs (Locomotion)    Hanson Level (Gait) standby assist;verbal cues  - standby assist  -    Assistive Device (Gait) walker, front-wheeled  -WM walker, front-wheeled  -    Distance in Feet (Gait) 350  -  -WM    Pattern (Gait) 4-point;step-through  -WM 4-point;step-through  -WM    Deviations/Abnormal Patterns (Gait) -- stride length decreased  -    Hanson Level (Stairs) contact guard;stand by assist;verbal cues  - --    Handrail Location (Stairs) both sides  - --    Number of Steps (Stairs) 2 x 3  -WM --    Ascending Technique (Stairs) step-to-step  -WM --    Descending Technique (Stairs) step-to-step  -WM --    Stairs, Impairments strength decreased;impaired balance  - --      Row Name 06/15/23 1339 06/15/23 0764       Safety Issues, Functional Mobility    Impairments Affecting Function (Mobility) balance;endurance/activity tolerance;strength  - balance;endurance/activity tolerance;strength  -      Row Name 06/15/23 0747          Balance    Balance Interventions standing;dynamic  Forward/backward walking, side stepping in parallel bars  -       Row Name 06/15/23 0706          Hip (Therapeutic Exercise)    Hip Strengthening (Therapeutic Exercise) bilateral;aBduction;aDduction;marching while seated;marching while standing;sitting;standing;2 lb free weight;resistance band;green;15 repititions;2 sets  -       Row Name 06/15/23 0752          Knee (Therapeutic Exercise)    Knee Strengthening (Therapeutic Exercise) bilateral;LAQ (long arc quad);hamstring curls;sitting;2 lb free weight;resistance band;green;15 repititions;2 sets  -       Row Name 06/15/23 0747          Ankle (Therapeutic Exercise)    Ankle Strengthening (Therapeutic Exercise) bilateral;2 lb  free weight;15 repititions;2 sets  Heel raises in parallel bars  -WM       Row Name 06/15/23 0747          Aerobic Exercise    Type (Aerobic Exercise) --  Nustep x 15 minutes, load 3  -WM       Row Name 06/15/23 1339 06/15/23 0747       Progress Summary (PT)    Progress Toward Functional Goals (PT) progress toward functional goals is good  -WM progress toward functional goals is good  -              User Key  (r) = Recorded By, (t) = Taken By, (c) = Cosigned By      Initials Name Provider Type     Cristhian Artis PTA Physical Therapist Assistant                  Section G              Section GG                       Physical Therapy Education       Title: PT OT SLP Therapies (In Progress)       Topic: Physical Therapy (In Progress)       Point: Mobility training (In Progress)       Learning Progress Summary             Patient Acceptance, E, NR by  at 6/10/2023 1409                         Point: Home exercise program (Not Started)       Learner Progress:  Not documented in this visit.              Point: Body mechanics (Not Started)       Learner Progress:  Not documented in this visit.              Point: Precautions (In Progress)       Learning Progress Summary             Patient Acceptance, E, NR by  at 6/10/2023 1409                                         User Key       Initials Effective Dates Name Provider Type Discipline     04/25/21 -  Alisha Clark, PT Physical Therapist PT                  PT Recommendation and Plan     Progress Summary (PT)  Progress Toward Functional Goals (PT): progress toward functional goals is good   Outcome Measures       Row Name 06/15/23 1341 06/15/23 0750 06/14/23 0816       How much help from another person do you currently need...    Turning from your back to your side while in flat bed without using bedrails? 4  -WM 4  -WM 4  -WM    Moving from lying on back to sitting on the side of a flat bed without bedrails? 4  -WM 4  -WM 4  -WM    Moving to and from a bed  to a chair (including a wheelchair)? 3  -WM 3  -WM 3  -WM    Standing up from a chair using your arms (e.g., wheelchair, bedside chair)? 4  -WM 4  -WM 4  -WM    Climbing 3-5 steps with a railing? 3  -WM 3  -WM 3  -WM    To walk in hospital room? 3  -WM 3  -WM 3  -WM    AM-PAC 6 Clicks Score (PT) 21  -WM 21  -WM 21  -WM      Row Name 06/13/23 0833             How much help from another person do you currently need...    Turning from your back to your side while in flat bed without using bedrails? 4  -WM      Moving from lying on back to sitting on the side of a flat bed without bedrails? 4  -WM      Moving to and from a bed to a chair (including a wheelchair)? 3  -WM      Standing up from a chair using your arms (e.g., wheelchair, bedside chair)? 4  -WM      Climbing 3-5 steps with a railing? 3  -WM      To walk in hospital room? 3  -WM      AM-PAC 6 Clicks Score (PT) 21  -WM                User Key  (r) = Recorded By, (t) = Taken By, (c) = Cosigned By      Initials Name Provider Type    Cristhian Hawkins PTA Physical Therapist Assistant                     Time Calculation:    PT Charges       Row Name 06/15/23 1338 06/15/23 0746          Time Calculation    PT Received On 06/15/23  -WM 06/15/23  -WM        Timed Charges    27290 - PT Therapeutic Exercise Minutes -- 31  -WM     64908 - Gait Training Minutes  12  -WM 8  -WM     18615 - PT Therapeutic Activity Minutes 3  -WM 5  -WM        SNF Physical Therapy Minutes    Skilled Minutes- PT 15 min  -WM 44 min  -WM        Total Minutes    Timed Charges Total Minutes 15  -WM 44  -WM      Total Minutes 15  -WM 44  -WM               User Key  (r) = Recorded By, (t) = Taken By, (c) = Cosigned By      Initials Name Provider Type    Cristhian Hawkins PTA Physical Therapist Assistant                  Therapy Charges for Today       Code Description Service Date Service Provider Modifiers Qty    02945912244  PT THER PROC EA 15 MIN 6/14/2023 Cristhian Artis PTA GP 2     57584141863 HC GAIT TRAINING EA 15 MIN 6/14/2023 Cristhian Artis, PTA GP 1    73583290012 HC PT THER PROC EA 15 MIN 6/15/2023 Cristhian Artis, PTA GP 2    05081798826 HC GAIT TRAINING EA 15 MIN 6/15/2023 Cristhian Artis, PTA GP 1            PT G-Codes  Outcome Measure Options: AM-PAC 6 Clicks Daily Activity (OT), Optimal Instrument  AM-PAC 6 Clicks Score (PT): 21  AM-PAC 6 Clicks Score (OT): 24    Cristhian Artis PTA  6/15/2023

## 2023-06-15 NOTE — PLAN OF CARE
Goal Outcome Evaluation:    Resident is A&O, and pleasant with staff.  Ambulates with 1 assist, gait belt and walker.  No c/o pain this shift.  LBM this shift.  Blood sugar monitoring continued.  Sitting up in chair with call light in reach.

## 2023-06-15 NOTE — THERAPY TREATMENT NOTE
SNF - Occupational Therapy Treatment Note  SARKIS Dhillon    Patient Name: Cosmo Gauthier  : 1945    MRN: [id number]                              Today's Date: 6/15/2023       Admit Date: 2023    Visit Dx:     ICD-10-CM ICD-9-CM   1. Difficulty walking  R26.2 719.7   2. 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)    Stage 3b chronic kidney disease (HCC)    Thrombocytopenia (HCC)    Coronary artery disease involving native coronary artery of native heart without angina pectoris    Hx of CABG    Hyperlipemia, mixed    Degenerative spondylolisthesis    Hypertension, essential    Hereditary and idiopathic neuropathy, unspecified    Low lying conus medullaris    Mood disorder (HCC)    Non-alcoholic cirrhosis (HCC)    Sleep apnea    Spinal stenosis of lumbar region with neurogenic claudication    Vascular dementia without behavioral disturbance    Iron deficiency anemia    Hypothyroidism, adult    Traumatic subarachnoid hemorrhage with loss of consciousness of 30 minutes or less    Morbid (severe) obesity due to excess calories    Claudication of both lower extremities    Dyspnea on exertion    Medicare annual wellness visit, subsequent    Weakness     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     Primary osteoarthritis of left knee     Sleep apnea      Sleep apnea     Thrombocytopenia 05/22/2018    Thyroid disease      Past Surgical History:   Procedure Laterality Date    CARDIAC SURGERY      COLONOSCOPY  2015    CORONARY ANGIOPLASTY WITH STENT PLACEMENT      CORONARY ARTERY BYPASS GRAFT N/A 05/20/2021    Procedure: MIDLINE STERNOTOMY,CORONARY ARTERY BYPASS GRAFTING X 5, UTILIZING THE LEFT COMPA AND LEFT SAPHENOUS VEIN, ABHIJEET, PRP;  Surgeon: Jr Tom Tubbs MD;  Location: [facility];  Service: Cardiothoracic;  Laterality: N/A;    ENDOSCOPY      HERNIA REPAIR      JOINT REPLACEMENT      SHOULDER SURGERY      SHOULDER REPAIR    TOTAL KNEE ARTHROPLASTY      TRANSESOPHAGEAL ECHOCARDIOGRAM (ABHIJEET) N/A 05/20/2021    Procedure: TRANSESOPHAGEAL ECHOCARDIOGRAM WITH ANESTHESIA;  Surgeon: Jr Tom Tubbs MD;  Location: [facility];  Service: Cardiothoracic;  Laterality: N/A;      General Information       Row Name 06/15/23 1028          OT Time and Intention    Document Type therapy note (daily note)  -     Mode of Treatment individual therapy;occupational therapy  -       Row Name 06/15/23 1028          General Information    Patient Profile Reviewed yes  -     Existing Precautions/Restrictions fall  -       Row Name 06/15/23 1028          Safety Issues, Functional Mobility    Safety Issues Affecting Function (Mobility) impulsivity;insight into deficits/self-awareness  -     Impairments Affecting Function (Mobility) balance;endurance/activity tolerance;strength  -               User Key  (r) = Recorded By, (t) = Taken By, (c) = Cosigned By      Initials Name Provider Type    GC Margot Nobles OT Occupational Therapist                     Mobility/ADL's       Row Name 06/15/23 1028          Transfers    Transfers stand-sit transfer;sit-stand transfer;other (see comments);bed-chair transfer;toilet transfer  -       Row Name 06/15/23 1028          Bed-Chair Transfer    Bed-Chair [place] (Transfers) modified independence  -     Assistive  Device (Bed-Chair Transfers) walker, front-wheeled  -       Row Name 06/15/23 1028          Sit-Stand Transfer    Sit-Stand [place] (Transfers) modified independence;supervision  -     Assistive Device (Sit-Stand Transfers) walker, front-wheeled  -       Row Name 06/15/23 1028          Stand-Sit Transfer    Stand-Sit [place] (Transfers) modified independence;supervision  -     Assistive Device (Stand-Sit Transfers) walker, front-wheeled  -       Row Name 06/15/23 1028          Toilet Transfer    [place] Level (Toilet Transfer) standby assist  -     Assistive Device (Toilet Transfer) raised toilet seat  -       Row Name 06/15/23 1028          Functional Mobility    Functional Mobility- Ind. Level supervision required;verbal cues required  -     Functional Mobility- Device walker, front-wheeled  -     Functional Mobility- Comment Pt. ambulated to/from shower room, BR and therapy gym with SPV with VCs for safety.  Pt. impulsiveness sometimes puts is balance at risk for falls  -       Row Name 06/15/23 1028          Bathing Assessment/Intervention    [place] Level (Bathing) bathing skills;set up;supervision  -     Assistive Devices (Bathing) grab bar, tub/shower;hand-held shower spray hose;tub bench  -       Row Name 06/15/23 1028          Upper Body Dressing Assessment/Training    [place] Level (Upper Body Dressing) upper body dressing skills;standby assist  -[facility] Name 06/15/23 1028          Lower Body Dressing Assessment/Training    [place] Level (Lower Body Dressing) lower body dressing skills;supervision;verbal cues  -       Row Name 06/15/23 1028          Grooming Assessment/Training    [place] Level (Grooming) grooming skills;set up;standby assist  -[facility] Name 06/15/23 1028          Toileting Assessment/Training    [place] Level (Toileting) toileting skills;supervision  -               User Key  (r) = Recorded By, (t) = Taken By,  (c) = Cosigned By      Initials Name Provider Type    Margot Almendarez OT Occupational Therapist                   Obj/Interventions       Row Name 06/15/23 1030          Elbow/Forearm (Therapeutic Exercise)    Elbow/Forearm (Therapeutic Exercise) strengthening exercise  -     Elbow/Forearm Strengthening (Therapeutic Exercise) 5 lb free weight;30 repititions;2 sets  -       Row Name 06/15/23 1030          Motor Skills    Functional Endurance Omnicycle x20 minutes  -     Therapeutic Exercise aerobic  -               User Key  (r) = Recorded By, (t) = Taken By, (c) = Cosigned By      Initials Name Provider Type    Margot Almendarez OT Occupational Therapist                   Goals/Plan    No documentation.                  Clinical Impression       Row Name 06/15/23 1031          Plan of Care Review    Plan of Care Reviewed With patient  -     Progress no change  -     Outcome Evaluation Pt. continues to require spv at times due to safety using RW with impulsiveness noted at times.  Pt. can be Mod Indep with safety techs using RW for BADLs however at times is unsafe and puts himself at risk.  Pt. will be d/cing home with spouse early next week.  -               User Key  (r) = Recorded By, (t) = Taken By, (c) = Cosigned By      Initials Name Provider Type    Margot Almendarez, PARTHA Occupational Therapist                   Outcome Measures       Row Name 06/15/23 1034          How much help from another is currently needed...    Putting on and taking off regular lower body clothing? 4  -GC     Bathing (including washing, rinsing, and drying) 4  -GC     Toileting (which includes using toilet bed pan or urinal) 4  -GC     Putting on and taking off regular upper body clothing 4  -GC     Taking care of personal grooming (such as brushing teeth) 4  -GC     Eating meals 4  -GC     AM-PAC 6 Clicks Score (OT) 24  -GC       Row Name 06/15/23 0750 06/15/23 0747       How much help from another person do you currently  need...    Turning from your back to your side while in flat bed without using bedrails? 4  -WM 4  -MJ    Moving from lying on back to sitting on the side of a flat bed without bedrails? 4  -WM 4  -MJ    Moving to and from a bed to a chair (including a wheelchair)? 3  -WM 3  -MJ    Standing up from a chair using your arms (e.g., wheelchair, bedside chair)? 4  -WM 4  -MJ    Climbing 3-5 steps with a railing? 3  -WM 3  -MJ    To walk in hospital room? 3  -WM 3  -MJ    AM-PAC 6 Clicks Score (PT) 21  -WM 21  -MJ    Highest level of mobility 6 --> Walked 10 steps or more  -WM 6 --> Walked 10 steps or more  -MJ      Row Name 06/15/23 1034          Optimal Instrument    Optimal Instrument Optimal - 3  -GC     Bending/Stooping 1  -GC     Standing 1  -GC     Reaching 1  -GC               User Key  (r) = Recorded By, (t) = Taken By, (c) = Cosigned By      Initials Name Provider Type    Cristhian Hawkins, WILBUR Physical Therapist Assistant    Margot Almendarez OT Occupational Therapist    Teto Huntley, RNA Registered Nurse                  Section G  Mobility  Dressing - self performance: limited assistance (staff provide guided maneuvering of limbs or other non-weight bearing assistance)  Dressing support/assistance: One person assist  Eating - self performance: independent  Eating support/assistance: No setup or physical help  Toileting - self performance: limited assistance (staff provide guided maneuvering of limbs or other non-weight bearing assistance)  Toileting support/assistance: One person assist  Personal hygiene - self performance: supervision (oversight, encourage)  Personal hygiene support/assistance: One person assist  Bathing  Bathing - self performance: Supervision  Bathing support/assistance: One person assist     Range of Motion  Upper Extremity: No impairment  Section GG  SectionGG: Functional Ability/Goals, Adm  Self Care, Prior Functioning (JW2881K): 3. Independent  Functional Cognition, Prior  Functioning (NB0478L): 3. Independent  Self Care, Admission (Section GG)  Eating: Self-Care Admission Performance (BH0193V8): independent (06)  Oral Hygiene: Self-Care Admission Performance (GK4858N1): setup or clean-up assistance (05)  Toileting Hygiene: Self-Care Admission Performance (VN4950U6): supervision or touching assistance (04)  Shower/Bathe Self: Self-Care Admission Performance (PO0107R5): supervision or touching assistance (04)  Upper Body Dressing: Self-Care Admission Performance (GU5468I1): supervision or touching assistance (04)  Lower Body Dressing: Self-Care Admission Performance (SP7005H4): partial/moderate assistance (03)  Putting On/Taking Off Footwear: Self-Care Admission Performance (AR9406N9): partial/moderate assistance (03)  Mobility, Admission Performance (MC7570)  Toilet Transfer: Mobility Admission Performance (GT0359F0): supervision or touching assistance (04)  Section GG: Functional Ability/Goals, DC  Eating: Self-Care Discharge Goal (XV1368G3): independent (06)  Oral Hygiene: Self-Care Discharge Goal (LE7058M4): independent (06)  Toileting Hygiene: Self-Care Discharge Goal (EQ5347K1): independent (06)  Shower/Bathe Self: Self-Care Discharge Goal (OO2416N2): independent (06)  Upper Body Dressing: Self-Care Discharge Goal (PI5091A6): independent (06)  Lower Body Dressing: Self-Care Discharge Goal (HB8624Y6): independent (06)  Putting On/Taking Off Footwear: Self-Care Discharge Goal (KQ9039E9): independent (06)  Mobility (GG), Discharge Goal (PK5717)  Toilet Transfer: Mobility Discharge Goal (RM7170K1): independent (06)          Occupational Therapy Education       Title: PT OT SLP Therapies (In Progress)       Topic: Occupational Therapy (Done)       Point: ADL training (Done)       Description:   Instruct learner(s) on proper safety adaptation and remediation techniques during self care or transfers.   Instruct in proper use of assistive devices.                  Learning Progress  Summary             Patient Acceptance, E,D, VU,NR by  at 6/12/2023 1228                         Point: Home exercise program (Done)       Description:   Instruct learner(s) on appropriate technique for monitoring, assisting and/or progressing therapeutic exercises/activities.                  Learning Progress Summary             Patient Acceptance, E,D, VU,NR by  at 6/12/2023 1228                         Point: Precautions (Done)       Description:   Instruct learner(s) on prescribed precautions during self-care and functional transfers.                  Learning Progress Summary             Patient Acceptance, E,D, VU,NR by  at 6/12/2023 1228                         Point: Body mechanics (Done)       Description:   Instruct learner(s) on proper positioning and spine alignment during self-care, functional mobility activities and/or exercises.                  Learning Progress Summary             Patient Acceptance, E,D, VU,NR by  at 6/12/2023 1228                                         User Key       Initials Effective Dates Name Provider Type Discipline     06/16/21 -  Margot Nobles OT Occupational Therapist OT                  OT Recommendation and Plan  Planned Therapy Interventions (OT): activity tolerance training, adaptive equipment training, BADL retraining, functional balance retraining, IADL retraining, patient/caregiver education/training, ROM/therapeutic exercise, strengthening exercise, transfer/mobility retraining  Therapy Frequency (OT): 5 times/wk  Plan of Care Review  Plan of Care Reviewed With: patient  Progress: no change  Outcome Evaluation: Pt. continues to require spv at times due to safety using RW with impulsiveness noted at times.  Pt. can be Mod Indep with safety techs using RW for BADLs however at times is unsafe and puts himself at risk.  Pt. will be d/cing home with spouse early next week.     Time Calculation:    Time Calculation- OT       Row Name 06/15/23 1035 06/15/23  0746          Time Calculation- OT    OT Received On 06/15/23  -GC --        Timed Charges    60244 - OT Therapeutic Exercise Minutes 25  -GC --     70886 - Gait Training Minutes  -- 8  -WM     99906 - OT Therapeutic Activity Minutes 10  -GC --     44643 - OT Self Care/Mgmt Minutes 23  -GC --        [facility] Occupational Therapy Minutes    Skilled Minutes- OT 58 min  -GC --        Total Minutes    Timed Charges Total Minutes 58  -GC 8  -WM      Total Minutes 58  -GC 8  -WM               User Key  (r) = Recorded By, (t) = Taken By, (c) = Cosigned By      Initials Name Provider Type     Cristhian Artis, PTA Physical Therapist Assistant     Margot Nobles, OT Occupational Therapist                  Therapy Charges for Today       Code Description Service Date Service Provider Modifiers Qty    60045969328 HC OT THER PROC EA 15 MIN 6/14/2023 GiulianoMargot bruner, OT GO 3    33005428680 HC OT THERAPEUTIC ACT EA 15 MIN 6/14/2023 GiulianoRodrigo brunerta, OT GO 1    04670652261 HC OT THER PROC EA 15 MIN 6/15/2023 GiulianoMargot bruner, OT GO 2    03075951908 HC OT THERAPEUTIC ACT EA 15 MIN 6/15/2023 Margot Nobles, OT GO 1    58662356206 HC OT SELF CARE/MGMT/TRAIN EA 15 MIN 6/15/2023 Margot Nobles, OT GO 1                 Margotdeandre Nobles, OT  6/15/2023

## 2023-06-15 NOTE — PROGRESS NOTES
[facility]     Progress Note    Patient Name: Cosmo Gauthier  : 1945  MRN: [id number]  Primary Care Physician:  Alex Buckley MD  Date of admission: 2023    Subjective   Subjective     Chief Complaint: Stable and doing well we will continue the current management patient feeling stronger ambulating well have explained about postural hypotension and neuropathy    HPI: With postural hypotension neuropathy stable doing well    Review of Systems   All systems were reviewed and negative except for: Reviewed    Objective   Objective     Vitals:   Temp:  [98.1 °F (36.7 °C)-98.8 °F (37.1 °C)] 98.8 °F (37.1 °C)  Heart Rate:  [] 107  Resp:  [18] 18  BP: (124-147)/(74-85) 124/74    Physical Exam    Constitutional: Awake, alert   Eyes: PERRLA, sclerae anicteric, no conjunctival injection   HENT: NCAT, mucous membranes moist   Neck: Supple, no thyromegaly, no lymphadenopathy, trachea midline   Respiratory: Clear to auscultation bilaterally, nonlabored respirations    Cardiovascular: RRR, no murmurs, rubs, or gallops, palpable pedal pulses bilaterally   Gastrointestinal: Positive bowel sounds, soft, nontender, nondistended   Musculoskeletal: No bilateral ankle edema, no clubbing or cyanosis to extremities   Psychiatric: Appropriate affect, cooperative   Neurologic: Oriented x 3, strength symmetric in all extremities, Cranial Nerves grossly intact to confrontation, speech clear   Skin: No rashes   No change  Result Review    Result Review:  I have personally reviewed the results from the time of this admission to 6/15/2023 08:32 EDT and agree with these findings:  [x]  Laboratory history of cirrhosis  []  Microbiology  []  Radiology  []  EKG/Telemetry   []  Cardiology/Vascular   []  Pathology  []  Old records  []  Other:  Most notable findings include: Cirrhosis with thrombocytopenia and history of lymphoma    Assessment & Plan   Assessment / Plan     Brief Patient Summary:  Cosmo Gauthier is a 77 y.o.  male who patient with peripheral neuropathy making good progress    Active Hospital Problems:  There are no active hospital problems to display for this patient.      Plan:   Continue with the current management    DVT prophylaxis:  No DVT prophylaxis order currently exists.    CODE STATUS:   Code Status (Patient has no pulse and is not breathing): CPR (Attempt to Resuscitate)  Medical Interventions (Patient has pulse or is breathing): Full Support    Disposition:  I expect patient to be discharged after the patient has been stabilized.    Electronically signed by Seven Saldana MD, 06/15/23, 8:32 AM EDT.      Part of this note may be an electronic transcription/translation of spoken language to printed text using the Dragon Dictation System.

## 2023-06-15 NOTE — THERAPY TREATMENT NOTE
SNF - Physical Therapy Treatment Note  SARKIS Dhillon     Patient Name: Cosmo Gauthier  : 1945  MRN: [id number]  Today's Date: 6/15/2023      Visit Dx:    ICD-10-CM ICD-9-CM   1. Difficulty walking  R26.2 719.7   2. 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)    Stage 3b chronic kidney disease (HCC)    Thrombocytopenia (HCC)    Coronary artery disease involving native coronary artery of native heart without angina pectoris    Hx of CABG    Hyperlipemia, mixed    Degenerative spondylolisthesis    Hypertension, essential    Hereditary and idiopathic neuropathy, unspecified    Low lying conus medullaris    Mood disorder (HCC)    Non-alcoholic cirrhosis (HCC)    Sleep apnea    Spinal stenosis of lumbar region with neurogenic claudication    Vascular dementia without behavioral disturbance    Iron deficiency anemia    Hypothyroidism, adult    Traumatic subarachnoid hemorrhage with loss of consciousness of 30 minutes or less    Morbid (severe) obesity due to excess calories    Claudication of both lower extremities    Dyspnea on exertion    Medicare annual wellness visit, subsequent    Weakness     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     Primary osteoarthritis of left knee     Sleep apnea     Sleep apnea     Thrombocytopenia 2018    Thyroid  disease      Past Surgical History:   Procedure Laterality Date    CARDIAC SURGERY      COLONOSCOPY  2015    CORONARY ANGIOPLASTY WITH STENT PLACEMENT      CORONARY ARTERY BYPASS GRAFT N/A 05/20/2021    Procedure: MIDLINE STERNOTOMY,CORONARY ARTERY BYPASS GRAFTING X 5, UTILIZING THE LEFT COMPA AND LEFT SAPHENOUS VEIN, ABHIJEET, PRP;  Surgeon: Jr Tom Tubbs MD;  Location: [facility];  Service: Cardiothoracic;  Laterality: N/A;    ENDOSCOPY      HERNIA REPAIR      JOINT REPLACEMENT      SHOULDER SURGERY      SHOULDER REPAIR    TOTAL KNEE ARTHROPLASTY      TRANSESOPHAGEAL ECHOCARDIOGRAM (ABHIJEET) N/A 05/20/2021    Procedure: TRANSESOPHAGEAL ECHOCARDIOGRAM WITH ANESTHESIA;  Surgeon: Jr Tom Tubbs MD;  Location: [facility];  Service: Cardiothoracic;  Laterality: N/A;       PT Assessment (last 12 hours)       PT Evaluation and Treatment       Row Name 06/15/23 0747          Physical Therapy Time and Intention    Document Type therapy note (daily note)  -WM     Mode of Treatment individual therapy;physical therapy  -WM     Patient Effort good  -WM     Symptoms Noted During/After Treatment fatigue  -WM       Row Name 06/15/23 0747          Pain Scale: FACES Pre/Post-Treatment    Pain: FACES Scale, Pretreatment 0-->no hurt  -WM     Posttreatment Pain Rating 0-->no hurt  -WM       Row Name 06/15/23 0747          Sit-Stand Transfer    Sit-Stand [place] (Transfers) standby assist  -     Assistive Device (Sit-Stand Transfers) walker, front-wheeled  -WM       Row Name 06/15/23 0747          Stand-Sit Transfer    Stand-Sit [place] (Transfers) standby assist  -     Assistive Device (Stand-Sit Transfers) walker, front-wheeled  -WM       Row Name 06/15/23 0747          Gait/Stairs (Locomotion)    [place] Level (Gait) standby assist  -     Assistive Device (Gait) walker, front-wheeled  -     Distance in Feet (Gait) 300  -WM     Pattern (Gait) 4-point;step-through  -WM     Deviations/Abnormal  Patterns (Gait) stride length decreased  -       Row Name 06/15/23 0747          Safety Issues, Functional Mobility    Impairments Affecting Function (Mobility) balance;endurance/activity tolerance;strength  -       Row Name 06/15/23 0747          Balance    Balance Interventions standing;dynamic  Forward/backward walking, side stepping in parallel bars  -       Row Name 06/15/23 0747          Hip (Therapeutic Exercise)    Hip Strengthening (Therapeutic Exercise) bilateral;aBduction;aDduction;marching while seated;marching while standing;sitting;standing;2 lb free weight;resistance band;green;15 repititions;2 sets  -       Row Name 06/15/23 0747          Knee (Therapeutic Exercise)    Knee Strengthening (Therapeutic Exercise) bilateral;LAQ (long arc quad);hamstring curls;sitting;2 lb free weight;resistance band;green;15 repititions;2 sets  -[facility] Name 06/15/23 0747          Ankle (Therapeutic Exercise)    Ankle Strengthening (Therapeutic Exercise) bilateral;2 lb free weight;15 repititions;2 sets  Heel raises in parallel bars  -[facility] Name 06/15/23 0747          Aerobic Exercise    Type (Aerobic Exercise) --  Nustep x 15 minutes, load 3  -       Row Name 06/15/23 0747          Progress Summary (PT)    Progress Toward Functional Goals (PT) progress toward functional goals is good  -               User Key  (r) = Recorded By, (t) = Taken By, (c) = Cosigned By      Initials Name Provider Type     Cristhian Artis PTA Physical Therapist Assistant                  Section G              Section GG                       Physical Therapy Education       Title: PT OT SLP Therapies (In Progress)       Topic: Physical Therapy (In Progress)       Point: Mobility training (In Progress)       Learning Progress Summary             Patient Acceptance, E, NR by CS at 6/10/2023 0650                         Point: Home exercise program (Not Started)       Learner Progress:  Not documented in this visit.               Point: Body mechanics (Not Started)       Learner Progress:  Not documented in this visit.              Point: Precautions (In Progress)       Learning Progress Summary             Patient Acceptance, E, NR by SUSAN at 6/10/2023 1409                                         User Key       Initials Effective Dates Name Provider Type Discipline    SUSAN 04/25/21 -  Alisha Clark, PT Physical Therapist PT                  PT Recommendation and Plan     Progress Summary (PT)  Progress Toward Functional Goals (PT): progress toward functional goals is good   Outcome Measures       Row Name 06/15/23 0750 06/14/23 0816 06/13/23 0833       How much help from another person do you currently need...    Turning from your back to your side while in flat bed without using bedrails? 4  -WM 4  -WM 4  -WM    Moving from lying on back to sitting on the side of a flat bed without bedrails? 4  -WM 4  -WM 4  -WM    Moving to and from a bed to a chair (including a wheelchair)? 3  -WM 3  -WM 3  -WM    Standing up from a chair using your arms (e.g., wheelchair, bedside chair)? 4  -WM 4  -WM 4  -WM    Climbing 3-5 steps with a railing? 3  -WM 3  -WM 3  -WM    To walk in hospital room? 3  -WM 3  -WM 3  -WM    AM-PAC 6 Clicks Score (PT) 21  -WM 21  -WM 21  -WM      Row Name 06/12/23 0800             How much help from another person do you currently need...    Turning from your back to your side while in flat bed without using bedrails? 4  -CS      Moving from lying on back to sitting on the side of a flat bed without bedrails? 4  -CS      Moving to and from a bed to a chair (including a wheelchair)? 3  -CS      Standing up from a chair using your arms (e.g., wheelchair, bedside chair)? 4  -CS      Climbing 3-5 steps with a railing? 3  -CS      To walk in hospital room? 3  -CS      AM-PAC 6 Clicks Score (PT) 21  -CS         Functional Assessment    Outcome Measure Options AM-PAC 6 Clicks Basic Mobility (PT)  -CS                User Key   (r) = Recorded By, (t) = Taken By, (c) = Cosigned By      Initials Name Provider Type     Cristhian Artis PTA Physical Therapist Assistant    Jamie Naik PTA Physical Therapist Assistant                     Time Calculation:    PT Charges       Row Name 06/15/23 0746             Time Calculation    PT Received On 06/15/23  -WM         Timed Charges    22015 - PT Therapeutic Exercise Minutes 31  -WM      92439 - Gait Training Minutes  8  -WM      62131 - PT Therapeutic Activity Minutes 5  -WM         SNF Physical Therapy Minutes    Skilled Minutes- PT 44 min  -WM         Total Minutes    Timed Charges Total Minutes 44  -WM       Total Minutes 44  -WM                User Key  (r) = Recorded By, (t) = Taken By, (c) = Cosigned By      Initials Name Provider Type     Cristhian Artis PTA Physical Therapist Assistant                  Therapy Charges for Today       Code Description Service Date Service Provider Modifiers Qty    28657965688 HC PT THER PROC EA 15 MIN 6/14/2023 Cristhian Artis PTA GP 2    33205607403 HC GAIT TRAINING EA 15 MIN 6/14/2023 Cristhain Artis PTA GP 1            PT G-Codes  Outcome Measure Options: AM-PAC 6 Clicks Daily Activity (OT), Optimal Instrument  AM-PAC 6 Clicks Score (PT): 21  AM-PAC 6 Clicks Score (OT): 24    Cristhian Artis PTA  6/15/2023

## 2023-06-15 NOTE — PLAN OF CARE
Goal Outcome Evaluation:           Progress: improving  Outcome Evaluation: Resident is alert & oriented times 4; calm, cooperative, and pleasant with team members; transfers with assist of one to BRM using walker; LBM noted on 6/14/23. Blood glucose continues to be monitored. Family members present in room at onset of shift. Will continue with POC.

## 2023-06-16 LAB
GLUCOSE BLDC GLUCOMTR-MCNC: 149 MG/DL (ref 70–99)
GLUCOSE BLDC GLUCOMTR-MCNC: 179 MG/DL (ref 70–99)
GLUCOSE BLDC GLUCOMTR-MCNC: 186 MG/DL (ref 70–99)
GLUCOSE BLDC GLUCOMTR-MCNC: 207 MG/DL (ref 70–99)

## 2023-06-16 NOTE — THERAPY TREATMENT NOTE
SNF - Physical Therapy Treatment Note  SARKIS Dhillon     Patient Name: Cosmo Gauthier  : 1945  MRN: [id number]  Today's Date: 2023      Visit Dx:    ICD-10-CM ICD-9-CM   1. Difficulty walking  R26.2 719.7   2. 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)    Stage 3b chronic kidney disease (HCC)    Thrombocytopenia (HCC)    Coronary artery disease involving native coronary artery of native heart without angina pectoris    Hx of CABG    Hyperlipemia, mixed    Degenerative spondylolisthesis    Hypertension, essential    Hereditary and idiopathic neuropathy, unspecified    Low lying conus medullaris    Mood disorder (HCC)    Non-alcoholic cirrhosis (HCC)    Sleep apnea    Spinal stenosis of lumbar region with neurogenic claudication    Vascular dementia without behavioral disturbance    Iron deficiency anemia    Hypothyroidism, adult    Traumatic subarachnoid hemorrhage with loss of consciousness of 30 minutes or less    Morbid (severe) obesity due to excess calories    Claudication of both lower extremities    Dyspnea on exertion    Medicare annual wellness visit, subsequent    Weakness     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     Primary osteoarthritis of left knee     Sleep apnea     Sleep apnea     Thrombocytopenia 2018    Thyroid  disease      Past Surgical History:   Procedure Laterality Date    CARDIAC SURGERY      COLONOSCOPY  2015    CORONARY ANGIOPLASTY WITH STENT PLACEMENT      CORONARY ARTERY BYPASS GRAFT N/A 05/20/2021    Procedure: MIDLINE STERNOTOMY,CORONARY ARTERY BYPASS GRAFTING X 5, UTILIZING THE LEFT [place]MP[place] AND LEFT SAPHENOUS VEIN, ABHIJEET, PRP;  Surgeon: Jr Tom Tubbs MD;  Location: [facility];  Service: Cardiothoracic;  Laterality: N/A;    ENDOSCOPY      HERNIA REPAIR      JOINT REPLACEMENT      SHOULDER SURGERY      SHOULDER REPAIR    TOTAL KNEE ARTHROPLASTY      TRANSESOPHAGEAL ECHOCARDIOGRAM (ABHIJEET) N/A 05/20/2021    Procedure: TRANSESOPHAGEAL ECHOCARDIOGRAM WITH ANESTHESIA;  Surgeon: Jr Tom Tubbs MD;  Location: [facility];  Service: Cardiothoracic;  Laterality: N/A;       PT Assessment (last 12 hours)       PT Evaluation and Treatment       Row Name 06/16/23 1338 06/16/23 0743       Physical Therapy Time and Intention    Document Type therapy note (daily note)  -WM therapy note (daily note)  -WM    Mode of Treatment individual therapy;physical therapy  -WM individual therapy;physical therapy  -WM    Patient Effort good  -WM good  -WM    Symptoms Noted During/After Treatment fatigue  -WM fatigue  -WM      Row Name 06/16/23 0743          Pain Scale: FACES Pre/Post-Treatment    Pain: FACES Scale, Pretreatment 0-->no hurt  -WM     Posttreatment Pain Rating 0-->no hurt  -WM       Row Name 06/16/23 1338 06/16/23 0743       Sit-Stand Transfer    Sit-Stand [place] (Transfers) standby assist;verbal cues  -WM standby assist  -WM    Assistive Device (Sit-Stand Transfers) walker, front-wheeled  -WM walker, front-wheeled  -WM      Row Name 06/16/23 1338 06/16/23 0743       Stand-Sit Transfer    Stand-Sit [place] (Transfers) standby assist;verbal cues  -WM standby assist  -WM    Assistive Device (Stand-Sit Transfers) walker, front-wheeled  -WM walker, front-wheeled  -WM      Row Name 06/16/23 1338 06/16/23 0743        Gait/Stairs (Locomotion)    [place] (Gait) standby assist;verbal cues  - standby assist  -    Assistive Device (Gait) walker, front-wheeled  -WM walker, front-wheeled  -    Distance in Feet (Gait) 300  - x 2  -WM    Pattern (Gait) 4-point;step-through  -WM 4-point;step-through  -WM    Deviations/Abnormal Patterns (Gait) gait speed decreased;stride length decreased  - gait speed decreased;stride length decreased  -      Row Name 06/16/23 1338 06/16/23 0743       Safety Issues, Functional Mobility    Impairments Affecting Function (Mobility) balance;endurance/activity tolerance;strength  - balance;endurance/activity tolerance;strength  -      Row Name 06/16/23 0743          Hip (Therapeutic Exercise)    Hip Strengthening (Therapeutic Exercise) bilateral;aBduction;aDduction;marching while seated;marching while standing;sitting;standing;2 lb free weight;resistance band;green;15 repititions;2 sets  -       Row Name 06/16/23 0743          Knee (Therapeutic Exercise)    Knee Strengthening (Therapeutic Exercise) bilateral;LAQ (long arc quad);hamstring curls;sitting;2 lb free weight;resistance band;green;15 repititions;2 sets  -       Row Name 06/16/23 0743          Ankle (Therapeutic Exercise)    Ankle Strengthening (Therapeutic Exercise) bilateral;standing;2 lb free weight;15 repititions;2 sets  Heel raises in parallel bars  -       Row Name 06/16/23 0743          Aerobic Exercise    Type (Aerobic Exercise) --  Nustep x 15 minutes, load 3  -       Row Name 06/16/23 1338 06/16/23 0743       Progress Summary (PT)    Progress Toward Functional Goals (PT) progress toward functional goals is good  - progress toward functional goals is good  -              User Key  (r) = Recorded By, (t) = Taken By, (c) = Cosigned By      Initials Name Provider Type    Cristhian Hawkins PTA Physical Therapist Assistant                  Section G              Section GG                        Physical Therapy Education       Title: PT OT SLP Therapies (In Progress)       Topic: Physical Therapy (In Progress)       Point: Mobility training (In Progress)       Learning Progress Summary             Patient Acceptance, E, NR by  at 6/10/2023 1409                         Point: Home exercise program (Not Started)       Learner Progress:  Not documented in this visit.              Point: Body mechanics (Not Started)       Learner Progress:  Not documented in this visit.              Point: Precautions (In Progress)       Learning Progress Summary             Patient Acceptance, E, NR by  at 6/10/2023 1409                                         User Key       Initials Effective Dates Name Provider Type Discipline     04/25/21 -  Alisha Clark, PT Physical Therapist PT                  PT Recommendation and Plan     Progress Summary (PT)  Progress Toward Functional Goals (PT): progress toward functional goals is good   Outcome Measures       Row Name 06/16/23 1340 06/16/23 0748 06/15/23 1341       How much help from another person do you currently need...    Turning from your back to your side while in flat bed without using bedrails? 4  -WM 4  -WM 4  -WM    Moving from lying on back to sitting on the side of a flat bed without bedrails? 4  -WM 4  -WM 4  -WM    Moving to and from a bed to a chair (including a wheelchair)? 3  -WM 3  -WM 3  -WM    Standing up from a chair using your arms (e.g., wheelchair, bedside chair)? 4  -WM 4  -WM 4  -WM    Climbing 3-5 steps with a railing? 3  -WM 3  -WM 3  -WM    To walk in hospital room? 3  -WM 3  -WM 3  -WM    AM-PAC 6 Clicks Score (PT) 21  -WM 21  -WM 21  -WM      Row Name 06/15/23 0750 06/14/23 0816          How much help from another person do you currently need...    Turning from your back to your side while in flat bed without using bedrails? 4  -WM 4  -WM     Moving from lying on back to sitting on the side of a flat bed without bedrails? 4  -WM 4  -WM      Moving to and from a bed to a chair (including a wheelchair)? 3  -WM 3  -WM     Standing up from a chair using your arms (e.g., wheelchair, bedside chair)? 4  -WM 4  -WM     Climbing 3-5 steps with a railing? 3  -WM 3  -WM     To walk in hospital room? 3  -WM 3  -WM     AM-PAC 6 Clicks Score (PT) 21  -WM 21  -WM               User Key  (r) = Recorded By, (t) = Taken By, (c) = Cosigned By      Initials Name Provider Type    Cristhian Hawkins PTA Physical Therapist Assistant                     Time Calculation:    PT Charges       Row Name 06/16/23 1337 06/16/23 0742          Time Calculation    PT Received On 06/16/23  -WM 06/16/23  -WM        Timed Charges    00676 - PT Therapeutic Exercise Minutes -- 30  -WM     90582 - Gait Training Minutes  8  -WM 12  -WM     15263 - PT Therapeutic Activity Minutes 2  -WM 3  -WM        SNF Physical Therapy Minutes    Skilled Minutes- PT 10 min  -WM 45 min  -WM        Total Minutes    Timed Charges Total Minutes 10  -WM 45  -WM      Total Minutes 10  -WM 45  -WM               User Key  (r) = Recorded By, (t) = Taken By, (c) = Cosigned By      Initials Name Provider Type    Cristhian Hawkins PTA Physical Therapist Assistant                  Therapy Charges for Today       Code Description Service Date Service Provider Modifiers Qty    08224863814 HC PT THER PROC EA 15 MIN 6/15/2023 Cristhian Artis PTA GP 2    15986658027 HC GAIT TRAINING EA 15 MIN 6/15/2023 Cristhian Artis, PTA GP 1    98315603923 HC GAIT TRAINING EA 15 MIN 6/15/2023 Cristhian Artis PTA GP 1    83474818472 HC PT THER PROC EA 15 MIN 6/16/2023 Cristhian Artis, PTA GP 2    41999473909 HC GAIT TRAINING EA 15 MIN 6/16/2023 Cristhian Artis, WILBUR GP 1            PT G-Codes  Outcome Measure Options: AM-PAC 6 Clicks Daily Activity (OT), Optimal Instrument  AM-PAC 6 Clicks Score (PT): 21  AM-PAC 6 Clicks Score (OT): 24    Cristhian Artis PTA  6/16/2023

## 2023-06-16 NOTE — THERAPY TREATMENT NOTE
SNF - Occupational Therapy Treatment Note  SARKIS Dhillon    Patient Name: Cosmo Gauthier  : 1945    MRN: [id number]                              Today's Date: 2023       Admit Date: 2023    Visit Dx:     ICD-10-CM ICD-9-CM   1. Difficulty walking  R26.2 719.7   2. 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)    Stage 3b chronic kidney disease (HCC)    Thrombocytopenia (HCC)    Coronary artery disease involving native coronary artery of native heart without angina pectoris    Hx of CABG    Hyperlipemia, mixed    Degenerative spondylolisthesis    Hypertension, essential    Hereditary and idiopathic neuropathy, unspecified    Low lying conus medullaris    Mood disorder (HCC)    Non-alcoholic cirrhosis (HCC)    Sleep apnea    Spinal stenosis of lumbar region with neurogenic claudication    Vascular dementia without behavioral disturbance    Iron deficiency anemia    Hypothyroidism, adult    Traumatic subarachnoid hemorrhage with loss of consciousness of 30 minutes or less    Morbid (severe) obesity due to excess calories    Claudication of both lower extremities    Dyspnea on exertion    Medicare annual wellness visit, subsequent    Weakness     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     Primary osteoarthritis of left knee     Sleep apnea      Sleep apnea     Thrombocytopenia 05/22/2018    Thyroid disease      Past Surgical History:   Procedure Laterality Date    CARDIAC SURGERY      COLONOSCOPY  2015    CORONARY ANGIOPLASTY WITH STENT PLACEMENT      CORONARY ARTERY BYPASS GRAFT N/A 05/20/2021    Procedure: MIDLINE STERNOTOMY,CORONARY ARTERY BYPASS GRAFTING X 5, UTILIZING THE LEFT COMPA AND LEFT SAPHENOUS VEIN, ABHIJEET, PRP;  Surgeon: Jr Tom Tubbs MD;  Location: [facility];  Service: Cardiothoracic;  Laterality: N/A;    ENDOSCOPY      HERNIA REPAIR      JOINT REPLACEMENT      SHOULDER SURGERY      SHOULDER REPAIR    TOTAL KNEE ARTHROPLASTY      TRANSESOPHAGEAL ECHOCARDIOGRAM (ABHIJEET) N/A 05/20/2021    Procedure: TRANSESOPHAGEAL ECHOCARDIOGRAM WITH ANESTHESIA;  Surgeon: Jr Tom Tubbs MD;  Location: [facility];  Service: Cardiothoracic;  Laterality: N/A;      General Information       Row Name 06/16/23 1316          OT Time and Intention    Document Type therapy note (daily note)  -     Mode of Treatment individual therapy;occupational therapy  -       Row Name 06/16/23 1316          General Information    Patient Profile Reviewed yes  -     Existing Precautions/Restrictions fall  -       Row Name 06/16/23 1316          Safety Issues, Functional Mobility    Impairments Affecting Function (Mobility) balance;endurance/activity tolerance;strength  -               User Key  (r) = Recorded By, (t) = Taken By, (c) = Cosigned By      Initials Name Provider Type    Margot Almendarez OT Occupational Therapist                     Mobility/ADL's       Row Name 06/16/23 1316          Functional Mobility    Functional Mobility- Ind. Level supervision required;verbal cues required  -     Functional Mobility- Device walker, front-wheeled  -               User Key  (r) = Recorded By, (t) = Taken By, (c) = Cosigned By      Initials Name Provider Type    Margot Almendarez OT Occupational Therapist                   Obj/Interventions       Row  Name 06/16/23 1316          Shoulder (Therapeutic Exercise)    Shoulder (Therapeutic Exercise) strengthening exercise  -     Shoulder Strengthening (Therapeutic Exercise) bilateral;flexion;extension;horizontal aBduction/aDduction;aBduction;aDduction;15 repititions;5 lb free weight  -       Row Name 06/16/23 1316          Elbow/Forearm (Therapeutic Exercise)    Elbow/Forearm (Therapeutic Exercise) strengthening exercise;other (see comments)  Ludmila 2 sets of 50 and 20 reps with #25  -     Elbow/Forearm Strengthening (Therapeutic Exercise) bilateral;flexion;extension;30 repititions;3 sets  -       Row Name 06/16/23 1316          Motor Skills    Functional Endurance Omnicycle x20 minutes for endurance training.  -     Therapeutic Exercise shoulder;elbow/forearm;aerobic  -               User Key  (r) = Recorded By, (t) = Taken By, (c) = Cosigned By      Initials Name Provider Type     Margot Nobles OT Occupational Therapist                   Goals/Plan    No documentation.                  Clinical Impression       Row Name 06/16/23 1318          Pain Scale: FACES Pre/Post-Treatment    Pain: FACES Scale, Pretreatment 0-->no hurt  -     Posttreatment Pain Rating 0-->no hurt  -       Row Name 06/16/23 1318          Plan of Care Review    Progress no change  -     Outcome Evaluation Patient is Mod Indep in a structured enviroment using RW.  Pt. can still be impulsive at times and requires occasional VCs for safety.  Pt. denies any dizziness.  Pt. scheduled to d/c home with f/u HH Monday or Tues.  Wife verbalizes an understanding pt. does require a structured environment and is aware of his impulsiveness. Pt. recommended to use his RW during adls to decrease his risk for falls.  -       Row Name 06/16/23 1318          Therapy Plan Review/Discharge Plan (OT)    Anticipated Discharge Disposition (OT) home with home health;home with outpatient therapy services  -               User Key  (r) = Recorded  By, (t) = Taken By, (c) = Cosigned By      Initials Name Provider Type    Margot Almendarez OT Occupational Therapist                   Outcome Measures       Row Name 06/16/23 1320          How much help from another is currently needed...    Putting on and taking off regular lower body clothing? 4  -GC     Bathing (including washing, rinsing, and drying) 4  -GC     Toileting (which includes using toilet bed pan or urinal) 4  -GC     Putting on and taking off regular upper body clothing 4  -GC     Taking care of personal grooming (such as brushing teeth) 4  -GC     Eating meals 4  -GC     AM-PAC 6 Clicks Score (OT) 24  -GC       Row Name 06/16/23 0748          How much help from another person do you currently need...    Turning from your back to your side while in flat bed without using bedrails? 4  -WM     Moving from lying on back to sitting on the side of a flat bed without bedrails? 4  -WM     Moving to and from a bed to a chair (including a wheelchair)? 3  -WM     Standing up from a chair using your arms (e.g., wheelchair, bedside chair)? 4  -WM     Climbing 3-5 steps with a railing? 3  -WM     To walk in hospital room? 3  -WM     AM-PAC 6 Clicks Score (PT) 21  -WM     Highest level of mobility 6 --> Walked 10 steps or more  -WM               User Key  (r) = Recorded By, (t) = Taken By, (c) = Cosigned By      Initials Name Provider Type    Cristhian Hawkins PTA Physical Therapist Assistant    Margot Almendarez OT Occupational Therapist                  Section G  Mobility  Dressing - self performance: limited assistance (staff provide guided maneuvering of limbs or other non-weight bearing assistance)  Dressing support/assistance: One person assist  Eating - self performance: independent  Eating support/assistance: No setup or physical help  Toileting - self performance: limited assistance (staff provide guided maneuvering of limbs or other non-weight bearing assistance)  Toileting support/assistance: One  person assist  Personal hygiene - self performance: supervision (oversight, encourage)  Personal hygiene support/assistance: One person assist  Bathing  Bathing - self performance: Supervision  Bathing support/assistance: One person assist     Range of Motion  Upper Extremity: No impairment  Section GG  SectionGG: Functional Ability/Goals, Adm  Self Care, Prior Functioning ([id number]): 3. Independent  Functional Cognition, Prior Functioning (HS4194G): 3. Independent  Self Care, Admission (Section GG)  Eating: Self-Care Admission Performance (DK6806M5): independent (06)  Oral Hygiene: Self-Care Admission Performance (JH4594G7): setup or clean-up assistance (05)  Toileting Hygiene: Self-Care Admission Performance (RG0783Y8): supervision or touching assistance (04)  Shower/Bathe Self: Self-Care Admission Performance (CP3333Y6): supervision or touching assistance (04)  Upper Body Dressing: Self-Care Admission Performance (QP3319T3): supervision or touching assistance (04)  Lower Body Dressing: Self-Care Admission Performance (JK2932C0): partial/moderate assistance (03)  Putting On/Taking Off Footwear: Self-Care Admission Performance (II1990I7): partial/moderate assistance (03)  Mobility, Admission Performance (MG3332)  Toilet Transfer: Mobility Admission Performance (JI6408T3): supervision or touching assistance (04)  Section GG: Functional Ability/Goals, DC  Eating: Self-Care Discharge Goal (PZ4616D8): independent (06)  Oral Hygiene: Self-Care Discharge Goal (WQ2361L6): independent (06)  Toileting Hygiene: Self-Care Discharge Goal (PN4026G2): independent (06)  Shower/Bathe Self: Self-Care Discharge Goal (FG7789H6): independent (06)  Upper Body Dressing: Self-Care Discharge Goal (GU9757B0): independent (06)  Lower Body Dressing: Self-Care Discharge Goal (VD3819I6): independent (06)  Putting On/Taking Off Footwear: Self-Care Discharge Goal (UH1139F9): independent (06)  Mobility (GG), Discharge Goal (YF3214)  Toilet  Transfer: Mobility Discharge Goal (RA0902V9): independent (06)          Occupational Therapy Education       Title: PT OT SLP Therapies (In Progress)       Topic: Occupational Therapy (Done)       Point: ADL training (Done)       Description:   Instruct learner(s) on proper safety adaptation and remediation techniques during self care or transfers.   Instruct in proper use of assistive devices.                  Learning Progress Summary             Patient Acceptance, E,D, VU,NR by  at 6/12/2023 1228                         Point: Home exercise program (Done)       Description:   Instruct learner(s) on appropriate technique for monitoring, assisting and/or progressing therapeutic exercises/activities.                  Learning Progress Summary             Patient Acceptance, E,D, VU,NR by  at 6/12/2023 1228                         Point: Precautions (Done)       Description:   Instruct learner(s) on prescribed precautions during self-care and functional transfers.                  Learning Progress Summary             Patient Acceptance, E,D, VU,NR by  at 6/12/2023 1228                         Point: Body mechanics (Done)       Description:   Instruct learner(s) on proper positioning and spine alignment during self-care, functional mobility activities and/or exercises.                  Learning Progress Summary             Patient Acceptance, E,D, VU,NR by  at 6/12/2023 1228                                         User Key       Initials Effective Dates Name Provider Type Discipline     06/16/21 -  Margot Nobles OT Occupational Therapist OT                  OT Recommendation and Plan  Planned Therapy Interventions (OT): activity tolerance training, adaptive equipment training, BADL retraining, functional balance retraining, IADL retraining, patient/caregiver education/training, ROM/therapeutic exercise, strengthening exercise, transfer/mobility retraining  Therapy Frequency (OT): 5 times/wk  Plan of Care  Review  Plan of Care Reviewed With: patient  Progress: no change  Outcome Evaluation: Patient is Mod Indep in a structured enviroment using RW.  Pt. can still be impulsive at times and requires occasional VCs for safety.  Pt. denies any dizziness.  Pt. scheduled to d/c home with f/u HH Monday or Tues.  Wife verbalizes an understanding pt. does require a structured environment and is aware of his impulsiveness. Pt. recommended to use his RW during adls to decrease his risk for falls.     Time Calculation:    Time Calculation- OT       Row Name 06/16/23 1321 06/16/23 0742          Time Calculation- OT    OT Received On 06/16/23  -GC --        Timed Charges    42094 - OT Therapeutic Exercise Minutes 38  -GC --     20770 - Gait Training Minutes  -- 12  -WM     27127 - OT Therapeutic Activity Minutes 5  -GC --        SNF Occupational Therapy Minutes    Skilled Minutes- OT 43 min  -GC --        Total Minutes    Timed Charges Total Minutes 43  -GC 12  -WM      Total Minutes 43  -GC 12  -WM               User Key  (r) = Recorded By, (t) = Taken By, (c) = Cosigned By      Initials Name Provider Type    Cristhian Hawkins, PTA Physical Therapist Assistant     Margot Nobles OT Occupational Therapist                  Therapy Charges for Today       Code Description Service Date Service Provider Modifiers Qty    57930659422 HC OT THER PROC EA 15 MIN 6/15/2023 Margot Nobles OT GO 2    47505115231 HC OT THERAPEUTIC ACT EA 15 MIN 6/15/2023 Margot Nobles OT GO 1    52763627038 HC OT SELF CARE/MGMT/TRAIN EA 15 MIN 6/15/2023 Margot Nobles OT GO 1    03486769298 HC OT THER PROC EA 15 MIN 6/16/2023 Margot Nobles, OT GO 3                 Margot Nobles OT  6/16/2023

## 2023-06-16 NOTE — PLAN OF CARE
Goal Outcome Evaluation:  Plan of Care Reviewed With: patient        Progress: improving  Outcome Evaluation: Alert and oriented x4; calls for assist as needed. Transfers to BR x1 person assist using rolling walker. No complaints of pain voiced tonight. Sliding scale administered for blood sugar at bedtime. Tenative discharge scheduled for Tuesday. Call light within reach. Continue plan of care.

## 2023-06-16 NOTE — THERAPY TREATMENT NOTE
SNF - Physical Therapy Treatment Note  SARKIS Dhillon     Patient Name: Cosmo Gauthier  : 1945  MRN: [id number]  Today's Date: 2023      Visit Dx:    ICD-10-CM ICD-9-CM   1. Difficulty walking  R26.2 719.7   2. 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)    Stage 3b chronic kidney disease (HCC)    Thrombocytopenia (HCC)    Coronary artery disease involving native coronary artery of native heart without angina pectoris    Hx of CABG    Hyperlipemia, mixed    Degenerative spondylolisthesis    Hypertension, essential    Hereditary and idiopathic neuropathy, unspecified    Low lying conus medullaris    Mood disorder (HCC)    Non-alcoholic cirrhosis (HCC)    Sleep apnea    Spinal stenosis of lumbar region with neurogenic claudication    Vascular dementia without behavioral disturbance    Iron deficiency anemia    Hypothyroidism, adult    Traumatic subarachnoid hemorrhage with loss of consciousness of 30 minutes or less    Morbid (severe) obesity due to excess calories    Claudication of both lower extremities    Dyspnea on exertion    Medicare annual wellness visit, subsequent    Weakness     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     Primary osteoarthritis of left knee     Sleep apnea     Sleep apnea     Thrombocytopenia 2018    Thyroid  disease      Past Surgical History:   Procedure Laterality Date    CARDIAC SURGERY      COLONOSCOPY  2015    CORONARY ANGIOPLASTY WITH STENT PLACEMENT      CORONARY ARTERY BYPASS GRAFT N/A 05/20/2021    Procedure: MIDLINE STERNOTOMY,CORONARY ARTERY BYPASS GRAFTING X 5, UTILIZING THE LEFT COMPA AND LEFT SAPHENOUS VEIN, ABHIJEET, PRP;  Surgeon: Jr Tom Tubbs MD;  Location: [facility];  Service: Cardiothoracic;  Laterality: N/A;    ENDOSCOPY      HERNIA REPAIR      JOINT REPLACEMENT      SHOULDER SURGERY      SHOULDER REPAIR    TOTAL KNEE ARTHROPLASTY      TRANSESOPHAGEAL ECHOCARDIOGRAM (ABHIJEET) N/A 05/20/2021    Procedure: TRANSESOPHAGEAL ECHOCARDIOGRAM WITH ANESTHESIA;  Surgeon: Jr Tom Tubbs MD;  Location: [facility];  Service: Cardiothoracic;  Laterality: N/A;       PT Assessment (last 12 hours)       PT Evaluation and Treatment       Row Name 06/16/23 0743          Physical Therapy Time and Intention    Document Type therapy note (daily note)  -WM     Mode of Treatment individual therapy;physical therapy  -WM     Patient Effort good  -WM     Symptoms Noted During/After Treatment fatigue  -WM       Row Name 06/16/23 0743          Pain Scale: FACES Pre/Post-Treatment    Pain: FACES Scale, Pretreatment 0-->no hurt  -WM     Posttreatment Pain Rating 0-->no hurt  -WM       Row Name 06/16/23 0743          Sit-Stand Transfer    Sit-Stand [place] (Transfers) standby assist  -     Assistive Device (Sit-Stand Transfers) walker, front-wheeled  -WM       Row Name 06/16/23 0743          Stand-Sit Transfer    Stand-Sit [place] (Transfers) standby assist  -     Assistive Device (Stand-Sit Transfers) walker, front-wheeled  -       Row Name 06/16/23 0743          Gait/Stairs (Locomotion)    [place] Level (Gait) standby assist  -     Assistive Device (Gait) walker, front-wheeled  -     Distance in Feet (Gait) 250 x 2  -WM     Pattern (Gait) 4-point;step-through  -WM     Deviations/Abnormal  Patterns (Gait) gait speed decreased;stride length decreased  -       Row Name 06/16/23 0743          Safety Issues, Functional Mobility    Impairments Affecting Function (Mobility) balance;endurance/activity tolerance;strength  -       Row Name 06/16/23 0743          Hip (Therapeutic Exercise)    Hip Strengthening (Therapeutic Exercise) bilateral;aBduction;aDduction;marching while seated;marching while standing;sitting;standing;2 lb free weight;resistance band;green;15 repititions;2 sets  -       Row Name 06/16/23 0743          Knee (Therapeutic Exercise)    Knee Strengthening (Therapeutic Exercise) bilateral;LAQ (long arc quad);hamstring curls;sitting;2 lb free weight;resistance band;green;15 repititions;2 sets  -       Row Name 06/16/23 0743          Ankle (Therapeutic Exercise)    Ankle Strengthening (Therapeutic Exercise) bilateral;standing;2 lb free weight;15 repititions;2 sets  Heel raises in parallel bars  -       Row Name 06/16/23 0743          Aerobic Exercise    Type (Aerobic Exercise) --  Nustep x 15 minutes, load 3  -       Row Name 06/16/23 0743          Progress Summary (PT)    Progress Toward Functional Goals (PT) progress toward functional goals is good  -               User Key  (r) = Recorded By, (t) = Taken By, (c) = Cosigned By      Initials Name Provider Type    Cristhian Hawkins PTA Physical Therapist Assistant                  Section G              Section GG                       Physical Therapy Education       Title: PT OT SLP Therapies (In Progress)       Topic: Physical Therapy (In Progress)       Point: Mobility training (In Progress)       Learning Progress Summary             Patient Acceptance, E, NR by CS at 6/10/2023 2506                         Point: Home exercise program (Not Started)       Learner Progress:  Not documented in this visit.              Point: Body mechanics (Not Started)       Learner Progress:  Not documented in this visit.              Point:  Precautions (In Progress)       Learning Progress Summary             Patient Acceptance, E, NR by SUSAN at 6/10/2023 1409                                         User Key       Initials Effective Dates Name Provider Type Discipline    SUSAN 04/25/21 -  Alisha Clark, PT Physical Therapist PT                  PT Recommendation and Plan     Progress Summary (PT)  Progress Toward Functional Goals (PT): progress toward functional goals is good   Outcome Measures       Row Name 06/16/23 0748 06/15/23 1341 06/15/23 0750       How much help from another person do you currently need...    Turning from your back to your side while in flat bed without using bedrails? 4  -WM 4  -WM 4  -WM    Moving from lying on back to sitting on the side of a flat bed without bedrails? 4  -WM 4  -WM 4  -WM    Moving to and from a bed to a chair (including a wheelchair)? 3  -WM 3  -WM 3  -WM    Standing up from a chair using your arms (e.g., wheelchair, bedside chair)? 4  -WM 4  -WM 4  -WM    Climbing 3-5 steps with a railing? 3  -WM 3  -WM 3  -WM    To walk in hospital room? 3  -WM 3  -WM 3  -WM    AM-PAC 6 Clicks Score (PT) 21  -WM 21  -WM 21  -WM      Row Name 06/14/23 0816 06/13/23 0833          How much help from another person do you currently need...    Turning from your back to your side while in flat bed without using bedrails? 4  -WM 4  -WM     Moving from lying on back to sitting on the side of a flat bed without bedrails? 4  -WM 4  -WM     Moving to and from a bed to a chair (including a wheelchair)? 3  -WM 3  -WM     Standing up from a chair using your arms (e.g., wheelchair, bedside chair)? 4  -WM 4  -WM     Climbing 3-5 steps with a railing? 3  -WM 3  -WM     To walk in hospital room? 3  -WM 3  -WM     AM-PAC 6 Clicks Score (PT) 21  -WM 21  -WM               User Key  (r) = Recorded By, (t) = Taken By, (c) = Cosigned By      Initials Name Provider Type    WM Cristhian Artis PTA Physical Therapist Assistant                      Time Calculation:    PT Charges       Row Name 06/16/23 0742             Time Calculation    PT Received On 06/16/23  -WM         Timed Charges    53182 - PT Therapeutic Exercise Minutes 30  -WM      22956 - Gait Training Minutes  12  -WM      38606 - PT Therapeutic Activity Minutes 3  -WM         SNF Physical Therapy Minutes    Skilled Minutes- PT 45 min  -WM         Total Minutes    Timed Charges Total Minutes 45  -WM       Total Minutes 45  -WM                User Key  (r) = Recorded By, (t) = Taken By, (c) = Cosigned By      Initials Name Provider Type     Cristhian Artis PTA Physical Therapist Assistant                  Therapy Charges for Today       Code Description Service Date Service Provider Modifiers Qty    31746379441 HC PT THER PROC EA 15 MIN 6/15/2023 Cristhian Artis PTA GP 2    25391157313 HC GAIT TRAINING EA 15 MIN 6/15/2023 Cristhian Artis PTA GP 1    27353023944 HC GAIT TRAINING EA 15 MIN 6/15/2023 Cristhian Artis PTA GP 1            PT G-Codes  Outcome Measure Options: AM-PAC 6 Clicks Daily Activity (OT), Optimal Instrument  AM-PAC 6 Clicks Score (PT): 21  AM-PAC 6 Clicks Score (OT): 24    Cristhian Artis PTA  6/16/2023

## 2023-06-16 NOTE — PLAN OF CARE
Goal Outcome Evaluation:   Alert & oriented x 4. Up to BR with walker & 1 person assist. No c/o's pain or discomfort this shift. Personal items & call light within reach.

## 2023-06-16 NOTE — PROGRESS NOTES
[facility]     Progress Note    Patient Name: Cosmo Gauthier  : 1945  MRN: [id number]  Primary Care Physician:  Alex Buckley MD  Date of admission: 2023    Subjective   Subjective     Chief Complaint: Stable and doing well not in acute distress we will continue the current management patient getting physical therapy feeling stronger    HPI: Doing going good progress no complaints    Review of Systems   All systems were reviewed and negative except for: Reviewed    Objective   Objective     Vitals:   Temp:  [97 °F (36.1 °C)-97.9 °F (36.6 °C)] 97 °F (36.1 °C)  Heart Rate:  [84-92] 92  Resp:  [14-18] 14  BP: (130-144)/(76-81) 130/81    Physical Exam    Constitutional: Awake, alert   Eyes: PERRLA, sclerae anicteric, no conjunctival injection   HENT: NCAT, mucous membranes moist   Neck: Supple, no thyromegaly, no lymphadenopathy, trachea midline   Respiratory: Clear to auscultation bilaterally, nonlabored respirations    Cardiovascular: RRR, no murmurs, rubs, or gallops, palpable pedal pulses bilaterally   Gastrointestinal: Positive bowel sounds, soft, nontender, nondistended   Musculoskeletal: No bilateral ankle edema, no clubbing or cyanosis to extremities   Psychiatric: Appropriate affect, cooperative   Neurologic: Oriented x 3, strength symmetric in all extremities, Cranial Nerves grossly intact to confrontation, speech clear   Skin: No rashes   No change  Result Review    Result Review:  I have personally reviewed the results from the time of this admission to 2023 10:22 EDT and agree with these findings:  [x]  Laboratory thrombo-cytopenia  []  Microbiology  []  Radiology  []  EKG/Telemetry   []  Cardiology/Vascular   []  Pathology  []  Old records  []  Other:  Most notable findings include: Thrombocytopenia    Assessment & Plan   Assessment / Plan     Brief Patient Summary:  Cosmo Gauthier is a 77 y.o. male who patient admitted with neuropathy weakness postural hypotension he has been  making good progress with physical therapy    Active Hospital Problems:  There are no active hospital problems to display for this patient.      Plan:   Continue physical therapy    DVT prophylaxis:  No DVT prophylaxis order currently exists.    CODE STATUS:   Code Status (Patient has no pulse and is not breathing): CPR (Attempt to Resuscitate)  Medical Interventions (Patient has pulse or is breathing): Full Support    Disposition:  I expect patient to be discharged tentative plan for discharge next week.    Electronically signed by Seven Saldana MD, 06/16/23, 10:22 AM EDT.      Part of this note may be an electronic transcription/translation of spoken language to printed text using the Dragon Dictation System.

## 2023-06-17 LAB
GLUCOSE BLDC GLUCOMTR-MCNC: 170 MG/DL (ref 70–99)
GLUCOSE BLDC GLUCOMTR-MCNC: 176 MG/DL (ref 70–99)
GLUCOSE BLDC GLUCOMTR-MCNC: 191 MG/DL (ref 70–99)
GLUCOSE BLDC GLUCOMTR-MCNC: 201 MG/DL (ref 70–99)

## 2023-06-17 NOTE — PLAN OF CARE
Goal Outcome Evaluation:  Plan of Care Reviewed With: patient        Progress: improving  Outcome Evaluation: Resident alert, oriented, able to make needs known to staff. Transfers with assist of one to bathroom. Uses urinal independently at BS. Bloodglucose elevated for all meals, covered with sliding scale. Resident voiced concers over elevated bloodsugars. Takes Lantus at home, PA notifed. Participated in therapy as orderd, tolerated well. Ambulated with staff in [place], tolerated well. Resident pleasant and cooperative.

## 2023-06-17 NOTE — THERAPY TREATMENT NOTE
SNF - Physical Therapy Treatment Note  SARKIS Dhillon     Patient Name: Cosmo Gauthier  : 1945  MRN: [id number]  Today's Date: 2023      Visit Dx:    ICD-10-CM ICD-9-CM   1. Difficulty walking  R26.2 719.7   2. 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)    Stage 3b chronic kidney disease (HCC)    Thrombocytopenia (HCC)    Coronary artery disease involving native coronary artery of native heart without angina pectoris    Hx of CABG    Hyperlipemia, mixed    Degenerative spondylolisthesis    Hypertension, essential    Hereditary and idiopathic neuropathy, unspecified    Low lying conus medullaris    Mood disorder (HCC)    Non-alcoholic cirrhosis (HCC)    Sleep apnea    Spinal stenosis of lumbar region with neurogenic claudication    Vascular dementia without behavioral disturbance    Iron deficiency anemia    Hypothyroidism, adult    Traumatic subarachnoid hemorrhage with loss of consciousness of 30 minutes or less    Morbid (severe) obesity due to excess calories    Claudication of both lower extremities    Dyspnea on exertion    Medicare annual wellness visit, subsequent    Weakness     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     Primary osteoarthritis of left knee     Sleep apnea     Sleep apnea     Thrombocytopenia 2018    Thyroid  disease      Past Surgical History:   Procedure Laterality Date    CARDIAC SURGERY      COLONOSCOPY  2015    CORONARY ANGIOPLASTY WITH STENT PLACEMENT      CORONARY ARTERY BYPASS GRAFT N/A 05/20/2021    Procedure: MIDLINE STERNOTOMY,CORONARY ARTERY BYPASS GRAFTING X 5, UTILIZING THE LEFT COMPA AND LEFT SAPHENOUS VEIN, ABHIJEET, PRP;  Surgeon: Jr Tom Tubbs MD;  Location: [facility];  Service: Cardiothoracic;  Laterality: N/A;    ENDOSCOPY      HERNIA REPAIR      JOINT REPLACEMENT      SHOULDER SURGERY      SHOULDER REPAIR    TOTAL KNEE ARTHROPLASTY      TRANSESOPHAGEAL ECHOCARDIOGRAM (ABHIJEET) N/A 05/20/2021    Procedure: TRANSESOPHAGEAL ECHOCARDIOGRAM WITH ANESTHESIA;  Surgeon: Jr Tom Tubbs MD;  Location: [facility];  Service: Cardiothoracic;  Laterality: N/A;       PT Assessment (last 12 hours)       PT Evaluation and Treatment       Row Name 06/17/23 0645          Physical Therapy Time and Intention    Document Type therapy note (daily note)  -WM     Mode of Treatment individual therapy;physical therapy  -WM     Patient Effort good  -WM     Symptoms Noted During/After Treatment fatigue  -WM       Row Name 06/17/23 0645          Pain Scale: FACES Pre/Post-Treatment    Pain: FACES Scale, Pretreatment 0-->no hurt  -WM     Posttreatment Pain Rating 0-->no hurt  -WM       Row Name 06/17/23 0645          Bed Mobility    Supine-Sit [place] (Bed Mobility) standby assist  -WM     Assistive Device (Bed Mobility) bed rails  -WM       Row Name 06/17/23 0645          Sit-Stand Transfer    Sit-Stand [place] (Transfers) standby assist;verbal cues  -WM     Assistive Device (Sit-Stand Transfers) walker, front-wheeled  -WM       Row Name 06/17/23 0645          Stand-Sit Transfer    Stand-Sit [place] (Transfers) standby assist;verbal cues  -WM     Assistive Device (Stand-Sit Transfers) walker, front-wheeled  -WM       Row Name 06/17/23 0645          Gait/Stairs (Locomotion)    [place] Level (Gait)  standby assist;verbal cues  -     Assistive Device (Gait) walker, front-wheeled  -     Distance in Feet (Gait) 300  -     Pattern (Gait) 4-point;step-through  -     Deviations/Abnormal Patterns (Gait) gait speed decreased;stride length decreased  -       Row Name 06/17/23 0645          Safety Issues, Functional Mobility    Impairments Affecting Function (Mobility) endurance/activity tolerance;strength  -       Row Name 06/17/23 0645          Balance    Balance Interventions standing;dynamic  Forward/backward walking, side stepping in parallel bars  -       Row Name 06/17/23 0645          Hip (Therapeutic Exercise)    Hip Strengthening (Therapeutic Exercise) bilateral;aBduction;aDduction;marching while seated;marching while standing;sitting;supine;2 lb free weight;resistance band;green;15 repititions;2 sets  -       Row Name 06/17/23 0645          Knee (Therapeutic Exercise)    Knee Strengthening (Therapeutic Exercise) bilateral;LAQ (long arc quad);hamstring curls;sitting;2 lb free weight;resistance band;green;15 repititions;2 sets  -       Row Name 06/17/23 0645          Ankle (Therapeutic Exercise)    Ankle Strengthening (Therapeutic Exercise) bilateral;standing;2 lb free weight;15 repititions;2 sets  Heel raises in parallel bars  -       Row Name 06/17/23 0645          Aerobic Exercise    Type (Aerobic Exercise) --  Nustep x 15 minutes, load 3  -       Row Name 06/17/23 0645          Progress Summary (PT)    Progress Toward Functional Goals (PT) progress toward functional goals is good  -               User Key  (r) = Recorded By, (t) = Taken By, (c) = Cosigned By      Initials Name Provider Type     Cristhian Artis PTA Physical Therapist Assistant                  Section G              Section GG                       Physical Therapy Education       Title: PT OT SLP Therapies (In Progress)       Topic: Physical Therapy (In Progress)       Point: Mobility training (In Progress)        Learning Progress Summary             Patient Acceptance, E, NR by  at 6/10/2023 1409                         Point: Home exercise program (Not Started)       Learner Progress:  Not documented in this visit.              Point: Body mechanics (Not Started)       Learner Progress:  Not documented in this visit.              Point: Precautions (In Progress)       Learning Progress Summary             Patient Acceptance, E, NR by  at 6/10/2023 1409                                         User Key       Initials Effective Dates Name Provider Type Discipline    CS 04/25/21 -  Alisha Clark, PT Physical Therapist PT                  PT Recommendation and Plan     Progress Summary (PT)  Progress Toward Functional Goals (PT): progress toward functional goals is good   Outcome Measures       Row Name 06/17/23 0648 06/16/23 1340 06/16/23 0748       How much help from another person do you currently need...    Turning from your back to your side while in flat bed without using bedrails? 4  -WM 4  -WM 4  -WM    Moving from lying on back to sitting on the side of a flat bed without bedrails? 4  -WM 4  -WM 4  -WM    Moving to and from a bed to a chair (including a wheelchair)? 3  -WM 3  -WM 3  -WM    Standing up from a chair using your arms (e.g., wheelchair, bedside chair)? 4  -WM 4  -WM 4  -WM    Climbing 3-5 steps with a railing? 3  -WM 3  -WM 3  -WM    To walk in hospital room? 3  -WM 3  -WM 3  -WM    AM-PAC 6 Clicks Score (PT) 21  -WM 21  -WM 21  -WM      Row Name 06/15/23 1341 06/15/23 0750 06/14/23 0816       How much help from another person do you currently need...    Turning from your back to your side while in flat bed without using bedrails? 4  -WM 4  -WM 4  -WM    Moving from lying on back to sitting on the side of a flat bed without bedrails? 4  -WM 4  -WM 4  -WM    Moving to and from a bed to a chair (including a wheelchair)? 3  -WM 3  -WM 3  -WM    Standing up from a chair using your arms (e.g., wheelchair,  bedside chair)? 4  -WM 4  -WM 4  -WM    Climbing 3-5 steps with a railing? 3  -WM 3  -WM 3  -WM    To walk in hospital room? 3  -WM 3  -WM 3  -WM    AM-PAC 6 Clicks Score (PT) 21  -WM 21  -WM 21  -WM              User Key  (r) = Recorded By, (t) = Taken By, (c) = Cosigned By      Initials Name Provider Type     Cristhian Artis PTA Physical Therapist Assistant                     Time Calculation:    PT Charges       Row Name 06/17/23 0645             Time Calculation    PT Received On 06/17/23  -WM         Timed Charges    72127 - PT Therapeutic Exercise Minutes 30  -WM      38934 - Gait Training Minutes  8  -WM      41487 - PT Therapeutic Activity Minutes 3  -WM         SNF Physical Therapy Minutes    Skilled Minutes- PT 41 min  -WM         Total Minutes    Timed Charges Total Minutes 41  -WM       Total Minutes 41  -WM                User Key  (r) = Recorded By, (t) = Taken By, (c) = Cosigned By      Initials Name Provider Type    Cristhian Hawkins PTA Physical Therapist Assistant                  Therapy Charges for Today       Code Description Service Date Service Provider Modifiers Qty    67230851351 HC PT THER PROC EA 15 MIN 6/16/2023 Cristhian Artis PTA GP 2    19644007187 HC GAIT TRAINING EA 15 MIN 6/16/2023 Cristhian Artis PTA GP 1    44573491082 HC GAIT TRAINING EA 15 MIN 6/16/2023 Cristhian Artis PTA GP 1            PT G-Codes  Outcome Measure Options: AM-PAC 6 Clicks Daily Activity (OT), Optimal Instrument  AM-PAC 6 Clicks Score (PT): 21  AM-PAC 6 Clicks Score (OT): 24    Cristhian Artis PTA  6/17/2023

## 2023-06-17 NOTE — PROGRESS NOTES
[facility]     Progress Note    Patient Name: Cosmo Gauthier  : 1945  MRN: [id number]  Primary Care Physician:  Alex Buckley MD  Date of admission: 2023    Subjective   Subjective     Chief Complaint: Patient stable and doing well going to be discharged on Tuesday we will continue current management discussed with the nursing    HPI: Stable doing fine continue current management postural hypotension and neuropathy    Review of Systems   All systems were reviewed and negative except for: Patient stable back pain has markedly improved since physical therapy    Objective   Objective     Vitals:   Temp:  [97.7 °F (36.5 °C)-97.8 °F (36.6 °C)] 97.7 °F (36.5 °C)  Heart Rate:  [84-91] 91  Resp:  [16] 16  BP: (126-164)/(64-71) 126/71    Physical Exam    Constitutional: Awake, alert   Eyes: PERRLA, sclerae anicteric, no conjunctival injection   HENT: NCAT, mucous membranes moist   Neck: Supple, no thyromegaly, no lymphadenopathy, trachea midline   Respiratory: Clear to auscultation bilaterally, nonlabored respirations    Cardiovascular: RRR, no murmurs, rubs, or gallops, palpable pedal pulses bilaterally   Gastrointestinal: Positive bowel sounds, soft, nontender, nondistended   Musculoskeletal: No bilateral ankle edema, no clubbing or cyanosis to extremities   Psychiatric: Appropriate affect, cooperative   Neurologic: Oriented x 3, strength symmetric in all extremities, Cranial Nerves grossly intact to confrontation, speech clear   Skin: No rashes   No change  Result Review    Result Review:  I have personally reviewed the results from the time of this admission to 2023 10:29 EDT and agree with these findings:  [x]  Laboratory thrombo-cytopenia cirrhosis of liver  []  Microbiology  []  Radiology  []  EKG/Telemetry   []  Cardiology/Vascular   []  Pathology  []  Old records  []  Other:  Most notable findings include: Thrombocytopenia cirrhosis of liver history of lymphoma    Assessment & Plan    Assessment / Plan     Brief Patient Summary:  Cosmo Gauthier is a 77 y.o. male who neuropathy has markedly improved postural hypotension has improved    Active Hospital Problems:  There are no active hospital problems to display for this patient.      Plan:   Continue the current management with physical therapy    DVT prophylaxis:  No DVT prophylaxis order currently exists.    CODE STATUS:   Code Status (Patient has no pulse and is not breathing): CPR (Attempt to Resuscitate)  Medical Interventions (Patient has pulse or is breathing): Full Support    Disposition:  I expect patient to be discharged discharge on next Tuesday.    Electronically signed by Seven Saldana MD, 06/17/23, 10:29 AM EDT.      Part of this note may be an electronic transcription/translation of spoken language to printed text using the Dragon Dictation System.

## 2023-06-17 NOTE — PLAN OF CARE
Goal Outcome Evaluation:  Plan of Care Reviewed With: patient        Progress: improving  Outcome Evaluation: Resident alert, oriented, able to make needs known to staff. Transfers with asssit of one to bathroom. Medicated x1 for prn pain, effective. Participated in therapy as ordered, tolerated well. Family at bedside this afternoon. Resident conversant with family and staff, pleasant and cooperative.

## 2023-06-17 NOTE — PLAN OF CARE
Goal Outcome Evaluation:  Plan of Care Reviewed With: patient        Progress: improving  Outcome Evaluation: Alert and oriented but forgetful; able to make needs known to staff. No complaints of pain voiced. Transfers to [facility] person stand by assist. Tenative discharge for Tuesday. Call light within reach. Continue plan of care.

## 2023-06-18 LAB
GLUCOSE BLDC GLUCOMTR-MCNC: 153 MG/DL (ref 70–99)
GLUCOSE BLDC GLUCOMTR-MCNC: 177 MG/DL (ref 70–99)
GLUCOSE BLDC GLUCOMTR-MCNC: 188 MG/DL (ref 70–99)
GLUCOSE BLDC GLUCOMTR-MCNC: 250 MG/DL (ref 70–99)
INDURATION: 0 MM (ref 0–10)
Lab: NORMAL
Lab: NORMAL
TB SKIN TEST: NEGATIVE

## 2023-06-18 NOTE — NURSING NOTE
"Found resident in bathroom, when asked how he got to the bathroom resident stated \"I walked\". Resident ambulated without assist of staff and without walker to bathroom. Reminded resident to call for assist with all transfers, as he is still considered a fall risk. Continues to present with unsteady gait. Resident verbalized understanding. Per spouse, resident reported to her that he turns off bed alarms.   "

## 2023-06-18 NOTE — PROGRESS NOTES
[facility]     Progress Note    Patient Name: Cosmo Gauthier  : 1945  MRN: [id number]  Primary Care Physician:  Alex Buckley MD  Date of admission: 2023    Subjective   Subjective     Chief Complaint stable and doing well no new complaints physical therapy in progress    HPI: Stable feeling stronger    Review of Systems   All systems were reviewed and negative except for: Reviewed    Objective   Objective     Vitals:   Temp:  [97.6 °F (36.4 °C)-97.7 °F (36.5 °C)] 97.6 °F (36.4 °C)  Heart Rate:  [70-82] 70  Resp:  [16-18] 18  BP: (132-152)/(69-72) 132/69    Physical Exam    Constitutional: Awake, alert   Eyes: PERRLA, sclerae anicteric, no conjunctival injection   HENT: NCAT, mucous membranes moist   Neck: Supple, no thyromegaly, no lymphadenopathy, trachea midline   Respiratory: Clear to auscultation bilaterally, nonlabored respirations    Cardiovascular: RRR, no murmurs, rubs, or gallops, palpable pedal pulses bilaterally   Gastrointestinal: Positive bowel sounds, soft, nontender, nondistended   Musculoskeletal: No bilateral ankle edema, no clubbing or cyanosis to extremities   Psychiatric: Appropriate affect, cooperative   Neurologic: Oriented x 3, strength symmetric in all extremities, Cranial Nerves grossly intact to confrontation, speech clear   Skin: No rashes   No change  Result Review    Result Review:  I have personally reviewed the results from the time of this admission to 2023 10:05 EDT and agree with these findings:  [x]  Laboratory thrombocytopenia  []  Microbiology  []  Radiology  []  EKG/Telemetry   []  Cardiology/Vascular   []  Pathology  []  Old records  []  Other:  Most notable findings include: Thrombosed cytopenia history of neuropathy diabetes    Assessment & Plan   Assessment / Plan     Brief Patient Summary:  Cosmo Gauthier is a 77 y.o. male who able and doing well    Active Hospital Problems:  There are no active hospital problems to display for this  patient.      Plan:   Continue physical therapy    DVT prophylaxis:  No DVT prophylaxis order currently exists.    CODE STATUS:   Code Status (Patient has no pulse and is not breathing): CPR (Attempt to Resuscitate)  Medical Interventions (Patient has pulse or is breathing): Full Support    Disposition:  I expect patient to be discharged plan to be discharged on Tuesday.    Electronically signed by Seven Saldana MD, 06/18/23, 10:05 AM EDT.      Part of this note may be an electronic transcription/translation of spoken language to printed text using the Dragon Dictation System.

## 2023-06-18 NOTE — NURSING NOTE
Resident up without assist and without walker this am, states just wanted to get his clothes to get dressed. Reminded resident to call for assist with all transfers. Resident verbalized understanding.

## 2023-06-18 NOTE — PLAN OF CARE
Goal Outcome Evaluation:  Plan of Care Reviewed With: patient        Progress: improving  Outcome Evaluation: Alert and oriented x4 but forgetful; able to make needs known. Reinforced calling for assitance before getting OOB; bed alert in use for safety. Transfers to  x1 person assist. Sliding scale insulin administered at bedtime per MAR. Call light within reach. Continue plan of care.

## 2023-06-18 NOTE — PLAN OF CARE
Goal Outcome Evaluation:  Plan of Care Reviewed With: patient        Progress: improving  Outcome Evaluation: Resident alert, oriented, able to make needs known to staff. Transfers with SBA of one to bathroom. Bloodsugars elevated for all meals, covered with sliding scale. spoke with Dr. Saldana this am r/t to long acting insulin. MD stated he can resume his long acting insulin when he goes home this week, no changes at this time. Resident up without assist several times today. resident educated each time about safety precautions and need to call for assist with transfers. Resident verbalized understanding. Resident up, ambulating in [place] with PCA today, tolerated well. Tentative discharge for Tuesday.

## 2023-06-19 LAB
GLUCOSE BLDC GLUCOMTR-MCNC: 172 MG/DL (ref 70–99)
GLUCOSE BLDC GLUCOMTR-MCNC: 175 MG/DL (ref 70–99)
GLUCOSE BLDC GLUCOMTR-MCNC: 176 MG/DL (ref 70–99)
GLUCOSE BLDC GLUCOMTR-MCNC: 188 MG/DL (ref 70–99)

## 2023-06-19 NOTE — PLAN OF CARE
Goal Outcome Evaluation:   Alert & oriented x 4. Up with walker & 1 person standby. Gait still a little unsteady. No c/o pain or discomfort. Personal items & call light within reach.

## 2023-06-19 NOTE — PROGRESS NOTES
[facility]     Progress Note    Patient Name: Cosmo Gauthier  : 1945  MRN: [id number]  Primary Care Physician:  Alex Buckley MD  Date of admission: 2023    Subjective   Subjective     Chief Complaint: Stable and doing well patient to be discharged tomorrow making good progress vital signs are stable    HPI: Feels unstable comfortable doing fine    Review of Systems   All systems were reviewed and negative except for: Reviewed    Objective   Objective     Vitals:   Temp:  [97.8 °F (36.6 °C)-98.1 °F (36.7 °C)] 97.8 °F (36.6 °C)  Heart Rate:  [92] 92  Resp:  [16] 16  BP: (131-149)/(71-79) 131/79    Physical Exam    Constitutional: Awake, alert   Eyes: PERRLA, sclerae anicteric, no conjunctival injection   HENT: NCAT, mucous membranes moist   Neck: Supple, no thyromegaly, no lymphadenopathy, trachea midline   Respiratory: Clear to auscultation bilaterally, nonlabored respirations    Cardiovascular: RRR, no murmurs, rubs, or gallops, palpable pedal pulses bilaterally   Gastrointestinal: Positive bowel sounds, soft, nontender, nondistended   Musculoskeletal: No bilateral ankle edema, no clubbing or cyanosis to extremities   Psychiatric: Appropriate affect, cooperative   Neurologic: Oriented x 3, strength symmetric in all extremities, Cranial Nerves grossly intact to confrontation, speech clear   Skin: No rashes   No change  Result Review    Result Review:  I have personally reviewed the results from the time of this admission to 2023 07:37 EDT and agree with these findings:   laboratory no change  []  Microbiology  []  Radiology  []  EKG/Telemetry   []  Cardiology/Vascular   []  Pathology  []  Old records  []  Other:  Most notable findings include: History of cirrhosis of liver with thrombocytopenia    Assessment & Plan   Assessment / Plan     Brief Patient Summary:  Cosmo Gauthier is a 77 y.o. male who stable and doing well making good progress neuropathy improved    Active Hospital  Problems:  There are no active hospital problems to display for this patient.      Plan:   Continue current management    DVT prophylaxis:  No DVT prophylaxis order currently exists.    CODE STATUS:   Code Status (Patient has no pulse and is not breathing): CPR (Attempt to Resuscitate)  Medical Interventions (Patient has pulse or is breathing): Full Support    Disposition:  I expect patient to be discharged after the patient has been stabilized.    Electronically signed by Seven Saldana MD, 06/19/23, 7:37 AM EDT.      Part of this note may be an electronic transcription/translation of spoken language to printed text using the Dragon Dictation System.

## 2023-06-19 NOTE — THERAPY TREATMENT NOTE
SNF - Occupational Therapy Treatment Note  SARKIS Dhillon    Patient Name: Cosmo Gauthier  : 1945    MRN: [id number]                              Today's Date: 2023       Admit Date: 2023    Visit Dx:     ICD-10-CM ICD-9-CM   1. Difficulty walking  R26.2 719.7   2. 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)    Stage 3b chronic kidney disease (HCC)    Thrombocytopenia (HCC)    Coronary artery disease involving native coronary artery of native heart without angina pectoris    Hx of CABG    Hyperlipemia, mixed    Degenerative spondylolisthesis    Hypertension, essential    Hereditary and idiopathic neuropathy, unspecified    Low lying conus medullaris    Mood disorder (HCC)    Non-alcoholic cirrhosis (HCC)    Sleep apnea    Spinal stenosis of lumbar region with neurogenic claudication    Vascular dementia without behavioral disturbance    Iron deficiency anemia    Hypothyroidism, adult    Traumatic subarachnoid hemorrhage with loss of consciousness of 30 minutes or less    Morbid (severe) obesity due to excess calories    Claudication of both lower extremities    Dyspnea on exertion    Medicare annual wellness visit, subsequent    Weakness     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     Primary osteoarthritis of left knee     Sleep apnea      Sleep apnea     Thrombocytopenia 05/22/2018    Thyroid disease      Past Surgical History:   Procedure Laterality Date    CARDIAC SURGERY      COLONOSCOPY  2015    CORONARY ANGIOPLASTY WITH STENT PLACEMENT      CORONARY ARTERY BYPASS GRAFT N/A 05/20/2021    Procedure: MIDLINE STERNOTOMY,CORONARY ARTERY BYPASS GRAFTING X 5, UTILIZING THE LEFT COMPA AND LEFT SAPHENOUS VEIN, ABHIJEET, PRP;  Surgeon: Jr Tom Tubbs MD;  Location: [facility];  Service: Cardiothoracic;  Laterality: N/A;    ENDOSCOPY      HERNIA REPAIR      JOINT REPLACEMENT      SHOULDER SURGERY      SHOULDER REPAIR    TOTAL KNEE ARTHROPLASTY      TRANSESOPHAGEAL ECHOCARDIOGRAM (ABHIJEET) N/A 05/20/2021    Procedure: TRANSESOPHAGEAL ECHOCARDIOGRAM WITH ANESTHESIA;  Surgeon: Jr Tom Tubbs MD;  Location: [facility];  Service: Cardiothoracic;  Laterality: N/A;      General Information       Row Name 06/19/23 1134          OT Time and Intention    Document Type therapy note (daily note)  -EG     Mode of Treatment individual therapy;occupational therapy  -EG       Row Name 06/19/23 1134          General Information    Existing Precautions/Restrictions fall  -EG     Barriers to Rehab none identified  -EG       Row Name 06/19/23 1134          Cognition    Orientation Status (Cognition) oriented to;person;place;situation  -EG       Row Name 06/19/23 1134          Safety Issues, Functional Mobility    Impairments Affecting Function (Mobility) balance;endurance/activity tolerance;strength  -EG               User Key  (r) = Recorded By, (t) = Taken By, (c) = Cosigned By      Initials Name Provider Type    EG Lalita Damon OT Occupational Therapist                     Mobility/ADL's       Row Name 06/19/23 1134          Bed Mobility    Comment, (Bed Mobility) Patient up in chair upon OT arrival  -EG       Row Name 06/19/23 1134          Transfers    Transfers stand-sit transfer;sit-stand transfer;bed-chair transfer;toilet transfer  -EG       Row  Name 06/19/23 1134          Bed-Chair Transfer    Bed-Chair George (Transfers) modified independence  -EG     Assistive Device (Bed-Chair Transfers) walker, front-wheeled  -EG       Row Name 06/19/23 1134          Sit-Stand Transfer    Sit-Stand George (Transfers) modified independence  -EG     Assistive Device (Sit-Stand Transfers) walker, front-wheeled  -EG       Row Name 06/19/23 1134          Stand-Sit Transfer    Stand-Sit George (Transfers) modified independence  -EG     Assistive Device (Stand-Sit Transfers) walker, front-wheeled  -EG       Row Name 06/19/23 1134          Toilet Transfer    George Level (Toilet Transfer) modified independence  -EG     Assistive Device (Toilet Transfer) raised toilet seat  -EG       Row Name 06/19/23 1134          Functional Mobility    Functional Mobility- Ind. Level supervision required;verbal cues required  -EG     Functional Mobility- Device walker, front-wheeled  -EG       Row Name 06/19/23 1134          Activities of Daily Living    BADL Assessment/Intervention bathing;upper body dressing;lower body dressing;grooming;toileting  -EG       Row Name 06/19/23 1134          Bathing Assessment/Intervention    George Level (Bathing) bathing skills;set up;supervision  -EG       Row Name 06/19/23 1134          Upper Body Dressing Assessment/Training    George Level (Upper Body Dressing) upper body dressing skills;modified independence  -EG       Row Name 06/19/23 1134          Lower Body Dressing Assessment/Training    George Level (Lower Body Dressing) lower body dressing skills;modified independence  -EG       Row Name 06/19/23 1134          Grooming Assessment/Training    [place] Level (Grooming) grooming skills;modified independence  -EG     Position (Grooming) sink side;supported standing;unsupported standing  -EG       Row Name 06/19/23 1134          Toileting Assessment/Training    George Level (Toileting) toileting  skills;modified independence  -EG       Row Name 06/19/23 1134          Self-Feeding Assessment/Training    [place] Level (Feeding) feeding skills;modified independence  -EG               User Key  (r) = Recorded By, (t) = Taken By, (c) = Cosigned By      Initials Name Provider Type    Lalita Layton OT Occupational Therapist                   Obj/Interventions       Row Name 06/19/23 1138          Motor Skills    Motor Skills functional endurance  -EG     Functional Endurance Omnicycle performed 15:00 no rest break  -EG     Therapeutic Exercise aerobic  -EG       Row Name 06/19/23 1138          Balance    Balance Interventions standing;sit to stand;static;dynamic;occupation based/functional task;single limb stance;weight shifting activity  -EG     Comment, Balance Patient particiapted in functional mobility obstacle course manuevering small spaces with rolling walker and stepping over objects requiring cues for safety due to impulsivity; Participated in dynamic weight shifting with alternating step ups and unilatearl UE support for strength and balance carryover into IADL task performance.  -EG       Row Name 06/19/23 1138          Aerobic Exercise    Type (Aerobic Exercise) arm bike  -EG     Comment, Aerobic Exercise (Therapeutic Exercise) Cardiac interval setting on omnicycle 15:00 no rest break  -EG               User Key  (r) = Recorded By, (t) = Taken By, (c) = Cosigned By      Initials Name Provider Type    Lalita Layton OT Occupational Therapist                   Goals/Plan    No documentation.                  Clinical Impression       Row Name 06/19/23 1140          Pain Scale: FACES Pre/Post-Treatment    Pain: FACES Scale, Pretreatment 0-->no hurt  -EG     Posttreatment Pain Rating 0-->no hurt  -EG       Row Name 06/19/23 1140          Plan of Care Review    Plan of Care Reviewed With patient  -EG     Progress improving  -EG     Outcome Evaluation Patient is pleasant and cooperative;  Highest practical level with a scheduled discharge date of tomorrow; No complaints of pain and good prognosis for continued improvement at home; Patient has improved in all performance areas .  -EG       Row Name 06/19/23 1140          Therapy Assessment/Plan (OT)    Rehab Potential (OT) good, to achieve stated therapy goals  -EG     Criteria for Skilled Therapeutic Interventions Met (OT) yes;meets criteria;skilled treatment is necessary  -EG     Therapy Frequency (OT) 5 times/wk  -EG       Row Name 06/19/23 1140          Therapy Plan Review/Discharge Plan (OT)    Anticipated Discharge Disposition (OT) home with home health;home with outpatient therapy services  -EG               User Key  (r) = Recorded By, (t) = Taken By, (c) = Cosigned By      Initials Name Provider Type    EG Lalita Damon, PARTHA Occupational Therapist                   Outcome Measures       Row Name 06/19/23 1142          How much help from another is currently needed...    Putting on and taking off regular lower body clothing? 4  -EG     Bathing (including washing, rinsing, and drying) 4  -EG     Toileting (which includes using toilet bed pan or urinal) 4  -EG     Putting on and taking off regular upper body clothing 4  -EG     Taking care of personal grooming (such as brushing teeth) 4  -EG     Eating meals 4  -EG     AM-PAC 6 Clicks Score (OT) 24  -EG       Row Name 06/19/23 0807          How much help from another person do you currently need...    Turning from your back to your side while in flat bed without using bedrails? 4  -WM     Moving from lying on back to sitting on the side of a flat bed without bedrails? 4  -WM     Moving to and from a bed to a chair (including a wheelchair)? 3  -WM     Standing up from a chair using your arms (e.g., wheelchair, bedside chair)? 4  -WM     Climbing 3-5 steps with a railing? 3  -WM     To walk in hospital room? 3  -WM     AM-PAC 6 Clicks Score (PT) 21  -WM     Highest level of mobility 6 -->  Walked 10 steps or more  -       Row Name 06/19/23 1142          Functional Assessment    Outcome Measure Options AM-PAC 6 Clicks Daily Activity (OT);Optimal Instrument  -EG       Row Name 06/19/23 1142          Optimal Instrument    Optimal Instrument Optimal - 3  -EG     Bending/Stooping 1  -EG     Standing 1  -EG     Reaching 1  -EG               User Key  (r) = Recorded By, (t) = Taken By, (c) = Cosigned By      Initials Name Provider Type    Cristhian Hawkins, WILBUR Physical Therapist Assistant    EG Lalita Damon, OT Occupational Therapist                  Section G              Section GG                         Occupational Therapy Education       Title: PT OT SLP Therapies (In Progress)       Topic: Occupational Therapy (Done)       Point: ADL training (Done)       Description:   Instruct learner(s) on proper safety adaptation and remediation techniques during self care or transfers.   Instruct in proper use of assistive devices.                  Learning Progress Summary             Patient Acceptance, E,D, VU,NR by  at 6/12/2023 1228                         Point: Home exercise program (Done)       Description:   Instruct learner(s) on appropriate technique for monitoring, assisting and/or progressing therapeutic exercises/activities.                  Learning Progress Summary             Patient Acceptance, E,D, VU,NR by GC at 6/12/2023 1228                         Point: Precautions (Done)       Description:   Instruct learner(s) on prescribed precautions during self-care and functional transfers.                  Learning Progress Summary             Patient Acceptance, E,D, VU,NR by GC at 6/12/2023 1228                         Point: Body mechanics (Done)       Description:   Instruct learner(s) on proper positioning and spine alignment during self-care, functional mobility activities and/or exercises.                  Learning Progress Summary             Patient Acceptance, E,D, VU,NR by  at  6/12/2023 1228                                         User Key       Initials Effective Dates Name Provider Type Discipline     06/16/21 -  Margot Nobles OT Occupational Therapist OT                  OT Recommendation and Plan  Therapy Frequency (OT): 5 times/wk  Plan of Care Review  Plan of Care Reviewed With: patient  Progress: improving  Outcome Evaluation: Patient is pleasant and cooperative; Highest practical level with a scheduled discharge date of tomorrow; No complaints of pain and good prognosis for continued improvement at home; Patient has improved in all performance areas .     Time Calculation:    Time Calculation- OT       Row Name 06/19/23 1142 06/19/23 0801          Time Calculation- OT    OT Received On 06/19/23  -EG --     OT Goal Re-Cert Due Date 07/12/23  -EG --        Timed Charges    28615 - OT Therapeutic Exercise Minutes 15  -EG --     49739 -  OT Neuromuscular Reeducation Minutes 15  -EG --     99980 - Gait Training Minutes  -- 12  -WM     05094 - OT Therapeutic Activity Minutes 10  -EG --     92146 - OT Self Care/Mgmt Minutes 19  -EG --        SNF Occupational Therapy Minutes    Skilled Minutes- OT 59 min  -EG --        Total Minutes    Timed Charges Total Minutes 59  -EG 12  -WM      Total Minutes 59  -EG 12  -WM               User Key  (r) = Recorded By, (t) = Taken By, (c) = Cosigned By      Initials Name Provider Type    Cristhian Hawkins PTA Physical Therapist Assistant    EG Lalita Damon OT Occupational Therapist                  Therapy Charges for Today       Code Description Service Date Service Provider Modifiers Qty    78415027717 HC OT NEUROMUSC RE EDUCATION EA 15 MIN 6/19/2023 Lalita Damon OT GO 1    00381190259 HC OT THER PROC EA 15 MIN 6/19/2023 Lalita Damon OT GO 1    15744421539 HC OT THERAPEUTIC ACT EA 15 MIN 6/19/2023 Lalita Damon OT GO 1    11571208004 HC OT SELF CARE/MGMT/TRAIN EA 15 MIN 6/19/2023 Lalita Damon OT GO 1                 Lalita  Marisol, OT  6/19/2023

## 2023-06-19 NOTE — PLAN OF CARE
Goal Outcome Evaluation:  Plan of Care Reviewed With: patient           Outcome Evaluation: Pt is AO x 4 and VSS. He is a 1 assist to the bathroom with a walker. No c/o pain or discomfort noted this shift. Will continue with his plan of care. Plans are for discharge on Tuesday.  Call light and personal items within reach.

## 2023-06-19 NOTE — THERAPY TREATMENT NOTE
SNF - Physical Therapy Treatment Note  SARKIS Dhillon     Patient Name: Cosmo Gauthier  : 1945  MRN: [id number]  Today's Date: 2023      Visit Dx:    ICD-10-CM ICD-9-CM   1. Difficulty walking  R26.2 719.7   2. 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)    Stage 3b chronic kidney disease (HCC)    Thrombocytopenia (HCC)    Coronary artery disease involving native coronary artery of native heart without angina pectoris    Hx of CABG    Hyperlipemia, mixed    Degenerative spondylolisthesis    Hypertension, essential    Hereditary and idiopathic neuropathy, unspecified    Low lying conus medullaris    Mood disorder (HCC)    Non-alcoholic cirrhosis (HCC)    Sleep apnea    Spinal stenosis of lumbar region with neurogenic claudication    Vascular dementia without behavioral disturbance    Iron deficiency anemia    Hypothyroidism, adult    Traumatic subarachnoid hemorrhage with loss of consciousness of 30 minutes or less    Morbid (severe) obesity due to excess calories    Claudication of both lower extremities    Dyspnea on exertion    Medicare annual wellness visit, subsequent    Weakness     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     Primary osteoarthritis of left knee     Sleep apnea     Sleep apnea     Thrombocytopenia 2018    Thyroid  disease      Past Surgical History:   Procedure Laterality Date    CARDIAC SURGERY      COLONOSCOPY  2015    CORONARY ANGIOPLASTY WITH STENT PLACEMENT      CORONARY ARTERY BYPASS GRAFT N/A 05/20/2021    Procedure: MIDLINE STERNOTOMY,CORONARY ARTERY BYPASS GRAFTING X 5, UTILIZING THE LEFT COMPA AND LEFT SAPHENOUS VEIN, ABHIJEET, PRP;  Surgeon: Jr Tom Tubbs MD;  Location: [facility];  Service: Cardiothoracic;  Laterality: N/A;    ENDOSCOPY      HERNIA REPAIR      JOINT REPLACEMENT      SHOULDER SURGERY      SHOULDER REPAIR    TOTAL KNEE ARTHROPLASTY      TRANSESOPHAGEAL ECHOCARDIOGRAM (ABHIJEET) N/A 05/20/2021    Procedure: TRANSESOPHAGEAL ECHOCARDIOGRAM WITH ANESTHESIA;  Surgeon: Jr Tom Tubbs MD;  Location: [facility];  Service: Cardiothoracic;  Laterality: N/A;       PT Assessment (last 12 hours)       PT Evaluation and Treatment       Row Name 06/19/23 0803          Physical Therapy Time and Intention    Document Type therapy note (daily note)  -WM     Mode of Treatment individual therapy;physical therapy  -WM     Patient Effort good  -WM     Symptoms Noted During/After Treatment fatigue  -WM       Row Name 06/19/23 0803          Pain Scale: FACES Pre/Post-Treatment    Pain: FACES Scale, Pretreatment 0-->no hurt  -WM     Posttreatment Pain Rating 0-->no hurt  -WM       Row Name 06/19/23 0803          Bed Mobility    Supine-Sit Wake (Bed Mobility) standby assist;verbal cues  -WM     Assistive Device (Bed Mobility) bed rails  -WM       Row Name 06/19/23 0803          Sit-Stand Transfer    Sit-Stand Wake (Transfers) standby assist;verbal cues  -WM     Assistive Device (Sit-Stand Transfers) walker, front-wheeled  -WM       Row Name 06/19/23 0803          Stand-Sit Transfer    Stand-Sit Wake (Transfers) standby assist;verbal cues  -WM     Assistive Device (Stand-Sit Transfers) walker, front-wheeled  -WM       Row Name 06/19/23 0803          Gait/Stairs (Locomotion)    Wake  Level (Gait) standby assist;verbal cues  -     Assistive Device (Gait) walker, front-wheeled  -     Distance in Feet (Gait) 250 x 2  -     Pattern (Gait) 4-point;step-through  -     Deviations/Abnormal Patterns (Gait) stride length decreased  -       Row Name 06/19/23 0803          Safety Issues, Functional Mobility    Impairments Affecting Function (Mobility) balance;endurance/activity tolerance;strength  -       Row Name 06/19/23 0803          Balance    Balance Interventions standing;dynamic  Forward/backwars walking, side stepping in parallel bars  -       Row Name 06/19/23 0803          Hip (Therapeutic Exercise)    Hip Strengthening (Therapeutic Exercise) bilateral;aBduction;aDduction;marching while seated;marching while standing;sitting;standing;2 lb free weight;resistance band;green;15 repititions;2 sets  -       Row Name 06/19/23 0803          Knee (Therapeutic Exercise)    Knee Strengthening (Therapeutic Exercise) bilateral;LAQ (long arc quad);hamstring curls;sitting;2 lb free weight;resistance band;green;15 repititions;2 sets  -       Row Name 06/19/23 0803          Ankle (Therapeutic Exercise)    Ankle Strengthening (Therapeutic Exercise) bilateral;standing;2 lb free weight;15 repititions;2 sets  Heel raises in parallel bars  -       Row Name 06/19/23 0803          Aerobic Exercise    Type (Aerobic Exercise) --  Nustep x 20 minutes, load 4  -       Row Name 06/19/23 0803          Progress Summary (PT)    Progress Toward Functional Goals (PT) progress toward functional goals is good  -               User Key  (r) = Recorded By, (t) = Taken By, (c) = Cosigned By      Initials Name Provider Type    Cristhian Hawkins PTA Physical Therapist Assistant                  Section G              Section GG                       Physical Therapy Education       Title: PT OT SLP Therapies (In Progress)       Topic: Physical Therapy (In Progress)       Point: Mobility training (In Progress)        Learning Progress Summary             Patient Acceptance, E, NR by  at 6/10/2023 1409                         Point: Home exercise program (Not Started)       Learner Progress:  Not documented in this visit.              Point: Body mechanics (Not Started)       Learner Progress:  Not documented in this visit.              Point: Precautions (In Progress)       Learning Progress Summary             Patient Acceptance, E, NR by  at 6/10/2023 1409                                         User Key       Initials Effective Dates Name Provider Type Discipline     04/25/21 -  Alisha Clark, PT Physical Therapist PT                  PT Recommendation and Plan     Progress Summary (PT)  Progress Toward Functional Goals (PT): progress toward functional goals is good   Outcome Measures       Row Name 06/19/23 0807 06/17/23 0648 06/16/23 1340       How much help from another person do you currently need...    Turning from your back to your side while in flat bed without using bedrails? 4  -WM 4  -WM 4  -WM    Moving from lying on back to sitting on the side of a flat bed without bedrails? 4  -WM 4  -WM 4  -WM    Moving to and from a bed to a chair (including a wheelchair)? 3  -WM 3  -WM 3  -WM    Standing up from a chair using your arms (e.g., wheelchair, bedside chair)? 4  -WM 4  -WM 4  -WM    Climbing 3-5 steps with a railing? 3  -WM 3  -WM 3  -WM    To walk in hospital room? 3  -WM 3  -WM 3  -WM    AM-PAC 6 Clicks Score (PT) 21  -WM 21  -WM 21  -WM              User Key  (r) = Recorded By, (t) = Taken By, (c) = Cosigned By      Initials Name Provider Type    Cristhian Hawkins PTA Physical Therapist Assistant                     Time Calculation:    PT Charges       Row Name 06/19/23 0801             Time Calculation    PT Received On 06/19/23  -WM         Timed Charges    70430 - PT Therapeutic Exercise Minutes 36  -      56356 - Gait Training Minutes  12  -WM      27227 - PT Therapeutic Activity Minutes 5   -WM         SNF Physical Therapy Minutes    Skilled Minutes- PT 53 min  -WM         Total Minutes    Timed Charges Total Minutes 53  -WM       Total Minutes 53  -WM                User Key  (r) = Recorded By, (t) = Taken By, (c) = Cosigned By      Initials Name Provider Type    Cristhian Hawkins PTA Physical Therapist Assistant                      PT G-Codes  Outcome Measure Options: AM-PAC 6 Clicks Daily Activity (OT), Optimal Instrument  AM-PAC 6 Clicks Score (PT): 21  AM-PAC 6 Clicks Score (OT): 24    Cristhian Artis PTA  6/19/2023

## 2023-06-20 LAB — GLUCOSE BLDC GLUCOMTR-MCNC: 156 MG/DL (ref 70–99)

## 2023-06-20 NOTE — THERAPY DISCHARGE NOTE
SNF - Occupational Therapy Discharge   [place]    Patient Name: Cosmo Gauthier  : 1945    MRN: [id number]                              Today's Date: 2023       Admit Date: 2023    Visit Dx:     ICD-10-CM ICD-9-CM   1. Difficulty walking  R26.2 719.7   2. 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)    Stage 3b chronic kidney disease (HCC)    Thrombocytopenia (HCC)    Coronary artery disease involving native coronary artery of native heart without angina pectoris    Hx of CABG    Hyperlipemia, mixed    Degenerative spondylolisthesis    Hypertension, essential    Hereditary and idiopathic neuropathy, unspecified    Low lying conus medullaris    Mood disorder (HCC)    Non-alcoholic cirrhosis (HCC)    Sleep apnea    Spinal stenosis of lumbar region with neurogenic claudication    Vascular dementia without behavioral disturbance    Iron deficiency anemia    Hypothyroidism, adult    Traumatic subarachnoid hemorrhage with loss of consciousness of 30 minutes or less    Morbid (severe) obesity due to excess calories    Claudication of both lower extremities    Dyspnea on exertion    Medicare annual wellness visit, subsequent    Weakness     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     Primary osteoarthritis of left knee     Sleep apnea      Sleep apnea     Thrombocytopenia 05/22/2018    Thyroid disease      Past Surgical History:   Procedure Laterality Date    CARDIAC SURGERY      COLONOSCOPY  2015    CORONARY ANGIOPLASTY WITH STENT PLACEMENT      CORONARY ARTERY BYPASS GRAFT N/A 05/20/2021    Procedure: MIDLINE STERNOTOMY,CORONARY ARTERY BYPASS GRAFTING X 5, UTILIZING THE LEFT COMPA AND LEFT SAPHENOUS VEIN, ABHIJEET, PRP;  Surgeon: Jr Tom Tubbs MD;  Location: [facility]  Service: Cardiothoracic;  Laterality: N/A;    ENDOSCOPY      HERNIA REPAIR      JOINT REPLACEMENT      SHOULDER SURGERY      SHOULDER REPAIR    TOTAL KNEE ARTHROPLASTY      TRANSESOPHAGEAL ECHOCARDIOGRAM (ABHIJEET) N/A 05/20/2021    Procedure: TRANSESOPHAGEAL ECHOCARDIOGRAM WITH ANESTHESIA;  Surgeon: Jr Tom Tubbs MD;  Location: [facility]  Service: Cardiothoracic;  Laterality: N/A;      General Information    No documentation.                  Mobility/ADL's    No documentation.                  Obj/Interventions    No documentation.                  Goals/Plan       Row Name 06/20/23 1056          Transfer Goal 1 (OT)    Activity/Assistive Device (Transfer Goal 1, OT) transfers, all  -GC     [place] Level/Cues Needed (Transfer Goal 1, OT) supervision required  -GC     Progress/Outcome (Transfer Goal 1, OT) goal partially met  -GC       Row Name 06/20/23 1056          Bathing Goal 1 (OT)    Activity/Device (Bathing Goal 1, OT) bathing skills, all  -GC     [place] Level/Cues Needed (Bathing Goal 1, OT) supervision required  -GC     Progress/Outcomes (Bathing Goal 1, OT) goal partially met  -       Row Name 06/20/23 1056          Dressing Goal 1 (OT)    Activity/Device (Dressing Goal 1, OT) dressing skills, all  -GC     [place]/Cues Needed (Dressing Goal 1, OT) supervision required  -GC     Progress/Outcome (Dressing Goal 1, OT) goal partially met  -       Row Name 06/20/23 1056          Toileting Goal 1 (OT)    Activity/Device (Toileting Goal 1, OT)  toileting skills, all  -GC     [place] Level/Cues Needed (Toileting Goal 1, OT) modified independence  -GC     Progress/Outcome (Toileting Goal 1, OT) goal met  -       Row Name 06/20/23 1056          Grooming Goal 1 (OT)    Activity/Device (Grooming Goal 1, OT) grooming skills, all  -GC     [place] (Grooming Goal 1, OT) set-up required  -GC     Progress/Outcome (Grooming Goal 1, OT) goal partially met  -       Row Name 06/20/23 1056          Problem Specific Goal 1 (OT)    Problem Specific Goal 1 (OT) Patient will demonstrate good graded dynamic standing balance with ADL engagement in preperation for independent ADL routine completion at time of discharge  -     Progress/Outcome (Problem Specific Goal 1, OT) goal met  -               User Key  (r) = Recorded By, (t) = Taken By, (c) = Cosigned By      Initials Name Provider Type     Margot Nobles OT Occupational Therapist                   Clinical Impression       Row Name 06/20/23 1059          Plan of Care Review    Progress no change  -     Outcome Evaluation Pt. is able to perform all BADLs using RW however remains impulsive and unsafe with use of RW during activities requiring spv to decrease his risk for falls.  Pt. has no carryover of safety techs.  Wife verbalized an understanding.  Pt. wants to f/u with outpatient therapy.  -               User Key  (r) = Recorded By, (t) = Taken By, (c) = Cosigned By      Initials Name Provider Type     Margot Nobles, OT Occupational Therapist                   Outcome Measures       Row Name 06/20/23 0813 06/20/23 0742       How much help from another person do you currently need...    Turning from your back to your side while in flat bed without using bedrails? 4  -KM 4  -WM    Moving from lying on back to sitting on the side of a flat bed without bedrails? 4  -KM 4  -WM    Moving to and from a bed to a chair (including a wheelchair)? 4  -KM 4  -WM    Standing up from a chair using your arms  (e.g., wheelchair, bedside chair)? 4  -KM 4  -WM    Climbing 3-5 steps with a railing? 3  -KM 3  -WM    To walk in hospital room? 3  -KM 3  -WM    AM-PAC 6 Clicks Score (PT) 22  -KM 22  -WM    Highest level of mobility 7 --> Walked 25 feet or more  -KM 7 --> Walked 25 feet or more  -WM              User Key  (r) = Recorded By, (t) = Taken By, (c) = Cosigned By      Initials Name Provider Type    Cristhian Hawkins PTA Physical Therapist Assistant    KM Colette Saab RNA Registered Nurse                  Section G  Mobility  Dressing - self performance: limited assistance (staff provide guided maneuvering of limbs or other non-weight bearing assistance)  Dressing support/assistance: One person assist  Eating - self performance: independent  Eating support/assistance: No setup or physical help  Toileting - self performance: limited assistance (staff provide guided maneuvering of limbs or other non-weight bearing assistance)  Toileting support/assistance: One person assist  Personal hygiene - self performance: supervision (oversight, encourage)  Personal hygiene support/assistance: One person assist  Bathing  Bathing - self performance: Supervision  Bathing support/assistance: One person assist     Range of Motion  Upper Extremity: No impairment  Section GG  SectionGG: Functional Ability/Goals, Adm  Self Care, Prior Functioning (QS7303C): 3. Independent  Functional Cognition, Prior Functioning (MR9666U): 3. Independent  Self Care, Admission (Section GG)  Eating: Self-Care Admission Performance (XC2519D3): independent (06)  Oral Hygiene: Self-Care Admission Performance (XB0110D0): setup or clean-up assistance (05)  Toileting Hygiene: Self-Care Admission Performance (WH2915M1): supervision or touching assistance (04)  Shower/Bathe Self: Self-Care Admission Performance (BP7952H6): supervision or touching assistance (04)  Upper Body Dressing: Self-Care Admission Performance (BO9124P2): supervision or touching  assistance (04)  Lower Body Dressing: Self-Care Admission Performance (BY4666G5): partial/moderate assistance (03)  Putting On/Taking Off Footwear: Self-Care Admission Performance (ZK6014F9): partial/moderate assistance (03)  Mobility, Admission Performance (EG5476)  Toilet Transfer: Mobility Admission Performance (EX7345Z9): supervision or touching assistance (04)  Section GG: Functional Ability/Goals, DC  Eating: Self-Care Discharge Goal (CY4044J6): independent (06)  Oral Hygiene: Self-Care Discharge Goal (WR8984A2): independent (06)  Toileting Hygiene: Self-Care Discharge Goal (AB3639W0): independent (06)  Shower/Bathe Self: Self-Care Discharge Goal (PE5290R4): independent (06)  Upper Body Dressing: Self-Care Discharge Goal (CT4505K4): independent (06)  Lower Body Dressing: Self-Care Discharge Goal (EV5575R7): independent (06)  Putting On/Taking Off Footwear: Self-Care Discharge Goal (LQ1991R9): independent (06)  Mobility (GG), Discharge Goal (PG9970)  Toilet Transfer: Mobility Discharge Goal (DI0456Y7): independent (06)  Section GG: Functional Ability/Goals, DC  Eating: Self-Care Discharge Performance (XU8704W6): independent (06)  Oral Hygiene: Self-Care Discharge Performance (RF6571U7): supervision or touching assistance (04)  Toileting Hygiene: Self-Care Discharge Performance (ZS0895A4): supervision or touching assistance (04)  Shower/Bathe Self: Self-Care Discharge Performance (XQ2266D0): supervision or touching assistance (04)  Upper Body Dressing: Self-Care Discharge Performance (MQ3480F0): supervision or touching assistance (04)  Lower Body Dressing: Self-Care Discharge Performance (AM1156X1): supervision or touching assistance (04)  Putting On/Taking Off Footwear: Self-Care Discharge Performance (WD7600N4): supervision or touching assistance (04)  Mobility, Discharge Performance (BB2447)  Toilet Transfer: Mobility Discharge Performance (OK9370R0): supervision or touching assistance (04)    Occupational  Therapy Education       Title: PT OT SLP Therapies (Resolved)       Topic: Occupational Therapy (Resolved)       Point: ADL training (Resolved)       Description:   Instruct learner(s) on proper safety adaptation and remediation techniques during self care or transfers.   Instruct in proper use of assistive devices.                  Learning Progress Summary             Patient Acceptance, E,D, VU,NR by  at 6/12/2023 1228                         Point: Home exercise program (Resolved)       Description:   Instruct learner(s) on appropriate technique for monitoring, assisting and/or progressing therapeutic exercises/activities.                  Learning Progress Summary             Patient Acceptance, E,D, VU,NR by  at 6/12/2023 1228                         Point: Precautions (Resolved)       Description:   Instruct learner(s) on prescribed precautions during self-care and functional transfers.                  Learning Progress Summary             Patient Acceptance, E,D, VU,NR by  at 6/12/2023 1228                         Point: Body mechanics (Resolved)       Description:   Instruct learner(s) on proper positioning and spine alignment during self-care, functional mobility activities and/or exercises.                  Learning Progress Summary             Patient Acceptance, E,D, VU,NR by  at 6/12/2023 1228                                         User Key       Initials Effective Dates Name Provider Type Discipline     06/16/21 -  Margot Nobles OT Occupational Therapist OT                  OT Recommendation and Plan  Planned Therapy Interventions (OT): activity tolerance training, adaptive equipment training, BADL retraining, functional balance retraining, IADL retraining, patient/caregiver education/training, ROM/therapeutic exercise, strengthening exercise, transfer/mobility retraining  Therapy Frequency (OT): 5 times/wk  Plan of Care Review  Plan of Care Reviewed With: patient  Progress: no  change  Outcome Evaluation: Pt. is able to perform all BADLs using RW however remains impulsive and unsafe with use of RW during activities requiring spv to decrease his risk for falls.  Pt. has no carryover of safety techs.  Wife verbalized an understanding.  Pt. wants to f/u with outpatient therapy.  Plan of Care Reviewed With: patient  Outcome Evaluation: Pt. is able to perform all BADLs using RW however remains impulsive and unsafe with use of RW during activities requiring spv to decrease his risk for falls.  Pt. has no carryover of safety techs.  Wife verbalized an understanding.  Pt. wants to f/u with outpatient therapy.     Time Calculation:    Time Calculation- OT       Row Name 06/20/23 0736             Timed Charges    56205 - Gait Training Minutes  12  -WM         Total Minutes    Timed Charges Total Minutes 12  -WM       Total Minutes 12  -WM                User Key  (r) = Recorded By, (t) = Taken By, (c) = Cosigned By      Initials Name Provider Type    Cristhian Hawkins PTA Physical Therapist Assistant                           Margot Nobles OT  6/20/2023

## 2023-06-20 NOTE — THERAPY DISCHARGE NOTE
SNF - Physical Therapy Treatment Note/Discharge  SARKIS Dhillon     Patient Name: Cosmo Gauthier  : 1945  MRN: [id number]  Today's Date: 2023                Admit Date: 2023    Visit Dx:    ICD-10-CM ICD-9-CM   1. Difficulty walking  R26.2 719.7   2. 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)    Stage 3b chronic kidney disease (HCC)    Thrombocytopenia (HCC)    Coronary artery disease involving native coronary artery of native heart without angina pectoris    Hx of CABG    Hyperlipemia, mixed    Degenerative spondylolisthesis    Hypertension, essential    Hereditary and idiopathic neuropathy, unspecified    Low lying conus medullaris    Mood disorder (HCC)    Non-alcoholic cirrhosis (HCC)    Sleep apnea    Spinal stenosis of lumbar region with neurogenic claudication    Vascular dementia without behavioral disturbance    Iron deficiency anemia    Hypothyroidism, adult    Traumatic subarachnoid hemorrhage with loss of consciousness of 30 minutes or less    Morbid (severe) obesity due to excess calories    Claudication of both lower extremities    Dyspnea on exertion    Medicare annual wellness visit, subsequent    Weakness     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     Primary osteoarthritis of left knee     Sleep apnea     Sleep  apnea     Thrombocytopenia 05/22/2018    Thyroid disease      Past Surgical History:   Procedure Laterality Date    CARDIAC SURGERY      COLONOSCOPY  2015    CORONARY ANGIOPLASTY WITH STENT PLACEMENT      CORONARY ARTERY BYPASS GRAFT N/A 05/20/2021    Procedure: MIDLINE STERNOTOMY,CORONARY ARTERY BYPASS GRAFTING X 5, UTILIZING THE LEFT COMPA AND LEFT SAPHENOUS VEIN, ABHIJEET, PRP;  Surgeon: Jr Tom Tubbs MD;  Location: [facility];  Service: Cardiothoracic;  Laterality: N/A;    ENDOSCOPY      HERNIA REPAIR      JOINT REPLACEMENT      SHOULDER SURGERY      SHOULDER REPAIR    TOTAL KNEE ARTHROPLASTY      TRANSESOPHAGEAL ECHOCARDIOGRAM (ABHIJEET) N/A 05/20/2021    Procedure: TRANSESOPHAGEAL ECHOCARDIOGRAM WITH ANESTHESIA;  Surgeon: Jr Tom Tubbs MD;  Location: [facility];  Service: Cardiothoracic;  Laterality: N/A;       PT Assessment (last 12 hours)       PT Evaluation and Treatment       Row Name 06/20/23 0738          Physical Therapy Time and Intention    Document Type therapy note (daily note)  -WM     Mode of Treatment individual therapy;physical therapy  -WM     Patient Effort good  -WM     Symptoms Noted During/After Treatment fatigue  -WM       Row Name 06/20/23 0764          Pain Scale: FACES Pre/Post-Treatment    Pain: FACES Scale, Pretreatment 0-->no hurt  -WM     Posttreatment Pain Rating 0-->no hurt  -WM       Row Name 06/20/23 0738          Bed Mobility    Supine-Sit [place] (Bed Mobility) supervision  -WM     Assistive Device (Bed Mobility) bed rails  -WM       Row Name 06/20/23 0738          Sit-Stand Transfer    Sit-Stand [place] (Transfers) standby assist;verbal cues  -WM     Assistive Device (Sit-Stand Transfers) walker, front-wheeled  -WM       Row Name 06/20/23 0738          Stand-Sit Transfer    Stand-Sit [place] (Transfers) standby assist;verbal cues  -WM     Assistive Device (Stand-Sit Transfers) walker, front-wheeled  -WM       Row Name 06/20/23 0771           Gait/Stairs (Locomotion)    [place] (Gait) standby assist;verbal cues  -     Assistive Device (Gait) walker, front-wheeled  -     Distance in Feet (Gait) 250 x 2  -     Pattern (Gait) 4-point;step-through  -     Deviations/Abnormal Patterns (Gait) stride length decreased  -       Row Name 06/20/23 0738          Safety Issues, Functional Mobility    Safety Issues Affecting Function (Mobility) impulsivity  -     Impairments Affecting Function (Mobility) balance;endurance/activity tolerance;strength  -       Row Name 06/20/23 0738          Balance    Balance Interventions standing;dynamic  Forward/backward walking, side stepping in parallel bars  -[facility] Name 06/20/23 0738          Hip (Therapeutic Exercise)    Hip Strengthening (Therapeutic Exercise) bilateral;aBduction;aDduction;marching while seated;marching while standing;sitting;standing;2 lb free weight;resistance band;green;15 repititions;2 sets  -[facility] Name 06/20/23 0738          Knee (Therapeutic Exercise)    Knee Strengthening (Therapeutic Exercise) bilateral;LAQ (long arc quad);hamstring curls;sitting;2 lb free weight;resistance band;green;15 repititions;2 sets  -[facility] Name 06/20/23 0702          Ankle (Therapeutic Exercise)    Ankle Strengthening (Therapeutic Exercise) bilateral;sidelying;2 lb free weight;15 repititions;2 sets  Heel raises in parallel bars  -[facility] Name 06/20/23 0738          Aerobic Exercise    Type (Aerobic Exercise) --  Nustep x 20 minutes, load 3  -[facility] Name 06/20/23 0738          Progress Summary (PT)    Progress Toward Functional Goals (PT) progress toward functional goals is good  -[facility] Name 06/20/23 1226          Bed Mobility Goal 1 (PT)    Progress/Outcomes (Bed Mobility Goal 1, PT) goal met  -[facility] Name 06/20/23 1226          Transfer Goal 1 (PT)    Progress/Outcome (Transfer Goal 1, PT) goal not met  -[facility] Name 06/20/23 1226          Gait Training  Goal 1 (PT)    Progress/Outcome (Gait Training Goal 1, PT) goal not met  -WM       Row Name 06/20/23 1226          Stairs Goal 1 (PT)    Progress/Outcome (Stairs Goal 1, PT) goal partially met  -WM       Row Name 06/20/23 1226          Patient Education Goal (PT)    Progress/Outcome (Patient Education Goal, PT) goal met  -WM       Row Name 06/20/23 1226          Discharge Summary (PT)    Additional Documentation Discharge Summary (PT) (Group)  -WM       Row Name 06/20/23 1226          Discharge Summary (PT)    Reason for Discharge (PT) patient discharged from this facility  -WM     Outcomes Achieved/Progress Made Upon Discharge (PT) goals partially achieved prior to discharge  -WM     Transfer to Another Level of Care or Facility (PT) home with supervision recommended  -               User Key  (r) = Recorded By, (t) = Taken By, (c) = Cosigned By      Initials Name Provider Type    Cristhian Hawkins PTA Physical Therapist Assistant                  Section G              Section GG                    Mobility, Discharge Performance (BL4528)  Roll Left & Right: Mobility Discharge (XP3999Y7): independent (06)  Sit to Lying: Mobility Discharge Performance (BP3137W2): independent (06)  Lying to Sitting, Side of Bed: Mobility Discharge Performance (UG7688O2): independent (06)  Sit to Stand: Mobility Discharge Performance (RW6149H8): supervision or touching assistance (04)  Chair/Bed-Chair Transfer: Mobility Discharge Performance (NW5896C8): supervision or touching assistance (04)  Toilet Transfer: Mobility Discharge Performance (ME1900O6): supervision or touching assistance (04)  Car Transfer: Mobility Discharge Performance (TA7757E4): not attempted due to environmental limitations (10)  Walk 10 Feet: Mobility Discharge Performance (PD1790S1): supervision or touching assistance (04)  Walk 50 Feet With Two Turns: Mobility Discharge Performance (RT0761C7): supervision or touching assistance (04)  Walk 150 Feet:  Mobility Discharge Performance (FB3330W4): supervision or touching assistance (04)  Walk 10 Ft, Uneven Surfaces: Mobility Discharge Performance (MH8386V9): not applicable (09)  1 Step/Curb: Mobility Discharge Performance (UP9051E0): supervision or touching assistance (04)  4 Steps: Mobility Discharge Performance (IV8177B8): supervision or touching assistance (04)  12 Steps: Mobility Discharge Performance (AQ9440E6): not applicable (09)  Picking up object: Mobility Discharge Performance (AB6060Q3): supervision or touching assistance (04)  Wheel 50 Ft Two Turns: Mobility Discharge Performance (NB0918L7): not applicable (09)  Wheel 150 Feet: Mobility Discharge Performance (RJ7330J7): not applicable (09)    Physical Therapy Education       Title: PT OT SLP Therapies (Resolved)       Topic: Physical Therapy (Resolved)       Point: Mobility training (Resolved)       Learning Progress Summary             Patient Acceptance, E, NR by  at 6/10/2023 1409                         Point: Home exercise program (Resolved)       Learner Progress:  Not documented in this visit.              Point: Body mechanics (Resolved)       Learner Progress:  Not documented in this visit.              Point: Precautions (Resolved)       Learning Progress Summary             Patient Acceptance, E, NR by  at 6/10/2023 1409                                         User Key       Initials Effective Dates Name Provider Type Discipline     21 -  Alisha Clark, PT Physical Therapist PT                    PT Recommendation and Plan     Progress Summary (PT)  Progress Toward Functional Goals (PT): progress toward functional goals is good     Outcome Measures       Row Name 23 0742 23 0807          How much help from another person do you currently need...    Turning from your back to your side while in flat bed without using bedrails? 4  -WM 4  -WM     Moving from lying on back to sitting on the side of a flat bed without  bedrails? 4  -WM 4  -WM     Moving to and from a bed to a chair (including a wheelchair)? 4  -WM 3  -WM     Standing up from a chair using your arms (e.g., wheelchair, bedside chair)? 4  -WM 4  -WM     Climbing 3-5 steps with a railing? 3  -WM 3  -WM     To walk in hospital room? 3  -WM 3  -WM     AM-PAC 6 Clicks Score (PT) 22  -WM 21  -WM               User Key  (r) = Recorded By, (t) = Taken By, (c) = Cosigned By      Initials Name Provider Type    Cristhian Hawkins PTA Physical Therapist Assistant                     Time Calculation:    PT Charges       Row Name 06/20/23 0736             Time Calculation    PT Received On 06/20/23  -WM         Timed Charges    05662 - PT Therapeutic Exercise Minutes 37  -WM      64996 - Gait Training Minutes  12  -WM      68982 - PT Therapeutic Activity Minutes 5  -WM         SNF Physical Therapy Minutes    Skilled Minutes- PT 54 min  -WM         Total Minutes    Timed Charges Total Minutes 54  -WM       Total Minutes 54  -WM                User Key  (r) = Recorded By, (t) = Taken By, (c) = Cosigned By      Initials Name Provider Type    Cristhian Hawkins PTA Physical Therapist Assistant                  Therapy Charges for Today       Code Description Service Date Service Provider Modifiers Qty    18895616405 HC PT THER PROC EA 15 MIN 6/19/2023 Cristhian Artis PTA GP 3    34403429100 HC GAIT TRAINING EA 15 MIN 6/19/2023 Cristhian Artis PTA GP 1    31938447146 HC PT THER PROC EA 15 MIN 6/20/2023 Cristhian Artis PTA GP 3    50834822302 HC GAIT TRAINING EA 15 MIN 6/20/2023 Cristhian Artis PTA GP 1            PT G-Codes  Outcome Measure Options: AM-PAC 6 Clicks Daily Activity (OT), Optimal Instrument  AM-PAC 6 Clicks Score (PT): 22  AM-PAC 6 Clicks Score (OT): 24         Cristhian Artis PTA  6/20/2023

## 2023-06-20 NOTE — THERAPY TREATMENT NOTE
SNF - Physical Therapy Treatment Note  SARKIS Dhillon     Patient Name: Cosmo Gauthier  : 1945  MRN: [id number]  Today's Date: 2023      Visit Dx:    ICD-10-CM ICD-9-CM   1. Difficulty walking  R26.2 719.7   2. 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)    Stage 3b chronic kidney disease (HCC)    Thrombocytopenia (HCC)    Coronary artery disease involving native coronary artery of native heart without angina pectoris    Hx of CABG    Hyperlipemia, mixed    Degenerative spondylolisthesis    Hypertension, essential    Hereditary and idiopathic neuropathy, unspecified    Low lying conus medullaris    Mood disorder (HCC)    Non-alcoholic cirrhosis (HCC)    Sleep apnea    Spinal stenosis of lumbar region with neurogenic claudication    Vascular dementia without behavioral disturbance    Iron deficiency anemia    Hypothyroidism, adult    Traumatic subarachnoid hemorrhage with loss of consciousness of 30 minutes or less    Morbid (severe) obesity due to excess calories    Claudication of both lower extremities    Dyspnea on exertion    Medicare annual wellness visit, subsequent    Weakness     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     Primary osteoarthritis of left knee     Sleep apnea     Sleep apnea     Thrombocytopenia 2018    Thyroid  disease      Past Surgical History:   Procedure Laterality Date    CARDIAC SURGERY      COLONOSCOPY  2015    CORONARY ANGIOPLASTY WITH STENT PLACEMENT      CORONARY ARTERY BYPASS GRAFT N/A 05/20/2021    Procedure: MIDLINE STERNOTOMY,CORONARY ARTERY BYPASS GRAFTING X 5, UTILIZING THE LEFT COMPA AND LEFT SAPHENOUS VEIN, ABHIJEET, PRP;  Surgeon: Jr Tom Tubbs MD;  Location: [facility];  Service: Cardiothoracic;  Laterality: N/A;    ENDOSCOPY      HERNIA REPAIR      JOINT REPLACEMENT      SHOULDER SURGERY      SHOULDER REPAIR    TOTAL KNEE ARTHROPLASTY      TRANSESOPHAGEAL ECHOCARDIOGRAM (ABHIJEET) N/A 05/20/2021    Procedure: TRANSESOPHAGEAL ECHOCARDIOGRAM WITH ANESTHESIA;  Surgeon: Jr Tom Tubbs MD;  Location: [facility];  Service: Cardiothoracic;  Laterality: N/A;       PT Assessment (last 12 hours)       PT Evaluation and Treatment       Row Name 06/20/23 0738          Physical Therapy Time and Intention    Document Type therapy note (daily note)  -WM     Mode of Treatment individual therapy;physical therapy  -WM     Patient Effort good  -WM     Symptoms Noted During/After Treatment fatigue  -WM       Row Name 06/20/23 0783          Pain Scale: FACES Pre/Post-Treatment    Pain: FACES Scale, Pretreatment 0-->no hurt  -WM     Posttreatment Pain Rating 0-->no hurt  -WM       Row Name 06/20/23 0738          Bed Mobility    Supine-Sit [place] (Bed Mobility) supervision  -WM     Assistive Device (Bed Mobility) bed rails  -WM       Row Name 06/20/23 0738          Sit-Stand Transfer    Sit-Stand [place] (Transfers) standby assist;verbal cues  -WM     Assistive Device (Sit-Stand Transfers) walker, front-wheeled  -WM       Row Name 06/20/23 0738          Stand-Sit Transfer    Stand-Sit [place] (Transfers) standby assist;verbal cues  -WM     Assistive Device (Stand-Sit Transfers) walker, front-wheeled  -WM       Row Name 06/20/23 0738          Gait/Stairs (Locomotion)    [place] Level (Gait)  standby assist;verbal cues  -     Assistive Device (Gait) walker, front-wheeled  -     Distance in Feet (Gait) 250 x 2  -     Pattern (Gait) 4-point;step-through  -     Deviations/Abnormal Patterns (Gait) stride length decreased  -       Row Name 06/20/23 0738          Safety Issues, Functional Mobility    Safety Issues Affecting Function (Mobility) impulsivity  -     Impairments Affecting Function (Mobility) balance;endurance/activity tolerance;strength  -       Row Name 06/20/23 0738          Balance    Balance Interventions standing;dynamic  Forward/backward walking, side stepping in parallel bars  -       Row Name 06/20/23 0738          Hip (Therapeutic Exercise)    Hip Strengthening (Therapeutic Exercise) bilateral;aBduction;aDduction;marching while seated;marching while standing;sitting;standing;2 lb free weight;resistance band;green;15 repititions;2 sets  -       Row Name 06/20/23 0738          Knee (Therapeutic Exercise)    Knee Strengthening (Therapeutic Exercise) bilateral;LAQ (long arc quad);hamstring curls;sitting;2 lb free weight;resistance band;green;15 repititions;2 sets  -       Row Name 06/20/23 0738          Ankle (Therapeutic Exercise)    Ankle Strengthening (Therapeutic Exercise) bilateral;sidelying;2 lb free weight;15 repititions;2 sets  Heel raises in parallel bars  -       Row Name 06/20/23 0738          Aerobic Exercise    Type (Aerobic Exercise) --  Nustep x 20 minutes, load 3  -       Row Name 06/20/23 0738          Progress Summary (PT)    Progress Toward Functional Goals (PT) progress toward functional goals is good  -               User Key  (r) = Recorded By, (t) = Taken By, (c) = Cosigned By      Initials Name Provider Type     Cristhian Artis PTA Physical Therapist Assistant                  Section G              Section GG                       Physical Therapy Education       Title: PT OT SLP Therapies (In Progress)       Topic: Physical Therapy (In  Progress)       Point: Mobility training (In Progress)       Learning Progress Summary             Patient Acceptance, E, NR by  at 6/10/2023 1409                         Point: Home exercise program (Not Started)       Learner Progress:  Not documented in this visit.              Point: Body mechanics (Not Started)       Learner Progress:  Not documented in this visit.              Point: Precautions (In Progress)       Learning Progress Summary             Patient Acceptance, E, NR by CS at 6/10/2023 1409                                         User Key       Initials Effective Dates Name Provider Type Discipline     04/25/21 -  Alisha Clark, PT Physical Therapist PT                  PT Recommendation and Plan     Progress Summary (PT)  Progress Toward Functional Goals (PT): progress toward functional goals is good   Outcome Measures       Row Name 06/20/23 0742 06/19/23 0807          How much help from another person do you currently need...    Turning from your back to your side while in flat bed without using bedrails? 4  -WM 4  -WM     Moving from lying on back to sitting on the side of a flat bed without bedrails? 4  -WM 4  -WM     Moving to and from a bed to a chair (including a wheelchair)? 4  -WM 3  -WM     Standing up from a chair using your arms (e.g., wheelchair, bedside chair)? 4  -WM 4  -WM     Climbing 3-5 steps with a railing? 3  -WM 3  -WM     To walk in hospital room? 3  -WM 3  -WM     AM-PAC 6 Clicks Score (PT) 22  -WM 21  -WM               User Key  (r) = Recorded By, (t) = Taken By, (c) = Cosigned By      Initials Name Provider Type    WM Cristhian Artis PTA Physical Therapist Assistant                     Time Calculation:    PT Charges       Row Name 06/20/23 0736             Time Calculation    PT Received On 06/20/23  -         Timed Charges    38429 - PT Therapeutic Exercise Minutes 37  -      90318 - Gait Training Minutes  12  -      26973 - PT Therapeutic Activity Minutes 5   -WM         SNF Physical Therapy Minutes    Skilled Minutes- PT 54 min  -WM         Total Minutes    Timed Charges Total Minutes 54  -WM       Total Minutes 54  -WM                User Key  (r) = Recorded By, (t) = Taken By, (c) = Cosigned By      Initials Name Provider Type    Cristhian Hawkins PTA Physical Therapist Assistant                  Therapy Charges for Today       Code Description Service Date Service Provider Modifiers Qty    48708025943 HC PT THER PROC EA 15 MIN 6/19/2023 Cristhian Artis PTA GP 3    31047163419 HC GAIT TRAINING EA 15 MIN 6/19/2023 Cristhian Artis PTA GP 1            PT G-Codes  Outcome Measure Options: AM-PAC 6 Clicks Daily Activity (OT), Optimal Instrument  AM-PAC 6 Clicks Score (PT): 22  AM-PAC 6 Clicks Score (OT): 24    Cristhian Artis PTA  6/20/2023

## 2023-06-20 NOTE — PLAN OF CARE
Goal Outcome Evaluation:  Plan of Care Reviewed With: patient           Outcome Evaluation: No significant changes. Pt is AO x 4 and VSS. He is a 1 assist to the bathroom with a walker. No c/o pain or discomfort.  Plans are for discharge today. Will continue with his plan of care. Call light and personal items within reach.

## 2023-06-20 NOTE — DISCHARGE SUMMARY
[facility]         DISCHARGE SUMMARY    Patient Name: Cosmo Gauthier  : 1945  MRN: [id number]    Date of Admission: 2023  Date of Discharge: 2023  Primary Care Physician: Alex Buckley MD    Consults     Date and Time Order Name Status Description    2023 11:37 AM Inpatient Neurology Consult General      2023 11:37 AM IP General Consult (Use specialty-specific consult if known)            Presenting Problem:   No admission diagnoses are documented for this encounter.    Active and Resolved Hospital Problems:  There are no hospital problems to display for this patient.        Hospital Course     Hospital Course:  Cosmo Gauthier is a 77 y.o. male patient was admitted after having had issues with inability to walk to the admitted during hospital with possibility of spinal cord compression was considered possibility of stroke was considered which was ruled out neurology consultation was made final diagnosis was that of peripheral neuropathy because of diabetes mellitus as well as had history of lymphoma there was no evidence of recurrence of that he also had very severe hypotension which was postural he was advised to be careful about getting up he has since then improved quite a bit he is much feels much stronger and he is therefore being discharged today      DISCHARGE Follow Up Recommendations for labs and diagnostics: With peripheral neuropathy and postural hypotension with history of lymphoma severe arthritis coronary artery disease as well as history of stroke      Pertinent  and/or Most Recent Results     LAB RESULTS:                              Brief Urine Lab Results  (Last result in the past 365 days)      Color   Clarity   Blood   Leuk Est   Nitrite   Protein   CREAT   Urine HCG        23 1516 Yellow   Clear   Negative   Negative   Negative   Negative               Microbiology Results (last 10 days)     ** No results found for the last 240 hours. **           CT Head Without Contrast    Result Date: 6/4/2023  Impression:  No acute intracranial process or calvarial fracture identified.     DOUG VERDUZCO MD       Electronically Signed and Approved By: DOUG VERDUZCO MD on 6/04/2023 at 20:23             MRI Brain With & Without Contrast    Result Date: 6/6/2023  Impression:   1. No evidence of metastatic disease 2. No acute infarction or intracranial hemorrhage 3. Chronic right thalamic lacunar infarct 4. Moderate mucosal inflammatory changes in the paranasal sinuses      Markie Rivas M.D.       Electronically Signed and Approved By: Markie Rivas M.D. on 6/06/2023 at 8:41             MRI Cervical Spine With & Without Contrast    Result Date: 6/6/2023  Impression:   1. Diffusely heterogeneous bone marrow signal without a discrete measurable lesion, obvious edema or destructive changes. 2. Moderate cervical spondylosis with varying degrees of neural foraminal narrowing.  No spinal canal stenosis.     NAWAF CÁRDENAS MD       Electronically Signed and Approved By: NAWAF CÁRDENAS MD on 6/06/2023 at 9:27             MRI Thoracic Spine With & Without Contrast    Result Date: 6/6/2023  Impression:   1. Diffusely heterogeneous bone marrow signal without obvious destructive or edematous lesions.  There are hemangiomas within the T6 and T10 vertebral bodies.  No pathologic fracture. 2. Diffuse degenerative changes without spinal canal stenosis.  There is moderate right-sided neural foraminal narrowing at T10-T11.      NAWAF CÁRDENAS MD       Electronically Signed and Approved By: NAWAF CÁRDENAS MD on 6/06/2023 at 9:35             XR Chest 1 View    Result Date: 6/4/2023  Impression:  No active cardiopulmonary disease       IGNACIA AG MD       Electronically Signed and Approved By: IGNACIA AG MD on 6/04/2023 at 16:47             MRI Lumbar Spine With & Without Contrast    Result Date: 6/5/2023  Impression:   1. No acute change from 2016. 2. Stable syrinx at L2 and  stable findings of tethered spinal cord, lipoma of the filum terminal a and dysraphic changes at the lumbosacral junction. 3. Progressive severe atrophy of the paraspinal musculature. 4. Stable moderate to severe lumbar spondylosis with varying degrees of neural foraminal narrowing as discussed.      NAWAF CÁRDENAS MD       Electronically Signed and Approved By: NAWAF CÁRDENAS MD on 6/05/2023 at 7:59               Results for orders placed during the hospital encounter of 01/27/23    Doppler Arterial Multi Level Lower Extremity - Bilateral CAR    Interpretation Summary  •  Right Conclusion: The right OSCAR is normal. Normal digital pressures.  •  Left Conclusion: The left OSCAR is normal. Normal digital pressures.  •  Normal resting arterial evaluation of both lower extremities.  Patient declined stress component due to back pain and fear of treadmill.      Results for orders placed during the hospital encounter of 01/27/23    Doppler Arterial Multi Level Lower Extremity - Bilateral CAR    Interpretation Summary  •  Right Conclusion: The right OSCAR is normal. Normal digital pressures.  •  Left Conclusion: The left OSCAR is normal. Normal digital pressures.  •  Normal resting arterial evaluation of both lower extremities.  Patient declined stress component due to back pain and fear of treadmill.      Results for orders placed in visit on 05/20/21    Emergent/Open-Heart Anesthesia ABHIJEET    Narrative  Preanesthesia Checklist:  Patient identified, IV assessed, risks and benefits discussed, monitors and equipment assessed and procedure being performed at surgeon's request.    General Procedure Information  Diagnostic Indications for Echo:  assessment of ascending aorta, assessment of surgical repair, defect repair evaluation and hemodynamic monitoring  Physician Requesting Echo: Jr Tom Tubbs MD  ICD Code for Medical Necessity:  Aortic Insufficiency  Location performed:  OR  Intubated  Bite block placed  Heart  visualized  Probe Insertion:  Easy  Probe Type:  Multiplane  Modalities:  Color flow mapping, 2D only, continuous wave Doppler and pulse wave Doppler    Echocardiographic and Doppler Measurements    Ventricles    Right Ventricle:  Cavity size normal.  Hypertrophy not present.  Left Ventricle:  Diastolic dimension 4.7 cm.  Hypertrophy not present.  Thrombus not present.  Global Function mildly impaired.  Ejection Fraction 50%.    Ventricular Regional Function:  13- Apical Anterior:  hypokinetic  14- Apical Lateral:  hypokinetic  16- Apical Septal:  hypokinetic  17- [place]:  hypokinetic      Valves    Aortic Valve:  Annulus normal.  Stenosis not present.  Pressure Half-time: 910 ms.  Regurgitation mild.  Leaflets normal.  Leaflet motions normal.    Mitral Valve:  Annulus normal.  Stenosis not present.  Regurgitation trace.    Tricuspid Valve:  Annulus normal.  Stenosis not present.  Regurgitation mild.  Pulmonic Valve:  Annulus normal.  Regurgitation trace.        Aorta    Ascending Aorta:  Size normal.  Diameter 3.6 cm.  Dissection not present.  Plaque thickness less than 3 mm.  Mobile plaque not present.  Aortic Arch:  Size normal.  Diameter 3.1 cm.  Dissection not present.  Plaque thickness less than 3 mm.  Mobile plaque not present.  Descending Aorta:  Size normal.  Diameter 2.5 cm.  Dissection not present.  Plaque thickness less than 3 mm.  Mobile plaque not present.        Atria    Right Atrium:  Size normal.  Spontaneous echo contrast not present.  Thrombus not present.  Tumor not present.  Device present.    Left Atrium:  Size normal.  Spontaneous echo contrast not present.  Thrombus not present.  Tumor not present.  Device not present.  Left atrial appendage normal.      Septa    Atrial Septum:  Intra-atrial septal morphology lipomatous hypertrophy.        Diastolic Function Measurements:  Diastolic Dysfunction Grade= II  E= 40.6 ms  A= 35.3 ms  E/A Ratio=  1.2  DT=  296 ms  S/D=  IVRT=    Other  Findings  Pericardium:  normal  Pleural Effusion:  none  Pulmonary Arteries:  normal  Pulmonary Venous Flow:  normal    Anesthesia Information  Performed Personally      Echocardiogram Comments:  Post CPB:  LVEF much improved, 60%, apex no longer hypokinetic.  No aortic dissection post decannulation.  AI unchanged.      Labs Pending at Discharge:        Discharge Details        Discharge Medications      Continue These Medications      Instructions Start Date   amitriptyline 50 MG tablet  Commonly known as: ELAVIL   50 mg, Oral, Nightly      aspirin 81 MG EC tablet   81 mg, Oral, Daily      clopidogrel 75 MG tablet  Commonly known as: PLAVIX   75 mg, Oral, Daily      donepezil 10 MG tablet  Commonly known as: ARICEPT   10 mg, Oral, Every Night at Bedtime      finasteride 5 MG tablet  Commonly known as: PROSCAR   5 mg, Oral, Daily      furosemide 40 MG tablet  Commonly known as: LASIX   40 mg, Oral, Daily      gabapentin 600 MG tablet  Commonly known as: NEURONTIN   300 mg, Oral, 2 Times Daily      insulin glargine 100 UNIT/ML injection  Commonly known as: LANTUS, SEMGLEE   25 Units, Subcutaneous, Nightly      levothyroxine 112 MCG tablet  Commonly known as: SYNTHROID, LEVOTHROID   224 mcg, Oral, Every Morning      metFORMIN 1000 MG tablet  Commonly known as: GLUCOPHAGE   1,000 mg, Oral, 2 Times Daily With Meals      nitroglycerin 0.4 MG SL tablet  Commonly known as: NITROSTAT   No dose, route, or frequency recorded.      sertraline 100 MG tablet  Commonly known as: ZOLOFT   200 mg, Oral, Daily      simvastatin 40 MG tablet  Commonly known as: ZOCOR   40 mg, Oral, Nightly      tamsulosin 0.4 MG capsule 24 hr capsule  Commonly known as: FLOMAX   0.4 mg, Oral, Daily         Stop These Medications    ferrous sulfate 325 (65 FE) MG tablet     nadolol 20 MG tablet  Commonly known as: CORGARD            No Known Allergies      Discharge Disposition:  Home or Self Care    Diet:  Hospital:  Diet Order   Procedures   • Diet:  Diabetic Diets; Consistent Carbohydrate; Texture: Regular Texture (IDDSI 7); Fluid Consistency: Thin (IDDSI 0)         Discharge Activity: As tolerated        CODE STATUS:  Code Status and Medical Interventions:   Ordered at: 06/09/23 1137     Code Status (Patient has no pulse and is not breathing):    CPR (Attempt to Resuscitate)     Medical Interventions (Patient has pulse or is breathing):    Full Support         Future Appointments   Date Time Provider Department Center   7/25/2023 12:15 PM Alex Buckley MD [facility]   11/16/2023 12:00 PM Sven Duran MD [facility]           Time spent on Discharge including face to face service: 45 minutes    Electronically signed by Seven Saldana MD, 06/20/23, 7:35 AM EDT.    Part of this note may be an electronic transcription/translation of spoken language to printed text using the Dragon Dictation System.

## 2023-07-20 ENCOUNTER — LAB (OUTPATIENT)
Dept: LAB | Facility: HOSPITAL | Age: 78
End: 2023-07-20
Payer: OTHER GOVERNMENT

## 2023-07-20 DIAGNOSIS — N18.32 STAGE 3B CHRONIC KIDNEY DISEASE: ICD-10-CM

## 2023-07-20 DIAGNOSIS — E11.8 TYPE 2 DIABETES MELLITUS WITH COMPLICATIONS: ICD-10-CM

## 2023-07-20 LAB
ALBUMIN SERPL-MCNC: 4.5 G/DL (ref 3.5–5.2)
ALBUMIN/GLOB SERPL: 1.8 G/DL
ALP SERPL-CCNC: 80 U/L (ref 39–117)
ALT SERPL W P-5'-P-CCNC: 19 U/L (ref 1–41)
ANION GAP SERPL CALCULATED.3IONS-SCNC: 10 MMOL/L (ref 5–15)
AST SERPL-CCNC: 22 U/L (ref 1–40)
BASOPHILS # BLD AUTO: 0.08 10*3/MM3 (ref 0–0.2)
BASOPHILS NFR BLD AUTO: 1 % (ref 0–1.5)
BILIRUB SERPL-MCNC: 0.4 MG/DL (ref 0–1.2)
BUN SERPL-MCNC: 29 MG/DL (ref 8–23)
BUN/CREAT SERPL: 18.1 (ref 7–25)
CALCIUM SPEC-SCNC: 10.5 MG/DL (ref 8.6–10.5)
CHLORIDE SERPL-SCNC: 104 MMOL/L (ref 98–107)
CO2 SERPL-SCNC: 24 MMOL/L (ref 22–29)
CREAT SERPL-MCNC: 1.6 MG/DL (ref 0.76–1.27)
DEPRECATED RDW RBC AUTO: 45.2 FL (ref 37–54)
EGFRCR SERPLBLD CKD-EPI 2021: 44.7 ML/MIN/1.73
EOSINOPHIL # BLD AUTO: 0.72 10*3/MM3 (ref 0–0.4)
EOSINOPHIL NFR BLD AUTO: 9 % (ref 0.3–6.2)
ERYTHROCYTE [DISTWIDTH] IN BLOOD BY AUTOMATED COUNT: 13.3 % (ref 12.3–15.4)
GLOBULIN UR ELPH-MCNC: 2.5 GM/DL
GLUCOSE SERPL-MCNC: 133 MG/DL (ref 65–99)
HCT VFR BLD AUTO: 37.7 % (ref 37.5–51)
HGB BLD-MCNC: 12.7 G/DL (ref 13–17.7)
IMM GRANULOCYTES # BLD AUTO: 0.02 10*3/MM3 (ref 0–0.05)
IMM GRANULOCYTES NFR BLD AUTO: 0.2 % (ref 0–0.5)
LYMPHOCYTES # BLD AUTO: 2.08 10*3/MM3 (ref 0.7–3.1)
LYMPHOCYTES NFR BLD AUTO: 25.9 % (ref 19.6–45.3)
MCH RBC QN AUTO: 31.1 PG (ref 26.6–33)
MCHC RBC AUTO-ENTMCNC: 33.7 G/DL (ref 31.5–35.7)
MCV RBC AUTO: 92.4 FL (ref 79–97)
MONOCYTES # BLD AUTO: 0.43 10*3/MM3 (ref 0.1–0.9)
MONOCYTES NFR BLD AUTO: 5.3 % (ref 5–12)
NEUTROPHILS NFR BLD AUTO: 4.71 10*3/MM3 (ref 1.7–7)
NEUTROPHILS NFR BLD AUTO: 58.6 % (ref 42.7–76)
NRBC BLD AUTO-RTO: 0 /100 WBC (ref 0–0.2)
PLATELET # BLD AUTO: 93 10*3/MM3 (ref 140–450)
PMV BLD AUTO: 9.4 FL (ref 6–12)
POTASSIUM SERPL-SCNC: 5.7 MMOL/L (ref 3.5–5.2)
PROT SERPL-MCNC: 7 G/DL (ref 6–8.5)
RBC # BLD AUTO: 4.08 10*6/MM3 (ref 4.14–5.8)
SODIUM SERPL-SCNC: 138 MMOL/L (ref 136–145)
WBC NRBC COR # BLD: 8.04 10*3/MM3 (ref 3.4–10.8)

## 2023-07-20 PROCEDURE — 85025 COMPLETE CBC W/AUTO DIFF WBC: CPT

## 2023-07-20 PROCEDURE — 80053 COMPREHEN METABOLIC PANEL: CPT

## 2023-07-20 PROCEDURE — 83036 HEMOGLOBIN GLYCOSYLATED A1C: CPT

## 2023-07-20 PROCEDURE — 36415 COLL VENOUS BLD VENIPUNCTURE: CPT

## 2023-07-21 LAB — HBA1C MFR BLD: 6.4 % (ref 4.8–5.6)

## 2023-07-25 ENCOUNTER — OFFICE VISIT (OUTPATIENT)
Dept: INTERNAL MEDICINE | Facility: CLINIC | Age: 78
End: 2023-07-25
Payer: MEDICARE

## 2023-07-25 VITALS
WEIGHT: 254 LBS | OXYGEN SATURATION: 92 % | HEART RATE: 79 BPM | HEIGHT: 72 IN | TEMPERATURE: 97.3 F | SYSTOLIC BLOOD PRESSURE: 138 MMHG | DIASTOLIC BLOOD PRESSURE: 84 MMHG | BODY MASS INDEX: 34.4 KG/M2

## 2023-07-25 DIAGNOSIS — E03.9 HYPOTHYROIDISM, ADULT: ICD-10-CM

## 2023-07-25 DIAGNOSIS — I10 HYPERTENSION, ESSENTIAL: ICD-10-CM

## 2023-07-25 DIAGNOSIS — R53.1 WEAKNESS: ICD-10-CM

## 2023-07-25 DIAGNOSIS — E11.8 TYPE 2 DIABETES MELLITUS WITH COMPLICATIONS: Primary | ICD-10-CM

## 2023-07-25 DIAGNOSIS — N18.32 STAGE 3B CHRONIC KIDNEY DISEASE: ICD-10-CM

## 2023-07-25 PROBLEM — R06.09 DYSPNEA ON EXERTION: Status: RESOLVED | Noted: 2023-01-18 | Resolved: 2023-07-25

## 2023-07-25 PROCEDURE — 3044F HG A1C LEVEL LT 7.0%: CPT | Performed by: INTERNAL MEDICINE

## 2023-07-25 PROCEDURE — 1159F MED LIST DOCD IN RCRD: CPT | Performed by: INTERNAL MEDICINE

## 2023-07-25 PROCEDURE — 1170F FXNL STATUS ASSESSED: CPT | Performed by: INTERNAL MEDICINE

## 2023-07-25 PROCEDURE — 1160F RVW MEDS BY RX/DR IN RCRD: CPT | Performed by: INTERNAL MEDICINE

## 2023-07-25 PROCEDURE — 99215 OFFICE O/P EST HI 40 MIN: CPT | Performed by: INTERNAL MEDICINE

## 2023-07-25 PROCEDURE — 3079F DIAST BP 80-89 MM HG: CPT | Performed by: INTERNAL MEDICINE

## 2023-07-25 PROCEDURE — 3075F SYST BP GE 130 - 139MM HG: CPT | Performed by: INTERNAL MEDICINE

## 2023-07-25 RX ORDER — INSULIN LISPRO 100 [IU]/ML
INJECTION, SOLUTION INTRAVENOUS; SUBCUTANEOUS
Status: SHIPPED | COMMUNITY
Start: 2023-07-25

## 2023-07-25 RX ORDER — INSULIN LISPRO 100 [IU]/ML
INJECTION, SOLUTION INTRAVENOUS; SUBCUTANEOUS
Status: SHIPPED | COMMUNITY
Start: 2023-07-25 | End: 2023-07-25

## 2023-07-25 RX ORDER — INSULIN GLARGINE 100 [IU]/ML
15 INJECTION, SOLUTION SUBCUTANEOUS NIGHTLY
Status: SHIPPED | COMMUNITY
Start: 2023-07-25

## 2023-07-25 RX ORDER — INSULIN LISPRO 100 [IU]/ML
15 INJECTION, SOLUTION INTRAVENOUS; SUBCUTANEOUS
COMMUNITY
End: 2023-07-25

## 2023-07-25 NOTE — ASSESSMENT & PLAN NOTE
A1c is down to 6.4 as of his 7/23 office visit.  He has been having some hypoglycemic episodes, and it sounds sounds like he is using 14 units of Humalog anytime his sugar is over 130 or so.  We will give him a new sliding scale, and back off on the Lantus as well.  He will continue with full dose metformin for now.

## 2023-07-25 NOTE — ASSESSMENT & PLAN NOTE
Patient clinically euthyroid, he is taking 2 of the 112 mcg tablets daily and on Sunday he just takes 1-1/2.  We will repeat level on return to office.

## 2023-07-25 NOTE — ASSESSMENT & PLAN NOTE
GFR of 45 is pretty close to his baseline.  His potassium is 5.7, previously was in 4.5-4.8 range, so I suspect this is a lab error.  Patient is on no supplemental potassium.  He is not on an ACE inhibitor, and he is on low-dose Lasix.  Additionally he is not bradycardic, heart rate is 79.  Will defer this finding.

## 2023-07-25 NOTE — PROGRESS NOTES
Chief Complaint  Diabetes (Follow up - lab follow up, BS follow up. Rt foot open sore, pt applying otc ointment. Pt is driving and there is a concern with his HX of diabetes. )    Subjective          Cosmo Gauthier presents to [facility]     History of present illness:  Patient is pleasant but unfortunate 77-year-old male with multiple medical issues, status post 5 vessel bypass 5/21, with underlying hypertension, hyperlipidemia, and diabetes, coming in 7/23 for routine 3-month follow-up. We will review all his labs and address any new concerns.    ---> Phone messaage 7/17/23:  1.  Please get home health orders to obtain a CMP and a CBC tomorrow for generalized weakness.    2.  Since the patient is checking his sugars, I am confused as to why they are not being written down.  Why are we just speculating that they are low based on the sound that the machine makes.  Please ask his wife to record the blood sugars when being checked and note the time.    3.  In the interim he should lower his Lantus from 25 to 18 units.    4.  Is a utilizing any short acting/regular insulin?    5.  Patient was hospitalized twice for this weakness, has been in therapy, he is continuing to fall, he should be wheelchair-bound.  Please place order for standard wheelchair if they do not already have one.  He should be up with assistance only.    Review of Systems   Constitutional:  Negative for appetite change, fatigue and fever.   HENT:  Negative for congestion and ear pain.    Eyes:  Negative for blurred vision.   Respiratory:  Negative for cough, chest tightness and wheezing.    Cardiovascular:  Negative for chest pain, palpitations and leg swelling.   Gastrointestinal:  Negative for abdominal pain.   Genitourinary:  Negative for difficulty urinating, dysuria and hematuria.   Musculoskeletal:  Negative for arthralgias and gait problem.   Skin:  Negative for skin lesions.   Neurological:  Negative for  "syncope, memory problem and confusion.   Psychiatric/Behavioral:  Negative for self-injury and depressed mood.      Objective   Vital Signs:   /84   Pulse 79   Temp 97.3 °F (36.3 °C)   Ht 182.9 cm (72.01\")   Wt 115 kg (254 lb)   SpO2 92%   BMI 34.44 kg/m²           Physical Exam  Vitals and nursing note reviewed.   Constitutional:       General: He is not in acute distress.     Appearance: Normal appearance. He is not toxic-appearing.   HENT:      Head: Atraumatic.      Right Ear: External ear normal.      Left Ear: External ear normal.      Nose: Nose normal.      Mouth/Throat:      Mouth: Mucous membranes are moist.   Eyes:      General:         Right eye: No discharge.         Left eye: No discharge.      Extraocular Movements: Extraocular movements intact.      Pupils: Pupils are equal, round, and reactive to light.   Cardiovascular:      Rate and Rhythm: Normal rate and regular rhythm.      Pulses: Normal pulses.      Heart sounds: Normal heart sounds. No murmur heard.    No gallop.   Pulmonary:      Effort: Pulmonary effort is normal. No respiratory distress.      Breath sounds: No wheezing, rhonchi or rales.   Abdominal:      General: There is no distension.      Palpations: Abdomen is soft. There is no mass.      Tenderness: There is no abdominal tenderness. There is no guarding.   Musculoskeletal:         General: No swelling or tenderness.      Cervical back: No tenderness.      Right lower leg: No edema.      Left lower leg: No edema.   Skin:     General: Skin is warm and dry.      Findings: No rash.   Neurological:      General: No focal deficit present.      Mental Status: He is alert and oriented to person, place, and time. Mental status is at baseline.      Motor: No weakness.      Gait: Gait normal.   Psychiatric:         Mood and Affect: Mood normal.         Thought Content: Thought content normal.        Result Review :   The following data was reviewed by: Alex Buckley MD on " 08/02/2021:                  Assessment and Plan    Diagnoses and all orders for this visit:    1. Type 2 diabetes mellitus with complications (Primary)  Assessment & Plan:  A1c is down to 6.4 as of his 7/23 office visit.  He has been having some hypoglycemic episodes, and it sounds sounds like he is using 14 units of Humalog anytime his sugar is over 130 or so.  We will give him a new sliding scale, and back off on the Lantus as well.  He will continue with full dose metformin for now.    Orders:  -     Microalbumin / Creatinine Urine Ratio - Urine, Clean Catch; Future  -     Hemoglobin A1c; Future    2. Stage 3b chronic kidney disease  Assessment & Plan:  GFR of 45 is pretty close to his baseline.  His potassium is 5.7, previously was in 4.5-4.8 range, so I suspect this is a lab error.  Patient is on no supplemental potassium.  He is not on an ACE inhibitor, and he is on low-dose Lasix.  Additionally he is not bradycardic, heart rate is 79.  Will defer this finding.    Orders:  -     Basic Metabolic Panel; Future    3. Hypertension, essential  Assessment & Plan:  Blood pressure stable as of his 7/23 office visit.  Believe he was taken off of Corgard due to some hypotension and/or some bradycardia.  He is fine to continue with just low-dose diuretic therapy for this.      4. Weakness    5. Hypothyroidism, adult  Assessment & Plan:  Patient clinically euthyroid, he is taking 2 of the 112 mcg tablets daily and on Sunday he just takes 1-1/2.  We will repeat level on return to office.    Orders:  -     TSH; Future      --  --  Older notes:  HOSP F/U 6/21 = S/P 5V CABG 5/21 per Dr Milligan=I reviewed records sent and the med list provided by wife.  TELEVISIT 2/25/21:  HOSP F/U 10/20 = I reviewed 9/20 [facility] records; I d/w pt labs/meds in detail:  1) CHEST PAIN and pt is being admitted to R/O MI; cardiology was notified...SPECT was neg, so will f/u with cards in a few weeks; may still need a cath=no new sxs and is gideon to see  him next week as of 10/20 OV...to [facility] for CABG per Dr Milligan, but PLT's too low; has f/u with cards.  2) CAD/MI with prior stents; ? last stress was done in cardiology office 5/19; will continue home meds for now...no rest angina; ? increase Imdur=defer to cards appt he has on 3/9/21---> S/P 5V CABG as above; looks great.    3) HTN will be followed closely on home meds=stable 10/20 OV, BUT is orthostatic, so drink more and lower ACEI to 5 mg---> stable 6/21.  4) HYPERLIPIDEMIA=continue statin=LDL 61 (9/20)---> 46 in 6/21.    5) CIRRHOSIS per Dr Bradshaw; watch volume status.  6) THROMBOCYTOPENIA is related to the cirrhosis and is stable around 70K...on Prednisone per Dr Saldana---> off this; labs on RTO.    7) DM per orders...A1C was only 7.3 in 1/21; she d/w me BS fine despite prednisone as of 2/21 OV; ? lost 20+ lbs---> 5.6 and I d/w stop glipizide 10 bid and stay on Humalog 15 tid, but then again not totally sure he's on it?    8) HYPOTHYROIDISM is wnl as of 2/21 OV---> RTO.    9) BPH on flomax after retenion issues in BE as of 2/21 OV; to see Dr Dill---> saw him for mihaela post-op = it's out now.    (lost friend/valeria 60's to covid)    VISIT 6/20:  ANNUAL MEDICARE WELLNESS EXAM 10/19 = reviewed all forms with pt; he is much more depressed, so zoloft was increased.  H.M. ISSUES:  DM = A1C as below; Optho=q 12 mo with last 4/18 = early diabetic retinopathy and ? laser next visit?  --  HTN...needs ACEI now at 6/17 OV; repeat urine micro as well...remains controlled...ACEI fell off his list; resume now 1/19...better already---> stable.  ? CKD3 noted 6/20 = no new meds; needs repeat few weeks.  --  DM...max out eluvia...8.1 an has f/u with [facility]...on lantus 20, d/w increase 2-4 units every week to get FBS to 110...7.1 is great...7.5 is stuck and I d/w again to increase Lantus=told him 24 now... 8.8 is horrible, so increase glucotrol to whole tab in AM...8.9, so try whole tab bid before increase lantus...9.5 is worse  and needs Humalog before meals; d/w qid testing; stop glucotrol and increase lantus to 30 qhs...BS noted per phone and in office; rec 16/20/16 and stay on Lantus 30 qhs; d/w NO SSI for now...A1C down to 8.0 already and Fructosamine of 300=A1C closer to 7.5 actually...6.3 is great with TSH back down...6.8 is ok for now=7/19...7.3 in 10/19 and I d/w take 8 units with breakfast since he has been skipping this and only taking 15 with lunch/supper---> 7.3 great 6/20.   HYPOTHYROIDSIM stable 6/17...0.2 at 1/18 OV...0.7 better...increase 1/19 for 3.5 given above...? n/c, b/c now=10; will add 50 as of 1/19 OV...TSH 64 and apparently he missed them past week; I rec 250 qd for now and close f/u...0.7 and will leave this alone for now---> 1.5 in 2/20.  --  CAD per Dr Pritchett; s/p Spect '15 per pt and was neg---> still no CP/SOB and sees cards q 6 mo=no recent as of 6/20 OV.  LIPIDS with LDL 72...83 ok for now...LDL 57---> 55 in 2/20.  --  THROMBOCYTOPENIA is new 4/16 OV=99, so to HEME...off ASA but plavix ok per Dr Chaudhry; had BM bx=?...75 holding...on iron per her---> CBC stable 6/20 per her.  --  HOSP F/U 8/16 for FUO.  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  --  DJD s/p R TKR and then L TKR per Dr Eckert 1/16 and rehab with [facility] still on-going = 3x/wk at 4/16 OV...still with issues L knee 4/18...to have redo L TKR per Dr Zayas as of 10/18 OV=Dr Duran cleared him already...is gideon for 2/4/19, but apparently wants his A1C below 7 for this?? = d/w me 1/19...I d/w not going to get there that soon, but current blood sugars excellent and pt cleared for surgery from my standpoint as well=reviewed all their pre-op labs as well as EKG and CXR...s/p redo L TKR on 2/4/19 and looks great at 2/27/19 OV...finishing up as of 4/19 OV.  --  GERD per Dr Bradshaw.  ? CIRRHOSIS=tried on Nadolol per Dr Bradshaw=stopped it 6/20 due to diarrhea?; ? has CT/NH3 gideon.  --  OBESITY up 3 more to 263...258--->270 is stuck 2/20 and I  d/w no meds=must go to [facility] ( Cards said no to surgery).  --  DEPRESSION on maint med...? Dementia per pt, sent to Dr Kim; he sent for [facility] as of 4/18 OV...will address depression 8/18 = increase zoloft   [facility] as [facility]---> c/w as of 2/21 OV; neg O2 in [facility] recently;   --  --  PSA 0.5 (4/7/16)...defer.  Colon 7/19...2 inflam/hyper polyps per Dr Bradshaw.  Prevnar 11/16; Pneumovax # 1 9/17;  (, retired [employer] '97, 3 kids)    Follow Up   Return in about 3 months (around 10/25/2023).    Total Time Spent: 44 minutes     This time includes time spent by me in the following activities: preparing for the visit, reviewing extensive past medical history and tests, performing a medically appropriate examination and/or evaluation, counseling and educating the patient and/or caregivers, ordering medications, tests, or procedures, referring and/or communicating with other health care professionals and documenting information in the medical record all on this date of service.     Patient was given instructions and counseling regarding his condition or for health maintenance advice. Please see specific information pulled into the AVS if appropriate.

## 2023-07-25 NOTE — ASSESSMENT & PLAN NOTE
Blood pressure stable as of his 7/23 office visit.  Believe he was taken off of Corgard due to some hypotension and/or some bradycardia.  He is fine to continue with just low-dose diuretic therapy for this.

## 2023-08-17 ENCOUNTER — TELEPHONE (OUTPATIENT)
Dept: CARDIOLOGY | Facility: CLINIC | Age: 78
End: 2023-08-17
Payer: OTHER GOVERNMENT

## 2023-08-17 ENCOUNTER — TELEPHONE (OUTPATIENT)
Dept: INTERNAL MEDICINE | Facility: CLINIC | Age: 78
End: 2023-08-17
Payer: OTHER GOVERNMENT

## 2023-08-17 DIAGNOSIS — R42 DIZZY: Primary | ICD-10-CM

## 2023-08-17 DIAGNOSIS — E11.8 TYPE 2 DIABETES MELLITUS WITH COMPLICATIONS: Primary | ICD-10-CM

## 2023-08-17 DIAGNOSIS — D69.6 THROMBOCYTOPENIA: ICD-10-CM

## 2023-08-17 DIAGNOSIS — R06.02 SOBOE (SHORTNESS OF BREATH ON EXERTION): ICD-10-CM

## 2023-08-17 RX ORDER — MIDODRINE HYDROCHLORIDE 2.5 MG/1
2.5 TABLET ORAL DAILY
Qty: 90 TABLET | Refills: 3 | Status: SHIPPED | OUTPATIENT
Start: 2023-08-17

## 2023-08-17 NOTE — TELEPHONE ENCOUNTER
Sumanth physical therapist from [facility] called this a.m.  Wanted to report that the patient is having some considerable orthostatic hypotension; in standing 78/40 with dizziness.States the home health nursing is there with him now and on the phone with cardiology.  Wanted Dr. Buckley to be aware.    Also asking for verbal orders for nursing and PT;  nursing weekly x 4 weeks, then every other week x 4 weeks.  PT once weekly x 9 weeks.  Verbal okay given by myself.      Sumanth # [phone]

## 2023-08-17 NOTE — TELEPHONE ENCOUNTER
Obtain 48 hour holter monitor  Start midodrine 2.5 mg daily, continue to monitor blood pressure and heart rate daily

## 2023-08-17 NOTE — TELEPHONE ENCOUNTER
LEIF Mary RN with [facility]. Patient has been getting dizzy and falling with changing positions.  Tyra did BP sitting 118/87 and then standing 80/44 this AM. Per Tyra patient is not on any HTN medications. Patient does take Lasix 20 mg daily.       Advised I would return call with recommendations

## 2023-08-18 ENCOUNTER — LAB (OUTPATIENT)
Dept: INTERNAL MEDICINE | Facility: CLINIC | Age: 78
End: 2023-08-18
Payer: MEDICARE

## 2023-08-18 DIAGNOSIS — D69.6 THROMBOCYTOPENIA: ICD-10-CM

## 2023-08-18 DIAGNOSIS — R06.02 SOBOE (SHORTNESS OF BREATH ON EXERTION): ICD-10-CM

## 2023-08-18 DIAGNOSIS — E11.8 TYPE 2 DIABETES MELLITUS WITH COMPLICATIONS: ICD-10-CM

## 2023-08-18 LAB
ALBUMIN SERPL-MCNC: 5 G/DL (ref 3.5–5.2)
ALBUMIN UR-MCNC: <1.2 MG/DL
ALBUMIN/GLOB SERPL: 2.4 G/DL
ALP SERPL-CCNC: 96 U/L (ref 39–117)
ALT SERPL W P-5'-P-CCNC: 33 U/L (ref 1–41)
ANION GAP SERPL CALCULATED.3IONS-SCNC: 11 MMOL/L (ref 5–15)
AST SERPL-CCNC: 24 U/L (ref 1–40)
BASOPHILS # BLD AUTO: 0.07 10*3/MM3 (ref 0–0.2)
BASOPHILS NFR BLD AUTO: 1 % (ref 0–1.5)
BILIRUB SERPL-MCNC: 0.4 MG/DL (ref 0–1.2)
BUN SERPL-MCNC: 30 MG/DL (ref 8–23)
BUN/CREAT SERPL: 21.7 (ref 7–25)
CALCIUM SPEC-SCNC: 9.8 MG/DL (ref 8.6–10.5)
CHLORIDE SERPL-SCNC: 105 MMOL/L (ref 98–107)
CO2 SERPL-SCNC: 23 MMOL/L (ref 22–29)
CREAT SERPL-MCNC: 1.38 MG/DL (ref 0.76–1.27)
CREAT UR-MCNC: 68.2 MG/DL
DEPRECATED RDW RBC AUTO: 44.1 FL (ref 37–54)
EGFRCR SERPLBLD CKD-EPI 2021: 53.3 ML/MIN/1.73
EOSINOPHIL # BLD AUTO: 0.52 10*3/MM3 (ref 0–0.4)
EOSINOPHIL NFR BLD AUTO: 7.5 % (ref 0.3–6.2)
ERYTHROCYTE [DISTWIDTH] IN BLOOD BY AUTOMATED COUNT: 13.3 % (ref 12.3–15.4)
GLOBULIN UR ELPH-MCNC: 2.1 GM/DL
GLUCOSE SERPL-MCNC: 165 MG/DL (ref 65–99)
HCT VFR BLD AUTO: 39 % (ref 37.5–51)
HGB BLD-MCNC: 13.2 G/DL (ref 13–17.7)
IMM GRANULOCYTES # BLD AUTO: 0.02 10*3/MM3 (ref 0–0.05)
IMM GRANULOCYTES NFR BLD AUTO: 0.3 % (ref 0–0.5)
LYMPHOCYTES # BLD AUTO: 2.02 10*3/MM3 (ref 0.7–3.1)
LYMPHOCYTES NFR BLD AUTO: 29.1 % (ref 19.6–45.3)
MCH RBC QN AUTO: 31.2 PG (ref 26.6–33)
MCHC RBC AUTO-ENTMCNC: 33.8 G/DL (ref 31.5–35.7)
MCV RBC AUTO: 92.2 FL (ref 79–97)
MICROALBUMIN/CREAT UR: NORMAL MG/G{CREAT}
MONOCYTES # BLD AUTO: 0.39 10*3/MM3 (ref 0.1–0.9)
MONOCYTES NFR BLD AUTO: 5.6 % (ref 5–12)
NEUTROPHILS NFR BLD AUTO: 3.93 10*3/MM3 (ref 1.7–7)
NEUTROPHILS NFR BLD AUTO: 56.5 % (ref 42.7–76)
NRBC BLD AUTO-RTO: 0 /100 WBC (ref 0–0.2)
NT-PROBNP SERPL-MCNC: 116 PG/ML (ref 0–1800)
PLATELET # BLD AUTO: 116 10*3/MM3 (ref 140–450)
PMV BLD AUTO: 9.2 FL (ref 6–12)
POTASSIUM SERPL-SCNC: 5.8 MMOL/L (ref 3.5–5.2)
PROT SERPL-MCNC: 7.1 G/DL (ref 6–8.5)
RBC # BLD AUTO: 4.23 10*6/MM3 (ref 4.14–5.8)
SODIUM SERPL-SCNC: 139 MMOL/L (ref 136–145)
WBC NRBC COR # BLD: 6.95 10*3/MM3 (ref 3.4–10.8)

## 2023-08-18 PROCEDURE — 82570 ASSAY OF URINE CREATININE: CPT | Performed by: INTERNAL MEDICINE

## 2023-08-18 PROCEDURE — 85025 COMPLETE CBC W/AUTO DIFF WBC: CPT | Performed by: INTERNAL MEDICINE

## 2023-08-18 PROCEDURE — 82043 UR ALBUMIN QUANTITATIVE: CPT | Performed by: INTERNAL MEDICINE

## 2023-08-18 PROCEDURE — 36415 COLL VENOUS BLD VENIPUNCTURE: CPT | Performed by: INTERNAL MEDICINE

## 2023-08-18 PROCEDURE — 83880 ASSAY OF NATRIURETIC PEPTIDE: CPT | Performed by: INTERNAL MEDICINE

## 2023-08-18 PROCEDURE — 80053 COMPREHEN METABOLIC PANEL: CPT | Performed by: INTERNAL MEDICINE

## 2023-08-18 NOTE — TELEPHONE ENCOUNTER
Pt's wife states that he was started on Midodrine 2.5mg qd yesterday by Cardio, today was his first day.     She is going to bring him into office lab today to get blood work

## 2023-08-20 ENCOUNTER — HOSPITAL ENCOUNTER (EMERGENCY)
Facility: HOSPITAL | Age: 78
Discharge: HOME | End: 2023-08-20
Attending: EMERGENCY MEDICINE
Payer: MEDICARE

## 2023-08-20 ENCOUNTER — APPOINTMENT (OUTPATIENT)
Dept: GENERAL RADIOLOGY | Facility: HOSPITAL | Age: 78
End: 2023-08-20
Payer: MEDICARE

## 2023-08-20 ENCOUNTER — APPOINTMENT (OUTPATIENT)
Dept: CT IMAGING | Facility: HOSPITAL | Age: 78
End: 2023-08-20
Payer: MEDICARE

## 2023-08-20 VITALS
WEIGHT: 253.53 LBS | HEART RATE: 81 BPM | TEMPERATURE: 99 F | HEIGHT: 72 IN | BODY MASS INDEX: 34.34 KG/M2 | OXYGEN SATURATION: 97 % | RESPIRATION RATE: 18 BRPM | SYSTOLIC BLOOD PRESSURE: 107 MMHG | DIASTOLIC BLOOD PRESSURE: 69 MMHG

## 2023-08-20 DIAGNOSIS — E86.0 DEHYDRATION: ICD-10-CM

## 2023-08-20 DIAGNOSIS — E86.1 HYPOTENSION DUE TO HYPOVOLEMIA: ICD-10-CM

## 2023-08-20 DIAGNOSIS — I95.89 HYPOTENSION DUE TO HYPOVOLEMIA: ICD-10-CM

## 2023-08-20 DIAGNOSIS — R53.1 EPISODE OF GENERALIZED WEAKNESS: Primary | ICD-10-CM

## 2023-08-20 DIAGNOSIS — T67.5XXA HEAT EXHAUSTION, INITIAL ENCOUNTER: ICD-10-CM

## 2023-08-20 LAB
ALBUMIN SERPL-MCNC: 4.7 G/DL (ref 3.5–5.2)
ALBUMIN/GLOB SERPL: 1.6 G/DL
ALP SERPL-CCNC: 99 U/L (ref 39–117)
ALT SERPL W P-5'-P-CCNC: 30 U/L (ref 1–41)
ANION GAP SERPL CALCULATED.3IONS-SCNC: 16.1 MMOL/L (ref 5–15)
AST SERPL-CCNC: 27 U/L (ref 1–40)
BASOPHILS # BLD AUTO: 0.07 10*3/MM3 (ref 0–0.2)
BASOPHILS NFR BLD AUTO: 0.8 % (ref 0–1.5)
BILIRUB SERPL-MCNC: 0.5 MG/DL (ref 0–1.2)
BUN SERPL-MCNC: 28 MG/DL (ref 8–23)
BUN/CREAT SERPL: 12.6 (ref 7–25)
CALCIUM SPEC-SCNC: 9.8 MG/DL (ref 8.6–10.5)
CHLORIDE SERPL-SCNC: 98 MMOL/L (ref 98–107)
CO2 SERPL-SCNC: 19.9 MMOL/L (ref 22–29)
CREAT SERPL-MCNC: 2.23 MG/DL (ref 0.76–1.27)
DEPRECATED RDW RBC AUTO: 46.9 FL (ref 37–54)
EGFRCR SERPLBLD CKD-EPI 2021: 30 ML/MIN/1.73
EOSINOPHIL # BLD AUTO: 0.37 10*3/MM3 (ref 0–0.4)
EOSINOPHIL NFR BLD AUTO: 4 % (ref 0.3–6.2)
ERYTHROCYTE [DISTWIDTH] IN BLOOD BY AUTOMATED COUNT: 14.5 % (ref 12.3–15.4)
GLOBULIN UR ELPH-MCNC: 2.9 GM/DL
GLUCOSE BLDC GLUCOMTR-MCNC: 219 MG/DL (ref 70–99)
GLUCOSE SERPL-MCNC: 177 MG/DL (ref 65–99)
HCT VFR BLD AUTO: 39 % (ref 37.5–51)
HGB BLD-MCNC: 13.7 G/DL (ref 13–17.7)
HOLD SPECIMEN: NORMAL
HOLD SPECIMEN: NORMAL
IMM GRANULOCYTES # BLD AUTO: 0.03 10*3/MM3 (ref 0–0.05)
IMM GRANULOCYTES NFR BLD AUTO: 0.3 % (ref 0–0.5)
LYMPHOCYTES # BLD AUTO: 1.66 10*3/MM3 (ref 0.7–3.1)
LYMPHOCYTES NFR BLD AUTO: 17.9 % (ref 19.6–45.3)
MAGNESIUM SERPL-MCNC: 1.6 MG/DL (ref 1.6–2.4)
MCH RBC QN AUTO: 31.4 PG (ref 26.6–33)
MCHC RBC AUTO-ENTMCNC: 35.1 G/DL (ref 31.5–35.7)
MCV RBC AUTO: 89.4 FL (ref 79–97)
MONOCYTES # BLD AUTO: 0.67 10*3/MM3 (ref 0.1–0.9)
MONOCYTES NFR BLD AUTO: 7.2 % (ref 5–12)
NEUTROPHILS NFR BLD AUTO: 6.49 10*3/MM3 (ref 1.7–7)
NEUTROPHILS NFR BLD AUTO: 69.8 % (ref 42.7–76)
NRBC BLD AUTO-RTO: 0 /100 WBC (ref 0–0.2)
PLATELET # BLD AUTO: 133 10*3/MM3 (ref 140–450)
PMV BLD AUTO: 8.8 FL (ref 6–12)
POTASSIUM SERPL-SCNC: 5.2 MMOL/L (ref 3.5–5.2)
PROT SERPL-MCNC: 7.6 G/DL (ref 6–8.5)
RBC # BLD AUTO: 4.36 10*6/MM3 (ref 4.14–5.8)
RBC MORPH BLD: NORMAL
SMALL PLATELETS BLD QL SMEAR: NORMAL
SODIUM SERPL-SCNC: 134 MMOL/L (ref 136–145)
TROPONIN T SERPL HS-MCNC: 31 NG/L
WBC MORPH BLD: NORMAL
WBC NRBC COR # BLD: 9.29 10*3/MM3 (ref 3.4–10.8)
WHOLE BLOOD HOLD COAG: NORMAL
WHOLE BLOOD HOLD SPECIMEN: NORMAL

## 2023-08-20 PROCEDURE — 70496 CT ANGIOGRAPHY HEAD: CPT

## 2023-08-20 PROCEDURE — 80053 COMPREHEN METABOLIC PANEL: CPT | Performed by: EMERGENCY MEDICINE

## 2023-08-20 PROCEDURE — 83735 ASSAY OF MAGNESIUM: CPT | Performed by: EMERGENCY MEDICINE

## 2023-08-20 PROCEDURE — 99285 EMERGENCY DEPT VISIT HI MDM: CPT

## 2023-08-20 PROCEDURE — 84484 ASSAY OF TROPONIN QUANT: CPT | Performed by: EMERGENCY MEDICINE

## 2023-08-20 PROCEDURE — 93005 ELECTROCARDIOGRAM TRACING: CPT | Performed by: EMERGENCY MEDICINE

## 2023-08-20 PROCEDURE — 25510000001 IOPAMIDOL PER 1 ML

## 2023-08-20 PROCEDURE — 70450 CT HEAD/BRAIN W/O DYE: CPT

## 2023-08-20 PROCEDURE — 82948 REAGENT STRIP/BLOOD GLUCOSE: CPT

## 2023-08-20 PROCEDURE — 85025 COMPLETE CBC W/AUTO DIFF WBC: CPT | Performed by: EMERGENCY MEDICINE

## 2023-08-20 PROCEDURE — 70498 CT ANGIOGRAPHY NECK: CPT

## 2023-08-20 PROCEDURE — 71045 X-RAY EXAM CHEST 1 VIEW: CPT

## 2023-08-20 PROCEDURE — 99204 OFFICE O/P NEW MOD 45 MIN: CPT | Performed by: PSYCHIATRY & NEUROLOGY

## 2023-08-20 PROCEDURE — 93010 ELECTROCARDIOGRAM REPORT: CPT | Performed by: INTERNAL MEDICINE

## 2023-08-20 PROCEDURE — 85007 BL SMEAR W/DIFF WBC COUNT: CPT | Performed by: EMERGENCY MEDICINE

## 2023-08-20 PROCEDURE — 93005 ELECTROCARDIOGRAM TRACING: CPT

## 2023-08-20 RX ORDER — SODIUM CHLORIDE 0.9 % (FLUSH) 0.9 %
10 SYRINGE (ML) INJECTION AS NEEDED
Status: DISCONTINUED | OUTPATIENT
Start: 2023-08-20 | End: 2023-08-21 | Stop reason: HOSPADM

## 2023-08-20 RX ORDER — HYDROCODONE BITARTRATE AND ACETAMINOPHEN 5; 325 MG/1; MG/1
1 TABLET ORAL ONCE
Status: DISCONTINUED | OUTPATIENT
Start: 2023-08-20 | End: 2023-08-21 | Stop reason: HOSPADM

## 2023-08-20 RX ORDER — HYDROCODONE BITARTRATE AND ACETAMINOPHEN 5; 325 MG/1; MG/1
1 TABLET ORAL EVERY 6 HOURS PRN
Status: DISCONTINUED | OUTPATIENT
Start: 2023-08-20 | End: 2023-08-21 | Stop reason: HOSPADM

## 2023-08-20 RX ADMIN — IOPAMIDOL 100 ML: 755 INJECTION, SOLUTION INTRAVENOUS at 17:19

## 2023-08-20 RX ADMIN — SODIUM CHLORIDE 500 ML: 9 INJECTION, SOLUTION INTRAVENOUS at 20:06

## 2023-08-20 RX ADMIN — SODIUM CHLORIDE 500 ML: 9 INJECTION, SOLUTION INTRAVENOUS at 21:06

## 2023-08-20 RX ADMIN — HYDROCODONE BITARTRATE AND ACETAMINOPHEN 1 TABLET: 5; 325 TABLET ORAL at 18:44

## 2023-08-20 NOTE — CONSULTS
[facility]  [facility]      Patient Name:   Cosmo Gauthier  YOB: 1945  Identification Number:   MRN - [id number]  Date of Service:   08/20/2023 16:41:54    Diagnosis:        R53.1 - Weakness        R42 - Dizziness/ Vertigo/ Giddiness    Impression:       78 yo man presenting for evaluation of bilateral leg weakness, dizziness in the setting of hypotension and being out in the heat. Low suspicion of stroke especially as this has happened before. I do think a CTA H/N is reasonable to make sure LVO. This appears neg on my review.    Disposition:        Sign off    Sign Out:        Discussed with Emergency Department Provider        ------------------------------------------------------------------------------    Advanced Imaging:  CTA Head and Neck Completed.    LVO:No    Patient in not a candidate for MAGGIE      Metrics:  Last Known Well: 08/20/2023 15:30:00  [facility] Notification Time: 08/20/2023 16:41:04  Arrival Time: 08/20/2023 16:13:00  Stamp Time: 08/20/2023 16:41:54  Initial Response Time: 08/20/2023 16:47:39  Symptoms: weakness, dizziness, fall.  Initial patient interaction: 08/20/2023 16:53:12  NIHSS Assessment Completed: 08/20/2023 17:02:42  Patient is not a candidate for Thrombolytic.  Thrombolytic Medical Decision: 08/20/2023 17:02:43  Patient was not deemed candidate for Thrombolytic because of following reasons:  Other Diagnosis suspected.    CT head showed no acute hemorrhage or acute core infarct.    Primary Provider Notified of Diagnostic Impression and Management Plan on: 08/20/2023 17:22:39        ------------------------------------------------------------------------------    History of Present Illness:  Patient is a 77 year old Male.    Patient was brought by EMS for symptoms of weakness, dizziness, fall.  78 yo man with history of hypotension, recently started on midodrine, HLD, CAD who presents for evaluation of dizziness, BLE  weakness, fall. He was outside mowing the lawn for almost 3 hours before his symptoms began. Some slurred speech at the outset. He's had difficulty walking due to the symptoms.    On exam, NIH is zero. I did not have him walk. He is hypotensive and tachycardic. Head CT neg. I do not see LVO on CTA.       Past Medical History:       Hypertension       Hyperlipidemia       Coronary Artery Disease  Othere PMH:  thrombocytopenia    Medications:    No Anticoagulant use   Antiplatelet use: Yes aspirin  Reviewed EMR for current medications    Allergies:   NKDA    Social History:  Smoking: No    Family History:    There is no family history of premature cerebrovascular disease pertinent to this consultation    ROS :  14 Points Review of Systems was performed and was negative except mentioned in HPI.    Past Surgical History:  There Is No Surgical History Contributory To Today's Visit         Examination:  BP(84/58), Pulse(125),  1A: Level of Consciousness - Alert; keenly responsive + 0  1B: Ask Month and Age - Both Questions Right + 0  1C: Blink Eyes & Squeeze Hands - Performs Both Tasks + 0  2: Test Horizontal Extraocular Movements - Normal + 0  3: Test Visual Fields - No Visual Loss + 0  4: Test Facial Palsy (Use Grimace if Obtunded) - Normal symmetry + 0  5A: Test Left Arm Motor Drift - No Drift for 10 Seconds + 0  5B: Test Right Arm Motor Drift - No Drift for 10 Seconds + 0  6A: Test Left Leg Motor Drift - No Drift for 5 Seconds + 0  6B: Test Right Leg Motor Drift - No Drift for 5 Seconds + 0  7: Test Limb Ataxia (FNF/Heel-Shin) - No Ataxia + 0  8: Test Sensation - Normal; No sensory loss + 0  9: Test Language/Aphasia - Normal; No aphasia + 0  10: Test Dysarthria - Normal + 0  11: Test Extinction/Inattention - No abnormality + 0    NIHSS Score: 0    Pre-Morbid Modified Rosalee Scale:  3 Points = Moderate disability; requiring some help, but able to walk without assistance    Spoke with : ER physician  I reviewed the  available imaging via [facility] and initiated discussion with the primary provider    Patient/Family was informed the Neurology Consult would occur via TeleHealth consult by way of interactive audio and video telecommunications and consented to receiving care in this manner.      Patient is being evaluated for possible acute neurologic impairment and high probability of imminent or life-threatening deterioration. I spent total of 35 minutes providing care to this patient, including time for face to face visit via telemedicine, review of medical records, imaging studies and discussion of findings with providers, the patient and/or family.      Dr Liam Pokl      [facility]  For Inpatient follow-up with [facility] physician please call [facility] [phone]. This is not an outpatient service. Post hospital discharge, please contact hospital directly.

## 2023-08-21 LAB — QT INTERVAL: 360 MS

## 2023-08-21 NOTE — ED PROVIDER NOTES
Time: 9:01 PM EDT  Date of encounter:  8/20/2023  Independent Historian/Clinical History and Information was obtained by:   Patient and Nursing Staff    History is limited by: N/A    Chief Complaint: Heat exhaustion, leg weakness today      History of Present Illness:  Patient is a 77 y.o. year old male who presents to the emergency department for evaluation of bilateral leg weakness after getting off of his tractor today.    He was riding his tractor out in the 90 degree heat for 3-1/2 hours and when he went to get up off the tractor his legs gave out and he just laid down in the grass.    He did not lose consciousness or feel lightheaded and denies any chest pain or palpitations or nausea.    He was in his normal state of health earlier this morning.    After some time in the air conditioning and getting IV fluids he states he is feeling much better now.    HPI    Patient Care Team  Primary Care Provider: Alex Buckley MD    Past Medical History:     No Known Allergies  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     Primary osteoarthritis of left knee     Sleep apnea     Sleep apnea     Thrombocytopenia 05/22/2018    Thyroid disease      Past Surgical History:   Procedure Laterality Date    CARDIAC SURGERY      COLONOSCOPY  2015    CORONARY ANGIOPLASTY WITH STENT PLACEMENT      CORONARY ARTERY BYPASS GRAFT N/A 05/20/2021    Procedure: MIDLINE STERNOTOMY,CORONARY ARTERY  BYPASS GRAFTING X 5, UTILIZING THE LEFT COMPA AND LEFT SAPHENOUS VEIN, ABHIJEET, PRP;  Surgeon: Jr Tom Tubbs MD;  Location: [facility] MAIN OR;  Service: Cardiothoracic;  Laterality: N/A;    ENDOSCOPY      HERNIA REPAIR      JOINT REPLACEMENT      SHOULDER SURGERY      SHOULDER REPAIR    TOTAL KNEE ARTHROPLASTY      TRANSESOPHAGEAL ECHOCARDIOGRAM (ABHIJEET) N/A 05/20/2021    Procedure: TRANSESOPHAGEAL ECHOCARDIOGRAM WITH ANESTHESIA;  Surgeon: Jr Tom Tubbs MD;  Location: [facility] MAIN OR;  Service: Cardiothoracic;  Laterality: N/A;     Family History   Problem Relation Age of Onset    Colon cancer Father     Diabetes Mother        Home Medications:  Prior to Admission medications    Medication Sig Start Date End Date Taking? Authorizing Provider   amitriptyline (ELAVIL) 50 MG tablet Take 1 tablet by mouth Every Night.    ProviderMichael MD   aspirin 81 MG EC tablet Take 1 tablet by mouth Daily.    ProviderMichael MD   donepezil (ARICEPT) 10 MG tablet Take 1 tablet by mouth every night at bedtime.    ProviderMichael MD   finasteride (PROSCAR) 5 MG tablet Take 1 tablet by mouth Daily. 6/1/21   Jr Tom Tubbs MD   furosemide (LASIX) 40 MG tablet Take 1 tablet by mouth Daily.  Patient taking differently: Take 20 mg by mouth Daily. 9/13/22   Sven Duran MD   gabapentin (NEURONTIN) 600 MG tablet Take 0.5 tablets by mouth 2 (Two) Times a Day. 5/1/23   Alex Buckley MD   insulin glargine (LANTUS, SEMGLEE) 100 UNIT/ML injection Inject 15 Units under the skin into the appropriate area as directed Every Night. 7/25/23   Alex Buckley MD   Insulin Lispro, 1 Unit Dial, (HumaLOG KwikPen) 100 UNIT/ML solution pen-injector 150-200 = 4 units  201-250 = 6 units  251-300 = 8 units  301-350 = 10 units       >350 = 14 units 7/25/23   Alex Buckley MD   levothyroxine (SYNTHROID, LEVOTHROID) 112 MCG tablet Take 2 tablets by mouth Every Morning.    ProviderMichael MD   metFORMIN (GLUCOPHAGE) 1000  "MG tablet Take 1 tablet by mouth 2 (Two) Times a Day With Meals.    Michael Snowden MD   midodrine (PROAMATINE) 2.5 MG tablet Take 1 tablet by mouth Daily. 8/17/23   Betty Rodríguez APRN   nitroglycerin (NITROSTAT) 0.4 MG SL tablet  8/16/21   Michael Snowden MD   sertraline (ZOLOFT) 100 MG tablet Take 2 tablets by mouth Daily. 7/6/22   Alex Buckley MD   simvastatin (ZOCOR) 40 MG tablet Take 1 tablet by mouth Every Night.    ProviderMichael MD   tamsulosin (FLOMAX) 0.4 MG capsule 24 hr capsule Take 1 capsule by mouth Daily. 2/20/21   Jr Tom Tubbs MD        Social History:   Social History     Tobacco Use    Smoking status: Never    Smokeless tobacco: Never   Vaping Use    Vaping Use: Never used   Substance Use Topics    Alcohol use: Not Currently     Comment: QUIT DRINKING 32 YEARS AGO    Drug use: Never         Review of Systems:  Review of Systems   I performed a 10 point review of systems which was all negative, except for the positives found in the HPI above.      Physical Exam:  /69   Pulse 81   Temp 99 øF (37.2 øC) (Oral)   Resp 18   Ht 182.9 cm (72\")   Wt 115 kg (253 lb 8.5 oz)   SpO2 97%   BMI 34.38 kg/mý         Physical Exam   General: Awake alert and in no obvious distress    HEENT: Head normocephalic atraumatic, eyes PERRLA EOMI, nose normal, oropharynx normal.    Neck: Supple full range of motion, no meningismus, no lymphadenopathy    Heart: Regular rate and rhythm, no murmurs or rubs, 2+ radial pulses bilaterally    Lungs: Clear to auscultation bilaterally without wheezes or crackles, no respiratory distress    Abdomen: Soft, nontender, nondistended, no rebound or guarding    Skin: Warm, dry, no rash    Musculoskeletal: Normal range of motion, no lower extremity edema    Neurologic: Oriented x3, no motor deficits no sensory deficits, specifically he is moving both legs normally with normal strength and sensation at this time.    Psychiatric: Mood appears " stable, no psychosis          Procedures:  Procedures      Medical Decision Making:      Comorbidities that affect care:    Diabetes    External Notes reviewed:    Hospital Discharge Summary: I reviewed his most recent hospital discharge summary from 2 months ago where he had similar issues with episodes of orthostatic hypotension and difficulty walking felt to be due to his neuropathy.      The following orders were placed and all results were independently analyzed by me:  Orders Placed This Encounter   Procedures    XR Chest 1 View    CT Head Without Contrast Stroke Protocol    CT Angiogram Head w AI Analysis of LVO    CT Angiogram Neck    [place] Draw    Comprehensive Metabolic Panel    Single High Sensitivity Troponin T    Magnesium    Urinalysis With Microscopic If Indicated (No Culture) - Urine, Clean Catch    CBC Auto Differential    Scan Slide    NPO Diet NPO Type: Strict NPO    Undress & Gown    Vital Signs    Orthostatic Blood Pressure    Initiate Department's Acute Stroke Process (Team D, Code 19, etc)    Perform NIH Stroke Scale    Measure Actual Weight    Head of Bed 30 Degrees or Less    Undress and Gown    Continuous Pulse Oximetry    Vital Signs    Neuro Checks    Notify MD for SBP < 80 or > 200    Notify Provider for SBP greater than 140 if hemorrhagic Stroke    No Hypotonic Fluids    Nursing Dysphagia Screening (Complete Prior to Giving anything PO)    RN to Place Order SLP Consult (IF swallow screen failed) - Eval & Treat Choosing Reason of RN Dysphagia Screen Failed    Ambulate patient    Oxygen Therapy- Nasal Cannula; Titrate 1-6 LPM Per SpO2; 90 - 95%    Oxygen Therapy- Nasal Cannula; Titrate 1-6 LPM Per SpO2; 90 - 95%    POC Glucose Once    POC Glucose Once    POC Glucose Once    ECG 12 Lead ED Triage Standing Order; Acute Stroke (Onset <12 hrs)    Insert Peripheral IV    Insert Large Bore Peripheral IV - Right AC Preferred    Insert Peripheral IV    Fall Precautions    CBC & Differential     Green Top (Gel)    Lavender Top    Gold Top - SST    Light Blue Top       Medications Given in the Emergency Department:  Medications   sodium chloride 0.9 % flush 10 mL (has no administration in time range)   sodium chloride 0.9 % flush 10 mL (has no administration in time range)   HYDROcodone-acetaminophen (NORCO) 5-325 MG per tablet 1 tablet (1 tablet Oral Not Given 8/20/23 1836)   HYDROcodone-acetaminophen (NORCO) 5-325 MG per tablet 1 tablet (1 tablet Oral Given 8/20/23 1844)   sodium chloride 0.9 % flush 10 mL (has no administration in time range)   iopamidol (ISOVUE-370) 76 % injection 100 mL (100 mL Intravenous Given 8/20/23 1719)   sodium chloride 0.9 % bolus 500 mL (0 mL Intravenous Stopped 8/20/23 2218)   sodium chloride 0.9 % bolus 500 mL (0 mL Intravenous Stopped 8/20/23 2218)        ED Course:    ED Course as of 08/20/23 2325   Sun Aug 20, 2023   2105 EKG: I interpreted his twelve-lead EKG is normal sinus rhythm at 89 bpm, normal P waves but with first-degree AV block, normal ST segments and QRS and T waves; no acute ischemia or ectopy.    Unchanged from old EKG 2 months ago [VS]      ED Course User Index  [VS] Aron Finn MD       Labs:    Lab Results (last 24 hours)       Procedure Component Value Units Date/Time    POC Glucose Once [[id number]]  (Abnormal) Collected: 08/20/23 1640    Specimen: Blood Updated: 08/20/23 2257     Glucose 219 mg/dL      Comment: Serial Number: [id number]:  [id number]       CBC & Differential [[id number]]  (Abnormal) Collected: 08/20/23 1725    Specimen: Blood Updated: 08/20/23 1816    Narrative:      The following orders were created for panel order CBC & Differential.  Procedure                               Abnormality         Status                     ---------                               -----------         ------                     CBC Auto Differential[[id number]]        Abnormal            Final result               Scan Slide[[id number]]                                        Final result                 Please view results for these tests on the individual orders.    Comprehensive Metabolic Panel [[id number]]  (Abnormal) Collected: 08/20/23 1725    Specimen: Blood Updated: 08/20/23 1814     Glucose 177 mg/dL      BUN 28 mg/dL      Creatinine 2.23 mg/dL      Sodium 134 mmol/L      Potassium 5.2 mmol/L      Chloride 98 mmol/L      CO2 19.9 mmol/L      Calcium 9.8 mg/dL      Total Protein 7.6 g/dL      Albumin 4.7 g/dL      ALT (SGPT) 30 U/L      AST (SGOT) 27 U/L      Alkaline Phosphatase 99 U/L      Total Bilirubin 0.5 mg/dL      Globulin 2.9 gm/dL      A/G Ratio 1.6 g/dL      BUN/Creatinine Ratio 12.6     Anion Gap 16.1 mmol/L      eGFR 30.0 mL/min/1.73     Narrative:      GFR Normal >60  Chronic Kidney Disease <60  Kidney Failure <15    The GFR formula is only valid for adults with stable renal function between ages 18 and 70.    Single High Sensitivity Troponin T [[id number]]  (Abnormal) Collected: 08/20/23 1725    Specimen: Blood Updated: 08/20/23 1806     HS Troponin T 31 ng/L     Narrative:      High Sensitive Troponin T Reference Range:  <10.0 ng/L- Negative Female for AMI  <15.0 ng/L- Negative Male for AMI  >=10 - Abnormal Female indicating possible myocardial injury.  >=15 - Abnormal Male indicating possible myocardial injury.   Clinicians would have to utilize clinical acumen, EKG, Troponin, and serial changes to determine if it is an Acute Myocardial Infarction or myocardial injury due to an underlying chronic condition.         Magnesium [[id number]]  (Normal) Collected: 08/20/23 1725    Specimen: Blood Updated: 08/20/23 1814     Magnesium 1.6 mg/dL     CBC Auto Differential [[id number]]  (Abnormal) Collected: 08/20/23 1725    Specimen: Blood Updated: 08/20/23 1755     WBC 9.29 10*3/mm3      RBC 4.36 10*6/mm3      Hemoglobin 13.7 g/dL      Hematocrit 39.0 %      MCV 89.4 fL      MCH 31.4 pg      MCHC 35.1 g/dL      RDW 14.5 %      RDW-SD 46.9 fl      MPV 8.8  fL      Platelets 133 10*3/mm3      Neutrophil % 69.8 %      Lymphocyte % 17.9 %      Monocyte % 7.2 %      Eosinophil % 4.0 %      Basophil % 0.8 %      Immature Grans % 0.3 %      Neutrophils, Absolute 6.49 10*3/mm3      Lymphocytes, Absolute 1.66 10*3/mm3      Monocytes, Absolute 0.67 10*3/mm3      Eosinophils, Absolute 0.37 10*3/mm3      Basophils, Absolute 0.07 10*3/mm3      Immature Grans, Absolute 0.03 10*3/mm3      nRBC 0.0 /100 WBC     Scan Slide [[id number]] Collected: 08/20/23 1725    Specimen: Blood Updated: 08/20/23 1816     RBC Morphology Normal     WBC Morphology Normal     Platelet Estimate Decreased             Imaging:    CT Angiogram Neck    Result Date: 8/20/2023  PROCEDURE: CT ANGIOGRAM HEAD W AI ANALYSIS OF LVO, 8/20/2023, 17:11 CT ANGIOGRAM NECK, 8/20/2023, 17:11  COMPARISON: [facility], CT, CT HEAD WO CONTRAST STROKE PROTOCOL, 8/20/2023, 16:46.  INDICATIONS: SYNCOPE LOWER EXTREMITY WEAKNESS  PROTOCOL:   Standard imaging protocol performed    RADIATION:   DLP: 1099.2mGy*cm   Automated exposure control was utilized to minimize radiation dose. CONTRAST: 93cc Isovue 370 I.V. RAPID: CTA imaging was analyzed using RAPID AI to enable computer assisted triage notification to rapidly detect a large vessel occlusion (LVO) and shorten notification time  TECHNIQUE: After obtaining the patient's consent, CT images of the head were obtained without and with non-ionic contrast, and multi-planar/3-D imaging were created and interpreted to optimize visualization of vascular anatomy.   FINDINGS:  CTA brain:  The [id number]-nv-Hzhfri has a normal configuration.  No focal stenosis or branch artery occlusions or aneurysms are demonstrated.  The anterior communicating and posterior communicating arteries are patent.  No pathologic enhancement in the brain.  CTA neck:  There is a normal configuration of the great vessels arising from the aortic arch.  Sternotomy wires are partially visualized.  Right-sided  findings:  The brachiocephalic artery is widely patent.  The right common, external and internal carotid arteries are widely patent without disease.  There is minimal amount of vascular calcification in the carotid bulb.  The right vertebral artery is widely patent without disease and is non dominant.  The origin of the right subclavian artery from the brachiocephalic artery is tortuous.  Left-sided findings:  The left common carotid, external and internal carotid arteries are widely patent without disease.  Carotid bulb is widely patent.  The left vertebral artery is widely patent without disease and is dominant.  Nonvascular findings:  None significant.  Extensive degenerative changes are present in the cervical spine.       Normal CTA of the head and neck.     SHANON WALLER DO       Electronically Signed and Approved By: SHANON WALLER DO on 8/20/2023 at 18:16             XR Chest 1 View    Result Date: 8/20/2023  PROCEDURE: XR CHEST 1 VW  COMPARISON: [facility], CR, XR CHEST 1 VW, 6/04/2023, 16:15.  INDICATIONS: Weak/Dizzy/AMS triage protocol  FINDINGS:  The lungs are clear is a for plate atelectasis in the left lung base.. Cardiac, hilar, and mediastinal silhouettes are stable with evidence of previous CABG and mild cardiomegaly.  No pneumothorax or pleural effusion. Bony structures are intact.  The trachea is midline.           Stable mild cardiomegaly and evidence of previous CABG.  No active cardiopulmonary disease.       SHANON WALLER DO       Electronically Signed and Approved By: SHANON WALLER DO on 8/20/2023 at 18:00             CT Head Without Contrast Stroke Protocol    Result Date: 8/20/2023  PROCEDURE: CT HEAD WO CONTRAST STROKE PROTOCOL  COMPARISON:  [facility], CT, CT HEAD WO CONTRAST, 6/04/2023, 20:03. INDICATIONS: SYNCOPE. SLURRED SPEECH. WEAKNESS  PROTOCOL:   Standard imaging protocol performed    RADIATION:   DLP: 1144.2mGy*cm   MA and/or KV was adjusted to  minimize radiation dose.     TECHNIQUE: After obtaining the patient's consent, CT images were obtained without non-ionic intravenous contrast material.  FINDINGS:  There is no CT evidence of acute hemorrhage, infarct, mass, mass effect or midline shift. There is no hydrocephalus. The gray-white matter junctions are preserved.  The mastoid air cells are clear.  The petrous apices are aerated.  There is chronic mucosal thickening in the maxillary sinuses and evidence of maxillary and ethmoid sinus surgery.  No air-fluid levels.  No skull fractures or calvarial lesions.   There1 has been no significant change in the CT appearance of the brain            No acute intracranial abnormality.      SHANON WALLER DO       Electronically Signed and Approved By: SHANON WALLER DO on 8/20/2023 at 17:05             CT Angiogram Head w AI Analysis of LVO    Result Date: 8/20/2023  PROCEDURE: CT ANGIOGRAM HEAD W AI ANALYSIS OF LVO, 8/20/2023, 17:11 CT ANGIOGRAM NECK, 8/20/2023, 17:11  COMPARISON: [facility], CT, CT HEAD WO CONTRAST STROKE PROTOCOL, 8/20/2023, 16:46.  INDICATIONS: SYNCOPE LOWER EXTREMITY WEAKNESS  PROTOCOL:   Standard imaging protocol performed    RADIATION:   DLP: 1099.2mGy*cm   Automated exposure control was utilized to minimize radiation dose. CONTRAST: 93cc Isovue 370 I.V. RAPID: CTA imaging was analyzed using RAPID AI to enable computer assisted triage notification to rapidly detect a large vessel occlusion (LVO) and shorten notification time  TECHNIQUE: After obtaining the patient's consent, CT images of the head were obtained without and with non-ionic contrast, and multi-planar/3-D imaging were created and interpreted to optimize visualization of vascular anatomy.   FINDINGS:  CTA brain:  The vixagn-jl-[id number] has a normal configuration.  No focal stenosis or branch artery occlusions or aneurysms are demonstrated.  The anterior communicating and posterior communicating arteries are patent.  No  pathologic enhancement in the brain.  CTA neck:  There is a normal configuration of the great vessels arising from the aortic arch.  Sternotomy wires are partially visualized.  Right-sided findings:  The brachiocephalic artery is widely patent.  The right common, external and internal carotid arteries are widely patent without disease.  There is minimal amount of vascular calcification in the carotid bulb.  The right vertebral artery is widely patent without disease and is non dominant.  The origin of the right subclavian artery from the brachiocephalic artery is tortuous.  Left-sided findings:  The left common carotid, external and internal carotid arteries are widely patent without disease.  Carotid bulb is widely patent.  The left vertebral artery is widely patent without disease and is dominant.  Nonvascular findings:  None significant.  Extensive degenerative changes are present in the cervical spine.       Normal CTA of the head and neck.     SHANON WALLER DO       Electronically Signed and Approved By: SHANON WALLER DO on 8/20/2023 at 18:16                Differential Diagnosis and Discussion:    Syncope: Differential diagnosis includes but is not limited to TIA, hyperventilation, aortic stenosis, pulmonary emboli, myocardial disease, bradycardia arrhythmia, heart block, tachyarrhythmia, vasovagal, orthostatic hypotension, ruptured AAA, aortic dissection, subarachnoid hemorrhage, seizure, hypoglycemia.  Weakness: Based on the patient's history, signs, and symptoms, the diffential diagnosis includes but is not limited to meningitis, stroke, sepsis, subarachnoid hemorrhage, intracranial bleeding, encephalitis, acute uti, dehydration, MS, myasthenia gravis, Guillan [place], migraine variant, neuromuscular disorders vertigo, electrolyte imbalance, and metabolic disorders.    All labs were reviewed and interpreted by me.  All X-rays impressions were independently interpreted by me.  EKG was interpreted by me.  CT  scan radiology impression was interpreted by me.    MDM     Amount and/or Complexity of Data Reviewed  Clinical lab tests: reviewed  Tests in the radiology section of CP[facility]: reviewed  Tests in the medicine section of [facility]: reviewed  Decide to obtain previous medical records or to obtain history from someone other than the patient: yes                   This patient is a pleasant 77-year-old male that was out in the heat for several hours on his riding tractor and when he got off the tractor he got very weak and had to lie down in the grass and his legs gave out on him.    It does not sound like he actually passed out but somewhat hypotensive on arrival, which he has a history of and is already started on midodrine medication.      We gave him some IV fluid boluses here, bringing his blood pressure up, and I reviewed his lab work showing his chronic kidney disease that looks slightly worse and also some low serum sodium level here.    I considered most likely symptoms to be due to heat exhaustion and probably some dehydration as well.      *Given patient's bilateral leg weakness our nursing staff called a stroke alert and he ended up getting CT imaging of the head and CTA of the head and neck and having neurology see him.    He was not felt to show any signs of acute ischemic stroke per our neurologist and it seems like most of his symptoms are in the setting of hypotension and tachycardia and probable heat exhaustion and volume depletion here.        After keeping him here and resting in the ED and observing him, he is looking better after getting IV fluids and being in the air conditioning here.    We will get him up and ambulate him to make sure his strength is back and that he would be safe to be discharged home and follow-up with his PCP.      I consider his indeterminately elevated troponin to be due to his chronic underlying kidney disease as he is chest pain-free and has no acute ischemic findings on his  EKG.        After IV fluids patient was smiling and walking around the ED easily without assistance and looks stable to be discharged home with supportive care instructions.            Patient Care Considerations:          Consultants/Shared Management Plan:    Consultant: I have discussed the case with the on-call teleneurologist, who agrees to consult on the patient.    Social Determinants of Health:    Patient is independent, reliable, and has access to care.       Disposition and Care Coordination:    Discharged: I considered escalation of care by admitting this patient for observation, however the patient has improved and is suitable and  stable for discharge.    I have explained the patient's condition, diagnoses and treatment plan based on the information available to me at this time. I have answered questions and addressed any concerns. The patient has a good  understanding of the patient's diagnosis, condition, and treatment plan as can be expected at this point. The vital signs have been stable. The patient's condition is stable and appropriate for discharge from the emergency department.      The patient will pursue further outpatient evaluation with the primary care physician or other designated or consulting physician as outlined in the discharge instructions. They are agreeable to this plan of care and follow-up instructions have been explained in detail. The patient has received these instructions in written format and have expressed an understanding of the discharge instructions. The patient is aware that any significant change in condition or worsening of symptoms should prompt an immediate return to this or the closest emergency department or call to 911.  I have explained discharge medications and the need for follow up with the patient/caretakers. This was also printed in the discharge instructions. Patient was discharged with the following medications and follow up:      Medication List         Changed      furosemide 40 MG tablet  Commonly known as: LASIX  Take 1 tablet by mouth Daily.  What changed: how much to take           Alex Buckley MD  [address]  [place] [address]  [place] [address]  [phone]    Call in 1 day  As needed, for a follow-up appointment       Final diagnoses:   Episode of generalized weakness   Heat exhaustion, initial encounter   Dehydration   Hypotension due to hypovolemia        ED Disposition       ED Disposition   Discharge    Condition   Stable    Comment   --               This medical record created using voice recognition software.             Aron Finn MD  08/20/23 0729

## 2023-08-21 NOTE — DISCHARGE INSTRUCTIONS
It looks like you had some heat exhaustion today and also somewhat dehydrated.    Try to get some rest tomorrow and stay indoors, away from the heat and drink plenty of fluids to rehydrate.    Follow-up with your primary care doctor if you are still feeling weak tomorrow.

## 2023-08-22 ENCOUNTER — TELEPHONE (OUTPATIENT)
Dept: INTERNAL MEDICINE | Facility: CLINIC | Age: 78
End: 2023-08-22
Payer: OTHER GOVERNMENT

## 2023-08-22 ENCOUNTER — LAB (OUTPATIENT)
Dept: LAB | Facility: HOSPITAL | Age: 78
End: 2023-08-22
Payer: OTHER GOVERNMENT

## 2023-08-22 DIAGNOSIS — N18.32 STAGE 3B CHRONIC KIDNEY DISEASE: ICD-10-CM

## 2023-08-22 DIAGNOSIS — N18.32 STAGE 3B CHRONIC KIDNEY DISEASE: Primary | ICD-10-CM

## 2023-08-22 LAB
ANION GAP SERPL CALCULATED.3IONS-SCNC: 11.8 MMOL/L (ref 5–15)
BUN SERPL-MCNC: 34 MG/DL (ref 8–23)
BUN/CREAT SERPL: 19.9 (ref 7–25)
CALCIUM SPEC-SCNC: 9.3 MG/DL (ref 8.6–10.5)
CHLORIDE SERPL-SCNC: 103 MMOL/L (ref 98–107)
CO2 SERPL-SCNC: 22.2 MMOL/L (ref 22–29)
CREAT SERPL-MCNC: 1.71 MG/DL (ref 0.76–1.27)
EGFRCR SERPLBLD CKD-EPI 2021: 41 ML/MIN/1.73
GLUCOSE SERPL-MCNC: 144 MG/DL (ref 65–99)
POTASSIUM SERPL-SCNC: 5.4 MMOL/L (ref 3.5–5.2)
SODIUM SERPL-SCNC: 137 MMOL/L (ref 136–145)

## 2023-08-22 PROCEDURE — 36415 COLL VENOUS BLD VENIPUNCTURE: CPT

## 2023-08-22 PROCEDURE — 80048 BASIC METABOLIC PNL TOTAL CA: CPT

## 2023-08-22 NOTE — TELEPHONE ENCOUNTER
----- Message from Alex Buckley MD sent at 8/21/2023 11:40 AM EDT -----  Needs a renal panel sent stat, so it is not transported to [place], and about 3 weeks after starting a low potassium diet.  Thanks.

## 2023-08-22 NOTE — TELEPHONE ENCOUNTER
April called to check on the status of a wheelchair order for Cosmo. Apparently they been waiting on this according to her. She said they called [employer] and they have no order for one. She wants to know what the status for this is and for us to get back with her regarding this.

## 2023-08-24 NOTE — TELEPHONE ENCOUNTER
"Dr. Buckley you had approved this wheelchair a while back, can you go back and addend his office note and put the following in it?    The patient has a mobility limitation that significantly impairs his ability to participate in one or more mobility related activity of daily living such as: toileting, feeding, dressing, grooming, and bathing in customary within the home. (This must be stated and explained with a \"due To\")  A cane or walker have been ruled out. The use of a manual wheelchair will significantly improve the patient's ability to participate in ADL's and the patient will use it on a regular basis within the home.  Patient has upper body strength and mental capability to safely propel the wheelchair in their home or if they do not, they do have a caregiver that is available, willing, and able to provide assistance with the wheelchair. Beneficiary's weight is more than 250 pounds.      Thank You.   "

## 2023-09-12 ENCOUNTER — LAB (OUTPATIENT)
Dept: LAB | Facility: HOSPITAL | Age: 78
End: 2023-09-12
Payer: MEDICARE

## 2023-09-12 ENCOUNTER — TELEPHONE (OUTPATIENT)
Dept: CARDIOLOGY | Facility: CLINIC | Age: 78
End: 2023-09-12
Payer: OTHER GOVERNMENT

## 2023-09-12 DIAGNOSIS — I10 HYPERTENSION, ESSENTIAL: ICD-10-CM

## 2023-09-12 DIAGNOSIS — Z95.1 HX OF CABG: ICD-10-CM

## 2023-09-12 DIAGNOSIS — E78.2 HYPERLIPEMIA, MIXED: ICD-10-CM

## 2023-09-12 DIAGNOSIS — N18.32 STAGE 3B CHRONIC KIDNEY DISEASE: ICD-10-CM

## 2023-09-12 DIAGNOSIS — I25.10 CORONARY ARTERY DISEASE INVOLVING NATIVE CORONARY ARTERY OF NATIVE HEART WITHOUT ANGINA PECTORIS: ICD-10-CM

## 2023-09-12 LAB
ALBUMIN SERPL-MCNC: 4.5 G/DL (ref 3.5–5.2)
ALP SERPL-CCNC: 100 U/L (ref 39–117)
ALT SERPL W P-5'-P-CCNC: 20 U/L (ref 1–41)
ANION GAP SERPL CALCULATED.3IONS-SCNC: 11.5 MMOL/L (ref 5–15)
AST SERPL-CCNC: 18 U/L (ref 1–40)
BILIRUB CONJ SERPL-MCNC: <0.2 MG/DL (ref 0–0.3)
BILIRUB INDIRECT SERPL-MCNC: NORMAL MG/DL
BILIRUB SERPL-MCNC: 0.4 MG/DL (ref 0–1.2)
BUN SERPL-MCNC: 20 MG/DL (ref 8–23)
BUN/CREAT SERPL: 16.9 (ref 7–25)
CALCIUM SPEC-SCNC: 9.6 MG/DL (ref 8.6–10.5)
CHLORIDE SERPL-SCNC: 103 MMOL/L (ref 98–107)
CHOLEST SERPL-MCNC: 128 MG/DL (ref 0–200)
CO2 SERPL-SCNC: 25.5 MMOL/L (ref 22–29)
CREAT SERPL-MCNC: 1.18 MG/DL (ref 0.76–1.27)
EGFRCR SERPLBLD CKD-EPI 2021: 64 ML/MIN/1.73
GLUCOSE SERPL-MCNC: 170 MG/DL (ref 65–99)
HDLC SERPL-MCNC: 48 MG/DL (ref 40–60)
LDLC SERPL CALC-MCNC: 51 MG/DL (ref 0–100)
LDLC/HDLC SERPL: 0.92 {RATIO}
PHOSPHATE SERPL-MCNC: 3.8 MG/DL (ref 2.5–4.5)
POTASSIUM SERPL-SCNC: 5.1 MMOL/L (ref 3.5–5.2)
PROT SERPL-MCNC: 7 G/DL (ref 6–8.5)
SODIUM SERPL-SCNC: 140 MMOL/L (ref 136–145)
TRIGL SERPL-MCNC: 179 MG/DL (ref 0–150)
VLDLC SERPL-MCNC: 29 MG/DL (ref 5–40)

## 2023-09-12 PROCEDURE — 84100 ASSAY OF PHOSPHORUS: CPT

## 2023-09-12 PROCEDURE — 36415 COLL VENOUS BLD VENIPUNCTURE: CPT

## 2023-09-12 PROCEDURE — 80076 HEPATIC FUNCTION PANEL: CPT

## 2023-09-12 PROCEDURE — 80048 BASIC METABOLIC PNL TOTAL CA: CPT

## 2023-09-12 PROCEDURE — 80061 LIPID PANEL: CPT

## 2023-09-12 NOTE — TELEPHONE ENCOUNTER
----- Message from MAXWELL Mitchell sent at 9/12/2023 12:25 PM EDT -----  Liver enzymes are in normal range

## 2023-09-13 ENCOUNTER — TELEPHONE (OUTPATIENT)
Dept: CARDIOLOGY | Facility: CLINIC | Age: 78
End: 2023-09-13
Payer: OTHER GOVERNMENT

## 2023-09-13 NOTE — TELEPHONE ENCOUNTER
----- Message from MAXWELL Mitchell sent at 9/12/2023 10:45 PM EDT -----  Labs are all good except for triglycerides are mildly elevated. Advise less sugar/carbs in diet. Continue current meds.

## 2023-09-18 ENCOUNTER — TELEPHONE (OUTPATIENT)
Dept: CARDIOLOGY | Facility: CLINIC | Age: 78
End: 2023-09-18
Payer: OTHER GOVERNMENT

## 2023-09-18 NOTE — TELEPHONE ENCOUNTER
----- Message from MAXWELL Mitchell sent at 9/18/2023 11:38 AM EDT -----  Notify pt holter result:   Maximum heart rate is 141.  Minimum heart rate 60.  Average heart rate 79.  There are occasional PACs (less than 1% total beats) can be felt as palpitations, limit caffeine intake.  There is no significant tachycardia or bradycardia arrhythmias.  If he continues to have palpitations, could do 30 day trial of Toprol XL 25 mg nightly.   Follow up as scheduled

## 2023-09-18 NOTE — TELEPHONE ENCOUNTER
SW patient wife regarding results and recommendations. Voiced understanding. Patient denies any palpitations

## 2023-10-03 ENCOUNTER — TELEPHONE (OUTPATIENT)
Dept: INTERNAL MEDICINE | Facility: CLINIC | Age: 78
End: 2023-10-03
Payer: OTHER GOVERNMENT

## 2023-10-03 NOTE — TELEPHONE ENCOUNTER
FYI:  April from [facility] called to report patient had 2 falls over the weekend. Has some scrapes on his arms but reporting no other injuries. Stumbled and fell in the yard into some bushes causing the scrapes. The other fall he got dizzy and fell out of the drivers seat when getting out of his car at the grocery store.  Wanted Dr. Buckley to know.      April # [phone]

## 2023-10-04 NOTE — TELEPHONE ENCOUNTER
Please get message to the patient as well as to his family that the patient should no longer be driving.  Progression of his multiple medical problems is impairing his ability to safely operate a motor vehicle.

## 2023-10-10 ENCOUNTER — OFFICE VISIT (OUTPATIENT)
Dept: INTERNAL MEDICINE | Facility: CLINIC | Age: 78
End: 2023-10-10
Payer: MEDICARE

## 2023-10-10 VITALS
TEMPERATURE: 97.7 F | HEIGHT: 72 IN | SYSTOLIC BLOOD PRESSURE: 108 MMHG | BODY MASS INDEX: 33.56 KG/M2 | DIASTOLIC BLOOD PRESSURE: 60 MMHG | HEART RATE: 80 BPM | WEIGHT: 247.8 LBS | OXYGEN SATURATION: 94 %

## 2023-10-10 DIAGNOSIS — I10 HYPERTENSION, ESSENTIAL: ICD-10-CM

## 2023-10-10 DIAGNOSIS — R05.1 ACUTE COUGH: Primary | ICD-10-CM

## 2023-10-10 LAB
EXPIRATION DATE: NORMAL
FLUAV AG UPPER RESP QL IA.RAPID: NOT DETECTED
FLUBV AG UPPER RESP QL IA.RAPID: NOT DETECTED
INTERNAL CONTROL: NORMAL
Lab: NORMAL
SARS-COV-2 AG UPPER RESP QL IA.RAPID: NOT DETECTED

## 2023-10-10 RX ORDER — SULFAMETHOXAZOLE AND TRIMETHOPRIM 800; 160 MG/1; MG/1
1 TABLET ORAL 2 TIMES DAILY
Qty: 20 TABLET | Refills: 0 | Status: SHIPPED | OUTPATIENT
Start: 2023-10-10

## 2023-10-10 RX ORDER — PREDNISONE 20 MG/1
20 TABLET ORAL 2 TIMES DAILY
Qty: 10 TABLET | Refills: 0 | Status: SHIPPED | OUTPATIENT
Start: 2023-10-10 | End: 2023-10-15

## 2023-10-10 NOTE — PROGRESS NOTES
"Chief Complaint  Cough (Pt states that he is coughing, hoarseness. /I let pt's wife know that he does not need to drive. )    Subjective          Cosmo Gauthier presents to [facility]     History of Present Illness:  Patient is pleasant but unfortunate 77-year-old male with multiple medical issues, status post 5 vessel bypass 5/21, with underlying hypertension, hyperlipidemia, and diabetes, coming in 7/23 for routine 3-month follow-up. We will review all his labs and address any new concerns.---> Patient being seen 10/23 for urgent issue as per chief complaint above.    ---> Phone messaage 7/17/23:  5.  Patient was hospitalized twice for this weakness, has been in therapy, he is continuing to fall, he should be wheelchair-bound.  Please place order for standard wheelchair if they do not already have one.  He should be up with assistance only.    Review of Systems   Constitutional:  Negative for appetite change, fatigue and fever.   HENT:  Negative for congestion and ear pain.    Eyes:  Negative for blurred vision.   Respiratory:  Negative for cough, chest tightness and wheezing.    Cardiovascular:  Negative for chest pain, palpitations and leg swelling.   Gastrointestinal:  Negative for abdominal pain.   Genitourinary:  Negative for difficulty urinating, dysuria and hematuria.   Musculoskeletal:  Negative for arthralgias and gait problem.   Skin:  Negative for skin lesions.   Neurological:  Negative for syncope, memory problem and confusion.   Psychiatric/Behavioral:  Negative for self-injury and depressed mood.        Objective   Vital Signs:   /60   Pulse 80   Temp 97.7 øF (36.5 øC) (Skin)   Ht 182.9 cm (72.01\")   Wt 112 kg (247 lb 12.8 oz)   SpO2 94%   BMI 33.60 kg/mý           Physical Exam  Vitals and nursing note reviewed.   Constitutional:       General: He is not in acute distress.     Appearance: Normal appearance. He is not toxic-appearing.   HENT:      " Head: Atraumatic.      Right Ear: External ear normal.      Left Ear: External ear normal.      Nose: Nose normal.      Mouth/Throat:      Mouth: Mucous membranes are moist.   Eyes:      General:         Right eye: No discharge.         Left eye: No discharge.      Extraocular Movements: Extraocular movements intact.      Pupils: Pupils are equal, round, and reactive to light.   Cardiovascular:      Rate and Rhythm: Normal rate and regular rhythm.      Pulses: Normal pulses.      Heart sounds: Normal heart sounds. No murmur heard.     No gallop.   Pulmonary:      Effort: Pulmonary effort is normal. No respiratory distress.      Breath sounds: No wheezing, rhonchi or rales.   Abdominal:      General: There is no distension.      Palpations: Abdomen is soft. There is no mass.      Tenderness: There is no abdominal tenderness. There is no guarding.   Musculoskeletal:         General: No swelling or tenderness.      Cervical back: No tenderness.      Right lower leg: No edema.      Left lower leg: No edema.   Skin:     General: Skin is warm and dry.      Findings: No rash.   Neurological:      General: No focal deficit present.      Mental Status: He is alert and oriented to person, place, and time. Mental status is at baseline.      Motor: No weakness.      Gait: Gait normal.   Psychiatric:         Mood and Affect: Mood normal.         Thought Content: Thought content normal.          Result Review :   The following data was reviewed by: Alex Buckley MD on 08/02/2021:                  Assessment and Plan    Diagnoses and all orders for this visit:    1. Acute cough (Primary)  Assessment & Plan:  This is the issue as of his 10/23 urgent visit.  His sats are good at 94%, he is afebrile.  We checked him for COVID and flu, they were both negative.  His lungs are fairly clear.  This most likely related to some sinus drainage, could not rule out acute sinusitis, will treat per orders, patient to call if not  improving.    Orders:  -     POCT SARS-CoV-2 Antigen INDY + Flu    2. Hypertension, essential  Assessment & Plan:  This is really a nonissue as of his 10/23 OV.  He is having orthostatic hypotension, he is on low-dose midodrine, otherwise no antihypertensives other than low-dose Lasix.      Other orders  -     sulfamethoxazole-trimethoprim (Bactrim DS) 800-160 MG per tablet; Take 1 tablet by mouth 2 (Two) Times a Day.  Dispense: 20 tablet; Refill: 0  -     predniSONE (DELTASONE) 20 MG tablet; Take 1 tablet by mouth 2 (Two) Times a Day for 5 days.  Dispense: 10 tablet; Refill: 0      --  --  Older notes:  HOSP F/U 6/21 = S/P 5V CABG 5/21 per Dr Milligan=I reviewed records sent and the med list provided by wife.  TELEVISIT 2/25/21:  HOSP F/U 10/20 = I reviewed 9/20 [facility] records; I d/w pt labs/meds in detail:  1) CHEST PAIN and pt is being admitted to R/O MI; cardiology was notified...SPECT was neg, so will f/u with cards in a few weeks; may still need a cath=no new sxs and is gideon to see him next week as of 10/20 OV...to [facility] for CABG per Dr Milligan, but PLT's too low; has f/u with cards.  2) CAD/MI with prior stents; ? last stress was done in cardiology office 5/19; will continue home meds for now...no rest angina; ? increase Imdur=defer to cards appt he has on 3/9/21---> S/P 5V CABG as above; looks great.    3) HTN will be followed closely on home meds=stable 10/20 OV, BUT is orthostatic, so drink more and lower ACEI to 5 mg---> stable 6/21.  4) HYPERLIPIDEMIA=continue statin=LDL 61 (9/20)---> 46 in 6/21.    5) CIRRHOSIS per Dr Bradshaw; watch volume status.  6) THROMBOCYTOPENIA is related to the cirrhosis and is stable around 70K...on Prednisone per Dr Saldana---> off this; labs on RTO.    7) DM per orders...A1C was only 7.3 in 1/21; she d/w me BS fine despite prednisone as of 2/21 OV; ? lost 20+ lbs---> 5.6 and I d/w stop glipizide 10 bid and stay on Humalog 15 tid, but then again not totally sure he's on it?    8)  HYPOTHYROIDISM is wnl as of 2/21 OV---> RTO.    9) BPH on flomax after retenion issues in [place] as of 2/21 OV; to see Dr Dill---> saw him for mihaela post-op = it's out now.    (lost friend/ 60's to covid)    VISIT 6/20:  ANNUAL MEDICARE WELLNESS EXAM 10/19 = reviewed all forms with pt; he is much more depressed, so zoloft was increased.  H.M. ISSUES:  DM = A1C as below; Optho=q 12 mo with last 4/18 = early diabetic retinopathy and ? laser next visit?  --  HTN...needs ACEI now at 6/17 OV; repeat urine micro as well...remains controlled...ACEI fell off his list; resume now 1/19...better already---> stable.  ? CKD3 noted 6/20 = no new meds; needs repeat few weeks.  --  DM...max out januvia...8.1 an has f/u with [facility]...on lantus 20, d/w increase 2-4 units every week to get FBS to 110...7.1 is great...7.5 is stuck and I d/w again to increase Lantus=told him 24 now... 8.8 is horrible, so increase glucotrol to whole tab in AM...8.9, so try whole tab bid before increase lantus...9.5 is worse and needs Humalog before meals; d/w qid testing; stop glucotrol and increase lantus to 30 qhs...BS noted per phone and in office; rec 16/20/16 and stay on Lantus 30 qhs; d/w NO SSI for now...A1C down to 8.0 already and Fructosamine of 300=A1C closer to 7.5 actually...6.3 is great with TSH back down...6.8 is ok for now=7/19...7.3 in 10/19 and I d/w take 8 units with breakfast since he has been skipping this and only taking 15 with lunch/supper---> 7.3 great 6/20.   HYPOTHYROIDSIM stable 6/17...0.2 at 1/18 OV...0.7 better...increase 1/19 for 3.5 given above...? n/c, b/c now=10; will add 50 as of 1/19 OV...TSH 64 and apparently he missed them past week; I rec 250 qd for now and close f/u...0.7 and will leave this alone for now---> 1.5 in 2/20.  --  CAD per Dr Pritchett; s/p Spect '15 per pt and was neg---> still no CP/SOB and sees cards q 6 mo=no recent as of 6/20 OV.  LIPIDS with LDL 72...83 ok for now...LDL 57---> 55 in  2/20.  --  THROMBOCYTOPENIA is new 4/16 OV=99, so to HEME...off ASA but plavix ok per Dr Chaudhry; had BM bx=?...75 holding...on iron per her---> CBC stable 6/20 per her.  --  HOSP F/U 8/16 for FUO.  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  --  DJD s/p R TKR and then L TKR per Dr Eckert 1/16 and rehab with [facility] still on-going = 3x/wk at 4/16 OV...still with issues L knee 4/18...to have redo L TKR per Dr Zayas as of 10/18 OV=Dr Duran cleared him already...is gideon for 2/4/19, but apparently wants his A1C below 7 for this?? = d/w me 1/19...I d/w not going to get there that soon, but current blood sugars excellent and pt cleared for surgery from my standpoint as well=reviewed all their pre-op labs as well as EKG and CXR...s/p redo L TKR on 2/4/19 and looks great at 2/27/19 OV...finishing up as of 4/19 OV.  --  GERD per Dr Bradshaw.  ? CIRRHOSIS=tried on Nadolol per Dr Bradshaw=stopped it 6/20 due to diarrhea?; ? has CT/NH3 gideon.  --  OBESITY up 3 more to 263...258--->270 is stuck 2/20 and I d/w no meds=must go to [facility] ( Cards said no to surgery).  --  DEPRESSION on maint med...? Dementia per pt, sent to Dr Kim; he sent for [facility] as of 4/18 OV...will address depression 8/18 = increase zoloft   [facility] as of [facility]17 [facility]---> c/w as of 2/21 OV; neg O2 in [facility] recently;   --  --  PSA 0.5 (4/7/16)...defer.  Colon 7/19...2 inflam/hyper polyps per Dr Bradsahw.  Prevnar 11/16; Pneumovax # 1 9/17;  (, retired [employer] '97, 3 kids)    Follow Up   No follow-ups on file.    Total Time Spent:  minutes     This time includes time spent by me in the following activities: preparing for the visit, reviewing extensive past medical history and tests, performing a medically appropriate examination and/or evaluation, counseling and educating the patient and/or caregivers, ordering medications, tests, or procedures, referring and/or communicating with other health care professionals and documenting  information in the medical record all on this date of service.     Patient was given instructions and counseling regarding his condition or for health maintenance advice. Please see specific information pulled into the AVS if appropriate.

## 2023-10-10 NOTE — ASSESSMENT & PLAN NOTE
This is really a nonissue as of his 10/23 OV.  He is having orthostatic hypotension, he is on low-dose midodrine, otherwise no antihypertensives other than low-dose Lasix.

## 2023-10-10 NOTE — ASSESSMENT & PLAN NOTE
This is the issue as of his 10/23 urgent visit.  His sats are good at 94%, he is afebrile.  We checked him for COVID and flu, they were both negative.  His lungs are fairly clear.  This most likely related to some sinus drainage, could not rule out acute sinusitis, will treat per orders, patient to call if not improving.

## 2023-10-11 ENCOUNTER — HOSPITAL ENCOUNTER (OUTPATIENT)
Facility: HOSPITAL | Age: 78
Setting detail: OBSERVATION
Discharge: NURSING, REHAB OR LONG-TERM CARE | End: 2023-10-16
Attending: EMERGENCY MEDICINE | Admitting: INTERNAL MEDICINE
Payer: OTHER GOVERNMENT

## 2023-10-11 ENCOUNTER — APPOINTMENT (OUTPATIENT)
Dept: GENERAL RADIOLOGY | Facility: HOSPITAL | Age: 78
End: 2023-10-11
Payer: OTHER GOVERNMENT

## 2023-10-11 ENCOUNTER — APPOINTMENT (OUTPATIENT)
Dept: CT IMAGING | Facility: HOSPITAL | Age: 78
End: 2023-10-11
Payer: OTHER GOVERNMENT

## 2023-10-11 DIAGNOSIS — R26.2 DIFFICULTY IN WALKING: ICD-10-CM

## 2023-10-11 DIAGNOSIS — R53.1 GENERALIZED WEAKNESS: ICD-10-CM

## 2023-10-11 DIAGNOSIS — N17.9 AKI (ACUTE KIDNEY INJURY): ICD-10-CM

## 2023-10-11 DIAGNOSIS — Z78.9 DECREASED ACTIVITIES OF DAILY LIVING (ADL): Primary | ICD-10-CM

## 2023-10-11 LAB
ALBUMIN SERPL-MCNC: 3.7 G/DL (ref 3.5–5.2)
ALBUMIN SERPL-MCNC: 4.1 G/DL (ref 3.5–5.2)
ALBUMIN/GLOB SERPL: 1.3 G/DL
ALBUMIN/GLOB SERPL: 1.4 G/DL
ALP SERPL-CCNC: 75 U/L (ref 39–117)
ALP SERPL-CCNC: 85 U/L (ref 39–117)
ALT SERPL W P-5'-P-CCNC: 20 U/L (ref 1–41)
ALT SERPL W P-5'-P-CCNC: 21 U/L (ref 1–41)
ANION GAP SERPL CALCULATED.3IONS-SCNC: 11.5 MMOL/L (ref 5–15)
ANION GAP SERPL CALCULATED.3IONS-SCNC: 13 MMOL/L (ref 5–15)
AST SERPL-CCNC: 28 U/L (ref 1–40)
AST SERPL-CCNC: 29 U/L (ref 1–40)
BASOPHILS # BLD AUTO: 0.02 10*3/MM3 (ref 0–0.2)
BASOPHILS NFR BLD AUTO: 0.3 % (ref 0–1.5)
BILIRUB SERPL-MCNC: 0.5 MG/DL (ref 0–1.2)
BILIRUB SERPL-MCNC: 0.6 MG/DL (ref 0–1.2)
BILIRUB UR QL STRIP: NEGATIVE
BILIRUB UR QL STRIP: NEGATIVE
BUN SERPL-MCNC: 41 MG/DL (ref 8–23)
BUN SERPL-MCNC: 42 MG/DL (ref 8–23)
BUN/CREAT SERPL: 20 (ref 7–25)
BUN/CREAT SERPL: 23.2 (ref 7–25)
CALCIUM SPEC-SCNC: 8.6 MG/DL (ref 8.6–10.5)
CALCIUM SPEC-SCNC: 9.4 MG/DL (ref 8.6–10.5)
CHLORIDE SERPL-SCNC: 102 MMOL/L (ref 98–107)
CHLORIDE SERPL-SCNC: 99 MMOL/L (ref 98–107)
CLARITY UR: CLEAR
CLARITY UR: CLEAR
CO2 SERPL-SCNC: 20.5 MMOL/L (ref 22–29)
CO2 SERPL-SCNC: 22 MMOL/L (ref 22–29)
COLOR UR: YELLOW
COLOR UR: YELLOW
CREAT SERPL-MCNC: 1.81 MG/DL (ref 0.76–1.27)
CREAT SERPL-MCNC: 2.05 MG/DL (ref 0.76–1.27)
DEPRECATED RDW RBC AUTO: 48.5 FL (ref 37–54)
DEPRECATED RDW RBC AUTO: 49.9 FL (ref 37–54)
EGFRCR SERPLBLD CKD-EPI 2021: 33 ML/MIN/1.73
EGFRCR SERPLBLD CKD-EPI 2021: 38.3 ML/MIN/1.73
EOSINOPHIL # BLD AUTO: 0.01 10*3/MM3 (ref 0–0.4)
EOSINOPHIL NFR BLD AUTO: 0.1 % (ref 0.3–6.2)
ERYTHROCYTE [DISTWIDTH] IN BLOOD BY AUTOMATED COUNT: 14.8 % (ref 12.3–15.4)
ERYTHROCYTE [DISTWIDTH] IN BLOOD BY AUTOMATED COUNT: 15 % (ref 12.3–15.4)
GLOBULIN UR ELPH-MCNC: 2.8 GM/DL
GLOBULIN UR ELPH-MCNC: 3 GM/DL
GLUCOSE BLDC GLUCOMTR-MCNC: 138 MG/DL (ref 70–99)
GLUCOSE SERPL-MCNC: 154 MG/DL (ref 65–99)
GLUCOSE SERPL-MCNC: 177 MG/DL (ref 65–99)
GLUCOSE UR STRIP-MCNC: NEGATIVE MG/DL
GLUCOSE UR STRIP-MCNC: NEGATIVE MG/DL
HCT VFR BLD AUTO: 29.5 % (ref 37.5–51)
HCT VFR BLD AUTO: 32.6 % (ref 37.5–51)
HGB BLD-MCNC: 10.8 G/DL (ref 13–17.7)
HGB BLD-MCNC: 9.6 G/DL (ref 13–17.7)
HGB UR QL STRIP.AUTO: NEGATIVE
HGB UR QL STRIP.AUTO: NEGATIVE
HOLD SPECIMEN: NORMAL
HOLD SPECIMEN: NORMAL
IMM GRANULOCYTES # BLD AUTO: 0.05 10*3/MM3 (ref 0–0.05)
IMM GRANULOCYTES NFR BLD AUTO: 0.7 % (ref 0–0.5)
KETONES UR QL STRIP: NEGATIVE
KETONES UR QL STRIP: NEGATIVE
LEUKOCYTE ESTERASE UR QL STRIP.AUTO: NEGATIVE
LEUKOCYTE ESTERASE UR QL STRIP.AUTO: NEGATIVE
LYMPHOCYTES # BLD AUTO: 0.12 10*3/MM3 (ref 0.7–3.1)
LYMPHOCYTES NFR BLD AUTO: 1.8 % (ref 19.6–45.3)
MAGNESIUM SERPL-MCNC: 1.4 MG/DL (ref 1.6–2.4)
MCH RBC QN AUTO: 29.2 PG (ref 26.6–33)
MCH RBC QN AUTO: 30.2 PG (ref 26.6–33)
MCHC RBC AUTO-ENTMCNC: 32.5 G/DL (ref 31.5–35.7)
MCHC RBC AUTO-ENTMCNC: 33.1 G/DL (ref 31.5–35.7)
MCV RBC AUTO: 89.7 FL (ref 79–97)
MCV RBC AUTO: 91.1 FL (ref 79–97)
MONOCYTES # BLD AUTO: 0.12 10*3/MM3 (ref 0.1–0.9)
MONOCYTES NFR BLD AUTO: 1.8 % (ref 5–12)
NEUTROPHILS NFR BLD AUTO: 6.45 10*3/MM3 (ref 1.7–7)
NEUTROPHILS NFR BLD AUTO: 95.3 % (ref 42.7–76)
NITRITE UR QL STRIP: NEGATIVE
NITRITE UR QL STRIP: NEGATIVE
NRBC BLD AUTO-RTO: 0 /100 WBC (ref 0–0.2)
PH UR STRIP.AUTO: 5.5 [PH] (ref 5–8)
PH UR STRIP.AUTO: <=5 [PH] (ref 5–8)
PLATELET # BLD AUTO: 59 10*3/MM3 (ref 140–450)
PLATELET # BLD AUTO: 71 10*3/MM3 (ref 140–450)
PMV BLD AUTO: 9 FL (ref 6–12)
PMV BLD AUTO: 9.2 FL (ref 6–12)
POTASSIUM SERPL-SCNC: 4.5 MMOL/L (ref 3.5–5.2)
POTASSIUM SERPL-SCNC: 4.7 MMOL/L (ref 3.5–5.2)
PROT SERPL-MCNC: 6.5 G/DL (ref 6–8.5)
PROT SERPL-MCNC: 7.1 G/DL (ref 6–8.5)
PROT UR QL STRIP: NEGATIVE
PROT UR QL STRIP: NEGATIVE
QT INTERVAL: 356 MS
QTC INTERVAL: 434 MS
RBC # BLD AUTO: 3.29 10*6/MM3 (ref 4.14–5.8)
RBC # BLD AUTO: 3.58 10*6/MM3 (ref 4.14–5.8)
SODIUM SERPL-SCNC: 134 MMOL/L (ref 136–145)
SODIUM SERPL-SCNC: 134 MMOL/L (ref 136–145)
SP GR UR STRIP: 1.01 (ref 1–1.03)
SP GR UR STRIP: 1.02 (ref 1–1.03)
TROPONIN T SERPL HS-MCNC: 27 NG/L
UROBILINOGEN UR QL STRIP: NORMAL
UROBILINOGEN UR QL STRIP: NORMAL
WBC NRBC COR # BLD: 5.82 10*3/MM3 (ref 3.4–10.8)
WBC NRBC COR # BLD: 6.77 10*3/MM3 (ref 3.4–10.8)
WHOLE BLOOD HOLD COAG: NORMAL
WHOLE BLOOD HOLD SPECIMEN: NORMAL

## 2023-10-11 PROCEDURE — 93005 ELECTROCARDIOGRAM TRACING: CPT | Performed by: EMERGENCY MEDICINE

## 2023-10-11 PROCEDURE — 87040 BLOOD CULTURE FOR BACTERIA: CPT | Performed by: INTERNAL MEDICINE

## 2023-10-11 PROCEDURE — 96361 HYDRATE IV INFUSION ADD-ON: CPT

## 2023-10-11 PROCEDURE — G0378 HOSPITAL OBSERVATION PER HR: HCPCS

## 2023-10-11 PROCEDURE — 93010 ELECTROCARDIOGRAM REPORT: CPT | Performed by: SPECIALIST

## 2023-10-11 PROCEDURE — 81003 URINALYSIS AUTO W/O SCOPE: CPT | Performed by: INTERNAL MEDICINE

## 2023-10-11 PROCEDURE — 25010000002 MAGNESIUM SULFATE 2 GM/50ML SOLUTION: Performed by: INTERNAL MEDICINE

## 2023-10-11 PROCEDURE — 81003 URINALYSIS AUTO W/O SCOPE: CPT | Performed by: EMERGENCY MEDICINE

## 2023-10-11 PROCEDURE — 87086 URINE CULTURE/COLONY COUNT: CPT | Performed by: INTERNAL MEDICINE

## 2023-10-11 PROCEDURE — 71045 X-RAY EXAM CHEST 1 VIEW: CPT

## 2023-10-11 PROCEDURE — 84484 ASSAY OF TROPONIN QUANT: CPT | Performed by: EMERGENCY MEDICINE

## 2023-10-11 PROCEDURE — 63710000001 PREDNISONE PER 1 MG: Performed by: INTERNAL MEDICINE

## 2023-10-11 PROCEDURE — 99284 EMERGENCY DEPT VISIT MOD MDM: CPT

## 2023-10-11 PROCEDURE — 96366 THER/PROPH/DIAG IV INF ADDON: CPT

## 2023-10-11 PROCEDURE — 85025 COMPLETE CBC W/AUTO DIFF WBC: CPT | Performed by: EMERGENCY MEDICINE

## 2023-10-11 PROCEDURE — 80053 COMPREHEN METABOLIC PANEL: CPT | Performed by: EMERGENCY MEDICINE

## 2023-10-11 PROCEDURE — 97161 PT EVAL LOW COMPLEX 20 MIN: CPT | Performed by: PHYSICAL THERAPIST

## 2023-10-11 PROCEDURE — 94799 UNLISTED PULMONARY SVC/PX: CPT

## 2023-10-11 PROCEDURE — 25810000003 SODIUM CHLORIDE 0.9 % SOLUTION: Performed by: EMERGENCY MEDICINE

## 2023-10-11 PROCEDURE — 93005 ELECTROCARDIOGRAM TRACING: CPT

## 2023-10-11 PROCEDURE — 82948 REAGENT STRIP/BLOOD GLUCOSE: CPT

## 2023-10-11 PROCEDURE — 70450 CT HEAD/BRAIN W/O DYE: CPT

## 2023-10-11 PROCEDURE — 85027 COMPLETE CBC AUTOMATED: CPT | Performed by: INTERNAL MEDICINE

## 2023-10-11 PROCEDURE — 80053 COMPREHEN METABOLIC PANEL: CPT | Performed by: INTERNAL MEDICINE

## 2023-10-11 PROCEDURE — 96365 THER/PROPH/DIAG IV INF INIT: CPT

## 2023-10-11 PROCEDURE — 83735 ASSAY OF MAGNESIUM: CPT | Performed by: EMERGENCY MEDICINE

## 2023-10-11 RX ORDER — ACETAMINOPHEN 325 MG/1
650 TABLET ORAL EVERY 4 HOURS PRN
Status: DISCONTINUED | OUTPATIENT
Start: 2023-10-11 | End: 2023-10-16 | Stop reason: HOSPADM

## 2023-10-11 RX ORDER — SERTRALINE HYDROCHLORIDE 100 MG/1
200 TABLET, FILM COATED ORAL DAILY
Status: DISCONTINUED | OUTPATIENT
Start: 2023-10-12 | End: 2023-10-16 | Stop reason: HOSPADM

## 2023-10-11 RX ORDER — SODIUM CHLORIDE 0.9 % (FLUSH) 0.9 %
10 SYRINGE (ML) INJECTION AS NEEDED
Status: DISCONTINUED | OUTPATIENT
Start: 2023-10-11 | End: 2023-10-16 | Stop reason: HOSPADM

## 2023-10-11 RX ORDER — SODIUM CHLORIDE 9 MG/ML
40 INJECTION, SOLUTION INTRAVENOUS AS NEEDED
Status: DISCONTINUED | OUTPATIENT
Start: 2023-10-11 | End: 2023-10-16 | Stop reason: HOSPADM

## 2023-10-11 RX ORDER — ALUMINA, MAGNESIA, AND SIMETHICONE 2400; 2400; 240 MG/30ML; MG/30ML; MG/30ML
15 SUSPENSION ORAL EVERY 6 HOURS PRN
Status: DISCONTINUED | OUTPATIENT
Start: 2023-10-11 | End: 2023-10-16 | Stop reason: HOSPADM

## 2023-10-11 RX ORDER — ASPIRIN 81 MG/1
81 TABLET ORAL DAILY
Status: DISCONTINUED | OUTPATIENT
Start: 2023-10-12 | End: 2023-10-16 | Stop reason: HOSPADM

## 2023-10-11 RX ORDER — BISACODYL 5 MG/1
5 TABLET, DELAYED RELEASE ORAL DAILY PRN
Status: DISCONTINUED | OUTPATIENT
Start: 2023-10-11 | End: 2023-10-16 | Stop reason: HOSPADM

## 2023-10-11 RX ORDER — AMOXICILLIN 250 MG
2 CAPSULE ORAL 2 TIMES DAILY
Status: DISCONTINUED | OUTPATIENT
Start: 2023-10-11 | End: 2023-10-16 | Stop reason: HOSPADM

## 2023-10-11 RX ORDER — NICOTINE POLACRILEX 4 MG
15 LOZENGE BUCCAL
Status: DISCONTINUED | OUTPATIENT
Start: 2023-10-11 | End: 2023-10-16 | Stop reason: HOSPADM

## 2023-10-11 RX ORDER — FINASTERIDE 5 MG/1
5 TABLET, FILM COATED ORAL DAILY
Status: DISCONTINUED | OUTPATIENT
Start: 2023-10-12 | End: 2023-10-16 | Stop reason: HOSPADM

## 2023-10-11 RX ORDER — LEVOTHYROXINE SODIUM 112 UG/1
168 TABLET ORAL WEEKLY
Status: DISCONTINUED | OUTPATIENT
Start: 2023-10-15 | End: 2023-10-16 | Stop reason: HOSPADM

## 2023-10-11 RX ORDER — MAGNESIUM SULFATE HEPTAHYDRATE 40 MG/ML
2 INJECTION, SOLUTION INTRAVENOUS ONCE
Status: COMPLETED | OUTPATIENT
Start: 2023-10-11 | End: 2023-10-11

## 2023-10-11 RX ORDER — LEVOTHYROXINE SODIUM 112 UG/1
168 TABLET ORAL TAKE AS DIRECTED
COMMUNITY

## 2023-10-11 RX ORDER — DEXTROSE AND SODIUM CHLORIDE 5; .45 G/100ML; G/100ML
100 INJECTION, SOLUTION INTRAVENOUS CONTINUOUS
Status: DISCONTINUED | OUTPATIENT
Start: 2023-10-11 | End: 2023-10-12

## 2023-10-11 RX ORDER — GABAPENTIN 300 MG/1
600 CAPSULE ORAL DAILY
Status: DISCONTINUED | OUTPATIENT
Start: 2023-10-12 | End: 2023-10-16 | Stop reason: HOSPADM

## 2023-10-11 RX ORDER — NITROGLYCERIN 0.4 MG/1
0.4 TABLET SUBLINGUAL
Status: DISCONTINUED | OUTPATIENT
Start: 2023-10-11 | End: 2023-10-16 | Stop reason: HOSPADM

## 2023-10-11 RX ORDER — AMITRIPTYLINE HYDROCHLORIDE 50 MG/1
50 TABLET, FILM COATED ORAL NIGHTLY
Status: DISCONTINUED | OUTPATIENT
Start: 2023-10-11 | End: 2023-10-16 | Stop reason: HOSPADM

## 2023-10-11 RX ORDER — BISACODYL 10 MG
10 SUPPOSITORY, RECTAL RECTAL DAILY PRN
Status: DISCONTINUED | OUTPATIENT
Start: 2023-10-11 | End: 2023-10-16 | Stop reason: HOSPADM

## 2023-10-11 RX ORDER — LEVOTHYROXINE SODIUM 112 UG/1
224 TABLET ORAL
Status: DISCONTINUED | OUTPATIENT
Start: 2023-10-12 | End: 2023-10-16 | Stop reason: HOSPADM

## 2023-10-11 RX ORDER — PREDNISONE 20 MG/1
20 TABLET ORAL 2 TIMES DAILY
Status: DISPENSED | OUTPATIENT
Start: 2023-10-11 | End: 2023-10-15

## 2023-10-11 RX ORDER — DONEPEZIL HYDROCHLORIDE 10 MG/1
10 TABLET, FILM COATED ORAL NIGHTLY
Status: DISCONTINUED | OUTPATIENT
Start: 2023-10-11 | End: 2023-10-16 | Stop reason: HOSPADM

## 2023-10-11 RX ORDER — POLYETHYLENE GLYCOL 3350 17 G/17G
17 POWDER, FOR SOLUTION ORAL DAILY PRN
Status: DISCONTINUED | OUTPATIENT
Start: 2023-10-11 | End: 2023-10-16 | Stop reason: HOSPADM

## 2023-10-11 RX ORDER — ATORVASTATIN CALCIUM 10 MG/1
10 TABLET, FILM COATED ORAL DAILY
Status: DISCONTINUED | OUTPATIENT
Start: 2023-10-12 | End: 2023-10-16 | Stop reason: HOSPADM

## 2023-10-11 RX ORDER — TAMSULOSIN HYDROCHLORIDE 0.4 MG/1
0.4 CAPSULE ORAL DAILY
Status: DISCONTINUED | OUTPATIENT
Start: 2023-10-12 | End: 2023-10-16 | Stop reason: HOSPADM

## 2023-10-11 RX ORDER — SODIUM CHLORIDE 9 MG/ML
125 INJECTION, SOLUTION INTRAVENOUS CONTINUOUS
Status: DISCONTINUED | OUTPATIENT
Start: 2023-10-11 | End: 2023-10-12

## 2023-10-11 RX ORDER — SODIUM CHLORIDE 0.9 % (FLUSH) 0.9 %
10 SYRINGE (ML) INJECTION EVERY 12 HOURS SCHEDULED
Status: DISCONTINUED | OUTPATIENT
Start: 2023-10-11 | End: 2023-10-16 | Stop reason: HOSPADM

## 2023-10-11 RX ORDER — DEXTROSE MONOHYDRATE 25 G/50ML
25 INJECTION, SOLUTION INTRAVENOUS
Status: DISCONTINUED | OUTPATIENT
Start: 2023-10-11 | End: 2023-10-16 | Stop reason: HOSPADM

## 2023-10-11 RX ORDER — ONDANSETRON 2 MG/ML
4 INJECTION INTRAMUSCULAR; INTRAVENOUS EVERY 6 HOURS PRN
Status: DISCONTINUED | OUTPATIENT
Start: 2023-10-11 | End: 2023-10-16 | Stop reason: HOSPADM

## 2023-10-11 RX ORDER — INSULIN LISPRO 100 [IU]/ML
2-7 INJECTION, SOLUTION INTRAVENOUS; SUBCUTANEOUS
Status: DISCONTINUED | OUTPATIENT
Start: 2023-10-11 | End: 2023-10-16 | Stop reason: HOSPADM

## 2023-10-11 RX ORDER — MIDODRINE HYDROCHLORIDE 2.5 MG/1
2.5 TABLET ORAL
Status: DISCONTINUED | OUTPATIENT
Start: 2023-10-11 | End: 2023-10-16 | Stop reason: HOSPADM

## 2023-10-11 RX ADMIN — METFORMIN HYDROCHLORIDE 1000 MG: 500 TABLET ORAL at 17:24

## 2023-10-11 RX ADMIN — MAGNESIUM SULFATE HEPTAHYDRATE 2 G: 40 INJECTION, SOLUTION INTRAVENOUS at 15:39

## 2023-10-11 RX ADMIN — DEXTROSE AND SODIUM CHLORIDE 100 ML/HR: 5; 450 INJECTION, SOLUTION INTRAVENOUS at 16:16

## 2023-10-11 RX ADMIN — DONEPEZIL HYDROCHLORIDE 10 MG: 10 TABLET, FILM COATED ORAL at 21:05

## 2023-10-11 RX ADMIN — MIDODRINE HYDROCHLORIDE 2.5 MG: 2.5 TABLET ORAL at 17:24

## 2023-10-11 RX ADMIN — SODIUM CHLORIDE 1000 ML: 9 INJECTION, SOLUTION INTRAVENOUS at 09:24

## 2023-10-11 RX ADMIN — AMITRIPTYLINE HYDROCHLORIDE 50 MG: 50 TABLET, FILM COATED ORAL at 21:05

## 2023-10-11 RX ADMIN — PREDNISONE 20 MG: 20 TABLET ORAL at 21:12

## 2023-10-11 RX ADMIN — DOCUSATE SODIUM 50MG AND SENNOSIDES 8.6MG 2 TABLET: 8.6; 5 TABLET, FILM COATED ORAL at 21:05

## 2023-10-11 RX ADMIN — SODIUM CHLORIDE 125 ML/HR: 9 INJECTION, SOLUTION INTRAVENOUS at 10:40

## 2023-10-11 NOTE — PLAN OF CARE
Goal Outcome Evaluation:   Took over patient care at 1720. Patient is A&O X4. VSS.

## 2023-10-11 NOTE — ED PROVIDER NOTES
Time: 9:07 AM EDT  Date of encounter:  10/11/2023  Independent Historian/Clinical History and Information was obtained by:   Patient    History is limited by: N/A    Chief Complaint:  Generalized weakness    History of Present Illness:  Patient is a 78 y.o. year old male who presents to the emergency department for evaluation of generalized weakness.  Patient states that he got up today and was unable to get around due to generalized weakness.  Reports that his legs just do not work for him currently.  States this is gotten worse over the last week or so.  States he is just had a general decline over the last week or so.  He has chronic issues with his pain in his low back as well as his left knee.  Denies any numbness or tingling in his legs, perineal anesthesia, loss of bowel or bladder function or urinary retention.  States that she just cannot too weak to get around.    HPI    Patient Care Team  Primary Care Provider: Alex Buckley MD    Past Medical History:     No Known Allergies  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     Primary osteoarthritis of left knee     Sleep apnea     Sleep apnea     Thrombocytopenia 05/22/2018    Thyroid disease      Past Surgical History:   Procedure Laterality Date    CARDIAC SURGERY      COLONOSCOPY  2015    CORONARY ANGIOPLASTY WITH STENT PLACEMENT      CORONARY ARTERY BYPASS GRAFT N/A 05/20/2021    Procedure: MIDLINE  STERNOTOMY,CORONARY ARTERY BYPASS GRAFTING X 5, UTILIZING THE LEFT COMPA AND LEFT SAPHENOUS VEIN, ABHIJEET, PRP;  Surgeon: Jr Tom Tubbs MD;  Location[facility];  Service: Cardiothoracic;  Laterality: N/A;    ENDOSCOPY      HERNIA REPAIR      JOINT REPLACEMENT      SHOULDER SURGERY      SHOULDER REPAIR    TOTAL KNEE ARTHROPLASTY      TRANSESOPHAGEAL ECHOCARDIOGRAM (ABHIJEET) N/A 05/20/2021    Procedure: TRANSESOPHAGEAL ECHOCARDIOGRAM WITH ANESTHESIA;  Surgeon: Jr Tom Tubbs MD;  Location[facility];  Service: Cardiothoracic;  Laterality: N/A;     Family History   Problem Relation Age of Onset    Colon cancer Father     Diabetes Mother        Home Medications:  Prior to Admission medications    Medication Sig Start Date End Date Taking? Authorizing Provider   amitriptyline (ELAVIL) 50 MG tablet Take 1 tablet by mouth Every Night.    ProviderMichael MD   aspirin 81 MG EC tablet Take 1 tablet by mouth Daily.    ProviderMichael MD   donepezil (ARICEPT) 10 MG tablet Take 1 tablet by mouth every night at bedtime.    ProviderMichael MD   finasteride (PROSCAR) 5 MG tablet Take 1 tablet by mouth Daily. 6/1/21   Jr Tom Tubbs MD   furosemide (LASIX) 40 MG tablet Take 1 tablet by mouth Daily.  Patient taking differently: Take 0.5 tablets by mouth Daily. 9/13/22   Sven Duran MD   gabapentin (NEURONTIN) 600 MG tablet Take 0.5 tablets by mouth 2 (Two) Times a Day. 5/1/23   Alex Buckley MD   insulin glargine (LANTUS, SEMGLEE) 100 UNIT/ML injection Inject 15 Units under the skin into the appropriate area as directed Every Night. 7/25/23   Alex Buckley MD   Insulin Lispro, 1 Unit Dial, (HumaLOG KwikPen) 100 UNIT/ML solution pen-injector 150-200 = 4 units  201-250 = 6 units  251-300 = 8 units  301-350 = 10 units       >350 = 14 units 7/25/23   Alex Buckley MD   levothyroxine (SYNTHROID, LEVOTHROID) 112 MCG tablet Take 2 tablets by mouth Every Morning.    ProviderMichael  "MD   metFORMIN (GLUCOPHAGE) 1000 MG tablet Take 1 tablet by mouth 2 (Two) Times a Day With Meals.    Provider, MD Michael   midodrine (PROAMATINE) 2.5 MG tablet Take 1 tablet by mouth Daily. 8/17/23   Betty Rodríguez APRN   nitroglycerin (NITROSTAT) 0.4 MG SL tablet  8/16/21   ProviderMichael MD   predniSONE (DELTASONE) 20 MG tablet Take 1 tablet by mouth 2 (Two) Times a Day for 5 days. 10/10/23 10/15/23  Alex Buckley MD   sertraline (ZOLOFT) 100 MG tablet Take 2 tablets by mouth Daily. 7/6/22   Alex Buckley MD   simvastatin (ZOCOR) 40 MG tablet Take 1 tablet by mouth Every Night.    Provider, MD Michael   sulfamethoxazole-trimethoprim (Bactrim DS) 800-160 MG per tablet Take 1 tablet by mouth 2 (Two) Times a Day. 10/10/23   Alex Buckley MD   tamsulosin (FLOMAX) 0.4 MG capsule 24 hr capsule Take 1 capsule by mouth Daily. 2/20/21   Jr Tom Tubbs MD        Social History:   Social History     Tobacco Use    Smoking status: Never    Smokeless tobacco: Never   Vaping Use    Vaping Use: Never used   Substance Use Topics    Alcohol use: Not Currently     Comment: QUIT DRINKING 32 YEARS AGO    Drug use: Never         Review of Systems:  Review of Systems   Musculoskeletal:  Positive for back pain.   Neurological:  Positive for weakness.        Physical Exam:  BP 97/62 (BP Location: Right arm)   Pulse 94   Temp 98.6 °F (37 °C) (Oral)   Resp 16   Ht 182.9 cm (72\")   Wt 112 kg (247 lb 5.7 oz)   SpO2 93%   BMI 33.55 kg/m²     Physical Exam  Vitals and nursing note reviewed.   Constitutional:       Appearance: Normal appearance.   HENT:      Head: Normocephalic and atraumatic.   Eyes:      General: No scleral icterus.  Cardiovascular:      Rate and Rhythm: Normal rate and regular rhythm.   Pulmonary:      Effort: Pulmonary effort is normal.      Breath sounds: Normal breath sounds.   Abdominal:      Palpations: Abdomen is soft.      Tenderness: There is no abdominal tenderness. "   Musculoskeletal:         General: Normal range of motion.      Cervical back: Normal range of motion.      Comments: Patient with great strength in bilateral lower extremities.  Neurovascular intact distally   Skin:     Findings: No rash.   Neurological:      General: No focal deficit present.      Mental Status: He is alert and oriented to person, place, and time.      Motor: No weakness.                  Procedures:  Procedures      Medical Decision Making:      Comorbidities that affect care:    Coronary Artery Disease, Diabetes, Hypertension    External Notes reviewed:    Reviewed office visit from yesterday      The following orders were placed and all results were independently analyzed by me:  Orders Placed This Encounter   Procedures    XR Chest 1 View    CT Head Without Contrast    [place] Draw    Comprehensive Metabolic Panel    Single High Sensitivity Troponin T    Magnesium    Urinalysis With Microscopic If Indicated (No Culture) - Urine, Clean Catch    CBC Auto Differential    NPO Diet NPO Type: Strict NPO    Undress & Gown    Continuous Pulse Oximetry    Vital Signs    Orthostatic Blood Pressure    Discontinue Cardiac Monitoring    IP General Consult (Use specialty-specific consult if known)    PT Consult: Eval & Treat Functional Mobility Below Baseline    Oxygen Therapy- Nasal Cannula; Titrate 1-6 LPM Per SpO2; 90 - 95%    POC Glucose Once    ECG 12 Lead ED Triage Standing Order; Weak / Dizzy / AMS    Insert Peripheral IV    Initiate Observation Status    Fall Precautions    CBC & Differential    Green Top (Gel)    Lavender Top    Gold Top - SST    Light Blue Top       Medications Given in the Emergency Department:  Medications   sodium chloride 0.9 % flush 10 mL (has no administration in time range)   sodium chloride 0.9 % infusion (has no administration in time range)   sodium chloride 0.9 % bolus 1,000 mL (1,000 mL Intravenous New Bag 10/11/23 0924)        ED Course:    ED Course as of 10/11/23  1026   Wed Oct 11, 2023   0920 EKG interpreted by me  Time 854  Heart rate 89  Sinus, normal QRS, nonspecific ST change [MA]      ED Course User Index  [MA] Kg Hodges MD       Labs:    Lab Results (last 24 hours)       Procedure Component Value Units Date/Time    POCT SARS-CoV-2 Antigen INDY + Flu [[id number]] Collected: 10/10/23 1612    Specimen: Swab Updated: 10/10/23 1613     SARS Antigen Not Detected     Influenza A Antigen INDY Not Detected     Influenza B Antigen INDY Not Detected     Internal Control Passed     Lot Number [id number]     Expiration Date 10/10/2024    CBC & Differential [[id number]]  (Abnormal) Collected: 10/11/23 0856    Specimen: Blood Updated: 10/11/23 0911    Narrative:      The following orders were created for panel order CBC & Differential.  Procedure                               Abnormality         Status                     ---------                               -----------         ------                     CBC Auto Differential[[id number]]        Abnormal            Final result                 Please view results for these tests on the individual orders.    Comprehensive Metabolic Panel [[id number]]  (Abnormal) Collected: 10/11/23 0856    Specimen: Blood Updated: 10/11/23 0924     Glucose 177 mg/dL      BUN 41 mg/dL      Creatinine 2.05 mg/dL      Sodium 134 mmol/L      Potassium 4.5 mmol/L      Chloride 99 mmol/L      CO2 22.0 mmol/L      Calcium 9.4 mg/dL      Total Protein 7.1 g/dL      Albumin 4.1 g/dL      ALT (SGPT) 20 U/L      AST (SGOT) 28 U/L      Alkaline Phosphatase 85 U/L      Total Bilirubin 0.5 mg/dL      Globulin 3.0 gm/dL      A/G Ratio 1.4 g/dL      BUN/Creatinine Ratio 20.0     Anion Gap 13.0 mmol/L      eGFR 33.0 mL/min/1.73     Narrative:      GFR Normal >60  Chronic Kidney Disease <60  Kidney Failure <15    The GFR formula is only valid for adults with stable renal function between ages 18 and 70.    Single High Sensitivity Troponin T [[id number]]   (Abnormal) Collected: 10/11/23 0856    Specimen: Blood Updated: 10/11/23 0924     HS Troponin T 27 ng/L     Narrative:      High Sensitive Troponin T Reference Range:  <10.0 ng/L- Negative Female for AMI  <15.0 ng/L- Negative Male for AMI  >=10 - Abnormal Female indicating possible myocardial injury.  >=15 - Abnormal Male indicating possible myocardial injury.   Clinicians would have to utilize clinical acumen, EKG, Troponin, and serial changes to determine if it is an Acute Myocardial Infarction or myocardial injury due to an underlying chronic condition.         Magnesium [[id number]]  (Abnormal) Collected: 10/11/23 0856    Specimen: Blood Updated: 10/11/23 0924     Magnesium 1.4 mg/dL     CBC Auto Differential [[id number]]  (Abnormal) Collected: 10/11/23 0856    Specimen: Blood Updated: 10/11/23 0911     WBC 6.77 10*3/mm3      RBC 3.58 10*6/mm3      Hemoglobin 10.8 g/dL      Hematocrit 32.6 %      MCV 91.1 fL      MCH 30.2 pg      MCHC 33.1 g/dL      RDW 15.0 %      RDW-SD 49.9 fl      MPV 9.0 fL      Platelets 71 10*3/mm3      Neutrophil % 95.3 %      Lymphocyte % 1.8 %      Monocyte % 1.8 %      Eosinophil % 0.1 %      Basophil % 0.3 %      Immature Grans % 0.7 %      Neutrophils, Absolute 6.45 10*3/mm3      Lymphocytes, Absolute 0.12 10*3/mm3      Monocytes, Absolute 0.12 10*3/mm3      Eosinophils, Absolute 0.01 10*3/mm3      Basophils, Absolute 0.02 10*3/mm3      Immature Grans, Absolute 0.05 10*3/mm3      nRBC 0.0 /100 WBC     Urinalysis With Microscopic If Indicated (No Culture) - Urine, Clean Catch [[id number]]  (Normal) Collected: 10/11/23 1016    Specimen: Urine, Clean Catch Updated: 10/11/23 1024     Color, UA Yellow     Appearance, UA Clear     pH, UA <=5.0     Specific Gravity, UA 1.018     Glucose, UA Negative     Ketones, UA Negative     Bilirubin, UA Negative     Blood, UA Negative     Protein, UA Negative     Leuk Esterase, UA Negative     Nitrite, UA Negative     Urobilinogen, UA 0.2 E.U./dL     Narrative:      Urine microscopic not indicated.             Imaging:    CT Head Without Contrast    Result Date: 10/11/2023  PROCEDURE: CT HEAD WO CONTRAST  COMPARISON:  [facility], CT, CT HEAD WO CONTRAST, 6/04/2023, 20:03. INDICATIONS: headache with dizziness  PROTOCOL:   Standard imaging protocol performed    RADIATION:   DLP: 1017.2mGy*cm   MA and/or KV was adjusted to minimize radiation dose.     TECHNIQUE: After obtaining the patient's consent, CT images were obtained without non-ionic intravenous contrast material.  FINDINGS:  Chronic appearing lacunar infarct is noted in the right thalamus.  No CT evidence of acute infarction, mass or intracranial hemorrhage is identified.  The ventricles and cisterns are normal in size and configuration.   Mucosal inflammatory changes are noted in the maxillary and ethmoid sinuses bilaterally.  There is thickening of the maxillary sinus osseous walls consistent with a history of chronic sinusitis.  The mastoid air cells and middle ear cavities are clear bilaterally.  No focal calvarial abnormality is seen.        1. Chronic right thalamic lacunar infarct 2. Mucosal inflammatory changes in the paranasal sinuses, as above     Markie Rivas M.D.       Electronically Signed and Approved By: Markie Rivas M.D. on 10/11/2023 at 9:47             XR Chest 1 View    Result Date: 10/11/2023  PROCEDURE: XR CHEST 1 VW  COMPARISON: [facility], CR, XR CHEST 1 VW, 8/20/2023, 17:05.  INDICATIONS: Weak/Dizzy/AMS triage protocol  FINDINGS:  The heart size remains normal.  There are postoperative changes of prior CABG.  The pulmonary vascular markings are normal and the lungs are clear.        1. Status post CABG 2. Stable chest, no active disease       Austin Escalante MD       Electronically Signed and Approved By: Austin Escalante MD on 10/11/2023 at 9:34                Differential Diagnosis and Discussion:    Weakness: Based on the patient's history, signs, and  symptoms, the diffential diagnosis includes but is not limited to meningitis, stroke, sepsis, subarachnoid hemorrhage, intracranial bleeding, encephalitis, acute uti, dehydration, MS, myasthenia gravis, Guillan [place], migraine variant, neuromuscular disorders vertigo, electrolyte imbalance, and metabolic disorders.    All labs were reviewed and interpreted by me.  All X-rays impressions were independently interpreted by me.  EKG was interpreted by me.  CT scan radiology impression was interpreted by me.    MDM     Amount and/or Complexity of Data Reviewed  Decide to obtain previous medical records or to obtain history from someone other than the patient: yes       Patient presents with complaints of generalized weakness.  He also appeared to be very dehydrated on exam.  Patient with dehydration as well as an MARKUS and generalized weakness.  PT evaluated the patient and they were unable to get him up.  Will need admission for fluids as well as further eval.      Patient Care Considerations:          Consultants/Shared Management Plan:    Hospitalist: I have discussed the case with Dr. Wang who agrees to accept the patient for admission.    Social Determinants of Health:    Patient is unable to carry out activities of daily life. Escalation of care is necessary.       Disposition and Care Coordination:    Admit:   Through independent evaluation of the patient's history, physical, and imperical data, the patient meets criteria for observation/admission to the hospital.        Final diagnoses:   Generalized weakness   MARKUS (acute kidney injury)        ED Disposition       ED Disposition   Decision to Admit    Condition   --    Comment   Level of Care: Med/Surg [1]   Diagnosis: Acute kidney injury [[id number]]   Admitting Physician: ZIA WANG [[id number]]   Attending Physician: ZIA WANG [[id number]]                 This medical record created using voice recognition software.             Kg Hodges,  MD  10/11/23 1024

## 2023-10-11 NOTE — H&P
[facility]   HISTORY AND PHYSICAL    Patient Name: Cosmo Gauthier  : 1945  MRN: [id number]  Primary Care Physician:  Alex Buckley MD  Date of admission: 10/11/2023    Subjective   Subjective     Chief Complaint: patient was seen yesterday in the office he was complaining of weakness but since and he has become extremely weak and went to the emergency room saying that he just cannot 1 walk is feeling so weak he recently underwent an extensive workup with his lumbar spine MRIs but there was no evidence of any new neurological problems were seen he was also seen by neurology at this time he appears to be dehydrated has been taking Lasix.  History of coronary artery disease a have done a detailed history and examination recently was in [place] and has had some workup done by his cardiologist he is dehydrated treating with every week we will get physical therapy we will also consider putting him for rehabilitation IV hydration will be given      HPI: she'll be dehydration extreme weakness history of coronary artery disease    Cosmo Gauthier is a 78 y.o. male patient with history of lymphoma status post radiation treatment and has been treated with Rituxan    Review of Systems   All systems were reviewed and negative except for: has been reviewed    Personal History     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     Primary osteoarthritis of left knee      Sleep apnea     Sleep apnea     Thrombocytopenia 05/22/2018    Thyroid disease        Past Surgical History:   Procedure Laterality Date    CARDIAC SURGERY      COLONOSCOPY  2015    CORONARY ANGIOPLASTY WITH STENT PLACEMENT      CORONARY ARTERY BYPASS GRAFT N/A 05/20/2021    Procedure: MIDLINE STERNOTOMY,CORONARY ARTERY BYPASS GRAFTING X 5, UTILIZING THE LEFT COMPA AND LEFT SAPHENOUS VEIN, ABHIJEET, PRP;  Surgeon: Jr Tom Tubbs MD;  Location: [facility];  Service: Cardiothoracic;  Laterality: N/A;    ENDOSCOPY      HERNIA REPAIR      JOINT REPLACEMENT      SHOULDER SURGERY      SHOULDER REPAIR    TOTAL KNEE ARTHROPLASTY      TRANSESOPHAGEAL ECHOCARDIOGRAM (ABHIJEET) N/A 05/20/2021    Procedure: TRANSESOPHAGEAL ECHOCARDIOGRAM WITH ANESTHESIA;  Surgeon: Jr Tom Tubbs MD;  Location: [facility];  Service: Cardiothoracic;  Laterality: N/A;       Family History: family history includes Colon cancer in his father; Diabetes in his mother. Otherwise pertinent FHx was reviewed and not pertinent to current issue.    Social History:  reports that he has never smoked. He has never used smokeless tobacco. He reports that he does not currently use alcohol. He reports that he does not use drugs.    Home Medications:  Insulin Lispro (1 Unit Dial), amitriptyline, aspirin, donepezil, finasteride, furosemide, gabapentin, insulin glargine, levothyroxine, metFORMIN, midodrine, nitroglycerin, predniSONE, sertraline, simvastatin, sulfamethoxazole-trimethoprim, and tamsulosin      Allergies:  No Known Allergies    Objective   Objective     Vitals:   Temp:  [97.7 °F (36.5 °C)-98.6 °F (37 °C)] 98.2 °F (36.8 °C)  Heart Rate:  [80-94] 89  Resp:  [16-18] 18  BP: ()/(60-85) 140/85  Physical Exam    Constitutional: alert oriented not in acute distress   Eyes: pupils equal and reactive to light and accommodation   HENT: normal   Neck: normal   Respiratory: no rales or rhonchi   Cardiovascular: S1 and S2 normal no S3 or  S4   Gastrointestinal: and nontender no organomegaly   Musculoskeletal: weakness of extremities   Neurologic: no localizing signs  Skin: no rash    Result Review    Result Review:  I have personally reviewed the results from the time of this admission to 10/11/2023 14:48 EDT and agree with these findings:  [x]  Laboratory  []  Microbiology  [x]  Radiology  []  EKG/Telemetry   []  Cardiology/Vascular   []  Pathology  []  Old records  []  Other:  Most notable findings include: dehydration elevated BUN/creatinine    Assessment & Plan   Assessment / Plan     Brief Patient Summary:  Cosmo Gauthier is a 78 y.o. male who elevated BUN/creatinine extreme weakness    Active Hospital Problems:  Active Hospital Problems    Diagnosis     **Acute kidney injury        Plan:   IV fluids physical therapy     DVT prophylaxis:  No DVT prophylaxis order currently exists.    CODE STATUS:         Admission Status:  I believe this patient meets observation status.    Electronically signed by Seven Saldana MD, 10/11/23, 2:48 PM EDT.      Part of this note may be an electronic transcription/translation of spoken language to printed text using the Dragon Dictation System.

## 2023-10-11 NOTE — NURSING NOTE
This RN contacted Dr. Saldana in regards to pt. home CPAP brought in. Orders received to use, orders also received for low dose sliding scale insulin coverage per Dr. Saldana.

## 2023-10-11 NOTE — THERAPY EVALUATION
Patient Name: Cosmo Gauthier  : 1945    MRN: [id number]                              Today's Date: 10/11/2023       Admit Date: 10/11/2023    Visit Dx:     ICD-10-CM ICD-9-CM   1. Decreased activities of daily living (ADL)  Z78.9 V49.89     Patient Active Problem List   Diagnosis    Lymphoma    Type 2 diabetes mellitus with complications    Stage 3b chronic kidney disease    Thrombocytopenia    Coronary artery disease involving native coronary artery of native heart without angina pectoris    Hx of CABG    Hyperlipemia, mixed    Hypertension, essential    Hereditary and idiopathic neuropathy, unspecified    Low lying conus medullaris    Mood disorder    Hepatic cirrhosis    Sleep apnea    Spinal stenosis of lumbar region with neurogenic claudication    Vascular dementia without behavioral disturbance    Iron deficiency anemia    Hypothyroidism, adult    Traumatic subarachnoid hemorrhage with loss of consciousness of 30 minutes or less    Morbid (severe) obesity due to excess calories    Claudication of both lower extremities    Medicare annual wellness visit, subsequent    Weakness    Acute cough     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     Primary osteoarthritis of left knee     Sleep apnea     Sleep apnea     Thrombocytopenia 2018    Thyroid disease      Past Surgical History:   Procedure Laterality Date    CARDIAC SURGERY      COLONOSCOPY       CORONARY ANGIOPLASTY WITH STENT PLACEMENT      CORONARY ARTERY BYPASS GRAFT N/A 05/20/2021    Procedure: MIDLINE STERNOTOMY,CORONARY ARTERY BYPASS GRAFTING X 5, UTILIZING THE LEFT COMPA AND LEFT SAPHENOUS VEIN, ABHIJEET, PRP;  Surgeon: Jr Tom Tubbs MD;  Location: [facility];  Service: Cardiothoracic;  Laterality: N/A;    ENDOSCOPY      HERNIA REPAIR      JOINT REPLACEMENT      SHOULDER SURGERY      SHOULDER REPAIR    TOTAL KNEE ARTHROPLASTY      TRANSESOPHAGEAL ECHOCARDIOGRAM (ABHIJEET) N/A 05/20/2021    Procedure: TRANSESOPHAGEAL ECHOCARDIOGRAM WITH ANESTHESIA;  Surgeon: Jr Tom Tubbs MD;  Location: [facility];  Service: Cardiothoracic;  Laterality: N/A;      General Information       Row Name 10/11/23 1022          Physical Therapy Time and Intention    Document Type evaluation  -LR     Mode of Treatment individual therapy  -LR       Row Name 10/11/23 1022          General Information    Patient Profile Reviewed yes  -LR               User Key  (r) = Recorded By, (t) = Taken By, (c) = Cosigned By      Initials Name Provider Type    LR Lee Ann Julian, PT Physical Therapist                  History: Patient reports he has had pain in his back since 1969.  He states yesterday and today he has felt much weaker and is unable to control his legs and has arm weakness.  He states he can't get up to move around.  He reports he lives with his wife and his son lives next door.  He states his wife and son both help him with some of his activities of daily living.  He has a shower chair at home that he uses.  He also got a wheelchair 1 week ago to get around the house.  Patient states he does have a walker and tries to use that to get around but is still falling with a walker.  He has had 4 falls in the past week.    ROM: Bilateral UE and LE ROM WNL    Strength:   L Shoulder MMT  R Shoulder MMT  Flexion: 5   Flexion: 5    L Hip MMT   R Hip MMT  Flexion: 5   Flexion: 5  Abduction: 5   Abduction: 5  Adduction:  5   Adduction: 5    L Knee MMT   R Knee MMT  Flexion: 5   Flexion: 5  Extension: 5   Extension: 5    L Ankle MMT   R Ankle MMT  Dorsiflexion: 5   Dorsiflexion: 5  Plantarflexion: 5  Plantarflexion: 5      Sensation: B UE and LE sensation intact    Gait: pt is unable to ambulate at this point in time    Functional Mobility  Supine to sit transfer: moderate assistance x1  Sit to stand: unable to perform    Balance  Static standing balance: poor; pt is unable to maintain static sitting balance on edge of bed; he experiences dizziness as soon as he sits up and has to lie back down; O2 dropped to 92% with sitting up    Dynamic standing balance: not assessed    Assessment/Plan: Patient presents to the emergency room due to weakness.  Although patient has good UE and LE strength he is having significant difficulty with functional movements.  He requires assistance to move from supine to sitting.  He is unable to maintain his static sitting balance on the edge of the bed.  Patient has significant dizziness and shortness of breath with movement to edge of bed.  He would not be able to safely stand or ambulate at this point in time due to the symptoms.        Outcome Measures       Row Name 10/11/23 1022          Optimal Instrument    Optimal Instrument Optimal - 3  -LR     Moving - lying to sitting 3  -LR     Standing 5  -LR     Walking - short distance 5  -LR     From the list, choose the 3 activities you would most like to be able to do without any difficulty Standing;Moving -lying to sitting;Walking -short distance  -LR     Total Score Optimal - 3 10  -LR       Row Name 10/11/23 1022          Functional Assessment    Outcome Measure Options Optimal Instrument  -LR               User Key  (r) = Recorded By, (t) = Taken By, (c) = Cosigned By      Initials Name Provider Type    Lee Ann William PT Physical Therapist                   Time Calculation:   PT Evaluation Complexity  History, PT Evaluation Complexity: 3 or more  personal factors and/or comorbidities  Examination of Body Systems (PT Eval Complexity): 1-2 elements  Clinical Presentation (PT Evaluation Complexity): stable  Clinical Decision Making (PT Evaluation Complexity): low complexity  Overall Complexity (PT Evaluation Complexity): low complexity     PT Charges       Row Name 10/11/23 1022             Time Calculation    PT Received On 10/11/23  -LR         Time Calculation- PT    Total Timed Code Minutes- PT 19 minute(s)  -LR         Untimed Charges    PT Eval/Re-eval Minutes 19  -LR         Total Minutes    Untimed Charges Total Minutes 19  -LR       Total Minutes 19  -LR                User Key  (r) = Recorded By, (t) = Taken By, (c) = Cosigned By      Initials Name Provider Type    LR Lee Ann Julian, PT Physical Therapist                  Therapy Charges for Today       Code Description Service Date Service Provider Modifiers Qty    [id number]  PT EVAL LOW COMPLEXITY 2 10/11/2023 Lee Ann Julian, PT GP 1            PT G-Codes  Outcome Measure Options: Optimal Instrument       Lee Ann Julian PT  10/11/2023

## 2023-10-12 LAB
ALBUMIN SERPL-MCNC: 3.5 G/DL (ref 3.5–5.2)
ALBUMIN/GLOB SERPL: 1.2 G/DL
ALP SERPL-CCNC: 72 U/L (ref 39–117)
ALT SERPL W P-5'-P-CCNC: 24 U/L (ref 1–41)
ANION GAP SERPL CALCULATED.3IONS-SCNC: 10.2 MMOL/L (ref 5–15)
AST SERPL-CCNC: 31 U/L (ref 1–40)
BASOPHILS # BLD AUTO: 0.02 10*3/MM3 (ref 0–0.2)
BASOPHILS NFR BLD AUTO: 0.4 % (ref 0–1.5)
BILIRUB SERPL-MCNC: 0.7 MG/DL (ref 0–1.2)
BUN SERPL-MCNC: 37 MG/DL (ref 8–23)
BUN/CREAT SERPL: 23.7 (ref 7–25)
CALCIUM SPEC-SCNC: 8.7 MG/DL (ref 8.6–10.5)
CHLORIDE SERPL-SCNC: 102 MMOL/L (ref 98–107)
CO2 SERPL-SCNC: 18.8 MMOL/L (ref 22–29)
CREAT SERPL-MCNC: 1.56 MG/DL (ref 0.76–1.27)
DEPRECATED RDW RBC AUTO: 49.1 FL (ref 37–54)
EGFRCR SERPLBLD CKD-EPI 2021: 45.7 ML/MIN/1.73
EOSINOPHIL # BLD AUTO: 0.01 10*3/MM3 (ref 0–0.4)
EOSINOPHIL NFR BLD AUTO: 0.2 % (ref 0.3–6.2)
ERYTHROCYTE [DISTWIDTH] IN BLOOD BY AUTOMATED COUNT: 14.9 % (ref 12.3–15.4)
GLOBULIN UR ELPH-MCNC: 2.9 GM/DL
GLUCOSE BLDC GLUCOMTR-MCNC: 145 MG/DL (ref 70–99)
GLUCOSE BLDC GLUCOMTR-MCNC: 156 MG/DL (ref 70–99)
GLUCOSE BLDC GLUCOMTR-MCNC: 220 MG/DL (ref 70–99)
GLUCOSE BLDC GLUCOMTR-MCNC: 221 MG/DL (ref 70–99)
GLUCOSE BLDC GLUCOMTR-MCNC: 246 MG/DL (ref 70–99)
GLUCOSE SERPL-MCNC: 250 MG/DL (ref 65–99)
HCT VFR BLD AUTO: 30.3 % (ref 37.5–51)
HGB BLD-MCNC: 10 G/DL (ref 13–17.7)
IMM GRANULOCYTES # BLD AUTO: 0.04 10*3/MM3 (ref 0–0.05)
IMM GRANULOCYTES NFR BLD AUTO: 0.9 % (ref 0–0.5)
INR PPP: 1.05 (ref 0.86–1.15)
LYMPHOCYTES # BLD AUTO: 0.32 10*3/MM3 (ref 0.7–3.1)
LYMPHOCYTES NFR BLD AUTO: 7 % (ref 19.6–45.3)
MAGNESIUM SERPL-MCNC: 1.7 MG/DL (ref 1.6–2.4)
MCH RBC QN AUTO: 29.6 PG (ref 26.6–33)
MCHC RBC AUTO-ENTMCNC: 33 G/DL (ref 31.5–35.7)
MCV RBC AUTO: 89.6 FL (ref 79–97)
MONOCYTES # BLD AUTO: 0.23 10*3/MM3 (ref 0.1–0.9)
MONOCYTES NFR BLD AUTO: 5.1 % (ref 5–12)
NEUTROPHILS NFR BLD AUTO: 3.93 10*3/MM3 (ref 1.7–7)
NEUTROPHILS NFR BLD AUTO: 86.4 % (ref 42.7–76)
NRBC BLD AUTO-RTO: 0 /100 WBC (ref 0–0.2)
PLATELET # BLD AUTO: 57 10*3/MM3 (ref 140–450)
PMV BLD AUTO: 9.3 FL (ref 6–12)
POTASSIUM SERPL-SCNC: 4.9 MMOL/L (ref 3.5–5.2)
PROT SERPL-MCNC: 6.4 G/DL (ref 6–8.5)
PROTHROMBIN TIME: 13.8 SECONDS (ref 11.8–14.9)
RBC # BLD AUTO: 3.38 10*6/MM3 (ref 4.14–5.8)
SODIUM SERPL-SCNC: 131 MMOL/L (ref 136–145)
WBC NRBC COR # BLD: 4.55 10*3/MM3 (ref 3.4–10.8)

## 2023-10-12 PROCEDURE — 85025 COMPLETE CBC W/AUTO DIFF WBC: CPT | Performed by: INTERNAL MEDICINE

## 2023-10-12 PROCEDURE — 97166 OT EVAL MOD COMPLEX 45 MIN: CPT

## 2023-10-12 PROCEDURE — 80053 COMPREHEN METABOLIC PANEL: CPT | Performed by: INTERNAL MEDICINE

## 2023-10-12 PROCEDURE — 85610 PROTHROMBIN TIME: CPT | Performed by: INTERNAL MEDICINE

## 2023-10-12 PROCEDURE — 83735 ASSAY OF MAGNESIUM: CPT | Performed by: INTERNAL MEDICINE

## 2023-10-12 PROCEDURE — 96361 HYDRATE IV INFUSION ADD-ON: CPT

## 2023-10-12 PROCEDURE — 63710000001 INSULIN LISPRO (HUMAN) PER 5 UNITS: Performed by: INTERNAL MEDICINE

## 2023-10-12 PROCEDURE — 94799 UNLISTED PULMONARY SVC/PX: CPT

## 2023-10-12 PROCEDURE — 63710000001 PREDNISONE PER 1 MG: Performed by: INTERNAL MEDICINE

## 2023-10-12 PROCEDURE — 82948 REAGENT STRIP/BLOOD GLUCOSE: CPT

## 2023-10-12 PROCEDURE — G0378 HOSPITAL OBSERVATION PER HR: HCPCS

## 2023-10-12 RX ADMIN — PREDNISONE 20 MG: 20 TABLET ORAL at 08:41

## 2023-10-12 RX ADMIN — ASPIRIN 81 MG: 81 TABLET, COATED ORAL at 08:41

## 2023-10-12 RX ADMIN — INSULIN LISPRO 3 UNITS: 100 INJECTION, SOLUTION INTRAVENOUS; SUBCUTANEOUS at 17:17

## 2023-10-12 RX ADMIN — LEVOTHYROXINE SODIUM 224 MCG: 0.11 TABLET ORAL at 05:39

## 2023-10-12 RX ADMIN — Medication 10 ML: at 20:15

## 2023-10-12 RX ADMIN — ATORVASTATIN CALCIUM 10 MG: 10 TABLET, FILM COATED ORAL at 08:41

## 2023-10-12 RX ADMIN — SERTRALINE HYDROCHLORIDE 200 MG: 100 TABLET ORAL at 08:41

## 2023-10-12 RX ADMIN — AMITRIPTYLINE HYDROCHLORIDE 50 MG: 50 TABLET, FILM COATED ORAL at 20:15

## 2023-10-12 RX ADMIN — INSULIN LISPRO 3 UNITS: 100 INJECTION, SOLUTION INTRAVENOUS; SUBCUTANEOUS at 07:48

## 2023-10-12 RX ADMIN — METFORMIN HYDROCHLORIDE 1000 MG: 500 TABLET ORAL at 07:48

## 2023-10-12 RX ADMIN — TAMSULOSIN HYDROCHLORIDE 0.4 MG: 0.4 CAPSULE ORAL at 08:41

## 2023-10-12 RX ADMIN — DONEPEZIL HYDROCHLORIDE 10 MG: 10 TABLET, FILM COATED ORAL at 20:15

## 2023-10-12 RX ADMIN — INSULIN LISPRO 2 UNITS: 100 INJECTION, SOLUTION INTRAVENOUS; SUBCUTANEOUS at 20:15

## 2023-10-12 RX ADMIN — GABAPENTIN 600 MG: 300 CAPSULE ORAL at 08:41

## 2023-10-12 RX ADMIN — METFORMIN HYDROCHLORIDE 1000 MG: 500 TABLET ORAL at 17:17

## 2023-10-12 RX ADMIN — DEXTROSE AND SODIUM CHLORIDE 100 ML/HR: 5; 450 INJECTION, SOLUTION INTRAVENOUS at 03:30

## 2023-10-12 RX ADMIN — FINASTERIDE 5 MG: 5 TABLET, FILM COATED ORAL at 08:41

## 2023-10-12 RX ADMIN — DOCUSATE SODIUM 50MG AND SENNOSIDES 8.6MG 2 TABLET: 8.6; 5 TABLET, FILM COATED ORAL at 20:15

## 2023-10-12 RX ADMIN — Medication 10 ML: at 08:42

## 2023-10-12 RX ADMIN — DOCUSATE SODIUM 50MG AND SENNOSIDES 8.6MG 2 TABLET: 8.6; 5 TABLET, FILM COATED ORAL at 08:42

## 2023-10-12 NOTE — PLAN OF CARE
Goal Outcome Evaluation:   Patient is A&O X4. VSS. Patient is awaiting rehab placement.

## 2023-10-12 NOTE — PLAN OF CARE
Goal Outcome Evaluation:  Plan of Care Reviewed With: patient        Progress: no change  Outcome Evaluation: Patient presents with limitations of decreased functional endurance, strength, balance and limited safety/insight skills requiring need for skilled OT services to facilitate return to prior level of function with ADLs. REports increased dizziness with any activity outside of supine, feeling fuzzy with all tasks.      Anticipated Discharge Disposition (OT): sub acute care setting

## 2023-10-12 NOTE — DISCHARGE SUMMARY
[facility]         DISCHARGE SUMMARY    Patient Name: Cosmo Gauthier  : 1945  MRN: [id number]    Date of Admission: 10/11/2023  Date of Discharge: 10/12/2023  Primary Care Physician: Alex Buckley MD    Consults       Date and Time Order Name Status Description    10/11/2023 10:18 AM IP General Consult (Use specialty-specific consult if known)              Presenting Problem:   MARKUS (acute kidney injury) [N17.9]  Acute kidney injury [N17.9]  Generalized weakness [R53.1]  Decreased activities of daily living (ADL) [Z78.9]    Active and Resolved Hospital Problems:  Active Hospital Problems    Diagnosis POA    **Acute kidney injury [N17.9] Yes      Resolved Hospital Problems   No resolved problems to display.         Hospital Course     Hospital Course:  Cosmo Gauthier is a 78 y.o. male patient came to the emergency room because he was feeling very weak could not walk much he has had an extensive work-up couple of months ago at that time was found to have diabetic neuropathy he also has postural hypotension and it has been explained to him that there is no cure that he has to take precautions he is also being followed by his primary care physician for the same and there is no evidence of any spinal cord compression and no other issues he is able to walk his will to do it he has been advised just to be careful but we will get home health agency to come and give him some physical therapy at home he was fine when 2 to 3 months ago he was admitted to the SNF unit and at that time he received extensive physical therapy but probably has reached the maximum has been advised regarding the postural hypotension patient states that he does not want to go to the nursing home that he is able to carry on that he has his wife and his son live close to him and he has got good family support this problem has been going on for many years  He was also dehydrated and IV fluids were given which has been now  corrected    DISCHARGE Follow Up Recommendations for labs and diagnostics: Patient postural hypotension was dehydrated      Pertinent  and/or Most Recent Results     LAB RESULTS:      Lab 10/12/23  0432 10/11/23  1504 10/11/23  0856   WBC 4.55 5.82 6.77   HEMOGLOBIN 10.0* 9.6* 10.8*   HEMATOCRIT 30.3* 29.5* 32.6*   PLATELETS 57* 59* 71*   NEUTROS ABS 3.93  --  6.45   IMMATURE GRANS (ABS) 0.04  --  0.05   LYMPHS ABS 0.32*  --  0.12*   MONOS ABS 0.23  --  0.12   EOS ABS 0.01  --  0.01   MCV 89.6 89.7 91.1   PROTIME 13.8  --   --          Lab 10/12/23  0432 10/11/23  1504 10/11/23  0856   SODIUM 131* 134* 134*   POTASSIUM 4.9 4.7 4.5   CHLORIDE 102 102 99   CO2 18.8* 20.5* 22.0   ANION GAP 10.2 11.5 13.0   BUN 37* 42* 41*   CREATININE 1.56* 1.81* 2.05*   EGFR 45.7* 38.3* 33.0*   GLUCOSE 250* 154* 177*   CALCIUM 8.7 8.6 9.4   MAGNESIUM 1.7  --  1.4*         Lab 10/12/23  0432 10/11/23  1504 10/11/23  0856   TOTAL PROTEIN 6.4 6.5 7.1   ALBUMIN 3.5 3.7 4.1   GLOBULIN 2.9 2.8 3.0   ALT (SGPT) 24 21 20   AST (SGOT) 31 29 28   BILIRUBIN 0.7 0.6 0.5   ALK PHOS 72 75 85         Lab 10/12/23  0432 10/11/23  0856   HSTROP T  --  27*   PROTIME 13.8  --    INR 1.05  --                  Brief Urine Lab Results  (Last result in the past 365 days)        Color   Clarity   Blood   Leuk Est   Nitrite   Protein   CREAT   Urine HCG        10/11/23 1639 Yellow   Clear   Negative   Negative   Negative   Negative                 Microbiology Results (last 10 days)       ** No results found for the last 240 hours. **            CT Head Without Contrast    Result Date: 10/11/2023  Impression:   1. Chronic right thalamic lacunar infarct 2. Mucosal inflammatory changes in the paranasal sinuses, as above     Markie Rivas M.D.       Electronically Signed and Approved By: Markie Rivas M.D. on 10/11/2023 at 9:47             XR Chest 1 View    Result Date: 10/11/2023  Impression:   1. Status post CABG 2. Stable chest, no active disease       Austin  GUICHO Escalante MD       Electronically Signed and Approved By: Austin Escalante MD on 10/11/2023 at 9:34              Results for orders placed during the hospital encounter of 01/27/23    Doppler Arterial Multi Level Lower Extremity - Bilateral CAR    Interpretation Summary    Right Conclusion: The right OSCAR is normal. Normal digital pressures.    Left Conclusion: The left OSCAR is normal. Normal digital pressures.    Normal resting arterial evaluation of both lower extremities.  Patient declined stress component due to back pain and fear of treadmill.      Results for orders placed during the hospital encounter of 01/27/23    Doppler Arterial Multi Level Lower Extremity - Bilateral CAR    Interpretation Summary    Right Conclusion: The right OSCAR is normal. Normal digital pressures.    Left Conclusion: The left OSCAR is normal. Normal digital pressures.    Normal resting arterial evaluation of both lower extremities.  Patient declined stress component due to back pain and fear of treadmill.      Results for orders placed in visit on 05/20/21    Emergent/Open-Heart Anesthesia ABHIJEET    Narrative  Preanesthesia Checklist:  Patient identified, IV assessed, risks and benefits discussed, monitors and equipment assessed and procedure being performed at surgeon's request.    General Procedure Information  Diagnostic Indications for Echo:  assessment of ascending aorta, assessment of surgical repair, defect repair evaluation and hemodynamic monitoring  Physician Requesting Echo: Jr Tom Tubbs MD  ICD Code for Medical Necessity:  Aortic Insufficiency  Location performed:  OR  Intubated  Bite block placed  Heart visualized  Probe Insertion:  Easy  Probe Type:  Multiplane  Modalities:  Color flow mapping, 2D only, continuous wave Doppler and pulse wave Doppler    Echocardiographic and Doppler Measurements    Ventricles    Right Ventricle:  Cavity size normal.  Hypertrophy not present.  Left Ventricle:  Diastolic dimension 4.7 cm.   Hypertrophy not present.  Thrombus not present.  Global Function mildly impaired.  Ejection Fraction 50%.    Ventricular Regional Function:  13- Apical Anterior:  hypokinetic  14- Apical Lateral:  hypokinetic  16- Apical Septal:  hypokinetic  17- [place]:  hypokinetic      Valves    Aortic Valve:  Annulus normal.  Stenosis not present.  Pressure Half-time: 910 ms.  Regurgitation mild.  Leaflets normal.  Leaflet motions normal.    Mitral Valve:  Annulus normal.  Stenosis not present.  Regurgitation trace.    Tricuspid Valve:  Annulus normal.  Stenosis not present.  Regurgitation mild.  Pulmonic Valve:  Annulus normal.  Regurgitation trace.        Aorta    Ascending Aorta:  Size normal.  Diameter 3.6 cm.  Dissection not present.  Plaque thickness less than 3 mm.  Mobile plaque not present.  Aortic Arch:  Size normal.  Diameter 3.1 cm.  Dissection not present.  Plaque thickness less than 3 mm.  Mobile plaque not present.  Descending Aorta:  Size normal.  Diameter 2.5 cm.  Dissection not present.  Plaque thickness less than 3 mm.  Mobile plaque not present.        Atria    Right Atrium:  Size normal.  Spontaneous echo contrast not present.  Thrombus not present.  Tumor not present.  Device present.    Left Atrium:  Size normal.  Spontaneous echo contrast not present.  Thrombus not present.  Tumor not present.  Device not present.  Left atrial appendage normal.      Septa    Atrial Septum:  Intra-atrial septal morphology lipomatous hypertrophy.        Diastolic Function Measurements:  Diastolic Dysfunction Grade= II  E= 40.6 ms  A= 35.3 ms  E/A Ratio=  1.2  DT=  296 ms  S/D=  IVRT=    Other Findings  Pericardium:  normal  Pleural Effusion:  none  Pulmonary Arteries:  normal  Pulmonary Venous Flow:  normal    Anesthesia Information  Performed Personally      Echocardiogram Comments:  Post CPB:  LVEF much improved, 60%, apex no longer hypokinetic.  No aortic dissection post decannulation.  AI unchanged.      Labs Pending at  Discharge:  Pending Labs       Order Current Status    Blood Culture - Blood, Arm, Left In process    Blood Culture - Blood, Arm, Right In process    Urine Culture - Urine, Urine, Clean Catch In process              Discharge Details        Discharge Medications        Changes to Medications        Instructions Start Date   furosemide 40 MG tablet  Commonly known as: LASIX  What changed: how much to take   40 mg, Oral, Daily      gabapentin 600 MG tablet  Commonly known as: NEURONTIN  What changed: how much to take   300 mg, Oral, 2 Times Daily             Continue These Medications        Instructions Start Date   amitriptyline 50 MG tablet  Commonly known as: ELAVIL   50 mg, Oral, Nightly      aspirin 81 MG EC tablet   81 mg, Oral, Daily      donepezil 10 MG tablet  Commonly known as: ARICEPT   10 mg, Oral, Every Night at Bedtime      finasteride 5 MG tablet  Commonly known as: PROSCAR   5 mg, Oral, Daily      HumaLOG KwikPen 100 UNIT/ML solution pen-injector  Generic drug: Insulin Lispro (1 Unit Dial)   150-200 = 4 units 201-250 = 6 units 251-300 = 8 units 301-350 = 10 units      >350 = 14 units      insulin glargine 100 UNIT/ML injection  Commonly known as: LANTUS, SEMGLEE   15 Units, Subcutaneous, Nightly      levothyroxine 112 MCG tablet  Commonly known as: SYNTHROID, LEVOTHROID   224 mcg, Oral, Take As Directed,  Takes two tablets every day Mon - Sat       levothyroxine 112 MCG tablet  Commonly known as: SYNTHROID, LEVOTHROID   168 mcg, Oral, Take As Directed,  Sunday only      metFORMIN 1000 MG tablet  Commonly known as: GLUCOPHAGE   1,000 mg, Oral, 2 Times Daily With Meals      midodrine 2.5 MG tablet  Commonly known as: PROAMATINE   2.5 mg, Oral, Daily      nitroglycerin 0.4 MG SL tablet  Commonly known as: NITROSTAT   0.4 mg, Sublingual, Every 5 Minutes PRN      predniSONE 20 MG tablet  Commonly known as: DELTASONE   20 mg, Oral, 2 Times Daily      sertraline 100 MG tablet  Commonly known as: ZOLOFT   200  mg, Oral, Daily      simvastatin 40 MG tablet  Commonly known as: ZOCOR   20 mg, Oral, Nightly      sulfamethoxazole-trimethoprim 800-160 MG per tablet  Commonly known as: Bactrim DS   1 tablet, Oral, 2 Times Daily      tamsulosin 0.4 MG capsule 24 hr capsule  Commonly known as: FLOMAX   0.4 mg, Oral, Daily               No Known Allergies      Discharge Dis[place]:  [place] [facility]:  Hospital:  Diet Order   Procedures    Diet: Diabetic Diets; Consistent Carbohydrate; Texture: Regular Texture (IDDSI 7); Fluid Consistency: Thin (IDDSI 0)         Discharge Activity: As tolerated        CODE STATUS:  Code Status and Medical Interventions:   Ordered at: 10/11/23 1449     Level Of Support Discussed With:    Patient     Code Status (Patient has no pulse and is not breathing):    CPR (Attempt to Resuscitate)     Medical Interventions (Patient has pulse or is breathing):    Full Support         Future Appointments   Date Time Provider Department Center   10/23/2023 12:00 AM LAB [facility] LABORATORY [facility] LAB [facility]   10/27/2023 10:45 AM Hunter Orellana DPM [facility] POD [facility]   11/16/2023 12:00 PM [facility] Duran [facility]           Time spent on Discharge including face to face service: 45 minutes    Electronically signed by Seven Saldana MD, 10/12/23, 7:59 AM EDT.    Part of this note may be an electronic transcription/translation of spoken language to printed text using the Dragon Dictation System.

## 2023-10-12 NOTE — PLAN OF CARE
Goal Outcome Evaluation:  Plan of Care Reviewed With: patient        Progress: no change  Outcome Evaluation: Pt. VSS. A & O X 4, moments of confusion, but easily reorientated. Pt. states he doesnt want to stand for orthostatic blood pressures, states that he feels unbalanced. Purewick removed, pt. using urinal. IVF infusing per orders. Insulin protocol orders recieved early in shift from Dr. Saldana.

## 2023-10-12 NOTE — THERAPY EVALUATION
Patient Name: oCsmo Gauthier  : 1945    MRN: [id number]                              Today's Date: 10/12/2023       Admit Date: 10/11/2023    Visit Dx:     ICD-10-CM ICD-9-CM   1. Decreased activities of daily living (ADL)  Z78.9 V49.89   2. Generalized weakness  R53.1 780.79   3. MARKUS (acute kidney injury)  N17.9 584.9     Patient Active Problem List   Diagnosis    Lymphoma    Type 2 diabetes mellitus with complications    Stage 3b chronic kidney disease    Thrombocytopenia    Coronary artery disease involving native coronary artery of native heart without angina pectoris    Hx of CABG    Hyperlipemia, mixed    Hypertension, essential    Hereditary and idiopathic neuropathy, unspecified    Low lying conus medullaris    Mood disorder    Hepatic cirrhosis    Sleep apnea    Spinal stenosis of lumbar region with neurogenic claudication    Vascular dementia without behavioral disturbance    Iron deficiency anemia    Hypothyroidism, adult    Traumatic subarachnoid hemorrhage with loss of consciousness of 30 minutes or less    Morbid (severe) obesity due to excess calories    Claudication of both lower extremities    Medicare annual wellness visit, subsequent    Weakness    Acute cough    Acute kidney injury     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     Primary osteoarthritis of left knee     Sleep apnea     Sleep apnea     Thrombocytopenia 2018    Thyroid  "disease      Past Surgical History:   Procedure Laterality Date    CARDIAC SURGERY      COLONOSCOPY  2015    CORONARY ANGIOPLASTY WITH STENT PLACEMENT      CORONARY ARTERY BYPASS GRAFT N/A 05/20/2021    Procedure: MIDLINE STERNOTOMY,CORONARY ARTERY BYPASS GRAFTING X 5, UTILIZING THE LEFT COMPA AND LEFT SAPHENOUS VEIN, ABHIJEET, PRP;  Surgeon: Jr Tom Tubbs MD;  Location: [facility];  Service: Cardiothoracic;  Laterality: N/A;    ENDOSCOPY      HERNIA REPAIR      JOINT REPLACEMENT      SHOULDER SURGERY      SHOULDER REPAIR    TOTAL KNEE ARTHROPLASTY      TRANSESOPHAGEAL ECHOCARDIOGRAM (ABHIJEET) N/A 05/20/2021    Procedure: TRANSESOPHAGEAL ECHOCARDIOGRAM WITH ANESTHESIA;  Surgeon: Jr Tom Tubbs MD;  Location: [facility];  Service: Cardiothoracic;  Laterality: N/A;      General Information       Row Name 10/12/23 0934          OT Time and Intention    Document Type evaluation  -EG     Mode of Treatment individual therapy;occupational therapy  -EG       Row Name 10/12/23 0934          General Information    Patient Profile Reviewed yes  Patient lives with wife; son lives next door; owns RW that he was using until 4 falls in last week causing wheelchair use; Ind w/ADL's prior; ramped entrance; Owns BSC and handles on standard height toilet; walk-in shower with seat  -EG     Prior Level of Function independent:;ADL's;all household mobility  -EG     Existing Precautions/Restrictions fall  -EG     Barriers to Rehab none identified  -EG       Row Name 10/12/23 0934          Occupational Profile    Reason for Services/Referral (Occupational Profile) Patient is a 78-year-old male admitted to [facility] on 10/11/2023.  OT was consulted due to documented acute kidney injury with increased weakness and falls.  OT to assess for new onset of deficits and limitations in ADL/transfer performance.  No previous OT services for current condition.  -EG     Patient Goals (Occupational Profile) \"Get stronger\"  -EG  "      Row Name 10/12/23 0934          Living Environment    People in Home spouse  -EG       Row Name 10/12/23 0934          Home Main Entrance    Number of Stairs, Main Entrance --  new ramped entrance  -EG       Row Name 10/12/23 0934          Stairs Within Home, Primary    Number of Stairs, Within Home, Primary none  -EG       Row Name 10/12/23 0934          Cognition    Orientation Status (Cognition) oriented x 4  -EG       Row Name 10/12/23 0934          Safety Issues, Functional Mobility    Impairments Affecting Function (Mobility) balance;shortness of breath;strength;endurance/activity tolerance  -EG               User Key  (r) = Recorded By, (t) = Taken By, (c) = Cosigned By      Initials Name Provider Type    EG Lalita Damon OT Occupational Therapist                     Mobility/ADL's       Row Name 10/12/23 0939          Bed Mobility    Bed Mobility bed mobility (all) activities  -EG     All Activities, Bent (Bed Mobility) contact guard  -EG     Assistive Device (Bed Mobility) bed rails  -EG       Row Name 10/12/23 0939          Transfers    Comment, (Transfers) CGA/Jess for all transfers with use of rolling walker  -EG       Row Name 10/12/23 0939          Functional Mobility    Functional Mobility- Comment not tested this date  -EG       Row Name 10/12/23 0939          Activities of Daily Living    BADL Assessment/Intervention bathing;upper body dressing;lower body dressing;grooming;feeding;toileting  -       Row Name 10/12/23 0939          Bathing Assessment/Intervention    [place] Level (Bathing) bathing skills;upper body;set up;lower body;moderate assist (50% patient effort)  -       Row Name 10/12/23 0939          Upper Body Dressing Assessment/Training    [place] Level (Upper Body Dressing) upper body dressing skills;set up  -       Row Name 10/12/23 0939          Lower Body Dressing Assessment/Training    [place] Level (Lower Body Dressing) lower body dressing  skills;moderate assist (50% patient effort)  -EG       Row Name 10/12/23 0939          Grooming Assessment/Training    [place] Level (Grooming) grooming skills;set up  -EG       Row Name 10/12/23 0939          Self-Feeding Assessment/Training    [place] Level (Feeding) feeding skills;set up  -EG       Row Name 10/12/23 0939          Toileting Assessment/Training    [place] Level (Toileting) toileting skills;minimum assist (75% patient effort)  -EG               User Key  (r) = Recorded By, (t) = Taken By, (c) = Cosigned By      Initials Name Provider Type    EG Lalita Damon, PARTHA Occupational Therapist                   Obj/Interventions       Row Name 10/12/23 0944          Sensory Assessment (Somatosensory)    Sensory Assessment (Somatosensory) UE sensation intact  -EG       Row Name 10/12/23 0944          Vision Assessment/Intervention    Visual Impairment/Limitations blurry vision  reports increased dizziness  -EG       Row Name 10/12/23 0944          Range of Motion Comprehensive    General Range of Motion bilateral upper extremity ROM WNL  -EG       Row Name 10/12/23 0944          Strength Comprehensive (MMT)    Comment, General Manual Muscle Testing (MMT) Assessment 4-/5 BUE's grossly  -EG       Row Name 10/12/23 0944          Motor Skills    Motor Skills coordination;functional endurance  -EG     Coordination WFL  -EG     Functional Endurance fair-  -EG       Row Name 10/12/23 0944          Balance    Balance Assessment sit to stand dynamic balance;standing static balance  -EG     Sit to Stand Dynamic Balance contact guard  -EG     Static Standing Balance contact guard;verbal cues  -EG     Balance Interventions standing;sit to stand;occupation based/functional task;weight shifting activity  -EG               User Key  (r) = Recorded By, (t) = Taken By, (c) = Cosigned By      Initials Name Provider Type    EG Lalita Damon OT Occupational Therapist                   Goals/Plan       [facility]  Name 10/12/23 0946          Bed Mobility Goal 1 (OT)    Activity/Assistive Device (Bed Mobility Goal 1, OT) bed mobility activities, all  -EG     [place] Level/Cues Needed (Bed Mobility Goal 1, OT) modified independence  -EG     Time Frame (Bed Mobility Goal 1, OT) long term goal (LTG);10 days  -EG       Row Name 10/12/23 0946          Transfer Goal 1 (OT)    Activity/Assistive Device (Transfer Goal 1, OT) transfers, all  -EG     [place] Level/Cues Needed (Transfer Goal 1, OT) modified independence  -EG     Time Frame (Transfer Goal 1, OT) long term goal (LTG);10 days  -EG       Row Name 10/12/23 0946          Bathing Goal 1 (OT)    Activity/Device (Bathing Goal 1, OT) bathing skills, all  -EG     [place] Level/Cues Needed (Bathing Goal 1, OT) modified independence  -EG     Time Frame (Bathing Goal 1, OT) long term goal (LTG);10 days  -EG       Row Name 10/12/23 0946          Dressing Goal 1 (OT)    Activity/Device (Dressing Goal 1, OT) dressing skills, all  -EG     [place]/Cues Needed (Dressing Goal 1, OT) modified independence  -EG     Time Frame (Dressing Goal 1, OT) long term goal (LTG);10 days  -EG       Row Name 10/12/23 0946          Toileting Goal 1 (OT)    Activity/Device (Toileting Goal 1, OT) toileting skills, all  -EG     [place] Level/Cues Needed (Toileting Goal 1, OT) modified independence  -EG     Time Frame (Toileting Goal 1, OT) long term goal (LTG);10 days  -EG       Row Name 10/12/23 0946          Grooming Goal 1 (OT)    Activity/Device (Grooming Goal 1, OT) grooming skills, all  -EG     [place] (Grooming Goal 1, OT) modified independence  -EG     Time Frame (Grooming Goal 1, OT) long term goal (LTG);10 days  -EG       Row Name 10/12/23 0946          Strength Goal 1 (OT)    Strength Goal 1 (OT) Patient will improve BUE strength grossly to 4/5 to facilitate increased functional ADL/Transfer independence skills.  -EG     Time Frame (Strength Goal 1, OT) long term goal  (LTG);10 days  -EG       Row Name 10/12/23 0946          Problem Specific Goal 1 (OT)    Problem Specific Goal 1 (OT) Patient will demonstrate improved endurance for ADL/Transfers to fair+.  -EG     Time Frame (Problem Specific Goal 1, OT) long term goal (LTG);10 days  -EG       Row Name 10/12/23 0946          Therapy Assessment/Plan (OT)    Planned Therapy Interventions (OT) activity tolerance training;functional balance retraining;occupation/activity based interventions;adaptive equipment training;BADL retraining;neuromuscular control/coordination retraining;patient/caregiver education/training;transfer/mobility retraining;strengthening exercise  -EG               User Key  (r) = Recorded By, (t) = Taken By, (c) = Cosigned By      Initials Name Provider Type    EG Lalita Damon OT Occupational Therapist                   Clinical Impression       Row Name 10/12/23 0945          Plan of Care Review    Plan of Care Reviewed With patient  -EG     Progress no change  -EG     Outcome Evaluation Patient presents with limitations of decreased functional endurance, strength, balance and limited safety/insight skills requiring need for skilled OT services to facilitate return to prior level of function with ADLs. REports increased dizziness with any activity outside of supine, feeling fuzzy with all tasks.  -EG       Row Name 10/12/23 0945          Therapy Assessment/Plan (OT)    Rehab Potential (OT) good, to achieve stated therapy goals  -EG     Criteria for Skilled Therapeutic Interventions Met (OT) yes;meets criteria;skilled treatment is necessary  -EG     Therapy Frequency (OT) 5 times/wk  -EG       Row Name 10/12/23 0945          Therapy Plan Review/Discharge Plan (OT)    Anticipated Discharge Disposition (OT) sub acute care setting  -EG               User Key  (r) = Recorded By, (t) = Taken By, (c) = Cosigned By      Initials Name Provider Type    Lalita Layton OT Occupational Therapist                    Outcome Measures       Row Name 10/12/23 0949          How much help from another is currently needed...    Putting on and taking off regular lower body clothing? 2  -EG     Bathing (including washing, rinsing, and drying) 3  -EG     Toileting (which includes using toilet bed pan or urinal) 3  -EG     Putting on and taking off regular upper body clothing 3  -EG     Taking care of personal grooming (such as brushing teeth) 4  -EG     Eating meals 4  -EG     AM-PAC 6 Clicks Score (OT) 19  -EG       Row Name 10/12/23 0720          How much help from another person do you currently need...    Turning from your back to your side while in flat bed without using bedrails? 3  -MS     Moving from lying on back to sitting on the side of a flat bed without bedrails? 3  -MS     Moving to and from a bed to a chair (including a wheelchair)? 2  -MS     Standing up from a chair using your arms (e.g., wheelchair, bedside chair)? 2  -MS     Climbing 3-5 steps with a railing? 1  -MS     To walk in hospital room? 2  -MS     AM-PAC 6 Clicks Score (PT) 13  -MS     Highest level of mobility 4 --> Transferred to chair/commode  -MS       Row Name 10/12/23 0949          Functional Assessment    Outcome Measure Options AM-PAC 6 Clicks Daily Activity (OT);Optimal Instrument  -EG       Row Name 10/12/23 0949          Optimal Instrument    Optimal Instrument Optimal - 3  -EG     Bending/Stooping 3  -EG     Standing 2  -EG     Reaching 1  -EG     From the list, choose the 3 activities you would most like to be able to do without any difficulty Standing;Reaching;Bending/stooping  -EG     Total Score Optimal - 3 6  -EG               User Key  (r) = Recorded By, (t) = Taken By, (c) = Cosigned By      Initials Name Provider Type    EG Lalita Damon OT Occupational Therapist    Shae Chirinos, RN Registered Nurse                    Occupational Therapy Education       Title: PT OT SLP Therapies (In Progress)       Topic: Occupational Therapy  (In Progress)       Point: ADL training (In Progress)       Description:   Instruct learner(s) on proper safety adaptation and remediation techniques during self care or transfers.   Instruct in proper use of assistive devices.                  Learning Progress Summary             Patient Namrataer, E, NR by EG at 10/12/2023 0950    Comment: Education on safety with transfers  Education on activity pacing                         Point: Home exercise program (In Progress)       Description:   Instruct learner(s) on appropriate technique for monitoring, assisting and/or progressing therapeutic exercises/activities.                  Learning Progress Summary             Patient Eager, E, NR by EG at 10/12/2023 0950    Comment: Education on safety with transfers  Education on activity pacing                         Point: Precautions (In Progress)       Description:   Instruct learner(s) on prescribed precautions during self-care and functional transfers.                  Learning Progress Summary             Patient Namrataer, E, NR by EG at 10/12/2023 0950    Comment: Education on safety with transfers  Education on activity pacing                         Point: Body mechanics (In Progress)       Description:   Instruct learner(s) on proper positioning and spine alignment during self-care, functional mobility activities and/or exercises.                  Learning Progress Summary             Patient Namrataer, E, NR by EG at 10/12/2023 0950    Comment: Education on safety with transfers  Education on activity pacing                                         User Key       Initials Effective Dates Name Provider Type Discipline    EG 09/14/22 -  Lalita Damon OT Occupational Therapist OT                  OT Recommendation and Plan  Planned Therapy Interventions (OT): activity tolerance training, functional balance retraining, occupation/activity based interventions, adaptive equipment training, BADL retraining, neuromuscular  control/coordination retraining, patient/caregiver education/training, transfer/mobility retraining, strengthening exercise  Therapy Frequency (OT): 5 times/wk  Plan of Care Review  Plan of Care Reviewed With: patient  Progress: no change  Outcome Evaluation: Patient presents with limitations of decreased functional endurance, strength, balance and limited safety/insight skills requiring need for skilled OT services to facilitate return to prior level of function with ADLs. REports increased dizziness with any activity outside of supine, feeling fuzzy with all tasks.     Time Calculation:   Evaluation Complexity (OT)  Review Occupational Profile/Medical/Therapy History Complexity: expanded/moderate complexity  Assessment, Occupational Performance/Identification of Deficit Complexity: 3-5 performance deficits  Clinical Decision Making Complexity (OT): detailed assessment/moderate complexity  Overall Complexity of Evaluation (OT): moderate complexity     Time Calculation- OT       Row Name 10/12/23 0951             Time Calculation- OT    OT Received On 10/12/23  -EG      OT Goal Re-Cert Due Date 10/21/23  -EG         Untimed Charges    OT Eval/Re-eval Minutes 33  -EG         Total Minutes    Untimed Charges Total Minutes 33  -EG       Total Minutes 33  -EG                User Key  (r) = Recorded By, (t) = Taken By, (c) = Cosigned By      Initials Name Provider Type    EG Lalita Damon OT Occupational Therapist                  Therapy Charges for Today       Code Description Service Date Service Provider Modifiers Qty    [id number] HC OT EVAL MOD COMPLEXITY 3 10/12/2023 Lalita Damon OT GO 1                 Lalita Damon OT  10/12/2023

## 2023-10-13 LAB
BACTERIA SPEC AEROBE CULT: NO GROWTH
GLUCOSE BLDC GLUCOMTR-MCNC: 167 MG/DL (ref 70–99)
GLUCOSE BLDC GLUCOMTR-MCNC: 173 MG/DL (ref 70–99)
GLUCOSE BLDC GLUCOMTR-MCNC: 196 MG/DL (ref 70–99)
GLUCOSE BLDC GLUCOMTR-MCNC: 210 MG/DL (ref 70–99)

## 2023-10-13 PROCEDURE — 82948 REAGENT STRIP/BLOOD GLUCOSE: CPT

## 2023-10-13 PROCEDURE — 97161 PT EVAL LOW COMPLEX 20 MIN: CPT

## 2023-10-13 PROCEDURE — G0378 HOSPITAL OBSERVATION PER HR: HCPCS

## 2023-10-13 PROCEDURE — 94799 UNLISTED PULMONARY SVC/PX: CPT

## 2023-10-13 PROCEDURE — 63710000001 INSULIN LISPRO (HUMAN) PER 5 UNITS: Performed by: INTERNAL MEDICINE

## 2023-10-13 PROCEDURE — 63710000001 PREDNISONE PER 1 MG: Performed by: INTERNAL MEDICINE

## 2023-10-13 PROCEDURE — 97530 THERAPEUTIC ACTIVITIES: CPT

## 2023-10-13 RX ADMIN — ASPIRIN 81 MG: 81 TABLET, COATED ORAL at 08:27

## 2023-10-13 RX ADMIN — FINASTERIDE 5 MG: 5 TABLET, FILM COATED ORAL at 08:27

## 2023-10-13 RX ADMIN — TAMSULOSIN HYDROCHLORIDE 0.4 MG: 0.4 CAPSULE ORAL at 08:27

## 2023-10-13 RX ADMIN — MIDODRINE HYDROCHLORIDE 2.5 MG: 2.5 TABLET ORAL at 17:22

## 2023-10-13 RX ADMIN — INSULIN LISPRO 2 UNITS: 100 INJECTION, SOLUTION INTRAVENOUS; SUBCUTANEOUS at 08:26

## 2023-10-13 RX ADMIN — Medication 10 ML: at 08:28

## 2023-10-13 RX ADMIN — DONEPEZIL HYDROCHLORIDE 10 MG: 10 TABLET, FILM COATED ORAL at 20:10

## 2023-10-13 RX ADMIN — DOCUSATE SODIUM 50MG AND SENNOSIDES 8.6MG 2 TABLET: 8.6; 5 TABLET, FILM COATED ORAL at 20:11

## 2023-10-13 RX ADMIN — INSULIN LISPRO 3 UNITS: 100 INJECTION, SOLUTION INTRAVENOUS; SUBCUTANEOUS at 17:22

## 2023-10-13 RX ADMIN — PREDNISONE 20 MG: 20 TABLET ORAL at 20:11

## 2023-10-13 RX ADMIN — METFORMIN HYDROCHLORIDE 1000 MG: 500 TABLET ORAL at 08:27

## 2023-10-13 RX ADMIN — INSULIN LISPRO 2 UNITS: 100 INJECTION, SOLUTION INTRAVENOUS; SUBCUTANEOUS at 12:03

## 2023-10-13 RX ADMIN — MIDODRINE HYDROCHLORIDE 2.5 MG: 2.5 TABLET ORAL at 08:27

## 2023-10-13 RX ADMIN — PREDNISONE 20 MG: 20 TABLET ORAL at 08:27

## 2023-10-13 RX ADMIN — SERTRALINE HYDROCHLORIDE 200 MG: 100 TABLET ORAL at 08:27

## 2023-10-13 RX ADMIN — INSULIN LISPRO 2 UNITS: 100 INJECTION, SOLUTION INTRAVENOUS; SUBCUTANEOUS at 20:15

## 2023-10-13 RX ADMIN — ATORVASTATIN CALCIUM 10 MG: 10 TABLET, FILM COATED ORAL at 08:27

## 2023-10-13 RX ADMIN — Medication 10 ML: at 20:11

## 2023-10-13 RX ADMIN — AMITRIPTYLINE HYDROCHLORIDE 50 MG: 50 TABLET, FILM COATED ORAL at 20:10

## 2023-10-13 RX ADMIN — GABAPENTIN 600 MG: 300 CAPSULE ORAL at 08:27

## 2023-10-13 RX ADMIN — LEVOTHYROXINE SODIUM 224 MCG: 0.11 TABLET ORAL at 05:15

## 2023-10-13 RX ADMIN — METFORMIN HYDROCHLORIDE 1000 MG: 500 TABLET ORAL at 17:22

## 2023-10-13 NOTE — PLAN OF CARE
Problem: Skin Injury Risk Increased  Goal: Skin Health and Integrity  Outcome: Ongoing, Progressing  Intervention: Optimize Skin Protection  Recent Flowsheet Documentation  Taken 10/13/2023 0200 by Parul Pereira RN  Head of Bed (HOB) Positioning: HOB elevated  Taken 10/13/2023 0100 by Parul Pereira RN  Head of Bed (HOB) Positioning: HOB elevated  Taken 10/13/2023 0000 by Parul Pereira RN  Head of Bed (HOB) Positioning: HOB elevated  Taken 10/12/2023 2300 by Parul Pereira RN  Head of Bed (HOB) Positioning: HOB elevated  Taken 10/12/2023 2200 by Parul Pereira RN  Head of Bed (HOB) Positioning: HOB elevated  Taken 10/12/2023 2100 by Parul Pereira RN  Head of Bed (HOB) Positioning: HOB elevated  Taken 10/12/2023 2000 by Parul Pereira RN  Pressure Reduction Techniques: frequent weight shift encouraged  Head of Bed (HOB) Positioning: HOB elevated  Pressure Reduction Devices: positioning supports utilized  Skin Protection: adhesive use limited   Goal Outcome Evaluation:      Patient alert and oriented vitals stable no complaints at this time doing well

## 2023-10-13 NOTE — THERAPY TREATMENT NOTE
Patient Name: Cosmo Gauthier  : 1945    MRN: [id number]                              Today's Date: 10/13/2023       Admit Date: 10/11/2023    Visit Dx:     ICD-10-CM ICD-9-CM   1. Decreased activities of daily living (ADL)  Z78.9 V49.89   2. Generalized weakness  R53.1 780.79   3. MARKUS (acute kidney injury)  N17.9 584.9   4. Difficulty in walking  R26.2 719.7     Patient Active Problem List   Diagnosis    Lymphoma    Type 2 diabetes mellitus with complications    Stage 3b chronic kidney disease    Thrombocytopenia    Coronary artery disease involving native coronary artery of native heart without angina pectoris    Hx of CABG    Hyperlipemia, mixed    Hypertension, essential    Hereditary and idiopathic neuropathy, unspecified    Low lying conus medullaris    Mood disorder    Hepatic cirrhosis    Sleep apnea    Spinal stenosis of lumbar region with neurogenic claudication    Vascular dementia without behavioral disturbance    Iron deficiency anemia    Hypothyroidism, adult    Traumatic subarachnoid hemorrhage with loss of consciousness of 30 minutes or less    Morbid (severe) obesity due to excess calories    Claudication of both lower extremities    Medicare annual wellness visit, subsequent    Weakness    Acute cough    Acute kidney injury     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     Primary osteoarthritis of left knee     Sleep apnea     Sleep apnea      Thrombocytopenia 05/22/2018    Thyroid disease      Past Surgical History:   Procedure Laterality Date    CARDIAC SURGERY      COLONOSCOPY  2015    CORONARY ANGIOPLASTY WITH STENT PLACEMENT      CORONARY ARTERY BYPASS GRAFT N/A 05/20/2021    Procedure: MIDLINE STERNOTOMY,CORONARY ARTERY BYPASS GRAFTING X 5, UTILIZING THE LEFT COMPA AND LEFT SAPHENOUS VEIN, ABHIJEET, PRP;  Surgeon: Jr Tom Tubbs MD;  Location: [facility];  Service: Cardiothoracic;  Laterality: N/A;    ENDOSCOPY      HERNIA REPAIR      JOINT REPLACEMENT      SHOULDER SURGERY      SHOULDER REPAIR    TOTAL KNEE ARTHROPLASTY      TRANSESOPHAGEAL ECHOCARDIOGRAM (ABHIJEET) N/A 05/20/2021    Procedure: TRANSESOPHAGEAL ECHOCARDIOGRAM WITH ANESTHESIA;  Surgeon: Jr Tom Tubbs MD;  Location: [facility];  Service: Cardiothoracic;  Laterality: N/A;      General Information       Row Name 10/13/23 1317          OT Time and Intention    Document Type therapy note (daily note)  -     Mode of Treatment individual therapy;occupational therapy  -               User Key  (r) = Recorded By, (t) = Taken By, (c) = Cosigned By      Initials Name Provider Type     Chasity Pickard OT Occupational Therapist                     Mobility/ADL's       Row Name 10/13/23 1319          Bed Mobility    Bed Mobility supine-sit  -     Supine-Sit [place] (Bed Mobility) standby assist  -     Assistive Device (Bed Mobility) bed rails;head of bed elevated  -       Row Name 10/13/23 1319          Transfers    Transfers sit-stand transfer;stand-sit transfer  -       Row Name 10/13/23 1319          Sit-Stand Transfer    Sit-Stand Tom [place] (Transfers) contact guard  -     Assistive Device (Sit-Stand Transfers) walker, front-wheeled  -       Row Name 10/13/23 1319          Stand-Sit Transfer    Stand-Sit Tom Green (Transfers) contact guard  -     Assistive Device (Stand-Sit Transfers) walker, front-wheeled  -       Row Name 10/13/23 1319           Functional Mobility    Functional Mobility- Ind. Level contact guard assist  -LF     Functional Mobility- Device walker, front-wheeled  -LF       Row Name 10/13/23 1319          Activities of Daily Living    BADL Assessment/Intervention lower body dressing  -       Row Name 10/13/23 1319          Lower Body Dressing Assessment/Training    Plessis Level (Lower Body Dressing) lower body dressing skills;don;socks;moderate assist (50% patient effort)  -LF     Position (Lower Body Dressing) unsupported sitting;edge of bed sitting  -     Comment, (Lower Body Dressing) Pt reports using a sock-aid at baseline.  -LF               User Key  (r) = Recorded By, (t) = Taken By, (c) = Cosigned By      Initials Name Provider Type    Chasity Oakes OT Occupational Therapist                   Obj/Interventions       Row Name 10/13/23 1320          Balance    Balance Assessment sitting dynamic balance;standing dynamic balance  -     Dynamic Sitting Balance supervision  -     Position, Sitting Balance unsupported;sitting edge of bed;sitting in chair  -LF     Dynamic Standing Balance contact guard  -     Position/Device Used, Standing Balance supported;walker, front-wheeled  -LF     Balance Interventions sitting;standing;sit to stand;supported;dynamic;occupation based/functional task;weight shifting activity  -               User Key  (r) = Recorded By, (t) = Taken By, (c) = Cosigned By      Initials Name Provider Type    LF Chasity Pickard OT Occupational Therapist                   Goals/Plan    No documentation.                  Clinical Impression       Row Name 10/13/23 1322          Pain Assessment    Additional Documentation Pain Scale: FACES Pre/Post-Treatment (Group)  -       Row Name 10/13/23 1322          Pain Scale: FACES Pre/Post-Treatment    Pain: FACES Scale, Pretreatment 0-->no hurt  -LF     Posttreatment Pain Rating 0-->no hurt  -LF       Row Name 10/13/23 1322          Plan of Care Review     Plan of Care Reviewed With patient  -LF     Progress improving  -LF     Outcome Evaluation Patient agreeable to OOB activity, completing supine to sit with SBA, sit to stand with CGA using RW, and ~5ft of functional mobility in the room. He would benefit from continued OT services to maximize independence.  -       Row Name 10/13/23 1322          Vital Signs    O2 Delivery Pre Treatment room air  -LF     O2 Delivery Intra Treatment room air  -LF     O2 Delivery Post Treatment room air  -LF               User Key  (r) = Recorded By, (t) = Taken By, (c) = Cosigned By      Initials Name Provider Type     Chasity Pickard, OT Occupational Therapist                   Outcome Measures       Row Name 10/13/23 1340          How much help from another is currently needed...    Putting on and taking off regular lower body clothing? 2  -LF     Bathing (including washing, rinsing, and drying) 3  -LF     Toileting (which includes using toilet bed pan or urinal) 3  -LF     Putting on and taking off regular upper body clothing 3  -LF     Taking care of personal grooming (such as brushing teeth) 4  -LF     Eating meals 4  -LF     AM-PAC 6 Clicks Score (OT) 19  -LF       Row Name 10/13/23 0722          How much help from another person do you currently need...    Turning from your back to your side while in flat bed without using bedrails? 3  -MF     Moving from lying on back to sitting on the side of a flat bed without bedrails? 3  -MF     Moving to and from a bed to a chair (including a wheelchair)? 2  -MF     Standing up from a chair using your arms (e.g., wheelchair, bedside chair)? 2  -MF     Climbing 3-5 steps with a railing? 1  -MF     To walk in hospital room? 2  -MF     AM-PAC 6 Clicks Score (PT) 13  -MF     Highest level of mobility 4 --> Transferred to chair/commode  -       Row Name 10/13/23 1340          Functional Assessment    Outcome Measure Options AM-PAC 6 Clicks Daily Activity (OT);Optimal Instrument  -LF        Row Name 10/13/23 1340          Optimal Instrument    Optimal Instrument Optimal - 3  -LF     Bending/Stooping 3  -LF     Standing 2  -LF     Reaching 1  -LF     From the list, choose the 3 activities you would most like to be able to do without any difficulty Bending/stooping;Standing;Reaching  -LF     Total Score Optimal - 3 6  -LF               User Key  (r) = Recorded By, (t) = Taken By, (c) = Cosigned By      Initials Name Provider Type    Yandy German, RN Registered Nurse    Chasity Oakes OT Occupational Therapist                    Occupational Therapy Education       Title: PT OT SLP Therapies (Done)       Topic: Occupational Therapy (Done)       Point: ADL training (Done)       Description:   Instruct learner(s) on proper safety adaptation and remediation techniques during self care or transfers.   Instruct in proper use of assistive devices.                  Learning Progress Summary             Patient Acceptance, E, VU by LR at 10/12/2023 2336    Eager, E, NR by EG at 10/12/2023 0950    Comment: Education on safety with transfers  Education on activity pacing                         Point: Home exercise program (Done)       Description:   Instruct learner(s) on appropriate technique for monitoring, assisting and/or progressing therapeutic exercises/activities.                  Learning Progress Summary             Patient Acceptance, E, VU by LR at 10/12/2023 2336    Eager, E, NR by EG at 10/12/2023 0950    Comment: Education on safety with transfers  Education on activity pacing                         Point: Precautions (Done)       Description:   Instruct learner(s) on prescribed precautions during self-care and functional transfers.                  Learning Progress Summary             Patient Acceptance, E, VU by LR at 10/12/2023 2336    Eager, E, NR by EG at 10/12/2023 0950    Comment: Education on safety with transfers  Education on activity pacing                         Point:  Body mechanics (Done)       Description:   Instruct learner(s) on proper positioning and spine alignment during self-care, functional mobility activities and/or exercises.                  Learning Progress Summary             Patient Acceptance, E, VU by LR at 10/12/2023 2336    EagerLUCIE, NR by EG at 10/12/2023 0950    Comment: Education on safety with transfers  Education on activity pacing                                         User Key       Initials Effective Dates Name Provider Type Discipline    LR 09/22/22 -  Parul Pereira, RN Registered Nurse Nurse    EG 09/14/22 -  Lalita Damon OT Occupational Therapist OT                  OT Recommendation and Plan     Plan of Care Review  Plan of Care Reviewed With: patient  Progress: improving  Outcome Evaluation: Patient agreeable to OOB activity, completing supine to sit with SBA, sit to stand with CGA using RW, and ~5ft of functional mobility in the room. He would benefit from continued OT services to maximize independence.     Time Calculation:         Time Calculation- OT       Row Name 10/13/23 1340             Time Calculation- OT    OT Received On 10/13/23  -LF      OT Goal Re-Cert Due Date 10/21/23  -LF         Timed Charges    45885 - OT Therapeutic Activity Minutes 8  -LF      43215 - OT Self Care/Mgmt Minutes 5  -LF         Total Minutes    Timed Charges Total Minutes 13  -LF       Total Minutes 13  -LF                User Key  (r) = Recorded By, (t) = Taken By, (c) = Cosigned By      Initials Name Provider Type     Chasity Pickard OT Occupational Therapist                  Therapy Charges for Today       Code Description Service Date Service Provider Modifiers Qty    11580425330 HC OT THERAPEUTIC ACT EA 15 MIN 10/13/2023 Chasity Pickard OT GO 1                 Chasity Pickard OT  10/13/2023

## 2023-10-13 NOTE — PLAN OF CARE
tGoal Outcome Evaluation:  Plan of Care Reviewed With: patient           Outcome Evaluation: Pt presents with decreased transfers and ambulation.  Skilled PT services will be required to address these mobitiy deficits.      Anticipated Discharge Disposition (PT): inpatient rehabilitation facility

## 2023-10-13 NOTE — PROGRESS NOTES
[facility]     Progress Note    Patient Name: Cosmo Gauthier  : 1945  MRN: [id number]  Primary Care Physician:  Alex Buckley MD  Date of admission: 10/11/2023    Subjective   Subjective     Chief Complaint: Patient stable doing well had a long discussion with his wife and explained to him the nature of his neuropathy being incurable however she feels that physical therapy has been helping so we have asked for a physical therapy to see if he could get him into rehab    HPI: With a diabetic neuropathy waiting for rehab place    Review of Systems   All systems were reviewed and negative except for: Has been reviewed    Objective   Objective     Vitals:   Temp:  [97.9 °F (36.6 °C)-98.6 °F (37 °C)] 98.3 °F (36.8 °C)  Heart Rate:  [] 82  Resp:  [16] 16  BP: (128-146)/(68-88) 134/75    Physical Exam    Constitutional: Awake, alert   Eyes: PERRLA, sclerae anicteric, no conjunctival injection   HENT: NCAT, mucous membranes moist   Neck: Supple, no thyromegaly, no lymphadenopathy, trachea midline   Respiratory: Clear to auscultation bilaterally, nonlabored respirations    Cardiovascular: RRR, no murmurs, rubs, or gallops, palpable pedal pulses bilaterally   Gastrointestinal: Positive bowel sounds, soft, nontender, nondistended   Musculoskeletal: No bilateral ankle edema, no clubbing or cyanosis to extremities   Psychiatric: Appropriate affect, cooperative   Neurologic: Oriented x 3, strength symmetric in all extremities, Cranial Nerves grossly intact to confrontation, speech clear   Skin: No rashes   No change  Result Review    Result Review:  I have personally reviewed the results from the time of this admission to 10/13/2023 10:52 EDT and agree with these findings:  [x]  Laboratory  []  Microbiology  []  Radiology  []  EKG/Telemetry   []  Cardiology/Vascular   []  Pathology  []  Old records  []  Other:  Most notable findings include: Reviewed    Assessment & Plan   Assessment / Plan     Brief Patient  Summary:  Cosmo Gauthier is a 78 y.o. male who patient has diabetic neuropathy with postural hypotension    Active Hospital Problems:  Active Hospital Problems    Diagnosis     **Acute kidney injury        Plan:   Physical therapy    DVT prophylaxis:  Mechanical DVT prophylaxis orders are present.    CODE STATUS:   Level Of Support Discussed With: Patient  Code Status (Patient has no pulse and is not breathing): CPR (Attempt to Resuscitate)  Medical Interventions (Patient has pulse or is breathing): Full Support    Disposition:  I expect patient to be discharged after the rehab bed is available.    Electronically signed by Seven Saldana MD, 10/13/23, 10:52 AM EDT.      Part of this note may be an electronic transcription/translation of spoken language to printed text using the Dragon Dictation System.

## 2023-10-13 NOTE — THERAPY EVALUATION
Acute Care - Physical Therapy Initial Evaluation  SARKIS Dhillon     Patient Name: Cosmo Gauthier  : 1945  MRN: [id number]  Today's Date: 10/13/2023    Admit date: 10/11/2023     Referring Physician: Seven Saldana MD     Surgery Date:* No surgery found *              Visit Dx:     ICD-10-CM ICD-9-CM   1. Decreased activities of daily living (ADL)  Z78.9 V49.89   2. Generalized weakness  R53.1 780.79   3. MARKUS (acute kidney injury)  N17.9 584.9   4. Difficulty in walking  R26.2 719.7     Patient Active Problem List   Diagnosis    Lymphoma    Type 2 diabetes mellitus with complications    Stage 3b chronic kidney disease    Thrombocytopenia    Coronary artery disease involving native coronary artery of native heart without angina pectoris    Hx of CABG    Hyperlipemia, mixed    Hypertension, essential    Hereditary and idiopathic neuropathy, unspecified    Low lying conus medullaris    Mood disorder    Hepatic cirrhosis    Sleep apnea    Spinal stenosis of lumbar region with neurogenic claudication    Vascular dementia without behavioral disturbance    Iron deficiency anemia    Hypothyroidism, adult    Traumatic subarachnoid hemorrhage with loss of consciousness of 30 minutes or less    Morbid (severe) obesity due to excess calories    Claudication of both lower extremities    Medicare annual wellness visit, subsequent    Weakness    Acute cough    Acute kidney injury     Past Medical History:   Diagnosis Date    Anxiety and depression     Arthritis     Chronic back pain     Cirrhosis     Coronary artery disease     Dementia     Depression     Diabetes mellitus     Disease of thyroid gland     Elevated cholesterol     Family history of colon cancer     Fatty liver     Gastric ulcer     GERD (gastroesophageal reflux disease)     Heart disease     High cholesterol     Hypertension     Hyperthyroidism     Knee pain     Lymphoma     History of lymphoma, has been in remission    Memory change 10/18/2017    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     Primary osteoarthritis of left knee     Sleep apnea     Sleep apnea     Thrombocytopenia 05/22/2018    Thyroid disease      Past Surgical History:   Procedure Laterality Date    CARDIAC SURGERY      COLONOSCOPY  2015    CORONARY ANGIOPLASTY WITH STENT PLACEMENT      CORONARY ARTERY BYPASS GRAFT N/A 05/20/2021    Procedure: MIDLINE STERNOTOMY,CORONARY ARTERY BYPASS GRAFTING X 5, UTILIZING THE LEFT COMPA AND LEFT SAPHENOUS VEIN, ABHIJEET, PRP;  Surgeon: Jr Tom Tubbs MD;  Location: [facility];  Service: Cardiothoracic;  Laterality: N/A;    ENDOSCOPY      HERNIA REPAIR      JOINT REPLACEMENT      SHOULDER SURGERY      SHOULDER REPAIR    TOTAL KNEE ARTHROPLASTY      TRANSESOPHAGEAL ECHOCARDIOGRAM (ABHIJEET) N/A 05/20/2021    Procedure: TRANSESOPHAGEAL ECHOCARDIOGRAM WITH ANESTHESIA;  Surgeon: Jr Tom Tubbs MD;  Location: [facility];  Service: Cardiothoracic;  Laterality: N/A;     PT Assessment (last 12 hours)       PT Evaluation and Treatment       Row Name 10/13/23 1100          Physical Therapy Time and Intention    Subjective Information no complaints  -NICCI     Document Type evaluation  -NICCI     Mode of Treatment individual therapy;physical therapy  -NICCI     Patient Effort good  -NICCI       Row Name 10/13/23 1100          General Information    Patient Observations alert;cooperative;agree to therapy  -NICCI     Prior Level of Function independent:;all household mobility;community mobility  -NICCI     Equipment Currently Used at Home walker, rolling;commode, 3-in-1;grab bar  -NICCI     Existing Precautions/Restrictions fall  -NICCI     Barriers to Rehab none identified  -NICCI       Row Name 10/13/23 1100          Living Environment    Current Living Arrangements home  -NICCI     People in Home spouse  -NICCI       Row Name 10/13/23 1100          Range of Motion (ROM)    Range of Motion ROM is WFL  -NICCI       Row Name 10/13/23 1100          Strength (Manual Muscle Testing)     Strength (Manual Muscle Testing) bilateral lower extremities  4-/5  -NICCI       Row Name 10/13/23 1100          Bed Mobility    Bed Mobility bed mobility (all) activities;supine-sit  -NICCI     All Activities, [place] (Bed Mobility) standby assist  -NICCI     Supine-Sit [place] (Bed Mobility) standby assist  -NICCI       Row Name 10/13/23 1100          Transfers    Transfers bed-chair transfer;sit-stand transfer  -NICCI       Row Name 10/13/23 1100          Bed-Chair Transfer    Bed-Chair [place] (Transfers) contact guard  -NICCI     Assistive Device (Bed-Chair Transfers) walker, front-wheeled  -NICCI       Row Name 10/13/23 1100          Sit-Stand Transfer    Sit-Stand [place] (Transfers) contact guard  -NICCI     Assistive Device (Sit-Stand Transfers) walker, front-wheeled  -NICCI       Row Name 10/13/23 1100          Gait/Stairs (Locomotion)    Gait/Stairs Locomotion gait/ambulation assistive device  -NICCI     [place] Level (Gait) contact guard  -NICCI     Assistive Device (Gait) walker, front-wheeled  -NICCI     Distance in Feet (Gait) 100  knees buckle with fatigue.  Requiring minimal assist to correct  -NICCI       Row Name 10/13/23 1100          Safety Issues, Functional Mobility    Impairments Affecting Function (Mobility) balance;strength  -NICCI       Row Name 10/13/23 1100          Balance    Balance Assessment standing dynamic balance  -NICCI     Dynamic Standing Balance contact guard  -NICCI     Position/Device Used, Standing Balance walker, front-wheeled  -NICCI       Row Name             Wound 10/11/23 1200 Right anterior knee Abrasion    Wound - Properties Group Placement Date: 10/11/23  -GIANNA Placement Time: 1200  -GIANNA Present on Original Admission: Y  -GIANNA Side: Right  -GIANNA Orientation: anterior  -GIANNA Location: knee  -GIANNA Primary Wound Type: Abrasion  -GIANNA    Retired Wound - Properties Group Placement Date: 10/11/23  -GIANNA Placement Time: 1200  -GIANNA Present on Original Admission: Y  -GIANNA Side: Right  -GIANNA Orientation: anterior  -GIANNA  Location: knee  -GIANNA Primary Wound Type: Abrasion  -GIANNA    Retired Wound - Properties Group Date first assessed: 10/11/23  -GIANNA Time first assessed: 1200  -GIANNA Present on Original Admission: Y  -GIANNA Side: Right  -GIANNA Location: knee  -GIANNA Primary Wound Type: Abrasion  -GIANNA      Row Name 10/13/23 1100          Plan of Care Review    Plan of Care Reviewed With patient  -NICCI     Outcome Evaluation Pt presents with decreased transfers and ambulation.  Skilled PT services will be required to address these mobitiy deficits.  -NICCI       Row Name 10/13/23 1100          Therapy Assessment/Plan (PT)    Rehab Potential (PT) good, to achieve stated therapy goals  -NICCI     Criteria for Skilled Interventions Met (PT) skilled treatment is necessary  -NICCI     Therapy Frequency (PT) daily  -NICCI     Predicted Duration of Therapy Intervention (PT) 10 days  -NICCI     Problem List (PT) problems related to;balance;mobility  -NICCI     Activity Limitations Related to Problem List (PT) unable to ambulate safely;unable to transfer safely  -NICCI       Row Name 10/13/23 1100          PT Evaluation Complexity    History, PT Evaluation Complexity no personal factors and/or comorbidities  -NICCI     Examination of Body Systems (PT Eval Complexity) total of 4 or more elements  -NICCI     Clinical Presentation (PT Evaluation Complexity) stable  -NICCI     Clinical Decision Making (PT Evaluation Complexity) low complexity  -NICCI     Overall Complexity (PT Evaluation Complexity) low complexity  -NICCI       Row Name 10/13/23 1100          Therapy Plan Review/Discharge Plan (PT)    Therapy Plan Review (PT) evaluation/treatment results reviewed;participants voiced agreement with care plan;participants included;patient  -NICCI       Row Name 10/13/23 1100          Physical Therapy Goals    Transfer Goal Selection (PT) transfer, PT goal 1  -NICCI     Gait Training Goal Selection (PT) gait training, PT goal 1  -NICCI       Row Name 10/13/23 1100          Transfer Goal 1 (PT)    Activity/Assistive  Device (Transfer Goal 1, PT) transfers, all  -NICCI     [place] Level/Cues Needed (Transfer Goal 1, PT) independent  -NICCI     Time Frame (Transfer Goal 1, PT) long term goal (LTG);10 days  -NICCI       Row Name 10/13/23 1100          Gait Training Goal 1 (PT)    Activity/Assistive Device (Gait Training Goal 1, PT) gait (walking locomotion);walker, rolling  -NICCI     [place] Level (Gait Training Goal 1, PT) independent  -NICCI     Distance (Gait Training Goal 1, PT) 300  -NICCI     Time Frame (Gait Training Goal 1, PT) long term goal (LTG);10 days  -NICCI               User Key  (r) = Recorded By, (t) = Taken By, (c) = Cosigned By      Initials Name Provider Type    Santosh Del Toro PT Physical Therapist    Ceci Acuña, RN Registered Nurse                      PT Recommendation and Plan  Anticipated Discharge Disposition (PT): inpatient rehabilitation facility  Planned Therapy Interventions (PT): balance training, bed mobility training, gait training, home exercise program, stair training, strengthening, transfer training  Therapy Frequency (PT): daily  Plan of Care Reviewed With: patient  Outcome Evaluation: Pt presents with decreased transfers and ambulation.  Skilled PT services will be required to address these mobitiy deficits.       Time Calculation:    PT Charges       Row Name 10/13/23 1106             Time Calculation    PT Received On 10/13/23  -NICCI      PT Goal Re-Cert Due Date 10/22/23  -NICCI         Untimed Charges    PT Eval/Re-eval Minutes 30  -NICCI         Total Minutes    Untimed Charges Total Minutes 30  -NICCI       Total Minutes 30  -NICCI                User Key  (r) = Recorded By, (t) = Taken By, (c) = Cosigned By      Initials Name Provider Type    Santosh Del Toro PT Physical Therapist                  Therapy Charges for Today       Code Description Service Date Service Provider Modifiers Qty    [id number] HC PT EVAL LOW COMPLEXITY 3 10/13/2023 Santosh Cannon PT GP 1            PT  G-Codes  Outcome Measure Options: AM-PAC 6 Clicks Daily Activity (OT), Optimal Instrument  AM-PAC 6 Clicks Score (PT): 13  AM-PAC 6 Clicks Score (OT): 19    Santosh Cannon, PT  10/13/2023

## 2023-10-14 LAB
GLUCOSE BLDC GLUCOMTR-MCNC: 131 MG/DL (ref 70–99)
GLUCOSE BLDC GLUCOMTR-MCNC: 158 MG/DL (ref 70–99)
GLUCOSE BLDC GLUCOMTR-MCNC: 182 MG/DL (ref 70–99)
GLUCOSE BLDC GLUCOMTR-MCNC: 269 MG/DL (ref 70–99)

## 2023-10-14 PROCEDURE — 94799 UNLISTED PULMONARY SVC/PX: CPT

## 2023-10-14 PROCEDURE — 82948 REAGENT STRIP/BLOOD GLUCOSE: CPT

## 2023-10-14 PROCEDURE — 63710000001 PREDNISONE PER 1 MG: Performed by: INTERNAL MEDICINE

## 2023-10-14 PROCEDURE — 63710000001 INSULIN LISPRO (HUMAN) PER 5 UNITS: Performed by: INTERNAL MEDICINE

## 2023-10-14 PROCEDURE — G0378 HOSPITAL OBSERVATION PER HR: HCPCS

## 2023-10-14 RX ADMIN — TAMSULOSIN HYDROCHLORIDE 0.4 MG: 0.4 CAPSULE ORAL at 09:16

## 2023-10-14 RX ADMIN — INSULIN LISPRO 2 UNITS: 100 INJECTION, SOLUTION INTRAVENOUS; SUBCUTANEOUS at 11:24

## 2023-10-14 RX ADMIN — PREDNISONE 20 MG: 20 TABLET ORAL at 20:38

## 2023-10-14 RX ADMIN — GABAPENTIN 600 MG: 300 CAPSULE ORAL at 09:15

## 2023-10-14 RX ADMIN — SERTRALINE HYDROCHLORIDE 200 MG: 100 TABLET ORAL at 09:15

## 2023-10-14 RX ADMIN — DONEPEZIL HYDROCHLORIDE 10 MG: 10 TABLET, FILM COATED ORAL at 20:38

## 2023-10-14 RX ADMIN — MIDODRINE HYDROCHLORIDE 2.5 MG: 2.5 TABLET ORAL at 16:38

## 2023-10-14 RX ADMIN — FINASTERIDE 5 MG: 5 TABLET, FILM COATED ORAL at 09:16

## 2023-10-14 RX ADMIN — Medication 10 ML: at 20:38

## 2023-10-14 RX ADMIN — METFORMIN HYDROCHLORIDE 1000 MG: 500 TABLET ORAL at 07:49

## 2023-10-14 RX ADMIN — PREDNISONE 20 MG: 20 TABLET ORAL at 09:16

## 2023-10-14 RX ADMIN — ATORVASTATIN CALCIUM 10 MG: 10 TABLET, FILM COATED ORAL at 09:15

## 2023-10-14 RX ADMIN — METFORMIN HYDROCHLORIDE 1000 MG: 500 TABLET ORAL at 17:43

## 2023-10-14 RX ADMIN — ASPIRIN 81 MG: 81 TABLET, COATED ORAL at 09:16

## 2023-10-14 RX ADMIN — AMITRIPTYLINE HYDROCHLORIDE 50 MG: 50 TABLET, FILM COATED ORAL at 20:37

## 2023-10-14 RX ADMIN — LEVOTHYROXINE SODIUM 224 MCG: 0.11 TABLET ORAL at 05:30

## 2023-10-14 RX ADMIN — INSULIN LISPRO 4 UNITS: 100 INJECTION, SOLUTION INTRAVENOUS; SUBCUTANEOUS at 20:43

## 2023-10-14 RX ADMIN — Medication 10 ML: at 09:29

## 2023-10-14 RX ADMIN — INSULIN LISPRO 2 UNITS: 100 INJECTION, SOLUTION INTRAVENOUS; SUBCUTANEOUS at 07:49

## 2023-10-14 RX ADMIN — MIDODRINE HYDROCHLORIDE 2.5 MG: 2.5 TABLET ORAL at 07:26

## 2023-10-14 NOTE — PLAN OF CARE
Goal Outcome Evaluation:      VSS. Alert and oriented x 4.  No complaints of pain at time of this note.  Tx continues as ordered.

## 2023-10-14 NOTE — PROGRESS NOTES
[facility]     Progress Note    Patient Name: Cosmo Gauthier  : 1945  MRN: [id number]  Primary Care Physician:  Alex Buckley MD  Date of admission: 10/11/2023    Subjective   Subjective     Chief Complaint: Patient has peripheral neuropathy wanted to be transferred to the rehab and therefore bed hopefully will be available on Monday and he will be transferred on Monday to the rehab    HPI: Admitted with extreme weakness neuropathy and will be going to the rehab    Review of Systems   All systems were reviewed and negative except for: As been reviewed    Objective   Objective     Vitals:   Temp:  [97.7 °F (36.5 °C)-98.4 °F (36.9 °C)] 97.7 °F (36.5 °C)  Heart Rate:  [62-93] 76  Resp:  [16-18] 16  BP: (124-147)/(62-86) 147/86    Physical Exam    Constitutional: Awake, alert   Eyes: PERRLA, sclerae anicteric, no conjunctival injection   HENT: NCAT, mucous membranes moist   Neck: Supple, no thyromegaly, no lymphadenopathy, trachea midline   Respiratory: Clear to auscultation bilaterally, nonlabored respirations    Cardiovascular: RRR, no murmurs, rubs, or gallops, palpable pedal pulses bilaterally   Gastrointestinal: Positive bowel sounds, soft, nontender, nondistended   Musculoskeletal: No bilateral ankle edema, no clubbing or cyanosis to extremities   Psychiatric: Appropriate affect, cooperative   Neurologic: Oriented x 3, strength symmetric in all extremities, Cranial Nerves grossly intact to confrontation, speech clear   Skin: No rashes   No change  Result Review    Result Review:  I have personally reviewed the results from the time of this admission to 10/14/2023 12:24 EDT and agree with these findings:  [x]  Laboratory thrombocytopenia  []  Microbiology  []  Radiology  []  EKG/Telemetry   []  Cardiology/Vascular   []  Pathology  []  Old records  []  Other:  Most notable findings include: Thrombocytopenia history of lymphoma    Assessment & Plan   Assessment / Plan     Brief Patient  Summary:  Cosmo Gauthier is a 78 y.o. male who peripheral neuropathy    Active Hospital Problems:  Active Hospital Problems    Diagnosis     **Acute kidney injury        Plan:   Patient waiting for rehab placement    DVT prophylaxis:  Mechanical DVT prophylaxis orders are present.    CODE STATUS:   Level Of Support Discussed With: Patient  Code Status (Patient has no pulse and is not breathing): CPR (Attempt to Resuscitate)  Medical Interventions (Patient has pulse or is breathing): Full Support    Disposition:  I expect patient to be discharged be transferred to the rehab on Monday.    Electronically signed by Seven Saldana MD, 10/14/23, 12:24 PM EDT.      Part of this note may be an electronic transcription/translation of spoken language to printed text using the Dragon Dictation System.

## 2023-10-15 LAB
GLUCOSE BLDC GLUCOMTR-MCNC: 167 MG/DL (ref 70–99)
GLUCOSE BLDC GLUCOMTR-MCNC: 175 MG/DL (ref 70–99)
GLUCOSE BLDC GLUCOMTR-MCNC: 262 MG/DL (ref 70–99)
GLUCOSE BLDC GLUCOMTR-MCNC: 285 MG/DL (ref 70–99)

## 2023-10-15 PROCEDURE — 63710000001 PREDNISONE PER 1 MG: Performed by: INTERNAL MEDICINE

## 2023-10-15 PROCEDURE — 63710000001 INSULIN LISPRO (HUMAN) PER 5 UNITS: Performed by: INTERNAL MEDICINE

## 2023-10-15 PROCEDURE — 82948 REAGENT STRIP/BLOOD GLUCOSE: CPT

## 2023-10-15 PROCEDURE — 94799 UNLISTED PULMONARY SVC/PX: CPT

## 2023-10-15 PROCEDURE — G0378 HOSPITAL OBSERVATION PER HR: HCPCS

## 2023-10-15 RX ADMIN — INSULIN LISPRO 2 UNITS: 100 INJECTION, SOLUTION INTRAVENOUS; SUBCUTANEOUS at 11:35

## 2023-10-15 RX ADMIN — Medication 10 ML: at 20:51

## 2023-10-15 RX ADMIN — INSULIN LISPRO 2 UNITS: 100 INJECTION, SOLUTION INTRAVENOUS; SUBCUTANEOUS at 07:45

## 2023-10-15 RX ADMIN — SERTRALINE HYDROCHLORIDE 200 MG: 100 TABLET ORAL at 08:28

## 2023-10-15 RX ADMIN — INSULIN LISPRO 4 UNITS: 100 INJECTION, SOLUTION INTRAVENOUS; SUBCUTANEOUS at 17:11

## 2023-10-15 RX ADMIN — MIDODRINE HYDROCHLORIDE 2.5 MG: 2.5 TABLET ORAL at 17:20

## 2023-10-15 RX ADMIN — PREDNISONE 20 MG: 20 TABLET ORAL at 08:28

## 2023-10-15 RX ADMIN — AMITRIPTYLINE HYDROCHLORIDE 50 MG: 50 TABLET, FILM COATED ORAL at 20:51

## 2023-10-15 RX ADMIN — ATORVASTATIN CALCIUM 10 MG: 10 TABLET, FILM COATED ORAL at 08:28

## 2023-10-15 RX ADMIN — FINASTERIDE 5 MG: 5 TABLET, FILM COATED ORAL at 08:28

## 2023-10-15 RX ADMIN — METFORMIN HYDROCHLORIDE 1000 MG: 500 TABLET ORAL at 17:11

## 2023-10-15 RX ADMIN — TAMSULOSIN HYDROCHLORIDE 0.4 MG: 0.4 CAPSULE ORAL at 08:28

## 2023-10-15 RX ADMIN — MIDODRINE HYDROCHLORIDE 2.5 MG: 2.5 TABLET ORAL at 07:21

## 2023-10-15 RX ADMIN — Medication 10 ML: at 08:31

## 2023-10-15 RX ADMIN — INSULIN LISPRO 4 UNITS: 100 INJECTION, SOLUTION INTRAVENOUS; SUBCUTANEOUS at 20:51

## 2023-10-15 RX ADMIN — DONEPEZIL HYDROCHLORIDE 10 MG: 10 TABLET, FILM COATED ORAL at 20:51

## 2023-10-15 RX ADMIN — LEVOTHYROXINE SODIUM 168 MCG: 0.11 TABLET ORAL at 06:07

## 2023-10-15 RX ADMIN — ASPIRIN 81 MG: 81 TABLET, COATED ORAL at 08:28

## 2023-10-15 RX ADMIN — METFORMIN HYDROCHLORIDE 1000 MG: 500 TABLET ORAL at 07:21

## 2023-10-15 RX ADMIN — GABAPENTIN 600 MG: 300 CAPSULE ORAL at 08:28

## 2023-10-15 NOTE — PROGRESS NOTES
[facility]     Progress Note    Patient Name: Cosmo Gauthier  : 1945  MRN: [id number]  Primary Care Physician:  Alex Buckley MD  Date of admission: 10/11/2023    Subjective   Subjective     Chief Complaint: Stable doing well waiting for the rehab placement hopefully will be discharged tomorrow    HPI: With peripheral neuropathy to be discharged tomorrow    Review of Systems   All systems were reviewed and negative except for: Has been reviewed    Objective   Objective     Vitals:   Temp:  [97.4 °F (36.3 °C)-98.8 °F (37.1 °C)] 97.9 °F (36.6 °C)  Heart Rate:  [67-91] 67  Resp:  [18] 18  BP: (132-160)/(69-90) 153/85    Physical Exam    Constitutional: Awake, alert   Eyes: PERRLA, sclerae anicteric, no conjunctival injection   HENT: NCAT, mucous membranes moist   Neck: Supple, no thyromegaly, no lymphadenopathy, trachea midline   Respiratory: Clear to auscultation bilaterally, nonlabored respirations    Cardiovascular: RRR, no murmurs, rubs, or gallops, palpable pedal pulses bilaterally   Gastrointestinal: Positive bowel sounds, soft, nontender, nondistended   Musculoskeletal: No bilateral ankle edema, no clubbing or cyanosis to extremities   Psychiatric: Appropriate affect, cooperative   Neurologic: Oriented x 3, strength symmetric in all extremities, Cranial Nerves grossly intact to confrontation, speech clear   Skin: No rashes   No change  Result Review    Result Review:  I have personally reviewed the results from the time of this admission to 10/15/2023 10:16 EDT and agree with these findings:  [x]  Laboratory stable  []  Microbiology  []  Radiology  []  EKG/Telemetry   []  Cardiology/Vascular   []  Pathology  []  Old records  []  Other:  Most notable findings include: Patient stable doing well waiting for rehab placement    Assessment & Plan   Assessment / Plan     Brief Patient Summary:  Cosmo Gauthier is a 78 y.o. male who be discharged tomorrow    Active Hospital Problems:  Active Hospital  Problems    Diagnosis     **Acute kidney injury        Plan:   Patient to be discharged to the rehab tomorrow    DVT prophylaxis:  Mechanical DVT prophylaxis orders are present.    CODE STATUS:   Level Of Support Discussed With: Patient  Code Status (Patient has no pulse and is not breathing): CPR (Attempt to Resuscitate)  Medical Interventions (Patient has pulse or is breathing): Full Support    Disposition:  I expect patient to be discharged we will be discharged tomorrow no specific new complaints today.    Electronically signed by Seven Saldana MD, 10/15/23, 10:16 AM EDT.      Part of this note may be an electronic transcription/translation of spoken language to printed text using the Dragon Dictation System.

## 2023-10-15 NOTE — PLAN OF CARE
Goal Outcome Evaluation:      VSS. No c/o pain this shift. No significant changes this shift. All needs met at this time.

## 2023-10-15 NOTE — PLAN OF CARE
Goal Outcome Evaluation:      VSS. Pt alert and oriented x 4.  Tx continues per orders, pt voiced no complaints throughout shift.

## 2023-10-16 VITALS
WEIGHT: 247.36 LBS | OXYGEN SATURATION: 98 % | DIASTOLIC BLOOD PRESSURE: 72 MMHG | RESPIRATION RATE: 18 BRPM | BODY MASS INDEX: 33.5 KG/M2 | HEART RATE: 86 BPM | TEMPERATURE: 97.7 F | SYSTOLIC BLOOD PRESSURE: 141 MMHG | HEIGHT: 72 IN

## 2023-10-16 LAB
BACTERIA SPEC AEROBE CULT: NORMAL
BACTERIA SPEC AEROBE CULT: NORMAL
GLUCOSE BLDC GLUCOMTR-MCNC: 126 MG/DL (ref 70–99)
GLUCOSE BLDC GLUCOMTR-MCNC: 216 MG/DL (ref 70–99)

## 2023-10-16 PROCEDURE — 63710000001 INSULIN LISPRO (HUMAN) PER 5 UNITS: Performed by: INTERNAL MEDICINE

## 2023-10-16 PROCEDURE — 97116 GAIT TRAINING THERAPY: CPT

## 2023-10-16 PROCEDURE — 82948 REAGENT STRIP/BLOOD GLUCOSE: CPT

## 2023-10-16 PROCEDURE — G0378 HOSPITAL OBSERVATION PER HR: HCPCS

## 2023-10-16 PROCEDURE — 94799 UNLISTED PULMONARY SVC/PX: CPT

## 2023-10-16 RX ADMIN — ASPIRIN 81 MG: 81 TABLET, COATED ORAL at 08:37

## 2023-10-16 RX ADMIN — SERTRALINE HYDROCHLORIDE 200 MG: 100 TABLET ORAL at 08:36

## 2023-10-16 RX ADMIN — FINASTERIDE 5 MG: 5 TABLET, FILM COATED ORAL at 08:37

## 2023-10-16 RX ADMIN — DOCUSATE SODIUM 50MG AND SENNOSIDES 8.6MG 2 TABLET: 8.6; 5 TABLET, FILM COATED ORAL at 08:36

## 2023-10-16 RX ADMIN — GABAPENTIN 600 MG: 300 CAPSULE ORAL at 08:36

## 2023-10-16 RX ADMIN — MIDODRINE HYDROCHLORIDE 2.5 MG: 2.5 TABLET ORAL at 09:26

## 2023-10-16 RX ADMIN — Medication 10 ML: at 09:26

## 2023-10-16 RX ADMIN — INSULIN LISPRO 3 UNITS: 100 INJECTION, SOLUTION INTRAVENOUS; SUBCUTANEOUS at 12:08

## 2023-10-16 RX ADMIN — ATORVASTATIN CALCIUM 10 MG: 10 TABLET, FILM COATED ORAL at 08:37

## 2023-10-16 RX ADMIN — TAMSULOSIN HYDROCHLORIDE 0.4 MG: 0.4 CAPSULE ORAL at 08:36

## 2023-10-16 RX ADMIN — METFORMIN HYDROCHLORIDE 1000 MG: 500 TABLET ORAL at 08:37

## 2023-10-16 RX ADMIN — LEVOTHYROXINE SODIUM 224 MCG: 0.11 TABLET ORAL at 05:36

## 2023-10-16 NOTE — PROGRESS NOTES
[facility]     Progress Note    Patient Name: Cosmo Gauthier  : 1945  MRN: [id number]  Primary Care Physician:  Alex Buckley MD  Date of admission: 10/11/2023    Subjective   Subjective     Chief Complaint: Patient comfortable doing well complaining of weakness and neuropathy is waiting for the rehab placement    HPI: Waiting for rehab placement he has postural hypotension and neuropathy    Review of Systems   All systems were reviewed and negative except for: As been reviewed    Objective   Objective     Vitals:   Temp:  [97.7 °F (36.5 °C)-98.6 °F (37 °C)] 97.9 °F (36.6 °C)  Heart Rate:  [77-86] 86  Resp:  [18-20] 18  BP: (132-179)/(69-96) 179/94    Physical Exam    Constitutional: Awake, alert   Eyes: PERRLA, sclerae anicteric, no conjunctival injection   HENT: NCAT, mucous membranes moist   Neck: Supple, no thyromegaly, no lymphadenopathy, trachea midline   Respiratory: Clear to auscultation bilaterally, nonlabored respirations    Cardiovascular: RRR, no murmurs, rubs, or gallops, palpable pedal pulses bilaterally   Gastrointestinal: Positive bowel sounds, soft, nontender, nondistended   Musculoskeletal: No bilateral ankle edema, no clubbing or cyanosis to extremities   Psychiatric: Appropriate affect, cooperative   Neurologic: Oriented x 3, strength symmetric in all extremities, Cranial Nerves grossly intact to confrontation, speech clear   Skin: No rashes   No change  Result Review    Result Review:  I have personally reviewed the results from the time of this admission to 10/16/2023 07:50 EDT and agree with these findings:  [x]  Laboratory  []  Microbiology  []  Radiology  []  EKG/Telemetry   []  Cardiology/Vascular   []  Pathology  []  Old records  []  Other:  Most notable findings include: Reviewed    Assessment & Plan   Assessment / Plan     Brief Patient Summary:  Cosmo Gauthier is a 78 y.o. male who patient with neuropathy history of lymphoma and arthritis    Active Hospital  Problems:  Active Hospital Problems    Diagnosis     **Acute kidney injury        Plan:   Postural hypotension with neuropathy and unstable gait    DVT prophylaxis:  Mechanical DVT prophylaxis orders are present.    CODE STATUS:   Level Of Support Discussed With: Patient  Code Status (Patient has no pulse and is not breathing): CPR (Attempt to Resuscitate)  Medical Interventions (Patient has pulse or is breathing): Full Support    Disposition:  I expect patient to be discharged when the rehab bed is available.    Electronically signed by Seven Saldana MD, 10/16/23, 7:50 AM EDT.      Part of this note may be an electronic transcription/translation of spoken language to printed text using the Dragon Dictation System.

## 2023-10-16 NOTE — PLAN OF CARE
Goal Outcome Evaluation:      Pt. Walked in hallway with PT today. Pt. Cooperative in care today. Plan to d/c today.

## 2023-10-16 NOTE — NURSING NOTE
Pt. Discharged with family to take to [facility]. Family informed that pt. Will have to have prescription for gabapentin at [facility]. Pt. States understanding and will get script before going to facility.

## 2023-10-16 NOTE — DISCHARGE SUMMARY
[facility]         DISCHARGE SUMMARY    Patient Name: Cosmo Gauthier  : 1945  MRN: [id number]    Date of Admission: 10/11/2023  Date of Discharge:  10/16/2023  Primary Care Physician: Alex Buckley MD    Consults       Date and Time Order Name Status Description    10/11/2023 10:18 AM IP General Consult (Use specialty-specific consult if known)              Presenting Problem:   MARKUS (acute kidney injury) [N17.9]  Acute kidney injury [N17.9]  Generalized weakness [R53.1]  Decreased activities of daily living (ADL) [Z78.9]    Active and Resolved Hospital Problems:  Active Hospital Problems    Diagnosis POA    **Acute kidney injury [N17.9] Yes      Resolved Hospital Problems   No resolved problems to display.         Hospital Course     Hospital Course:  Cosmo Gauthier is a 78 y.o. male patient was admitted because of extreme weakness neuropathy postural hypotension not keeping his balance and is therefore being now transferred to to rehabilitation patient with neuropathy being sent for rehabilitation      DISCHARGE Follow Up Recommendations for labs and diagnostics: patient with neuropathy diabetes postural hypotension going for he      Pertinent  and/or Most Recent Results     LAB RESULTS:      Lab 10/12/23  0432 10/11/23  1504 10/11/23  0856   WBC 4.55 5.82 6.77   HEMOGLOBIN 10.0* 9.6* 10.8*   HEMATOCRIT 30.3* 29.5* 32.6*   PLATELETS 57* 59* 71*   NEUTROS ABS 3.93  --  6.45   IMMATURE GRANS (ABS) 0.04  --  0.05   LYMPHS ABS 0.32*  --  0.12*   MONOS ABS 0.23  --  0.12   EOS ABS 0.01  --  0.01   MCV 89.6 89.7 91.1   PROTIME 13.8  --   --          Lab 10/12/23  0432 10/11/23  1504 10/11/23  0856   SODIUM 131* 134* 134*   POTASSIUM 4.9 4.7 4.5   CHLORIDE 102 102 99   CO2 18.8* 20.5* 22.0   ANION GAP 10.2 11.5 13.0   BUN 37* 42* 41*   CREATININE 1.56* 1.81* 2.05*   EGFR 45.7* 38.3* 33.0*   GLUCOSE 250* 154* 177*   CALCIUM 8.7 8.6 9.4   MAGNESIUM 1.7  --  1.4*         Lab 10/12/23  0432  10/11/23  1504 10/11/23  0856   TOTAL PROTEIN 6.4 6.5 7.1   ALBUMIN 3.5 3.7 4.1   GLOBULIN 2.9 2.8 3.0   ALT (SGPT) 24 21 20   AST (SGOT) 31 29 28   BILIRUBIN 0.7 0.6 0.5   ALK PHOS 72 75 85         Lab 10/12/23  0432 10/11/23  0856   HSTROP T  --  27*   PROTIME 13.8  --    INR 1.05  --                  Brief Urine Lab Results  (Last result in the past 365 days)        Color   Clarity   Blood   Leuk Est   Nitrite   Protein   CREAT   Urine HCG        10/11/23 1639 Yellow   Clear   Negative   Negative   Negative   Negative                 Microbiology Results (last 10 days)       Procedure Component Value - Date/Time    Urine Culture - Urine, Urine, Clean Catch [[id number]]  (Normal) Collected: 10/11/23 1639    Lab Status: Final result Specimen: Urine, Clean Catch Updated: 10/13/23 1152     Urine Culture No growth    Blood Culture - Blood, Arm, Left [[id number]]  (Normal) Collected: 10/11/23 1504    Lab Status: Preliminary result Specimen: Blood from Arm, Left Updated: 10/15/23 1530     Blood Culture No growth at 4 days    Blood Culture - Blood, Arm, Right [[id number]]  (Normal) Collected: 10/11/23 1504    Lab Status: Preliminary result Specimen: Blood from Arm, Right Updated: 10/15/23 1515     Blood Culture No growth at 4 days            CT Head Without Contrast    Result Date: 10/11/2023  Impression:   1. Chronic right thalamic lacunar infarct 2. Mucosal inflammatory changes in the paranasal sinuses, as above     Markie Rivas M.D.       Electronically Signed and Approved By: Markie Rivas M.D. on 10/11/2023 at 9:47             XR Chest 1 View    Result Date: 10/11/2023  Impression:   1. Status post CABG 2. Stable chest, no active disease       Austin Escalante MD       Electronically Signed and Approved By: Austin Escalante MD on 10/11/2023 at 9:34              Results for orders placed during the hospital encounter of 01/27/23    Doppler Arterial Multi Level Lower Extremity - Bilateral CAR    Interpretation Summary     Right Conclusion: The right OSCAR is normal. Normal digital pressures.    Left Conclusion: The left OSCAR is normal. Normal digital pressures.    Normal resting arterial evaluation of both lower extremities.  Patient declined stress component due to back pain and fear of treadmill.      Results for orders placed during the hospital encounter of 01/27/23    Doppler Arterial Multi Level Lower Extremity - Bilateral CAR    Interpretation Summary    Right Conclusion: The right OSCAR is normal. Normal digital pressures.    Left Conclusion: The left OSCAR is normal. Normal digital pressures.    Normal resting arterial evaluation of both lower extremities.  Patient declined stress component due to back pain and fear of treadmill.      Results for orders placed in visit on 05/20/21    Emergent/Open-Heart Anesthesia ABHIJEET    Narrative  Preanesthesia Checklist:  Patient identified, IV assessed, risks and benefits discussed, monitors and equipment assessed and procedure being performed at surgeon's request.    General Procedure Information  Diagnostic Indications for Echo:  assessment of ascending aorta, assessment of surgical repair, defect repair evaluation and hemodynamic monitoring  Physician Requesting Echo: Jr Tom Tubbs MD  ICD Code for Medical Necessity:  Aortic Insufficiency  Location performed:  OR  Intubated  Bite block placed  Heart visualized  Probe Insertion:  Easy  Probe Type:  Multiplane  Modalities:  Color flow mapping, 2D only, continuous wave Doppler and pulse wave Doppler    Echocardiographic and Doppler Measurements    Ventricles    Right Ventricle:  Cavity size normal.  Hypertrophy not present.  Left Ventricle:  Diastolic dimension 4.7 cm.  Hypertrophy not present.  Thrombus not present.  Global Function mildly impaired.  Ejection Fraction 50%.    Ventricular Regional Function:  13- Apical Anterior:  hypokinetic  14- Apical Lateral:  hypokinetic  16- Apical Septal:  hypokinetic  17- [place]:   hypokinetic      Valves    Aortic Valve:  Annulus normal.  Stenosis not present.  Pressure Half-time: 910 ms.  Regurgitation mild.  Leaflets normal.  Leaflet motions normal.    Mitral Valve:  Annulus normal.  Stenosis not present.  Regurgitation trace.    Tricuspid Valve:  Annulus normal.  Stenosis not present.  Regurgitation mild.  Pulmonic Valve:  Annulus normal.  Regurgitation trace.        Aorta    Ascending Aorta:  Size normal.  Diameter 3.6 cm.  Dissection not present.  Plaque thickness less than 3 mm.  Mobile plaque not present.  Aortic Arch:  Size normal.  Diameter 3.1 cm.  Dissection not present.  Plaque thickness less than 3 mm.  Mobile plaque not present.  Descending Aorta:  Size normal.  Diameter 2.5 cm.  Dissection not present.  Plaque thickness less than 3 mm.  Mobile plaque not present.        Atria    Right Atrium:  Size normal.  Spontaneous echo contrast not present.  Thrombus not present.  Tumor not present.  Device present.    Left Atrium:  Size normal.  Spontaneous echo contrast not present.  Thrombus not present.  Tumor not present.  Device not present.  Left atrial appendage normal.      Septa    Atrial Septum:  Intra-atrial septal morphology lipomatous hypertrophy.        Diastolic Function Measurements:  Diastolic Dysfunction Grade= II  E= 40.6 ms  A= 35.3 ms  E/A Ratio=  1.2  DT=  296 ms  S/D=  IVRT=    Other Findings  Pericardium:  normal  Pleural Effusion:  none  Pulmonary Arteries:  normal  Pulmonary Venous Flow:  normal    Anesthesia Information  Performed Personally      Echocardiogram Comments:  Post CPB:  LVEF much improved, 60%, apex no longer hypokinetic.  No aortic dissection post decannulation.  AI unchanged.      Labs Pending at Discharge:  Pending Labs       Order Current Status    Blood Culture - Blood, Arm, Left Preliminary result    Blood Culture - Blood, Arm, Right Preliminary result              Discharge Details        Discharge Medications        Changes to Medications         Instructions Start Date   furosemide 40 MG tablet  Commonly known as: LASIX  What changed: how much to take   40 mg, Oral, Daily      gabapentin 600 MG tablet  Commonly known as: NEURONTIN  What changed: how much to take   300 mg, Oral, 2 Times Daily             Continue These Medications        Instructions Start Date   amitriptyline 50 MG tablet  Commonly known as: ELAVIL   50 mg, Oral, Nightly      aspirin 81 MG EC tablet   81 mg, Oral, Daily      donepezil 10 MG tablet  Commonly known as: ARICEPT   10 mg, Oral, Every Night at Bedtime      finasteride 5 MG tablet  Commonly known as: PROSCAR   5 mg, Oral, Daily      HumaLOG KwikPen 100 UNIT/ML solution pen-injector  Generic drug: Insulin Lispro (1 Unit Dial)   150-200 = 4 units 201-250 = 6 units 251-300 = 8 units 301-350 = 10 units      >350 = 14 units      insulin glargine 100 UNIT/ML injection  Commonly known as: LANTUS, SEMGLEE   15 Units, Subcutaneous, Nightly      levothyroxine 112 MCG tablet  Commonly known as: SYNTHROID, LEVOTHROID   224 mcg, Oral, Take As Directed,  Takes two tablets every day Mon - Sat       levothyroxine 112 MCG tablet  Commonly known as: SYNTHROID, LEVOTHROID   168 mcg, Oral, Take As Directed,  Sunday only      metFORMIN 1000 MG tablet  Commonly known as: GLUCOPHAGE   1,000 mg, Oral, 2 Times Daily With Meals      midodrine 2.5 MG tablet  Commonly known as: PROAMATINE   2.5 mg, Oral, Daily      nitroglycerin 0.4 MG SL tablet  Commonly known as: NITROSTAT   0.4 mg, Sublingual, Every 5 Minutes PRN      sertraline 100 MG tablet  Commonly known as: ZOLOFT   200 mg, Oral, Daily      simvastatin 40 MG tablet  Commonly known as: ZOCOR   20 mg, Oral, Nightly      sulfamethoxazole-trimethoprim 800-160 MG per tablet  Commonly known as: Bactrim DS   1 tablet, Oral, 2 Times Daily      tamsulosin 0.4 MG capsule 24 hr capsule  Commonly known as: FLOMAX   0.4 mg, Oral, Daily             ASK your doctor about these medications        Instructions  Start Date   predniSONE 20 MG tablet  Commonly known as: DELTASONE  Ask about: Should I take this medication?   20 mg, Oral, 2 Times Daily               No Known Allergies      Discharge Disposition:  Skilled Nursing Facility (DC - External)    Diet:  Hospital:  Diet Order   Procedures    Diet: Diabetic Diets; Consistent Carbohydrate; Texture: Regular Texture (IDDSI 7); Fluid Consistency: Thin (IDDSI 0)         Discharge Activity: as tolerated         CODE STATUS:  Code Status and Medical Interventions:   Ordered at: 10/11/23 1447     Level Of Support Discussed With:    Patient     Code Status (Patient has no pulse and is not breathing):    CPR (Attempt to Resuscitate)     Medical Interventions (Patient has pulse or is breathing):    Full Support         Future Appointments   Date Time Provider Department Center   10/23/2023 12:00 AM LAB [facility] LABORATORY [facility] LAB [facility]   10/27/2023 10:45 AM Hunter Orellana DPM [facility] POD [facility]   11/16/2023 12:00 PM Sven Duran MD [facility] CD [facility]           Time spent on Discharge including face to face service:  45 minutes    Electronically signed by Seven Saldana MD, 10/16/23, 2:16 PM EDT.    Part of this note may be an electronic transcription/translation of spoken language to printed text using the Dragon Dictation System.

## 2023-10-16 NOTE — NURSING NOTE
VSS. Report called to Anabell at [facility].

## 2023-10-16 NOTE — THERAPY TREATMENT NOTE
Acute Care - Physical Therapy Treatment Note  SARKIS Dhillon     Patient Name: Cosmo Gauthier  : 1945  MRN: [id number]  Today's Date: 10/16/2023      Visit Dx:     ICD-10-CM ICD-9-CM   1. Decreased activities of daily living (ADL)  Z78.9 V49.89   2. Generalized weakness  R53.1 780.79   3. MARKUS (acute kidney injury)  N17.9 584.9   4. Difficulty in walking  R26.2 719.7     Patient Active Problem List   Diagnosis    Lymphoma    Type 2 diabetes mellitus with complications    Stage 3b chronic kidney disease    Thrombocytopenia    Coronary artery disease involving native coronary artery of native heart without angina pectoris    Hx of CABG    Hyperlipemia, mixed    Hypertension, essential    Hereditary and idiopathic neuropathy, unspecified    Low lying conus medullaris    Mood disorder    Hepatic cirrhosis    Sleep apnea    Spinal stenosis of lumbar region with neurogenic claudication    Vascular dementia without behavioral disturbance    Iron deficiency anemia    Hypothyroidism, adult    Traumatic subarachnoid hemorrhage with loss of consciousness of 30 minutes or less    Morbid (severe) obesity due to excess calories    Claudication of both lower extremities    Medicare annual wellness visit, subsequent    Weakness    Acute cough    Acute kidney injury     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     Primary osteoarthritis of left knee     Sleep apnea     Sleep apnea      Thrombocytopenia 05/22/2018    Thyroid disease      Past Surgical History:   Procedure Laterality Date    CARDIAC SURGERY      COLONOSCOPY  2015    CORONARY ANGIOPLASTY WITH STENT PLACEMENT      CORONARY ARTERY BYPASS GRAFT N/A 05/20/2021    Procedure: MIDLINE STERNOTOMY,CORONARY ARTERY BYPASS GRAFTING X 5, UTILIZING THE LEFT COMPA AND LEFT SAPHENOUS VEIN, ABHIJEET, PRP;  Surgeon: Jr Tom Tubbs MD;  Location: [facility];  Service: Cardiothoracic;  Laterality: N/A;    ENDOSCOPY      HERNIA REPAIR      JOINT REPLACEMENT      SHOULDER SURGERY      SHOULDER REPAIR    TOTAL KNEE ARTHROPLASTY      TRANSESOPHAGEAL ECHOCARDIOGRAM (ABHIJEET) N/A 05/20/2021    Procedure: TRANSESOPHAGEAL ECHOCARDIOGRAM WITH ANESTHESIA;  Surgeon: Jr Tom Tubbs MD;  Location: [facility];  Service: Cardiothoracic;  Laterality: N/A;     PT Assessment (last 12 hours)       PT Evaluation and Treatment       Row Name 10/16/23 1400          Physical Therapy Time and Intention    Subjective Information no complaints (P)   -AM     Document Type therapy note (daily note) (P)   -AM     Mode of Treatment individual therapy;physical therapy (P)   -AM     Patient Effort good (P)   -AM     Symptoms Noted During/After Treatment none (P)   -AM       Row Name 10/16/23 1400          Bed Mobility    Bed Mobility supine-sit;sit-supine (P)   -AM     Supine-Sit [place] (Bed Mobility) standby assist (P)   -AM     Sit-Supine [place] (Bed Mobility) standby assist (P)   -AM       Row Name 10/16/23 1400          Transfers    Transfers sit-stand transfer;stand-sit transfer (P)   -AM       Row Name 10/16/23 1400          Sit-Stand Transfer    Sit-Stand [place] (Transfers) contact guard (P)   -AM     Assistive Device (Sit-Stand Transfers) walker, front-wheeled (P)   -AM       Row Name 10/16/23 1400          Stand-Sit Transfer    Stand-Sit [place] (Transfers) contact guard (P)   -AM     Assistive Device (Stand-Sit Transfers) walker,  front-wheeled (P)   -AM       Row Name 10/16/23 1400          Gait/Stairs (Locomotion)    Gait/Stairs Locomotion gait/ambulation assistive device (P)   -AM     [place] Level (Gait) contact guard (P)   -AM     Assistive Device (Gait) walker, front-wheeled (P)   -AM     Patient was able to Ambulate yes (P)   -AM     Distance in Feet (Gait) 120 (P)   -AM     Pattern (Gait) step-through (P)   -AM     Deviations/Abnormal Patterns (Gait) gait speed decreased;stride length decreased (P)   -AM     Comment, (Gait/Stairs) pt reports feeling of L knee giving out last couple steps (P)   -AM       Row Name 10/16/23 1400          Balance    Balance Assessment standing dynamic balance (P)   -AM     Dynamic Standing Balance contact guard (P)   -AM       Row Name             Wound 10/11/23 1200 Right anterior knee Abrasion    Wound - Properties Group Placement Date: 10/11/23  -GIANNA Placement Time: 1200  -GIANNA Present on Original Admission: Y  -GIANNA Side: Right  -GIANNA Orientation: anterior  -GIANNA Location: knee  -GIANNA Primary Wound Type: Abrasion  -GIANNA    Retired Wound - Properties Group Placement Date: 10/11/23  -GIANNA Placement Time: 1200  -GIANNA Present on Original Admission: Y  -GIANNA Side: Right  -GIANNA Orientation: anterior  -GIANNA Location: knee  -GIANNA Primary Wound Type: Abrasion  -GIANNA    Retired Wound - Properties Group Date first assessed: 10/11/23  -GIANNA Time first assessed: 1200  -GIANNA Present on Original Admission: Y  -GIANNA Side: Right  -GIANNA Location: knee  -GIANNA Primary Wound Type: Abrasion  -GIANNA      Row Name 10/16/23 1400          Progress Summary (PT)    Progress Toward Functional Goals (PT) progress toward functional goals is good (P)   -AM     Daily Progress Summary (PT) Pt tolerated gait training well this date (P)   -AM               User Key  (r) = Recorded By, (t) = Taken By, (c) = Cosigned By      Initials Name Provider Type    Ceci Acuña, RN Registered Nurse    Rohini Loera, PT Student PT Student                      PT  Recommendation and Plan     Progress Summary (PT)  Progress Toward Functional Goals (PT): (P) progress toward functional goals is good  Daily Progress Summary (PT): (P) Pt tolerated gait training well this date   Outcome Measures       Row Name 10/16/23 1400             How much help from another person do you currently need...    Turning from your back to your side while in flat bed without using bedrails? 4 (P)   -AM      Moving from lying on back to sitting on the side of a flat bed without bedrails? 4 (P)   -AM      Moving to and from a bed to a chair (including a wheelchair)? 3 (P)   -AM      Standing up from a chair using your arms (e.g., wheelchair, bedside chair)? 3 (P)   -AM      Climbing 3-5 steps with a railing? 2 (P)   -AM      To walk in hospital room? 3 (P)   -AM      AM-PAC 6 Clicks Score (PT) 19 (P)   -AM      Highest level of mobility 6 --> Walked 10 steps or more (P)   -AM         Functional Assessment    Outcome Measure Options AM-PAC 6 Clicks Basic Mobility (PT) (P)   -AM                User Key  (r) = Recorded By, (t) = Taken By, (c) = Cosigned By      Initials Name Provider Type    AM Rohini Manjarrez PT Student PT Student                     Time Calculation:    PT Charges       Row Name 10/16/23 1446             Time Calculation    PT Received On 10/16/23 (P)   -AM         Timed Charges    16609 - Gait Training Minutes  5 (P)   -AM      38533 - PT Therapeutic Activity Minutes 3 (P)   -AM         Total Minutes    Timed Charges Total Minutes 8 (P)   -AM       Total Minutes 8 (P)   -AM                User Key  (r) = Recorded By, (t) = Taken By, (c) = Cosigned By      Initials Name Provider Type    AM Rohini Manjarrez PT Student PT Student                  Therapy Charges for Today       Code Description Service Date Service Provider Modifiers Qty    [id number] HC GAIT TRAINING EA 15 MIN 10/16/2023 Rohini Manjarrez PT Student GP 1            PT G-Codes  Outcome Measure Options: (P) AM-PAC 6  Clicks Basic Mobility (PT)  AM-PAC 6 Clicks Score (PT): (P) 19  AM-PAC 6 Clicks Score (OT): 19    Rohini Manjarrez, PT Student  10/16/2023

## 2023-10-17 DIAGNOSIS — G60.9 HEREDITARY AND IDIOPATHIC NEUROPATHY, UNSPECIFIED: Primary | ICD-10-CM

## 2023-10-17 RX ORDER — GABAPENTIN 300 MG/1
300 CAPSULE ORAL 2 TIMES DAILY
Qty: 60 CAPSULE | Refills: 5 | Status: SHIPPED | OUTPATIENT
Start: 2023-10-17

## 2023-10-18 ENCOUNTER — TELEPHONE (OUTPATIENT)
Dept: INTERNAL MEDICINE | Facility: CLINIC | Age: 78
End: 2023-10-18
Payer: OTHER GOVERNMENT

## 2023-10-18 NOTE — TELEPHONE ENCOUNTER
Caller: Carla Gauthier    Relationship: Emergency Contact    Best call back number: [phone]    What was the call regarding: PATIENT'S SPOUSE IS REQUESTING A CALL FROM [facility] TO DISCUSS SOMETHING REGARDING PATIENT.

## 2023-10-19 NOTE — TELEPHONE ENCOUNTER
I have spoke with pt's wife and she wanted us to know that he is in rehab at the [facility].

## 2023-10-25 ENCOUNTER — TRANSCRIBE ORDERS (OUTPATIENT)
Dept: ADMINISTRATIVE | Facility: HOSPITAL | Age: 78
End: 2023-10-25
Payer: OTHER GOVERNMENT

## 2023-10-25 DIAGNOSIS — I65.23 CAROTID STENOSIS, BILATERAL: Primary | ICD-10-CM

## 2023-10-26 ENCOUNTER — TRANSCRIBE ORDERS (OUTPATIENT)
Dept: ADMINISTRATIVE | Facility: HOSPITAL | Age: 78
End: 2023-10-26
Payer: OTHER GOVERNMENT

## 2023-10-26 DIAGNOSIS — I95.1 ORTHOSTATIC HYPOTENSION: ICD-10-CM

## 2023-10-26 DIAGNOSIS — I50.9 HEART FAILURE, UNSPECIFIED HF CHRONICITY, UNSPECIFIED HEART FAILURE TYPE: Primary | ICD-10-CM

## 2023-10-27 ENCOUNTER — HOSPITAL ENCOUNTER (OUTPATIENT)
Dept: CARDIOLOGY | Facility: HOSPITAL | Age: 78
Discharge: HOME | End: 2023-10-27
Payer: MEDICARE

## 2023-10-27 ENCOUNTER — OFFICE VISIT (OUTPATIENT)
Dept: PODIATRY | Facility: CLINIC | Age: 78
End: 2023-10-27
Payer: OTHER GOVERNMENT

## 2023-10-27 VITALS
OXYGEN SATURATION: 98 % | TEMPERATURE: 96.9 F | SYSTOLIC BLOOD PRESSURE: 106 MMHG | DIASTOLIC BLOOD PRESSURE: 74 MMHG | WEIGHT: 247 LBS | HEART RATE: 99 BPM | BODY MASS INDEX: 33.46 KG/M2 | HEIGHT: 72 IN

## 2023-10-27 DIAGNOSIS — I95.1 ORTHOSTATIC HYPOTENSION: ICD-10-CM

## 2023-10-27 DIAGNOSIS — L60.0 ONYCHOCRYPTOSIS: ICD-10-CM

## 2023-10-27 DIAGNOSIS — I50.9 HEART FAILURE, UNSPECIFIED HF CHRONICITY, UNSPECIFIED HEART FAILURE TYPE: ICD-10-CM

## 2023-10-27 DIAGNOSIS — R26.2 DIFFICULTY WALKING: ICD-10-CM

## 2023-10-27 DIAGNOSIS — E11.42 TYPE 2 DIABETES MELLITUS WITH DIABETIC POLYNEUROPATHY, WITH LONG-TERM CURRENT USE OF INSULIN: Primary | ICD-10-CM

## 2023-10-27 DIAGNOSIS — M79.671 FOOT PAIN, BILATERAL: ICD-10-CM

## 2023-10-27 DIAGNOSIS — Z79.4 TYPE 2 DIABETES MELLITUS WITH DIABETIC POLYNEUROPATHY, WITH LONG-TERM CURRENT USE OF INSULIN: Primary | ICD-10-CM

## 2023-10-27 DIAGNOSIS — G62.2 NEUROPATHY DUE TO CHEMICAL SUBSTANCE: ICD-10-CM

## 2023-10-27 DIAGNOSIS — Z77.098 HISTORY OF AGENT ORANGE EXPOSURE: ICD-10-CM

## 2023-10-27 DIAGNOSIS — M79.672 FOOT PAIN, BILATERAL: ICD-10-CM

## 2023-10-27 LAB
BH CV XLRA MEAS LEFT CAROTID BULB EDV: 9 CM/SEC
BH CV XLRA MEAS LEFT CAROTID BULB PSV: 36 CM/SEC
BH CV XLRA MEAS LEFT DIST CCA EDV: -14.7 CM/SEC
BH CV XLRA MEAS LEFT DIST CCA PSV: -88.3 CM/SEC
BH CV XLRA MEAS LEFT DIST ICA EDV: -19.8 CM/SEC
BH CV XLRA MEAS LEFT DIST ICA PSV: -58.7 CM/SEC
BH CV XLRA MEAS LEFT ICA/CCA RATIO: 0.56
BH CV XLRA MEAS LEFT MID ICA EDV: -15.3 CM/SEC
BH CV XLRA MEAS LEFT MID ICA PSV: -49.3 CM/SEC
BH CV XLRA MEAS LEFT PROX CCA EDV: 18.9 CM/SEC
BH CV XLRA MEAS LEFT PROX CCA PSV: 93.9 CM/SEC
BH CV XLRA MEAS LEFT PROX ECA EDV: -6.4 CM/SEC
BH CV XLRA MEAS LEFT PROX ECA PSV: -70.3 CM/SEC
BH CV XLRA MEAS LEFT PROX ICA EDV: -13.3 CM/SEC
BH CV XLRA MEAS LEFT PROX ICA PSV: -39.8 CM/SEC
BH CV XLRA MEAS LEFT VERTEBRAL A EDV: 13.8 CM/SEC
BH CV XLRA MEAS LEFT VERTEBRAL A PSV: 57.5 CM/SEC
BH CV XLRA MEAS RIGHT CAROTID BULB EDV: 10 CM/SEC
BH CV XLRA MEAS RIGHT CAROTID BULB PSV: 44 CM/SEC
BH CV XLRA MEAS RIGHT DIST CCA EDV: 11.2 CM/SEC
BH CV XLRA MEAS RIGHT DIST CCA PSV: 68.7 CM/SEC
BH CV XLRA MEAS RIGHT DIST ICA EDV: -18 CM/SEC
BH CV XLRA MEAS RIGHT DIST ICA PSV: -59.7 CM/SEC
BH CV XLRA MEAS RIGHT ICA/CCA RATIO: -0.54
BH CV XLRA MEAS RIGHT MID ICA EDV: -9.7 CM/SEC
BH CV XLRA MEAS RIGHT MID ICA PSV: -31.6 CM/SEC
BH CV XLRA MEAS RIGHT PROX CCA EDV: -9.4 CM/SEC
BH CV XLRA MEAS RIGHT PROX CCA PSV: -110.5 CM/SEC
BH CV XLRA MEAS RIGHT PROX ECA EDV: -7.9 CM/SEC
BH CV XLRA MEAS RIGHT PROX ECA PSV: -71.1 CM/SEC
BH CV XLRA MEAS RIGHT PROX ICA EDV: -10.1 CM/SEC
BH CV XLRA MEAS RIGHT PROX ICA PSV: -37.3 CM/SEC
BH CV XLRA MEAS RIGHT VERTEBRAL A EDV: 11.9 CM/SEC
BH CV XLRA MEAS RIGHT VERTEBRAL A PSV: 35.6 CM/SEC
LEFT ARM BP: NORMAL MMHG
RIGHT ARM BP: NORMAL MMHG

## 2023-10-27 PROCEDURE — 93880 EXTRACRANIAL BILAT STUDY: CPT

## 2023-10-27 NOTE — PROGRESS NOTES
[facility] - PODIATRY    Today's Date: 10/27/23    Patient Name: Cosmo Gauthier  MRN: [id number]  CSN: [id number]  PCP: Alex Buckley MD, Last PCP Visit:  10/10/2023  Referring Provider: Marah Monreal MD    SUBJECTIVE     Chief Complaint   Patient presents with    Left Foot - Establish Care, Annual Exam, Diabetes    Right Foot - Establish Care, Annual Exam, Diabetes     HPI: Cosmo Gauthier, a 78 y.o.male, presents to clinic for painful toenail and a diabetic foot evaluation.    New, Established, New Problem:  New  Location:  Toenails  Duration:   Greater than five years  Onset:  Gradual  Nature:  sore with palpation.  Stable, worsening, improving:   worsening  Aggravating factors:  Pain with shoe gear and ambulation.  Previous Treatment: Unable to trim their own toenails.    Patient controlling diabetes via:  insulin    Patient states their last blood glucose was:  140    Patient denies any fevers, chills, nausea, vomiting, shortness of breath, nor any other constitutional signs nor symptoms.    No other pedal complaints at this time.    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     Primary osteoarthritis of left knee     Sleep apnea     Sleep apnea     Thrombocytopenia 05/22/2018    Thyroid disease      Past Surgical History:   Procedure Laterality Date    CARDIAC SURGERY      COLONOSCOPY  2015    CORONARY ANGIOPLASTY WITH STENT PLACEMENT       CORONARY ARTERY BYPASS GRAFT N/A 05/20/2021    Procedure: MIDLINE STERNOTOMY,CORONARY ARTERY BYPASS GRAFTING X 5, UTILIZING THE LEFT COMPA AND LEFT SAPHENOUS VEIN, ABHIJEET, PRP;  Surgeon: Jr Tom Tubbs MD;  Location: [facility];  Service: Cardiothoracic;  Laterality: N/A;    ENDOSCOPY      HERNIA REPAIR      JOINT REPLACEMENT      SHOULDER SURGERY      SHOULDER REPAIR    TOTAL KNEE ARTHROPLASTY      TRANSESOPHAGEAL ECHOCARDIOGRAM (ABHIJEET) N/A 05/20/2021    Procedure: TRANSESOPHAGEAL ECHOCARDIOGRAM WITH ANESTHESIA;  Surgeon: Jr Tom Tubbs MD;  Location: [facility];  Service: Cardiothoracic;  Laterality: N/A;     Family History   Problem Relation Age of Onset    Colon cancer Father     Diabetes Mother      Social History     Socioeconomic History    Marital status:    Tobacco Use    Smoking status: Never    Smokeless tobacco: Never   Vaping Use    Vaping Use: Never used   Substance and Sexual Activity    Alcohol use: Not Currently     Comment: QUIT DRINKING 32 YEARS AGO    Drug use: Never    Sexual activity: Defer     No Known Allergies  Current Outpatient Medications   Medication Sig Dispense Refill    amitriptyline (ELAVIL) 50 MG tablet Take 1 tablet by mouth Every Night.      aspirin 81 MG EC tablet Take 1 tablet by mouth Daily.      donepezil (ARICEPT) 10 MG tablet Take 1 tablet by mouth every night at bedtime.      finasteride (PROSCAR) 5 MG tablet Take 1 tablet by mouth Daily. 60 tablet 5    furosemide (LASIX) 40 MG tablet Take 1 tablet by mouth Daily. 90 tablet 3    gabapentin (NEURONTIN) 300 MG capsule Take 1 capsule by mouth 2 (Two) Times a Day. 60 capsule 5    gabapentin (NEURONTIN) 600 MG tablet Take 0.5 tablets by mouth 2 (Two) Times a Day. 30 tablet 0    insulin glargine (LANTUS, SEMGLEE) 100 UNIT/ML injection Inject 15 Units under the skin into the appropriate area as directed Every Night.      Insulin Lispro, 1 Unit Dial, (HumaLOG KwikPen) 100 UNIT/ML solution pen-injector 150-200  = 4 units  201-250 = 6 units  251-300 = 8 units  301-350 = 10 units       >350 = 14 units      levothyroxine (SYNTHROID, LEVOTHROID) 112 MCG tablet Take 2 tablets by mouth Take As Directed.  Takes two tablets every day Mon - Sat      levothyroxine (SYNTHROID, LEVOTHROID) 112 MCG tablet Take 1.5 tablets by mouth Take As Directed.  Sunday only      metFORMIN (GLUCOPHAGE) 1000 MG tablet Take 1 tablet by mouth 2 (Two) Times a Day With Meals.      midodrine (PROAMATINE) 2.5 MG tablet Take 1 tablet by mouth Daily. 90 tablet 3    nitroglycerin (NITROSTAT) 0.4 MG SL tablet Place 1 tablet under the tongue Every 5 (Five) Minutes As Needed.      sertraline (ZOLOFT) 100 MG tablet Take 2 tablets by mouth Daily. 180 tablet 1    simvastatin (ZOCOR) 40 MG tablet Take 0.5 tablets by mouth Every Night.      sulfamethoxazole-trimethoprim (Bactrim DS) 800-160 MG per tablet Take 1 tablet by mouth 2 (Two) Times a Day. 20 tablet 0    tamsulosin (FLOMAX) 0.4 MG capsule 24 hr capsule Take 1 capsule by mouth Daily. 30 capsule 5     No current facility-administered medications for this visit.     Review of Systems   Constitutional: Negative.    Skin:         Painful toenails   Neurological:  Positive for numbness.   All other systems reviewed and are negative.      OBJECTIVE     Vitals:    10/27/23 1035   BP: 106/74   Pulse: 99   Temp: 96.9 °F (36.1 °C)   SpO2: 98%       Body mass index is 33.5 kg/m².    Lab Results   Component Value Date    HGBA1C 6.40 (H) 07/20/2023       Lab Results   Component Value Date    GLUCOSE 250 (H) 10/12/2023    CALCIUM 8.7 10/12/2023     (L) 10/12/2023    K 4.9 10/12/2023    CO2 18.8 (L) 10/12/2023     10/12/2023    BUN 37 (H) 10/12/2023    CREATININE 1.56 (H) 10/12/2023    EGFRIFNONA 48 (L) 01/13/2022    BCR 23.7 10/12/2023    ANIONGAP 10.2 10/12/2023       Patient seen in no apparent distress.      PHYSICAL EXAM:     Foot/Ankle Exam    GENERAL  Diabetic foot exam performed    Appearance:   elderly  Orientation:  AAOx3  Affect:  appropriate  Gait:  unimpaired  Assistance:  independent  Right shoe gear: casual shoe  Left shoe gear: casual shoe    VASCULAR     Right Foot Vascularity   Dorsalis pedis:  1+  Posterior tibial:  1+  Skin temperature:  warm  Edema grading:  None  CFT:  < 3 seconds  Pedal hair growth:  Absent  Varicosities:  mild varicosities     Left Foot Vascularity   Dorsalis pedis:  1+  Posterior tibial:  1+  Skin temperature:  warm  Edema grading:  None  CFT:  < 3 seconds  Pedal hair growth:  Absent  Varicosities:  mild varicosities     NEUROLOGIC     Right Foot Neurologic   Light touch sensation: diminished  Vibratory sensation: diminished  Hot/Cold sensation: diminished  Protective Sensation using Lyman-Tanya Monofilament:   Sites intact: 2  Sites tested: 10     Left Foot Neurologic   Light touch sensation: diminished  Vibratory sensation: diminished  Hot/Cold sensation:  diminished  Protective Sensation using Lyman-Tanya Monofilament:   Sites intact: 2  Sites tested: 10    MUSCULOSKELETAL     Right Foot Musculoskeletal   Hammertoe:  Second toe, third toe, fourth toe and fifth toe     Left Foot Musculoskeletal   Hammertoe:  Second toe, third toe, fourth toe and fifth toe    MUSCLE STRENGTH     Right Foot Muscle Strength   Foot dorsiflexion:  4-  Foot plantar flexion:  4-  Foot inversion:  4-  Foot eversion:  4-     Left Foot Muscle Strength   Foot dorsiflexion:  4-  Foot plantar flexion:  4-  Foot inversion:  4-  Foot eversion:  4-    RANGE OF MOTION     Right Foot Range of Motion   Foot and ankle ROM within normal limits       Left Foot Range of Motion   Foot and ankle ROM within normal limits      DERMATOLOGIC      Right Foot Dermatologic   Skin  Right foot skin is intact.   Nails  1.  Positive for elongated, onychomycosis, abnormal thickness, subungual debris and ingrown toenail.  2.  Positive for elongated, onychomycosis, abnormal thickness, subungual debris and ingrown  toenail.  3.  Positive for elongated, onychomycosis, abnormal thickness, subungual debris and ingrown toenail.  4.  Positive for elongated, onychomycosis, abnormal thickness, subungual debris and ingrown toenail.  5.  Positive for elongated, onychomycosis, abnormal thickness, subungual debris and ingrown toenail.  Nails comment:  Toenails 1, 2, 3, 4, and 5     Left Foot Dermatologic   Skin  Left foot skin is intact.   Nails comment:  Toenails 1, 2, 3, 4, and 5  Nails  1.  Positive for elongated, onychomycosis, abnormal thickness, subungual debris and ingrown toenail.  2.  Positive for elongated, onychomycosis, abnormal thickness, subungual debris and ingrown toenail.  3.  Positive for elongated, onychomycosis, abnormal thickness, subungual debris and ingrown toenail.  4.  Positive for elongated, onychomycosis, abnormally thick, subungual debris and ingrown toenail.  5.  Positive for elongated, onychomycosis, abnormally thick, subungual debris and ingrown toenail.      Diabetic Foot Exam Performed and Monofilament Test Performed      ASSESSMENT/PLAN     Diagnoses and all orders for this visit:    1. Type 2 diabetes mellitus with diabetic polyneuropathy, with long-term current use of insulin (Primary)    2. Foot pain, bilateral    3. Onychocryptosis    4. Difficulty walking    5. 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    Discussed findings and treatment plan including risks, benefits, and treatment options with patient in detail. Patient agreed with treatment plan.    Medications and allergies reviewed.  Reviewed available blood glucose and HgB A1C lab values along with other pertinent labs.  These were discussed with the patient as to their importance of diabetic maintenance.    Toenails 1, 2, 3, 4, 5 on Right and 1, 2, 3, 4, 5 on Left were debrided with nail nippers then filed with a Francesco nail tavia.   Patient tolerated procedure well without complications.    Diabetic foot exam performed and documented this date, compliant with CQM required standards. Detail of findings as noted in physical exam.  Lower extremity Neurologic exam for diabetic patient performed and documented this date, compliant with PQRS required standards. Detail of findings as noted in physical exam.  Advised patient importance of good routine lower extremity hygiene. Advised patient importance of evaluating for intact skin and pain free nail borders.  Advised patient to use mirror to evaluate plantar/ soles of feet for better visualization. Advised patient monitor and phone office to be seen if any cracking to skin, open lesions, painful nail borders or if nails become elongated prior to next visit. Advised patient importance of daily cleansing of lower extremities, followed by good skin cream to maintain normal hydration of skin. Also advised patient importance of close daily monitoring of blood sugar. Advised to regulate diet and medications to maintain control of blood sugar in optimal range. Contact primary care provider if difficulties maintaining blood sugar levels.  Advised Patient of presence of Diabetes Mellitus condition.  Advised Patient risk of progression and worsening or improvement, then return of condition.  Will monitor condition for any change in future. Treat with most appropriate treatment pending status of condition.  Counseled and advised patient extensively on nature and ramifications of diabetes. Standard instructions given to patient for good diabetic foot care and maintenance. Advised importance of careful monitoring to avoid break down and complications secondary to diabetes. Advised patient importance of strict maintenance of blood sugar control. Advised patient of possible ominous results from neglect of condition, i.e.: amputation/ loss of digits, feet and legs, or even death.  Patient states understands  counseling, will monitor closely, continue good hygiene and routine diabetic foot care. Patient will contact office should they have any questions or problems.      An After Visit Summary was printed and given to the patient at discharge, including (if requested) any available informative/educational handouts regarding diagnosis, treatment, or medications. All questions were answered to patient/family satisfaction. Should symptoms fail to improve or worsen they agree to call or return to clinic or to go to the Emergency Department. Discussed the importance of following up with any needed screening tests/labs/specialist appointments and any requested follow-up recommended by me today. Importance of maintaining follow-up discussed and patient accepts that missed appointments can delay diagnosis and potentially lead to worsening of conditions.    Return in about 9 weeks (around 12/29/2023) for Toenail Care., or sooner if acute issues arise.    This document has been electronically signed by Hunter Orellana DPM on October 27, 2023 10:48 EDT

## 2023-10-30 ENCOUNTER — TELEPHONE (OUTPATIENT)
Dept: INTERNAL MEDICINE | Facility: CLINIC | Age: 78
End: 2023-10-30
Payer: OTHER GOVERNMENT

## 2023-10-30 NOTE — TELEPHONE ENCOUNTER
Caller: Raya Gauthierh    Relationship: Emergency Contact    Best call back number: [phone]     What form or medical record are you requesting: NOTE ADVISING THAT PATIENT IS NO LONGER ABLE TO DRIVE.    Who is requesting this form or medical record from you: [employer]    How would you like to receive the form or medical records (pick-up, mail, fax):     Timeframe paperwork needed: AS SOON AS POSSIBLE, CALLER WANTING TO  FORM ON 10.31.2023

## 2023-11-03 NOTE — TELEPHONE ENCOUNTER
To whom it may concern,    Unfortunately Mr. Cosmo Gauthier can no longer safely operate a motor vehicle on a consistent basis.  He has numerous chronic and progressive medical conditions which can result in  inattention, to include but not limited to vascular dementia, hepatic cirrhosis, and chronic kidney disease.  Additionally he has numerous conditions that can result in acute loss of consciousness, to include diabetes mellitus which is insulin requiring as well as episodic hypotensive episodes.    Please feel free to contact our office if any further information is needed.    Sincerely  Alex Buckley MD

## 2023-11-13 NOTE — PROGRESS NOTES
[facility]  Cardiology progress Note    Patient Name: Cosmo Gauthier  : 1945    CHIEF COMPLAINT  CAD        Subjective   Subjective     HISTORY OF PRESENT ILLNESS    Cosmo Gauthier is a 78 y.o. male with CAD status post CABG.  No chest pain or shortness of breath.    REVIEW OF SYSTEMS    Constitutional:    No fever, no weight loss  Skin:     No rash  Otolaryngeal:    No difficulty swallowing  Cardiovascular: See HPI.  Pulmonary:    No cough, no sputum production    Personal History     Social History:    reports that he has never smoked. He has never used smokeless tobacco. He reports that he does not currently use alcohol. He reports that he does not use drugs.    Home Medications:  Current Outpatient Medications on File Prior to Visit   Medication Sig    amitriptyline (ELAVIL) 50 MG tablet Take 1 tablet by mouth Every Night.    aspirin 81 MG EC tablet Take 1 tablet by mouth Daily.    donepezil (ARICEPT) 10 MG tablet Take 1 tablet by mouth every night at bedtime.    finasteride (PROSCAR) 5 MG tablet Take 1 tablet by mouth Daily.    furosemide (LASIX) 40 MG tablet Take 1 tablet by mouth Daily.    gabapentin (NEURONTIN) 300 MG capsule Take 1 capsule by mouth 2 (Two) Times a Day.    gabapentin (NEURONTIN) 600 MG tablet Take 0.5 tablets by mouth 2 (Two) Times a Day.    insulin glargine (LANTUS, SEMGLEE) 100 UNIT/ML injection Inject 15 Units under the skin into the appropriate area as directed Every Night.    Insulin Lispro, 1 Unit Dial, (HumaLOG KwikPen) 100 UNIT/ML solution pen-injector 150-200 = 4 units  201-250 = 6 units  251-300 = 8 units  301-350 = 10 units       >350 = 14 units    levothyroxine (SYNTHROID, LEVOTHROID) 112 MCG tablet Take 2 tablets by mouth Take As Directed.  Takes two tablets every day Mon - Sat    levothyroxine (SYNTHROID, LEVOTHROID) 112 MCG tablet Take 1.5 tablets by mouth Take As Directed.   only    metFORMIN (GLUCOPHAGE) 1000 MG tablet Take 1 tablet by mouth 2  (Two) Times a Day With Meals.    midodrine (PROAMATINE) 2.5 MG tablet Take 1 tablet by mouth Daily.    nitroglycerin (NITROSTAT) 0.4 MG SL tablet Place 1 tablet under the tongue Every 5 (Five) Minutes As Needed.    sertraline (ZOLOFT) 100 MG tablet Take 2 tablets by mouth Daily.    simvastatin (ZOCOR) 40 MG tablet Take 0.5 tablets by mouth Every Night.    sulfamethoxazole-trimethoprim (Bactrim DS) 800-160 MG per tablet Take 1 tablet by mouth 2 (Two) Times a Day.    tamsulosin (FLOMAX) 0.4 MG capsule 24 hr capsule Take 1 capsule by mouth Daily.     No current facility-administered medications on file prior to visit.       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     PHYSICAL EXAM:    General Appearance:   well developed  well nourished  HENT:   oropharynx moist  lips not cyanotic  Neck:  thyroid not enlarged  supple  Respiratory:  no respiratory distress  normal breath sounds  no rales  Cardiovascular:  no jugular venous distention  regular rhythm  apical impulse normal  S1 normal, S2 normal  no S3, no S4   no murmur  no rub, no thrill  carotid pulses normal; no bruit  pedal pulses normal  lower  extremity edema: none    Skin:   warm, dry  Psychiatric:  judgement and insight appropriate  normal mood and affect        Result Review:  I have personally reviewed the available results from  [x]  Laboratory  [x]  EKG  [x]  Cardiology  [x]  Medications  [x]  Old records  []  Other:     Procedures  Lab Results   Component Value Date    CHOL 128 09/12/2023    CHOL 126 04/20/2023    CHOL 147 10/11/2022     Lab Results   Component Value Date    TRIG 179 (H) 09/12/2023    TRIG 140 04/20/2023    TRIG 157 (H) 10/11/2022     Lab Results   Component Value Date    HDL 48 09/12/2023    HDL 40 04/20/2023    HDL 45 10/11/2022     Lab Results   Component Value Date    LDL 51 09/12/2023    LDL 62 04/20/2023    LDL 75 10/11/2022     Lab Results   Component Value Date    VLDL 29 09/12/2023    VLDL 24 04/20/2023    VLDL 27 10/11/2022     Results for orders placed in visit on 05/20/21    Emergent/Open-Heart Anesthesia ABHIJEET    Narrative  Preanesthesia Checklist:  Patient identified, IV assessed, risks and benefits discussed, monitors and equipment assessed and procedure being performed at surgeon's request.    General Procedure Information  Diagnostic Indications for Echo:  assessment of ascending aorta, assessment of surgical repair, defect repair evaluation and hemodynamic monitoring  Physician Requesting Echo: Jr Tom Tubbs MD  ICD Code for Medical Necessity:  Aortic Insufficiency  Location performed:  OR  Intubated  Bite block placed  Heart visualized  Probe Insertion:  Easy  Probe Type:  Multiplane  Modalities:  Color flow mapping, 2D only, continuous wave Doppler and pulse wave Doppler    Echocardiographic and Doppler Measurements    Ventricles    Right Ventricle:  Cavity size normal.  Hypertrophy not present.  Left Ventricle:  Diastolic dimension 4.7 cm.  Hypertrophy not present.  Thrombus not present.  Global Function mildly impaired.  Ejection Fraction 50%.    Ventricular Regional Function:  13- Apical Anterior:   hypokinetic  14- Apical Lateral:  hypokinetic  16- Apical Septal:  hypokinetic  17- [place]:  hypokinetic      Valves    Aortic Valve:  Annulus normal.  Stenosis not present.  Pressure Half-time: 910 ms.  Regurgitation mild.  Leaflets normal.  Leaflet motions normal.    Mitral Valve:  Annulus normal.  Stenosis not present.  Regurgitation trace.    Tricuspid Valve:  Annulus normal.  Stenosis not present.  Regurgitation mild.  Pulmonic Valve:  Annulus normal.  Regurgitation trace.        Aorta    Ascending Aorta:  Size normal.  Diameter 3.6 cm.  Dissection not present.  Plaque thickness less than 3 mm.  Mobile plaque not present.  Aortic Arch:  Size normal.  Diameter 3.1 cm.  Dissection not present.  Plaque thickness less than 3 mm.  Mobile plaque not present.  Descending Aorta:  Size normal.  Diameter 2.5 cm.  Dissection not present.  Plaque thickness less than 3 mm.  Mobile plaque not present.        Atria    Right Atrium:  Size normal.  Spontaneous echo contrast not present.  Thrombus not present.  Tumor not present.  Device present.    Left Atrium:  Size normal.  Spontaneous echo contrast not present.  Thrombus not present.  Tumor not present.  Device not present.  Left atrial appendage normal.      Septa    Atrial Septum:  Intra-atrial septal morphology lipomatous hypertrophy.        Diastolic Function Measurements:  Diastolic Dysfunction Grade= II  E= 40.6 ms  A= 35.3 ms  E/A Ratio=  1.2  DT=  296 ms  S/D=  IVRT=    Other Findings  Pericardium:  normal  Pleural Effusion:  none  Pulmonary Arteries:  normal  Pulmonary Venous Flow:  normal    Anesthesia Information  Performed Personally      Echocardiogram Comments:  Post CPB:  LVEF much improved, 60%, apex no longer hypokinetic.  No aortic dissection post decannulation.  AI unchanged.     Impression/Plan:  1.  Essential hypertension controlled: Monitor blood pressure regularly.  2.  Chronic diastolic heart failure stable: Continue Lasix 40 mg once a day.  Monitor  BMP.  3.  Coronary artery s/p CABG stable: Continue aspirin 81 mg once a day.  No chest pain.  4.  Hyperlipidemia: Continue simvastatin 20 mg once a day.  Monitor lipid and hepatic profile.           Sven Duran MD   11/16/23   12:26 EST

## 2023-11-16 ENCOUNTER — OFFICE VISIT (OUTPATIENT)
Dept: CARDIOLOGY | Facility: CLINIC | Age: 78
End: 2023-11-16
Payer: MEDICARE

## 2023-11-16 VITALS
HEIGHT: 72 IN | HEART RATE: 74 BPM | SYSTOLIC BLOOD PRESSURE: 139 MMHG | BODY MASS INDEX: 34.27 KG/M2 | DIASTOLIC BLOOD PRESSURE: 66 MMHG | WEIGHT: 253 LBS

## 2023-11-16 DIAGNOSIS — Z95.1 HX OF CABG: ICD-10-CM

## 2023-11-16 DIAGNOSIS — E78.2 HYPERLIPEMIA, MIXED: ICD-10-CM

## 2023-11-16 DIAGNOSIS — I25.10 CORONARY ARTERY DISEASE INVOLVING NATIVE CORONARY ARTERY OF NATIVE HEART WITHOUT ANGINA PECTORIS: Primary | ICD-10-CM

## 2023-11-16 DIAGNOSIS — I50.32 DIASTOLIC CHF, CHRONIC: ICD-10-CM

## 2023-11-16 PROCEDURE — 1160F RVW MEDS BY RX/DR IN RCRD: CPT | Performed by: SPECIALIST

## 2023-11-16 PROCEDURE — 1159F MED LIST DOCD IN RCRD: CPT | Performed by: SPECIALIST

## 2023-11-16 PROCEDURE — 3075F SYST BP GE 130 - 139MM HG: CPT | Performed by: SPECIALIST

## 2023-11-16 PROCEDURE — 3078F DIAST BP <80 MM HG: CPT | Performed by: SPECIALIST

## 2023-11-16 PROCEDURE — 99214 OFFICE O/P EST MOD 30 MIN: CPT | Performed by: SPECIALIST

## 2023-11-21 ENCOUNTER — OFFICE VISIT (OUTPATIENT)
Dept: GASTROENTEROLOGY | Facility: CLINIC | Age: 78
End: 2023-11-21
Payer: OTHER GOVERNMENT

## 2023-11-21 ENCOUNTER — TELEPHONE (OUTPATIENT)
Dept: GASTROENTEROLOGY | Facility: CLINIC | Age: 78
End: 2023-11-21
Payer: OTHER GOVERNMENT

## 2023-11-21 VITALS
SYSTOLIC BLOOD PRESSURE: 134 MMHG | DIASTOLIC BLOOD PRESSURE: 82 MMHG | HEART RATE: 71 BPM | BODY MASS INDEX: 34.91 KG/M2 | WEIGHT: 257.4 LBS

## 2023-11-21 DIAGNOSIS — D64.9 ANEMIA, UNSPECIFIED TYPE: ICD-10-CM

## 2023-11-21 DIAGNOSIS — K74.69 OTHER CIRRHOSIS OF LIVER: Primary | ICD-10-CM

## 2023-11-21 DIAGNOSIS — K76.82 HEPATIC ENCEPHALOPATHY: ICD-10-CM

## 2023-11-21 DIAGNOSIS — E66.9 CLASS 1 OBESITY WITH SERIOUS COMORBIDITY AND BODY MASS INDEX (BMI) OF 34.0 TO 34.9 IN ADULT, UNSPECIFIED OBESITY TYPE: ICD-10-CM

## 2023-11-21 DIAGNOSIS — I85.10 SECONDARY ESOPHAGEAL VARICES WITHOUT BLEEDING: ICD-10-CM

## 2023-11-21 DIAGNOSIS — R13.19 ESOPHAGEAL DYSPHAGIA: ICD-10-CM

## 2023-11-21 RX ORDER — LACTULOSE 10 G/15ML
20 SOLUTION ORAL DAILY
Qty: 900 ML | Refills: 0 | Status: SHIPPED | OUTPATIENT
Start: 2023-11-21 | End: 2023-12-21

## 2023-11-21 NOTE — PROGRESS NOTES
Patient Name: Cosmo Gauthier   Visit Date: 11/21/2023   Patient ID: [id number]  Provider: MAXWELL Denise    Sex: male  Location:  Location Address:  Location Phone: [address]  [place] [address] [phone]    YOB: 1945  Age: 78 y.o.   Primary Care Provider Alex Buckley MD      Referring Provider: No ref. provider found        Chief Complaint  Difficulty Swallowing (Pt states having trouble with solids and taking medications in the morning )    History of Present Illness    Patient has not been seen since 2020.  He has history of fatty liver noted in 2010 with liver biopsy in 2011 that showed CANSECO.  History of alcohol for 20 years but he quit in the 1990s.    Esophageal varices were noted in 2014 and patient was started on nadolol.  At last visit in 2020 patient was off Xifaxan and was not sure why.  Last EGD February 25, 2014 gastritis, esophageal varices, patient was started on nadolol 40 mg/day.  Last colonoscopy 7/29/2019: Adequate prep, 2 small polyps in the transverse colon completely removedinflammatory and hyperplastic polyps. Patient is in recall for 5 years.    Pt states he has been following with Dr Saldana and having liver US q6 mo. States sees Dr Saldana for hx leukemia - states in remission. Most hx provided by pt's wife.   Pt was in hospital w [facility] in October.  Pt states he's doing better, he's been in rehab and back home now.   Pt has had some intermittent dysphagia, states food can feel stuck, a couple times month.   States he was taken off Nadolol, d/t low BP, sometime this year.   No abd pain, no blood in stool or black stool, has daily BM usually.   Wife states frequent napping and some confusion. Pt seems appropriate today.     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     Primary osteoarthritis of left knee     Sleep apnea     Sleep apnea     Thrombocytopenia 05/22/2018    Thyroid disease        Past Surgical History:   Procedure Laterality Date    CARDIAC SURGERY      COLONOSCOPY  2015    CORONARY ANGIOPLASTY WITH STENT PLACEMENT      CORONARY ARTERY BYPASS GRAFT N/A 05/20/2021    Procedure: MIDLINE STERNOTOMY,CORONARY ARTERY BYPASS GRAFTING X 5, UTILIZING THE LEFT COMPA AND LEFT SAPHENOUS VEIN, ABHIJEET, PRP;  Surgeon: Jr Tom Tubbs MD;  Location: [facility];  Service: Cardiothoracic;  Laterality: N/A;    ENDOSCOPY      HERNIA REPAIR      JOINT REPLACEMENT      SHOULDER SURGERY      SHOULDER REPAIR    TOTAL KNEE ARTHROPLASTY      TRANSESOPHAGEAL ECHOCARDIOGRAM (ABHIJEET) N/A 05/20/2021    Procedure: TRANSESOPHAGEAL ECHOCARDIOGRAM WITH ANESTHESIA;  Surgeon: Jr Tom Tubbs MD;  Location: [facility] OR;  Service: Cardiothoracic;  Laterality: N/A;       No Known Allergies    Family History   Problem Relation Age of Onset    Diabetes Mother     Colon cancer Father 65        Social History     Tobacco Use    Smoking status: Never    Smokeless tobacco: Never   Vaping Use    Vaping Use: Never used   Substance Use Topics    Alcohol use: Not Currently     Comment: QUIT DRINKING 32 YEARS AGO    Drug use: Never       Objective     Vital Signs:   /82 (BP Location: Right arm, Patient Position: Sitting, Cuff Size: Adult)   Pulse 71   Wt 117 kg (257 lb 6.4 oz)   BMI 34.91 kg/m²       Physical Exam  Constitutional:       General: The patient is not in acute distress.     Appearance: Normal  appearance.   HENT:      Head: Normocephalic and atraumatic.      Nose: Nose normal.   Pulmonary:      Effort: Pulmonary effort is normal. No respiratory distress.   Abdominal:      General: Abdomen is flat.      Palpations: Abdomen is soft. There is no mass.      Tenderness: There is no abdominal tenderness. There is no guarding.   Musculoskeletal:      Cervical back: Neck supple.      Right lower leg: No edema.      Left lower leg: No edema.   Skin:     General: Skin is warm and dry.   Neurological:      General: No focal deficit present.      Mental Status: The patient is alert and oriented to person, place, and time.      Gait: Gait normal.   Psychiatric:         Mood and Affect: Mood normal.         Speech: Speech normal.         Behavior: Behavior normal.         Thought Content: Thought content normal.     Result Review :   The following data was reviewed by: MAXWELL Denise on 11/21/2023:    CBC w/diff          8/20/2023    17:25 10/11/2023    08:56 10/11/2023    15:04 10/12/2023    04:32   CBC w/Diff   WBC 9.29  6.77  5.82  4.55    RBC 4.36  3.58  3.29  3.38    Hemoglobin 13.7  10.8  9.6  10.0    Hematocrit 39.0  32.6  29.5  30.3    MCV 89.4  91.1  89.7  89.6    MCH 31.4  30.2  29.2  29.6    MCHC 35.1  33.1  32.5  33.0    RDW 14.5  15.0  14.8  14.9    Platelets 133  71  59  57    Neutrophil Rel % 69.8  95.3   86.4    Immature Granulocyte Rel % 0.3  0.7   0.9    Lymphocyte Rel % 17.9  1.8   7.0    Monocyte Rel % 7.2  1.8   5.1    Eosinophil Rel % 4.0  0.1   0.2    Basophil Rel % 0.8  0.3   0.4      CMP          9/12/2023    11:18 10/11/2023    08:56 10/11/2023    15:04 10/12/2023    04:32   CMP   Glucose 170  177  154  250    BUN 20  41  42  37    Creatinine 1.18  2.05  1.81  1.56    EGFR 64.0  33.0  38.3  45.7    Sodium 140  134  134  131    Potassium 5.1  4.5  4.7  4.9    Chloride 103  99  102  102    Calcium 9.6  9.4  8.6  8.7    Total Protein 7.0  7.1  6.5  6.4    Albumin 4.5  4.1  3.7  3.5     Globulin  3.0  2.8  2.9    Total Bilirubin 0.4  0.5  0.6  0.7    Alkaline Phosphatase 100  85  75  72    AST (SGOT) 18  28  29  31    ALT (SGPT) 20  20  21  24    Albumin/Globulin Ratio  1.4  1.3  1.2    BUN/Creatinine Ratio 16.9  20.0  23.2  23.7    Anion Gap 11.5  13.0  11.5  10.2                    Assessment and Plan    Diagnoses and all orders for this visit:    1. Other cirrhosis of liver (Primary)  -     Case Request; Standing  -     Case Request    2. Hepatic encephalopathy  Comments:  mild  Orders:  -     Case Request; Standing  -     Case Request    3. Secondary esophageal varices without bleeding  Comments:  r/t cirrhosis; off Nadolol now  Orders:  -     Case Request; Standing  -     Case Request    4. Esophageal dysphagia  -     Case Request; Standing  -     Case Request    5. Class 1 obesity with serious comorbidity and body mass index (BMI) of 34.0 to 34.9 in adult, unspecified obesity type    6. Anemia, unspecified type    Other orders  -     lactulose (CHRONULAC) 10 GM/15ML solution; Take 30 mL by mouth Daily for 30 days. Goal 2-3 BM/day  Dispense: 900 mL; Refill: 0  -     Follow Anesthesia Guidelines / Protocol; Standing  -     Obtain Informed Consent; Standing  -     Follow Anesthesia Guidelines / Protocol; Future  -     Obtain Informed Consent; Future  -     Verify NPO; Standing              Follow Up   Return for follow up after procedure.  EGD Surgical Risk and Benefits: Possible risks/complications, benefits, and alternatives to surgical or invasive procedure have been explained to patient and/or legal guardian; risks include bleeding, infection, and perforation. Patient has been evaluated and can tolerate anesthesia and/or sedation. Risks, benefits, and alternatives to anesthesia and sedation have been explained to patient and/or legal guardian. Dr Duran clearance  States Liver US per Dr Saldana  Lactulose recommended - goal 2-3 BM/d  Protein shake before bed   Low carb diet  2-4 c.  Coffee/d  Please obtain copy of last labs before next follow up from Dr Saldana     Patient was given instructions and counseling regarding his condition or for health maintenance advice. Please see specific information pulled into the AVS if appropriate.

## 2023-11-21 NOTE — PATIENT INSTRUCTIONS
Lactulose recommended - goal 2-3 BM/d  Protein shake before bed   Low carb diet  2-4 c. Coffee/d    Cirrhosis    Cirrhosis is long-term (chronic) liver injury. The liver is the body's largest internal organ, and it performs many functions. It converts food into energy, removes toxic material from the blood, makes important proteins, and absorbs necessary vitamins from food.  In cirrhosis, healthy liver cells are replaced by scar tissue. This prevents blood from flowing through the liver and makes it difficult for the liver to complete its functions.  What are the causes?  Common causes of this condition are hepatitis C and long-term alcohol abuse. Other causes include:  Nonalcoholic fatty liver disease (NAFLD). This happens when fat is deposited in the liver by causes other than alcohol.  Hepatitis B infection.  Autoimmune hepatitis. In this condition, the body's defense system (immune system) mistakenly attacks the liver cells, causing inflammation.  Diseases that cause blockage of ducts inside the liver.  Inherited liver diseases, such as hemochromatosis. This is one of the most common inherited liver diseases. In this disease, deposits of iron collect in the liver and other organs.  Reactions to certain long-term medicines, such as amiodarone, a heart medicine.  Parasitic infections. These include schistosomiasis, which is caused by a flatworm.  Long-term contact to certain toxins. These toxins include certain organic solvents, such as toluene and chloroform.  What increases the risk?  You are more likely to develop this condition if:  You have certain types of viral hepatitis.  You abuse alcohol, especially if you are female.  You are overweight.  You use IV drugs and share needles.  You have unprotected sex with someone who has viral hepatitis.  What are the signs or symptoms?  You may not have any signs and symptoms at first. Symptoms may not develop until the damage to your liver starts to get worse.  Early  symptoms may include:  Weakness and tiredness (fatigue).  Changes in sleep patterns or having trouble sleeping.  Itchiness.  Tenderness in the right-upper part of your abdomen.  Weight loss and muscle loss.  Nausea.  Loss of appetite.  Later symptoms may include:  Fatigue or weakness that is getting worse.  Yellow skin and eyes (jaundice).  Buildup of fluid in the abdomen (ascites). You may notice that your clothes are tight around your waist.  Weight gain and swelling of the feet and ankles (edema).  Trouble breathing.  Easy bruising and bleeding.  Vomiting blood, or black or bloody stool.  Mental confusion.  How is this diagnosed?  Your health care provider may suspect cirrhosis based on your symptoms and medical history, especially if you have other medical conditions or a history of alcohol abuse. Your health care provider will do a physical exam to feel your liver and to check for signs of cirrhosis. Tests may include:  Blood tests to check:  For hepatitis B or C.  Kidney function.  Liver function.  Imaging tests such as:  MRI or CT scan to look for changes seen in advanced cirrhosis.  Ultrasound to see if normal liver tissue is being replaced by scar tissue.  A procedure in which a long needle is used to take a sample of liver tissue to be checked in a lab (biopsy). Liver biopsy can confirm the diagnosis of cirrhosis.  How is this treated?  Treatment for this condition depends on how damaged your liver is and what caused the damage. It may include treating the symptoms of cirrhosis, or treating the underlying causes to slow the damage. Treatment may include:  Making lifestyle changes, such as:  Eating a healthy diet. You may need to work with your health care provider or a dietitian to develop an eating plan.  Restricting salt intake.  Maintaining a healthy weight.  Not abusing drugs or alcohol.  Taking medicines to:  Treat liver infections or other infections.  Control itching.  Reduce fluid buildup.  Reduce  certain blood toxins.  Reduce risk of bleeding from enlarged blood vessels in the stomach or esophagus (varices).  Liver transplant. In this procedure, a liver from a donor is used to replace your diseased liver. This is done if cirrhosis has caused liver failure.  Other treatments and procedures may be done depending on the problems that you get from cirrhosis. Common problems include liver-related kidney failure (hepatorenal syndrome).  Follow these instructions at home:    Take medicines only as told by your health care provider. Do not use medicines that are toxic to your liver. Ask your health care provider before taking any new medicines, including over-the-counter medicines such as NSAIDs.  Rest as needed.  Eat a well-balanced diet.  Limit your salt or water intake, if your health care provider asks you to do this.  Do not drink alcohol. This is especially important if you routinely take acetaminophen.  Keep all follow-up visits. This is important.  Contact a health care provider if you:  Have fatigue or weakness that is getting worse.  Develop swelling of the hands, feet, or legs, or a buildup of fluid in the abdomen (ascites).  Have a fever or chills.  Develop loss of appetite.  Have nausea or vomiting.  Develop jaundice.  Develop easy bruising or bleeding.  Get help right away if you:  Vomit bright red blood or a material that looks like coffee grounds.  Have blood in your stools.  Notice that your stools appear black and tarry.  Become confused.  Have chest pain or trouble breathing.  These symptoms may represent a serious problem that is an emergency. Do not wait to see if the symptoms will go away. Get medical help right away. Call your local emergency services (911 in the [place] Do not drive yourself to the hospital.  Summary  Cirrhosis is chronic liver injury. Common causes are hepatitis C and long-term alcohol abuse.  Tests used to diagnose cirrhosis include blood tests, imaging tests, and liver  biopsy.  Treatment for this condition involves treating the underlying cause. Avoid alcohol, drugs, salt, and medicines that may damage your liver.  Get help right away if you vomit bright red blood or a material that looks like coffee grounds.  This information is not intended to replace advice given to you by your health care provider. Make sure you discuss any questions you have with your health care provider.  Document Revised: 09/30/2021 Document Reviewed: 09/30/2021  Elsevier Patient Education © 2023 [employer]

## 2023-11-27 ENCOUNTER — TELEPHONE (OUTPATIENT)
Dept: GASTROENTEROLOGY | Facility: CLINIC | Age: 78
End: 2023-11-27
Payer: OTHER GOVERNMENT

## 2023-11-27 NOTE — TELEPHONE ENCOUNTER
2023    Dear Dr Duran,      Patient Name: Cosmo Gauthier  : 1945      This patient is waiting to have a Colonoscopy and/or Esophagogastroduodenoscopy which I will perform at [facility] on 2024. Please respond to this request noting your recommendations regarding clearance from a Cardiac  standpoint.  You may contact our office at [phone] Option 2 with any questions. I appreciate your prompt response in this matter. Please return this form to our office as soon as possible to [phone].    ____ I approve my patient from a Cardiac  standpoint    ____ I do NOT approve my patient from a Cardiac  standpoint at this time      Please specify clearance expiration date:____________________________________      Approving physician name (please print): _____________________________________________      Approving physician signature: ________________________________ Date:________________  Sincerely,  [facility] - Gastroenterology   Dr. Bradshaw          Please fax approval or denial to our office as soon as possible.

## 2023-11-28 ENCOUNTER — TELEPHONE (OUTPATIENT)
Dept: HOSPITALIST | Facility: HOSPITAL | Age: 78
End: 2023-11-28

## 2023-11-28 NOTE — TELEPHONE ENCOUNTER
Procedure: Colonoscopy and/or EGD     Med Directive: NA     PMH: CAD, CABG, CHF, HTN, HLD     Last Seen: 11/16/23

## 2023-11-28 NOTE — TELEPHONE ENCOUNTER
Caller: Cosmo Gauthier    Relationship to patient: Self    Best call back number: [phone]     Patient is needing: PATIENT IS REQUESTING COVID TEST. WIFE TESTED POSITIVE YESTERDAY.

## 2023-12-13 ENCOUNTER — TELEPHONE (OUTPATIENT)
Dept: GASTROENTEROLOGY | Facility: CLINIC | Age: 78
End: 2023-12-13
Payer: OTHER GOVERNMENT

## 2023-12-13 NOTE — TELEPHONE ENCOUNTER
"S/W pt home health nurse. She Stated that the pt was only having one BM a day cense he started taking lactulose. S/W Donna ARREOLA and she stated,\" that he could take 30-45ml of lactulose BID or TID daily. He should be having 3 BM's a day.\" Pt home health nurse hung up before I was able to answer her.     "

## 2023-12-14 NOTE — TELEPHONE ENCOUNTER
Called and s/w pt's spouse, pt is agreeable to recommendations, will take 30-45ml BID or TID for goal of 3 Bms per day

## 2023-12-19 ENCOUNTER — TELEPHONE (OUTPATIENT)
Dept: CARDIOLOGY | Facility: CLINIC | Age: 78
End: 2023-12-19
Payer: OTHER GOVERNMENT

## 2023-12-19 NOTE — TELEPHONE ENCOUNTER
Caller: Carla Gauthier     Best call back number: [phone]     What is your medical concern? PT RECENTLY FELL AND HIS HOMEHEALTH NURSE AND HOME PHYSICAL THERAPY NURSE BOTH STATES HIS O2 WAS LOW AND IT COULD BE WHY HE IS FALLING - PT WIFE WANTING TO LET PROVIDER KNOW AND SEEK ADVISEMENT -

## 2023-12-19 NOTE — TELEPHONE ENCOUNTER
SW wife advised would need to call PCP regarding oxygen as they can order test to see if patient needs oxygen

## 2023-12-21 ENCOUNTER — TELEPHONE (OUTPATIENT)
Dept: CARDIOLOGY | Facility: CLINIC | Age: 78
End: 2023-12-21
Payer: OTHER GOVERNMENT

## 2023-12-21 ENCOUNTER — LAB (OUTPATIENT)
Dept: LAB | Facility: HOSPITAL | Age: 78
End: 2023-12-21
Payer: OTHER GOVERNMENT

## 2023-12-21 DIAGNOSIS — E03.9 HYPOTHYROIDISM, ADULT: ICD-10-CM

## 2023-12-21 DIAGNOSIS — N18.32 STAGE 3B CHRONIC KIDNEY DISEASE: ICD-10-CM

## 2023-12-21 DIAGNOSIS — E78.2 HYPERLIPEMIA, MIXED: ICD-10-CM

## 2023-12-21 DIAGNOSIS — I50.32 DIASTOLIC CHF, CHRONIC: ICD-10-CM

## 2023-12-21 DIAGNOSIS — I50.32 DIASTOLIC CHF, CHRONIC: Primary | ICD-10-CM

## 2023-12-21 DIAGNOSIS — E11.8 TYPE 2 DIABETES MELLITUS WITH COMPLICATIONS: ICD-10-CM

## 2023-12-21 LAB
ALBUMIN SERPL-MCNC: 4.3 G/DL (ref 3.5–5.2)
ANION GAP SERPL CALCULATED.3IONS-SCNC: 9.5 MMOL/L (ref 5–15)
BUN SERPL-MCNC: 29 MG/DL (ref 8–23)
BUN/CREAT SERPL: 19.3 (ref 7–25)
CALCIUM SPEC-SCNC: 9.4 MG/DL (ref 8.6–10.5)
CHLORIDE SERPL-SCNC: 101 MMOL/L (ref 98–107)
CHOLEST SERPL-MCNC: 111 MG/DL (ref 0–200)
CO2 SERPL-SCNC: 25.5 MMOL/L (ref 22–29)
CREAT SERPL-MCNC: 1.5 MG/DL (ref 0.76–1.27)
EGFRCR SERPLBLD CKD-EPI 2021: 48 ML/MIN/1.73
GLUCOSE SERPL-MCNC: 159 MG/DL (ref 65–99)
HBA1C MFR BLD: 6.9 % (ref 4.8–5.6)
HDLC SERPL-MCNC: 45 MG/DL (ref 40–60)
LDLC SERPL CALC-MCNC: 48 MG/DL (ref 0–100)
LDLC/HDLC SERPL: 1.04 {RATIO}
PHOSPHATE SERPL-MCNC: 4.4 MG/DL (ref 2.5–4.5)
POTASSIUM SERPL-SCNC: 4.8 MMOL/L (ref 3.5–5.2)
SODIUM SERPL-SCNC: 136 MMOL/L (ref 136–145)
TRIGL SERPL-MCNC: 97 MG/DL (ref 0–150)
TSH SERPL DL<=0.05 MIU/L-ACNC: 5.85 UIU/ML (ref 0.27–4.2)
VLDLC SERPL-MCNC: 18 MG/DL (ref 5–40)

## 2023-12-21 PROCEDURE — 80069 RENAL FUNCTION PANEL: CPT

## 2023-12-21 PROCEDURE — 83036 HEMOGLOBIN GLYCOSYLATED A1C: CPT

## 2023-12-21 PROCEDURE — 36415 COLL VENOUS BLD VENIPUNCTURE: CPT

## 2023-12-21 PROCEDURE — 80061 LIPID PANEL: CPT

## 2023-12-21 PROCEDURE — 84443 ASSAY THYROID STIM HORMONE: CPT

## 2023-12-21 RX ORDER — FUROSEMIDE 40 MG/1
20 TABLET ORAL EVERY OTHER DAY
Start: 2023-12-21

## 2023-12-21 NOTE — TELEPHONE ENCOUNTER
----- Message from MAXWELL Mitchell sent at 12/21/2023 10:27 AM EST -----  Renal function is declined, decrease lasix dose to 20 mg daily and repeat BMP in 2 weeks

## 2023-12-26 ENCOUNTER — OFFICE VISIT (OUTPATIENT)
Dept: INTERNAL MEDICINE | Facility: CLINIC | Age: 78
End: 2023-12-26
Payer: OTHER GOVERNMENT

## 2023-12-26 VITALS
BODY MASS INDEX: 31.97 KG/M2 | OXYGEN SATURATION: 98 % | HEART RATE: 84 BPM | HEIGHT: 72 IN | WEIGHT: 236 LBS | TEMPERATURE: 98.2 F | DIASTOLIC BLOOD PRESSURE: 84 MMHG | SYSTOLIC BLOOD PRESSURE: 132 MMHG

## 2023-12-26 DIAGNOSIS — E78.2 HYPERLIPEMIA, MIXED: ICD-10-CM

## 2023-12-26 DIAGNOSIS — N18.32 STAGE 3B CHRONIC KIDNEY DISEASE: ICD-10-CM

## 2023-12-26 DIAGNOSIS — E11.8 TYPE 2 DIABETES MELLITUS WITH COMPLICATIONS: Primary | ICD-10-CM

## 2023-12-26 DIAGNOSIS — E03.9 HYPOTHYROIDISM, ADULT: ICD-10-CM

## 2023-12-26 DIAGNOSIS — I10 HYPERTENSION, ESSENTIAL: ICD-10-CM

## 2023-12-26 DIAGNOSIS — Z23 NEED FOR VACCINATION: ICD-10-CM

## 2023-12-26 RX ORDER — LEVOTHYROXINE SODIUM 112 UG/1
224 TABLET ORAL TAKE AS DIRECTED
Status: SHIPPED | COMMUNITY
Start: 2023-12-26

## 2023-12-26 NOTE — ASSESSMENT & PLAN NOTE
GFR stable at 48 as of 12/23.  His potassium as well is stable for him at 5.5.  It was 6.3 recently, this appears to have been a lab error.    Of note, his Lasix was lowered by cardiology from 20 daily to 20 every other day due to a slight decline in his GFR, that is reasonable as long as he does not have significant fluid retention.  Cardiology already has follow-up BMP ordered.

## 2023-12-26 NOTE — PROGRESS NOTES
"Chief Complaint  Diabetes (Routine follow up. Frequent falling due to neuropathy. Lab follow up. Flu shot request. )    Subjective          Cosmo Gauthier presents to [facility]     History of Present Illness:  Patient is pleasant but unfortunate 77-year-old male with multiple medical issues, status post 5 vessel bypass 5/21, with underlying hypertension, hyperlipidemia, and diabetes, coming in 12/23 for routine 3-month follow-up. We will review all his labs and address any new concerns.    Review of Systems   Constitutional:  Negative for appetite change, fatigue and fever.   HENT:  Negative for congestion and ear pain.    Eyes:  Negative for blurred vision.   Respiratory:  Negative for cough, chest tightness and wheezing.    Cardiovascular:  Negative for chest pain, palpitations and leg swelling.   Gastrointestinal:  Negative for abdominal pain.   Genitourinary:  Negative for difficulty urinating, dysuria and hematuria.   Musculoskeletal:  Negative for arthralgias and gait problem.   Skin:  Negative for skin lesions.   Neurological:  Negative for syncope, memory problem and confusion.   Psychiatric/Behavioral:  Negative for self-injury and depressed mood.        Objective   Vital Signs:   /84   Pulse 84   Temp 98.2 °F (36.8 °C)   Ht 182.9 cm (72.01\")   Wt 107 kg (236 lb)   SpO2 98%   BMI 32.00 kg/m²           Physical Exam  Vitals and nursing note reviewed.   Constitutional:       General: He is not in acute distress.     Appearance: Normal appearance. He is not toxic-appearing.   HENT:      Head: Atraumatic.      Right Ear: External ear normal.      Left Ear: External ear normal.      Nose: Nose normal.      Mouth/Throat:      Mouth: Mucous membranes are moist.   Eyes:      General:         Right eye: No discharge.         Left eye: No discharge.      Extraocular Movements: Extraocular movements intact.      Pupils: Pupils are equal, round, and reactive to light. "   Cardiovascular:      Rate and Rhythm: Normal rate and regular rhythm.      Pulses: Normal pulses.      Heart sounds: Normal heart sounds. No murmur heard.     No gallop.   Pulmonary:      Effort: Pulmonary effort is normal. No respiratory distress.      Breath sounds: No wheezing, rhonchi or rales.   Abdominal:      General: There is no distension.      Palpations: Abdomen is soft. There is no mass.      Tenderness: There is no abdominal tenderness. There is no guarding.   Musculoskeletal:         General: No swelling or tenderness.      Cervical back: No tenderness.      Right lower leg: No edema.      Left lower leg: No edema.   Skin:     General: Skin is warm and dry.      Findings: No rash.   Neurological:      General: No focal deficit present.      Mental Status: He is alert and oriented to person, place, and time. Mental status is at baseline.      Motor: No weakness.      Gait: Gait normal.   Psychiatric:         Mood and Affect: Mood normal.         Thought Content: Thought content normal.          Result Review :   The following data was reviewed by: Alex Buckley MD on 08/02/2021:                  Assessment and Plan    Diagnoses and all orders for this visit:    1. Type 2 diabetes mellitus with complications (Primary)  Assessment & Plan:  A1c is 6.9 is still fine as of 12/23 office visit.  He is on full dose metformin, low-dose Lantus, and as needed Humalog.  Continue same    Orders:  -     Hemoglobin A1c; Future    2. Stage 3b chronic kidney disease  Assessment & Plan:  GFR stable at 48 as of 12/23.  His potassium as well is stable for him at 5.5.  It was 6.3 recently, this appears to have been a lab error.    Of note, his Lasix was lowered by cardiology from 20 daily to 20 every other day due to a slight decline in his GFR, that is reasonable as long as he does not have significant fluid retention.  Cardiology already has follow-up BMP ordered.    Orders:  -     Basic Metabolic Panel; Future    3.  Hypertension, essential  Assessment & Plan:  Patient on low-dose midodrine per cardiology due to orthostasis.  Certainly stable to continue same as of 12/23.      4. Hypothyroidism, adult  Assessment & Plan:  TSH is up from 0.7 to 5.8 as of his 12/23 office visit.  We just did away with 56 mcg of levothyroxine 1 day a week.  He is on 224 mcg daily, and 1-1/2 of the 112 mcg tablets on Sundays.  If the TSH trends up further, we may need to alter this, but we will continue with this for now.    Orders:  -     TSH; Future    5. Hyperlipemia, mixed  Assessment & Plan:  LDL is down from 75 to 62 to 48 as of his 12/23 office visit.  Patient stable to continue with low-dose simvastatin. (Utilizing half a tablet for cost.)      Other orders  -     Fluzone High-Dose 65+yrs ([id number])        --  --  Older notes:  HOSP F/U 6/21 = S/P 5V CABG 5/21 per Dr Milligan=I reviewed records sent and the med list provided by wife.  TELEVISIT 2/25/21:  HOSP F/U 10/20 = I reviewed 9/[facility] records; I d/w pt labs/meds in detail:  1) CHEST PAIN and pt is being admitted to R/O MI; cardiology was notified...SPECT was neg, so will f/u with cards in a few weeks; may still need a cath=no new sxs and is gideon to see him next week as of 10/20 OV...to [facility] for CABG per Dr Milligan, but PLT's too low; has f/u with cards.  2) CAD/MI with prior stents; ? last stress was done in cardiology office 5/19; will continue home meds for now...no rest angina; ? increase Imdur=defer to cards appt he has on 3/9/21---> S/P 5V CABG as above; looks great.    3) HTN will be followed closely on home meds=stable 10/20 OV, BUT is orthostatic, so drink more and lower ACEI to 5 mg---> stable 6/21.  4) HYPERLIPIDEMIA=continue statin=LDL 61 (9/20)---> 46 in 6/21.    5) CIRRHOSIS per Dr Bradshaw; watch volume status.  6) THROMBOCYTOPENIA is related to the cirrhosis and is stable around 70K...on Prednisone per Dr Saldana---> off this; labs on RTO.    7) DM per orders...A1C was only  7.3 in 1/21; she d/w me BS fine despite prednisone as of 2/21 OV; ? lost 20+ lbs---> 5.6 and I d/w stop glipizide 10 bid and stay on Humalog 15 tid, but then again not totally sure he's on it?    8) HYPOTHYROIDISM is wnl as of 2/21 OV---> RTO.    9) BPH on flomax after retenion issues in BE as of 2/21 OV; to see Dr Dill---> saw him for mihaela post-op = it's out now.    (lost friend/ 60's to covid)    VISIT 6/20:  ANNUAL MEDICARE WELLNESS EXAM 10/19 = reviewed all forms with pt; he is much more depressed, so zoloft was increased.  H.M. ISSUES:  DM = A1C as below; Optho=q 12 mo with last 4/18 = early diabetic retinopathy and ? laser next visit?  --  HTN...needs ACEI now at 6/17 OV; repeat urine micro as well...remains controlled...ACEI fell off his list; resume now 1/19...better already---> stable.  ? CKD3 noted 6/20 = no new meds; needs repeat few weeks.  --  DM...max out januvia...8.1 an has f/u with [facility]...on lantus 20, d/w increase 2-4 units every week to get FBS to 110...7.1 is great...7.5 is stuck and I d/w again to increase Lantus=told him 24 now... 8.8 is horrible, so increase glucotrol to whole tab in AM...8.9, so try whole tab bid before increase lantus...9.5 is worse and needs Humalog before meals; d/w qid testing; stop glucotrol and increase lantus to 30 qhs...BS noted per phone and in office; rec 16/20/16 and stay on Lantus 30 qhs; d/w NO SSI for now...A1C down to 8.0 already and Fructosamine of 300=A1C closer to 7.5 actually...6.3 is great with TSH back down...6.8 is ok for now=7/19...7.3 in 10/19 and I d/w take 8 units with breakfast since he has been skipping this and only taking 15 with lunch/supper---> 7.3 great 6/20.   HYPOTHYROIDSIM stable 6/17...0.2 at 1/18 OV...0.7 better...increase 1/19 for 3.5 given above...? n/c, b/c now=10; will add 50 as of 1/19 OV...TSH 64 and apparently he missed them past week; I rec 250 qd for now and close f/u...0.7 and will leave this alone for now---> 1.5 in  2/20.  --  CAD per Dr Pritchett; s/p Spect '15 per pt and was neg---> still no CP/SOB and sees cards q 6 mo=no recent as of 6/20 OV.  LIPIDS with LDL 72...83 ok for now...LDL 57---> 55 in 2/20.  --  THROMBOCYTOPENIA is new 4/16 OV=99, so to HEME...off ASA but plavix ok per Dr Chaudhry; had BM bx=?...75 holding...on iron per her---> CBC stable 6/20 per her.  --  HOSP F/U 8/16 for FUO.  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  --  DJD s/p R TKR and then L TKR per Dr Eckert 1/16 and rehab with [facility] still on-going = 3x/wk at 4/16 OV...still with issues L knee 4/18...to have redo L TKR per Dr Zayas as of 10/18 OV=Dr Duran cleared him already...is gideon for 2/4/19, but apparently wants his A1C below 7 for this?? = d/w me 1/19...I d/w not going to get there that soon, but current blood sugars excellent and pt cleared for surgery from my standpoint as well=reviewed all their pre-op labs as well as EKG and CXR...s/p redo L TKR on 2/4/19 and looks great at 2/27/19 OV...finishing up as of 4/19 OV.  --  GERD per Dr Bradshaw.  ? CIRRHOSIS=tried on Nadolol per Dr Bradshaw=stopped it 6/20 due to diarrhea?; ? has CT/NH3 gideon.  --  OBESITY up 3 more to 263...258--->270 is stuck 2/20 and I d/w no meds=must go to [facility] ( Cards said no to surgery).  --  DEPRESSION on maint med...? Dementia per pt, sent to Dr Kim; he sent for [facility] as of 4/18 OV...will address depression 8/18 = increase zoloft   [facility] as of [facility]17 [facility]---> c/w as of 2/21 OV; neg O2 in [facility] recently;   --  --  PSA 0.5 (4/7/16)...defer.  Colon 7/19...2 inflam/hyper polyps per Dr Bradshaw.  Prevnar 11/16; Pneumovax # 1 9/17;  (, retired [employer] '97, 3 kids)    Follow Up   No follow-ups on file.    Total Time Spent:  minutes     This time includes time spent by me in the following activities: preparing for the visit, reviewing extensive past medical history and tests, performing a medically appropriate examination and/or  evaluation, counseling and educating the patient and/or caregivers, ordering medications, tests, or procedures, referring and/or communicating with other health care professionals and documenting information in the medical record all on this date of service.     Patient was given instructions and counseling regarding his condition or for health maintenance advice. Please see specific information pulled into the AVS if appropriate.

## 2023-12-26 NOTE — ASSESSMENT & PLAN NOTE
A1c is 6.9 is still fine as of 12/23 office visit.  He is on full dose metformin, low-dose Lantus, and as needed Humalog.  Continue same

## 2023-12-26 NOTE — ASSESSMENT & PLAN NOTE
TSH is up from 0.7 to 5.8 as of his 12/23 office visit.  We just did away with 56 mcg of levothyroxine 1 day a week.  He is on 224 mcg daily, and 1-1/2 of the 112 mcg tablets on Sundays.  If the TSH trends up further, we may need to alter this, but we will continue with this for now.

## 2023-12-26 NOTE — ASSESSMENT & PLAN NOTE
LDL is down from 75 to 62 to 48 as of his 12/23 office visit.  Patient stable to continue with low-dose simvastatin. (Utilizing half a tablet for cost.)

## 2023-12-26 NOTE — ASSESSMENT & PLAN NOTE
Patient on low-dose midodrine per cardiology due to orthostasis.  Certainly stable to continue same as of 12/23.

## 2023-12-27 ENCOUNTER — TELEPHONE (OUTPATIENT)
Dept: INTERNAL MEDICINE | Facility: CLINIC | Age: 78
End: 2023-12-27
Payer: OTHER GOVERNMENT

## 2023-12-27 NOTE — TELEPHONE ENCOUNTER
Caller: Carla Gauthier    Relationship: Emergency Contact    Best call back number: [phone]     What is the best time to reach you: ANYTIME     Who are you requesting to speak with (clinical staff, provider,  specific staff member): NURSE MYNOR     What was the call regarding: PATIENT SPOUSE IS CALLING, REQUESTING OF POSSIBLE FOR THE PATIENT FOR A SCRIPT TO BE SENT IN TO PHARMACY, FOR THE DEXACOM G7.

## 2024-01-01 NOTE — TELEPHONE ENCOUNTER
After speaking with Dr Tubbs I called and spoke with patients spouse. They are agreeable to seeing Dr Tubbs in office 3/31/21 with surgery likely to follow the next day 4/1/21.   English

## 2024-01-17 NOTE — PRE-PROCEDURE INSTRUCTIONS
"Instructed on date and arrival time of 0700. Come to entrance \"C\".  Must have  over age 18 to drive home.  May have two visitors; however, children under 12 must stay in waiting room.  Discussed diet/NPO.  May take medications as usual except for blood thinners, diabetic medications, or weight loss medications.  Bring list of medications to hospital.  Verbalized understanding of instructions given.  Instructed to call for questions or concerns.  Spoke with wife.  Cardiac clearance noted in chart.  "

## 2024-01-19 ENCOUNTER — OFFICE VISIT (OUTPATIENT)
Dept: PODIATRY | Facility: CLINIC | Age: 79
End: 2024-01-19
Payer: MEDICARE

## 2024-01-19 VITALS
SYSTOLIC BLOOD PRESSURE: 93 MMHG | TEMPERATURE: 97.7 F | HEART RATE: 59 BPM | HEIGHT: 72 IN | WEIGHT: 255 LBS | DIASTOLIC BLOOD PRESSURE: 59 MMHG | BODY MASS INDEX: 34.54 KG/M2 | OXYGEN SATURATION: 94 %

## 2024-01-19 DIAGNOSIS — G62.2 NEUROPATHY DUE TO CHEMICAL SUBSTANCE: ICD-10-CM

## 2024-01-19 DIAGNOSIS — Z77.098 HISTORY OF AGENT ORANGE EXPOSURE: ICD-10-CM

## 2024-01-19 DIAGNOSIS — L60.0 ONYCHOCRYPTOSIS: ICD-10-CM

## 2024-01-19 DIAGNOSIS — Z79.4 TYPE 2 DIABETES MELLITUS WITH DIABETIC POLYNEUROPATHY, WITH LONG-TERM CURRENT USE OF INSULIN: Primary | ICD-10-CM

## 2024-01-19 DIAGNOSIS — R26.2 DIFFICULTY WALKING: ICD-10-CM

## 2024-01-19 DIAGNOSIS — M79.671 FOOT PAIN, BILATERAL: ICD-10-CM

## 2024-01-19 DIAGNOSIS — E11.42 TYPE 2 DIABETES MELLITUS WITH DIABETIC POLYNEUROPATHY, WITH LONG-TERM CURRENT USE OF INSULIN: Primary | ICD-10-CM

## 2024-01-19 DIAGNOSIS — M79.672 FOOT PAIN, BILATERAL: ICD-10-CM

## 2024-01-19 NOTE — PROGRESS NOTES
[facility] - PODIATRY    Today's Date: 01/19/24    Patient Name: Cosmo Gauthier  MRN: [id number]  CSN: [id number]  PCP: Alex Buckley MD, Last PCP Visit: 26 December 2023  Referring Provider: No ref. provider found    SUBJECTIVE     Chief Complaint   Patient presents with    Left Foot - Follow-up, Nail Problem    Right Foot - Follow-up, Nail Problem     HPI: Cosmo Gauthier, a 78 y.o.male, presents to clinic for painful toenail and a diabetic foot evaluation.    New, Established, New Problem: established  Location:  Toenails  Duration:   Greater than five years  Onset:  Gradual  Nature:  sore with palpation.  Stable, worsening, improving:   worsening  Aggravating factors:  Pain with shoe gear and ambulation.  Previous Treatment: debridement    Patient controlling diabetes via:  insulin    Patient states their last blood glucose was: 130    Patient denies any fevers, chills, nausea, vomiting, shortness of breath, nor any other constitutional signs nor symptoms.    Patient relates no medical changes since their last visit.    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     Primary osteoarthritis of left knee     Sleep apnea     Sleep apnea     Thrombocytopenia 05/22/2018    Thyroid disease      Past Surgical History:   Procedure Laterality Date    CARDIAC SURGERY      COLONOSCOPY  2015    CORONARY ANGIOPLASTY WITH STENT PLACEMENT      CORONARY ARTERY  BYPASS GRAFT N/A 05/20/2021    Procedure: MIDLINE STERNOTOMY,CORONARY ARTERY BYPASS GRAFTING X 5, UTILIZING THE LEFT COMPA AND LEFT SAPHENOUS VEIN, ABHIJEET, PRP;  Surgeon: Jr Tom Tubbs MD;  Location: [facility];  Service: Cardiothoracic;  Laterality: N/A;    ENDOSCOPY      HERNIA REPAIR      JOINT REPLACEMENT      SHOULDER SURGERY      SHOULDER REPAIR    TOTAL KNEE ARTHROPLASTY      TRANSESOPHAGEAL ECHOCARDIOGRAM (ABHIJEET) N/A 05/20/2021    Procedure: TRANSESOPHAGEAL ECHOCARDIOGRAM WITH ANESTHESIA;  Surgeon: Jr Tom Tubbs MD;  Location: [facility] OR;  Service: Cardiothoracic;  Laterality: N/A;     Family History   Problem Relation Age of Onset    Diabetes Mother     Colon cancer Father 65     Social History     Socioeconomic History    Marital status:    Tobacco Use    Smoking status: Never    Smokeless tobacco: Never   Vaping Use    Vaping Use: Never used   Substance and Sexual Activity    Alcohol use: Not Currently     Comment: QUIT DRINKING 32 YEARS AGO    Drug use: Never    Sexual activity: Defer     No Known Allergies  Current Outpatient Medications   Medication Sig Dispense Refill    amitriptyline (ELAVIL) 50 MG tablet Take 1 tablet by mouth Every Night.      aspirin 81 MG EC tablet Take 1 tablet by mouth Daily.      donepezil (ARICEPT) 10 MG tablet Take 1 tablet by mouth every night at bedtime.      finasteride (PROSCAR) 5 MG tablet Take 1 tablet by mouth Daily. 60 tablet 5    furosemide (LASIX) 40 MG tablet Take 0.5 tablets by mouth Every Other Day.      gabapentin (NEURONTIN) 300 MG capsule Take 1 capsule by mouth 2 (Two) Times a Day. 60 capsule 5    gabapentin (NEURONTIN) 600 MG tablet Take 0.5 tablets by mouth 2 (Two) Times a Day. 30 tablet 0    insulin glargine (LANTUS, SEMGLEE) 100 UNIT/ML injection Inject 15 Units under the skin into the appropriate area as directed Every Night.      Insulin Lispro, 1 Unit Dial, (HumaLOG KwikPen) 100 UNIT/ML solution pen-injector 150-200 = 4  units  201-250 = 6 units  251-300 = 8 units  301-350 = 10 units       >350 = 14 units      levothyroxine (SYNTHROID, LEVOTHROID) 112 MCG tablet Take 2 tablets by mouth Take As Directed. Takes two tablets every day Mon - Sat. Takes 1-1/2 tablets on Sunday.      metFORMIN (GLUCOPHAGE) 1000 MG tablet Take 1 tablet by mouth 2 (Two) Times a Day With Meals.      midodrine (PROAMATINE) 2.5 MG tablet Take 1 tablet by mouth Daily. 90 tablet 3    nitroglycerin (NITROSTAT) 0.4 MG SL tablet Place 1 tablet under the tongue Every 5 (Five) Minutes As Needed.      sertraline (ZOLOFT) 100 MG tablet Take 2 tablets by mouth Daily. 180 tablet 1    simvastatin (ZOCOR) 40 MG tablet Take 0.5 tablets by mouth Every Night.      tamsulosin (FLOMAX) 0.4 MG capsule 24 hr capsule Take 1 capsule by mouth Daily. 30 capsule 5     No current facility-administered medications for this visit.     Review of Systems   Constitutional: Negative.    Skin:         Painful toenails   Neurological:  Positive for numbness.   All other systems reviewed and are negative.      OBJECTIVE     Vitals:    01/19/24 1011   BP: 93/59   Pulse: 59   Temp: 97.7 °F (36.5 °C)   SpO2: 94%       Body mass index is 34.58 kg/m².    Lab Results   Component Value Date    HGBA1C 6.90 (H) 12/21/2023       Lab Results   Component Value Date    GLUCOSE 159 (H) 12/21/2023    CALCIUM 9.4 12/21/2023     12/21/2023    K 4.8 12/21/2023    CO2 25.5 12/21/2023     12/21/2023    BUN 29 (H) 12/21/2023    CREATININE 1.50 (H) 12/21/2023    EGFRIFNONA 48 (L) 01/13/2022    BCR 19.3 12/21/2023    ANIONGAP 9.5 12/21/2023       Patient seen in no apparent distress.      PHYSICAL EXAM:     Foot/Ankle Exam    GENERAL  Diabetic foot exam performed    Appearance:  elderly  Orientation:  AAOx3  Affect:  appropriate  Gait:  unimpaired  Assistance:  independent  Right shoe gear: casual shoe  Left shoe gear: casual shoe    VASCULAR     Right Foot Vascularity   Dorsalis pedis:  1+  Posterior  tibial:  1+  Skin temperature:  warm  Edema grading:  None  CFT:  < 3 seconds  Pedal hair growth:  Absent  Varicosities:  mild varicosities     Left Foot Vascularity   Dorsalis pedis:  1+  Posterior tibial:  1+  Skin temperature:  warm  Edema grading:  None  CFT:  < 3 seconds  Pedal hair growth:  Absent  Varicosities:  mild varicosities     NEUROLOGIC     Right Foot Neurologic   Light touch sensation: diminished  Vibratory sensation: diminished  Hot/Cold sensation: diminished  Protective Sensation using Gill-Tanya Monofilament:   Sites intact: 2  Sites tested: 10     Left Foot Neurologic   Light touch sensation: diminished  Vibratory sensation: diminished  Hot/Cold sensation:  diminished  Protective Sensation using Gill-Tanya Monofilament:   Sites intact: 2  Sites tested: 10    MUSCULOSKELETAL     Right Foot Musculoskeletal   Hammertoe:  Second toe, third toe, fourth toe and fifth toe     Left Foot Musculoskeletal   Hammertoe:  Second toe, third toe, fourth toe and fifth toe    MUSCLE STRENGTH     Right Foot Muscle Strength   Foot dorsiflexion:  4-  Foot plantar flexion:  4-  Foot inversion:  4-  Foot eversion:  4-     Left Foot Muscle Strength   Foot dorsiflexion:  4-  Foot plantar flexion:  4-  Foot inversion:  4-  Foot eversion:  4-    RANGE OF MOTION     Right Foot Range of Motion   Foot and ankle ROM within normal limits       Left Foot Range of Motion   Foot and ankle ROM within normal limits      DERMATOLOGIC      Right Foot Dermatologic   Skin  Right foot skin is intact.   Nails  1.  Positive for elongated, onychomycosis, abnormal thickness, subungual debris and ingrown toenail.  2.  Positive for elongated, onychomycosis, abnormal thickness, subungual debris and ingrown toenail.  3.  Positive for elongated, onychomycosis, abnormal thickness, subungual debris and ingrown toenail.  4.  Positive for elongated, onychomycosis, abnormal thickness, subungual debris and ingrown toenail.  5.  Positive for  elongated, onychomycosis, abnormal thickness, subungual debris and ingrown toenail.  Nails comment:  Toenails 1, 2, 3, 4, and 5     Left Foot Dermatologic   Skin  Left foot skin is intact.   Nails comment:  Toenails 1, 2, 3, 4, and 5  Nails  1.  Positive for elongated, onychomycosis, abnormal thickness, subungual debris and ingrown toenail.  2.  Positive for elongated, onychomycosis, abnormal thickness, subungual debris and ingrown toenail.  3.  Positive for elongated, onychomycosis, abnormal thickness, subungual debris and ingrown toenail.  4.  Positive for elongated, onychomycosis, abnormally thick, subungual debris and ingrown toenail.  5.  Positive for elongated, onychomycosis, abnormally thick, subungual debris and ingrown toenail.    Diabetic Foot Exam Performed and Monofilament Test Performed    ASSESSMENT/PLAN     Diagnoses and all orders for this visit:    1. Type 2 diabetes mellitus with diabetic polyneuropathy, with long-term current use of insulin (Primary)    2. Foot pain, bilateral    3. Onychocryptosis    4. 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 History of agent Orange exposure    Comprehensive lower extremity examination and evaluation was performed.    Discussed findings and treatment plan including risks, benefits, and treatment options with patient in detail. Patient agreed with treatment plan.    Medications and allergies reviewed.  Reviewed available blood glucose and HgB A1C lab values along with other pertinent labs.  These were discussed with the patient as to their importance of diabetic maintenance.    Toenails 1, 2, 3, 4, 5 on Right and 1, 2, 3, 4, 5 on Left were debrided with nail nippers then filed with a Herminiomel nail tavia.  Patient tolerated procedure well without complications.    Diabetic foot exam performed and documented this date, compliant with CQM required standards. Detail of findings as noted in physical exam.  Lower extremity Neurologic exam for  diabetic patient performed and documented this date, compliant with PQRS required standards. Detail of findings as noted in physical exam.  Advised patient importance of good routine lower extremity hygiene. Advised patient importance of evaluating for intact skin and pain free nail borders.  Advised patient to use mirror to evaluate plantar/ soles of feet for better visualization. Advised patient monitor and phone office to be seen if any cracking to skin, open lesions, painful nail borders or if nails become elongated prior to next visit. Advised patient importance of daily cleansing of lower extremities, followed by good skin cream to maintain normal hydration of skin. Also advised patient importance of close daily monitoring of blood sugar. Advised to regulate diet and medications to maintain control of blood sugar in optimal range. Contact primary care provider if difficulties maintaining blood sugar levels.  Advised Patient of presence of Diabetes Mellitus condition.  Advised Patient risk of progression and worsening or improvement, then return of condition.  Will monitor condition for any change in future. Treat with most appropriate treatment pending status of condition.  Counseled and advised patient extensively on nature and ramifications of diabetes. Standard instructions given to patient for good diabetic foot care and maintenance. Advised importance of careful monitoring to avoid break down and complications secondary to diabetes. Advised patient importance of strict maintenance of blood sugar control. Advised patient of possible ominous results from neglect of condition, i.e.: amputation/ loss of digits, feet and legs, or even death.  Patient states understands counseling, will monitor closely, continue good hygiene and routine diabetic foot care. Patient will contact office should they have any questions or problems.      An After Visit Summary was printed and given to the patient at discharge,  including (if requested) any available informative/educational handouts regarding diagnosis, treatment, or medications. All questions were answered to patient/family satisfaction. Should symptoms fail to improve or worsen they agree to call or return to clinic or to go to the Emergency Department. Discussed the importance of following up with any needed screening tests/labs/specialist appointments and any requested follow-up recommended by me today. Importance of maintaining follow-up discussed and patient accepts that missed appointments can delay diagnosis and potentially lead to worsening of conditions.    Return in about 9 weeks (around 3/22/2024) for Toenail Care., or sooner if acute issues arise.    This document has been electronically signed by Hunter Orellana DPM on January 19, 2024 10:37 EST

## 2024-01-23 ENCOUNTER — ANESTHESIA EVENT (OUTPATIENT)
Dept: GASTROENTEROLOGY | Facility: HOSPITAL | Age: 79
End: 2024-01-23
Payer: MEDICARE

## 2024-01-23 NOTE — ANESTHESIA PREPROCEDURE EVALUATION
Anesthesia Evaluation     Patient summary reviewed and Nursing notes reviewed   NPO Solid Status: > 8 hours  NPO Liquid Status: > 8 hours           Airway   Mallampati: II  TM distance: >3 FB  Neck ROM: limited  Possible difficult intubation  Dental          Pulmonary     breath sounds clear to auscultation  (+) ,sleep apnea on CPAP  (-) not a smoker  Cardiovascular     Rhythm: regular  Rate: normal    (+) hypertension well controlled, CAD, CABG >6 Months, hyperlipidemia      Neuro/Psych  (+) weakness, numbness, psychiatric history Anxiety and Depression, dementia  GI/Hepatic/Renal/Endo    (+) obesity, morbid obesity, GERD, PUD, liver disease fatty liver disease cirrhosis, renal disease- CRI, diabetes mellitus type 2, thyroid problem hypothyroidism and hyperthyroidism    Musculoskeletal     Abdominal    Substance History      OB/GYN          Other   arthritis,   history of cancer    ROS/Med Hx Other: Last Plavix 3 days ago  EKG 10/11/23: HR 89, SR    Open heart ABHIJEET 05/20/21:   Echocardiogram Comments:   Post CPB:  LVEF much improved, 60%, apex no longer hypokinetic.  No aortic dissection post decannulation.  AI unchanged.    ECHO 02/16/21:   ·Calculated left ventricular EF = 59% Estimated left ventricular EF = 59% Estimated left ventricular EF was in agreement with the calculated left ventricular EF. Left ventricular systolic function is normal. Normal left ventricular cavity size noted. Left ventricular wall thickness is consistent with mild concentric hypertrophy. All left ventricular wall segments contract normally. Left ventricular diastolic function is consistent with (grade I) impaired relaxation.  · Left atrial volume is normal. Saline test results are negative.  ·Mild aortic valve regurgitation is present.  ·Trace tricuspid valve regurgitation is present. Insufficient TR velocity profile to estimate the right ventricular systolic pressure.  ·Mild dilation of the sinuses of Valsalva is present. Sinus of  "Valsalva = 4.1 cm Mild dilation of the ascending aorta is present. Ascending aorta = 4.1 cm                        Anesthesia Plan    ASA 3     general     (Responds to \"Inocente\";  EGD for dysphagis.  Total IV Anesthesia    Patient understands anesthesia not responsible for dental damage.  )  intravenous induction     Anesthetic plan, risks, benefits, and alternatives have been provided, discussed and informed consent has been obtained with: patient and spouse/significant other.    Plan discussed with CRNA.    CODE STATUS:         "

## 2024-01-24 ENCOUNTER — HOSPITAL ENCOUNTER (OUTPATIENT)
Facility: HOSPITAL | Age: 79
Setting detail: HOSPITAL OUTPATIENT SURGERY
Discharge: HOME | End: 2024-01-24
Attending: INTERNAL MEDICINE | Admitting: INTERNAL MEDICINE
Payer: MEDICARE

## 2024-01-24 ENCOUNTER — ANESTHESIA (OUTPATIENT)
Dept: GASTROENTEROLOGY | Facility: HOSPITAL | Age: 79
End: 2024-01-24
Payer: MEDICARE

## 2024-01-24 VITALS
WEIGHT: 250.44 LBS | TEMPERATURE: 97.4 F | SYSTOLIC BLOOD PRESSURE: 121 MMHG | RESPIRATION RATE: 19 BRPM | BODY MASS INDEX: 33.97 KG/M2 | OXYGEN SATURATION: 95 % | HEART RATE: 69 BPM | DIASTOLIC BLOOD PRESSURE: 78 MMHG

## 2024-01-24 DIAGNOSIS — R13.19 ESOPHAGEAL DYSPHAGIA: Primary | ICD-10-CM

## 2024-01-24 LAB — GLUCOSE BLDC GLUCOMTR-MCNC: 148 MG/DL (ref 70–99)

## 2024-01-24 PROCEDURE — 25010000002 PROPOFOL 10 MG/ML EMULSION: Performed by: NURSE ANESTHETIST, CERTIFIED REGISTERED

## 2024-01-24 PROCEDURE — 82948 REAGENT STRIP/BLOOD GLUCOSE: CPT

## 2024-01-24 PROCEDURE — 43235 EGD DIAGNOSTIC BRUSH WASH: CPT | Performed by: INTERNAL MEDICINE

## 2024-01-24 PROCEDURE — 25810000003 LACTATED RINGERS PER 1000 ML

## 2024-01-24 RX ORDER — SODIUM CHLORIDE, SODIUM LACTATE, POTASSIUM CHLORIDE, CALCIUM CHLORIDE 600; 310; 30; 20 MG/100ML; MG/100ML; MG/100ML; MG/100ML
30 INJECTION, SOLUTION INTRAVENOUS CONTINUOUS
Status: DISCONTINUED | OUTPATIENT
Start: 2024-01-24 | End: 2024-01-24 | Stop reason: HOSPADM

## 2024-01-24 RX ORDER — PROPOFOL 10 MG/ML
VIAL (ML) INTRAVENOUS AS NEEDED
Status: DISCONTINUED | OUTPATIENT
Start: 2024-01-24 | End: 2024-01-24 | Stop reason: SURG

## 2024-01-24 RX ORDER — LIDOCAINE HYDROCHLORIDE 20 MG/ML
INJECTION, SOLUTION EPIDURAL; INFILTRATION; INTRACAUDAL; PERINEURAL AS NEEDED
Status: DISCONTINUED | OUTPATIENT
Start: 2024-01-24 | End: 2024-01-24 | Stop reason: SURG

## 2024-01-24 RX ADMIN — LIDOCAINE HYDROCHLORIDE 60 MG: 20 INJECTION, SOLUTION EPIDURAL; INFILTRATION; INTRACAUDAL; PERINEURAL at 08:12

## 2024-01-24 RX ADMIN — PROPOFOL 20 MG: 10 INJECTION, EMULSION INTRAVENOUS at 08:13

## 2024-01-24 RX ADMIN — PROPOFOL 20 MG: 10 INJECTION, EMULSION INTRAVENOUS at 08:17

## 2024-01-24 RX ADMIN — PROPOFOL 20 MG: 10 INJECTION, EMULSION INTRAVENOUS at 08:14

## 2024-01-24 RX ADMIN — SODIUM CHLORIDE, POTASSIUM CHLORIDE, SODIUM LACTATE AND CALCIUM CHLORIDE 30 ML/HR: 600; 310; 30; 20 INJECTION, SOLUTION INTRAVENOUS at 07:39

## 2024-01-24 RX ADMIN — PROPOFOL 80 MG: 10 INJECTION, EMULSION INTRAVENOUS at 08:12

## 2024-01-24 RX ADMIN — PROPOFOL 20 MG: 10 INJECTION, EMULSION INTRAVENOUS at 08:23

## 2024-01-24 RX ADMIN — PROPOFOL 20 MG: 10 INJECTION, EMULSION INTRAVENOUS at 08:20

## 2024-01-24 NOTE — ANESTHESIA POSTPROCEDURE EVALUATION
Patient: Cosmo Gauthier    Procedure Summary       Date: 01/24/24 Room / Location: [facility] ENDOSCOPY 3 / [facility] ENDOSCOPY    Anesthesia Start: 0808 Anesthesia Stop: 0831    Procedure: ESOPHAGOGASTRODUODENOSCOPY Diagnosis:       Other cirrhosis of liver      Hepatic encephalopathy      Secondary esophageal varices without bleeding      Esophageal dysphagia      (Other cirrhosis of liver [K74.69])      (Hepatic encephalopathy [K76.82])      (Secondary esophageal varices without bleeding [I85.10])      (Esophageal dysphagia [R13.19])    Surgeons: Chris Bradshaw MD Provider: Shy Zapien CRNA    Anesthesia Type: general ASA Status: 3            Anesthesia Type: general    Vitals  Vitals Value Taken Time   /78 01/24/24 0846   Temp 36.3 °C (97.4 °F) 01/24/24 0846   Pulse 69 01/24/24 0846   Resp 19 01/24/24 0846   SpO2 95 % 01/24/24 0846           Post Anesthesia Care and Evaluation    Patient location during evaluation: bedside  Patient participation: complete - patient participated  Level of consciousness: awake  Pain management: adequate    Airway patency: patent  Anesthetic complications: No anesthetic complications  PONV Status: controlled  Cardiovascular status: acceptable and stable  Respiratory status: acceptable

## 2024-01-24 NOTE — H&P
Pre Procedure History & Physical    Chief Complaint:   Dysphagia and cirrhosis    Subjective     HPI:   Stage and stenosis    Past Medical History:   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     Primary osteoarthritis of left knee     Sleep apnea     Sleep apnea     Thrombocytopenia 05/22/2018    Thyroid disease        Past Surgical History:  Past Surgical History:   Procedure Laterality Date    CARDIAC SURGERY      COLONOSCOPY  2015    CORONARY ANGIOPLASTY WITH STENT PLACEMENT      CORONARY ARTERY BYPASS GRAFT N/A 05/20/2021    Procedure: MIDLINE STERNOTOMY,CORONARY ARTERY BYPASS GRAFTING X 5, UTILIZING THE LEFT COMPA AND LEFT SAPHENOUS VEIN, ABHIJEET, PRP;  Surgeon: Jr Tom Tubbs MD;  Location: [facility];  Service: Cardiothoracic;  Laterality: N/A;    ENDOSCOPY      HERNIA REPAIR      JOINT REPLACEMENT      SHOULDER SURGERY      SHOULDER REPAIR    TOTAL KNEE ARTHROPLASTY      TRANSESOPHAGEAL ECHOCARDIOGRAM (ABHIJEET) N/A 05/20/2021    Procedure: TRANSESOPHAGEAL ECHOCARDIOGRAM WITH ANESTHESIA;  Surgeon: Jr Tom Tubbs MD;  Location: [facility];  Service: Cardiothoracic;  Laterality: N/A;       Family History:  Family History   Problem Relation Age of Onset    Diabetes Mother     Colon cancer Father 65       Social History:   reports that he has never smoked. He has never used smokeless tobacco. He reports that he does not currently use alcohol. He reports  that he does not use drugs.    Medications:   Medications Prior to Admission   Medication Sig Dispense Refill Last Dose    amitriptyline (ELAVIL) 50 MG tablet Take 1 tablet by mouth Every Night.   1/23/2024    aspirin 81 MG EC tablet Take 1 tablet by mouth Daily.   Past Week    donepezil (ARICEPT) 10 MG tablet Take 1 tablet by mouth every night at bedtime.   1/23/2024    furosemide (LASIX) 40 MG tablet Take 0.5 tablets by mouth Every Other Day.   Past Week    gabapentin (NEURONTIN) 300 MG capsule Take 1 capsule by mouth 2 (Two) Times a Day. 60 capsule 5 Past Week    gabapentin (NEURONTIN) 600 MG tablet Take 0.5 tablets by mouth 2 (Two) Times a Day. 30 tablet 0 Past Week    levothyroxine (SYNTHROID, LEVOTHROID) 112 MCG tablet Take 2 tablets by mouth Take As Directed. Takes two tablets every day Mon - Sat. Takes 1-1/2 tablets on Sunday.   1/23/2024    metFORMIN (GLUCOPHAGE) 1000 MG tablet Take 1 tablet by mouth 2 (Two) Times a Day With Meals.   1/23/2024    midodrine (PROAMATINE) 2.5 MG tablet Take 1 tablet by mouth Daily. 90 tablet 3 1/23/2024    sertraline (ZOLOFT) 100 MG tablet Take 2 tablets by mouth Daily. 180 tablet 1 1/23/2024    simvastatin (ZOCOR) 40 MG tablet Take 0.5 tablets by mouth Every Night.   1/23/2024    tamsulosin (FLOMAX) 0.4 MG capsule 24 hr capsule Take 1 capsule by mouth Daily. 30 capsule 5 1/23/2024    finasteride (PROSCAR) 5 MG tablet Take 1 tablet by mouth Daily. 60 tablet 5     insulin glargine (LANTUS, SEMGLEE) 100 UNIT/ML injection Inject 15 Units under the skin into the appropriate area as directed Every Night.       Insulin Lispro, 1 Unit Dial, (HumaLOG KwikPen) 100 UNIT/ML solution pen-injector 150-200 = 4 units  201-250 = 6 units  251-300 = 8 units  301-350 = 10 units       >350 = 14 units       nitroglycerin (NITROSTAT) 0.4 MG SL tablet Place 1 tablet under the tongue Every 5 (Five) Minutes As Needed.          Allergies:  Patient has no known allergies.        Objective     Blood  pressure 140/71, pulse 78, temperature 97.6 °F (36.4 °C), temperature source Temporal, resp. rate 17, weight 114 kg (250 lb 7.1 oz), SpO2 95%.    Physical Exam   Constitutional: Pt is oriented to person, place, and time and well-developed, well-nourished, and in no distress.   Mouth/Throat: Oropharynx is clear and moist.   Neck: Normal range of motion.   Cardiovascular: Normal rate, regular rhythm and normal heart sounds.    Pulmonary/Chest: Effort normal and breath sounds normal.   Abdominal: Soft. Nontender  Skin: Skin is warm and dry.   Psychiatric: Mood, memory, affect and judgment normal.     Assessment & Plan     Diagnosis:  Dysphagia and cirrhosis    Anticipated Surgical Procedure:  EGD    The risks, benefits, and alternatives of this procedure have been discussed with the patient or the responsible party- the patient understands and agrees to proceed.

## 2024-01-25 ENCOUNTER — TELEPHONE (OUTPATIENT)
Dept: GASTROENTEROLOGY | Facility: CLINIC | Age: 79
End: 2024-01-25
Payer: MEDICARE

## 2024-01-25 NOTE — TELEPHONE ENCOUNTER
Dr Bradshaw did EGD 1/24 and planned to have repeat 1/29 w dilation off plavix , please touchbase with pt to make sure he's aware to stay off plavix

## 2024-01-26 ENCOUNTER — ANESTHESIA EVENT (OUTPATIENT)
Dept: GASTROENTEROLOGY | Facility: HOSPITAL | Age: 79
End: 2024-01-26
Payer: MEDICARE

## 2024-01-26 NOTE — TELEPHONE ENCOUNTER
Spoke with spouse (JULIANNA on file) and confirmed patient has been off of Plavix and will remain off until after upcoming procedure on 01.29.24 as directed.

## 2024-01-27 NOTE — ANESTHESIA PREPROCEDURE EVALUATION
Anesthesia Evaluation     Patient summary reviewed and Nursing notes reviewed   NPO Solid Status: > 8 hours  NPO Liquid Status: > 8 hours           Airway   Mallampati: II  TM distance: >3 FB  Neck ROM: limited  Possible difficult intubation  Dental          Pulmonary     breath sounds clear to auscultation  (+) ,sleep apnea on CPAP  (-) not a smoker  Cardiovascular     Rhythm: regular  Rate: normal    (+) hypertension well controlled, CAD, CABG >6 Months, hyperlipidemia      Neuro/Psych  (+) CVA, weakness, numbness, psychiatric history Anxiety and Depression, dementia  GI/Hepatic/Renal/Endo    (+) obesity, morbid obesity, GERD, PUD, liver disease fatty liver disease cirrhosis, renal disease- CRI, diabetes mellitus type 2, thyroid problem hypothyroidism and hyperthyroidism    Musculoskeletal     Abdominal    Substance History      OB/GYN          Other   arthritis,   history of cancer    ROS/Med Hx Other: Prev ANS for EGD 1/24 - plavix was current at that time, so dilation postponed, returns today in usual state of health for EGD with dilation    From prev eval:  EKG 10/11/23: HR 89, SR    Hx of emergent open heart ABHIJEET 05/20/21:  Diagnostic Indications for Echo:  assessment of ascending aorta, assessment of surgical repair, defect repair evaluation and hemodynamic monitoring    ECHO 02/26/21:   ·Calculated left ventricular EF = 59% Estimated left ventricular EF = 59% Estimated left ventricular EF was in agreement with the calculated left ventricular EF. Left ventricular systolic function is normal. Normal left ventricular cavity size noted. Left ventricular wall thickness is consistent with mild concentric hypertrophy. All left ventricular wall segments contract normally. Left ventricular diastolic function is consistent with (grade I) impaired relaxation.  ·Left atrial volume is normal. Saline test results are negative.  ·Mild aortic valve regurgitation is present.  ·Trace tricuspid valve regurgitation is present.  "Insufficient TR velocity profile to estimate the right ventricular systolic pressure.  ·Mild dilation of the sinuses of Valsalva is present. Sinus of Valsalva = 4.1 cm Mild dilation of the ascending aorta is present. Ascending aorta = 4.1 cm                      Anesthesia Plan    ASA 3     general     (Responds to \"Inocente\";  EGD for dysphagis.  Total IV Anesthesia    Patient understands anesthesia not responsible for dental damage.  )  intravenous induction     Anesthetic plan, risks, benefits, and alternatives have been provided, discussed and informed consent has been obtained with: patient and spouse/significant other.    Plan discussed with CRNA.      CODE STATUS:         "

## 2024-01-29 ENCOUNTER — HOSPITAL ENCOUNTER (OUTPATIENT)
Facility: HOSPITAL | Age: 79
Setting detail: HOSPITAL OUTPATIENT SURGERY
Discharge: HOME | End: 2024-01-29
Attending: INTERNAL MEDICINE | Admitting: INTERNAL MEDICINE
Payer: MEDICARE

## 2024-01-29 ENCOUNTER — ANESTHESIA (OUTPATIENT)
Dept: GASTROENTEROLOGY | Facility: HOSPITAL | Age: 79
End: 2024-01-29
Payer: MEDICARE

## 2024-01-29 VITALS
RESPIRATION RATE: 17 BRPM | OXYGEN SATURATION: 95 % | SYSTOLIC BLOOD PRESSURE: 111 MMHG | TEMPERATURE: 98 F | HEART RATE: 68 BPM | HEIGHT: 72 IN | BODY MASS INDEX: 33.62 KG/M2 | WEIGHT: 248.24 LBS | DIASTOLIC BLOOD PRESSURE: 64 MMHG

## 2024-01-29 LAB — GLUCOSE BLDC GLUCOMTR-MCNC: 143 MG/DL (ref 70–99)

## 2024-01-29 PROCEDURE — 43249 ESOPH EGD DILATION <30 MM: CPT | Performed by: INTERNAL MEDICINE

## 2024-01-29 PROCEDURE — 25010000002 PROPOFOL 10 MG/ML EMULSION: Performed by: NURSE ANESTHETIST, CERTIFIED REGISTERED

## 2024-01-29 PROCEDURE — 25810000003 LACTATED RINGERS PER 1000 ML

## 2024-01-29 PROCEDURE — 82948 REAGENT STRIP/BLOOD GLUCOSE: CPT

## 2024-01-29 PROCEDURE — C1726 CATH, BAL DIL, NON-VASCULAR: HCPCS | Performed by: INTERNAL MEDICINE

## 2024-01-29 RX ORDER — PROPOFOL 10 MG/ML
VIAL (ML) INTRAVENOUS AS NEEDED
Status: DISCONTINUED | OUTPATIENT
Start: 2024-01-29 | End: 2024-01-29 | Stop reason: SURG

## 2024-01-29 RX ORDER — PANTOPRAZOLE SODIUM 40 MG/1
40 TABLET, DELAYED RELEASE ORAL DAILY
Qty: 30 TABLET | Refills: 5 | Status: SHIPPED | OUTPATIENT
Start: 2024-01-29

## 2024-01-29 RX ORDER — LIDOCAINE HYDROCHLORIDE 20 MG/ML
INJECTION, SOLUTION EPIDURAL; INFILTRATION; INTRACAUDAL; PERINEURAL AS NEEDED
Status: DISCONTINUED | OUTPATIENT
Start: 2024-01-29 | End: 2024-01-29 | Stop reason: SURG

## 2024-01-29 RX ORDER — SODIUM CHLORIDE, SODIUM LACTATE, POTASSIUM CHLORIDE, CALCIUM CHLORIDE 600; 310; 30; 20 MG/100ML; MG/100ML; MG/100ML; MG/100ML
30 INJECTION, SOLUTION INTRAVENOUS CONTINUOUS
Status: DISCONTINUED | OUTPATIENT
Start: 2024-01-29 | End: 2024-01-29 | Stop reason: HOSPADM

## 2024-01-29 RX ADMIN — PROPOFOL 50 MG: 10 INJECTION, EMULSION INTRAVENOUS at 13:44

## 2024-01-29 RX ADMIN — PROPOFOL 150 MCG/KG/MIN: 10 INJECTION, EMULSION INTRAVENOUS at 13:44

## 2024-01-29 RX ADMIN — LIDOCAINE HYDROCHLORIDE 100 MG: 20 INJECTION, SOLUTION EPIDURAL; INFILTRATION; INTRACAUDAL; PERINEURAL at 13:44

## 2024-01-29 RX ADMIN — SODIUM CHLORIDE, POTASSIUM CHLORIDE, SODIUM LACTATE AND CALCIUM CHLORIDE: 600; 310; 30; 20 INJECTION, SOLUTION INTRAVENOUS at 13:42

## 2024-01-29 NOTE — H&P
Pre Procedure History & Physical    Chief Complaint:   Dysphagia    Subjective     HPI:   Dysphagia    Past Medical History:   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     Primary osteoarthritis of left knee     Sleep apnea     Sleep apnea     Thrombocytopenia 05/22/2018    Thyroid disease        Past Surgical History:  Past Surgical History:   Procedure Laterality Date    CARDIAC SURGERY      COLONOSCOPY  2015    CORONARY ANGIOPLASTY WITH STENT PLACEMENT      CORONARY ARTERY BYPASS GRAFT N/A 05/20/2021    Procedure: MIDLINE STERNOTOMY,CORONARY ARTERY BYPASS GRAFTING X 5, UTILIZING THE LEFT COMPA AND LEFT SAPHENOUS VEIN, ABHIJEET, PRP;  Surgeon: Jr Tom Tubbs MD;  Location: [facility];  Service: Cardiothoracic;  Laterality: N/A;    ENDOSCOPY      ENDOSCOPY N/A 1/24/2024    Procedure: ESOPHAGOGASTRODUODENOSCOPY;  Surgeon: Chris Bradshaw MD;  Location: [facility] ENDOSCOPY;  Service: Gastroenterology;  Laterality: N/A;  hiatal hernia, schatzki's ring    HERNIA REPAIR      JOINT REPLACEMENT      SHOULDER SURGERY      SHOULDER REPAIR    TOTAL KNEE ARTHROPLASTY      TRANSESOPHAGEAL ECHOCARDIOGRAM (ABHIJEET) N/A 05/20/2021    Procedure: TRANSESOPHAGEAL ECHOCARDIOGRAM WITH ANESTHESIA;  Surgeon: Jr Tom Tubbs MD;  Location: [facility];  Service: Cardiothoracic;  Laterality: N/A;       Family History:  Family History   Problem Relation Age of Onset    Diabetes  Mother     Colon cancer Father 65       Social History:   reports that he has never smoked. He has never used smokeless tobacco. He reports that he does not currently use alcohol. He reports that he does not use drugs.    Medications:   Medications Prior to Admission   Medication Sig Dispense Refill Last Dose    amitriptyline (ELAVIL) 50 MG tablet Take 1 tablet by mouth Every Night.   1/28/2024    aspirin 81 MG EC tablet Take 1 tablet by mouth Daily.   1/28/2024    donepezil (ARICEPT) 10 MG tablet Take 1 tablet by mouth every night at bedtime.   1/28/2024    finasteride (PROSCAR) 5 MG tablet Take 1 tablet by mouth Daily. 60 tablet 5 1/28/2024    furosemide (LASIX) 40 MG tablet Take 0.5 tablets by mouth Every Other Day.   Past Week    gabapentin (NEURONTIN) 300 MG capsule Take 1 capsule by mouth 2 (Two) Times a Day. 60 capsule 5 1/28/2024    gabapentin (NEURONTIN) 600 MG tablet Take 0.5 tablets by mouth 2 (Two) Times a Day. 30 tablet 0 1/28/2024    levothyroxine (SYNTHROID, LEVOTHROID) 112 MCG tablet Take 2 tablets by mouth Take As Directed. Takes two tablets every day Mon - Sat. Takes 1-1/2 tablets on Sunday.   1/28/2024    metFORMIN (GLUCOPHAGE) 1000 MG tablet Take 1 tablet by mouth 2 (Two) Times a Day With Meals.   1/28/2024    midodrine (PROAMATINE) 2.5 MG tablet Take 1 tablet by mouth Daily. 90 tablet 3 1/28/2024    sertraline (ZOLOFT) 100 MG tablet Take 2 tablets by mouth Daily. 180 tablet 1 1/28/2024    simvastatin (ZOCOR) 40 MG tablet Take 0.5 tablets by mouth Every Night.   1/28/2024    tamsulosin (FLOMAX) 0.4 MG capsule 24 hr capsule Take 1 capsule by mouth Daily. 30 capsule 5 1/28/2024    insulin glargine (LANTUS, SEMGLEE) 100 UNIT/ML injection Inject 15 Units under the skin into the appropriate area as directed Every Night.       Insulin Lispro, 1 Unit Dial, (HumaLOG KwikPen) 100 UNIT/ML solution pen-injector 150-200 = 4 units  201-250 = 6 units  251-300 = 8 units  301-350 = 10 units       >350 = 14 units  "      nitroglycerin (NITROSTAT) 0.4 MG SL tablet Place 1 tablet under the tongue Every 5 (Five) Minutes As Needed.          Allergies:  Patient has no known allergies.        Objective     Blood pressure 163/85, pulse 83, temperature 97.7 °F (36.5 °C), temperature source Temporal, resp. rate 16, height 182.9 cm (72\"), weight 113 kg (248 lb 3.8 oz), SpO2 95%.    Physical Exam   Constitutional: Pt is oriented to person, place, and time and well-developed, well-nourished, and in no distress.   Mouth/Throat: Oropharynx is clear and moist.   Neck: Normal range of motion.   Cardiovascular: Normal rate, regular rhythm and normal heart sounds.    Pulmonary/Chest: Effort normal and breath sounds normal.   Abdominal: Soft. Nontender  Skin: Skin is warm and dry.   Psychiatric: Mood, memory, affect and judgment normal.     Assessment & Plan     Diagnosis:  Dysphagia    Anticipated Surgical Procedure:  EGD    The risks, benefits, and alternatives of this procedure have been discussed with the patient or the responsible party- the patient understands and agrees to proceed.            "

## 2024-01-29 NOTE — ANESTHESIA POSTPROCEDURE EVALUATION
Patient: Cosmo Gauthier    Procedure Summary       Date: 01/29/24 Room / Location: [facility] ENDOSCOPY 1 / [facility] ENDOSCOPY    Anesthesia Start: 1342 Anesthesia Stop: 1401    Procedure: ESOPHAGOGASTRODUODENOSCOPY with balloon dilaitation 12-15cm Diagnosis:       Esophageal dysphagia      (Esophageal dysphagia [R13.19])    Surgeons: Chris Bradshaw MD Provider: Gurinder Castro CRNA    Anesthesia Type: general ASA Status: 3            Anesthesia Type: general    Vitals  Vitals Value Taken Time   /64 01/29/24 1432   Temp 36.7 °C (98 °F) 01/29/24 1415   Pulse 68 01/29/24 1432   Resp 17 01/29/24 1415   SpO2 98 % 01/29/24 1432   Vitals shown include unfiled device data.        Post Anesthesia Care and Evaluation    Post-procedure mental status: acceptable.  Pain management: satisfactory to patient    Airway patency: patent  Anesthetic complications: No anesthetic complications    Cardiovascular status: acceptable  Respiratory status: acceptable    Comments: Per chart review

## 2024-02-01 ENCOUNTER — TELEPHONE (OUTPATIENT)
Dept: GASTROENTEROLOGY | Facility: CLINIC | Age: 79
End: 2024-02-01
Payer: MEDICARE

## 2024-02-01 NOTE — TELEPHONE ENCOUNTER
1/29/2024 EGD: Small hiatal hernia, severe Schatzki's ring was found at the GE junction, dilation performed from 12 to 15 mm normal stomach, normal duodenum   Pt has appt next week.

## 2024-02-05 ENCOUNTER — OFFICE VISIT (OUTPATIENT)
Dept: GASTROENTEROLOGY | Facility: CLINIC | Age: 79
End: 2024-02-05
Payer: MEDICARE

## 2024-02-05 VITALS
SYSTOLIC BLOOD PRESSURE: 105 MMHG | HEIGHT: 72 IN | HEART RATE: 88 BPM | BODY MASS INDEX: 33.13 KG/M2 | WEIGHT: 244.6 LBS | DIASTOLIC BLOOD PRESSURE: 67 MMHG

## 2024-02-05 DIAGNOSIS — K76.82 HEPATIC ENCEPHALOPATHY: ICD-10-CM

## 2024-02-05 DIAGNOSIS — E66.9 CLASS 1 OBESITY WITH SERIOUS COMORBIDITY AND BODY MASS INDEX (BMI) OF 33.0 TO 33.9 IN ADULT, UNSPECIFIED OBESITY TYPE: ICD-10-CM

## 2024-02-05 DIAGNOSIS — R13.19 ESOPHAGEAL DYSPHAGIA: Primary | ICD-10-CM

## 2024-02-05 DIAGNOSIS — K74.69 OTHER CIRRHOSIS OF LIVER: ICD-10-CM

## 2024-02-05 DIAGNOSIS — K22.2 SCHATZKI'S RING OF DISTAL ESOPHAGUS: ICD-10-CM

## 2024-02-05 PROCEDURE — 3078F DIAST BP <80 MM HG: CPT | Performed by: NURSE PRACTITIONER

## 2024-02-05 PROCEDURE — 1160F RVW MEDS BY RX/DR IN RCRD: CPT | Performed by: NURSE PRACTITIONER

## 2024-02-05 PROCEDURE — 99214 OFFICE O/P EST MOD 30 MIN: CPT | Performed by: NURSE PRACTITIONER

## 2024-02-05 PROCEDURE — 1159F MED LIST DOCD IN RCRD: CPT | Performed by: NURSE PRACTITIONER

## 2024-02-05 PROCEDURE — 3074F SYST BP LT 130 MM HG: CPT | Performed by: NURSE PRACTITIONER

## 2024-02-05 RX ORDER — CLOPIDOGREL BISULFATE 75 MG/1
75 TABLET ORAL DAILY
COMMUNITY

## 2024-02-05 RX ORDER — LACTULOSE 10 G/15ML
30 SOLUTION ORAL DAILY
Qty: 900 ML | Refills: 5 | Status: SHIPPED | OUTPATIENT
Start: 2024-02-05

## 2024-02-05 NOTE — PROGRESS NOTES
Patient Name: Cosmo Gauthier   Visit Date: 02/05/2024   Patient ID: [id number]  Provider: MAXWELL Denise    Sex: male  Location:  Location Address:  Location Phone: [phone]  [place] [address] [phone]    YOB: 1945  Age: 78 y.o.   Primary Care Provider Alex Buckley MD      Referring Provider: No ref. provider found        Chief Complaint  EGD (Follow up ) and Cirrhosis of Liver     History of Present Illness  Pt w history of fatty liver noted in 2010 with liver biopsy in 2011 that showed CANSECO.  History of alcohol for 20 years but he quit in the 1990s.  Pt not seen from 2020 until 2023.   Esophageal varices were noted in 2014 and patient was started on nadolol.  At last visit in 2020 patient was off Xifaxan and was not sure why.  Last EGD February 25, 2014 gastritis, esophageal varices, patient was started on nadolol 40 mg/day.  Last Colonoscopy 7/29/2019: Adequate prep, 2 small polyps in the transverse colon completely removed-inflammatory and hyperplastic polyps. Patient is in recall for 5 years.    Patient was last seen 11/21/2023 and he had not been seen since 2020, he reported following with Dr. Saldana and having liver ultrasounds every 6 months.  Reported seeing Dr. Saldana for history of leukemia and reported he was in remission, intermittent dysphagia symptoms, reported was taken off of nadolol due to low blood pressure in 2023.  Wife reported frequent napping and some confusion.  Lactulose was prescribed    EGD 1/29/24: Small hiatal hernia, severe Schatzki's ring was found at the GE junction, dilation performed from 12 to 15 mm normal stomach, normal duodenum      Pt states dysphagia was resolved after dilation.  No abd pain,  BM's are daily, states he's out of Lactulose  States no worsening or new confusion, napping less than before, states he's feeling well.       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     Primary osteoarthritis of left knee     Sleep apnea     Sleep apnea     Thrombocytopenia 05/22/2018    Thyroid disease        Past Surgical History:   Procedure Laterality Date    CARDIAC SURGERY      COLONOSCOPY  2015    CORONARY ANGIOPLASTY WITH STENT PLACEMENT      CORONARY ARTERY BYPASS GRAFT N/A 05/20/2021    Procedure: MIDLINE STERNOTOMY,CORONARY ARTERY BYPASS GRAFTING X 5, UTILIZING THE LEFT COMPA AND LEFT SAPHENOUS VEIN, ABHIJEET, PRP;  Surgeon: Jr Tom Tubbs MD;  Location: [facility];  Service: Cardiothoracic;  Laterality: N/A;    ENDOSCOPY      ENDOSCOPY N/A 1/24/2024    Procedure: ESOPHAGOGASTRODUODENOSCOPY;  Surgeon: Chris Bradshaw MD;  Location: [facility] ENDOSCOPY;  Service: Gastroenterology;  Laterality: N/A;  hiatal hernia, schatzki's ring    ENDOSCOPY N/A 1/29/2024    Procedure: ESOPHAGOGASTRODUODENOSCOPY with balloon dilaitation 12-15cm;  Surgeon: Chris Bradshaw MD;  Location: [facility] ENDOSCOPY;  Service: Gastroenterology;  Laterality: N/A;  hiatal hernia, schatskis ring    HERNIA REPAIR      JOINT REPLACEMENT      SHOULDER SURGERY      SHOULDER REPAIR    TOTAL KNEE ARTHROPLASTY      TRANSESOPHAGEAL ECHOCARDIOGRAM (ABHIJEET) N/A 05/20/2021    Procedure: TRANSESOPHAGEAL ECHOCARDIOGRAM WITH ANESTHESIA;  Surgeon: Jr Tom Tubbs MD;  Location: [facility];  Service: Cardiothoracic;  Laterality: N/A;       No Known Allergies    Family History   Problem Relation Age of Onset    Diabetes  "Mother     Colon cancer Father 65        Social History     Tobacco Use    Smoking status: Never    Smokeless tobacco: Never   Vaping Use    Vaping Use: Never used   Substance Use Topics    Alcohol use: Not Currently     Comment: QUIT DRINKING 32 YEARS AGO    Drug use: Never       Objective     Vital Signs:   /67 (BP Location: Left arm, Patient Position: Sitting, Cuff Size: Adult)   Pulse 88   Ht 182.9 cm (72\")   Wt 111 kg (244 lb 9.6 oz)   BMI 33.17 kg/m²       Physical Exam  Constitutional:       General: The patient is not in acute distress.     Appearance: Normal appearance.   HENT:      Head: Normocephalic and atraumatic.      Nose: Nose normal.   Pulmonary:      Effort: Pulmonary effort is normal. No respiratory distress.   Abdominal:      General: Abdomen is flat.      Palpations: Abdomen is soft. There is no mass.      Tenderness: There is no abdominal tenderness. There is no guarding.   Musculoskeletal:      Cervical back: Neck supple.      Right lower leg: No edema.      Left lower leg: No edema.   Skin:     General: Skin is warm and dry.   Neurological:      General: No focal deficit present.      Mental Status: The patient is alert and oriented to person, place, and time.      Gait: a little unsteady, uses cane.  Psychiatric:         Mood and Affect: Mood normal.         Speech: Speech normal.         Behavior: Behavior normal.         Thought Content: Thought content normal.     Result Review :   The following data was reviewed by: MAXWELL Denise on 02/05/2024:                    Assessment and Plan    Diagnoses and all orders for this visit:    1. Esophageal dysphagia (Primary)  Comments:  resolved    2. Schatzki's ring of distal esophagus  Comments:  dilated 1/29/24    3. Other cirrhosis of liver    4. Hepatic encephalopathy    5. Class 1 obesity with serious comorbidity and body mass index (BMI) of 33.0 to 33.9 in adult, unspecified obesity type    Other orders  -     " lactulose (CHRONULAC) 10 GM/15ML solution; Take 30 mL by mouth Daily.  Dispense: 900 mL; Refill: 5              Follow Up   Return in about 6 months (around 8/5/2024).  Pt states Dr Saldana ordering liver US and labs - requesting Copy of last labs/US, pt states he has appt Wednesday.   EGD 1 yr  R/W pt protein shake before bed, coupons given  Small frequent meals, low carb, 2-4 c. Coffee/d  RF Lactulose, d/w pt and wife goal 2-3 BM's/day    Patient was given instructions and counseling regarding his condition or for health maintenance advice. Please see specific information pulled into the AVS if appropriate.

## 2024-03-07 ENCOUNTER — OFFICE VISIT (OUTPATIENT)
Dept: INTERNAL MEDICINE | Facility: CLINIC | Age: 79
End: 2024-03-07
Payer: MEDICARE

## 2024-03-07 VITALS
TEMPERATURE: 98.5 F | BODY MASS INDEX: 33.72 KG/M2 | HEIGHT: 72 IN | OXYGEN SATURATION: 93 % | WEIGHT: 249 LBS | HEART RATE: 105 BPM

## 2024-03-07 DIAGNOSIS — J20.9 ACUTE BRONCHITIS, UNSPECIFIED ORGANISM: ICD-10-CM

## 2024-03-07 DIAGNOSIS — R05.1 ACUTE COUGH: Primary | ICD-10-CM

## 2024-03-07 DIAGNOSIS — J06.9 ACUTE URI: ICD-10-CM

## 2024-03-07 PROCEDURE — 87428 SARSCOV & INF VIR A&B AG IA: CPT | Performed by: INTERNAL MEDICINE

## 2024-03-07 PROCEDURE — 99213 OFFICE O/P EST LOW 20 MIN: CPT | Performed by: INTERNAL MEDICINE

## 2024-03-07 RX ORDER — AZITHROMYCIN 250 MG/1
TABLET, FILM COATED ORAL
Qty: 6 TABLET | Refills: 0 | Status: SHIPPED | OUTPATIENT
Start: 2024-03-07 | End: 2024-03-12

## 2024-03-07 NOTE — PROGRESS NOTES
"CHIEF COMPLAINT/ HPI:  Cough (Cough , sinus congestion , SOA, Fatigue and weakness. Pt has only taken otc allergy medication. )  COVID flu test were negative, March 7, 2024  Falls, been feeling bad for about 3 to 4 days            Objective   Vital Signs  Vitals:    03/07/24 1122   Pulse: 105   Temp: 98.5 °F (36.9 °C)   SpO2: 93%   Weight: 113 kg (249 lb)   Height: 182.9 cm (72.01\")      Body mass index is 33.76 kg/m².  Review of Systems as above,  Physical Exam clear breath sounds, cardiac exam regular rhythm distant heart tones, neck is supple, throat shows dry mucosa, lower extremities no edema he is alert and oriented using his rollator walker,  Result Review :   Lab Results   Component Value Date    PROBNP 116.0 08/18/2023    PROBNP 395.0 04/20/2023    PROBNP 911.0 (H) 07/13/2022    BNP 2843 (H) 03/16/2021     09/29/2020    BNP 11 08/07/2019     CMP          10/11/2023    08:56 10/11/2023    15:04 10/12/2023    04:32 12/21/2023    07:44   CMP   Glucose 177  154  250  159    BUN 41  42  37  29    Creatinine 2.05  1.81  1.56  1.50    EGFR 33.0  38.3  45.7  48.0    Sodium 134  134  131  136    Potassium 4.5  4.7  4.9  4.8    Chloride 99  102  102  101    Calcium 9.4  8.6  8.7  9.4    Total Protein 7.1  6.5  6.4     Albumin 4.1  3.7  3.5  4.3    Globulin 3.0  2.8  2.9     Total Bilirubin 0.5  0.6  0.7     Alkaline Phosphatase 85  75  72     AST (SGOT) 28  29  31     ALT (SGPT) 20  21  24     Albumin/Globulin Ratio 1.4  1.3  1.2     BUN/Creatinine Ratio 20.0  23.2  23.7  19.3    Anion Gap 13.0  11.5  10.2  9.5      CBC w/diff          8/20/2023    17:25 10/11/2023    08:56 10/11/2023    15:04 10/12/2023    04:32   CBC w/Diff   WBC 9.29  6.77  5.82  4.55    RBC 4.36  3.58  3.29  3.38    Hemoglobin 13.7  10.8  9.6  10.0    Hematocrit 39.0  32.6  29.5  30.3    MCV 89.4  91.1  89.7  89.6    MCH 31.4  30.2  29.2  29.6    MCHC 35.1  33.1  32.5  33.0    RDW 14.5  15.0  14.8  14.9    Platelets 133  71  59  57  "   Neutrophil Rel % 69.8  95.3   86.4    Immature Granulocyte Rel % 0.3  0.7   0.9    Lymphocyte Rel % 17.9  1.8   7.0    Monocyte Rel % 7.2  1.8   5.1    Eosinophil Rel % 4.0  0.1   0.2    Basophil Rel % 0.8  0.3   0.4       Lipid Panel          4/20/2023    08:01 9/12/2023    11:18 12/21/2023    07:44   Lipid Panel   Total Cholesterol 126  128  111    Triglycerides 140  179  97    HDL Cholesterol 40  48  45    VLDL Cholesterol 24  29  18    LDL Cholesterol  62  51  48    LDL/HDL Ratio 1.45  0.92  1.04       Lab Results   Component Value Date    TSH 5.850 (H) 12/21/2023    TSH 0.725 06/08/2023    TSH 0.860 04/20/2023      Lab Results   Component Value Date    FREET4 1.67 06/07/2023    FREET4 1.49 10/11/2022    FREET4 1.29 07/13/2022      A1C Last 3 Results          4/20/2023    08:01 7/20/2023    12:51 12/21/2023    07:44   HGBA1C Last 3 Results   Hemoglobin A1C 6.70  6.40  6.90                        Visit Diagnoses:    ICD-10-CM ICD-9-CM   1. Acute cough  R05.1 786.2   2. Acute URI  J06.9 465.9   3. Acute bronchitis, unspecified organism  J20.9 466.0       Assessment and Plan   Diagnoses and all orders for this visit:    1. Acute cough (Primary)  -     POCT SARS-CoV-2 Antigen INDY + Flu    2. Acute URI    3. Acute bronchitis, unspecified organism    Other orders  -     azithromycin (Zithromax Z-Daniel) 250 MG tablet; Take 2 tablets the first day, then 1 tablet daily for 4 days.  Dispense: 6 tablet; Refill: 0    Will treat with a Z-Daniel,, patient is can use Mucinex over-the-counter and Tylenol,                   Follow Up   No follow-ups on file.  Patient was given instructions and counseling regarding his condition or for health maintenance advice. Please see specific information pulled into the AVS if appropriate.

## 2024-03-21 ENCOUNTER — LAB (OUTPATIENT)
Dept: LAB | Facility: HOSPITAL | Age: 79
End: 2024-03-21
Payer: MEDICARE

## 2024-03-21 DIAGNOSIS — E11.8 TYPE 2 DIABETES MELLITUS WITH COMPLICATIONS: ICD-10-CM

## 2024-03-21 DIAGNOSIS — N18.32 STAGE 3B CHRONIC KIDNEY DISEASE: ICD-10-CM

## 2024-03-21 DIAGNOSIS — E03.9 HYPOTHYROIDISM, ADULT: ICD-10-CM

## 2024-03-21 LAB
ANION GAP SERPL CALCULATED.3IONS-SCNC: 7.7 MMOL/L (ref 5–15)
BUN SERPL-MCNC: 19 MG/DL (ref 8–23)
BUN/CREAT SERPL: 15.8 (ref 7–25)
CALCIUM SPEC-SCNC: 9.6 MG/DL (ref 8.6–10.5)
CHLORIDE SERPL-SCNC: 104 MMOL/L (ref 98–107)
CO2 SERPL-SCNC: 27.3 MMOL/L (ref 22–29)
CREAT SERPL-MCNC: 1.2 MG/DL (ref 0.76–1.27)
EGFRCR SERPLBLD CKD-EPI 2021: 62.7 ML/MIN/1.73
GLUCOSE SERPL-MCNC: 149 MG/DL (ref 65–99)
HBA1C MFR BLD: 7 % (ref 4.8–5.6)
POTASSIUM SERPL-SCNC: 5 MMOL/L (ref 3.5–5.2)
SODIUM SERPL-SCNC: 139 MMOL/L (ref 136–145)
TSH SERPL DL<=0.05 MIU/L-ACNC: 0.27 UIU/ML (ref 0.27–4.2)

## 2024-03-21 PROCEDURE — 36415 COLL VENOUS BLD VENIPUNCTURE: CPT

## 2024-03-21 PROCEDURE — 84443 ASSAY THYROID STIM HORMONE: CPT

## 2024-03-21 PROCEDURE — 80048 BASIC METABOLIC PNL TOTAL CA: CPT

## 2024-03-21 PROCEDURE — 83036 HEMOGLOBIN GLYCOSYLATED A1C: CPT

## 2024-03-26 ENCOUNTER — OFFICE VISIT (OUTPATIENT)
Dept: INTERNAL MEDICINE | Age: 79
End: 2024-03-26
Payer: MEDICARE

## 2024-03-26 VITALS
WEIGHT: 240.6 LBS | HEART RATE: 94 BPM | BODY MASS INDEX: 32.59 KG/M2 | DIASTOLIC BLOOD PRESSURE: 60 MMHG | SYSTOLIC BLOOD PRESSURE: 118 MMHG | OXYGEN SATURATION: 97 % | HEIGHT: 72 IN | TEMPERATURE: 98.6 F

## 2024-03-26 DIAGNOSIS — E78.2 HYPERLIPEMIA, MIXED: ICD-10-CM

## 2024-03-26 DIAGNOSIS — E11.8 TYPE 2 DIABETES MELLITUS WITH COMPLICATIONS: ICD-10-CM

## 2024-03-26 DIAGNOSIS — I10 HYPERTENSION, ESSENTIAL: ICD-10-CM

## 2024-03-26 DIAGNOSIS — F01.50 VASCULAR DEMENTIA WITHOUT BEHAVIORAL DISTURBANCE: ICD-10-CM

## 2024-03-26 DIAGNOSIS — E03.9 HYPOTHYROIDISM, ADULT: ICD-10-CM

## 2024-03-26 DIAGNOSIS — Z00.00 MEDICARE ANNUAL WELLNESS VISIT, SUBSEQUENT: ICD-10-CM

## 2024-03-26 DIAGNOSIS — N18.31 STAGE 3A CHRONIC KIDNEY DISEASE: Primary | ICD-10-CM

## 2024-03-26 PROBLEM — R05.1 ACUTE COUGH: Status: RESOLVED | Noted: 2023-10-10 | Resolved: 2024-03-26

## 2024-03-26 RX ORDER — DONEPEZIL HYDROCHLORIDE 10 MG/1
20 TABLET, FILM COATED ORAL
Qty: 180 TABLET | Refills: 1 | Status: SHIPPED | OUTPATIENT
Start: 2024-03-26

## 2024-03-26 NOTE — ASSESSMENT & PLAN NOTE
TSH is down from 5.3 to 0.3 as of his 3/24 OV.  We made no change in his regimen at his 12/23 office visit.  Will stick with 2 of the 112 mcg tablets daily 6 days a week, and 1-1/2 of them 1 day a week for now.  Needs repeat labs in 3 months.

## 2024-03-26 NOTE — ASSESSMENT & PLAN NOTE
AWV completed 3/24.  Patient has continued decline in his ADLs, requiring more assistance etc.  Still falling despite assistive devices.  Was hospitalized overnight by Dr. Saldana for generalized weakness.  Previously provided information regarding advance directive.

## 2024-03-26 NOTE — ASSESSMENT & PLAN NOTE
Patient with progressive issues this regards as of 3/24.  He is just on low-dose Aricept, we will go ahead and titrate that now, asked them to call in about 4 to 6 weeks if no improvement/stabilization.

## 2024-03-26 NOTE — PROGRESS NOTES
The ABCs of the Annual Wellness Visit  Subsequent Medicare Wellness Visit    Subjective      Cosmo Gauthier is a 78 y.o. male who presents for a Subsequent Medicare Wellness Visit.    The following portions of the patient's history were reviewed and   updated as appropriate: allergies, current medications, past family history, past medical history, past social history, past surgical history, and problem list.    Compared to one year ago, the patient feels his physical   health is worse.---> Increased generalized weakness with occasional falls.    Compared to one year ago, the patient feels his mental   health is worse.---> Progressive dementia.    Recent Hospitalizations:  This patient has had a [facility] admission record on file within the last 365 days.    Current Medical Providers:  Patient Care Team:  Alex Buckley MD as PCP - General (Internal Medicine)  Seven Saldana MD as Consulting Physician (Hematology and Oncology)    Outpatient Medications Prior to Visit   Medication Sig Dispense Refill    amitriptyline (ELAVIL) 50 MG tablet Take 1 tablet by mouth Every Night.      aspirin 81 MG EC tablet Take 1 tablet by mouth Daily.      finasteride (PROSCAR) 5 MG tablet Take 1 tablet by mouth Daily. 60 tablet 5    furosemide (LASIX) 40 MG tablet Take 0.5 tablets by mouth Every Other Day.      insulin glargine (LANTUS, SEMGLEE) 100 UNIT/ML injection Inject 15 Units under the skin into the appropriate area as directed Every Night.      Insulin Lispro, 1 Unit Dial, (HumaLOG KwikPen) 100 UNIT/ML solution pen-injector 150-200 = 4 units  201-250 = 6 units  251-300 = 8 units  301-350 = 10 units       >350 = 14 units      lactulose (CHRONULAC) 10 GM/15ML solution Take 30 mL by mouth Daily. 900 mL 5    levothyroxine (SYNTHROID, LEVOTHROID) 112 MCG tablet Take 2 tablets by mouth Take As Directed. Takes two tablets every day Mon - Sat. Takes 1-1/2 tablets on Sunday.      metFORMIN (GLUCOPHAGE) 1000 MG tablet Take 1  tablet by mouth 2 (Two) Times a Day With Meals.      midodrine (PROAMATINE) 2.5 MG tablet Take 1 tablet by mouth Daily. 90 tablet 3    nitroglycerin (NITROSTAT) 0.4 MG SL tablet Place 1 tablet under the tongue Every 5 (Five) Minutes As Needed.      pantoprazole (PROTONIX) 40 MG EC tablet Take 1 tablet by mouth Daily. 30 tablet 5    sertraline (ZOLOFT) 100 MG tablet Take 2 tablets by mouth Daily. 180 tablet 1    simvastatin (ZOCOR) 40 MG tablet Take 0.5 tablets by mouth Every Night.      tamsulosin (FLOMAX) 0.4 MG capsule 24 hr capsule Take 1 capsule by mouth Daily. 30 capsule 5    donepezil (ARICEPT) 10 MG tablet Take 1 tablet by mouth every night at bedtime.      gabapentin (NEURONTIN) 300 MG capsule Take 1 capsule by mouth 2 (Two) Times a Day. 60 capsule 5     No facility-administered medications prior to visit.       No opioid medication identified on active medication list. I have reviewed chart for other potential  high risk medication/s and harmful drug interactions in the elderly.        Aspirin is on active medication list. Aspirin use is indicated based on review of current medical condition/s. Pros and cons of this therapy have been discussed today. Benefits of this medication outweigh potential harm.  Patient has been encouraged to continue taking this medication.  .      Patient Active Problem List   Diagnosis    Lymphoma    Type 2 diabetes mellitus with complications    Stage 3a chronic kidney disease    Thrombocytopenia    Coronary artery disease involving native coronary artery of native heart without angina pectoris    Hx of CABG    Hyperlipemia, mixed    Hypertension, essential    Hereditary and idiopathic neuropathy, unspecified    Low lying conus medullaris    Mood disorder    Hepatic cirrhosis    Sleep apnea    Spinal stenosis of lumbar region with neurogenic claudication    Vascular dementia without behavioral disturbance    Iron deficiency anemia    Hypothyroidism, adult    Traumatic  "subarachnoid hemorrhage with loss of consciousness of 30 minutes or less    Morbid (severe) obesity due to excess calories    Claudication of both lower extremities    Medicare annual wellness visit, subsequent    Weakness    Acute kidney injury    Hepatic encephalopathy    Secondary esophageal varices without bleeding    Esophageal dysphagia    Acute URI    Acute bronchitis     Advance Care Planning   Advance Care Planning     Advance Directive is not on file.  ACP discussion was held with the patient during this visit. Patient does not have an advance directive, information provided.     Objective    Vitals:    03/26/24 1207   BP: 118/60   Pulse: 94   Temp: 98.6 °F (37 °C)   TempSrc: Skin   SpO2: 97%   Weight: 109 kg (240 lb 9.6 oz)   Height: 182.9 cm (72.01\")     Estimated body mass index is 32.62 kg/m² as calculated from the following:    Height as of this encounter: 182.9 cm (72.01\").    Weight as of this encounter: 109 kg (240 lb 9.6 oz).           Does the patient have evidence of cognitive impairment?   Yes: Current medication dosing adjusted.    Lab Results   Component Value Date    HGBA1C 7.00 (H) 03/21/2024          HEALTH RISK ASSESSMENT    Smoking Status:  Social History     Tobacco Use   Smoking Status Never   Smokeless Tobacco Never     Alcohol Consumption:  Social History     Substance and Sexual Activity   Alcohol Use Not Currently    Comment: QUIT DRINKING 32 YEARS AGO     Fall Risk Screen:    MO Fall Risk Assessment was completed, and patient is at HIGH risk for falls. Assessment completed on:3/26/2024    Depression Screening:      3/26/2024    12:04 PM   PHQ-2/PHQ-9 Depression Screening   Little Interest or Pleasure in Doing Things 0-->not at all   Feeling Down, Depressed or Hopeless 0-->not at all   PHQ-9: Brief Depression Severity Measure Score 0       Health Habits and Functional and Cognitive Screening:      3/26/2024    12:00 PM   Functional & Cognitive Status   Do you have difficulty " preparing food and eating? Yes   Do you have difficulty bathing yourself, getting dressed or grooming yourself? Yes   Do you have difficulty using the toilet? Yes   Do you have difficulty moving around from place to place? Yes   Do you have trouble with steps or getting out of a bed or a chair? Yes   Current Diet Well Balanced Diet   Do you need help using the phone?  No   Are you deaf or do you have serious difficulty hearing?  Yes   Do you need help to go to places out of walking distance? Yes   Do you need help shopping? Yes   Do you need help preparing meals?  Yes   Do you need help with housework?  Yes   Do you need help with laundry? Yes   Do you need help taking your medications? Yes   Do you need help managing money? Yes   Do you ever drive or ride in a car without wearing a seat belt? No   Do you have difficulty concentrating, remembering or making decisions? Yes       Age-appropriate Screening Schedule:  Refer to the list below for future screening recommendations based on patient's age, sex and/or medical conditions. Orders for these recommended tests are listed in the plan section. The patient has been provided with a written plan.    Health Maintenance   Topic Date Due    Hepatitis B (1 of 3 - Risk 3-dose series) Never done    RSV Vaccine - Adults (1 - 1-dose 60+ series) Never done    TDAP/TD VACCINES (1 - Tdap) 07/21/2022    COVID-19 Vaccine (5 - 2023-24 season) 09/01/2023    DIABETIC EYE EXAM  09/19/2023    COLORECTAL CANCER SCREENING  07/29/2024    ZOSTER VACCINE (2 of 2) 04/08/2024    BMI FOLLOWUP  04/25/2024    URINE MICROALBUMIN  08/18/2024    HEMOGLOBIN A1C  09/21/2024    LIPID PANEL  12/21/2024    ANNUAL WELLNESS VISIT  03/26/2025    HEPATITIS C SCREENING  Completed    INFLUENZA VACCINE  Completed    Pneumococcal Vaccine 65+  Completed                  CMS Preventative Services Quick Reference  Risk Factors Identified During Encounter:    Fall Risk-High or Moderate: Discussed Fall Prevention in  the home    The above risks/problems have been discussed with the patient.  Pertinent information has been shared with the patient in the After Visit Summary.    Diagnoses and all orders for this visit:    1. Stage 3a chronic kidney disease (Primary)  Assessment & Plan:  GFR is back up from 48 to 63 as of his 3/24 OV.  This looks like continued improvement after backing off to every other day on the Lasix.  No concerning electrolyte abnormalities, continue same follow-up.    Orders:  -     Comprehensive Metabolic Panel; Future    2. Type 2 diabetes mellitus with complications  Assessment & Plan:  Patient's A1c is well-controlled as of his 3/24 OV.  His number is 7.0 presently.  He is on low-dose Lantus along with full dose metformin, stable to continue same.  He has a continuous glucose monitor now, and he will use Humalog as needed for highs when notified by the monitor.  Discussed with them do not worry about the ones that come within an hour or 2 of eating though.    Orders:  -     Hemoglobin A1c; Future    3. Hypertension, essential  Assessment & Plan:  Blood pressure certainly not elevated as of his 3/24 OV.  He still on low-dose midodrine per cardiology.  He is using low-dose intermittent daily Lasix as well.  Stable to continue same.      4. Vascular dementia without behavioral disturbance  Assessment & Plan:  Patient with progressive issues this regards as of 3/24.  He is just on low-dose Aricept, we will go ahead and titrate that now, asked them to call in about 4 to 6 weeks if no improvement/stabilization.      5. Medicare annual wellness visit, subsequent  Assessment & Plan:  AWV completed 3/24.  Patient has continued decline in his ADLs, requiring more assistance etc.  Still falling despite assistive devices.  Was hospitalized overnight by Dr. Saldana for generalized weakness.  Previously provided information regarding advance directive.      6. Hypothyroidism, adult  Assessment & Plan:  TSH is down  from 5.3 to 0.3 as of his 3/24 OV.  We made no change in his regimen at his 12/23 office visit.  Will stick with 2 of the 112 mcg tablets daily 6 days a week, and 1-1/2 of them 1 day a week for now.  Needs repeat labs in 3 months.    Orders:  -     TSH; Future    7. Hyperlipemia, mixed  -     Lipid Panel; Future    Other orders  -     donepezil (ARICEPT) 10 MG tablet; Take 2 tablets by mouth every night at bedtime.  Dispense: 180 tablet; Refill: 1        Follow Up:   Next Medicare Wellness visit to be scheduled in 1 year.      An After Visit Summary and PPPS were made available to the patient.

## 2024-03-26 NOTE — PROGRESS NOTES
"Chief Complaint  Annual Exam (Pt had labs, he states that this is routine, he has no new issues. /He is wanting to know if he can increase the Aricept, his memory is getting worse. )    Subjective          Cosmo Gauthier presents to [facility]     History of Present Illness:  Patient is pleasant but unfortunate 78-year-old male with multiple medical issues, status post 5 vessel bypass 5/21, with underlying hypertension, hyperlipidemia, and diabetes, coming in 3/24 for routine 3-month follow-up. We will review all his labs and address any new concerns.    Review of Systems   Constitutional:  Negative for appetite change, fatigue and fever.   HENT:  Negative for congestion and ear pain.    Eyes:  Negative for blurred vision.   Respiratory:  Negative for cough, chest tightness and wheezing.    Cardiovascular:  Negative for chest pain, palpitations and leg swelling.   Gastrointestinal:  Negative for abdominal pain.   Genitourinary:  Negative for difficulty urinating, dysuria and hematuria.   Musculoskeletal:  Negative for arthralgias and gait problem.   Skin:  Negative for skin lesions.   Neurological:  Negative for syncope, memory problem and confusion.   Psychiatric/Behavioral:  Negative for self-injury and depressed mood.        Objective   Vital Signs:   /60   Pulse 94   Temp 98.6 °F (37 °C) (Skin)   Ht 182.9 cm (72.01\")   Wt 109 kg (240 lb 9.6 oz)   SpO2 97%   BMI 32.62 kg/m²           Physical Exam  Vitals and nursing note reviewed.   Constitutional:       General: He is not in acute distress.     Appearance: Normal appearance. He is not toxic-appearing.   HENT:      Head: Atraumatic.      Right Ear: External ear normal.      Left Ear: External ear normal.      Nose: Nose normal.      Mouth/Throat:      Mouth: Mucous membranes are moist.   Eyes:      General:         Right eye: No discharge.         Left eye: No discharge.      Extraocular Movements: Extraocular " movements intact.      Pupils: Pupils are equal, round, and reactive to light.   Cardiovascular:      Rate and Rhythm: Normal rate and regular rhythm.      Pulses: Normal pulses.      Heart sounds: Normal heart sounds. No murmur heard.     No gallop.   Pulmonary:      Effort: Pulmonary effort is normal. No respiratory distress.      Breath sounds: No wheezing, rhonchi or rales.   Abdominal:      General: There is no distension.      Palpations: Abdomen is soft. There is no mass.      Tenderness: There is no abdominal tenderness. There is no guarding.   Musculoskeletal:         General: No swelling or tenderness.      Cervical back: No tenderness.      Right lower leg: No edema.      Left lower leg: No edema.   Skin:     General: Skin is warm and dry.      Findings: No rash.   Neurological:      General: No focal deficit present.      Mental Status: He is alert and oriented to person, place, and time. Mental status is at baseline.      Motor: No weakness.      Gait: Gait normal.   Psychiatric:         Mood and Affect: Mood normal.         Thought Content: Thought content normal.          Result Review :   The following data was reviewed by: Alex Buckley MD on 08/02/2021:                  Assessment and Plan    Diagnoses and all orders for this visit:    1. Stage 3a chronic kidney disease (Primary)  Assessment & Plan:  GFR is back up from 48 to 63 as of his 3/24 OV.  This looks like continued improvement after backing off to every other day on the Lasix.  No concerning electrolyte abnormalities, continue same follow-up.    Orders:  -     Comprehensive Metabolic Panel; Future    2. Type 2 diabetes mellitus with complications  Assessment & Plan:  Patient's A1c is well-controlled as of his 3/24 OV.  His number is 7.0 presently.  He is on low-dose Lantus along with full dose metformin, stable to continue same.  He has a continuous glucose monitor now, and he will use Humalog as needed for highs when notified by the  monitor.  Discussed with them do not worry about the ones that come within an hour or 2 of eating though.    Orders:  -     Hemoglobin A1c; Future    3. Hypertension, essential  Assessment & Plan:  Blood pressure certainly not elevated as of his 3/24 OV.  He still on low-dose midodrine per cardiology.  He is using low-dose intermittent daily Lasix as well.  Stable to continue same.      4. Vascular dementia without behavioral disturbance  Assessment & Plan:  Patient with progressive issues this regards as of 3/24.  He is just on low-dose Aricept, we will go ahead and titrate that now, asked them to call in about 4 to 6 weeks if no improvement/stabilization.      5. Medicare annual wellness visit, subsequent  Assessment & Plan:  AWV completed 3/24.  Patient has continued decline in his ADLs, requiring more assistance etc.  Still falling despite assistive devices.  Was hospitalized overnight by Dr. Saldana for generalized weakness.  Previously provided information regarding advance directive.      6. Hypothyroidism, adult  Assessment & Plan:  TSH is down from 5.3 to 0.3 as of his 3/24 OV.  We made no change in his regimen at his 12/23 office visit.  Will stick with 2 of the 112 mcg tablets daily 6 days a week, and 1-1/2 of them 1 day a week for now.  Needs repeat labs in 3 months.    Orders:  -     TSH; Future    7. Hyperlipemia, mixed  -     Lipid Panel; Future    Other orders  -     donepezil (ARICEPT) 10 MG tablet; Take 2 tablets by mouth every night at bedtime.  Dispense: 180 tablet; Refill: 1          --  --  Older notes:  HOSP F/U 6/21 = S/P 5V CABG 5/21 per Dr Milligan=I reviewed records sent and the med list provided by wife.  TELEVISIT 2/25/21:  HOSP F/U 10/20 = I reviewed 9/20 [facility] records; I d/w pt labs/meds in detail:  1) CHEST PAIN and pt is being admitted to R/O MI; cardiology was notified...SPECT was neg, so will f/u with cards in a few weeks; may still need a cath=no new sxs and is gideon to see him next  week as of 10/20 OV...to BE for CABG per Dr Milligan, but PLT's too low; has f/u with cards.  2) CAD/MI with prior stents; ? last stress was done in cardiology office 5/19; will continue home meds for now...no rest angina; ? increase Imdur=defer to cards appt he has on 3/9/21---> S/P 5V CABG as above; looks great.    3) HTN will be followed closely on home meds=stable 10/20 OV, BUT is orthostatic, so drink more and lower ACEI to 5 mg---> stable 6/21.  4) HYPERLIPIDEMIA=continue statin=LDL 61 (9/20)---> 46 in 6/21.    5) CIRRHOSIS per Dr Bradshaw; watch volume status.  6) THROMBOCYTOPENIA is related to the cirrhosis and is stable around 70K...on Prednisone per Dr Saldana---> off this; labs on RTO.    7) DM per orders...A1C was only 7.3 in 1/21; she d/w me BS fine despite prednisone as of 2/21 OV; ? lost 20+ lbs---> 5.6 and I d/w stop glipizide 10 bid and stay on Humalog 15 tid, but then again not totally sure he's on it?    8) HYPOTHYROIDISM is wnl as of 2/21 OV---> RTO.    9) BPH on flomax after retenion issues in BE as of 2/21 OV; to see Dr Dill---> saw him for mihaela post-op = it's out now.    (lost friend/valeria 60's to covid)    VISIT 6/20:  ANNUAL MEDICARE WELLNESS EXAM 10/19 = reviewed all forms with pt; he is much more depressed, so zoloft was increased.  H.M. ISSUES:  DM = A1C as below; Optho=q 12 mo with last 4/18 = early diabetic retinopathy and ? laser next visit?  --  HTN...needs ACEI now at 6/17 OV; repeat urine micro as well...remains controlled...ACEI fell off his list; resume now 1/19...better already---> stable.  ? CKD3 noted 6/20 = no new meds; needs repeat few weeks.  --  DM...max out januvia...8.1 an has f/u with [facility]...on lantus 20, d/w increase 2-4 units every week to get FBS to 110...7.1 is great...7.5 is stuck and I d/w again to increase Lantus=told him 24 now... 8.8 is horrible, so increase glucotrol to whole tab in AM...8.9, so try whole tab bid before increase lantus...9.5 is worse and needs  Humalog before meals; d/w qid testing; stop glucotrol and increase lantus to 30 qhs...BS noted per phone and in office; rec 16/20/16 and stay on Lantus 30 qhs; d/w NO SSI for now...A1C down to 8.0 already and Fructosamine of 300=A1C closer to 7.5 actually...6.3 is great with TSH back down...6.8 is ok for now=7/19...7.3 in 10/19 and I d/w take 8 units with breakfast since he has been skipping this and only taking 15 with lunch/supper---> 7.3 great 6/20.   HYPOTHYROIDSIM stable 6/17...0.2 at 1/18 OV...0.7 better...increase 1/19 for 3.5 given above...? n/c, b/c now=10; will add 50 as of 1/19 OV...TSH 64 and apparently he missed them past week; I rec 250 qd for now and close f/u...0.7 and will leave this alone for now---> 1.5 in 2/20.  --  CAD per Dr Pritchett; s/p Spect '15 per pt and was neg---> still no CP/SOB and sees cards q 6 mo=no recent as of 6/20 OV.  LIPIDS with LDL 72...83 ok for now...LDL 57---> 55 in 2/20.  --  THROMBOCYTOPENIA is new 4/16 OV=99, so to HEME...off ASA but plavix ok per Dr Chaudhry; had BM bx=?...75 holding...on iron per her---> CBC stable 6/20 per her.  --  HOSP F/U 8/16 for FUO.  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  --  DJD s/p R TKR and then L TKR per Dr Eckert 1/16 and rehab with [facility] still on-going = 3x/wk at 4/16 OV...still with issues L knee 4/18...to have redo L TKR per Dr Zayas as of 10/18 OV=Dr Duran cleared him already...is gideon for 2/4/19, but apparently wants his A1C below 7 for this?? = d/w me 1/19...I d/w not going to get there that soon, but current blood sugars excellent and pt cleared for surgery from my standpoint as well=reviewed all their pre-op labs as well as EKG and CXR...s/p redo L TKR on 2/4/19 and looks great at 2/27/19 OV...finishing up as of 4/19 OV.  --  GERD per Dr Bradshaw.  ? CIRRHOSIS=tried on Nadolol per Dr Bradshaw=stopped it 6/20 due to diarrhea?; ? has CT/NH3 gideon.  --  OBESITY up 3 more to 263...258--->270 is stuck 2/20 and I d/w no  meds=must go to [facility] ( Cards said no to surgery).  --  DEPRESSION on maint med...? Dementia per pt, sent to Dr Kim; he sent for [facility] as of 4/18 OV...will address depression 8/18 = increase zoloft   [facility] as [facility]---> c/w as of 2/21 OV; neg O2 in [facility] recently;   --  --  PSA 0.5 (4/7/16)...defer.  Colon 7/19...2 inflam/hyper polyps per Dr Bradshaw.  Prevnar 11/16; Pneumovax # 1 9/17;  (, retired [employer] '97, 3 kids)    Follow Up   Return in about 3 months (around 6/26/2024).    Total Time Spent:  minutes     This time includes time spent by me in the following activities: preparing for the visit, reviewing extensive past medical history and tests, performing a medically appropriate examination and/or evaluation, counseling and educating the patient and/or caregivers, ordering medications, tests, or procedures, referring and/or communicating with other health care professionals and documenting information in the medical record all on this date of service.     Patient was given instructions and counseling regarding his condition or for health maintenance advice. Please see specific information pulled into the AVS if appropriate.

## 2024-03-26 NOTE — ASSESSMENT & PLAN NOTE
GFR is back up from 48 to 63 as of his 3/24 OV.  This looks like continued improvement after backing off to every other day on the Lasix.  No concerning electrolyte abnormalities, continue same follow-up.

## 2024-03-26 NOTE — ASSESSMENT & PLAN NOTE
Blood pressure certainly not elevated as of his 3/24 OV.  He still on low-dose midodrine per cardiology.  He is using low-dose intermittent daily Lasix as well.  Stable to continue same.

## 2024-03-26 NOTE — ASSESSMENT & PLAN NOTE
Patient's A1c is well-controlled as of his 3/24 OV.  His number is 7.0 presently.  He is on low-dose Lantus along with full dose metformin, stable to continue same.  He has a continuous glucose monitor now, and he will use Humalog as needed for highs when notified by the monitor.  Discussed with them do not worry about the ones that come within an hour or 2 of eating though.

## 2024-04-11 ENCOUNTER — OFFICE VISIT (OUTPATIENT)
Dept: INTERNAL MEDICINE | Age: 79
End: 2024-04-11
Payer: MEDICARE

## 2024-04-11 VITALS
BODY MASS INDEX: 32.51 KG/M2 | OXYGEN SATURATION: 96 % | HEART RATE: 96 BPM | TEMPERATURE: 98 F | DIASTOLIC BLOOD PRESSURE: 78 MMHG | WEIGHT: 240 LBS | SYSTOLIC BLOOD PRESSURE: 121 MMHG | HEIGHT: 72 IN

## 2024-04-11 DIAGNOSIS — J30.1 SEASONAL ALLERGIC RHINITIS DUE TO POLLEN: Primary | ICD-10-CM

## 2024-04-11 PROCEDURE — 3078F DIAST BP <80 MM HG: CPT | Performed by: INTERNAL MEDICINE

## 2024-04-11 PROCEDURE — 3074F SYST BP LT 130 MM HG: CPT | Performed by: INTERNAL MEDICINE

## 2024-04-11 PROCEDURE — 99213 OFFICE O/P EST LOW 20 MIN: CPT | Performed by: INTERNAL MEDICINE

## 2024-04-11 RX ORDER — FLUTICASONE PROPIONATE 50 MCG
2 SPRAY, SUSPENSION (ML) NASAL DAILY
Qty: 16 G | Refills: 1 | Status: SHIPPED | OUTPATIENT
Start: 2024-04-11

## 2024-04-11 RX ORDER — MONTELUKAST SODIUM 10 MG/1
10 TABLET ORAL NIGHTLY
Qty: 30 TABLET | Refills: 1 | Status: SHIPPED | OUTPATIENT
Start: 2024-04-11

## 2024-04-11 NOTE — PROGRESS NOTES
CHIEF COMPLAINT  Cosmo Gauthier presents to [facility] INTERNAL MEDICINE for follow-up of Sinus Problem (Pt is a 78 yr old male presenting to Internal Medicine for persistent active cough with phlegm, post nasal drip, mild throat discomfort, mild headaches, and nasal congestion with facial pressure ; Pt reports symptoms have been persistent for over a month, he's tested for Covid-19, Flu A, Flu B, that resulted negative. Pt self treated with OTC Allegra, Claritin, and Mucinex with no relief. Pt denies fever, chills, body aches, ).    HPI    77 yo Patient of Dr. Alex Bey with complaint of persistent cough with sputum production - clear now-postnasal drainage and congestion-see above  Seen 3/7/24 -given zpak then.Saw Dr Bey 3/26/24-occ bitter taste  Not using any nasal spray    AR-used to be on shots yrs ago then stopped due to cardiac history-    Records reviewed, meds reviewed in detail, labs reviewed with patient.  Plan of care is as follows below.    Past medical history significant coronary disease, hypertension, hyperlipidemia, chronic kidney disease, vascular dementia and diabetes among others.    Current Outpatient Medications:     amitriptyline (ELAVIL) 50 MG tablet, Take 1 tablet by mouth Every Night., Disp: , Rfl:     aspirin 81 MG EC tablet, Take 1 tablet by mouth Daily., Disp: , Rfl:     donepezil (ARICEPT) 10 MG tablet, Take 2 tablets by mouth every night at bedtime., Disp: 180 tablet, Rfl: 1    finasteride (PROSCAR) 5 MG tablet, Take 1 tablet by mouth Daily., Disp: 60 tablet, Rfl: 5    furosemide (LASIX) 40 MG tablet, Take 0.5 tablets by mouth Every Other Day., Disp: , Rfl:     insulin glargine (LANTUS, SEMGLEE) 100 UNIT/ML injection, Inject 15 Units under the skin into the appropriate area as directed Every Night., Disp: , Rfl:     Insulin Lispro, 1 Unit Dial, (HumaLOG KwikPen) 100 UNIT/ML solution pen-injector, 150-200 = 4 units 201-250 = 6 units 251-300 = 8 units 301-350  "= 10 units      >350 = 14 units, Disp: , Rfl:     lactulose (CHRONULAC) 10 GM/15ML solution, Take 30 mL by mouth Daily., Disp: 900 mL, Rfl: 5    levothyroxine (SYNTHROID, LEVOTHROID) 112 MCG tablet, Take 2 tablets by mouth Take As Directed. Takes two tablets every day Mon - Sat. Takes 1-1/2 tablets on Sunday., Disp: , Rfl:     metFORMIN (GLUCOPHAGE) 1000 MG tablet, Take 1 tablet by mouth 2 (Two) Times a Day With Meals., Disp: , Rfl:     midodrine (PROAMATINE) 2.5 MG tablet, Take 1 tablet by mouth Daily., Disp: 90 tablet, Rfl: 3    nitroglycerin (NITROSTAT) 0.4 MG SL tablet, Place 1 tablet under the tongue Every 5 (Five) Minutes As Needed., Disp: , Rfl:     pantoprazole (PROTONIX) 40 MG EC tablet, Take 1 tablet by mouth Daily., Disp: 30 tablet, Rfl: 5    sertraline (ZOLOFT) 100 MG tablet, Take 2 tablets by mouth Daily., Disp: 180 tablet, Rfl: 1    simvastatin (ZOCOR) 40 MG tablet, Take 0.5 tablets by mouth Every Night., Disp: , Rfl:     tamsulosin (FLOMAX) 0.4 MG capsule 24 hr capsule, Take 1 capsule by mouth Daily., Disp: 30 capsule, Rfl: 5    fluticasone (FLONASE) 50 MCG/ACT nasal spray, 2 sprays into the nostril(s) as directed by provider Daily., Disp: 16 g, Rfl: 1    montelukast (Singulair) 10 MG tablet, Take 1 tablet by mouth Every Night., Disp: 30 tablet, Rfl: 1   PFSH reviewed.      OBJECTIVE  Vital Signs  Vitals:    04/11/24 0836   BP: 121/78   BP Location: Right arm   Patient Position: Sitting   Cuff Size: Large Adult   Pulse: 96   Temp: 98 °F (36.7 °C)   TempSrc: Infrared   SpO2: 96%   Weight: 109 kg (240 lb)   Height: 182.9 cm (72.01\")      Body mass index is 32.54 kg/m².    Physical Exam  Vitals and nursing note reviewed.   Constitutional:       Appearance: Normal appearance.   HENT:      Head: Normocephalic and atraumatic.      Right Ear: Tympanic membrane normal.      Left Ear: Tympanic membrane normal.      Nose: Nose normal. Rhinorrhea present.      Mouth/Throat:      Pharynx: Oropharyngeal exudate " present. No posterior oropharyngeal erythema.   Eyes:      Extraocular Movements: Extraocular movements intact.      Pupils: Pupils are equal, round, and reactive to light.   Cardiovascular:      Rate and Rhythm: Normal rate and regular rhythm.   Pulmonary:      Effort: Pulmonary effort is normal.      Breath sounds: Normal breath sounds.   Abdominal:      General: Abdomen is flat.      Palpations: Abdomen is soft.   Musculoskeletal:      Cervical back: Normal range of motion and neck supple.   Skin:     General: Skin is warm and dry.   Neurological:      General: No focal deficit present.      Mental Status: He is alert and oriented to person, place, and time.   Psychiatric:         Mood and Affect: Mood normal.         Behavior: Behavior normal.          RESULTS REVIEW  Lab Results   Component Value Date    PROBNP 116.0 08/18/2023    PROBNP 395.0 04/20/2023    PROBNP 911.0 (H) 07/13/2022    BNP 2843 (H) 03/16/2021     09/29/2020    BNP 11 08/07/2019     CMP          10/12/2023    04:32 12/21/2023    07:44 3/21/2024    09:08   CMP   Glucose 250  159  149    BUN 37  29  19    Creatinine 1.56  1.50  1.20    EGFR 45.7  48.0  62.7    Sodium 131  136  139    Potassium 4.9  4.8  5.0    Chloride 102  101  104    Calcium 8.7  9.4  9.6    Total Protein 6.4      Albumin 3.5  4.3     Globulin 2.9      Total Bilirubin 0.7      Alkaline Phosphatase 72      AST (SGOT) 31      ALT (SGPT) 24      Albumin/Globulin Ratio 1.2      BUN/Creatinine Ratio 23.7  19.3  15.8    Anion Gap 10.2  9.5  7.7      CBC w/diff          8/20/2023    17:25 10/11/2023    08:56 10/11/2023    15:04 10/12/2023    04:32   CBC w/Diff   WBC 9.29  6.77  5.82  4.55    RBC 4.36  3.58  3.29  3.38    Hemoglobin 13.7  10.8  9.6  10.0    Hematocrit 39.0  32.6  29.5  30.3    MCV 89.4  91.1  89.7  89.6    MCH 31.4  30.2  29.2  29.6    MCHC 35.1  33.1  32.5  33.0    RDW 14.5  15.0  14.8  14.9    Platelets 133  71  59  57    Neutrophil Rel % 69.8  95.3   86.4     Immature Granulocyte Rel % 0.3  0.7   0.9    Lymphocyte Rel % 17.9  1.8   7.0    Monocyte Rel % 7.2  1.8   5.1    Eosinophil Rel % 4.0  0.1   0.2    Basophil Rel % 0.8  0.3   0.4       Lipid Panel          4/20/2023    08:01 9/12/2023    11:18 12/21/2023    07:44   Lipid Panel   Total Cholesterol 126  128  111    Triglycerides 140  179  97    HDL Cholesterol 40  48  45    VLDL Cholesterol 24  29  18    LDL Cholesterol  62  51  48    LDL/HDL Ratio 1.45  0.92  1.04       Lab Results   Component Value Date    TSH 0.273 03/21/2024    TSH 5.850 (H) 12/21/2023    TSH 0.725 06/08/2023      Lab Results   Component Value Date    FREET4 1.67 06/07/2023    FREET4 1.49 10/11/2022    FREET4 1.29 07/13/2022      A1C Last 3 Results          7/20/2023    12:51 12/21/2023    07:44 3/21/2024    09:08   HGBA1C Last 3 Results   Hemoglobin A1C 6.40  6.90  7.00       Lab Results   Component Value Date    SZCXGJCV79 530 06/08/2023    MG 1.7 10/12/2023        No Images in the past 120 days found..             ASSESSMENT & PLAN  Diagnoses and all orders for this visit:    1. Seasonal allergic rhinitis due to pollen (Primary)  Comments:  try flonase NS daily/montelukast 10 mg daily-can stop claritin since no coryza at present-was on allergy shots in past-if not better may need allergist-f/u PCP  Orders:  -     fluticasone (FLONASE) 50 MCG/ACT nasal spray; 2 sprays into the nostril(s) as directed by provider Daily.  Dispense: 16 g; Refill: 1    Other orders  -     montelukast (Singulair) 10 MG tablet; Take 1 tablet by mouth Every Night.  Dispense: 30 tablet; Refill: 1              FOLLOW UP  Return for Next scheduled follow up, Recheck.  With PCP    Patient was given instructions and counseling regarding his condition or for health maintenance advice. Please see specific information pulled into the AVS if appropriate.

## 2024-05-03 ENCOUNTER — TELEPHONE (OUTPATIENT)
Dept: PODIATRY | Facility: CLINIC | Age: 79
End: 2024-05-03

## 2024-05-03 NOTE — TELEPHONE ENCOUNTER
[facility] received a fax that requires your attention. The document has been indexed to the patient’s chart for your review.      Reason for sending: RECEIVED [facility] AUTHORIZATION LETTER FOR CONTINUATION OF CARE    Documents Description: [facility] AUTH LETTER-PODIATRY-5/1/2024    Name of Sender: SAMANTHA PELAEZ    Date Indexed: 5/3/2024

## 2024-05-06 ENCOUNTER — OFFICE VISIT (OUTPATIENT)
Dept: PODIATRY | Facility: CLINIC | Age: 79
End: 2024-05-06
Payer: OTHER GOVERNMENT

## 2024-05-06 VITALS
SYSTOLIC BLOOD PRESSURE: 145 MMHG | DIASTOLIC BLOOD PRESSURE: 81 MMHG | BODY MASS INDEX: 32.23 KG/M2 | OXYGEN SATURATION: 93 % | WEIGHT: 238 LBS | TEMPERATURE: 98.4 F | HEIGHT: 72 IN | HEART RATE: 91 BPM

## 2024-05-06 DIAGNOSIS — M79.671 FOOT PAIN, BILATERAL: ICD-10-CM

## 2024-05-06 DIAGNOSIS — M79.672 FOOT PAIN, BILATERAL: ICD-10-CM

## 2024-05-06 DIAGNOSIS — E11.42 TYPE 2 DIABETES MELLITUS WITH DIABETIC POLYNEUROPATHY, WITH LONG-TERM CURRENT USE OF INSULIN: ICD-10-CM

## 2024-05-06 DIAGNOSIS — G62.2 NEUROPATHY DUE TO CHEMICAL SUBSTANCE: ICD-10-CM

## 2024-05-06 DIAGNOSIS — Z77.098 HISTORY OF AGENT ORANGE EXPOSURE: ICD-10-CM

## 2024-05-06 DIAGNOSIS — Z79.4 TYPE 2 DIABETES MELLITUS WITH DIABETIC POLYNEUROPATHY, WITH LONG-TERM CURRENT USE OF INSULIN: ICD-10-CM

## 2024-05-06 DIAGNOSIS — R26.2 DIFFICULTY WALKING: Primary | ICD-10-CM

## 2024-05-06 DIAGNOSIS — L60.0 ONYCHOCRYPTOSIS: ICD-10-CM

## 2024-05-06 PROCEDURE — 1160F RVW MEDS BY RX/DR IN RCRD: CPT | Performed by: PODIATRIST

## 2024-05-06 PROCEDURE — 3079F DIAST BP 80-89 MM HG: CPT | Performed by: PODIATRIST

## 2024-05-06 PROCEDURE — 3077F SYST BP >= 140 MM HG: CPT | Performed by: PODIATRIST

## 2024-05-06 PROCEDURE — 1159F MED LIST DOCD IN RCRD: CPT | Performed by: PODIATRIST

## 2024-05-06 PROCEDURE — 11721 DEBRIDE NAIL 6 OR MORE: CPT | Performed by: PODIATRIST

## 2024-06-01 NOTE — PROGRESS NOTES
[facility]  Cardiology progress Note    Patient Name: Cosmo Gauthier  : 1945    CHIEF COMPLAINT  CAD        Subjective   Subjective     HISTORY OF PRESENT ILLNESS    Cosmo Gauthier is a 78 y.o. male with CAD s/p CABG.  No chest pain or shortness of breath.    REVIEW OF SYSTEMS    Constitutional:    No fever, no weight loss  Skin:     No rash  Otolaryngeal:    No difficulty swallowing  Cardiovascular: See HPI.  Pulmonary:    No cough, no sputum production    Personal History     Social History:    reports that he has never smoked. He has never used smokeless tobacco. He reports that he does not currently use alcohol. He reports that he does not use drugs.    Home Medications:  Current Outpatient Medications on File Prior to Visit   Medication Sig   • amitriptyline (ELAVIL) 50 MG tablet Take 1 tablet by mouth Every Night.   • aspirin 81 MG EC tablet Take 1 tablet by mouth Daily.   • finasteride (PROSCAR) 5 MG tablet Take 1 tablet by mouth Daily.   • fluticasone (FLONASE) 50 MCG/ACT nasal spray 2 sprays into the nostril(s) as directed by provider Daily.   • furosemide (LASIX) 40 MG tablet Take 0.5 tablets by mouth Every Other Day.   • insulin glargine (LANTUS, SEMGLEE) 100 UNIT/ML injection Inject 15 Units under the skin into the appropriate area as directed Every Night.   • Insulin Lispro, 1 Unit Dial, (HumaLOG KwikPen) 100 UNIT/ML solution pen-injector 150-200 = 4 units  201-250 = 6 units  251-300 = 8 units  301-350 = 10 units       >350 = 14 units   • lactulose (CHRONULAC) 10 GM/15ML solution Take 30 mL by mouth Daily.   • levothyroxine (SYNTHROID, LEVOTHROID) 112 MCG tablet Take 2 tablets by mouth Take As Directed. Takes two tablets every day Mon - Sat. Takes 1-1/2 tablets on .   • metFORMIN (GLUCOPHAGE) 1000 MG tablet Take 1 tablet by mouth 2 (Two) Times a Day With Meals.   • midodrine (PROAMATINE) 2.5 MG tablet Take 1 tablet by mouth Daily.   • montelukast (Singulair) 10 MG tablet  Take 1 tablet by mouth Every Night.   • nitroglycerin (NITROSTAT) 0.4 MG SL tablet Place 1 tablet under the tongue Every 5 (Five) Minutes As Needed.   • pantoprazole (PROTONIX) 40 MG EC tablet Take 1 tablet by mouth Daily.   • sertraline (ZOLOFT) 100 MG tablet Take 2 tablets by mouth Daily.   • simvastatin (ZOCOR) 40 MG tablet Take 0.5 tablets by mouth Every Night.   • tamsulosin (FLOMAX) 0.4 MG capsule 24 hr capsule Take 1 capsule by mouth Daily.   • [DISCONTINUED] donepezil (ARICEPT) 10 MG tablet Take 2 tablets by mouth every night at bedtime. (Patient not taking: Reported on 6/4/2024)     No current facility-administered medications on file prior to visit.       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     PHYSICAL EXAM:    General Appearance:   well developed  well nourished  HENT:   oropharynx moist  lips not cyanotic  Neck:  thyroid not enlarged  supple  Respiratory:  no respiratory distress  normal breath sounds  no rales  Cardiovascular:  no jugular venous distention  regular rhythm  apical impulse normal  S1 normal, S2 normal  no S3, no S4   no murmur  no rub,  no thrill  carotid pulses normal; no bruit  pedal pulses normal  lower extremity edema: none    Skin:   warm, dry  Psychiatric:  judgement and insight appropriate  normal mood and affect        Result Review:  I have personally reviewed the available results from  [x]  Laboratory  [x]  EKG  [x]  Cardiology  [x]  Medications  [x]  Old records  []  Other:     Procedures  Lab Results   Component Value Date    CHOL 111 12/21/2023    CHOL 128 09/12/2023    CHOL 126 04/20/2023     Lab Results   Component Value Date    TRIG 97 12/21/2023    TRIG 179 (H) 09/12/2023    TRIG 140 04/20/2023     Lab Results   Component Value Date    HDL 45 12/21/2023    HDL 48 09/12/2023    HDL 40 04/20/2023     Lab Results   Component Value Date    LDL 48 12/21/2023    LDL 51 09/12/2023    LDL 62 04/20/2023     Lab Results   Component Value Date    VLDL 18 12/21/2023    VLDL 29 09/12/2023    VLDL 24 04/20/2023     Results for orders placed in visit on 05/20/21    Emergent/Open-Heart Anesthesia ABHIJEET    Narrative  Preanesthesia Checklist:  Patient identified, IV assessed, risks and benefits discussed, monitors and equipment assessed and procedure being performed at surgeon's request.    General Procedure Information  Diagnostic Indications for Echo:  assessment of ascending aorta, assessment of surgical repair, defect repair evaluation and hemodynamic monitoring  Physician Requesting Echo: Jr Tom Tubbs MD  ICD Code for Medical Necessity:  Aortic Insufficiency  Location performed:  OR  Intubated  Bite block placed  Heart visualized  Probe Insertion:  Easy  Probe Type:  Multiplane  Modalities:  Color flow mapping, 2D only, continuous wave Doppler and pulse wave Doppler    Echocardiographic and Doppler Measurements    Ventricles    Right Ventricle:  Cavity size normal.  Hypertrophy not present.  Left Ventricle:  Diastolic dimension 4.7 cm.  Hypertrophy not present.  Thrombus not present.  Global Function mildly impaired.  Ejection Fraction  50%.    Ventricular Regional Function:  13- Apical Anterior:  hypokinetic  14- Apical Lateral:  hypokinetic  16- Apical Septal:  hypokinetic  17- [place]:  hypokinetic      Valves    Aortic Valve:  Annulus normal.  Stenosis not present.  Pressure Half-time: 910 ms.  Regurgitation mild.  Leaflets normal.  Leaflet motions normal.    Mitral Valve:  Annulus normal.  Stenosis not present.  Regurgitation trace.    Tricuspid Valve:  Annulus normal.  Stenosis not present.  Regurgitation mild.  Pulmonic Valve:  Annulus normal.  Regurgitation trace.        Aorta    Ascending Aorta:  Size normal.  Diameter 3.6 cm.  Dissection not present.  Plaque thickness less than 3 mm.  Mobile plaque not present.  Aortic Arch:  Size normal.  Diameter 3.1 cm.  Dissection not present.  Plaque thickness less than 3 mm.  Mobile plaque not present.  Descending Aorta:  Size normal.  Diameter 2.5 cm.  Dissection not present.  Plaque thickness less than 3 mm.  Mobile plaque not present.        Atria    Right Atrium:  Size normal.  Spontaneous echo contrast not present.  Thrombus not present.  Tumor not present.  Device present.    Left Atrium:  Size normal.  Spontaneous echo contrast not present.  Thrombus not present.  Tumor not present.  Device not present.  Left atrial appendage normal.      Septa    Atrial Septum:  Intra-atrial septal morphology lipomatous hypertrophy.        Diastolic Function Measurements:  Diastolic Dysfunction Grade= II  E= 40.6 ms  A= 35.3 ms  E/A Ratio=  1.2  DT=  296 ms  S/D=  IVRT=    Other Findings  Pericardium:  normal  Pleural Effusion:  none  Pulmonary Arteries:  normal  Pulmonary Venous Flow:  normal    Anesthesia Information  Performed Personally      Echocardiogram Comments:  Post CPB:  LVEF much improved, 60%, apex no longer hypokinetic.  No aortic dissection post decannulation.  AI unchanged.     Impression/Plan:  1.  CAD s/p CABG stable: Continue aspirin 81 mg once a day.  No chest pain.  2.  Mixed  hyperlipidemia: Continue simvastatin 20 mg once a day.  Monitor lipid and hepatic profile.  3.  Chronic diastolic heart failure stable: Continue Lasix 20 mg every other day.  Monitor BMP.  4.  Essential hypertension controlled: Monitor blood pressure regularly.           Sven Duran MD   06/04/24   13:38 EDT

## 2024-06-04 ENCOUNTER — OFFICE VISIT (OUTPATIENT)
Dept: CARDIOLOGY | Facility: CLINIC | Age: 79
End: 2024-06-04
Payer: MEDICARE

## 2024-06-04 VITALS
BODY MASS INDEX: 32.78 KG/M2 | WEIGHT: 242 LBS | HEART RATE: 97 BPM | HEIGHT: 72 IN | SYSTOLIC BLOOD PRESSURE: 111 MMHG | DIASTOLIC BLOOD PRESSURE: 60 MMHG

## 2024-06-04 DIAGNOSIS — I50.32 DIASTOLIC CHF, CHRONIC: ICD-10-CM

## 2024-06-04 DIAGNOSIS — I25.10 CORONARY ARTERY DISEASE INVOLVING NATIVE CORONARY ARTERY OF NATIVE HEART WITHOUT ANGINA PECTORIS: Primary | ICD-10-CM

## 2024-06-04 DIAGNOSIS — Z95.1 HX OF CABG: ICD-10-CM

## 2024-06-04 DIAGNOSIS — I10 HYPERTENSION, ESSENTIAL: ICD-10-CM

## 2024-06-04 PROCEDURE — 3078F DIAST BP <80 MM HG: CPT | Performed by: SPECIALIST

## 2024-06-04 PROCEDURE — 99214 OFFICE O/P EST MOD 30 MIN: CPT | Performed by: SPECIALIST

## 2024-06-04 PROCEDURE — 3074F SYST BP LT 130 MM HG: CPT | Performed by: SPECIALIST

## 2024-06-17 ENCOUNTER — TELEPHONE (OUTPATIENT)
Dept: INTERNAL MEDICINE | Age: 79
End: 2024-06-17

## 2024-06-17 RX ORDER — LEVOTHYROXINE SODIUM 112 UG/1
224 TABLET ORAL TAKE AS DIRECTED
Qty: 75 TABLET | Refills: 3 | Status: SHIPPED | OUTPATIENT
Start: 2024-06-17

## 2024-06-17 NOTE — TELEPHONE ENCOUNTER
Caller: Carla Gauthier    Relationship: Emergency Contact    Best call back number: [phone]     What orders are you requesting (i.e. lab or imaging): VITAMIN B TEST    In what timeframe would the patient need to come in: ASAP    Where will you receive your lab/imaging services: [facility]

## 2024-06-17 NOTE — TELEPHONE ENCOUNTER
Caller: Carla Gauthier    Relationship: Emergency Contact    Best call back number: [phone]     Requested Prescriptions:   Requested Prescriptions     Pending Prescriptions Disp Refills    levothyroxine (SYNTHROID, LEVOTHROID) 112 MCG tablet       Sig: Take 2 tablets by mouth Take As Directed. Takes two tablets every day Mon - Sat. Takes 1-1/2 tablets on Sunday.        Pharmacy where request should be sent: [facility] [id number]  [place]  [address] [facility] [address] - [phone] [facility] [phone]      Last office visit with prescribing clinician: 3/26/2024   Last telemedicine visit with prescribing clinician: Visit date not found   Next office visit with prescribing clinician: 7/2/2024     Does the patient have less than a 3 day supply:  [x] Yes  [] No    Would you like a call back once the refill request has been completed: [x] Yes [] No    If the office needs to give you a call back, can they leave a voicemail: [x] Yes [] No    Inocencio Gallegos Rep   06/17/24 08:13 EDT

## 2024-06-18 DIAGNOSIS — E53.8 VITAMIN B12 DEFICIENCY: Primary | ICD-10-CM

## 2024-06-18 NOTE — TELEPHONE ENCOUNTER
Yes, and I went ahead and added it.  FYI, not sure if you normally do this, but whenever I add labs, I look back at the due date for the upcoming labs, and make sure to match that date.  If not, often times the lab, obviously not our lab, but others, will end up not drawing all the labs that were requested.

## 2024-06-27 ENCOUNTER — LAB (OUTPATIENT)
Dept: LAB | Facility: HOSPITAL | Age: 79
End: 2024-06-27
Payer: MEDICARE

## 2024-06-27 DIAGNOSIS — E78.2 HYPERLIPEMIA, MIXED: ICD-10-CM

## 2024-06-27 DIAGNOSIS — E03.9 HYPOTHYROIDISM, ADULT: ICD-10-CM

## 2024-06-27 DIAGNOSIS — E53.8 VITAMIN B12 DEFICIENCY: ICD-10-CM

## 2024-06-27 DIAGNOSIS — E11.8 TYPE 2 DIABETES MELLITUS WITH COMPLICATIONS: ICD-10-CM

## 2024-06-27 DIAGNOSIS — N18.31 STAGE 3A CHRONIC KIDNEY DISEASE: ICD-10-CM

## 2024-06-27 DIAGNOSIS — I50.32 DIASTOLIC CHF, CHRONIC: ICD-10-CM

## 2024-06-27 LAB
ALBUMIN SERPL-MCNC: 4.4 G/DL (ref 3.5–5.2)
ALBUMIN/GLOB SERPL: 1.8 G/DL
ALP SERPL-CCNC: 90 U/L (ref 39–117)
ALT SERPL W P-5'-P-CCNC: 13 U/L (ref 1–41)
ANION GAP SERPL CALCULATED.3IONS-SCNC: 8 MMOL/L (ref 5–15)
AST SERPL-CCNC: 13 U/L (ref 1–40)
BILIRUB SERPL-MCNC: 0.3 MG/DL (ref 0–1.2)
BUN SERPL-MCNC: 27 MG/DL (ref 8–23)
BUN/CREAT SERPL: 19.6 (ref 7–25)
CALCIUM SPEC-SCNC: 9.1 MG/DL (ref 8.6–10.5)
CHLORIDE SERPL-SCNC: 102 MMOL/L (ref 98–107)
CHOLEST SERPL-MCNC: 108 MG/DL (ref 0–200)
CO2 SERPL-SCNC: 27 MMOL/L (ref 22–29)
CREAT SERPL-MCNC: 1.38 MG/DL (ref 0.76–1.27)
EGFRCR SERPLBLD CKD-EPI 2021: 53 ML/MIN/1.73
FOLATE SERPL-MCNC: 15.8 NG/ML (ref 4.78–24.2)
GLOBULIN UR ELPH-MCNC: 2.4 GM/DL
GLUCOSE SERPL-MCNC: 174 MG/DL (ref 65–99)
HBA1C MFR BLD: 7.5 % (ref 4.8–5.6)
HDLC SERPL-MCNC: 45 MG/DL (ref 40–60)
LDLC SERPL CALC-MCNC: 45 MG/DL (ref 0–100)
LDLC/HDLC SERPL: 1 {RATIO}
POTASSIUM SERPL-SCNC: 4.7 MMOL/L (ref 3.5–5.2)
PROT SERPL-MCNC: 6.8 G/DL (ref 6–8.5)
SODIUM SERPL-SCNC: 137 MMOL/L (ref 136–145)
TRIGL SERPL-MCNC: 90 MG/DL (ref 0–150)
TSH SERPL DL<=0.05 MIU/L-ACNC: 0.98 UIU/ML (ref 0.27–4.2)
VIT B12 BLD-MCNC: 358 PG/ML (ref 211–946)
VLDLC SERPL-MCNC: 18 MG/DL (ref 5–40)

## 2024-06-27 PROCEDURE — 36415 COLL VENOUS BLD VENIPUNCTURE: CPT

## 2024-06-27 PROCEDURE — 83036 HEMOGLOBIN GLYCOSYLATED A1C: CPT

## 2024-06-27 PROCEDURE — 82607 VITAMIN B-12: CPT

## 2024-06-27 PROCEDURE — 82746 ASSAY OF FOLIC ACID SERUM: CPT

## 2024-06-27 PROCEDURE — 84443 ASSAY THYROID STIM HORMONE: CPT

## 2024-06-27 PROCEDURE — 80061 LIPID PANEL: CPT

## 2024-06-27 PROCEDURE — 80053 COMPREHEN METABOLIC PANEL: CPT

## 2024-07-02 ENCOUNTER — OFFICE VISIT (OUTPATIENT)
Dept: INTERNAL MEDICINE | Age: 79
End: 2024-07-02
Payer: MEDICARE

## 2024-07-02 VITALS
WEIGHT: 243 LBS | HEIGHT: 72 IN | HEART RATE: 89 BPM | DIASTOLIC BLOOD PRESSURE: 71 MMHG | SYSTOLIC BLOOD PRESSURE: 129 MMHG | OXYGEN SATURATION: 96 % | BODY MASS INDEX: 32.91 KG/M2 | TEMPERATURE: 97.7 F

## 2024-07-02 DIAGNOSIS — F01.50 VASCULAR DEMENTIA WITHOUT BEHAVIORAL DISTURBANCE: ICD-10-CM

## 2024-07-02 DIAGNOSIS — I10 HYPERTENSION, ESSENTIAL: ICD-10-CM

## 2024-07-02 DIAGNOSIS — D50.9 IRON DEFICIENCY ANEMIA, UNSPECIFIED IRON DEFICIENCY ANEMIA TYPE: ICD-10-CM

## 2024-07-02 DIAGNOSIS — E11.8 TYPE 2 DIABETES MELLITUS WITH COMPLICATIONS: Primary | ICD-10-CM

## 2024-07-02 DIAGNOSIS — N18.31 STAGE 3A CHRONIC KIDNEY DISEASE: ICD-10-CM

## 2024-07-02 DIAGNOSIS — E03.9 HYPOTHYROIDISM, ADULT: ICD-10-CM

## 2024-07-02 DIAGNOSIS — E78.2 HYPERLIPEMIA, MIXED: ICD-10-CM

## 2024-07-02 PROBLEM — J06.9 ACUTE URI: Status: RESOLVED | Noted: 2024-03-07 | Resolved: 2024-07-02

## 2024-07-02 PROBLEM — J20.9 ACUTE BRONCHITIS: Status: RESOLVED | Noted: 2024-03-07 | Resolved: 2024-07-02

## 2024-07-02 PROBLEM — N17.9 ACUTE KIDNEY INJURY: Status: RESOLVED | Noted: 2023-10-11 | Resolved: 2024-07-02

## 2024-07-02 PROCEDURE — 3074F SYST BP LT 130 MM HG: CPT | Performed by: INTERNAL MEDICINE

## 2024-07-02 PROCEDURE — 1159F MED LIST DOCD IN RCRD: CPT | Performed by: INTERNAL MEDICINE

## 2024-07-02 PROCEDURE — G2211 COMPLEX E/M VISIT ADD ON: HCPCS | Performed by: INTERNAL MEDICINE

## 2024-07-02 PROCEDURE — 3078F DIAST BP <80 MM HG: CPT | Performed by: INTERNAL MEDICINE

## 2024-07-02 PROCEDURE — 1160F RVW MEDS BY RX/DR IN RCRD: CPT | Performed by: INTERNAL MEDICINE

## 2024-07-02 PROCEDURE — 1126F AMNT PAIN NOTED NONE PRSNT: CPT | Performed by: INTERNAL MEDICINE

## 2024-07-02 PROCEDURE — 99214 OFFICE O/P EST MOD 30 MIN: CPT | Performed by: INTERNAL MEDICINE

## 2024-07-02 RX ORDER — INSULIN LISPRO 100 [IU]/ML
INJECTION, SOLUTION INTRAVENOUS; SUBCUTANEOUS
Qty: 9 ML | Refills: 3 | Status: SHIPPED | OUTPATIENT
Start: 2024-07-02

## 2024-07-02 NOTE — ASSESSMENT & PLAN NOTE
Blood pressure remains controlled and his pulse is right around 90 as of his 7/24 OV.  This is really not an issue anymore, we are having to utilize mid urine for orthostatic symptoms, has just low-dose on board per cardiology's direction, as well as low-dose intermittent/every other day furosemide.  Patient stable to continue current meds, his potassium is 4.7 without supplementation.

## 2024-07-02 NOTE — ASSESSMENT & PLAN NOTE
Have not checked his hemoglobin this year, we will do it on return to office.  No evidence for any GI blood loss at least.

## 2024-07-02 NOTE — ASSESSMENT & PLAN NOTE
LDL is easily at goal, he is 45 as of his 7/24 OV.  He is just on low-dose simvastatin, half of the 40 mg tablet to help with cost, stable to continue same.

## 2024-07-02 NOTE — ASSESSMENT & PLAN NOTE
TSH is well normal at 0.9 as of his 7/24 OV.    He has 112 mcg tablets,   he takes 2 of them 6 days a week,  and 1-1/2 of them 1 day a week.    Continue same.

## 2024-07-02 NOTE — ASSESSMENT & PLAN NOTE
GFR is fluctuating, 48 to 63 previously, presently coming in at 53.  He is a little prerenal presently.  Just on Lasix every other day, electrolytes are fine, will continue with routine laboratory follow-up.

## 2024-07-02 NOTE — PROGRESS NOTES
"Chief Complaint  Follow-up (Pt is a 78 yr old male presenting to Internal Medicine for a 3 month follow up; Pt reports no further concerns and/or complaints. /)    Subjective          Cosmo Gauthier presents to [facility] INTERNAL MEDICINE     History of Present Illness:  Patient is pleasant but unfortunate 78-year-old male with multiple medical issues, status post 5 vessel bypass 5/21, with underlying hypertension, hyperlipidemia, and diabetes, coming in 7/24 for routine 3-4-month follow-up. We will review all his labs and address any new concerns.    Review of Systems   Constitutional:  Negative for appetite change, fatigue and fever.   HENT:  Negative for congestion and ear pain.    Eyes:  Negative for blurred vision.   Respiratory:  Negative for cough, chest tightness and wheezing.    Cardiovascular:  Negative for chest pain, palpitations and leg swelling.   Gastrointestinal:  Negative for abdominal pain.   Genitourinary:  Negative for difficulty urinating, dysuria and hematuria.   Musculoskeletal:  Negative for arthralgias and gait problem.   Skin:  Negative for skin lesions.   Neurological:  Negative for syncope, memory problem and confusion.   Psychiatric/Behavioral:  Negative for self-injury and depressed mood.        Objective   Vital Signs:   /71 (BP Location: Right arm, Patient Position: Sitting, Cuff Size: Adult)   Pulse 89   Temp 97.7 °F (36.5 °C) (Infrared)   Ht 182.9 cm (72.01\")   Wt 110 kg (243 lb)   SpO2 96%   BMI 32.95 kg/m²           Physical Exam  Vitals and nursing note reviewed.   Constitutional:       General: He is not in acute distress.     Appearance: Normal appearance. He is not toxic-appearing.   HENT:      Head: Atraumatic.      Right Ear: External ear normal.      Left Ear: External ear normal.      Nose: Nose normal.      Mouth/Throat:      Mouth: Mucous membranes are moist.   Eyes:      General:         Right eye: No discharge.         Left eye: No " discharge.      Extraocular Movements: Extraocular movements intact.      Pupils: Pupils are equal, round, and reactive to light.   Cardiovascular:      Rate and Rhythm: Normal rate and regular rhythm.      Pulses: Normal pulses.      Heart sounds: Normal heart sounds. No murmur heard.     No gallop.   Pulmonary:      Effort: Pulmonary effort is normal. No respiratory distress.      Breath sounds: No wheezing, rhonchi or rales.   Abdominal:      General: There is no distension.      Palpations: Abdomen is soft. There is no mass.      Tenderness: There is no abdominal tenderness. There is no guarding.   Musculoskeletal:         General: No swelling or tenderness.      Cervical back: No tenderness.      Right lower leg: No edema.      Left lower leg: No edema.   Skin:     General: Skin is warm and dry.      Findings: No rash.   Neurological:      General: No focal deficit present.      Mental Status: He is alert and oriented to person, place, and time. Mental status is at baseline.      Motor: No weakness.      Gait: Gait normal.   Psychiatric:         Mood and Affect: Mood normal.         Thought Content: Thought content normal.          Result Review :   The following data was reviewed by: Alex Buckley MD on 08/02/2021:                  Assessment and Plan    Diagnoses and all orders for this visit:    1. Type 2 diabetes mellitus with complications (Primary)  Assessment & Plan:  A1c is gradually trending up, he was 6.4 this time last year, 7.0 earlier this year, and now he is 7.5 as of his 7/24 OV.  His fasting sugars are around 140 at home, would be concerned about titrating the Lantus then, continue with low-dose, utilizing Humalog based on sliding scale and readings from the continuous glucose monitor.  Could consider adding low-dose Januvia etc., but we will leave it alone for now.    Orders:  -     Hemoglobin A1c; Future    2. Stage 3a chronic kidney disease  Assessment & Plan:  GFR is fluctuating, 48 to 63  previously, presently coming in at 53.  He is a little prerenal presently.  Just on Lasix every other day, electrolytes are fine, will continue with routine laboratory follow-up.    Orders:  -     Basic Metabolic Panel; Future    3. Hypertension, essential  Assessment & Plan:  Blood pressure remains controlled and his pulse is right around 90 as of his 7/24 OV.  This is really not an issue anymore, we are having to utilize mid urine for orthostatic symptoms, has just low-dose on board per cardiology's direction, as well as low-dose intermittent/every other day furosemide.  Patient stable to continue current meds, his potassium is 4.7 without supplementation.      4. Hypothyroidism, adult  Assessment & Plan:  TSH is well normal at 0.9 as of his 7/24 OV.    He has 112 mcg tablets,   he takes 2 of them 6 days a week,  and 1-1/2 of them 1 day a week.    Continue same.        5. Iron deficiency anemia, unspecified iron deficiency anemia type  Assessment & Plan:  Have not checked his hemoglobin this year, we will do it on return to office.  No evidence for any GI blood loss at least.    Orders:  -     CBC & Differential; Future  -     Iron Profile; Future  -     Ferritin; Future    6. Hyperlipemia, mixed  Assessment & Plan:  LDL is easily at goal, he is 45 as of his 7/24 OV.  He is just on low-dose simvastatin, half of the 40 mg tablet to help with cost, stable to continue same.      7. 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      Other orders  -     Insulin Lispro, 1 Unit Dial, (HumaLOG KwikPen) 100 UNIT/ML solution pen-injector; 150-200 = 4 units,  201-250 = 6 units,  251-300 = 8 units,  301-350 = 10 units,  >350 = 14 units  Dispense: 9 mL; Refill: 3          BMI is >= 30 and <35. (Class 1 Obesity). The following options were offered after discussion;:  exercise counseling/recommendations and nutrition counseling/recommendations     --  --  Older notes:  HOSP F/U 6/21 = S/P 5V CABG 5/21 per Dr Milligan=I reviewed records sent and the med list provided by wife.  TELEVISIT 2/25/21:  HOSP F/U 10/20 = I reviewed 9/20 [facility] records; I d/w pt labs/meds in detail:  1) CHEST PAIN and pt is being admitted to R/O MI; cardiology was notified...SPECT was neg, so will f/u with cards in a few weeks; may still need a cath=no new sxs and is gideon to see him next week as of 10/20 OV...to [facility] for CABG per Dr Milligan, but PLT's too low; has f/u with cards.  2) CAD/MI with prior stents; ? last stress was done in cardiology office 5/19; will continue home meds for now...no rest angina; ? increase Imdur=defer to cards appt he has on 3/9/21---> S/P 5V CABG as above; looks great.    3) HTN will be followed closely on home meds=stable 10/20 OV, BUT is orthostatic, so drink more and lower ACEI to 5 mg---> stable 6/21.  4) HYPERLIPIDEMIA=continue statin=LDL 61 (9/20)---> 46 in 6/21.    5) CIRRHOSIS per Dr Bradshaw; watch volume status.  6) THROMBOCYTOPENIA is related to the cirrhosis and is stable around 70K...on Prednisone per Dr Saldana---> off this; labs on RTO.    7) DM per orders...A1C was only 7.3 in 1/21; she d/w me BS fine despite prednisone as of 2/21 OV; ? lost 20+ lbs---> 5.6 and I d/w stop glipizide 10 bid and stay on Humalog 15 tid, but then again not totally sure he's on it?    8) HYPOTHYROIDISM is wnl as of 2/21 OV---> RTO.    9) BPH on flomax after retenion issues in BE as of 2/21 OV; to see Dr Dill---> saw him for chairez post-op = it's out now.    (lost friend/ 60's to covid)    VISIT 6/20:  ANNUAL MEDICARE WELLNESS EXAM 10/19 = reviewed all forms with pt; he is much more depressed, so zoloft was increased.  H.M. ISSUES:  DM = A1C as below; Optho=q 12 mo with last 4/18 = early diabetic retinopathy and ? laser next visit?  --  HTN...needs ACEI now at 6/17 OV; repeat urine  micro as well...remains controlled...ACEI fell off his list; resume now 1/19...better already---> stable.  ? CKD3 noted 6/20 = no new meds; needs repeat few weeks.  --  DM...max out januvia...8.1 an has f/u with [facility]...on lantus 20, d/w increase 2-4 units every week to get FBS to 110...7.1 is great...7.5 is stuck and I d/w again to increase Lantus=told him 24 now... 8.8 is horrible, so increase glucotrol to whole tab in AM...8.9, so try whole tab bid before increase lantus...9.5 is worse and needs Humalog before meals; d/w qid testing; stop glucotrol and increase lantus to 30 qhs...BS noted per phone and in office; rec 16/20/16 and stay on Lantus 30 qhs; d/w NO SSI for now...A1C down to 8.0 already and Fructosamine of 300=A1C closer to 7.5 actually...6.3 is great with TSH back down...6.8 is ok for now=7/19...7.3 in 10/19 and I d/w take 8 units with breakfast since he has been skipping this and only taking 15 with lunch/supper---> 7.3 great 6/20.   HYPOTHYROIDSIM stable 6/17...0.2 at 1/18 OV...0.7 better...increase 1/19 for 3.5 given above...? n/c, b/c now=10; will add 50 as of 1/19 OV...TSH 64 and apparently he missed them past week; I rec 250 qd for now and close f/u...0.7 and will leave this alone for now---> 1.5 in 2/20.  --  CAD per Dr Pritchett; s/p Spect '15 per pt and was neg---> still no CP/SOB and sees cards q 6 mo=no recent as of 6/20 OV.  LIPIDS with LDL 72...83 ok for now...LDL 57---> 55 in 2/20.  --  THROMBOCYTOPENIA is new 4/16 OV=99, so to HEME...off ASA but plavix ok per Dr Chaudhry; had BM bx=?...75 holding...on iron per her---> CBC stable 6/20 per her.  --  HOSP F/U 8/16 for FUO.  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  --  DJD s/p R TKR and then L TKR per Dr Eckert 1/16 and rehab with [facility] still on-going = 3x/wk at 4/16 OV...still with issues L knee 4/18...to have redo L TKR per Dr Zayas as of 10/18 OV=Dr Duran cleared him already...is gideon for 2/4/19, but apparently wants his  A1C below 7 for this?? = d/w me 1/19...I d/w not going to get there that soon, but current blood sugars excellent and pt cleared for surgery from my standpoint as well=reviewed all their pre-op labs as well as EKG and CXR...s/p redo L TKR on 2/4/19 and looks great at 2/27/19 OV...finishing up as of 4/19 OV.  --  GERD per Dr Bradshaw.  ? CIRRHOSIS=tried on Nadolol per Dr Bradshaw=stopped it 6/20 due to diarrhea?; ? has CT/NH3 gideon.  --  OBESITY up 3 more to 263...258--->270 is stuck 2/20 and I d/w no meds=must go to [facility] ( Cards said no to surgery).  --  DEPRESSION on maint med...? Dementia per pt, sent to Dr Kim; he sent for NeuroPsych as of 4/18 OV...will address depression 8/18 = increase zoloft   [facility] as [facility]---> c/w as of 2/21 OV; neg O2 in [facility] recently;   --  --  PSA 0.5 (4/7/16)...defer.  Colon 7/19...2 inflam/hyper polyps per Dr Bradshaw.  Prevnar 11/16; Pneumovax # 1 9/17;  (, retired [employer] '97, 3 kids)    Follow Up   Return in about 4 months (around 11/2/2024).    Total Time Spent:  minutes     This time includes time spent by me in the following activities: preparing for the visit, reviewing extensive past medical history and tests, performing a medically appropriate examination and/or evaluation, counseling and educating the patient and/or caregivers, ordering medications, tests, or procedures, referring and/or communicating with other health care professionals and documenting information in the medical record all on this date of service.     Patient was given instructions and counseling regarding his condition or for health maintenance advice. Please see specific information pulled into the AVS if appropriate.

## 2024-07-02 NOTE — ASSESSMENT & PLAN NOTE
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

## 2024-07-02 NOTE — ASSESSMENT & PLAN NOTE
A1c is gradually trending up, he was 6.4 this time last year, 7.0 earlier this year, and now he is 7.5 as of his 7/24 OV.  His fasting sugars are around 140 at home, would be concerned about titrating the Lantus then, continue with low-dose, utilizing Humalog based on sliding scale and readings from the continuous glucose monitor.  Could consider adding low-dose Januvia etc., but we will leave it alone for now.

## 2024-07-15 ENCOUNTER — OFFICE VISIT (OUTPATIENT)
Dept: GASTROENTEROLOGY | Facility: CLINIC | Age: 79
End: 2024-07-15
Payer: MEDICARE

## 2024-07-15 ENCOUNTER — TELEPHONE (OUTPATIENT)
Dept: GASTROENTEROLOGY | Facility: CLINIC | Age: 79
End: 2024-07-15

## 2024-07-15 VITALS
DIASTOLIC BLOOD PRESSURE: 70 MMHG | HEIGHT: 72 IN | HEART RATE: 89 BPM | WEIGHT: 249.4 LBS | BODY MASS INDEX: 33.78 KG/M2 | SYSTOLIC BLOOD PRESSURE: 130 MMHG

## 2024-07-15 DIAGNOSIS — K76.82 HEPATIC ENCEPHALOPATHY: ICD-10-CM

## 2024-07-15 DIAGNOSIS — E66.9 CLASS 1 OBESITY WITH SERIOUS COMORBIDITY AND BODY MASS INDEX (BMI) OF 33.0 TO 33.9 IN ADULT, UNSPECIFIED OBESITY TYPE: ICD-10-CM

## 2024-07-15 DIAGNOSIS — D64.9 ANEMIA, UNSPECIFIED TYPE: ICD-10-CM

## 2024-07-15 DIAGNOSIS — K22.2 SCHATZKI'S RING OF DISTAL ESOPHAGUS: ICD-10-CM

## 2024-07-15 DIAGNOSIS — R13.19 ESOPHAGEAL DYSPHAGIA: ICD-10-CM

## 2024-07-15 DIAGNOSIS — K74.69 OTHER CIRRHOSIS OF LIVER: Primary | ICD-10-CM

## 2024-07-15 DIAGNOSIS — Z86.010 HISTORY OF COLON POLYPS: ICD-10-CM

## 2024-07-15 PROBLEM — Z86.0100 HISTORY OF COLON POLYPS: Status: ACTIVE | Noted: 2024-07-15

## 2024-07-15 PROCEDURE — 3078F DIAST BP <80 MM HG: CPT | Performed by: NURSE PRACTITIONER

## 2024-07-15 PROCEDURE — 1159F MED LIST DOCD IN RCRD: CPT | Performed by: NURSE PRACTITIONER

## 2024-07-15 PROCEDURE — 1160F RVW MEDS BY RX/DR IN RCRD: CPT | Performed by: NURSE PRACTITIONER

## 2024-07-15 PROCEDURE — 3075F SYST BP GE 130 - 139MM HG: CPT | Performed by: NURSE PRACTITIONER

## 2024-07-15 PROCEDURE — 99214 OFFICE O/P EST MOD 30 MIN: CPT | Performed by: NURSE PRACTITIONER

## 2024-07-15 RX ORDER — LACTULOSE 10 G/15ML
30 SOLUTION ORAL 2 TIMES DAILY
Qty: 900 ML | Refills: 5 | Status: SHIPPED | OUTPATIENT
Start: 2024-07-15

## 2024-07-15 RX ORDER — PANTOPRAZOLE SODIUM 40 MG/1
40 TABLET, DELAYED RELEASE ORAL DAILY
Qty: 30 TABLET | Refills: 5 | Status: SHIPPED | OUTPATIENT
Start: 2024-07-15

## 2024-07-15 NOTE — TELEPHONE ENCOUNTER
7/15/2024    Dear Dr Duran,      Patient Name: Cosmo Gauthier  : 1945      This patient is waiting to have a Colonoscopy and/or Esophagogastroduodenoscopy which I will perform at [facility] on 9.3.24. Please respond to this request noting your recommendations regarding clearance from a Cardiac  standpoint.  You may contact our office at [phone] Option 2   with any questions. I appreciate your prompt response in this matter. Please return this form to our office as soon as possible to [phone].    ____ I approve my patient from a Cardiac  standpoint    ____ I do NOT approve my patient from a Cardiac  standpoint at this time    Please inform our office if the patient requires additional follow-up from your office prior to scheduled procedure date.      Please specify clearance expiration date:____________________________________      Approving physician name (please print): _____________________________________________      Approving physician signature: ________________________________ Date:________________  Sincerely,  [facility] - Gastroenterology   Dr. Bradshaw          Please fax approval or denial to our office as soon as possible.

## 2024-07-15 NOTE — PROGRESS NOTES
Patient Name: Cosmo Gauthier   Visit Date: 07/15/2024   Patient ID: [id number]  Provider: MAXWELL Denise    Sex: male  Location:  Location Address:  Location Phone: [phone] [place] [address] [phone]    YOB: 1945  Age: 78 y.o.   Primary Care Provider Alex Buckley MD      Referring Provider: No ref. provider found        Chief Complaint  Cirrhosis of Liver (Follow up, patient is having 1 BM daily with taking lactulose )    History of Present Illness  Pt w history of fatty liver noted in 2010 with liver biopsy in 2011 that showed CANSECO.  History of alcohol for 20 years but he quit in the 1990s.  Pt not seen from 2020 until 2023.   Esophageal varices were noted in 2014 and patient was started on nadolol.  At visit in 2020 patient was off Xifaxan and was not sure why.  EGD February 25, 2014 gastritis, esophageal varices, patient was started on nadolol 40 mg/day.  Last Colonoscopy 7/29/2019: Adequate prep, 2 small polyps in the transverse colon completely removed-inflammatory and hyperplastic polyps.    11/2023: Reported seeing Dr Saldana for liver US q6, hx of leukemia but in remission. Reported taken off Nadolol d/t hypotension 2023.     EGD 1/29/24: Small hiatal hernia, severe Schatzki's ring was found at the GE junction, dilation performed from 12 to 15 mm normal stomach, normal duodenum      Patient was last seen 2/5/2024 and was feeling well, reported Dr. Saldana ordering liver ultrasound and labs    Pt states he has 1 BM /day, taking Lactulose about 30 ml /d.  No c/o dysphagia or difficulty eating  Occasionally takes a nap, wife states he gets confused at times with days and nights, states he has also been dx w dementia - follows w psych and neuro through [facility] [place] per wife  C/o fatigue.      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     Primary osteoarthritis of left knee     Sleep apnea     Sleep apnea     Thrombocytopenia 05/22/2018    Thyroid disease        Past Surgical History:   Procedure Laterality Date    CARDIAC SURGERY      COLONOSCOPY  2015    CORONARY ANGIOPLASTY WITH STENT PLACEMENT      CORONARY ARTERY BYPASS GRAFT N/A 05/20/2021    Procedure: MIDLINE STERNOTOMY,CORONARY ARTERY BYPASS GRAFTING X 5, UTILIZING THE LEFT COMPA AND LEFT SAPHENOUS VEIN, ABHIJEET, PRP;  Surgeon: Jr Tom Tubbs MD;  Location: [facility];  Service: Cardiothoracic;  Laterality: N/A;    ENDOSCOPY      ENDOSCOPY N/A 1/24/2024    Procedure: ESOPHAGOGASTRODUODENOSCOPY;  Surgeon: Chris Bradshaw MD;  Location: [facility] ENDOSCOPY;  Service: Gastroenterology;  Laterality: N/A;  hiatal hernia, schatzki's ring    ENDOSCOPY N/A 1/29/2024    Procedure: ESOPHAGOGASTRODUODENOSCOPY with balloon dilaitation 12-15cm;  Surgeon: Chris Bradshaw MD;  Location: [facility] ENDOSCOPY;  Service: Gastroenterology;  Laterality: N/A;  hiatal hernia, schatskis ring    HERNIA REPAIR      JOINT REPLACEMENT      SHOULDER SURGERY      SHOULDER REPAIR    TOTAL KNEE ARTHROPLASTY      TRANSESOPHAGEAL ECHOCARDIOGRAM (ABHIJEET) N/A 05/20/2021    Procedure: TRANSESOPHAGEAL ECHOCARDIOGRAM WITH ANESTHESIA;  Surgeon: Jr Tom Tubbs MD;  Location: [facility] OR;  Service: Cardiothoracic;  Laterality: N/A;       No Known Allergies    Family History   Problem Relation Age of Onset    Diabetes Mother     Colon cancer Father 65        Social History     Tobacco Use    Smoking  "status: Never    Smokeless tobacco: Never   Vaping Use    Vaping status: Never Used   Substance Use Topics    Alcohol use: Not Currently     Comment: QUIT DRINKING 32 YEARS AGO    Drug use: Never       Objective     Vital Signs:   /70 (BP Location: Left arm, Patient Position: Sitting, Cuff Size: Adult)   Pulse 89   Ht 182.9 cm (72.01\")   Wt 113 kg (249 lb 6.4 oz)   BMI 33.82 kg/m²       Physical Exam  Constitutional:       General: The patient is not in acute distress.     Appearance: Normal appearance.   HENT:      Head: Normocephalic and atraumatic.      Nose: Nose normal.   Pulmonary:      Effort: Pulmonary effort is normal. No respiratory distress.   Abdominal:      General: Abdomen is flat.      Palpations: Abdomen is soft. There is no mass.      Tenderness: There is no abdominal tenderness. There is no guarding.   Musculoskeletal:      Cervical back: Neck supple.      Right lower leg: No edema.      Left lower leg: No edema.   Skin:     General: Skin is warm and dry.   Neurological:      General: No focal deficit present.      Mental Status: The patient is alert and oriented to person, place, and time.      Gait: Gait normal.   Psychiatric:         Mood and Affect: Mood normal.         Speech: Speech normal.         Behavior: Behavior normal.         Thought Content: Thought content normal.     Result Review :   The following data was reviewed by: MAXWELL Denise on 07/15/2024:    CBC w/diff          8/20/2023    17:25 10/11/2023    08:56 10/11/2023    15:04 10/12/2023    04:32   CBC w/Diff   WBC 9.29  6.77  5.82  4.55    RBC 4.36  3.58  3.29  3.38    Hemoglobin 13.7  10.8  9.6  10.0    Hematocrit 39.0  32.6  29.5  30.3    MCV 89.4  91.1  89.7  89.6    MCH 31.4  30.2  29.2  29.6    MCHC 35.1  33.1  32.5  33.0    RDW 14.5  15.0  14.8  14.9    Platelets 133  71  59  57    Neutrophil Rel % 69.8  95.3   86.4    Immature Granulocyte Rel % 0.3  0.7   0.9    Lymphocyte Rel % 17.9  1.8   7.0  "   Monocyte Rel % 7.2  1.8   5.1    Eosinophil Rel % 4.0  0.1   0.2    Basophil Rel % 0.8  0.3   0.4      CMP          12/21/2023    07:44 3/21/2024    09:08 6/27/2024    09:01   CMP   Glucose 159  149  174    BUN 29  19  27    Creatinine 1.50  1.20  1.38    EGFR 48.0  62.7  53.0    Sodium 136  139  137    Potassium 4.8  5.0  4.7    Chloride 101  104  102    Calcium 9.4  9.6  9.1    Total Protein   6.8    Albumin 4.3   4.4    Globulin   2.4    Total Bilirubin   0.3    Alkaline Phosphatase   90    AST (SGOT)   13    ALT (SGPT)   13    Albumin/Globulin Ratio   1.8    BUN/Creatinine Ratio 19.3  15.8  19.6    Anion Gap 9.5  7.7  8.0        AFP   ALPHA-FETOPROTEIN   Date Value Ref Range Status   04/20/2023 <2 0 - 8.3 ng/mL Final      PT/INR   Protime   Date Value Ref Range Status   10/12/2023 13.8 11.8 - 14.9 Seconds Final   12/14/2018 11.7 10.3 - 13.3 s Final     Comment:     (note)  Anticoagulants may alter the results of Laboratory   Coagulation assays. New-generation anticoagulants such as   direct Thrombin inhibitors (Dabigatran/Pradaxa, Argatroban,   Bivalrudin) and direct/indirect factor Xa inhibitors   (Rivaroxaban/Xarelto,Apixaban/Eliquis, Danaparoid/Orgaran,   Fondaparinux/Arixtra) have been shown to affect the results   of Laboratory Coagulation assays, creating falsely elevated   or decreased values. Correlation with medication history is   advised.     INR   Date Value Ref Range Status   10/12/2023 1.05 0.86 - 1.15 Final   12/14/2018 1.0 INR Final     Comment:     INR Therapeutic Ranges:  Low Intensity Range: 2.0-3.0  High Intensity Range:  3.0-4.5               Assessment and Plan    Diagnoses and all orders for this visit:    1. Other cirrhosis of liver (Primary)    2. Esophageal dysphagia    3. Schatzki's ring of distal esophagus    4. History of colon polyps  -     Case Request; Standing  -     Case Request    5. Hepatic encephalopathy    6. Anemia, unspecified type    7. Class 1 obesity with serious  comorbidity and body mass index (BMI) of 33.0 to 33.9 in adult, unspecified obesity type    Other orders  -     pantoprazole (PROTONIX) 40 MG EC tablet; Take 1 tablet by mouth Daily.  Dispense: 30 tablet; Refill: 5  -     lactulose (CHRONULAC) 10 GM/15ML solution; Take 30 mL by mouth 2 (Two) Times a Day.  Dispense: 900 mL; Refill: 5  -     riFAXIMin (Xifaxan) 550 MG tablet; Take 1 tablet by mouth Every 12 (Twelve) Hours.  Dispense: 180 tablet; Refill: 1  -     Follow Anesthesia Guidelines / Protocol; Standing  -     Follow Anesthesia Guidelines / Protocol; Future  -     Obtain Informed Consent; Future  -     Verify NPO; Standing  -     Obtain Informed Consent; Standing  -     polyethylene glycol (GoLYTELY) 236 g solution; Take per office instructions  Dispense: 4000 mL; Refill: 0              Follow Up   Return in about 6 months (around 1/15/2025).  Restart Xifaxan 550 po BID (unclear why pt quit taking several yrs ago)  Increase lactulose 30 ml po BID   Colonoscopy - due now, wife requests Sept , Dr Duran clearance  EGD due 1 / 2025  Requesting Liver US copy from [facility]   Counseled pt on low carb diet, increase protein with protein shakes BID. 2-4  c. Coffee/d recommended    Patient was given instructions and counseling regarding his condition or for health maintenance advice. Please see specific information pulled into the AVS if appropriate.

## 2024-07-15 NOTE — TELEPHONE ENCOUNTER
Procedure: Colonoscopy and/or EGD     Med Directive: NA     PMH: CAD, CABG, CHF, HTN, HLD     Last Seen: 6/4/24

## 2024-07-16 ENCOUNTER — TELEPHONE (OUTPATIENT)
Dept: GASTROENTEROLOGY | Facility: CLINIC | Age: 79
End: 2024-07-16
Payer: MEDICARE

## 2024-07-16 NOTE — TELEPHONE ENCOUNTER
Rec'd a call from pt's spouse, she states pt did not receive the Xifaxan from the pharmacy with his other medications yesterday. The Xifaxan was sent to [facility] delivery pharmacy. I called and s/w [facility], they have rec'd the prescription and it dose not require a PA. Pt will receive the medication i9n 3-5 business days. Called and notified pt's spouse.

## 2024-07-30 ENCOUNTER — OFFICE VISIT (OUTPATIENT)
Dept: PODIATRY | Facility: CLINIC | Age: 79
End: 2024-07-30
Payer: OTHER GOVERNMENT

## 2024-07-30 VITALS
TEMPERATURE: 97.3 F | DIASTOLIC BLOOD PRESSURE: 73 MMHG | HEIGHT: 72 IN | BODY MASS INDEX: 33.18 KG/M2 | SYSTOLIC BLOOD PRESSURE: 133 MMHG | OXYGEN SATURATION: 92 % | WEIGHT: 245 LBS | HEART RATE: 87 BPM

## 2024-07-30 DIAGNOSIS — Z77.098 HISTORY OF AGENT ORANGE EXPOSURE: ICD-10-CM

## 2024-07-30 DIAGNOSIS — R26.2 DIFFICULTY WALKING: Primary | ICD-10-CM

## 2024-07-30 DIAGNOSIS — G62.2 NEUROPATHY DUE TO CHEMICAL SUBSTANCE: ICD-10-CM

## 2024-07-30 DIAGNOSIS — L60.0 ONYCHOCRYPTOSIS: ICD-10-CM

## 2024-07-30 DIAGNOSIS — M79.671 FOOT PAIN, BILATERAL: ICD-10-CM

## 2024-07-30 DIAGNOSIS — E11.42 TYPE 2 DIABETES MELLITUS WITH DIABETIC POLYNEUROPATHY, WITH LONG-TERM CURRENT USE OF INSULIN: ICD-10-CM

## 2024-07-30 DIAGNOSIS — M79.672 FOOT PAIN, BILATERAL: ICD-10-CM

## 2024-07-30 DIAGNOSIS — Z79.4 TYPE 2 DIABETES MELLITUS WITH DIABETIC POLYNEUROPATHY, WITH LONG-TERM CURRENT USE OF INSULIN: ICD-10-CM

## 2024-07-30 PROCEDURE — 11721 DEBRIDE NAIL 6 OR MORE: CPT | Performed by: PODIATRIST

## 2024-07-30 NOTE — PROGRESS NOTES
[facility] - PODIATRY    Today's Date: 07/30/24    Patient Name: Cosmo Gauthier  MRN: [id number]  CSN: [id number]  PCP: Alex Buckley MD, Last PCP Visit: 2 July 2024  Referring Provider: Marah Monreal MD    SUBJECTIVE     Chief Complaint   Patient presents with    Left Foot - Follow-up, Nail Problem    Right Foot - Follow-up, Nail Problem     HPI: Cosmo Gauthier, a 78 y.o.male, presents to clinic for painful toenails:    New, Established, New Problem: established  Location:  Toenails  Duration:   Greater than five years  Onset:  Gradual  Nature:  sore with palpation.  Stable, worsening, improving:   worsening  Aggravating factors:  Pain with shoe gear and ambulation.  Previous Treatment: debridement    Patient controlling diabetes via:  insulin    Patient states their last blood glucose was: 130    Patient denies any fevers, chills, nausea, vomiting, shortness of breath, nor any other constitutional signs nor symptoms.    Medical changes:  no changes    I have reviewed/confirmed previously documented HPI with no changes.     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     Primary osteoarthritis of left knee     Sleep apnea     Sleep apnea     Thrombocytopenia 05/22/2018    Thyroid disease      Past Surgical History:   Procedure Laterality Date    CARDIAC SURGERY      COLONOSCOPY  2015    CORONARY ANGIOPLASTY WITH STENT PLACEMENT      CORONARY  ARTERY BYPASS GRAFT N/A 05/20/2021    Procedure: MIDLINE STERNOTOMY,CORONARY ARTERY BYPASS GRAFTING X 5, UTILIZING THE LEFT COMPA AND LEFT SAPHENOUS VEIN, ABHIJEET, PRP;  Surgeon: Jr Tom Tubbs MD;  Location: [facility] OR;  Service: Cardiothoracic;  Laterality: N/A;    ENDOSCOPY      ENDOSCOPY N/A 1/24/2024    Procedure: ESOPHAGOGASTRODUODENOSCOPY;  Surgeon: Chris Bradshaw MD;  Location: [facility] ENDOSCOPY;  Service: Gastroenterology;  Laterality: N/A;  hiatal hernia, schatzki's ring    ENDOSCOPY N/A 1/29/2024    Procedure: ESOPHAGOGASTRODUODENOSCOPY with balloon dilaitation 12-15cm;  Surgeon: Chris Bradshaw MD;  Location: [facility] ENDOSCOPY;  Service: Gastroenterology;  Laterality: N/A;  hiatal hernia, schatskis ring    HERNIA REPAIR      JOINT REPLACEMENT      SHOULDER SURGERY      SHOULDER REPAIR    TOTAL KNEE ARTHROPLASTY      TRANSESOPHAGEAL ECHOCARDIOGRAM (ABHIJEET) N/A 05/20/2021    Procedure: TRANSESOPHAGEAL ECHOCARDIOGRAM WITH ANESTHESIA;  Surgeon: Jr Tom Tubbs MD;  Location: [facility];  Service: Cardiothoracic;  Laterality: N/A;     Family History   Problem Relation Age of Onset    Diabetes Mother     Colon cancer Father 65     Social History     Socioeconomic History    Marital status:    Tobacco Use    Smoking status: Never    Smokeless tobacco: Never   Vaping Use    Vaping status: Never Used   Substance and Sexual Activity    Alcohol use: Not Currently     Comment: QUIT DRINKING 32 YEARS AGO    Drug use: Never    Sexual activity: Defer     No Known Allergies  Current Outpatient Medications   Medication Sig Dispense Refill    amitriptyline (ELAVIL) 50 MG tablet Take 1 tablet by mouth Every Night.      aspirin 81 MG EC tablet Take 1 tablet by mouth Daily.      finasteride (PROSCAR) 5 MG tablet Take 1 tablet by mouth Daily. 60 tablet 5    fluticasone (FLONASE) 50 MCG/ACT nasal spray 2 sprays into the nostril(s) as directed by provider Daily. 16 g 1    furosemide (LASIX) 40 MG  tablet Take 0.5 tablets by mouth Every Other Day.      insulin glargine (LANTUS, SEMGLEE) 100 UNIT/ML injection Inject 15 Units under the skin into the appropriate area as directed Every Night.      Insulin Lispro, 1 Unit Dial, (HumaLOG KwikPen) 100 UNIT/ML solution pen-injector 150-200 = 4 units,  201-250 = 6 units,  251-300 = 8 units,  301-350 = 10 units,  >350 = 14 units 9 mL 3    lactulose (CHRONULAC) 10 GM/15ML solution Take 30 mL by mouth 2 (Two) Times a Day. 900 mL 5    levothyroxine (SYNTHROID, LEVOTHROID) 112 MCG tablet Take 2 tablets by mouth Take As Directed. Takes two tablets every day Mon - Sat. Takes 1-1/2 tablets on Sunday. 75 tablet 3    metFORMIN (GLUCOPHAGE) 1000 MG tablet Take 1 tablet by mouth 2 (Two) Times a Day With Meals.      midodrine (PROAMATINE) 2.5 MG tablet Take 1 tablet by mouth Daily. 90 tablet 3    montelukast (Singulair) 10 MG tablet Take 1 tablet by mouth Every Night. 30 tablet 1    nitroglycerin (NITROSTAT) 0.4 MG SL tablet Place 1 tablet under the tongue Every 5 (Five) Minutes As Needed.      pantoprazole (PROTONIX) 40 MG EC tablet Take 1 tablet by mouth Daily. 30 tablet 5    polyethylene glycol (GoLYTELY) 236 g solution Take per office instructions 4000 mL 0    riFAXIMin (Xifaxan) 550 MG tablet Take 1 tablet by mouth Every 12 (Twelve) Hours. 180 tablet 1    sertraline (ZOLOFT) 100 MG tablet Take 2 tablets by mouth Daily. 180 tablet 1    simvastatin (ZOCOR) 40 MG tablet Take 0.5 tablets by mouth Every Night.      tamsulosin (FLOMAX) 0.4 MG capsule 24 hr capsule Take 1 capsule by mouth Daily. 30 capsule 5     No current facility-administered medications for this visit.     Review of Systems   Constitutional: Negative.    Skin:         Painful toenails   Neurological:  Positive for numbness.   All other systems reviewed and are negative.      OBJECTIVE     Vitals:    07/30/24 0734   BP: 133/73   Pulse: 87   Temp: 97.3 °F (36.3 °C)   SpO2: 92%       Body mass index is 33.23  kg/m².    Lab Results   Component Value Date    HGBA1C 7.50 (H) 06/27/2024       Lab Results   Component Value Date    GLUCOSE 174 (H) 06/27/2024    CALCIUM 9.1 06/27/2024     06/27/2024    K 4.7 06/27/2024    CO2 27.0 06/27/2024     06/27/2024    BUN 27 (H) 06/27/2024    CREATININE 1.38 (H) 06/27/2024    EGFRIFNONA 48 (L) 01/13/2022    BCR 19.6 06/27/2024    ANIONGAP 8.0 06/27/2024       Patient seen in no apparent distress.      PHYSICAL EXAM:     Foot/Ankle Exam    GENERAL  Appearance:  elderly  Orientation:  AAOx3  Affect:  appropriate  Gait:  unimpaired  Assistance:  independent  Right shoe gear: casual shoe  Left shoe gear: casual shoe    VASCULAR     Right Foot Vascularity   Dorsalis pedis:  1+  Posterior tibial:  1+  Skin temperature:  warm  Edema grading:  None  CFT:  < 3 seconds  Pedal hair growth:  Absent  Varicosities:  mild varicosities     Left Foot Vascularity   Dorsalis pedis:  1+  Posterior tibial:  1+  Skin temperature:  warm  Edema grading:  None  CFT:  < 3 seconds  Pedal hair growth:  Absent  Varicosities:  mild varicosities     NEUROLOGIC     Right Foot Neurologic   Light touch sensation: diminished  Vibratory sensation: diminished  Hot/Cold sensation: diminished  Protective Sensation using [place] Monofilament:   Sites intact: 2  Sites tested: 10     Left Foot Neurologic   Light touch sensation: diminished  Vibratory sensation: diminished  Hot/Cold sensation:  diminished  Protective Sensation using [place] Monofilament:   Sites intact: 2  Sites tested: 10    MUSCULOSKELETAL     Right Foot Musculoskeletal   [place]amm[place]oe:  Second toe, third toe, fourth toe and fifth toe     Left Foot Musculoskeletal   [place]amm[place]oe:  Second toe, third toe, fourth toe and fifth toe    MUSCLE STRENGTH     Right Foot Muscle Strength   Foot dorsiflexion:  4-  Foot plantar flexion:  4-  Foot inversion:  4-  Foot eversion:  4-     Left Foot Muscle Strength   Foot dorsiflexion:  4-  Foot plantar  flexion:  4-  Foot inversion:  4-  Foot eversion:  4-    RANGE OF MOTION     Right Foot Range of Motion   Foot and ankle ROM within normal limits       Left Foot Range of Motion   Foot and ankle ROM within normal limits      DERMATOLOGIC      Right Foot Dermatologic   Skin  Right foot skin is intact.   Nails  1.  Positive for elongated, onychomycosis, abnormal thickness, subungual debris and ingrown toenail.  2.  Positive for elongated, onychomycosis, abnormal thickness, subungual debris and ingrown toenail.  3.  Positive for elongated, onychomycosis, abnormal thickness, subungual debris and ingrown toenail.  4.  Positive for elongated, onychomycosis, abnormal thickness, subungual debris and ingrown toenail.  5.  Positive for elongated, onychomycosis, abnormal thickness, subungual debris and ingrown toenail.  Nails comment:  Toenails 1, 2, 3, 4, and 5     Left Foot Dermatologic   Skin  Left foot skin is intact.   Nails comment:  Toenails 1, 2, 3, 4, and 5  Nails  1.  Positive for elongated, onychomycosis, abnormal thickness, subungual debris and ingrown toenail.  2.  Positive for elongated, onychomycosis, abnormal thickness, subungual debris and ingrown toenail.  3.  Positive for elongated, onychomycosis, abnormal thickness, subungual debris and ingrown toenail.  4.  Positive for elongated, onychomycosis, abnormally thick, subungual debris and ingrown toenail.  5.  Positive for elongated, onychomycosis, abnormally thick, subungual debris and ingrown toenail.    I have reexamined the patient the results are consistent with the previously documented exam.    ASSESSMENT/PLAN     Diagnoses and all orders for this visit:    1. Difficulty walking (Primary)    2. Onychocryptosis    3. Type 2 diabetes mellitus with diabetic polyneuropathy, with long-term current use of insulin    4. 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 History of agent Orange exposure    Comprehensive lower extremity  examination and evaluation was performed.    Discussed findings and treatment plan including risks, benefits, and treatment options with patient in detail. Patient agreed with treatment plan.    Medications and allergies reviewed.  Reviewed available blood glucose and HgB A1C lab values along with other pertinent labs.  These were discussed with the patient as to their importance of diabetic maintenance.    Toenails 1, 2, 3, 4, 5 on Right and 1, 2, 3, 4, 5 on Left were debrided with nail nippers then filed with a Dremel nail tavia.  Patient tolerated procedure well without complications.    An After Visit Summary was printed and given to the patient at discharge, including (if requested) any available informative/educational handouts regarding diagnosis, treatment, or medications. All questions were answered to patient/family satisfaction. Should symptoms fail to improve or worsen they agree to call or return to clinic or to go to the Emergency Department. Discussed the importance of following up with any needed screening tests/labs/specialist appointments and any requested follow-up recommended by me today. Importance of maintaining follow-up discussed and patient accepts that missed appointments can delay diagnosis and potentially lead to worsening of conditions.    Return in about 9 weeks (around 10/1/2024) for Toenail Care., or sooner if acute issues arise.    I have reviewed the assessment and plan and verified the accuracy of it. No changes to assessment and plan since the information was documented. Hunter Orellana DPM 07/30/24     I have dictated this note utilizing Dragon Dictation.  Please note that portions of this note were completed with a voice recognition program.  Part of this note may be an electronic transcription/translation of spoken language to printed text using the Dragon Dictation System.      This document has been electronically signed by Hunter Orellana DPM on July 30, 2024 07:43  EDT

## 2024-08-26 NOTE — PRE-PROCEDURE INSTRUCTIONS
"Instructed on date and arrival time of 0730. Instructed that arrival time is not their procedure time but allows time to prepare for procedure.  Come to entrance \"C\". Must have  over age 18 to drive home.  May have two visitors; however, children under 12 must stay in waiting room.  Discussed clear liquid diet (no red or purple), bowel prep, and NPO.  May take medications as usual except for blood thinners, diabetic medications, and weight loss medications.  Verbalized understanding of instructions given.  Instructed to call for questions or concerns.  Spoke with wife.  Cardiac clearance noted in chart.  "

## 2024-08-30 ENCOUNTER — ANESTHESIA EVENT (OUTPATIENT)
Dept: GASTROENTEROLOGY | Facility: HOSPITAL | Age: 79
End: 2024-08-30
Payer: MEDICARE

## 2024-08-30 NOTE — ANESTHESIA PREPROCEDURE EVALUATION
Anesthesia Evaluation     Patient summary reviewed and Nursing notes reviewed   NPO Solid Status: > 8 hours  NPO Liquid Status: > 4 hours           Airway   Mallampati: II  TM distance: >3 FB  Neck ROM: full  No difficulty expected and Large neck circumference  Dental - normal exam     Pulmonary - normal exam    breath sounds clear to auscultation  (+) ,sleep apnea (noncompliant with cpap)  Cardiovascular - normal exam  Exercise tolerance: poor (<4 METS)    ECG reviewed  Rhythm: regular  Rate: normal    (+) hypertension well controlled, CAD, CABG (3 yrs ago) >6 Months, hyperlipidemia      Neuro/Psych  (+) CVA (denies CVA), weakness, numbness, psychiatric history Anxiety and Depression, dementia  GI/Hepatic/Renal/Endo    (+) obesity, morbid obesity, GERD well controlled, PUD, liver disease fatty liver disease cirrhosis, renal disease- CRI, diabetes mellitus type 2 well controlled, thyroid problem hypothyroidism and hyperthyroidism    Musculoskeletal     Abdominal   (+) obese    Abdomen: soft.  Bowel sounds: normal.   Substance History      OB/GYN          Other   arthritis,   history of cancer    ROS/Med Hx Other: Echocardiogram Comments:       Post CPB:  LVEF much improved, 60%, apex no longer hypokinetic.  No aortic dissection post decannulation.  AI unchanged.    EKG 10/11/23: HR 89, SR     Card clearance received from  Dr. Duran with moderate risks on 07/16/24                   Anesthesia Plan    ASA 3     general   total IV anesthesia  (Total IV Anesthesia    Patient understands anesthesia not responsible for dental damage.  )  intravenous induction     Anesthetic plan, risks, benefits, and alternatives have been provided, discussed and informed consent has been obtained with: patient and spouse/significant other.  Pre-procedure education provided  Use of blood products discussed with patient  Consented to blood products.    Plan discussed with CRNA.      CODE STATUS:

## 2024-09-03 ENCOUNTER — ANESTHESIA (OUTPATIENT)
Dept: GASTROENTEROLOGY | Facility: HOSPITAL | Age: 79
End: 2024-09-03
Payer: MEDICARE

## 2024-09-03 ENCOUNTER — HOSPITAL ENCOUNTER (OUTPATIENT)
Facility: HOSPITAL | Age: 79
Setting detail: HOSPITAL OUTPATIENT SURGERY
Discharge: HOME | End: 2024-09-03
Attending: INTERNAL MEDICINE | Admitting: INTERNAL MEDICINE
Payer: MEDICARE

## 2024-09-03 VITALS
BODY MASS INDEX: 32.05 KG/M2 | HEART RATE: 81 BPM | TEMPERATURE: 97.4 F | DIASTOLIC BLOOD PRESSURE: 69 MMHG | WEIGHT: 236.33 LBS | SYSTOLIC BLOOD PRESSURE: 101 MMHG | RESPIRATION RATE: 16 BRPM | OXYGEN SATURATION: 97 %

## 2024-09-03 DIAGNOSIS — Z86.010 HISTORY OF COLON POLYPS: ICD-10-CM

## 2024-09-03 LAB — GLUCOSE BLDC GLUCOMTR-MCNC: 149 MG/DL (ref 70–99)

## 2024-09-03 PROCEDURE — 25810000003 LACTATED RINGERS PER 1000 ML: Performed by: NURSE ANESTHETIST, CERTIFIED REGISTERED

## 2024-09-03 PROCEDURE — 82948 REAGENT STRIP/BLOOD GLUCOSE: CPT | Performed by: NURSE ANESTHETIST, CERTIFIED REGISTERED

## 2024-09-03 PROCEDURE — 88305 TISSUE EXAM BY PATHOLOGIST: CPT | Performed by: INTERNAL MEDICINE

## 2024-09-03 PROCEDURE — 25010000002 PROPOFOL 10 MG/ML EMULSION

## 2024-09-03 RX ORDER — LIDOCAINE HYDROCHLORIDE 20 MG/ML
INJECTION, SOLUTION EPIDURAL; INFILTRATION; INTRACAUDAL; PERINEURAL AS NEEDED
Status: DISCONTINUED | OUTPATIENT
Start: 2024-09-03 | End: 2024-09-03 | Stop reason: SURG

## 2024-09-03 RX ORDER — SODIUM CHLORIDE, SODIUM LACTATE, POTASSIUM CHLORIDE, CALCIUM CHLORIDE 600; 310; 30; 20 MG/100ML; MG/100ML; MG/100ML; MG/100ML
30 INJECTION, SOLUTION INTRAVENOUS CONTINUOUS
Status: DISCONTINUED | OUTPATIENT
Start: 2024-09-03 | End: 2024-09-03 | Stop reason: HOSPADM

## 2024-09-03 RX ORDER — PROPOFOL 10 MG/ML
VIAL (ML) INTRAVENOUS AS NEEDED
Status: DISCONTINUED | OUTPATIENT
Start: 2024-09-03 | End: 2024-09-03 | Stop reason: SURG

## 2024-09-03 RX ORDER — PHENYLEPHRINE HCL IN 0.9% NACL 1 MG/10 ML
SYRINGE (ML) INTRAVENOUS AS NEEDED
Status: DISCONTINUED | OUTPATIENT
Start: 2024-09-03 | End: 2024-09-03 | Stop reason: SURG

## 2024-09-03 RX ADMIN — PROPOFOL 100 MG: 10 INJECTION, EMULSION INTRAVENOUS at 09:42

## 2024-09-03 RX ADMIN — LIDOCAINE HYDROCHLORIDE 50 MG: 20 INJECTION, SOLUTION INTRAVENOUS at 09:42

## 2024-09-03 RX ADMIN — Medication 150 MCG: at 09:53

## 2024-09-03 RX ADMIN — SODIUM CHLORIDE, POTASSIUM CHLORIDE, SODIUM LACTATE AND CALCIUM CHLORIDE 30 ML/HR: 600; 310; 30; 20 INJECTION, SOLUTION INTRAVENOUS at 08:57

## 2024-09-03 RX ADMIN — Medication 150 MCG: at 09:57

## 2024-09-03 RX ADMIN — PROPOFOL 200 MCG/KG/MIN: 10 INJECTION, EMULSION INTRAVENOUS at 09:43

## 2024-09-03 NOTE — H&P
Pre Procedure History & Physical    Chief Complaint:   Past history colon polyp    Subjective     HPI:   Past history colon polyp    Past Medical History:   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     Primary osteoarthritis of left knee     Sleep apnea     Sleep apnea     Thrombocytopenia 05/22/2018    Thyroid disease        Past Surgical History:  Past Surgical History:   Procedure Laterality Date    CARDIAC SURGERY      COLONOSCOPY  2015    CORONARY ANGIOPLASTY WITH STENT PLACEMENT      CORONARY ARTERY BYPASS GRAFT N/A 05/20/2021    Procedure: MIDLINE STERNOTOMY,CORONARY ARTERY BYPASS GRAFTING X 5, UTILIZING THE LEFT COMPA AND LEFT SAPHENOUS VEIN, ABHIJEET, PRP;  Surgeon: Jr Tom Tubbs MD;  Location: [facility];  Service: Cardiothoracic;  Laterality: N/A;    ENDOSCOPY      ENDOSCOPY N/A 1/24/2024    Procedure: ESOPHAGOGASTRODUODENOSCOPY;  Surgeon: Chris Bradshaw MD;  Location: [facility] ENDOSCOPY;  Service: Gastroenterology;  Laterality: N/A;  hiatal hernia, schatzki's ring    ENDOSCOPY N/A 1/29/2024    Procedure: ESOPHAGOGASTRODUODENOSCOPY with balloon dilaitation 12-15cm;  Surgeon: Chris Bradshaw MD;  Location: [facility] ENDOSCOPY;  Service: Gastroenterology;  Laterality: N/A;  hiatal hernia, schatskis ring    HERNIA REPAIR      JOINT REPLACEMENT      SHOULDER SURGERY      SHOULDER REPAIR    TOTAL KNEE ARTHROPLASTY      TRANSESOPHAGEAL ECHOCARDIOGRAM  (ABHIJEET) N/A 05/20/2021    Procedure: TRANSESOPHAGEAL ECHOCARDIOGRAM WITH ANESTHESIA;  Surgeon: Jr Tom Tubbs MD;  Location: [facility];  Service: Cardiothoracic;  Laterality: N/A;       Family History:  Family History   Problem Relation Age of Onset    Diabetes Mother     Colon cancer Father 65       Social History:   reports that he has never smoked. He has never used smokeless tobacco. He reports that he does not currently use alcohol. He reports that he does not use drugs.    Medications:   Medications Prior to Admission   Medication Sig Dispense Refill Last Dose    amitriptyline (ELAVIL) 50 MG tablet Take 1 tablet by mouth Every Night.       aspirin 81 MG EC tablet Take 1 tablet by mouth Daily.       finasteride (PROSCAR) 5 MG tablet Take 1 tablet by mouth Daily. 60 tablet 5     fluticasone (FLONASE) 50 MCG/ACT nasal spray 2 sprays into the nostril(s) as directed by provider Daily. 16 g 1     furosemide (LASIX) 40 MG tablet Take 0.5 tablets by mouth Every Other Day.       insulin glargine (LANTUS, SEMGLEE) 100 UNIT/ML injection Inject 15 Units under the skin into the appropriate area as directed Every Night.       Insulin Lispro, 1 Unit Dial, (HumaLOG KwikPen) 100 UNIT/ML solution pen-injector 150-200 = 4 units,  201-250 = 6 units,  251-300 = 8 units,  301-350 = 10 units,  >350 = 14 units 9 mL 3     lactulose (CHRONULAC) 10 GM/15ML solution Take 30 mL by mouth 2 (Two) Times a Day. 900 mL 5     levothyroxine (SYNTHROID, LEVOTHROID) 112 MCG tablet Take 2 tablets by mouth Take As Directed. Takes two tablets every day Mon - Sat. Takes 1-1/2 tablets on Sunday. 75 tablet 3     metFORMIN (GLUCOPHAGE) 1000 MG tablet Take 1 tablet by mouth 2 (Two) Times a Day With Meals.       midodrine (PROAMATINE) 2.5 MG tablet Take 1 tablet by mouth Daily. 90 tablet 3     montelukast (Singulair) 10 MG tablet Take 1 tablet by mouth Every Night. 30 tablet 1     nitroglycerin (NITROSTAT) 0.4 MG SL tablet Place 1 tablet under  the tongue Every 5 (Five) Minutes As Needed.       pantoprazole (PROTONIX) 40 MG EC tablet Take 1 tablet by mouth Daily. 30 tablet 5     polyethylene glycol (GoLYTELY) 236 g solution Take per office instructions 4000 mL 0     riFAXIMin (Xifaxan) 550 MG tablet Take 1 tablet by mouth Every 12 (Twelve) Hours. 180 tablet 1     sertraline (ZOLOFT) 100 MG tablet Take 2 tablets by mouth Daily. 180 tablet 1     simvastatin (ZOCOR) 40 MG tablet Take 0.5 tablets by mouth Every Night.       tamsulosin (FLOMAX) 0.4 MG capsule 24 hr capsule Take 1 capsule by mouth Daily. 30 capsule 5        Allergies:  Patient has no known allergies.        Objective     Weight 107 kg (236 lb 5.3 oz).    Physical Exam   Constitutional: Pt is oriented to person, place, and time and well-developed, well-nourished, and in no distress.   Mouth/Throat: Oropharynx is clear and moist.   Neck: Normal range of motion.   Cardiovascular: Normal rate, regular rhythm and normal heart sounds.    Pulmonary/Chest: Effort normal and breath sounds normal.   Abdominal: Soft. Nontender  Skin: Skin is warm and dry.   Psychiatric: Mood, memory, affect and judgment normal.     Assessment & Plan     Diagnosis:  Surveillance    Anticipated Surgical Procedure:  Colonoscopy    The risks, benefits, and alternatives of this procedure have been discussed with the patient or the responsible party- the patient understands and agrees to proceed.

## 2024-09-03 NOTE — ANESTHESIA POSTPROCEDURE EVALUATION
Patient: Cosmo Gauthier    Procedure Summary       Date: 09/03/24 Room / Location: [facility] ENDOSCOPY 4 / [facility] ENDOSCOPY    Anesthesia Start: 0938 Anesthesia Stop: 1014    Procedure: COLONOSCOPY FOR SCREENING WITH COLD SNARE Diagnosis:       History of colon polyps      (History of colon polyps [Z86.010])    Surgeons: Chris Bradshaw MD Provider: Evens Najera CRNA    Anesthesia Type: general ASA Status: 3            Anesthesia Type: general    Vitals  Vitals Value Taken Time   /69 09/03/24 1030   Temp 36.3 °C (97.4 °F) 09/03/24 1030   Pulse 81 09/03/24 1030   Resp 16 09/03/24 1030   SpO2 97 % 09/03/24 1030           Post Anesthesia Care and Evaluation    Post-procedure mental status: acceptable.  Pain management: satisfactory to patient    Airway patency: patent  Anesthetic complications: No anesthetic complications    Cardiovascular status: acceptable  Respiratory status: acceptable    Comments: Per chart review

## 2024-09-03 NOTE — ANESTHESIA POSTPROCEDURE EVALUATION
Patient: Cosmo Gauthier    Procedure Summary       Date: 09/03/24 Room / Location: [facility] ENDOSCOPY 4 / [facility] ENDOSCOPY    Anesthesia Start: 0938 Anesthesia Stop: 1014    Procedure: COLONOSCOPY FOR SCREENING WITH COLD SNARE Diagnosis:       History of colon polyps      (History of colon polyps [Z86.010])    Surgeons: Chris Bradshaw MD Provider: Evens Najera CRNA    Anesthesia Type: general ASA Status: 3            Anesthesia Type: general    Vitals  Vitals Value Taken Time   /69 09/03/24 1030   Temp 36.3 °C (97.4 °F) 09/03/24 1030   Pulse 81 09/03/24 1030   Resp 16 09/03/24 1030   SpO2 97 % 09/03/24 1030           Post Anesthesia Care and Evaluation      Comments: As patient was about to leave PACU, pt tripped and fell into wheelchair, no loss of consciousness, has some minor swelling and bruising noted to right forehead, PERRLA, neurologically intact, has a minor skin tear noted to right forearm , cleaned and applied band-aid , and applied ice pack to forehead. Patient and wife advised to report ER immediately if following symptoms occur;  vomiting , seizure like activity, abnormal bleeding, any other concerns. Pt and wife verb understanding.

## 2024-09-03 NOTE — NURSING NOTE
RN provided instructions to patient to be careful getting up slowly due to anesthesia. Patient was getting up to wheelchair with the assistance of cane and RN. Patient fell into wheelchair. Patient did not lose conscious. Anesthesia assessed patient. Anesthesia discussed with patient and family symptoms and what to be aware of at home. OK per anesthesia to discharge.

## 2024-09-04 LAB
CYTO UR: NORMAL
LAB AP CASE REPORT: NORMAL
LAB AP CLINICAL INFORMATION: NORMAL
PATH REPORT.FINAL DX SPEC: NORMAL
PATH REPORT.GROSS SPEC: NORMAL

## 2024-09-06 NOTE — NURSING NOTE
Called patient 9/5/2024 at 1618 for follow up phone call. Patient stated he is doing well after assisted fall on Tuesday 9/3/2024. He did not need to follow up with physician for care after assisted fall.

## 2024-11-05 ENCOUNTER — OFFICE VISIT (OUTPATIENT)
Dept: INTERNAL MEDICINE | Age: 79
End: 2024-11-05
Payer: OTHER GOVERNMENT

## 2024-11-05 VITALS
WEIGHT: 233 LBS | HEART RATE: 76 BPM | SYSTOLIC BLOOD PRESSURE: 126 MMHG | DIASTOLIC BLOOD PRESSURE: 64 MMHG | TEMPERATURE: 97.4 F | HEIGHT: 72 IN | BODY MASS INDEX: 31.56 KG/M2 | OXYGEN SATURATION: 96 %

## 2024-11-05 DIAGNOSIS — D50.9 IRON DEFICIENCY ANEMIA, UNSPECIFIED IRON DEFICIENCY ANEMIA TYPE: ICD-10-CM

## 2024-11-05 DIAGNOSIS — Z23 NEED FOR IMMUNIZATION AGAINST INFLUENZA: ICD-10-CM

## 2024-11-05 DIAGNOSIS — E11.8 TYPE 2 DIABETES MELLITUS WITH COMPLICATIONS: Primary | ICD-10-CM

## 2024-11-05 DIAGNOSIS — N18.31 STAGE 3A CHRONIC KIDNEY DISEASE: ICD-10-CM

## 2024-11-05 DIAGNOSIS — D69.6 THROMBOCYTOPENIA: ICD-10-CM

## 2024-11-05 DIAGNOSIS — E78.2 HYPERLIPEMIA, MIXED: ICD-10-CM

## 2024-11-05 DIAGNOSIS — K74.69 OTHER CIRRHOSIS OF LIVER: ICD-10-CM

## 2024-11-05 DIAGNOSIS — I10 HYPERTENSION, ESSENTIAL: ICD-10-CM

## 2024-11-05 LAB
ALBUMIN SERPL-MCNC: 4.3 G/DL (ref 3.5–5.2)
ALBUMIN UR-MCNC: <1.2 MG/DL
ALP SERPL-CCNC: 112 U/L (ref 39–117)
ALT SERPL W P-5'-P-CCNC: 14 U/L (ref 1–41)
ANION GAP SERPL CALCULATED.3IONS-SCNC: 9 MMOL/L (ref 5–15)
ANISOCYTOSIS BLD QL: ABNORMAL
AST SERPL-CCNC: 16 U/L (ref 1–40)
BASOPHILS # BLD MANUAL: 0.15 10*3/MM3 (ref 0–0.2)
BASOPHILS NFR BLD MANUAL: 1 % (ref 0–1.5)
BILIRUB CONJ SERPL-MCNC: <0.2 MG/DL (ref 0–0.3)
BILIRUB INDIRECT SERPL-MCNC: NORMAL MG/DL
BILIRUB SERPL-MCNC: 0.3 MG/DL (ref 0–1.2)
BUN SERPL-MCNC: 27 MG/DL (ref 8–23)
BUN/CREAT SERPL: 15.9 (ref 7–25)
CALCIUM SPEC-SCNC: 9.5 MG/DL (ref 8.6–10.5)
CHLORIDE SERPL-SCNC: 103 MMOL/L (ref 98–107)
CO2 SERPL-SCNC: 25 MMOL/L (ref 22–29)
CREAT SERPL-MCNC: 1.7 MG/DL (ref 0.76–1.27)
CREAT UR-MCNC: 67 MG/DL
EGFRCR SERPLBLD CKD-EPI 2021: 41 ML/MIN/1.73
EOSINOPHIL # BLD MANUAL: 0.59 10*3/MM3 (ref 0–0.4)
EOSINOPHIL NFR BLD MANUAL: 4 % (ref 0.3–6.2)
FERRITIN SERPL-MCNC: 66.3 NG/ML (ref 30–400)
GLUCOSE SERPL-MCNC: 211 MG/DL (ref 65–99)
HBA1C MFR BLD: 7 % (ref 4.8–5.6)
IRON 24H UR-MRATE: 71 MCG/DL (ref 59–158)
IRON SATN MFR SERPL: 18 % (ref 20–50)
LYMPHOCYTES # BLD MANUAL: 7.85 10*3/MM3 (ref 0.7–3.1)
LYMPHOCYTES NFR BLD MANUAL: 5 % (ref 5–12)
MICROALBUMIN/CREAT UR: NORMAL MG/G{CREAT}
MICROCYTES BLD QL: ABNORMAL
MONOCYTES # BLD: 0.74 10*3/MM3 (ref 0.1–0.9)
NEUTROPHILS # BLD AUTO: 5.48 10*3/MM3 (ref 1.7–7)
NEUTROPHILS NFR BLD MANUAL: 37 % (ref 42.7–76)
PLAT MORPH BLD: NORMAL
POTASSIUM SERPL-SCNC: 5.2 MMOL/L (ref 3.5–5.2)
PROT SERPL-MCNC: 6.8 G/DL (ref 6–8.5)
SODIUM SERPL-SCNC: 137 MMOL/L (ref 136–145)
TIBC SERPL-MCNC: 387 MCG/DL (ref 298–536)
TRANSFERRIN SERPL-MCNC: 260 MG/DL (ref 200–360)
VARIANT LYMPHS NFR BLD MANUAL: 4 % (ref 0–5)
VARIANT LYMPHS NFR BLD MANUAL: 49 % (ref 19.6–45.3)
WBC MORPH BLD: NORMAL

## 2024-11-05 PROCEDURE — 84466 ASSAY OF TRANSFERRIN: CPT | Performed by: INTERNAL MEDICINE

## 2024-11-05 PROCEDURE — 90662 IIV NO PRSV INCREASED AG IM: CPT | Performed by: INTERNAL MEDICINE

## 2024-11-05 PROCEDURE — 85025 COMPLETE CBC W/AUTO DIFF WBC: CPT | Performed by: INTERNAL MEDICINE

## 2024-11-05 PROCEDURE — 82043 UR ALBUMIN QUANTITATIVE: CPT | Performed by: INTERNAL MEDICINE

## 2024-11-05 PROCEDURE — 85007 BL SMEAR W/DIFF WBC COUNT: CPT | Performed by: INTERNAL MEDICINE

## 2024-11-05 PROCEDURE — 80076 HEPATIC FUNCTION PANEL: CPT | Performed by: INTERNAL MEDICINE

## 2024-11-05 PROCEDURE — 90471 IMMUNIZATION ADMIN: CPT | Performed by: INTERNAL MEDICINE

## 2024-11-05 PROCEDURE — 36415 COLL VENOUS BLD VENIPUNCTURE: CPT | Performed by: INTERNAL MEDICINE

## 2024-11-05 PROCEDURE — 82728 ASSAY OF FERRITIN: CPT | Performed by: INTERNAL MEDICINE

## 2024-11-05 PROCEDURE — 80048 BASIC METABOLIC PNL TOTAL CA: CPT | Performed by: INTERNAL MEDICINE

## 2024-11-05 PROCEDURE — 83540 ASSAY OF IRON: CPT | Performed by: INTERNAL MEDICINE

## 2024-11-05 PROCEDURE — 83036 HEMOGLOBIN GLYCOSYLATED A1C: CPT | Performed by: INTERNAL MEDICINE

## 2024-11-05 PROCEDURE — 82570 ASSAY OF URINE CREATININE: CPT | Performed by: INTERNAL MEDICINE

## 2024-11-05 PROCEDURE — 99214 OFFICE O/P EST MOD 30 MIN: CPT | Performed by: INTERNAL MEDICINE

## 2024-11-05 NOTE — ASSESSMENT & PLAN NOTE
A1c was 7.5 we saw him in 7/24.  He had reasonable fasting sugars so we did not titrate his Lantus dose.  He is already on sliding scale Humalog.  Also on full dose metformin.  If A1c no better, labs being done today, then we will titrate his Lantus some.    Of note, his fasting sugars are in the 130-145 ballpark presently.    ---> Patient's A1c came back at 7.0, this is as of his 11/24 OV, so no changes need to be made at this time.

## 2024-11-05 NOTE — ASSESSMENT & PLAN NOTE
Blood pressure stable and pulse is in the mid 70s as of his 11/24 OV.  He is just on low-dose every other day Lasix for volume control and low-dose midodrine for orthostasis, continue same.

## 2024-11-05 NOTE — PROGRESS NOTES
"Chief Complaint  Follow-up (Pt has not had labs, will do after visit today. ), Diabetes, and Dementia    Subjective          Cosom Gauthier presents to [facility]     History of Present Illness:  Patient is pleasant but unfortunate 79-year-old male with multiple medical issues, status post 5 vessel bypass 5/21, with underlying hypertension, hyperlipidemia, and diabetes, coming in 11/24 for routine 3-4-month follow-up. We will review all his labs and address any new concerns.    Review of Systems   Constitutional:  Negative for appetite change, fatigue and fever.   HENT:  Negative for congestion and ear pain.    Eyes:  Negative for blurred vision.   Respiratory:  Negative for cough, chest tightness and wheezing.    Cardiovascular:  Negative for chest pain, palpitations and leg swelling.   Gastrointestinal:  Negative for abdominal pain.   Genitourinary:  Negative for difficulty urinating, dysuria and hematuria.   Musculoskeletal:  Negative for arthralgias and gait problem.   Skin:  Negative for skin lesions.   Neurological:  Negative for syncope, memory problem and confusion.   Psychiatric/Behavioral:  Negative for self-injury and depressed mood.        Objective   Vital Signs:   /64   Pulse 76   Temp 97.4 °F (36.3 °C) (Skin)   Ht 182.9 cm (72\")   Wt 106 kg (233 lb)   SpO2 96%   BMI 31.60 kg/m²           Physical Exam  Vitals and nursing note reviewed.   Constitutional:       General: He is not in acute distress.     Appearance: Normal appearance. He is not toxic-appearing.   HENT:      Head: Atraumatic.      Right Ear: External ear normal.      Left Ear: External ear normal.      Nose: Nose normal.      Mouth/Throat:      Mouth: Mucous membranes are moist.   Eyes:      General:         Right eye: No discharge.         Left eye: No discharge.      Extraocular Movements: Extraocular movements intact.      Pupils: Pupils are equal, round, and reactive to light. "   Cardiovascular:      Rate and Rhythm: Normal rate and regular rhythm.      Pulses: Normal pulses.      Heart sounds: Normal heart sounds. No murmur heard.     No gallop.   Pulmonary:      Effort: Pulmonary effort is normal. No respiratory distress.      Breath sounds: No wheezing, rhonchi or rales.   Abdominal:      General: There is no distension.      Palpations: Abdomen is soft. There is no mass.      Tenderness: There is no abdominal tenderness. There is no guarding.   Musculoskeletal:         General: No swelling or tenderness.      Cervical back: No tenderness.      Right lower leg: No edema.      Left lower leg: No edema.   Skin:     General: Skin is warm and dry.      Findings: No rash.   Neurological:      General: No focal deficit present.      Mental Status: He is alert and oriented to person, place, and time. Mental status is at baseline.      Motor: No weakness.      Gait: Gait normal.   Psychiatric:         Mood and Affect: Mood normal.         Thought Content: Thought content normal.          Result Review :   The following data was reviewed by: Alex Buckley MD on 08/02/2021:                  Assessment and Plan    Diagnoses and all orders for this visit:    1. Type 2 diabetes mellitus with complications (Primary)  Assessment & Plan:  A1c was 7.5 we saw him in 7/24.  He had reasonable fasting sugars so we did not titrate his Lantus dose.  He is already on sliding scale Humalog.  Also on full dose metformin.  If A1c no better, labs being done today, then we will titrate his Lantus some.    Of note, his fasting sugars are in the 130-145 ballpark presently.    ---> Patient's A1c came back at 7.0, this is as of his 11/24 OV, so no changes need to be made at this time.    Orders:  -     Microalbumin / Creatinine Urine Ratio - Urine, Clean Catch; Future  -     Hemoglobin A1c; Future  -     Hemoglobin A1c  -     Microalbumin / Creatinine Urine Ratio - Urine, Clean Catch    2. Need for immunization against  influenza  -     Fluzone High-Dose 65+yrs ([id number])    3. Stage 3a chronic kidney disease  Assessment & Plan:  Labs pending, GFR holding in the 50-60 range on average, further recommendations after labs reviewed.    ---> GFR is down to 41 presently, BUN/creatinine 27/1.7.  This is below his baseline, but he has been lower, he had a GFR of 33 previously when his BUN was around 40.    There is no acidosis, potassium chronically mildly elevated, it is 5.2.    I think we will have them lower the Lasix to just twice a week, I believe he is on it every other day presently.    Orders:  -     Cancel: Basic Metabolic Panel; Future  -     Basic Metabolic Panel    4. Hypertension, essential  Assessment & Plan:  Blood pressure stable and pulse is in the mid 70s as of his 11/24 OV.  He is just on low-dose every other day Lasix for volume control and low-dose midodrine for orthostasis, continue same.    Orders:  -     Comprehensive Metabolic Panel; Future    5. Hyperlipemia, mixed  -     Lipid Panel; Future    6. Other cirrhosis of liver  -     AFP Tumor Marker; Future  -     Hepatic Function Panel; Future  -     Hepatic Function Panel    7. Iron deficiency anemia, unspecified iron deficiency anemia type  -     CBC & Differential  -     Iron Profile  -     Ferritin  -     Manual Differential  -     Pathology Consultation    8. Thrombocytopenia  Assessment & Plan:  His CBC is fairly stable this regards as of his 11/24 OV, the platelet count is 82.  Of note his white count is up some, although this is not exactly new.  He did flip his ratio, neutrophil 37 and lymphocyte 49.  He is followed closely by , will defer to his expertise, repeat labs on return to office.    Orders:  -     CBC & Differential; Future          BMI is >= 30 and <35. (Class 1 Obesity). The following options were offered after discussion;: exercise counseling/recommendations and nutrition counseling/recommendations     --  --  Older notes:  HOSP F/U  6/21 = S/P 5V CABG 5/21 per Dr Milligan=I reviewed records sent and the med list provided by wife.  TELEVISIT 2/25/21:  HOSP F/U 10/20 = I reviewed 9/20 [facility] records; I d/w pt labs/meds in detail:  1) CHEST PAIN and pt is being admitted to R/O MI; cardiology was notified...SPECT was neg, so will f/u with cards in a few weeks; may still need a cath=no new sxs and is gideon to see him next week as of 10/20 OV...to [facility] for CABG per Dr Milligan, but PLT's too low; has f/u with cards.  2) CAD/MI with prior stents; ? last stress was done in cardiology office 5/19; will continue home meds for now...no rest angina; ? increase Imdur=defer to cards appt he has on 3/9/21---> S/P 5V CABG as above; looks great.    3) HTN will be followed closely on home meds=stable 10/20 OV, BUT is orthostatic, so drink more and lower ACEI to 5 mg---> stable 6/21.  4) HYPERLIPIDEMIA=continue statin=LDL 61 (9/20)---> 46 in 6/21.    5) CIRRHOSIS per Dr Bradshaw; watch volume status.  6) THROMBOCYTOPENIA is related to the cirrhosis and is stable around 70K...on Prednisone per Dr Saldana---> off this; labs on RTO.    7) DM per orders...A1C was only 7.3 in 1/21; she d/w me BS fine despite prednisone as of 2/21 OV; ? lost 20+ lbs---> 5.6 and I d/w stop glipizide 10 bid and stay on Humalog 15 tid, but then again not totally sure he's on it?    8) HYPOTHYROIDISM is wnl as of 2/21 OV---> RTO.    9) BPH on flomax after retenion issues in BE as of 2/21 OV; to see Dr Dill---> saw him for chairez post-op = it's out now.    (lost friend/ 60's to covid)    VISIT 6/20:  ANNUAL MEDICARE WELLNESS EXAM 10/19 = reviewed all forms with pt; he is much more depressed, so zoloft was increased.  H.M. ISSUES:  DM = A1C as below; Optho=q 12 mo with last 4/18 = early diabetic retinopathy and ? laser next visit?  --  HTN...needs ACEI now at 6/17 OV; repeat urine micro as well...remains controlled...ACEI fell off his list; resume now 1/19...better already---> stable.  ? CKD3  noted 6/20 = no new meds; needs repeat few weeks.  --  DM...max out januvia...8.1 an has f/u with [facility]...on lantus 20, d/w increase 2-4 units every week to get FBS to 110...7.1 is great...7.5 is stuck and I d/w again to increase Lantus=told him 24 now... 8.8 is horrible, so increase glucotrol to whole tab in AM...8.9, so try whole tab bid before increase lantus...9.5 is worse and needs Humalog before meals; d/w qid testing; stop glucotrol and increase lantus to 30 qhs...BS noted per phone and in office; rec 16/20/16 and stay on Lantus 30 qhs; d/w NO SSI for now...A1C down to 8.0 already and Fructosamine of 300=A1C closer to 7.5 actually...6.3 is great with TSH back down...6.8 is ok for now=7/19...7.3 in 10/19 and I d/w take 8 units with breakfast since he has been skipping this and only taking 15 with lunch/supper---> 7.3 great 6/20.   HYPOTHYROIDSIM stable 6/17...0.2 at 1/18 OV...0.7 better...increase 1/19 for 3.5 given above...? n/c, b/c now=10; will add 50 as of 1/19 OV...TSH 64 and apparently he missed them past week; I rec 250 qd for now and close f/u...0.7 and will leave this alone for now---> 1.5 in 2/20.  --  CAD per Dr Pritchett; s/p Spect '15 per pt and was neg---> still no CP/SOB and sees cards q 6 mo=no recent as of 6/20 OV.  LIPIDS with LDL 72...83 ok for now...LDL 57---> 55 in 2/20.  --  THROMBOCYTOPENIA is new 4/16 OV=99, so to HEME...off ASA but plavix ok per Dr Chaudhry; had BM bx=?...75 holding...on iron per her---> CBC stable 6/20 per her.  --  HOSP F/U 8/16 for FUO.  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  --  DJD s/p R TKR and then L TKR per Dr Eckert 1/16 and rehab with [facility] still on-going = 3x/wk at 4/16 OV...still with issues L knee 4/18...to have redo L TKR per Dr Zayas as of 10/18 OV=Dr Duran cleared him already...is gideon for 2/4/19, but apparently wants his A1C below 7 for this?? = d/w me 1/19...I d/w not going to get there that soon, but current blood sugars excellent  and pt cleared for surgery from my standpoint as well=reviewed all their pre-op labs as well as EKG and CXR...s/p redo L TKR on 2/4/19 and looks great at 2/27/19 OV...finishing up as of 4/19 OV.  --  GERD per Dr Bradshaw.  ? CIRRHOSIS=tried on Nadolol per Dr Bradshaw=stopped it 6/20 due to diarrhea?; ? has CT/NH3 gideon.  --  OBESITY up 3 more to 263...258--->270 is stuck 2/20 and I d/w no meds=must go to [facility] ( Cards said no to surgery).  --  DEPRESSION on maint med...? Dementia per pt, sent to Dr Kim; he sent for [facility] as of 4/18 OV...will address depression 8/18 = increase zoloft   [facility] as [facility]---> c/w as of 2/21 OV; neg O2 in [facility] recently;   --  --  PSA 0.5 (4/7/16)...defer.  Colon 9/24 = 3 polyp = 3 years per Dr Bradshaw.  Prevnar 11/16; Pneumovax # 1 9/17;  (, retired [employer] '97, 3 kids)    Follow Up   Return in about 4 months (around 3/5/2025).    Total Time Spent:  minutes     This time includes time spent by me in the following activities: preparing for the visit, reviewing extensive past medical history and tests, performing a medically appropriate examination and/or evaluation, counseling and educating the patient and/or caregivers, ordering medications, tests, or procedures, referring and/or communicating with other health care professionals and documenting information in the medical record all on this date of service.     Patient was given instructions and counseling regarding his condition or for health maintenance advice. Please see specific information pulled into the AVS if appropriate.

## 2024-11-05 NOTE — ASSESSMENT & PLAN NOTE
Labs pending, GFR holding in the 50-60 range on average, further recommendations after labs reviewed.    ---> GFR is down to 41 presently, BUN/creatinine 27/1.7.  This is below his baseline, but he has been lower, he had a GFR of 33 previously when his BUN was around 40.    There is no acidosis, potassium chronically mildly elevated, it is 5.2.    I think we will have them lower the Lasix to just twice a week, I believe he is on it every other day presently.

## 2024-11-06 LAB
DEPRECATED RDW RBC AUTO: 45 FL (ref 37–54)
ERYTHROCYTE [DISTWIDTH] IN BLOOD BY AUTOMATED COUNT: 13.4 % (ref 12.3–15.4)
HCT VFR BLD AUTO: 39.5 % (ref 37.5–51)
HGB BLD-MCNC: 12.7 G/DL (ref 13–17.7)
LAB AP CASE REPORT: NORMAL
MCH RBC QN AUTO: 29.3 PG (ref 26.6–33)
MCHC RBC AUTO-ENTMCNC: 32.2 G/DL (ref 31.5–35.7)
MCV RBC AUTO: 91.2 FL (ref 79–97)
PATH REPORT.FINAL DX SPEC: NORMAL
PATHOLOGY REVIEW: YES
PLATELET # BLD AUTO: 82 10*3/MM3 (ref 140–450)
PMV BLD AUTO: 8.8 FL (ref 6–12)
RBC # BLD AUTO: 4.33 10*6/MM3 (ref 4.14–5.8)
WBC NRBC COR # BLD AUTO: 14.81 10*3/MM3 (ref 3.4–10.8)

## 2024-11-06 NOTE — ASSESSMENT & PLAN NOTE
His CBC is fairly stable this regards as of his 11/24 OV, the platelet count is 82.  Of note his white count is up some, although this is not exactly new.  He did flip his ratio, neutrophil 37 and lymphocyte 49.  He is followed closely by , will defer to his expertise, repeat labs on return to office.

## 2024-11-18 ENCOUNTER — OFFICE VISIT (OUTPATIENT)
Dept: PODIATRY | Facility: CLINIC | Age: 79
End: 2024-11-18
Payer: MEDICARE

## 2024-11-18 VITALS
WEIGHT: 238 LBS | DIASTOLIC BLOOD PRESSURE: 78 MMHG | HEART RATE: 86 BPM | TEMPERATURE: 98 F | SYSTOLIC BLOOD PRESSURE: 125 MMHG | OXYGEN SATURATION: 96 % | BODY MASS INDEX: 32.28 KG/M2

## 2024-11-18 DIAGNOSIS — R26.2 DIFFICULTY WALKING: Primary | ICD-10-CM

## 2024-11-18 DIAGNOSIS — Z79.4 TYPE 2 DIABETES MELLITUS WITH DIABETIC POLYNEUROPATHY, WITH LONG-TERM CURRENT USE OF INSULIN: ICD-10-CM

## 2024-11-18 DIAGNOSIS — L60.0 ONYCHOCRYPTOSIS: ICD-10-CM

## 2024-11-18 DIAGNOSIS — M79.672 FOOT PAIN, BILATERAL: ICD-10-CM

## 2024-11-18 DIAGNOSIS — Z77.098 HISTORY OF AGENT ORANGE EXPOSURE: ICD-10-CM

## 2024-11-18 DIAGNOSIS — G62.2 NEUROPATHY DUE TO CHEMICAL SUBSTANCE: ICD-10-CM

## 2024-11-18 DIAGNOSIS — E11.42 TYPE 2 DIABETES MELLITUS WITH DIABETIC POLYNEUROPATHY, WITH LONG-TERM CURRENT USE OF INSULIN: ICD-10-CM

## 2024-11-18 DIAGNOSIS — M79.671 FOOT PAIN, BILATERAL: ICD-10-CM

## 2024-11-18 PROCEDURE — 1159F MED LIST DOCD IN RCRD: CPT | Performed by: PODIATRIST

## 2024-11-18 PROCEDURE — 1160F RVW MEDS BY RX/DR IN RCRD: CPT | Performed by: PODIATRIST

## 2024-11-18 PROCEDURE — 3078F DIAST BP <80 MM HG: CPT | Performed by: PODIATRIST

## 2024-11-18 PROCEDURE — 3074F SYST BP LT 130 MM HG: CPT | Performed by: PODIATRIST

## 2024-11-18 PROCEDURE — 11721 DEBRIDE NAIL 6 OR MORE: CPT | Performed by: PODIATRIST

## 2024-11-18 NOTE — PROGRESS NOTES
[facility] - PODIATRY    Today's Date: 11/18/24    Patient Name: Cosmo Gauthier  MRN: [id number]  CSN: [id number]  PCP: Alex Buckley MD, Last PCP Visit: 5 November 2024  Referring Provider: Marah Monreal MD    SUBJECTIVE     Chief Complaint   Patient presents with    Left Foot - Follow-up, Nail Problem    Right Foot - Follow-up, Nail Problem     HPI: Cosmo Gauthier, a 79 y.o.male, presents to clinic for painful toenails:    New, Established, New Problem: established  Location:  Toenails  Duration:   Greater than five years  Onset:  Gradual  Nature:  sore with palpation.  Stable, worsening, improving:   Stable  Aggravating factors:  Pain with shoe gear and ambulation.  Previous Treatment: debridement    Patient controlling diabetes via:  insulin    Patient states their last blood glucose was: 140    Patient denies any fevers, chills, nausea, vomiting, shortness of breath, nor any other constitutional signs nor symptoms.    Medical changes: None    I have reviewed/confirmed previously documented HPI with no changes.     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     Primary osteoarthritis of left knee     Sleep apnea     Sleep apnea     Thrombocytopenia 05/22/2018    Thyroid disease      Past Surgical History:   Procedure Laterality Date    CARDIAC SURGERY      COLONOSCOPY  2015    COLONOSCOPY N/A 9/3/2024    Procedure: COLONOSCOPY FOR SCREENING  WITH COLD SNARE;  Surgeon: Chris Bradshaw MD;  Location: [facility] ENDOSCOPY;  Service: Gastroenterology;  Laterality: N/A;  COLON POLYPS    CORONARY ANGIOPLASTY WITH STENT PLACEMENT      CORONARY ARTERY BYPASS GRAFT N/A 05/20/2021    Procedure: MIDLINE STERNOTOMY,CORONARY ARTERY BYPASS GRAFTING X 5, UTILIZING THE LEFT COMPA AND LEFT SAPHENOUS VEIN, ABHIJEET, PRP;  Surgeon: Jr Tom Tubbs MD;  Location: [facility] OR;  Service: Cardiothoracic;  Laterality: N/A;    ENDOSCOPY      ENDOSCOPY N/A 1/24/2024    Procedure: ESOPHAGOGASTRODUODENOSCOPY;  Surgeon: Chris Bradshaw MD;  Location: [facility] ENDOSCOPY;  Service: Gastroenterology;  Laterality: N/A;  hiatal hernia, schatzki's ring    ENDOSCOPY N/A 1/29/2024    Procedure: ESOPHAGOGASTRODUODENOSCOPY with balloon dilaitation 12-15cm;  Surgeon: Chris Bradshaw MD;  Location: [facility] ENDOSCOPY;  Service: Gastroenterology;  Laterality: N/A;  hiatal hernia, schatskis ring    HERNIA REPAIR      JOINT REPLACEMENT      SHOULDER SURGERY      SHOULDER REPAIR    TOTAL KNEE ARTHROPLASTY      TRANSESOPHAGEAL ECHOCARDIOGRAM (ABHIJEET) N/A 05/20/2021    Procedure: TRANSESOPHAGEAL ECHOCARDIOGRAM WITH ANESTHESIA;  Surgeon: Jr Tom Tubbs MD;  Location: [facility] OR;  Service: Cardiothoracic;  Laterality: N/A;     Family History   Problem Relation Age of Onset    Diabetes Mother     Colon cancer Father 65     Social History     Socioeconomic History    Marital status:    Tobacco Use    Smoking status: Never    Smokeless tobacco: Never   Vaping Use    Vaping status: Never Used   Substance and Sexual Activity    Alcohol use: Not Currently     Comment: QUIT DRINKING 32 YEARS AGO    Drug use: Never    Sexual activity: Defer     No Known Allergies  Current Outpatient Medications   Medication Sig Dispense Refill    amitriptyline (ELAVIL) 50 MG tablet Take 1 tablet by mouth Every Night.      aspirin 81 MG EC tablet Take 1 tablet by mouth Daily.      finasteride (PROSCAR)  5 MG tablet Take 1 tablet by mouth Daily. 60 tablet 5    fluticasone (FLONASE) 50 MCG/ACT nasal spray 2 sprays into the nostril(s) as directed by provider Daily. 16 g 1    furosemide (LASIX) 40 MG tablet Take 0.5 tablets by mouth Every Other Day.      insulin glargine (LANTUS, SEMGLEE) 100 UNIT/ML injection Inject 15 Units under the skin into the appropriate area as directed Every Night.      Insulin Lispro, 1 Unit Dial, (HumaLOG KwikPen) 100 UNIT/ML solution pen-injector 150-200 = 4 units,  201-250 = 6 units,  251-300 = 8 units,  301-350 = 10 units,  >350 = 14 units 9 mL 3    lactulose (CHRONULAC) 10 GM/15ML solution Take 30 mL by mouth 2 (Two) Times a Day. 900 mL 5    levothyroxine (SYNTHROID, LEVOTHROID) 112 MCG tablet Take 2 tablets by mouth Take As Directed. Takes two tablets every day Mon - Sat. Takes 1-1/2 tablets on Sunday. 75 tablet 3    metFORMIN (GLUCOPHAGE) 1000 MG tablet Take 1 tablet by mouth 2 (Two) Times a Day With Meals.      midodrine (PROAMATINE) 2.5 MG tablet Take 1 tablet by mouth Daily. 90 tablet 3    montelukast (Singulair) 10 MG tablet Take 1 tablet by mouth Every Night. 30 tablet 1    nitroglycerin (NITROSTAT) 0.4 MG SL tablet Place 1 tablet under the tongue Every 5 (Five) Minutes As Needed.      pantoprazole (PROTONIX) 40 MG EC tablet Take 1 tablet by mouth Daily. 30 tablet 5    riFAXIMin (Xifaxan) 550 MG tablet Take 1 tablet by mouth Every 12 (Twelve) Hours. 180 tablet 1    sertraline (ZOLOFT) 100 MG tablet Take 2 tablets by mouth Daily. 180 tablet 1    simvastatin (ZOCOR) 40 MG tablet Take 0.5 tablets by mouth Every Night.      tamsulosin (FLOMAX) 0.4 MG capsule 24 hr capsule Take 1 capsule by mouth Daily. 30 capsule 5     No current facility-administered medications for this visit.     Review of Systems   Constitutional: Negative.    Skin:         Painful toenails   Neurological:  Positive for numbness.   All other systems reviewed and are negative.      OBJECTIVE     Vitals:    11/18/24  0823   BP: 125/78   Pulse: 86   Temp: 98 °F (36.7 °C)   SpO2: 96%       Body mass index is 32.28 kg/m².    Lab Results   Component Value Date    HGBA1C 7.00 (H) 11/05/2024       Lab Results   Component Value Date    GLUCOSE 211 (H) 11/05/2024    CALCIUM 9.5 11/05/2024     11/05/2024    K 5.2 11/05/2024    CO2 25.0 11/05/2024     11/05/2024    BUN 27 (H) 11/05/2024    CREATININE 1.70 (H) 11/05/2024    EGFRIFNONA 48 (L) 01/13/2022    BCR 15.9 11/05/2024    ANIONGAP 9.0 11/05/2024       Patient seen in no apparent distress.      PHYSICAL EXAM:     Foot/Ankle Exam    GENERAL  Appearance:  elderly  Orientation:  AAOx3  Affect:  appropriate  Gait:  unimpaired  Assistance:  independent  Right shoe gear: casual shoe  Left shoe gear: casual shoe    VASCULAR     Right Foot Vascularity   Dorsalis pedis:  1+  Posterior tibial:  1+  Skin temperature:  warm  Edema grading:  None  CFT:  < 3 seconds  Pedal hair growth:  Absent  Varicosities:  mild varicosities     Left Foot Vascularity   Dorsalis pedis:  1+  Posterior tibial:  1+  Skin temperature:  warm  Edema grading:  None  CFT:  < 3 seconds  Pedal hair growth:  Absent  Varicosities:  mild varicosities     NEUROLOGIC     Right Foot Neurologic   Light touch sensation: diminished  Vibratory sensation: diminished  Hot/Cold sensation: diminished  Protective Sensation using Wilson-Tanya Monofilament:   Sites intact: 2  Sites tested: 10     Left Foot Neurologic   Light touch sensation: diminished  Vibratory sensation: diminished  Hot/Cold sensation:  diminished  Protective Sensation using Wilson-Tanya Monofilament:   Sites intact: 2  Sites tested: 10    MUSCULOSKELETAL     Right Foot Musculoskeletal   Hammertoe:  Second toe, third toe, fourth toe and fifth toe     Left Foot Musculoskeletal   Hammertoe:  Second toe, third toe, fourth toe and fifth toe    MUSCLE STRENGTH     Right Foot Muscle Strength   Foot dorsiflexion:  4-  Foot plantar flexion:  4-  Foot  inversion:  4-  Foot eversion:  4-     Left Foot Muscle Strength   Foot dorsiflexion:  4-  Foot plantar flexion:  4-  Foot inversion:  4-  Foot eversion:  4-    RANGE OF MOTION     Right Foot Range of Motion   Foot and ankle ROM within normal limits       Left Foot Range of Motion   Foot and ankle ROM within normal limits      DERMATOLOGIC      Right Foot Dermatologic   Skin  Right foot skin is intact.   Nails  1.  Positive for elongated, onychomycosis, abnormal thickness, subungual debris and ingrown toenail.  2.  Positive for elongated, onychomycosis, abnormal thickness, subungual debris and ingrown toenail.  3.  Positive for elongated, onychomycosis, abnormal thickness, subungual debris and ingrown toenail.  4.  Positive for elongated, onychomycosis, abnormal thickness, subungual debris and ingrown toenail.  5.  Positive for elongated, onychomycosis, abnormal thickness, subungual debris and ingrown toenail.  Nails comment:  Toenails 1, 2, 3, 4, and 5     Left Foot Dermatologic   Skin  Left foot skin is intact.   Nails comment:  Toenails 1, 2, 3, 4, and 5  Nails  1.  Positive for elongated, onychomycosis, abnormal thickness, subungual debris and ingrown toenail.  2.  Positive for elongated, onychomycosis, abnormal thickness, subungual debris and ingrown toenail.  3.  Positive for elongated, onychomycosis, abnormal thickness, subungual debris and ingrown toenail.  4.  Positive for elongated, onychomycosis, abnormally thick, subungual debris and ingrown toenail.  5.  Positive for elongated, onychomycosis, abnormally thick, subungual debris and ingrown toenail.    I have reexamined the patient the results are consistent with the previously documented exam.    ASSESSMENT/PLAN     Diagnoses and all orders for this visit:    1. Difficulty walking (Primary)    2. Onychocryptosis    3. Type 2 diabetes mellitus with diabetic polyneuropathy, with long-term current use of insulin    4. 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 History of agent Orange exposure    Comprehensive lower extremity examination and evaluation was performed.    Discussed findings and treatment plan including risks, benefits, and treatment options with patient in detail. Patient agreed with treatment plan.    Medications and allergies reviewed.  Reviewed available blood glucose and HgB A1C lab values along with other pertinent labs.  These were discussed with the patient as to their importance of diabetic maintenance.    Toenails 1, 2, 3, 4, 5 on Right and 1, 2, 3, 4, 5 on Left were debrided with nail nippers then filed with a Dremel nail tavia.  Patient tolerated procedure well without complications.    An After Visit Summary was printed and given to the patient at discharge, including (if requested) any available informative/educational handouts regarding diagnosis, treatment, or medications. All questions were answered to patient/family satisfaction. Should symptoms fail to improve or worsen they agree to call or return to clinic or to go to the Emergency Department. Discussed the importance of following up with any needed screening tests/labs/specialist appointments and any requested follow-up recommended by me today. Importance of maintaining follow-up discussed and patient accepts that missed appointments can delay diagnosis and potentially lead to worsening of conditions.    Return in about 9 weeks (around 1/20/2025) for Toenail Care., or sooner if acute issues arise.    I have reviewed the assessment and plan and verified the accuracy of it. No changes to assessment and plan since the information was documented. Hunter Orellana DPM 11/18/24     I have dictated this note utilizing Dragon Dictation.  Please note that portions of this note were completed with a voice recognition program.  Part of this note may be an electronic transcription/translation of spoken language to printed text using the Dragon Dictation System.       This document has been electronically signed by Hunter Orellana DPM on November 18, 2024 08:28 EST

## 2024-12-09 NOTE — TELEPHONE ENCOUNTER
Caller: Carla Gauthier    Relationship: Emergency Contact    Best call back number: [phone]     Requested Prescriptions:   Requested Prescriptions     Pending Prescriptions Disp Refills    sertraline (ZOLOFT) 100 MG tablet 180 tablet 1     Sig: Take 2 tablets by mouth Daily.        Pharmacy where request should be sent: [facility] [id number]  [place], [place] - [address] AT [facility] [address] - [phone] [facility] [phone] FX     Last office visit with prescribing clinician: 11/5/2024   Last telemedicine visit with prescribing clinician: Visit date not found   Next office visit with prescribing clinician: 3/5/2025     Does the patient have less than a 3 day supply:  [x] Yes  [] No    Would you like a call back once the refill request has been completed: [x] Yes [] No    If the office needs to give you a call back, can they leave a voicemail: [x] Yes [] No    Inocencio Bradford Rep   12/09/24 14:27 EST

## 2024-12-10 RX ORDER — SERTRALINE HYDROCHLORIDE 100 MG/1
200 TABLET, FILM COATED ORAL DAILY
Qty: 180 TABLET | Refills: 1 | Status: SHIPPED | OUTPATIENT
Start: 2024-12-10

## 2024-12-26 NOTE — PROGRESS NOTES
[facility]  Cardiology progress Note    Patient Name: Cosmo Gauthier  : 1945    CHIEF COMPLAINT  CAD        Subjective   Subjective     HISTORY OF PRESENT ILLNESS    Cosmo Gauthier is a 79 y.o. male with CAD status post CABG.  No chest pain.    REVIEW OF SYSTEMS    Constitutional:    No fever, no weight loss  Skin:     No rash  Otolaryngeal:    No difficulty swallowing  Cardiovascular: See HPI.  Pulmonary:    No cough, no sputum production    Personal History     Social History:    reports that he has never smoked. He has never used smokeless tobacco. He reports that he does not currently use alcohol. He reports that he does not use drugs.    Home Medications:  Current Outpatient Medications on File Prior to Visit   Medication Sig   • amitriptyline (ELAVIL) 50 MG tablet Take 1 tablet by mouth Every Night.   • aspirin 81 MG EC tablet Take 1 tablet by mouth Daily.   • finasteride (PROSCAR) 5 MG tablet Take 1 tablet by mouth Daily.   • fluticasone (FLONASE) 50 MCG/ACT nasal spray 2 sprays into the nostril(s) as directed by provider Daily.   • furosemide (LASIX) 40 MG tablet Take 0.5 tablets by mouth Every Other Day.   • insulin glargine (LANTUS, SEMGLEE) 100 UNIT/ML injection Inject 15 Units under the skin into the appropriate area as directed Every Night.   • Insulin Lispro, 1 Unit Dial, (HumaLOG KwikPen) 100 UNIT/ML solution pen-injector 150-200 = 4 units,  201-250 = 6 units,  251-300 = 8 units,  301-350 = 10 units,  >350 = 14 units   • lactulose (CHRONULAC) 10 GM/15ML solution Take 30 mL by mouth 2 (Two) Times a Day.   • levothyroxine (SYNTHROID, LEVOTHROID) 112 MCG tablet Take 2 tablets by mouth Take As Directed. Takes two tablets every day Mon - Sat. Takes 1-1/2 tablets on .   • metFORMIN (GLUCOPHAGE) 1000 MG tablet Take 1 tablet by mouth 2 (Two) Times a Day With Meals.   • midodrine (PROAMATINE) 2.5 MG tablet Take 1 tablet by mouth Daily.   • montelukast (Singulair) 10 MG tablet  Take 1 tablet by mouth Every Night.   • nitroglycerin (NITROSTAT) 0.4 MG SL tablet Place 1 tablet under the tongue Every 5 (Five) Minutes As Needed.   • pantoprazole (PROTONIX) 40 MG EC tablet Take 1 tablet by mouth Daily.   • riFAXIMin (Xifaxan) 550 MG tablet Take 1 tablet by mouth Every 12 (Twelve) Hours.   • sertraline (ZOLOFT) 100 MG tablet Take 2 tablets by mouth Daily.   • simvastatin (ZOCOR) 40 MG tablet Take 0.5 tablets by mouth Every Night.   • tamsulosin (FLOMAX) 0.4 MG capsule 24 hr capsule Take 1 capsule by mouth Daily.     No current facility-administered medications on file prior to visit.       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     PHYSICAL EXAM:    General Appearance:   well developed  well nourished  HENT:   oropharynx moist  lips not cyanotic  Neck:  thyroid not enlarged  supple  Respiratory:  no respiratory distress  normal breath sounds  no rales  Cardiovascular:  no jugular venous distention  regular rhythm  apical impulse normal  S1 normal, S2 normal  no S3, no S4   no murmur  no rub, no thrill  carotid pulses normal; no bruit  pedal pulses  normal  lower extremity edema: none    Skin:   warm, dry  Psychiatric:  judgement and insight appropriate  normal mood and affect        Result Review:  I have personally reviewed the available results from  [x]  Laboratory  [x]  EKG  [x]  Cardiology  [x]  Medications  [x]  Old records  []  Other:     Procedures  Lab Results   Component Value Date    CHOL 108 06/27/2024    CHOL 111 12/21/2023    CHOL 128 09/12/2023     Lab Results   Component Value Date    TRIG 90 06/27/2024    TRIG 97 12/21/2023    TRIG 179 (H) 09/12/2023     Lab Results   Component Value Date    HDL 45 06/27/2024    HDL 45 12/21/2023    HDL 48 09/12/2023     Lab Results   Component Value Date    LDL 45 06/27/2024    LDL 48 12/21/2023    LDL 51 09/12/2023     Lab Results   Component Value Date    VLDL 18 06/27/2024    VLDL 18 12/21/2023    VLDL 29 09/12/2023     Results for orders placed in visit on 05/20/21    Emergent/Open-Heart Anesthesia ABHIJEET    Narrative  Preanesthesia Checklist:  Patient identified, IV assessed, risks and benefits discussed, monitors and equipment assessed and procedure being performed at surgeon's request.    General Procedure Information  Diagnostic Indications for Echo:  assessment of ascending aorta, assessment of surgical repair, defect repair evaluation and hemodynamic monitoring  Physician Requesting Echo: Jr Tom Tubbs MD  ICD Code for Medical Necessity:  Aortic Insufficiency  Location performed:  OR  Intubated  Bite block placed  Heart visualized  Probe Insertion:  Easy  Probe Type:  Multiplane  Modalities:  Color flow mapping, 2D only, continuous wave Doppler and pulse wave Doppler    Echocardiographic and Doppler Measurements    Ventricles    Right Ventricle:  Cavity size normal.  Hypertrophy not present.  Left Ventricle:  Diastolic dimension 4.7 cm.  Hypertrophy not present.  Thrombus not present.  Global Function mildly impaired.  Ejection Fraction 50%.    Ventricular Regional Function:  13- Apical  Anterior:  hypokinetic  14- Apical Lateral:  hypokinetic  16- Apical Septal:  hypokinetic  17- [place]:  hypokinetic      Valves    Aortic Valve:  Annulus normal.  Stenosis not present.  Pressure Half-time: 910 ms.  Regurgitation mild.  Leaflets normal.  Leaflet motions normal.    Mitral Valve:  Annulus normal.  Stenosis not present.  Regurgitation trace.    Tricuspid Valve:  Annulus normal.  Stenosis not present.  Regurgitation mild.  Pulmonic Valve:  Annulus normal.  Regurgitation trace.        Aorta    Ascending Aorta:  Size normal.  Diameter 3.6 cm.  Dissection not present.  Plaque thickness less than 3 mm.  Mobile plaque not present.  Aortic Arch:  Size normal.  Diameter 3.1 cm.  Dissection not present.  Plaque thickness less than 3 mm.  Mobile plaque not present.  Descending Aorta:  Size normal.  Diameter 2.5 cm.  Dissection not present.  Plaque thickness less than 3 mm.  Mobile plaque not present.        Atria    Right Atrium:  Size normal.  Spontaneous echo contrast not present.  Thrombus not present.  Tumor not present.  Device present.    Left Atrium:  Size normal.  Spontaneous echo contrast not present.  Thrombus not present.  Tumor not present.  Device not present.  Left atrial appendage normal.      Septa    Atrial Septum:  Intra-atrial septal morphology lipomatous hypertrophy.        Diastolic Function Measurements:  Diastolic Dysfunction Grade= II  E= 40.6 ms  A= 35.3 ms  E/A Ratio=  1.2  DT=  296 ms  S/D=  IVRT=    Other Findings  Pericardium:  normal  Pleural Effusion:  none  Pulmonary Arteries:  normal  Pulmonary Venous Flow:  normal    Anesthesia Information  Performed Personally      Echocardiogram Comments:  Post CPB:  LVEF much improved, 60%, apex no longer hypokinetic.  No aortic dissection post decannulation.  AI unchanged.     Impression/Plan:  1.  CAD s/p CABG stable: Continue aspirin 81 mg once a day.  No chest pain.  2.  Mixed hyperlipidemia: Continue simvastatin 40 mg once a day.  Monitor  lipid and hepatic profile.  3.  Chronic diastolic heart failure stable: Continue Lasix 20 mg every other day.  Monitor BMP.  4.  Essential hypertension controlled: Monitor blood pressure regularly.           Sven Duran MD   01/07/25   11:00 EST

## 2024-12-31 ENCOUNTER — TELEPHONE (OUTPATIENT)
Dept: CARDIOLOGY | Facility: CLINIC | Age: 79
End: 2024-12-31
Payer: OTHER GOVERNMENT

## 2024-12-31 NOTE — TELEPHONE ENCOUNTER
The [facility] received a fax that requires your attention. The document has been indexed to the patient’s chart for your review.      Reason for sending: EXTERNAL MEDICAL RECORD NOTIFICATION     Documents Description: RFS APPROVAL-[facility]-12.31.24    Name of Sender: [facility]     Date Indexed: 12.31.24

## 2025-01-07 ENCOUNTER — OFFICE VISIT (OUTPATIENT)
Dept: CARDIOLOGY | Facility: CLINIC | Age: 80
End: 2025-01-07
Payer: MEDICARE

## 2025-01-07 VITALS
WEIGHT: 235 LBS | SYSTOLIC BLOOD PRESSURE: 135 MMHG | HEART RATE: 87 BPM | DIASTOLIC BLOOD PRESSURE: 82 MMHG | HEIGHT: 72 IN | BODY MASS INDEX: 31.83 KG/M2

## 2025-01-07 DIAGNOSIS — I50.32 DIASTOLIC CHF, CHRONIC: Primary | ICD-10-CM

## 2025-01-07 DIAGNOSIS — I25.10 CORONARY ARTERY DISEASE INVOLVING NATIVE CORONARY ARTERY OF NATIVE HEART WITHOUT ANGINA PECTORIS: ICD-10-CM

## 2025-01-07 DIAGNOSIS — I10 HYPERTENSION, ESSENTIAL: ICD-10-CM

## 2025-01-07 DIAGNOSIS — Z95.1 HX OF CABG: ICD-10-CM

## 2025-01-13 ENCOUNTER — TELEPHONE (OUTPATIENT)
Dept: GASTROENTEROLOGY | Facility: CLINIC | Age: 80
End: 2025-01-13

## 2025-01-13 ENCOUNTER — OFFICE VISIT (OUTPATIENT)
Dept: GASTROENTEROLOGY | Facility: CLINIC | Age: 80
End: 2025-01-13
Payer: OTHER GOVERNMENT

## 2025-01-13 VITALS
DIASTOLIC BLOOD PRESSURE: 90 MMHG | SYSTOLIC BLOOD PRESSURE: 115 MMHG | HEART RATE: 85 BPM | BODY MASS INDEX: 30.72 KG/M2 | HEIGHT: 72 IN | WEIGHT: 226.8 LBS

## 2025-01-13 DIAGNOSIS — I85.10 SECONDARY ESOPHAGEAL VARICES WITHOUT BLEEDING: ICD-10-CM

## 2025-01-13 DIAGNOSIS — D64.9 ANEMIA, UNSPECIFIED TYPE: ICD-10-CM

## 2025-01-13 DIAGNOSIS — K76.82 HEPATIC ENCEPHALOPATHY: ICD-10-CM

## 2025-01-13 DIAGNOSIS — K22.2 SCHATZKI'S RING OF DISTAL ESOPHAGUS: ICD-10-CM

## 2025-01-13 DIAGNOSIS — E66.811 CLASS 1 OBESITY WITH SERIOUS COMORBIDITY AND BODY MASS INDEX (BMI) OF 30.0 TO 30.9 IN ADULT, UNSPECIFIED OBESITY TYPE: ICD-10-CM

## 2025-01-13 DIAGNOSIS — K74.69 OTHER CIRRHOSIS OF LIVER: Primary | ICD-10-CM

## 2025-01-13 DIAGNOSIS — R13.19 ESOPHAGEAL DYSPHAGIA: ICD-10-CM

## 2025-01-13 PROCEDURE — 99214 OFFICE O/P EST MOD 30 MIN: CPT | Performed by: NURSE PRACTITIONER

## 2025-01-13 NOTE — PROGRESS NOTES
Patient Name: Cosmo Gauthier   Visit Date: 01/13/2025   Patient ID: [id number]  Provider: MAXWELL Denise    Sex: male  Location:  Location Address:  Location Phone: [address] [place] [address] [phone]    YOB: 1945  Age: 79 y.o.   Primary Care Provider Alex Buckley MD      Referring Provider: Marah Monreal MD        Chief Complaint  Cirrhosis of Liver (Follow up )    History of Present Illness    Pt w history of fatty liver noted in 2010 with liver biopsy in 2011 that showed CANSEOC.  History of alcohol for 20 years but he quit in the 1990s.  Pt not seen from 2020 until 2023.   Esophageal varices were noted in 2014 and patient was started on nadolol.  At visit in 2020 patient was off Xifaxan and was not sure why.  EGD February 25, 2014 gastritis, esophageal varices, patient was started on nadolol 40 mg/day.  Hx Colonoscopy 7/29/2019: Adequate prep, 2 small polyps in the transverse colon completely removed-inflammatory and hyperplastic polyps.     11/2023: Reported seeing Dr Saldana for liver US q6, hx of leukemia but in remission. Reported taken off Nadolol d/t hypotension 2023.     EGD 1/29/24: Small hiatal hernia, severe Schatzki's ring was found at the GE junction, dilation performed from 12 to 15 mm normal stomach, normal duodenum      Ultrasound the abdomen 5/13/2024: Liver is reported as normal     Patient was last seen 7/15/2024, had reported previously that Dr. Saldana was ordering liver ultrasound and labs, he reported having 1 bowel movement a day with lactulose 30 mL/day, reported by wife to be confused at times with days and nights and also had been diagnosed with dementia, following with psych and neurology through [place].  Xifaxan was restarted    Colonoscopy 9/3/2024: Good prep, 3 small polyps removed from the descending and ascending colon-adenomatous     Pt states he is fatigued during day, takes naps, gets days and nights mixed up, wife attributes  to dementia. He converses normally.   Taking Lactulose more prn, states having daily BM, states he has had more lately, 5 yesterday, wife does not think pt is having diarrhea.  Denies blood in stool or black stool. No abd pain.   Wife thinks Dr Saldana has ordered repeat liver imaging  Pt states occasionally he has dysphagia but does not happen often  Pt states he's eating regularly , states not snacking as much and trying to lose weight. Pt has lost 23# since last visit  Drinking coffee 2 c. /day  Worsening kidney function noted in Nov, PCP recommended decreasing Lasix 2 x week  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     Primary osteoarthritis of left knee     Sleep apnea     Sleep apnea     Thrombocytopenia 05/22/2018    Thyroid disease        Past Surgical History:   Procedure Laterality Date    CARDIAC SURGERY      COLONOSCOPY  2015    COLONOSCOPY N/A 9/3/2024    Procedure: COLONOSCOPY FOR SCREENING WITH COLD SNARE;  Surgeon: Chris Bradshaw MD;  Location: [facility] ENDOSCOPY;  Service: Gastroenterology;  Laterality: N/A;  COLON POLYPS    CORONARY ANGIOPLASTY WITH STENT PLACEMENT      CORONARY ARTERY BYPASS GRAFT N/A 05/20/2021    Procedure: MIDLINE STERNOTOMY,CORONARY ARTERY BYPASS GRAFTING X 5, UTILIZING THE LEFT COMPA AND LEFT SAPHENOUS VEIN, ABHIJEET, PRP;  Surgeon: Jr Tom Tubbs MD;  Location: [facility] OR;  Service: Cardiothoracic;  Laterality: N/A;     "ENDOSCOPY      ENDOSCOPY N/A 1/24/2024    Procedure: ESOPHAGOGASTRODUODENOSCOPY;  Surgeon: Chris Bradshaw MD;  Location: [facility] ENDOSCOPY;  Service: Gastroenterology;  Laterality: N/A;  hiatal hernia, schatzki's ring    ENDOSCOPY N/A 1/29/2024    Procedure: ESOPHAGOGASTRODUODENOSCOPY with balloon dilaitation 12-15cm;  Surgeon: Chris Bradshaw MD;  Location: [facility] ENDOSCOPY;  Service: Gastroenterology;  Laterality: N/A;  hiatal hernia, schatskis ring    HERNIA REPAIR      JOINT REPLACEMENT      SHOULDER SURGERY      SHOULDER REPAIR    TOTAL KNEE ARTHROPLASTY      TRANSESOPHAGEAL ECHOCARDIOGRAM (ABHIJEET) N/A 05/20/2021    Procedure: TRANSESOPHAGEAL ECHOCARDIOGRAM WITH ANESTHESIA;  Surgeon: Jr Tom Tubbs MD;  Location: [facility] OR;  Service: Cardiothoracic;  Laterality: N/A;       No Known Allergies    Family History   Problem Relation Age of Onset    Diabetes Mother     Colon cancer Father 65        Social History     Tobacco Use    Smoking status: Never    Smokeless tobacco: Never   Vaping Use    Vaping status: Never Used   Substance Use Topics    Alcohol use: Not Currently     Comment: QUIT DRINKING 32 YEARS AGO    Drug use: Never       Objective     Vital Signs:   /90 (BP Location: Left arm, Patient Position: Sitting, Cuff Size: Adult)   Pulse 85   Ht 182.9 cm (72\")   Wt 103 kg (226 lb 12.8 oz)   BMI 30.76 kg/m²       Physical Exam  Constitutional:       General: The patient is not in acute distress.     Appearance: Normal appearance.   HENT:      Head: Normocephalic and atraumatic.      Nose: Nose normal.   Pulmonary:      Effort: Pulmonary effort is normal. No respiratory distress.   Abdominal:      General: Abdomen is flat.      Palpations: Abdomen is soft. There is no mass.      Tenderness: There is no abdominal tenderness. There is no guarding.   Musculoskeletal:      Cervical back: Neck supple.      Right lower leg: No edema.      Left lower leg: No edema.   Skin:     General: " Skin is warm and dry.   Neurological:      General: No focal deficit present.      Mental Status: The patient is alert and oriented to person, place, and time.      Gait: Gait normal.   Psychiatric:         Mood and Affect: Mood normal.         Speech: Speech normal.         Behavior: Behavior normal.         Thought Content: Thought content normal.     Result Review :   The following data was reviewed by: MAXWELL Denise on 01/13/2025:    CBC w/diff          11/5/2024    11:10   CBC w/Diff   WBC 14.81    RBC 4.33    Hemoglobin 12.7    Hematocrit 39.5    MCV 91.2    MCH 29.3    MCHC 32.2    RDW 13.4    Platelets 82      CMP          3/21/2024    09:08 6/27/2024    09:01 11/5/2024    11:10   CMP   Glucose 149  174  211    BUN 19  27  27    Creatinine 1.20  1.38  1.70    EGFR 62.7  53.0  41.0    Sodium 139  137  137    Potassium 5.0  4.7  5.2    Chloride 104  102  103    Calcium 9.6  9.1  9.5    Total Protein  6.8  6.8    Albumin  4.4  4.3    Globulin  2.4     Total Bilirubin  0.3  0.3    Alkaline Phosphatase  90  112    AST (SGOT)  13  16    ALT (SGPT)  13  14    Albumin/Globulin Ratio  1.8     BUN/Creatinine Ratio 15.8  19.6  15.9    Anion Gap 7.7  8.0  9.0        AFP   ALPHA-FETOPROTEIN   Date Value Ref Range Status   04/20/2023 <2 0 - 8.3 ng/mL Final      PT/INR   Protime   Date Value Ref Range Status   10/12/2023 13.8 11.8 - 14.9 Seconds Final   12/14/2018 11.7 10.3 - 13.3 s Final     Comment:     (note)  Anticoagulants may alter the results of Laboratory   Coagulation assays. New-generation anticoagulants such as   direct Thrombin inhibitors (Dabigatran/Pradaxa, Argatroban,   Bivalrudin) and direct/indirect factor Xa inhibitors   (Rivaroxaban/Xarelto,Apixaban/Eliquis, Danaparoid/Orgaran,   Fondaparinux/Arixtra) have been shown to affect the results   of Laboratory Coagulation assays, creating falsely elevated   or decreased values. Correlation with medication history is   advised.     INR   Date  Value Ref Range Status   10/12/2023 1.05 0.86 - 1.15 Final   12/14/2018 1.0 INR Final     Comment:     INR Therapeutic Ranges:  Low Intensity Range: 2.0-3.0  High Intensity Range:  3.0-4.5               Assessment and Plan    Diagnoses and all orders for this visit:    1. Other cirrhosis of liver (Primary)  -     Case Request; Standing  -     Case Request  -     CBC (No Diff); Future  -     Comprehensive Metabolic Panel; Future  -     Protime-INR; Future  -     AFP Tumor Marker; Future  -     US Liver; Future  -     Ammonia; Future    2. Hepatic encephalopathy  -     Case Request; Standing  -     Case Request  -     CBC (No Diff); Future  -     Comprehensive Metabolic Panel; Future  -     Protime-INR; Future  -     AFP Tumor Marker; Future  -     US Liver; Future  -     Ammonia; Future    3. Anemia, unspecified type    4. Secondary esophageal varices without bleeding  Comments:  hx of, last EGD negative  Orders:  -     Case Request; Standing  -     Case Request    5. Esophageal dysphagia  Comments:  mild/intermittent    6. Schatzki's ring of distal esophagus  Comments:  Hx of    7. Class 1 obesity with serious comorbidity and body mass index (BMI) of 30.0 to 30.9 in adult, unspecified obesity type    Other orders  -     Follow Anesthesia Guidelines / Protocol; Standing  -     Follow Anesthesia Guidelines / Protocol; Future  -     Verify NPO; Standing              Follow Up   Return in about 6 months (around 7/13/2025).  Pt has been intolerant to Nadolol. Hx of EV.   EGD Surgical Risk and Benefits: Possible risks/complications, benefits, and alternatives to surgical or invasive procedure have been explained to patient and/or legal guardian; risks include bleeding, infection, and perforation. Patient has been evaluated and can tolerate anesthesia and/or sedation. Risks, benefits, and alternatives to anesthesia and sedation have been explained to patient and/or legal guardian. Dr Duran  Will check on last liver  imaging at [facility] - nothing recent so will order  Labs in May  Pt will confirm if pt getting Xifaxan or not when she gets home -staff checked and it does need a PA now so we will work on that. It is difficult to determine if his confusion is r/t dementia or HE but would recommend treating any HE to maximize therapy.  Advised lactulose daily, goal 3 stools/d, as pt reported more stools yesterday than normal w/o Lactulose, I offered to check stool studies but both pt and wife declined, states they will call if persists.  Small frequent meals, chew food thoroughly, liquids after qbite of solids.     Patient was given instructions and counseling regarding his condition or for health maintenance advice. Please see specific information pulled into the AVS if appropriate.

## 2025-01-13 NOTE — TELEPHONE ENCOUNTER
PA approved. Called and s/w [facility], they have rec'd the approval. They will be filling the script for 30 days at a time and pt will have an $11 copay. They are shipping the medication to the pt now and he should receive it in 4 days. I called and notified pt's spouse. She verbalized understanding.

## 2025-01-28 ENCOUNTER — HOSPITAL ENCOUNTER (OUTPATIENT)
Dept: ULTRASOUND IMAGING | Facility: HOSPITAL | Age: 80
Discharge: HOME | End: 2025-01-28
Admitting: NURSE PRACTITIONER
Payer: OTHER GOVERNMENT

## 2025-01-28 DIAGNOSIS — K76.82 HEPATIC ENCEPHALOPATHY: ICD-10-CM

## 2025-01-28 DIAGNOSIS — K74.69 OTHER CIRRHOSIS OF LIVER: ICD-10-CM

## 2025-01-28 PROCEDURE — 76705 ECHO EXAM OF ABDOMEN: CPT

## 2025-01-30 ENCOUNTER — TELEPHONE (OUTPATIENT)
Dept: PODIATRY | Facility: CLINIC | Age: 80
End: 2025-01-30

## 2025-01-30 NOTE — TELEPHONE ENCOUNTER
[facility] received a fax that requires your attention. The document has been indexed to the patient’s chart for your review.      Reason for sending: RECEIVED [facility] AUTHORIZATION LETTER APPROVING CONTINUATION OF CARE    Documents Description: VA AUTH LETTER-PODIATRY-1/29/2025    Name of Sender: [facility]    Date Indexed: 1/30/2025

## 2025-02-07 ENCOUNTER — TELEPHONE (OUTPATIENT)
Dept: GASTROENTEROLOGY | Facility: CLINIC | Age: 80
End: 2025-02-07
Payer: OTHER GOVERNMENT

## 2025-02-07 NOTE — TELEPHONE ENCOUNTER
2025    Dear Dr Duran,      Patient Name: Cosmo Gauthier  : 1945      This patient is waiting to have a Colonoscopy and/or Esophagogastroduodenoscopy which I will perform at [facility] on 25. Please respond to this request noting your recommendations regarding clearance from a Cardiac  standpoint.  You may contact our office at [phone] Option 2 with any questions. I appreciate your prompt response in this matter. Please return this form to our office as soon as possible to [phone].    ____ I approve my patient from a Cardiac  standpoint    ____ I do NOT approve my patient from a Cardiac  standpoint at this time    Please inform our office if the patient requires additional follow-up from your office prior to scheduled procedure date.      Please specify clearance expiration date:____________________________________      Approving physician name (please print): _____________________________________________      Approving physician signature: ________________________________ Date:________________  Sincerely,  [facility] - Gastroenterology   Dr. Bradshaw          Please fax approval or denial to our office as soon as possible.

## 2025-02-07 NOTE — TELEPHONE ENCOUNTER
Procedure: Colonoscopy and/or EGD     Med Directive: n/a     PMH: CAD s/p CABG, diastolic CHF, HTN     Last Seen: 01/07/2025

## 2025-02-10 ENCOUNTER — TRANSCRIBE ORDERS (OUTPATIENT)
Dept: ADMINISTRATIVE | Facility: HOSPITAL | Age: 80
End: 2025-02-10
Payer: OTHER GOVERNMENT

## 2025-02-10 DIAGNOSIS — C85.99 NON-HODGKIN LYMPHOMA OF EXTRANODAL AND SOLID ORGAN SITES: Primary | ICD-10-CM

## 2025-02-12 ENCOUNTER — OFFICE VISIT (OUTPATIENT)
Dept: PODIATRY | Facility: CLINIC | Age: 80
End: 2025-02-12
Payer: OTHER GOVERNMENT

## 2025-02-12 VITALS
SYSTOLIC BLOOD PRESSURE: 126 MMHG | OXYGEN SATURATION: 98 % | WEIGHT: 233 LBS | BODY MASS INDEX: 31.6 KG/M2 | DIASTOLIC BLOOD PRESSURE: 62 MMHG | TEMPERATURE: 98 F | HEART RATE: 95 BPM

## 2025-02-12 DIAGNOSIS — E11.42 TYPE 2 DIABETES MELLITUS WITH DIABETIC POLYNEUROPATHY, WITH LONG-TERM CURRENT USE OF INSULIN: ICD-10-CM

## 2025-02-12 DIAGNOSIS — M79.671 FOOT PAIN, BILATERAL: Primary | ICD-10-CM

## 2025-02-12 DIAGNOSIS — B35.3 TINEA PEDIS OF LEFT FOOT: ICD-10-CM

## 2025-02-12 DIAGNOSIS — G62.2 NEUROPATHY DUE TO CHEMICAL SUBSTANCE: ICD-10-CM

## 2025-02-12 DIAGNOSIS — Z77.098 HISTORY OF AGENT ORANGE EXPOSURE: ICD-10-CM

## 2025-02-12 DIAGNOSIS — M79.672 FOOT PAIN, BILATERAL: Primary | ICD-10-CM

## 2025-02-12 DIAGNOSIS — L60.0 ONYCHOCRYPTOSIS: ICD-10-CM

## 2025-02-12 DIAGNOSIS — Z79.4 TYPE 2 DIABETES MELLITUS WITH DIABETIC POLYNEUROPATHY, WITH LONG-TERM CURRENT USE OF INSULIN: ICD-10-CM

## 2025-02-12 DIAGNOSIS — R26.2 DIFFICULTY WALKING: ICD-10-CM

## 2025-02-12 RX ORDER — CLOTRIMAZOLE AND BETAMETHASONE DIPROPIONATE 10; .64 MG/G; MG/G
1 CREAM TOPICAL 2 TIMES DAILY
Qty: 45 G | Refills: 5 | Status: SHIPPED | OUTPATIENT
Start: 2025-02-12

## 2025-02-12 NOTE — PRE-PROCEDURE INSTRUCTIONS
"Instructed on date and arrival time of 1130. Instructed that arrival time is not procedure time but allows time to prepare for procedure. Come to entrance \"C\".  Must have  over age 18 to drive home.  May have two visitors; however, children under 12 must stay in waiting room.  Discussed diet/NPO.  May take medications as usual except for blood thinners, diabetic medications, or weight loss medications.  Verbalized understanding of instructions given.  Instructed to call for questions or concerns.  Cardiac clearance noted in chart.  "

## 2025-02-12 NOTE — PROGRESS NOTES
[facility] - PODIATRY    Today's Date: 02/12/25    Patient Name: Cosmo Gauthier  MRN: [id number]  CSN: [id number]  PCP: Alex Buckley MD, Last PCP Visit: 5 November 2024  Referring Provider: Marah Monreal MD    SUBJECTIVE     Chief Complaint   Patient presents with    Left Foot - Follow-up, Nail Problem    Right Foot - Follow-up, Nail Problem     HPI: Cosmo Gauthier, a 79 y.o.male, presents to clinic for painful toenails:    New, Established, New Problem: established  Location:  Toenails  Duration:   Greater than five years  Onset:  Gradual  Nature:  sore with palpation.  Stable, worsening, improving:   Stable  Aggravating factors:  Pain with shoe gear and ambulation.  Previous Treatment: debridement    Patient controlling diabetes via:  insulin    Patient states their last blood glucose was: 160    New, Established, New Problem: New Problem  Location: Plantar left arch  Duration: Recent weeks  Onset: Insidious  Nature: Draining blisters, itchy  Aggravating factors:  Patient relates pain is aggravated by shoe gear and ambulation.    Previous Treatment: For the counter Neosporin    Patient denies any fevers, chills, nausea, vomiting, shortness of breathe, nor any other constitutional signs nor symptoms.    I have reviewed/confirmed previously documented HPI with no changes.     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     Primary osteoarthritis  of left knee     Sleep apnea     Sleep apnea     Thrombocytopenia 05/22/2018    Thyroid disease      Past Surgical History:   Procedure Laterality Date    CARDIAC SURGERY      COLONOSCOPY  2015    COLONOSCOPY N/A 9/3/2024    Procedure: COLONOSCOPY FOR SCREENING WITH COLD SNARE;  Surgeon: Chris Bradshaw MD;  Location: [facility] ENDOSCOPY;  Service: Gastroenterology;  Laterality: N/A;  COLON POLYPS    CORONARY ANGIOPLASTY WITH STENT PLACEMENT      CORONARY ARTERY BYPASS GRAFT N/A 05/20/2021    Procedure: MIDLINE STERNOTOMY,CORONARY ARTERY BYPASS GRAFTING X 5, UTILIZING THE LEFT COMPA AND LEFT SAPHENOUS VEIN, ABHIJEET, PRP;  Surgeon: Jr Tom Tubbs MD;  Location: [facility];  Service: Cardiothoracic;  Laterality: N/A;    ENDOSCOPY      ENDOSCOPY N/A 1/24/2024    Procedure: ESOPHAGOGASTRODUODENOSCOPY;  Surgeon: Chris Bradshaw MD;  Location: [facility] ENDOSCOPY;  Service: Gastroenterology;  Laterality: N/A;  hiatal hernia, schatzki's ring    ENDOSCOPY N/A 1/29/2024    Procedure: ESOPHAGOGASTRODUODENOSCOPY with balloon dilaitation 12-15cm;  Surgeon: Chris Bradshaw MD;  Location: [facility] ENDOSCOPY;  Service: Gastroenterology;  Laterality: N/A;  hiatal hernia, schatskis ring    HERNIA REPAIR      JOINT REPLACEMENT      SHOULDER SURGERY      SHOULDER REPAIR    TOTAL KNEE ARTHROPLASTY      TRANSESOPHAGEAL ECHOCARDIOGRAM (ABHIJEET) N/A 05/20/2021    Procedure: TRANSESOPHAGEAL ECHOCARDIOGRAM WITH ANESTHESIA;  Surgeon: Jr Tom Tubbs MD;  Location: [facility];  Service: Cardiothoracic;  Laterality: N/A;     Family History   Problem Relation Age of Onset    Diabetes Mother     Colon cancer Father 65     Social History     Socioeconomic History    Marital status:    Tobacco Use    Smoking status: Never    Smokeless tobacco: Never   Vaping Use    Vaping status: Never Used   Substance and Sexual Activity    Alcohol use: Not Currently     Comment: QUIT DRINKING 32 YEARS AGO    Drug use: Never    Sexual  activity: Defer     No Known Allergies  Current Outpatient Medications   Medication Sig Dispense Refill    amitriptyline (ELAVIL) 50 MG tablet Take 1 tablet by mouth Every Night.      aspirin 81 MG EC tablet Take 1 tablet by mouth Daily.      finasteride (PROSCAR) 5 MG tablet Take 1 tablet by mouth Daily. 60 tablet 5    furosemide (LASIX) 40 MG tablet Take 0.5 tablets by mouth Every Other Day.      insulin glargine (LANTUS, SEMGLEE) 100 UNIT/ML injection Inject 15 Units under the skin into the appropriate area as directed Every Night.      Insulin Lispro, 1 Unit Dial, (HumaLOG KwikPen) 100 UNIT/ML solution pen-injector 150-200 = 4 units,  201-250 = 6 units,  251-300 = 8 units,  301-350 = 10 units,  >350 = 14 units 9 mL 3    lactulose (CHRONULAC) 10 GM/15ML solution Take 30 mL by mouth 2 (Two) Times a Day. 900 mL 5    levothyroxine (SYNTHROID, LEVOTHROID) 112 MCG tablet Take 2 tablets by mouth Take As Directed. Takes two tablets every day Mon - Sat. Takes 1-1/2 tablets on Sunday. 75 tablet 3    metFORMIN (GLUCOPHAGE) 1000 MG tablet Take 1 tablet by mouth 2 (Two) Times a Day With Meals.      midodrine (PROAMATINE) 2.5 MG tablet Take 1 tablet by mouth Daily. 90 tablet 3    montelukast (Singulair) 10 MG tablet Take 1 tablet by mouth Every Night. 30 tablet 1    nitroglycerin (NITROSTAT) 0.4 MG SL tablet Place 1 tablet under the tongue Every 5 (Five) Minutes As Needed.      pantoprazole (PROTONIX) 40 MG EC tablet Take 1 tablet by mouth Daily. 30 tablet 5    riFAXIMin (Xifaxan) 550 MG tablet Take 1 tablet by mouth Every 12 (Twelve) Hours. 180 tablet 1    sertraline (ZOLOFT) 100 MG tablet Take 2 tablets by mouth Daily. 180 tablet 1    simvastatin (ZOCOR) 40 MG tablet Take 0.5 tablets by mouth Every Night.      tamsulosin (FLOMAX) 0.4 MG capsule 24 hr capsule Take 1 capsule by mouth Daily. 30 capsule 5    clotrimazole-betamethasone (LOTRISONE) 1-0.05 % cream Apply 1 Application topically to the appropriate area as directed 2  (Two) Times a Day. Apply to affected area twice daily 45 g 5    fluticasone (FLONASE) 50 MCG/ACT nasal spray 2 sprays into the nostril(s) as directed by provider Daily. (Patient not taking: Reported on 2/12/2025) 16 g 1     No current facility-administered medications for this visit.     Review of Systems   Constitutional: Negative.    Skin:         Painful toenails   Neurological:  Positive for numbness.   All other systems reviewed and are negative.      OBJECTIVE     Vitals:    02/12/25 0820   BP: 126/62   Pulse: 95   Temp: 98 °F (36.7 °C)   SpO2: 98%       Body mass index is 31.6 kg/m².    Lab Results   Component Value Date    HGBA1C 7.00 (H) 11/05/2024       Lab Results   Component Value Date    GLUCOSE 211 (H) 11/05/2024    CALCIUM 9.5 11/05/2024     11/05/2024    K 5.2 11/05/2024    CO2 25.0 11/05/2024     11/05/2024    BUN 27 (H) 11/05/2024    CREATININE 1.70 (H) 11/05/2024    EGFRIFNONA 48 (L) 01/13/2022    BCR 15.9 11/05/2024    ANIONGAP 9.0 11/05/2024       Patient seen in no apparent distress.      PHYSICAL EXAM:     Foot/Ankle Exam    GENERAL  Appearance:  elderly  Orientation:  AAOx3  Affect:  appropriate  Gait:  unimpaired  Assistance:  independent  Right shoe gear: casual shoe  Left shoe gear: casual shoe    VASCULAR     Right Foot Vascularity   Dorsalis pedis:  1+  Posterior tibial:  1+  Skin temperature:  warm  Edema grading:  None  CFT:  < 3 seconds  Pedal hair growth:  Absent  Varicosities:  mild varicosities     Left Foot Vascularity   Dorsalis pedis:  1+  Posterior tibial:  1+  Skin temperature:  warm  Edema grading:  None  CFT:  < 3 seconds  Pedal hair growth:  Absent  Varicosities:  mild varicosities     NEUROLOGIC     Right Foot Neurologic   Light touch sensation: diminished  Vibratory sensation: diminished  Hot/Cold sensation: diminished  Protective Sensation using Busy-Tanya Monofilament:   Sites intact: 2  Sites tested: 10     Left Foot Neurologic   Light touch  sensation: diminished  Vibratory sensation: diminished  Hot/Cold sensation:  diminished  Protective Sensation using Geismar-Tanya Monofilament:   Sites intact: 2  Sites tested: 10    MUSCULOSKELETAL     Right Foot Musculoskeletal   Hammertoe:  Second toe, third toe, fourth toe and fifth toe     Left Foot Musculoskeletal   Hammertoe:  Second toe, third toe, fourth toe and fifth toe    MUSCLE STRENGTH     Right Foot Muscle Strength   Foot dorsiflexion:  4-  Foot plantar flexion:  4-  Foot inversion:  4-  Foot eversion:  4-     Left Foot Muscle Strength   Foot dorsiflexion:  4-  Foot plantar flexion:  4-  Foot inversion:  4-  Foot eversion:  4-    RANGE OF MOTION     Right Foot Range of Motion   Foot and ankle ROM within normal limits       Left Foot Range of Motion   Foot and ankle ROM within normal limits      DERMATOLOGIC      Right Foot Dermatologic   Skin  Right foot skin is intact.   Nails  1.  Positive for elongated, onychomycosis, abnormal thickness, subungual debris and ingrown toenail.  2.  Positive for elongated, onychomycosis, abnormal thickness, subungual debris and ingrown toenail.  3.  Positive for elongated, onychomycosis, abnormal thickness, subungual debris and ingrown toenail.  4.  Positive for elongated, onychomycosis, abnormal thickness, subungual debris and ingrown toenail.  5.  Positive for elongated, onychomycosis, abnormal thickness, subungual debris and ingrown toenail.  Nails comment:  Toenails 1, 2, 3, 4, and 5     Left Foot Dermatologic   Skin  Positive for tinea (Plantar left arch).   Nails comment:  Toenails 1, 2, 3, 4, and 5  Nails  1.  Positive for elongated, onychomycosis, abnormal thickness, subungual debris and ingrown toenail.  2.  Positive for elongated, onychomycosis, abnormal thickness, subungual debris and ingrown toenail.  3.  Positive for elongated, onychomycosis, abnormal thickness, subungual debris and ingrown toenail.  4.  Positive for elongated, onychomycosis,  abnormally thick, subungual debris and ingrown toenail.  5.  Positive for elongated, onychomycosis, abnormally thick, subungual debris and ingrown toenail.    I have reexamined the patient the results are consistent with the previously documented exam.    ASSESSMENT/PLAN     Diagnoses and all orders for this visit:    1. Foot pain, bilateral (Primary)    2. Type 2 diabetes mellitus with diabetic polyneuropathy, with long-term current use of insulin    3. Onychocryptosis    4. 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 History of agent Orange exposure    7. Tinea pedis of left foot  -     clotrimazole-betamethasone (LOTRISONE) 1-0.05 % cream; Apply 1 Application topically to the appropriate area as directed 2 (Two) Times a Day. Apply to affected area twice daily  Dispense: 45 g; Refill: 5    Comprehensive lower extremity examination and evaluation was performed.    Discussed findings and treatment plan including risks, benefits, and treatment options with patient in detail. Patient agreed with treatment plan.    Medications and allergies reviewed.  Reviewed available blood glucose and HgB A1C lab values along with other pertinent labs.  These were discussed with the patient as to their importance of diabetic maintenance.    Toenails 1, 2, 3, 4, 5 on Right and 1, 2, 3, 4, 5 on Left were debrided with nail nippers then filed with a Dremel nail tavia.  Patient tolerated procedure well without complications.    An After Visit Summary was printed and given to the patient at discharge, including (if requested) any available informative/educational handouts regarding diagnosis, treatment, or medications. All questions were answered to patient/family satisfaction. Should symptoms fail to improve or worsen they agree to call or return to clinic or to go to the Emergency Department. Discussed the importance of following up with any needed screening tests/labs/specialist appointments and any requested  follow-up recommended by me today. Importance of maintaining follow-up discussed and patient accepts that missed appointments can delay diagnosis and potentially lead to worsening of conditions.    Return in about 9 weeks (around 4/16/2025) for Toenail Care., or sooner if acute issues arise.    I have reviewed the assessment and plan and verified the accuracy of it. No changes to assessment and plan since the information was documented. Hunter Orellana DPM 02/12/25     I have dictated this note utilizing Dragon Dictation.  Please note that portions of this note were completed with a voice recognition program.  Part of this note may be an electronic transcription/translation of spoken language to printed text using the Dragon Dictation System.      This document has been electronically signed by Hunter Orellana DPM on February 12, 2025 08:52 EST

## 2025-02-14 ENCOUNTER — ANESTHESIA EVENT (OUTPATIENT)
Dept: GASTROENTEROLOGY | Facility: HOSPITAL | Age: 80
End: 2025-02-14
Payer: OTHER GOVERNMENT

## 2025-02-14 NOTE — ANESTHESIA PREPROCEDURE EVALUATION
Anesthesia Evaluation     Patient summary reviewed   NPO Solid Status: > 8 hours  NPO Liquid Status: > 2 hours           Airway   Mallampati: II  TM distance: >3 FB  Neck ROM: full  No difficulty expected  Dental - normal exam     Pulmonary - normal exam    breath sounds clear to auscultation  (+) ,sleep apnea on CPAP  Cardiovascular     ECG reviewed  Rhythm: regular  Rate: normal    (+) hypertension, valvular problems/murmurs (mild tricuspid, aortic regurgitation), CAD, CABG, hyperlipidemia      Neuro/Psych  (+) CVA, weakness, numbness, psychiatric history Anxiety and Depression, dementia  GI/Hepatic/Renal/Endo    (+) obesity, morbid obesity, GERD, PUD, liver disease fatty liver disease cirrhosis, renal disease- CRI, diabetes mellitus type 2 using insulin, thyroid problem hypothyroidism and hyperthyroidism    Musculoskeletal     Abdominal  - normal exam    Abdomen: soft.  Bowel sounds: normal.   Substance History      OB/GYN          Other   arthritis,   history of cancer    ROS/Med Hx Other: Hx cirrhosis, hepatic encephalopathy    EKG 10/11/23: HR 89, SR    ECHO 02/16/21:   ·Calculated left ventricular EF = 59% Estimated left ventricular EF = 59% Estimated left ventricular EF was in agreement with the calculated left ventricular EF. Left ventricular systolic function is normal. Normal left ventricular cavity size noted. Left ventricular wall thickness is consistent with mild concentric hypertrophy. All left ventricular wall segments contract normally. Left ventricular diastolic function is consistent with (grade I) impaired relaxation.  ·Left atrial volume is normal. Saline test results are negative.  ·Mild aortic valve regurgitation is present.  ·Trace tricuspid valve regurgitation is present. Insufficient TR velocity profile to estimate the right ventricular systolic pressure.  ·Mild dilation of the sinuses of Valsalva is present. Sinus of Valsalva = 4.1 cm Mild dilation of the ascending aorta is present.  Ascending aorta = 4.1 cm     Cards clearance with acceptable risks per Dr. Duran on 02/07/25                     Anesthesia Plan    ASA 4     general   total IV anesthesia  (Total IV Anesthesia    Patient understands anesthesia not responsible for dental damage.      Discussed risks with pt including aspiration, allergic reactions, apnea, advanced airway placement. Pt verbalized understanding. All questions answered.     )  intravenous induction     Anesthetic plan, risks, benefits, and alternatives have been provided, discussed and informed consent has been obtained with: patient.  Pre-procedure education provided  Plan discussed with CRNA.        CODE STATUS:

## 2025-02-17 ENCOUNTER — ANESTHESIA (OUTPATIENT)
Dept: GASTROENTEROLOGY | Facility: HOSPITAL | Age: 80
End: 2025-02-17
Payer: OTHER GOVERNMENT

## 2025-02-17 ENCOUNTER — HOSPITAL ENCOUNTER (OUTPATIENT)
Facility: HOSPITAL | Age: 80
Setting detail: HOSPITAL OUTPATIENT SURGERY
Discharge: HOME | End: 2025-02-17
Attending: INTERNAL MEDICINE | Admitting: INTERNAL MEDICINE
Payer: OTHER GOVERNMENT

## 2025-02-17 VITALS
DIASTOLIC BLOOD PRESSURE: 70 MMHG | TEMPERATURE: 97 F | BODY MASS INDEX: 31.41 KG/M2 | WEIGHT: 231.92 LBS | HEIGHT: 72 IN | HEART RATE: 65 BPM | SYSTOLIC BLOOD PRESSURE: 119 MMHG | OXYGEN SATURATION: 96 % | RESPIRATION RATE: 18 BRPM

## 2025-02-17 DIAGNOSIS — K74.69 OTHER CIRRHOSIS OF LIVER: ICD-10-CM

## 2025-02-17 DIAGNOSIS — I85.10 SECONDARY ESOPHAGEAL VARICES WITHOUT BLEEDING: ICD-10-CM

## 2025-02-17 DIAGNOSIS — K76.82 HEPATIC ENCEPHALOPATHY: ICD-10-CM

## 2025-02-17 LAB — GLUCOSE BLDC GLUCOMTR-MCNC: 166 MG/DL (ref 70–99)

## 2025-02-17 PROCEDURE — 82948 REAGENT STRIP/BLOOD GLUCOSE: CPT

## 2025-02-17 PROCEDURE — 43239 EGD BIOPSY SINGLE/MULTIPLE: CPT | Performed by: INTERNAL MEDICINE

## 2025-02-17 PROCEDURE — 88305 TISSUE EXAM BY PATHOLOGIST: CPT | Performed by: INTERNAL MEDICINE

## 2025-02-17 PROCEDURE — 25010000002 PROPOFOL 10 MG/ML EMULSION: Performed by: NURSE ANESTHETIST, CERTIFIED REGISTERED

## 2025-02-17 PROCEDURE — 25010000002 LIDOCAINE PF 2% 2 % SOLUTION: Performed by: NURSE ANESTHETIST, CERTIFIED REGISTERED

## 2025-02-17 PROCEDURE — 25810000003 LACTATED RINGERS PER 1000 ML

## 2025-02-17 RX ORDER — SODIUM CHLORIDE, SODIUM LACTATE, POTASSIUM CHLORIDE, CALCIUM CHLORIDE 600; 310; 30; 20 MG/100ML; MG/100ML; MG/100ML; MG/100ML
30 INJECTION, SOLUTION INTRAVENOUS CONTINUOUS
Status: DISCONTINUED | OUTPATIENT
Start: 2025-02-17 | End: 2025-02-17 | Stop reason: HOSPADM

## 2025-02-17 RX ORDER — PROPOFOL 10 MG/ML
VIAL (ML) INTRAVENOUS AS NEEDED
Status: DISCONTINUED | OUTPATIENT
Start: 2025-02-17 | End: 2025-02-17 | Stop reason: SURG

## 2025-02-17 RX ORDER — LIDOCAINE HYDROCHLORIDE 20 MG/ML
INJECTION, SOLUTION EPIDURAL; INFILTRATION; INTRACAUDAL; PERINEURAL AS NEEDED
Status: DISCONTINUED | OUTPATIENT
Start: 2025-02-17 | End: 2025-02-17 | Stop reason: SURG

## 2025-02-17 RX ADMIN — LIDOCAINE HYDROCHLORIDE 60 MG: 20 INJECTION, SOLUTION INTRAVENOUS at 12:57

## 2025-02-17 RX ADMIN — PROPOFOL 150 MCG/KG/MIN: 10 INJECTION, EMULSION INTRAVENOUS at 12:57

## 2025-02-17 RX ADMIN — SODIUM CHLORIDE, SODIUM LACTATE, POTASSIUM CHLORIDE, CALCIUM CHLORIDE 30 ML/HR: 20; 30; 600; 310 INJECTION, SOLUTION INTRAVENOUS at 12:40

## 2025-02-17 RX ADMIN — PROPOFOL 70 MG: 10 INJECTION, EMULSION INTRAVENOUS at 12:57

## 2025-02-17 NOTE — ANESTHESIA POSTPROCEDURE EVALUATION
Patient: Cosmo Gauthier    Procedure Summary       Date: 02/17/25 Room / Location: [facility] ENDOSCOPY 1 / [facility] ENDOSCOPY    Anesthesia Start: 1252 Anesthesia Stop: 1312    Procedure: ESOPHAGOGASTRODUODENOSCOPY WITH BIOPSIES Diagnosis:       Other cirrhosis of liver      Hepatic encephalopathy      Secondary esophageal varices without bleeding      (Other cirrhosis of liver [K74.69])      (Hepatic encephalopathy [K76.82])      (Secondary esophageal varices without bleeding [I85.10])    Surgeons: Chris Bradshaw MD Provider: Shy Zapien CRNA    Anesthesia Type: general ASA Status: 4            Anesthesia Type: general    Vitals  Vitals Value Taken Time   /70 02/17/25 1333   Temp 36.1 °C (97 °F) 02/17/25 1310   Pulse 67 02/17/25 1333   Resp 18 02/17/25 1330   SpO2 95 % 02/17/25 1333   Vitals shown include unfiled device data.        Post Anesthesia Care and Evaluation    Patient location during evaluation: PHASE II  Patient participation: complete - patient participated  Level of consciousness: awake and awake and alert  Pain management: satisfactory to patient    Airway patency: patent  Anesthetic complications: No anesthetic complications  PONV Status: none  Cardiovascular status: acceptable and hemodynamically stable  Respiratory status: acceptable, nonlabored ventilation and spontaneous ventilation  Hydration status: acceptable    Comments: Patient sitting upright in bed and participating in self-care, no distress noted, nonlabored respirations and hemodynamically stable vitals, ok for discharge home  No anesthesia care post op

## 2025-02-17 NOTE — H&P
Pre Procedure History & Physical    Chief Complaint:   History of fatty liver and portal hypertension    Subjective     HPI:   Past    Past Medical History:   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     Primary osteoarthritis of left knee     Sleep apnea     Sleep apnea     Thrombocytopenia 05/22/2018    Thyroid disease        Past Surgical History:  Past Surgical History:   Procedure Laterality Date    CARDIAC SURGERY      COLONOSCOPY  2015    COLONOSCOPY N/A 9/3/2024    Procedure: COLONOSCOPY FOR SCREENING WITH COLD SNARE;  Surgeon: Chris Bradshaw MD;  Location: [facility] ENDOSCOPY;  Service: Gastroenterology;  Laterality: N/A;  COLON POLYPS    CORONARY ANGIOPLASTY WITH STENT PLACEMENT      CORONARY ARTERY BYPASS GRAFT N/A 05/20/2021    Procedure: MIDLINE STERNOTOMY,CORONARY ARTERY BYPASS GRAFTING X 5, UTILIZING THE LEFT COMPA AND LEFT SAPHENOUS VEIN, ABHIJEET, PRP;  Surgeon: Jr Tom Tubbs MD;  Location: [facility] OR;  Service: Cardiothoracic;  Laterality: N/A;    ENDOSCOPY      ENDOSCOPY N/A 1/24/2024    Procedure: ESOPHAGOGASTRODUODENOSCOPY;  Surgeon: Crhis Bradshaw MD;  Location: [facility] ENDOSCOPY;  Service: Gastroenterology;  Laterality: N/A;  hiatal hernia, schatzki's ring    ENDOSCOPY N/A 1/29/2024    Procedure: ESOPHAGOGASTRODUODENOSCOPY with balloon dilaitation 12-15cm;  Surgeon: Chris Bradshaw MD;  Location: [facility] ENDOSCOPY;  Service:  Gastroenterology;  Laterality: N/A;  hiatal hernia, schatskis ring    HERNIA REPAIR      JOINT REPLACEMENT      SHOULDER SURGERY      SHOULDER REPAIR    TOTAL KNEE ARTHROPLASTY      TRANSESOPHAGEAL ECHOCARDIOGRAM (ABHIJEET) N/A 05/20/2021    Procedure: TRANSESOPHAGEAL ECHOCARDIOGRAM WITH ANESTHESIA;  Surgeon: Jr Tom Tubbs MD;  Location: [facility];  Service: Cardiothoracic;  Laterality: N/A;       Family History:  Family History   Problem Relation Age of Onset    Diabetes Mother     Colon cancer Father 65       Social History:   reports that he has never smoked. He has never used smokeless tobacco. He reports that he does not currently use alcohol. He reports that he does not use drugs.    Medications:   Medications Prior to Admission   Medication Sig Dispense Refill Last Dose/Taking    amitriptyline (ELAVIL) 50 MG tablet Take 1 tablet by mouth Every Night.       aspirin 81 MG EC tablet Take 1 tablet by mouth Daily.       clotrimazole-betamethasone (LOTRISONE) 1-0.05 % cream Apply 1 Application topically to the appropriate area as directed 2 (Two) Times a Day. Apply to affected area twice daily 45 g 5     finasteride (PROSCAR) 5 MG tablet Take 1 tablet by mouth Daily. 60 tablet 5     fluticasone (FLONASE) 50 MCG/ACT nasal spray 2 sprays into the nostril(s) as directed by provider Daily. (Patient not taking: Reported on 2/12/2025) 16 g 1     furosemide (LASIX) 40 MG tablet Take 0.5 tablets by mouth Every Other Day.       insulin glargine (LANTUS, SEMGLEE) 100 UNIT/ML injection Inject 15 Units under the skin into the appropriate area as directed Every Night.       Insulin Lispro, 1 Unit Dial, (HumaLOG KwikPen) 100 UNIT/ML solution pen-injector 150-200 = 4 units,  201-250 = 6 units,  251-300 = 8 units,  301-350 = 10 units,  >350 = 14 units 9 mL 3     lactulose (CHRONULAC) 10 GM/15ML solution Take 30 mL by mouth 2 (Two) Times a Day. 900 mL 5     levothyroxine (SYNTHROID, LEVOTHROID) 112 MCG tablet Take 2 tablets  by mouth Take As Directed. Takes two tablets every day Mon - Sat. Takes 1-1/2 tablets on Sunday. 75 tablet 3     metFORMIN (GLUCOPHAGE) 1000 MG tablet Take 1 tablet by mouth 2 (Two) Times a Day With Meals.       midodrine (PROAMATINE) 2.5 MG tablet Take 1 tablet by mouth Daily. 90 tablet 3     montelukast (Singulair) 10 MG tablet Take 1 tablet by mouth Every Night. 30 tablet 1     nitroglycerin (NITROSTAT) 0.4 MG SL tablet Place 1 tablet under the tongue Every 5 (Five) Minutes As Needed.       pantoprazole (PROTONIX) 40 MG EC tablet Take 1 tablet by mouth Daily. 30 tablet 5     riFAXIMin (Xifaxan) 550 MG tablet Take 1 tablet by mouth Every 12 (Twelve) Hours. 180 tablet 1     sertraline (ZOLOFT) 100 MG tablet Take 2 tablets by mouth Daily. 180 tablet 1     simvastatin (ZOCOR) 40 MG tablet Take 0.5 tablets by mouth Every Night.       tamsulosin (FLOMAX) 0.4 MG capsule 24 hr capsule Take 1 capsule by mouth Daily. 30 capsule 5        Allergies:  Patient has no known allergies.        Objective     Weight 105 kg (231 lb 14.8 oz).    Physical Exam   Constitutional: Pt is oriented to person, place, and time and well-developed, well-nourished, and in no distress.   Mouth/Throat: Oropharynx is clear and moist.   Neck: Normal range of motion.   Cardiovascular: Normal rate, regular rhythm and normal heart sounds.    Pulmonary/Chest: Effort normal and breath sounds normal.   Abdominal: Soft. Nontender  Skin: Skin is warm and dry.   Psychiatric: Mood, memory, affect and judgment normal.     Assessment & Plan     Diagnosis:  Rule out esophageal varices    Anticipated Surgical Procedure:  EGD    The risks, benefits, and alternatives of this procedure have been discussed with the patient or the responsible party- the patient understands and agrees to proceed.

## 2025-02-24 ENCOUNTER — TELEPHONE (OUTPATIENT)
Dept: GASTROENTEROLOGY | Facility: CLINIC | Age: 80
End: 2025-02-24
Payer: OTHER GOVERNMENT

## 2025-02-24 NOTE — TELEPHONE ENCOUNTER
Pt's spouse called on behalf of pt wondering if he can stop Jardiance due to sores on his foot. I explained to pt's spouse that [facility] does not prescribe diabetes medication, that would be his PCP. She verbalized understanding and will contact them.

## 2025-02-28 ENCOUNTER — HOSPITAL ENCOUNTER (OUTPATIENT)
Dept: CT IMAGING | Facility: HOSPITAL | Age: 80
Discharge: HOME | End: 2025-02-28
Payer: MEDICARE

## 2025-02-28 DIAGNOSIS — C85.99 NON-HODGKIN LYMPHOMA OF EXTRANODAL AND SOLID ORGAN SITES: ICD-10-CM

## 2025-02-28 LAB
CREAT BLDA-MCNC: 1.7 MG/DL (ref 0.6–1.3)
EGFRCR SERPLBLD CKD-EPI 2021: 41 ML/MIN/1.73

## 2025-02-28 PROCEDURE — 71260 CT THORAX DX C+: CPT

## 2025-02-28 PROCEDURE — 74177 CT ABD & PELVIS W/CONTRAST: CPT

## 2025-02-28 PROCEDURE — 82565 ASSAY OF CREATININE: CPT

## 2025-02-28 PROCEDURE — 25510000001 IOPAMIDOL PER 1 ML: Performed by: INTERNAL MEDICINE

## 2025-02-28 RX ORDER — IOPAMIDOL 755 MG/ML
100 INJECTION, SOLUTION INTRAVASCULAR
Status: COMPLETED | OUTPATIENT
Start: 2025-02-28 | End: 2025-02-28

## 2025-02-28 RX ADMIN — IOPAMIDOL 100 ML: 755 INJECTION, SOLUTION INTRAVENOUS at 16:05

## 2025-03-05 ENCOUNTER — OFFICE VISIT (OUTPATIENT)
Age: 80
End: 2025-03-05
Payer: MEDICARE

## 2025-03-05 VITALS
TEMPERATURE: 98 F | WEIGHT: 234.7 LBS | HEIGHT: 72 IN | DIASTOLIC BLOOD PRESSURE: 60 MMHG | BODY MASS INDEX: 31.79 KG/M2 | OXYGEN SATURATION: 95 % | SYSTOLIC BLOOD PRESSURE: 122 MMHG | HEART RATE: 82 BPM

## 2025-03-05 DIAGNOSIS — Z79.4 TYPE 2 DIABETES MELLITUS WITH DIABETIC POLYNEUROPATHY, WITH LONG-TERM CURRENT USE OF INSULIN: ICD-10-CM

## 2025-03-05 DIAGNOSIS — E03.9 HYPOTHYROIDISM, ADULT: ICD-10-CM

## 2025-03-05 DIAGNOSIS — Z00.00 MEDICARE ANNUAL WELLNESS VISIT, SUBSEQUENT: Primary | ICD-10-CM

## 2025-03-05 DIAGNOSIS — I10 HYPERTENSION, ESSENTIAL: ICD-10-CM

## 2025-03-05 DIAGNOSIS — E11.8 TYPE 2 DIABETES MELLITUS WITH COMPLICATIONS: ICD-10-CM

## 2025-03-05 DIAGNOSIS — N18.32 STAGE 3B CHRONIC KIDNEY DISEASE: ICD-10-CM

## 2025-03-05 DIAGNOSIS — E11.42 TYPE 2 DIABETES MELLITUS WITH DIABETIC POLYNEUROPATHY, WITH LONG-TERM CURRENT USE OF INSULIN: ICD-10-CM

## 2025-03-05 NOTE — ASSESSMENT & PLAN NOTE
GFR is stable at 41 as of his 3/25 OV, no electrolyte abnormalities, will follow these up with routine labs.

## 2025-03-05 NOTE — ASSESSMENT & PLAN NOTE
Blood pressure stable and pulse is in the mid 80s as of his 2/25 OV.  He is just on low-dose every other day Lasix for volume control and low-dose midodrine for orthostasis, continue same.

## 2025-03-05 NOTE — PROGRESS NOTES
Subjective   The ABCs of the Annual Wellness Visit  Medicare Wellness Visit      Cosmo Gauthier is a 79 y.o. patient who presents for a Medicare Wellness Visit.    The following portions of the patient's history were reviewed and   updated as appropriate: allergies, current medications, past family history, past medical history, past social history, past surgical history, and problem list.    Compared to one year ago, the patient's physical   health is the same.  Compared to one year ago, the patient's mental   health is the same.    Recent Hospitalizations:  He was not admitted to the hospital during the last year.     Current Medical Providers:  Patient Care Team:  Alex Buckley MD as PCP - General (Internal Medicine)  Seven Saldana MD as Consulting Physician (Hematology and Oncology)  Chris Bradshaw MD as Consulting Physician (Gastroenterology)  Sven Duran MD as Consulting Physician (Cardiology)  Hunter Orellana DPM as Consulting Physician (Podiatry)    Outpatient Medications Prior to Visit   Medication Sig Dispense Refill    amitriptyline (ELAVIL) 50 MG tablet Take 1 tablet by mouth Every Night.      aspirin 81 MG EC tablet Take 1 tablet by mouth Daily.      clotrimazole-betamethasone (LOTRISONE) 1-0.05 % cream Apply 1 Application topically to the appropriate area as directed 2 (Two) Times a Day. Apply to affected area twice daily 45 g 5    finasteride (PROSCAR) 5 MG tablet Take 1 tablet by mouth Daily. 60 tablet 5    furosemide (LASIX) 40 MG tablet Take 0.5 tablets by mouth Every Other Day.      insulin glargine (LANTUS, SEMGLEE) 100 UNIT/ML injection Inject 15 Units under the skin into the appropriate area as directed Every Night.      Insulin Lispro, 1 Unit Dial, (HumaLOG KwikPen) 100 UNIT/ML solution pen-injector 150-200 = 4 units,  201-250 = 6 units,  251-300 = 8 units,  301-350 = 10 units,  >350 = 14 units 9 mL 3    lactulose (CHRONULAC) 10 GM/15ML solution Take 30 mL by  mouth 2 (Two) Times a Day. 900 mL 5    levothyroxine (SYNTHROID, LEVOTHROID) 112 MCG tablet Take 2 tablets by mouth Take As Directed. Takes two tablets every day Mon - Sat. Takes 1-1/2 tablets on Sunday. 75 tablet 3    metFORMIN (GLUCOPHAGE) 1000 MG tablet Take 1 tablet by mouth 2 (Two) Times a Day With Meals.      midodrine (PROAMATINE) 2.5 MG tablet Take 1 tablet by mouth Daily. 90 tablet 3    montelukast (Singulair) 10 MG tablet Take 1 tablet by mouth Every Night. 30 tablet 1    nitroglycerin (NITROSTAT) 0.4 MG SL tablet Place 1 tablet under the tongue Every 5 (Five) Minutes As Needed.      pantoprazole (PROTONIX) 40 MG EC tablet Take 1 tablet by mouth Daily. 30 tablet 5    riFAXIMin (Xifaxan) 550 MG tablet Take 1 tablet by mouth Every 12 (Twelve) Hours. 180 tablet 1    sertraline (ZOLOFT) 100 MG tablet Take 2 tablets by mouth Daily. 180 tablet 1    simvastatin (ZOCOR) 40 MG tablet Take 0.5 tablets by mouth Every Night.      tamsulosin (FLOMAX) 0.4 MG capsule 24 hr capsule Take 1 capsule by mouth Daily. 30 capsule 5    fluticasone (FLONASE) 50 MCG/ACT nasal spray 2 sprays into the nostril(s) as directed by provider Daily. (Patient not taking: Reported on 1/13/2025) 16 g 1    empagliflozin (Jardiance) 10 MG tablet tablet Take 1 tablet by mouth Daily.       No facility-administered medications prior to visit.     No opioid medication identified on active medication list. I have reviewed chart for other potential  high risk medication/s and harmful drug interactions in the elderly.      Aspirin is on active medication list. Aspirin use is indicated based on review of current medical condition/s. Pros and cons of this therapy have been discussed today. Benefits of this medication outweigh potential harm.  Patient has been encouraged to continue taking this medication.  .      Patient Active Problem List   Diagnosis    Lymphoma    Type 2 diabetes mellitus with complications    Stage 3a chronic kidney disease     "Thrombocytopenia    Coronary artery disease involving native coronary artery of native heart without angina pectoris    Hx of CABG    Hyperlipemia, mixed    Hypertension, essential    Hereditary and idiopathic neuropathy, unspecified    Low lying conus medullaris    Mood disorder    Other cirrhosis of liver    Sleep apnea    Spinal stenosis of lumbar region with neurogenic claudication    Vascular dementia without behavioral disturbance    Iron deficiency anemia    Hypothyroidism, adult    Traumatic subarachnoid hemorrhage with loss of consciousness of 30 minutes or less    Morbid (severe) obesity due to excess calories    Claudication of both lower extremities    Medicare annual wellness visit, subsequent    Weakness    Hepatic encephalopathy    Secondary esophageal varices without bleeding    Esophageal dysphagia    Seasonal allergic rhinitis due to pollen    History of colon polyps     Advance Care Planning Advance Directive is on file.  ACP discussion was held with the patient during this visit. Patient has an advance directive in EMR which is still valid.             Objective   Vitals:    03/05/25 1058   BP: 122/60   Pulse: 82   Temp: 98 °F (36.7 °C)   TempSrc: Oral   SpO2: 95%   Weight: 106 kg (234 lb 11.2 oz)   Height: 182.9 cm (72.01\")   PainSc: 4    PainLoc: Back       Estimated body mass index is 31.82 kg/m² as calculated from the following:    Height as of this encounter: 182.9 cm (72.01\").    Weight as of this encounter: 106 kg (234 lb 11.2 oz).                Does the patient have evidence of cognitive impairment? Yes                                                                                               Health  Risk Assessment    Smoking Status:  Social History     Tobacco Use   Smoking Status Never   Smokeless Tobacco Never     Alcohol Consumption:  Social History     Substance and Sexual Activity   Alcohol Use Not Currently    Comment: QUIT DRINKING 32 YEARS AGO       Fall Risk Screen  STEADI " Fall Risk Assessment was completed, and patient is at HIGH risk for falls. Assessment completed on:3/5/2025    Depression Screening   Little interest or pleasure in doing things? Not at all   Feeling down, depressed, or hopeless? Not at all   PHQ-2 Total Score 0      Health Habits and Functional and Cognitive Screening:      3/5/2025    10:55 AM   Functional & Cognitive Status   Do you have difficulty preparing food and eating? No   Do you have difficulty bathing yourself, getting dressed or grooming yourself? No   Do you have difficulty using the toilet? No   Do you have difficulty moving around from place to place? No   Do you have trouble with steps or getting out of a bed or a chair? No   Current Diet Well Balanced Diet   Dental Exam Up to date   Eye Exam Up to date   Exercise (times per week) 7 times per week   Current Exercises Include Swimming;Other   Do you need help using the phone?  No   Are you deaf or do you have serious difficulty hearing?  No   Do you need help to go to places out of walking distance? Yes   Do you need help shopping? No   Do you need help preparing meals?  No   Do you need help with housework?  No   Do you need help with laundry? No   Do you need help taking your medications? Yes   Do you need help managing money? No   Do you ever drive or ride in a car without wearing a seat belt? No   Have you felt unusual stress, anger or loneliness in the last month? No   Who do you live with? Spouse   If you need help, do you have trouble finding someone available to you? No   Have you been bothered in the last four weeks by sexual problems? No   Do you have difficulty concentrating, remembering or making decisions? No           Age-appropriate Screening Schedule:  Refer to the list below for future screening recommendations based on patient's age, sex and/or medical conditions. Orders for these recommended tests are listed in the plan section. The patient has been provided with a written  plan.    Health Maintenance List  Health Maintenance   Topic Date Due    URINE MICROALBUMIN-CREATININE RATIO (uACR)  Never done    ANNUAL WELLNESS VISIT  03/26/2025    HEMOGLOBIN A1C  05/05/2025    LIPID PANEL  06/27/2025    DIABETIC EYE EXAM  07/02/2025    BMI FOLLOWUP  03/05/2026    COLORECTAL CANCER SCREENING  09/03/2027    TDAP/TD VACCINES (3 - Td or Tdap) 07/20/2032    HEPATITIS C SCREENING  Completed    Hepatitis B  Completed    INFLUENZA VACCINE  Completed    Pneumococcal Vaccine 50+  Completed    COVID-19 Vaccine  Discontinued    RSV Vaccine - Adults  Discontinued    ZOSTER VACCINE  Discontinued                                                                                                                                                CMS Preventative Services Quick Reference  Risk Factors Identified During Encounter  Fall Risk-High or Moderate: Discussed Fall Prevention in the home    The above risks/problems have been discussed with the patient.  Pertinent information has been shared with the patient in the After Visit Summary.  An After Visit Summary and PPPS were made available to the patient.    Follow Up:   Next Medicare Wellness visit to be scheduled in 1 year.     Assessment & Plan  Medicare annual wellness visit, subsequent  AWV completed 3/25.  Patient has continued decline in his ADLs, requiring more assistance etc.  Still falling despite assistive devices.  No overnight hospitalizations past year.  Previously provided information regarding advance directive.              Follow Up:   No follow-ups on file.

## 2025-03-05 NOTE — PROGRESS NOTES
"Chief Complaint  Hyperlipidemia, Follow-up (Pt didn't have labs, he states that this is routine. /He was started on jardiance, but his wife stopped it due to he got an ulcer on the bottom of his foot.), and Medicare Wellness-subsequent    Subjective          Cosmo Gauthier presents to [facility]     History of Present Illness:  Patient is pleasant but unfortunate 79-year-old male with multiple medical issues, status post 5 vessel bypass 5/21, with underlying hypertension, hyperlipidemia, and diabetes, coming in 3/25 for routine 3-4-month follow-up. We will review all his labs and address any new concerns.    Review of Systems   Constitutional:  Negative for appetite change, fatigue and fever.   HENT:  Negative for congestion and ear pain.    Eyes:  Negative for blurred vision.   Respiratory:  Negative for cough, chest tightness and wheezing.    Cardiovascular:  Negative for chest pain, palpitations and leg swelling.   Gastrointestinal:  Negative for abdominal pain.   Genitourinary:  Negative for difficulty urinating, dysuria and hematuria.   Musculoskeletal:  Negative for arthralgias and gait problem.   Skin:  Negative for skin lesions.   Neurological:  Negative for syncope, memory problem and confusion.   Psychiatric/Behavioral:  Negative for self-injury and depressed mood.        Objective   Vital Signs:   /60   Pulse 82   Temp 98 °F (36.7 °C) (Oral)   Ht 182.9 cm (72.01\")   Wt 106 kg (234 lb 11.2 oz)   SpO2 95%   BMI 31.82 kg/m²           Physical Exam  Vitals and nursing note reviewed.   Constitutional:       General: He is not in acute distress.     Appearance: Normal appearance. He is not toxic-appearing.   HENT:      Head: Atraumatic.      Right Ear: External ear normal.      Left Ear: External ear normal.      Nose: Nose normal.      Mouth/Throat:      Mouth: Mucous membranes are moist.   Eyes:      General:         Right eye: No discharge.         Left eye: " No discharge.      Extraocular Movements: Extraocular movements intact.      Pupils: Pupils are equal, round, and reactive to light.   Cardiovascular:      Rate and Rhythm: Normal rate and regular rhythm.      Pulses: Normal pulses.      Heart sounds: Normal heart sounds. No murmur heard.     No gallop.   Pulmonary:      Effort: Pulmonary effort is normal. No respiratory distress.      Breath sounds: No wheezing, rhonchi or rales.   Abdominal:      General: There is no distension.      Palpations: Abdomen is soft. There is no mass.      Tenderness: There is no abdominal tenderness. There is no guarding.   Musculoskeletal:         General: No swelling or tenderness.      Cervical back: No tenderness.      Right lower leg: No edema.      Left lower leg: No edema.   Skin:     General: Skin is warm and dry.      Findings: No rash.   Neurological:      General: No focal deficit present.      Mental Status: He is alert and oriented to person, place, and time. Mental status is at baseline.      Motor: No weakness.      Gait: Gait normal.   Psychiatric:         Mood and Affect: Mood normal.         Thought Content: Thought content normal.          Result Review :   The following data was reviewed by: Alex Buckley MD on 08/02/2021:                  Assessment and Plan    Diagnoses and all orders for this visit:    1. Medicare annual wellness visit, subsequent (Primary)  Overview:  Patient's advance directive is on file at the hospital.    Assessment & Plan:  AWV completed 3/25.  Patient has continued decline in his ADLs, requiring more assistance etc.  Still falling despite assistive devices.  No overnight hospitalizations past year.  Previously provided information regarding advance directive.             2. Type 2 diabetes mellitus with diabetic polyneuropathy, with long-term current use of insulin  -     Microalbumin / Creatinine Urine Ratio - Urine, Clean Catch; Future    3. Type 2 diabetes mellitus with  complications  Overview:  Jardiance = athletes feet/dizziness/etc = wife concerned    Assessment & Plan:  Patient's A1c is pending as of his 3/25 OV, he is on Humalog as well as low-dose Lantus, and full dose metformin.  His renal function is stable, he can continue full dose metformin for now.    Jardiance discontinued recently as discussed above.      4. Stage 3b chronic kidney disease  Assessment & Plan:  GFR is stable at 41 as of his 3/25 OV, no electrolyte abnormalities, will follow these up with routine labs.      5. Hypertension, essential  Assessment & Plan:  Blood pressure stable and pulse is in the mid 80s as of his 2/25 OV.  He is just on low-dose every other day Lasix for volume control and low-dose midodrine for orthostasis, continue same.      6. Hypothyroidism, adult  -     TSH; Future            BMI is >= 30 and <35. (Class 1 Obesity). The following options were offered after discussion;: exercise counseling/recommendations and nutrition counseling/recommendations     --  --  Older notes:  HOSP F/U 6/21 = S/P 5V CABG 5/21 per Dr Milligan=I reviewed records sent and the med list provided by wife.  TELEVISIT 2/25/21:  HOSP F/U 10/20 = I reviewed 9/20 [facility] records; I d/w pt labs/meds in detail:  1) CHEST PAIN and pt is being admitted to R/O MI; cardiology was notified...SPECT was neg, so will f/u with cards in a few weeks; may still need a cath=no new sxs and is gideon to see him next week as of 10/20 OV...to BE for CABG per Dr Millgian, but PLT's too low; has f/u with cards.  2) CAD/MI with prior stents; ? last stress was done in cardiology office 5/19; will continue home meds for now...no rest angina; ? increase Imdur=defer to cards appt he has on 3/9/21---> S/P 5V CABG as above; looks great.    3) HTN will be followed closely on home meds=stable 10/20 OV, BUT is orthostatic, so drink more and lower ACEI to 5 mg---> stable 6/21.  4) HYPERLIPIDEMIA=continue statin=LDL 61 (9/20)---> 46 in 6/21.    5)  CIRRHOSIS per Dr Bradshaw; watch volume status.  6) THROMBOCYTOPENIA is related to the cirrhosis and is stable around 70K...on Prednisone per Dr Saldana---> off this; labs on RTO.    7) DM per orders...A1C was only 7.3 in 1/21; she d/w me BS fine despite prednisone as of 2/21 OV; ? lost 20+ lbs---> 5.6 and I d/w stop glipizide 10 bid and stay on Humalog 15 tid, but then again not totally sure he's on it?    8) HYPOTHYROIDISM is wnl as of 2/21 OV---> RTO.    9) BPH on flomax after retenion issues in BE as of 2/21 OV; to see Dr Dill---> saw him for chairez post-op = it's out now.    (lost friend/ 60's to covid)    VISIT 6/20:  ANNUAL MEDICARE WELLNESS EXAM 10/19 = reviewed all forms with pt; he is much more depressed, so zoloft was increased.  H.M. ISSUES:  DM = A1C as below; Optho=q 12 mo with last 4/18 = early diabetic retinopathy and ? laser next visit?  --  HTN...needs ACEI now at 6/17 OV; repeat urine micro as well...remains controlled...ACEI fell off his list; resume now 1/19...better already---> stable.  ? CKD3 noted 6/20 = no new meds; needs repeat few weeks.  --  DM...max out januvia...8.1 an has f/u with [facility]...on lantus 20, d/w increase 2-4 units every week to get FBS to 110...7.1 is great...7.5 is stuck and I d/w again to increase Lantus=told him 24 now... 8.8 is horrible, so increase glucotrol to whole tab in AM...8.9, so try whole tab bid before increase lantus...9.5 is worse and needs Humalog before meals; d/w qid testing; stop glucotrol and increase lantus to 30 qhs...BS noted per phone and in office; rec 16/20/16 and stay on Lantus 30 qhs; d/w NO SSI for now...A1C down to 8.0 already and Fructosamine of 300=A1C closer to 7.5 actually...6.3 is great with TSH back down...6.8 is ok for now=7/19...7.3 in 10/19 and I d/w take 8 units with breakfast since he has been skipping this and only taking 15 with lunch/supper---> 7.3 great 6/20.   HYPOTHYROIDSIM stable 6/17...0.2 at 1/18 OV...0.7  better...increase 1/19 for 3.5 given above...? n/c, b/c now=10; will add 50 as of 1/19 OV...TSH 64 and apparently he missed them past week; I rec 250 qd for now and close f/u...0.7 and will leave this alone for now---> 1.5 in 2/20.  --  CAD per Dr Pritchett; s/p Spect '15 per pt and was neg---> still no CP/SOB and sees cards q 6 mo=no recent as of 6/20 OV.  LIPIDS with LDL 72...83 ok for now...LDL 57---> 55 in 2/20.  --  THROMBOCYTOPENIA is new 4/16 OV=99, so to HEME...off ASA but plavix ok per Dr Chaudhry; had BM bx=?...75 holding...on iron per her---> CBC stable 6/20 per her.  --  HOSP F/U 8/16 for FUO.  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  --  DJD s/p R TKR and then L TKR per Dr Eckert 1/16 and rehab with [facility] still on-going = 3x/wk at 4/16 OV...still with issues L knee 4/18...to have redo L TKR per Dr Zayas as of 10/18 OV=Dr Duran cleared him already...is gideon for 2/4/19, but apparently wants his A1C below 7 for this?? = d/w me 1/19...I d/w not going to get there that soon, but current blood sugars excellent and pt cleared for surgery from my standpoint as well=reviewed all their pre-op labs as well as EKG and CXR...s/p redo L TKR on 2/4/19 and looks great at 2/27/19 OV...finishing up as of 4/19 OV.  --  GERD per Dr Bradshaw.  ? CIRRHOSIS=tried on Nadolol per Dr Bradshaw=stopped it 6/20 due to diarrhea?; ? has CT/NH3 gideon.  --  OBESITY up 3 more to 263...258--->270 is stuck 2/20 and I d/w no meds=must go to [facility] ( Cards said no to surgery).  --  DEPRESSION on maint med...? Dementia per pt, sent to Dr Kim; he sent for [facility] as of 4/18 OV...will address depression 8/18 = increase zoloft   [facility]---> c/w as of 2/21 OV; neg O2 in [facility] recently;   --  --  PSA 0.5 (4/7/16)...defer.  Colon 9/24 = 3 polyp = 3 years per Dr Bradshaw.  Prevnar 11/16; Pneumovax # 1 9/17;  (, retired [employer] '97, 3 kids)    Follow Up   Return in about 4 months (around  7/5/2025).    Total Time Spent:  minutes     This time includes time spent by me in the following activities: preparing for the visit, reviewing extensive past medical history and tests, performing a medically appropriate examination and/or evaluation, counseling and educating the patient and/or caregivers, ordering medications, tests, or procedures, referring and/or communicating with other health care professionals and documenting information in the medical record all on this date of service.     Patient was given instructions and counseling regarding his condition or for health maintenance advice. Please see specific information pulled into the AVS if appropriate.

## 2025-03-05 NOTE — ASSESSMENT & PLAN NOTE
AWV completed 3/25.  Patient has continued decline in his ADLs, requiring more assistance etc.  Still falling despite assistive devices.  No overnight hospitalizations past year.  Previously provided information regarding advance directive.

## 2025-03-05 NOTE — ASSESSMENT & PLAN NOTE
Patient's A1c is pending as of his 3/25 OV, he is on Humalog as well as low-dose Lantus, and full dose metformin.  His renal function is stable, he can continue full dose metformin for now.    Jardiance discontinued recently as discussed above.

## 2025-04-07 ENCOUNTER — TRANSCRIBE ORDERS (OUTPATIENT)
Dept: ADMINISTRATIVE | Facility: HOSPITAL | Age: 80
End: 2025-04-07
Payer: OTHER GOVERNMENT

## 2025-04-07 DIAGNOSIS — C85.99 ORBITAL LYMPHOMA: Primary | ICD-10-CM

## 2025-04-15 ENCOUNTER — HOSPITAL ENCOUNTER (OUTPATIENT)
Dept: PET IMAGING | Facility: HOSPITAL | Age: 80
Discharge: HOME | End: 2025-04-15
Payer: MEDICARE

## 2025-04-15 DIAGNOSIS — C85.99 ORBITAL LYMPHOMA: ICD-10-CM

## 2025-04-15 PROCEDURE — 34310000005 FLUDEOXYGLUCOSE F18 SOLUTION: Performed by: INTERNAL MEDICINE

## 2025-04-15 PROCEDURE — A9552 F18 FDG: HCPCS | Performed by: INTERNAL MEDICINE

## 2025-04-15 PROCEDURE — 78815 PET IMAGE W/CT SKULL-THIGH: CPT

## 2025-04-15 RX ADMIN — FLUDEOXYGLUCOSE F 18 1 DOSE: 200 INJECTION, SOLUTION INTRAVENOUS at 14:50

## 2025-04-16 RX ORDER — RIFAXIMIN 550 MG/1
550 TABLET ORAL EVERY 12 HOURS SCHEDULED
Qty: 62 TABLET | Refills: 10 | Status: SHIPPED | OUTPATIENT
Start: 2025-04-16

## 2025-06-08 ENCOUNTER — APPOINTMENT (OUTPATIENT)
Dept: GENERAL RADIOLOGY | Facility: HOSPITAL | Age: 80
DRG: 871 | End: 2025-06-08
Payer: OTHER GOVERNMENT

## 2025-06-08 ENCOUNTER — HOSPITAL ENCOUNTER (INPATIENT)
Facility: HOSPITAL | Age: 80
LOS: 4 days | Discharge: HOME | DRG: 871 | End: 2025-06-13
Attending: EMERGENCY MEDICINE | Admitting: INTERNAL MEDICINE
Payer: OTHER GOVERNMENT

## 2025-06-08 DIAGNOSIS — R13.12 OROPHARYNGEAL DYSPHAGIA: ICD-10-CM

## 2025-06-08 DIAGNOSIS — A41.9 SEPSIS, DUE TO UNSPECIFIED ORGANISM, UNSPECIFIED WHETHER ACUTE ORGAN DYSFUNCTION PRESENT: Primary | ICD-10-CM

## 2025-06-08 DIAGNOSIS — R26.2 DIFFICULTY WALKING: ICD-10-CM

## 2025-06-08 DIAGNOSIS — J30.1 SEASONAL ALLERGIC RHINITIS DUE TO POLLEN: ICD-10-CM

## 2025-06-08 DIAGNOSIS — E87.20 LACTIC ACIDOSIS: ICD-10-CM

## 2025-06-08 DIAGNOSIS — J18.9 PNEUMONIA DUE TO INFECTIOUS ORGANISM, UNSPECIFIED LATERALITY, UNSPECIFIED PART OF LUNG: ICD-10-CM

## 2025-06-08 DIAGNOSIS — D69.6 THROMBOCYTOPENIA: ICD-10-CM

## 2025-06-08 DIAGNOSIS — J96.91 RESPIRATORY FAILURE WITH HYPOXIA, UNSPECIFIED CHRONICITY: ICD-10-CM

## 2025-06-08 DIAGNOSIS — R53.1 WEAKNESS GENERALIZED: ICD-10-CM

## 2025-06-08 DIAGNOSIS — Z78.9 DECREASED ACTIVITIES OF DAILY LIVING (ADL): ICD-10-CM

## 2025-06-08 LAB
ARTERIAL PATENCY WRIST A: POSITIVE
ATMOSPHERIC PRESS: 738.6 MMHG
BASE EXCESS BLDA CALC-SCNC: -8.9 MMOL/L (ref -2–2)
BDY SITE: ABNORMAL
CA-I BLDA-SCNC: 1.05 MMOL/L (ref 1.13–1.32)
CHLORIDE BLDA-SCNC: 102 MMOL/L (ref 98–107)
D-LACTATE SERPL-SCNC: 4.6 MMOL/L
GAS FLOW AIRWAY: 6 LPM
GLUCOSE BLDC GLUCOMTR-MCNC: 239 MG/DL (ref 70–99)
HCO3 BLDA-SCNC: 14.6 MMOL/L (ref 22–26)
HCT VFR BLD CALC: 29 % (ref 38–51)
HEMODILUTION: NO
HGB BLDA-MCNC: 9.9 G/DL (ref 12–18)
HOLD SPECIMEN: NORMAL
HOLD SPECIMEN: NORMAL
INHALED O2 CONCENTRATION: 44 %
Lab: ABNORMAL
Lab: ABNORMAL
MODALITY: ABNORMAL
NOTIFIED WHO: ABNORMAL
NOTIFIED WHO: ABNORMAL
PCO2 BLDA: 23.8 MM HG (ref 35–45)
PH BLDA: 7.4 PH UNITS (ref 7.35–7.45)
PO2 BLD: 132 MM[HG] (ref 0–500)
PO2 BLDA: 57.9 MM HG (ref 80–100)
POTASSIUM BLDA-SCNC: 4.5 MMOL/L (ref 3.5–5)
READ BACK: YES
READ BACK: YES
SAO2 % BLDCOA: 90.4 % (ref 95–99)
SODIUM BLD-SCNC: 128 MMOL/L (ref 131–143)
WHOLE BLOOD HOLD COAG: NORMAL
WHOLE BLOOD HOLD SPECIMEN: NORMAL

## 2025-06-08 PROCEDURE — 99291 CRITICAL CARE FIRST HOUR: CPT

## 2025-06-08 PROCEDURE — 83880 ASSAY OF NATRIURETIC PEPTIDE: CPT | Performed by: EMERGENCY MEDICINE

## 2025-06-08 PROCEDURE — 86850 RBC ANTIBODY SCREEN: CPT | Performed by: EMERGENCY MEDICINE

## 2025-06-08 PROCEDURE — 80053 COMPREHEN METABOLIC PANEL: CPT | Performed by: EMERGENCY MEDICINE

## 2025-06-08 PROCEDURE — 71045 X-RAY EXAM CHEST 1 VIEW: CPT

## 2025-06-08 PROCEDURE — 86900 BLOOD TYPING SEROLOGIC ABO: CPT | Performed by: EMERGENCY MEDICINE

## 2025-06-08 PROCEDURE — 84484 ASSAY OF TROPONIN QUANT: CPT | Performed by: EMERGENCY MEDICINE

## 2025-06-08 PROCEDURE — 80051 ELECTROLYTE PANEL: CPT

## 2025-06-08 PROCEDURE — 82948 REAGENT STRIP/BLOOD GLUCOSE: CPT

## 2025-06-08 PROCEDURE — 93010 ELECTROCARDIOGRAM REPORT: CPT | Performed by: INTERNAL MEDICINE

## 2025-06-08 PROCEDURE — 83605 ASSAY OF LACTIC ACID: CPT

## 2025-06-08 PROCEDURE — 82330 ASSAY OF CALCIUM: CPT

## 2025-06-08 PROCEDURE — 25810000003 SODIUM CHLORIDE 0.9 % SOLUTION: Performed by: EMERGENCY MEDICINE

## 2025-06-08 PROCEDURE — 85007 BL SMEAR W/DIFF WBC COUNT: CPT | Performed by: EMERGENCY MEDICINE

## 2025-06-08 PROCEDURE — 85025 COMPLETE CBC W/AUTO DIFF WBC: CPT | Performed by: EMERGENCY MEDICINE

## 2025-06-08 PROCEDURE — 82803 BLOOD GASES ANY COMBINATION: CPT

## 2025-06-08 PROCEDURE — 36600 WITHDRAWAL OF ARTERIAL BLOOD: CPT

## 2025-06-08 PROCEDURE — 86901 BLOOD TYPING SEROLOGIC RH(D): CPT | Performed by: EMERGENCY MEDICINE

## 2025-06-08 PROCEDURE — 93005 ELECTROCARDIOGRAM TRACING: CPT | Performed by: EMERGENCY MEDICINE

## 2025-06-08 PROCEDURE — 94799 UNLISTED PULMONARY SVC/PX: CPT

## 2025-06-08 PROCEDURE — 86738 MYCOPLASMA ANTIBODY: CPT | Performed by: NURSE PRACTITIONER

## 2025-06-08 RX ORDER — SODIUM CHLORIDE 0.9 % (FLUSH) 0.9 %
10 SYRINGE (ML) INJECTION AS NEEDED
Status: DISCONTINUED | OUTPATIENT
Start: 2025-06-08 | End: 2025-06-13 | Stop reason: HOSPADM

## 2025-06-08 RX ADMIN — SODIUM CHLORIDE 1000 ML: 9 INJECTION, SOLUTION INTRAVENOUS at 23:06

## 2025-06-09 ENCOUNTER — APPOINTMENT (OUTPATIENT)
Dept: CT IMAGING | Facility: HOSPITAL | Age: 80
DRG: 871 | End: 2025-06-09
Payer: OTHER GOVERNMENT

## 2025-06-09 PROBLEM — A41.9 SEPSIS: Status: ACTIVE | Noted: 2025-06-09

## 2025-06-09 LAB
ABO GROUP BLD: NORMAL
ABO GROUP BLD: NORMAL
ALBUMIN SERPL-MCNC: 3.2 G/DL (ref 3.5–5.2)
ALBUMIN SERPL-MCNC: 3.8 G/DL (ref 3.5–5.2)
ALBUMIN/GLOB SERPL: 1.4 G/DL
ALBUMIN/GLOB SERPL: 1.5 G/DL
ALP SERPL-CCNC: 51 U/L (ref 39–117)
ALP SERPL-CCNC: 67 U/L (ref 39–117)
ALT SERPL W P-5'-P-CCNC: 10 U/L (ref 1–41)
ALT SERPL W P-5'-P-CCNC: 11 U/L (ref 1–41)
ANION GAP SERPL CALCULATED.3IONS-SCNC: 13.3 MMOL/L (ref 5–15)
ANION GAP SERPL CALCULATED.3IONS-SCNC: 14 MMOL/L (ref 5–15)
ANION GAP SERPL CALCULATED.3IONS-SCNC: 17.2 MMOL/L (ref 5–15)
ANISOCYTOSIS BLD QL: ABNORMAL
AST SERPL-CCNC: 15 U/L (ref 1–40)
AST SERPL-CCNC: 21 U/L (ref 1–40)
B PARAPERT DNA SPEC QL NAA+PROBE: NOT DETECTED
B PERT DNA SPEC QL NAA+PROBE: NOT DETECTED
BACTERIA UR QL AUTO: NORMAL /HPF
BASOPHILS # BLD MANUAL: 0.23 10*3/MM3 (ref 0–0.2)
BASOPHILS NFR BLD MANUAL: 1 % (ref 0–1.5)
BILIRUB SERPL-MCNC: 0.6 MG/DL (ref 0–1.2)
BILIRUB SERPL-MCNC: 0.8 MG/DL (ref 0–1.2)
BILIRUB UR QL STRIP: NEGATIVE
BLD GP AB SCN SERPL QL: NEGATIVE
BUN SERPL-MCNC: 33.7 MG/DL (ref 8–23)
BUN SERPL-MCNC: 38 MG/DL (ref 8–23)
BUN SERPL-MCNC: 39.6 MG/DL (ref 8–23)
BUN/CREAT SERPL: 13.5 (ref 7–25)
BUN/CREAT SERPL: 16 (ref 7–25)
BUN/CREAT SERPL: 16.7 (ref 7–25)
C PNEUM DNA NPH QL NAA+NON-PROBE: NOT DETECTED
CALCIUM SPEC-SCNC: 7.7 MG/DL (ref 8.6–10.5)
CALCIUM SPEC-SCNC: 7.8 MG/DL (ref 8.6–10.5)
CALCIUM SPEC-SCNC: 8.3 MG/DL (ref 8.6–10.5)
CHLORIDE SERPL-SCNC: 101 MMOL/L (ref 98–107)
CHLORIDE SERPL-SCNC: 101 MMOL/L (ref 98–107)
CHLORIDE SERPL-SCNC: 98 MMOL/L (ref 98–107)
CLARITY UR: CLEAR
CO2 SERPL-SCNC: 16 MMOL/L (ref 22–29)
CO2 SERPL-SCNC: 16.7 MMOL/L (ref 22–29)
CO2 SERPL-SCNC: 16.8 MMOL/L (ref 22–29)
COLOR UR: YELLOW
CREAT SERPL-MCNC: 2.37 MG/DL (ref 0.76–1.27)
CREAT SERPL-MCNC: 2.37 MG/DL (ref 0.76–1.27)
CREAT SERPL-MCNC: 2.49 MG/DL (ref 0.76–1.27)
D-LACTATE SERPL-SCNC: 1.8 MMOL/L (ref 0.5–2)
D-LACTATE SERPL-SCNC: 3.5 MMOL/L (ref 0.5–2)
D-LACTATE SERPL-SCNC: 6.1 MMOL/L (ref 0.5–2)
DEPRECATED RDW RBC AUTO: 54 FL (ref 37–54)
DEPRECATED RDW RBC AUTO: 54.9 FL (ref 37–54)
DEPRECATED RDW RBC AUTO: 55.1 FL (ref 37–54)
EGFRCR SERPLBLD CKD-EPI 2021: 25.9 ML/MIN/1.73
EGFRCR SERPLBLD CKD-EPI 2021: 27.5 ML/MIN/1.73
EGFRCR SERPLBLD CKD-EPI 2021: 27.5 ML/MIN/1.73
ERYTHROCYTE [DISTWIDTH] IN BLOOD BY AUTOMATED COUNT: 17.7 % (ref 12.3–15.4)
ERYTHROCYTE [DISTWIDTH] IN BLOOD BY AUTOMATED COUNT: 17.7 % (ref 12.3–15.4)
ERYTHROCYTE [DISTWIDTH] IN BLOOD BY AUTOMATED COUNT: 17.9 % (ref 12.3–15.4)
FLUAV RNA RESP QL NAA+PROBE: NOT DETECTED
FLUAV SUBTYP SPEC NAA+PROBE: NOT DETECTED
FLUBV RNA ISLT QL NAA+PROBE: NOT DETECTED
FLUBV RNA RESP QL NAA+PROBE: NOT DETECTED
GEN 5 1HR TROPONIN T REFLEX: 51 NG/L
GLOBULIN UR ELPH-MCNC: 2.3 GM/DL
GLOBULIN UR ELPH-MCNC: 2.5 GM/DL
GLUCOSE BLDC GLUCOMTR-MCNC: 108 MG/DL (ref 70–99)
GLUCOSE BLDC GLUCOMTR-MCNC: 112 MG/DL (ref 70–99)
GLUCOSE BLDC GLUCOMTR-MCNC: 140 MG/DL (ref 70–99)
GLUCOSE BLDC GLUCOMTR-MCNC: 160 MG/DL (ref 70–99)
GLUCOSE SERPL-MCNC: 154 MG/DL (ref 65–99)
GLUCOSE SERPL-MCNC: 156 MG/DL (ref 65–99)
GLUCOSE SERPL-MCNC: 209 MG/DL (ref 65–99)
GLUCOSE UR STRIP-MCNC: NEGATIVE MG/DL
HADV DNA SPEC NAA+PROBE: NOT DETECTED
HCOV 229E RNA SPEC QL NAA+PROBE: NOT DETECTED
HCOV HKU1 RNA SPEC QL NAA+PROBE: NOT DETECTED
HCOV NL63 RNA SPEC QL NAA+PROBE: NOT DETECTED
HCOV OC43 RNA SPEC QL NAA+PROBE: NOT DETECTED
HCT VFR BLD AUTO: 26.7 % (ref 37.5–51)
HCT VFR BLD AUTO: 27.3 % (ref 37.5–51)
HCT VFR BLD AUTO: 32.7 % (ref 37.5–51)
HGB BLD-MCNC: 10.2 G/DL (ref 13–17.7)
HGB BLD-MCNC: 8.5 G/DL (ref 13–17.7)
HGB BLD-MCNC: 8.7 G/DL (ref 13–17.7)
HGB UR QL STRIP.AUTO: NEGATIVE
HMPV RNA NPH QL NAA+NON-PROBE: NOT DETECTED
HOLD SPECIMEN: NORMAL
HPIV1 RNA ISLT QL NAA+PROBE: NOT DETECTED
HPIV2 RNA SPEC QL NAA+PROBE: NOT DETECTED
HPIV3 RNA NPH QL NAA+PROBE: DETECTED
HPIV4 P GENE NPH QL NAA+PROBE: NOT DETECTED
HYALINE CASTS UR QL AUTO: NORMAL /LPF
KETONES UR QL STRIP: ABNORMAL
L PNEUMO1 AG UR QL IA: NEGATIVE
LEUKOCYTE ESTERASE UR QL STRIP.AUTO: NEGATIVE
LYMPHOCYTES # BLD MANUAL: 16 10*3/MM3 (ref 0.7–3.1)
LYMPHOCYTES NFR BLD MANUAL: 3 % (ref 5–12)
M PNEUMO IGG SER IA-ACNC: NOT DETECTED
M PNEUMO IGM SER QL: NEGATIVE
MACROCYTES BLD QL SMEAR: ABNORMAL
MCH RBC QN AUTO: 26.4 PG (ref 26.6–33)
MCH RBC QN AUTO: 26.5 PG (ref 26.6–33)
MCH RBC QN AUTO: 26.9 PG (ref 26.6–33)
MCHC RBC AUTO-ENTMCNC: 31.2 G/DL (ref 31.5–35.7)
MCHC RBC AUTO-ENTMCNC: 31.8 G/DL (ref 31.5–35.7)
MCHC RBC AUTO-ENTMCNC: 31.9 G/DL (ref 31.5–35.7)
MCV RBC AUTO: 83.2 FL (ref 79–97)
MCV RBC AUTO: 84.3 FL (ref 79–97)
MCV RBC AUTO: 84.7 FL (ref 79–97)
MICROCYTES BLD QL: ABNORMAL
MONOCYTES # BLD: 0.68 10*3/MM3 (ref 0.1–0.9)
MRSA DNA SPEC QL NAA+PROBE: NORMAL
NEUTROPHILS # BLD AUTO: 5.64 10*3/MM3 (ref 1.7–7)
NEUTROPHILS NFR BLD MANUAL: 25 % (ref 42.7–76)
NITRITE UR QL STRIP: NEGATIVE
NT-PROBNP SERPL-MCNC: 1401 PG/ML (ref 0–1800)
PH UR STRIP.AUTO: 5.5 [PH] (ref 5–8)
PLATELET # BLD AUTO: 13 10*3/MM3 (ref 140–450)
PLATELET # BLD AUTO: 15 10*3/MM3 (ref 140–450)
PLATELET # BLD AUTO: 17 10*3/MM3 (ref 140–450)
PMV BLD AUTO: ABNORMAL FL
POLYCHROMASIA BLD QL SMEAR: ABNORMAL
POTASSIUM SERPL-SCNC: 4.5 MMOL/L (ref 3.5–5.2)
POTASSIUM SERPL-SCNC: 4.5 MMOL/L (ref 3.5–5.2)
POTASSIUM SERPL-SCNC: 5.1 MMOL/L (ref 3.5–5.2)
PROCALCITONIN SERPL-MCNC: 222.24 NG/ML (ref 0–0.25)
PROT SERPL-MCNC: 5.5 G/DL (ref 6–8.5)
PROT SERPL-MCNC: 6.3 G/DL (ref 6–8.5)
PROT UR QL STRIP: ABNORMAL
QT INTERVAL: 496 MS
QTC INTERVAL: 663 MS
RBC # BLD AUTO: 3.21 10*6/MM3 (ref 4.14–5.8)
RBC # BLD AUTO: 3.24 10*6/MM3 (ref 4.14–5.8)
RBC # BLD AUTO: 3.86 10*6/MM3 (ref 4.14–5.8)
RBC # UR STRIP: NORMAL /HPF
REF LAB TEST METHOD: NORMAL
RH BLD: NEGATIVE
RH BLD: NEGATIVE
RHINOVIRUS RNA SPEC NAA+PROBE: NOT DETECTED
RSV RNA NPH QL NAA+NON-PROBE: NOT DETECTED
RSV RNA RESP QL NAA+PROBE: NOT DETECTED
S PNEUM AG SPEC QL LA: NEGATIVE
SARS-COV-2 RNA RESP QL NAA+PROBE: NOT DETECTED
SARS-COV-2 RNA RESP QL NAA+PROBE: NOT DETECTED
SCAN SLIDE: NORMAL
SMALL PLATELETS BLD QL SMEAR: ABNORMAL
SMUDGE CELLS BLD QL SMEAR: ABNORMAL
SODIUM SERPL-SCNC: 131 MMOL/L (ref 136–145)
SODIUM SERPL-SCNC: 131 MMOL/L (ref 136–145)
SODIUM SERPL-SCNC: 132 MMOL/L (ref 136–145)
SP GR UR STRIP: 1.02 (ref 1–1.03)
SQUAMOUS #/AREA URNS HPF: NORMAL /HPF
T&S EXPIRATION DATE: NORMAL
TROPONIN T % DELTA: -7
TROPONIN T NUMERIC DELTA: -4 NG/L
TROPONIN T SERPL HS-MCNC: 55 NG/L
UROBILINOGEN UR QL STRIP: ABNORMAL
VARIANT LYMPHS NFR BLD MANUAL: 71 % (ref 19.6–45.3)
WBC # UR STRIP: NORMAL /HPF
WBC NRBC COR # BLD AUTO: 18.38 10*3/MM3 (ref 3.4–10.8)
WBC NRBC COR # BLD AUTO: 21.39 10*3/MM3 (ref 3.4–10.8)
WBC NRBC COR # BLD AUTO: 22.54 10*3/MM3 (ref 3.4–10.8)
WHOLE BLOOD HOLD SPECIMEN: NORMAL

## 2025-06-09 PROCEDURE — 74176 CT ABD & PELVIS W/O CONTRAST: CPT

## 2025-06-09 PROCEDURE — 85027 COMPLETE CBC AUTOMATED: CPT | Performed by: INTERNAL MEDICINE

## 2025-06-09 PROCEDURE — 99291 CRITICAL CARE FIRST HOUR: CPT | Performed by: STUDENT IN AN ORGANIZED HEALTH CARE EDUCATION/TRAINING PROGRAM

## 2025-06-09 PROCEDURE — 94761 N-INVAS EAR/PLS OXIMETRY MLT: CPT

## 2025-06-09 PROCEDURE — 94669 MECHANICAL CHEST WALL OSCILL: CPT

## 2025-06-09 PROCEDURE — 0202U NFCT DS 22 TRGT SARS-COV-2: CPT | Performed by: NURSE PRACTITIONER

## 2025-06-09 PROCEDURE — 84145 PROCALCITONIN (PCT): CPT | Performed by: NURSE PRACTITIONER

## 2025-06-09 PROCEDURE — 86901 BLOOD TYPING SEROLOGIC RH(D): CPT

## 2025-06-09 PROCEDURE — 87449 NOS EACH ORGANISM AG IA: CPT | Performed by: STUDENT IN AN ORGANIZED HEALTH CARE EDUCATION/TRAINING PROGRAM

## 2025-06-09 PROCEDURE — 83605 ASSAY OF LACTIC ACID: CPT

## 2025-06-09 PROCEDURE — 25010000002 VANCOMYCIN 5 G RECONSTITUTED SOLUTION: Performed by: EMERGENCY MEDICINE

## 2025-06-09 PROCEDURE — 86900 BLOOD TYPING SEROLOGIC ABO: CPT

## 2025-06-09 PROCEDURE — 94799 UNLISTED PULMONARY SVC/PX: CPT

## 2025-06-09 PROCEDURE — 81001 URINALYSIS AUTO W/SCOPE: CPT | Performed by: NURSE PRACTITIONER

## 2025-06-09 PROCEDURE — 25810000003 SODIUM CHLORIDE 0.9 % SOLUTION: Performed by: EMERGENCY MEDICINE

## 2025-06-09 PROCEDURE — 87641 MR-STAPH DNA AMP PROBE: CPT | Performed by: NURSE PRACTITIONER

## 2025-06-09 PROCEDURE — 80053 COMPREHEN METABOLIC PANEL: CPT | Performed by: INTERNAL MEDICINE

## 2025-06-09 PROCEDURE — 84484 ASSAY OF TROPONIN QUANT: CPT | Performed by: EMERGENCY MEDICINE

## 2025-06-09 PROCEDURE — 71250 CT THORAX DX C-: CPT

## 2025-06-09 PROCEDURE — 87070 CULTURE OTHR SPECIMN AEROBIC: CPT | Performed by: NURSE PRACTITIONER

## 2025-06-09 PROCEDURE — 63710000001 INSULIN LISPRO (HUMAN) PER 5 UNITS: Performed by: INTERNAL MEDICINE

## 2025-06-09 PROCEDURE — 87449 NOS EACH ORGANISM AG IA: CPT | Performed by: NURSE PRACTITIONER

## 2025-06-09 PROCEDURE — 87205 SMEAR GRAM STAIN: CPT | Performed by: NURSE PRACTITIONER

## 2025-06-09 PROCEDURE — 25010000002 ACETAMINOPHEN 10 MG/ML SOLUTION: Performed by: NURSE PRACTITIONER

## 2025-06-09 PROCEDURE — 25010000002 CEFEPIME PER 500 MG: Performed by: NURSE PRACTITIONER

## 2025-06-09 PROCEDURE — 25010000002 ACETAMINOPHEN 10 MG/ML SOLUTION: Performed by: INTERNAL MEDICINE

## 2025-06-09 PROCEDURE — 82948 REAGENT STRIP/BLOOD GLUCOSE: CPT

## 2025-06-09 PROCEDURE — 25010000002 CEFEPIME PER 500 MG: Performed by: EMERGENCY MEDICINE

## 2025-06-09 PROCEDURE — 87637 SARSCOV2&INF A&B&RSV AMP PRB: CPT | Performed by: EMERGENCY MEDICINE

## 2025-06-09 PROCEDURE — 87040 BLOOD CULTURE FOR BACTERIA: CPT | Performed by: EMERGENCY MEDICINE

## 2025-06-09 PROCEDURE — 36415 COLL VENOUS BLD VENIPUNCTURE: CPT

## 2025-06-09 RX ORDER — SODIUM CHLORIDE 0.9 % (FLUSH) 0.9 %
10 SYRINGE (ML) INJECTION AS NEEDED
Status: DISCONTINUED | OUTPATIENT
Start: 2025-06-09 | End: 2025-06-13 | Stop reason: HOSPADM

## 2025-06-09 RX ORDER — BISACODYL 5 MG/1
5 TABLET, DELAYED RELEASE ORAL DAILY PRN
Status: DISCONTINUED | OUTPATIENT
Start: 2025-06-09 | End: 2025-06-13 | Stop reason: HOSPADM

## 2025-06-09 RX ORDER — DOXYCYCLINE 100 MG/1
100 CAPSULE ORAL EVERY 12 HOURS SCHEDULED
Status: DISCONTINUED | OUTPATIENT
Start: 2025-06-09 | End: 2025-06-10

## 2025-06-09 RX ORDER — FINASTERIDE 5 MG/1
5 TABLET, FILM COATED ORAL DAILY
Status: DISCONTINUED | OUTPATIENT
Start: 2025-06-09 | End: 2025-06-13 | Stop reason: HOSPADM

## 2025-06-09 RX ORDER — LEVOTHYROXINE SODIUM 112 UG/1
224 TABLET ORAL
Status: DISCONTINUED | OUTPATIENT
Start: 2025-06-09 | End: 2025-06-09

## 2025-06-09 RX ORDER — FLUTICASONE PROPIONATE 50 MCG
2 SPRAY, SUSPENSION (ML) NASAL DAILY
Status: DISCONTINUED | OUTPATIENT
Start: 2025-06-09 | End: 2025-06-13 | Stop reason: HOSPADM

## 2025-06-09 RX ORDER — LEVOTHYROXINE SODIUM 200 UG/1
200 TABLET ORAL
COMMUNITY

## 2025-06-09 RX ORDER — ONDANSETRON 2 MG/ML
4 INJECTION INTRAMUSCULAR; INTRAVENOUS EVERY 6 HOURS PRN
Status: DISCONTINUED | OUTPATIENT
Start: 2025-06-09 | End: 2025-06-13 | Stop reason: HOSPADM

## 2025-06-09 RX ORDER — MIDODRINE HYDROCHLORIDE 5 MG/1
2.5 TABLET ORAL DAILY
Status: DISCONTINUED | OUTPATIENT
Start: 2025-06-09 | End: 2025-06-09

## 2025-06-09 RX ORDER — MONTELUKAST SODIUM 10 MG/1
10 TABLET ORAL NIGHTLY
Status: DISCONTINUED | OUTPATIENT
Start: 2025-06-09 | End: 2025-06-09

## 2025-06-09 RX ORDER — BISACODYL 10 MG
10 SUPPOSITORY, RECTAL RECTAL DAILY PRN
Status: DISCONTINUED | OUTPATIENT
Start: 2025-06-09 | End: 2025-06-13 | Stop reason: HOSPADM

## 2025-06-09 RX ORDER — INSULIN GLARGINE 100 [IU]/ML
15 INJECTION, SOLUTION SUBCUTANEOUS NIGHTLY
Status: DISCONTINUED | OUTPATIENT
Start: 2025-06-09 | End: 2025-06-13 | Stop reason: HOSPADM

## 2025-06-09 RX ORDER — POLYETHYLENE GLYCOL 3350 17 G/17G
17 POWDER, FOR SOLUTION ORAL DAILY PRN
Status: DISCONTINUED | OUTPATIENT
Start: 2025-06-09 | End: 2025-06-13 | Stop reason: HOSPADM

## 2025-06-09 RX ORDER — PANTOPRAZOLE SODIUM 40 MG/1
40 TABLET, DELAYED RELEASE ORAL DAILY
Status: DISCONTINUED | OUTPATIENT
Start: 2025-06-09 | End: 2025-06-13 | Stop reason: HOSPADM

## 2025-06-09 RX ORDER — FUROSEMIDE 20 MG/1
10 TABLET ORAL EVERY OTHER DAY
COMMUNITY

## 2025-06-09 RX ORDER — ACETAMINOPHEN 10 MG/ML
1000 INJECTION, SOLUTION INTRAVENOUS ONCE
Status: COMPLETED | OUTPATIENT
Start: 2025-06-09 | End: 2025-06-09

## 2025-06-09 RX ORDER — AMOXICILLIN 250 MG
2 CAPSULE ORAL 2 TIMES DAILY
Status: DISCONTINUED | OUTPATIENT
Start: 2025-06-09 | End: 2025-06-13 | Stop reason: HOSPADM

## 2025-06-09 RX ORDER — ASPIRIN 81 MG/1
81 TABLET ORAL DAILY
Status: DISCONTINUED | OUTPATIENT
Start: 2025-06-09 | End: 2025-06-11

## 2025-06-09 RX ORDER — NITROGLYCERIN 0.4 MG/1
0.4 TABLET SUBLINGUAL
Status: DISCONTINUED | OUTPATIENT
Start: 2025-06-09 | End: 2025-06-09

## 2025-06-09 RX ORDER — FUROSEMIDE 20 MG/1
20 TABLET ORAL EVERY OTHER DAY
Status: DISCONTINUED | OUTPATIENT
Start: 2025-06-09 | End: 2025-06-13 | Stop reason: HOSPADM

## 2025-06-09 RX ORDER — TAMSULOSIN HYDROCHLORIDE 0.4 MG/1
0.4 CAPSULE ORAL DAILY
Status: DISCONTINUED | OUTPATIENT
Start: 2025-06-09 | End: 2025-06-09

## 2025-06-09 RX ORDER — CLOTRIMAZOLE AND BETAMETHASONE DIPROPIONATE 10; .64 MG/G; MG/G
1 CREAM TOPICAL 2 TIMES DAILY
Status: DISCONTINUED | OUTPATIENT
Start: 2025-06-09 | End: 2025-06-09

## 2025-06-09 RX ORDER — SODIUM CHLORIDE 9 MG/ML
40 INJECTION, SOLUTION INTRAVENOUS AS NEEDED
Status: DISCONTINUED | OUTPATIENT
Start: 2025-06-09 | End: 2025-06-13 | Stop reason: HOSPADM

## 2025-06-09 RX ORDER — INSULIN LISPRO 100 [IU]/ML
10 INJECTION, SOLUTION INTRAVENOUS; SUBCUTANEOUS
Status: DISCONTINUED | OUTPATIENT
Start: 2025-06-09 | End: 2025-06-10

## 2025-06-09 RX ORDER — ALUMINA, MAGNESIA, AND SIMETHICONE 2400; 2400; 240 MG/30ML; MG/30ML; MG/30ML
15 SUSPENSION ORAL EVERY 6 HOURS PRN
Status: DISCONTINUED | OUTPATIENT
Start: 2025-06-09 | End: 2025-06-13 | Stop reason: HOSPADM

## 2025-06-09 RX ORDER — ATORVASTATIN CALCIUM 20 MG/1
20 TABLET, FILM COATED ORAL DAILY
Status: DISCONTINUED | OUTPATIENT
Start: 2025-06-09 | End: 2025-06-09

## 2025-06-09 RX ORDER — SODIUM CHLORIDE 0.9 % (FLUSH) 0.9 %
10 SYRINGE (ML) INJECTION EVERY 12 HOURS SCHEDULED
Status: DISCONTINUED | OUTPATIENT
Start: 2025-06-09 | End: 2025-06-13 | Stop reason: HOSPADM

## 2025-06-09 RX ORDER — ACETAMINOPHEN 10 MG/ML
1000 INJECTION, SOLUTION INTRAVENOUS EVERY 8 HOURS PRN
Status: DISCONTINUED | OUTPATIENT
Start: 2025-06-09 | End: 2025-06-13 | Stop reason: HOSPADM

## 2025-06-09 RX ORDER — LACTULOSE 10 G/15ML
30 SOLUTION ORAL 2 TIMES DAILY PRN
COMMUNITY

## 2025-06-09 RX ADMIN — CEFEPIME 2000 MG: 2 INJECTION, POWDER, FOR SOLUTION INTRAVENOUS at 21:58

## 2025-06-09 RX ADMIN — MUPIROCIN 1 APPLICATION: 20 OINTMENT TOPICAL at 21:58

## 2025-06-09 RX ADMIN — Medication 10 ML: at 08:09

## 2025-06-09 RX ADMIN — FINASTERIDE 5 MG: 5 TABLET, FILM COATED ORAL at 08:05

## 2025-06-09 RX ADMIN — FUROSEMIDE 20 MG: 20 TABLET ORAL at 08:05

## 2025-06-09 RX ADMIN — FLUTICASONE PROPIONATE 2 SPRAY: 50 SPRAY, METERED NASAL at 08:07

## 2025-06-09 RX ADMIN — CEFEPIME 2000 MG: 2 INJECTION, POWDER, FOR SOLUTION INTRAVENOUS at 08:05

## 2025-06-09 RX ADMIN — ASPIRIN 81 MG: 81 TABLET, COATED ORAL at 08:05

## 2025-06-09 RX ADMIN — CLOTRIMAZOLE AND BETAMETHASONE DIPROPIONATE 1 APPLICATION: 10; .5 CREAM TOPICAL at 08:07

## 2025-06-09 RX ADMIN — SODIUM CHLORIDE 2250 MG: 9 INJECTION, SOLUTION INTRAVENOUS at 01:26

## 2025-06-09 RX ADMIN — MUPIROCIN 1 APPLICATION: 20 OINTMENT TOPICAL at 08:05

## 2025-06-09 RX ADMIN — ACETAMINOPHEN 1000 MG: 10 INJECTION INTRAVENOUS at 12:55

## 2025-06-09 RX ADMIN — DOCUSATE SODIUM 50 MG AND SENNOSIDES 8.6 MG 2 TABLET: 8.6; 5 TABLET, FILM COATED ORAL at 08:05

## 2025-06-09 RX ADMIN — PANTOPRAZOLE SODIUM 40 MG: 40 TABLET, DELAYED RELEASE ORAL at 08:43

## 2025-06-09 RX ADMIN — DOXYCYCLINE 100 MG: 100 CAPSULE ORAL at 08:43

## 2025-06-09 RX ADMIN — ACETAMINOPHEN 1000 MG: 10 INJECTION INTRAVENOUS at 02:28

## 2025-06-09 RX ADMIN — ACETAMINOPHEN 1000 MG: 10 INJECTION INTRAVENOUS at 21:58

## 2025-06-09 RX ADMIN — INSULIN LISPRO 10 UNITS: 100 INJECTION, SOLUTION INTRAVENOUS; SUBCUTANEOUS at 08:44

## 2025-06-09 RX ADMIN — SODIUM CHLORIDE 1000 ML: 9 INJECTION, SOLUTION INTRAVENOUS at 00:02

## 2025-06-09 RX ADMIN — Medication 10 ML: at 21:58

## 2025-06-09 RX ADMIN — DOXYCYCLINE 100 MG: 100 CAPSULE ORAL at 21:58

## 2025-06-09 RX ADMIN — CEFEPIME 2000 MG: 2 INJECTION, POWDER, FOR SOLUTION INTRAVENOUS at 00:50

## 2025-06-09 RX ADMIN — INSULIN LISPRO 10 UNITS: 100 INJECTION, SOLUTION INTRAVENOUS; SUBCUTANEOUS at 12:53

## 2025-06-09 NOTE — CONSULTS
Pulmonary / Critical Care Consult Note      Patient Name: Cosmo Gauthier  : 1945  MRN: [id number]  Primary Care Physician:  Alex Buckley MD  Referring Physician: Seven Saldana MD  Date of admission: 2025    Subjective   Subjective     Reason for Consult/ Chief Complaint:   Acute hypoxic respiratory failure    HPI:  Cosmo Gauthier is a 79 y.o. male with history of lymphoma presented to the ED after falling at home.  He reported feeling dizzy, weak and fatigued.  He also reported some shortness of breath.  No fever, chills, cough, sputum production.  In the ED patient was found to be severely hypoxic.  He was placed on Airvo.  Currently on 55 L / 60% FiO2.  Labs significant for pancytopenia with severe thrombocytopenia.  Creatinine 2.3.  Looks like patient baseline is around 1.7-2.  Lactic acid was initially 5, now improving.  Chest imaging showed right middle lobe/right lower lobe dense consolidation consistent with pneumonia.  This morning patient is doing better.  He reports cough that is mostly nonproductive.  No fever, chills    Review of Systems  Constitutional symptoms: + Weakness/fatigue ;denied other complaints   Cardiovascular:  Denied complaints  Respiratory: + Cough, no sputum production denied complaints  Gastrointestinal: Denied complaints  Neurologic: Denied complaints    Personal History     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      Primary osteoarthritis of left knee     Sleep apnea     Sleep apnea     Thrombocytopenia 05/22/2018    Thyroid disease        Past Surgical History:   Procedure Laterality Date    CARDIAC SURGERY      COLONOSCOPY  2015    COLONOSCOPY N/A 9/3/2024    Procedure: COLONOSCOPY FOR SCREENING WITH COLD SNARE;  Surgeon: Chris Bradshaw MD;  Location: [facility] ENDOSCOPY;  Service: Gastroenterology;  Laterality: N/A;  COLON POLYPS    CORONARY ANGIOPLASTY WITH STENT PLACEMENT      CORONARY ARTERY BYPASS GRAFT N/A 05/20/2021    Procedure: MIDLINE STERNOTOMY,CORONARY ARTERY BYPASS GRAFTING X 5, UTILIZING THE LEFT COMPA AND LEFT SAPHENOUS VEIN, ABHIJEET, PRP;  Surgeon: Jr Tom Tubbs MD;  Location: [facility] OR;  Service: Cardiothoracic;  Laterality: N/A;    ENDOSCOPY      ENDOSCOPY N/A 1/24/2024    Procedure: ESOPHAGOGASTRODUODENOSCOPY;  Surgeon: Chris Bradshaw MD;  Location: [facility] ENDOSCOPY;  Service: Gastroenterology;  Laterality: N/A;  hiatal hernia, schatzki's ring    ENDOSCOPY N/A 1/29/2024    Procedure: ESOPHAGOGASTRODUODENOSCOPY with balloon dilaitation 12-15cm;  Surgeon: Chris Bradshaw MD;  Location: [facility] ENDOSCOPY;  Service: Gastroenterology;  Laterality: N/A;  hiatal hernia, schatskis ring    ENDOSCOPY N/A 2/17/2025    Procedure: ESOPHAGOGASTRODUODENOSCOPY WITH BIOPSIES;  Surgeon: Chris Bradshaw MD;  Location: [facility] ENDOSCOPY;  Service: Gastroenterology;  Laterality: N/A;  GASTRITIS/HIATAL HERNIA    HERNIA REPAIR      JOINT REPLACEMENT      SHOULDER SURGERY      SHOULDER REPAIR    TOTAL KNEE ARTHROPLASTY      TRANSESOPHAGEAL ECHOCARDIOGRAM (ABHIJEET) N/A 05/20/2021    Procedure: TRANSESOPHAGEAL ECHOCARDIOGRAM WITH ANESTHESIA;  Surgeon: Jr Tom Tubbs MD;  Location: [facility] OR;  Service: Cardiothoracic;  Laterality: N/A;       Family History: family history includes Colon cancer (age of onset: 65) in his father; Diabetes in his mother. Otherwise pertinent FHx was reviewed and not  pertinent to current issue.    Social History:  reports that he has never smoked. He has never used smokeless tobacco. He reports that he does not currently use alcohol. He reports that he does not use drugs.    Home Medications:  Insulin Lispro (1 Unit Dial), amitriptyline, aspirin, clotrimazole-betamethasone, finasteride, fluticasone, furosemide, insulin glargine, lactulose, levothyroxine, metFORMIN, midodrine, montelukast, nitroglycerin, pantoprazole, riFAXIMin, sertraline, simvastatin, and tamsulosin    Allergies:  No Known Allergies    Objective    Objective     Vitals:   Temp:  [99 °F (37.2 °C)-103.4 °F (39.7 °C)] 100.4 °F (38 °C)  Heart Rate:  [102-123] 103  Resp:  [23-33] 31  BP: ()/(44-87) 102/54  Flow (L/min) (Oxygen Therapy):  [15-55] 55    Physical Exam:  Vital Signs Reviewed   General:  Alert, NAD. Lying in bed   HEENT:  PERRL, EOMI.  OP  Neck:  Supple, no JVD, no thyromegaly  Chest: Diminished to auscultation bilaterally, no work of breathing noted on Airvo  CV: RRR, no MGR, pulses 2+, equal.  Abd:  Soft, NT, ND, + BS, no HSM, overweight  EXT:  no clubbing, no cyanosis, no edema  Neuro:  A&Ox3, CN grossly intact, no focal deficits.  Skin: No rashes or lesions noted    Result Review    Result Review:  I have personally reviewed the results from the time of this admission to 6/9/2025 08:11 EDT and agree with these findings:  []  Laboratory  []  Microbiology  []  Radiology  []  EKG/Telemetry   []  Cardiology/Vascular   []  Pathology  []  Old records  []  Other:  Most notable findings include:     Assessment & Plan   Assessment / Plan     Active Hospital Problems:  Active Hospital Problems    Diagnosis     **Sepsis          Impression:  Acute hypoxic respiratory failure requiring Airvo  Multifocal pneumonia, unspecified organism  Pancytopenia  Lactic acidosis  MARKUS on CKD  Recent falls  NSTEMI  Elevated procalcitonin  History of lymphoma    Plan:  Continue Airvo and supplemental oxygen to keep SpO2  greater than 90%  Continue with broad-spectrum antibiotics with Vanco and cefepime.  Will add doxycycline to cover atypicals  Trend lactic acid until clearance  Monitor for bleeding.  May need blood transfusions  May need bronchoscopy but unstable to get this at this time  Pressors: None needed at this time  Cont aspiration precautions. Keep HOB 30 deg.   Replace electrolytes PRN to keep K 4.0, Mag 2.0, Phos 4.0.  Keep glucose 140-180 while in ICU. Cont SSI.  Transfuse to keep Hgb >7  DVT ppx: None due to profound thrombocytopenia  GI ppx: On PPI  Lines: Peripheral IVs      VTE Prophylaxis:  No VTE prophylaxis order currently exists.       Code Status and Medical Interventions: CPR (Attempt to Resuscitate); Full Support   Ordered at: 06/09/25 0718     Code Status (Patient has no pulse and is not breathing):    CPR (Attempt to Resuscitate)     Medical Interventions (Patient has pulse or is breathing):    Full Support     Level Of Support Discussed With:    Patient      The patient is critically ill in the ICU with acute hypoxic respiratory failure on Airvo, pancytopenia, MARKUS, lactic acidosis with a history of lymphoma. Multidisciplinary bedside critical care rounds were performed with nursing staff, respiratory therapy, pharmacy, nutritional services, social work. I have personally reviewed the chart, labs and any pertinent imaging available.  I have spent 35 minutes of critical care time, excluding procedures, in the care of this patient.    Electronically signed by Carlos Linn MD, 06/09/25, 8:11 AM EDT.

## 2025-06-09 NOTE — PROGRESS NOTES
[facility] Clinical Pharmacy Services: Vancomycin Pharmacokinetic Initial Consult Note    Cosmo Gauthier is a 79 y.o. male who is on day 1 of pharmacy to dose vancomycin for Empiric.    Consult Information  Consulting Provider: VINCENZO Camilo  Planned Duration of Therapy: 7 days  Was Patient Receiving Prior to Admission/Consult?: No  Loading Dose Ordered or Given: 2250 mg on 6/9 at 0126  PK/PD Target: Dose by Levels  Other Antimicrobials: Cefepime    Imaging Reviewed?: Yes    Microbiology Data  MRSA PCR performed: 06/09/25; Result: Negative  Culture/Source:   Microbiology Results (last 10 days)       Procedure Component Value - Date/Time    MRSA Screen, PCR (Inpatient) - Swab, Nares [[id number]]  (Normal) Collected: 06/09/25 0904    Lab Status: Final result Specimen: Swab from Nares Updated: 06/09/25 1043     MRSA PCR No MRSA Detected    Narrative:      The negative predictive value of this diagnostic test is high and should only be used to consider de-escalating anti-MRSA therapy. A positive result may indicate colonization with MRSA and must be correlated clinically.    Respiratory Panel PCR w/COVID-19(SARS-CoV-2) GINO/ANG/UCHE/PAD/COR/NAIN In-House, NP Swab in UTM/VTM, 2 HR TAT - Swab, Nasopharynx [[id number]]  (Abnormal) Collected: 06/09/25 0904    Lab Status: Final result Specimen: Swab from Nasopharynx Updated: 06/09/25 1036     ADENOVIRUS, PCR Not Detected     Coronavirus 229E Not Detected     Coronavirus HKU1 Not Detected     Coronavirus NL63 Not Detected     Coronavirus OC43 Not Detected     COVID19 Not Detected     Human Metapneumovirus Not Detected     Human Rhinovirus/Enterovirus Not Detected     Influenza A PCR Not Detected     Influenza B PCR Not Detected     Parainfluenza Virus 1 Not Detected     Parainfluenza Virus 2 Not Detected     Parainfluenza Virus 3 Detected     Parainfluenza Virus 4 Not Detected     RSV, PCR Not Detected     Bordetella pertussis pcr Not Detected     Bordetella parapertussis PCR  Not Detected     Chlamydophila pneumoniae PCR Not Detected     Mycoplasma pneumo by PCR Not Detected    Narrative:      In the setting of a positive respiratory panel with a viral infection PLUS a negative procalcitonin without other underlying concern for bacterial infection, consider observing off antibiotics or discontinuation of antibiotics and continue supportive care. If the respiratory panel is positive for atypical bacterial infection (Bordetella pertussis, Chlamydophila pneumoniae, or Mycoplasma pneumoniae), consider antibiotic de-escalation to target atypical bacterial infection.    COVID PRE-OP / PRE-PROCEDURE SCREENING ORDER (NO ISOLATION) - Swab, Nasopharynx [[id number]]  (Normal) Collected: 06/09/25 0127    Lab Status: Final result Specimen: Swab from Nasopharynx Updated: 06/09/25 0223    Narrative:      The following orders were created for panel order COVID PRE-OP / PRE-PROCEDURE SCREENING ORDER (NO ISOLATION) - Swab, Nasopharynx.  Procedure                               Abnormality         Status                     ---------                               -----------         ------                     COVID-19, FLU A/B, RSV P...[[id number]]  Normal              Final result                 Please view results for these tests on the individual orders.    COVID-19, FLU A/B, RSV PCR 1 HR TAT - Swab, Nasopharynx [[id number]]  (Normal) Collected: 06/09/25 0127    Lab Status: Final result Specimen: Swab from Nasopharynx Updated: 06/09/25 0223     COVID19 Not Detected     Influenza A PCR Not Detected     Influenza B PCR Not Detected     RSV, PCR Not Detected    Narrative:      Fact sheet for providers: https://www.fda.gov/media/036165/download    Fact sheet for patients: https://www.fda.gov/media/931879/download    Test performed by PCR.    Mycoplasma Pneumoniae Antibody, IgM - Blood, [[id number]]  (Normal) Collected: 06/08/25 2303    Lab Status: Final result Specimen: Blood Updated: 06/09/25 0804      "Mycoplasma pneumo IgM Negative              Vitals/Labs  Ht: 182.9 cm (72\"); Wt: 110 kg (242 lb 15.2 oz)  Temp (24hrs), Av.9 °F (38.8 °C), Min:99 °F (37.2 °C), Max:103.4 °F (39.7 °C)   Estimated Creatinine Clearance: 33.4 mL/min (A) (by C-G formula based on SCr of 2.37 mg/dL (H)).     Results from last 7 days   Lab Units 25  0723 25  0429 25  2303   CREATININE mg/dL  --  2.37*  2.37* 2.49*   WBC 10*3/mm3 21.39* 18.38* 22.54*     Assessment/Plan:    Vancomycin Dose: Pulse dosing s/t renal function.   Vanc Random ordered for 6/10 at 0600  Patient has order for Basic Metabolic Panel    Pharmacy will follow patient's kidney function and will adjust doses and obtain levels as necessary. Thank you for involving pharmacy in this patient's care. Please contact pharmacy with any questions or concerns.                           Deanna Andre  Clinical Pharmacist    "

## 2025-06-09 NOTE — THERAPY EVALUATION
RT EQUIPMENT DEVICE RELATED - SKIN ASSESSMENT    RT Medical Equipment/Device:     High Flow Nasal Cannula:Airvo    Skin Assessment:      Cheek:  Intact  Nose:  Intact    Device Skin Pressure Protection:  Pressure points protected    Nurse Notification:  Stacey Gonzales, RRT

## 2025-06-09 NOTE — Clinical Note
Respiratory Therapist Broncho-Pulmonary Hygiene Progress Note      Patient Name:  Cosmo Gauthier  YOB: 1945    Cosmo Gauthier meets the qualification for Level 2 of the Bronco-Pulmonary Hygiene Protocol. This was based on my daily patient assessment and includes review of chest x-ray results, cough ability and quality, oxygenation, secretions or risk for secretion development and patient mobility.     Broncho-Pulmonary Hygiene Assessment:    Level of Movement: Actively changing positions without assistance  Alert/ oriented/ cooperative    Breath Sounds: Diminished and/or coarse rhonchi    Cough: Ineffective, weak, frequent    Chest X-Ray: Mild consolidation and/or atelectasis.    Sputum Productions: None or small amount of thin or watery secretions with effective cough    History and Physical: {BPH Assessment H and P:96971}    SpO2 to Oxygen Need: {[facility] Assessment SpO2 to O2:02211}    Current SpO2 is: *** on ***    Based on this information, I have completed the following interventions: {[facility] Intervention:21256}      Electronically signed by Codie Vargas RRT, 06/09/25, 12:10 PM EDT.

## 2025-06-09 NOTE — H&P
[facility]   HISTORY AND PHYSICAL    Patient Name: Cosmo Gauthier  : 1945  MRN: [id number]  Primary Care Physician:  Alex Buckley MD  Date of admission: 2025    Subjective   Subjective     Chief Complaint: Patient is 79 years old white male patient was brought to the hospital because of marked respiratory distress and hypoxia has been started on nasal cannula Airvo at this time patient appears to have stabilized feeling much better alert and oriented he does have right lower lobe pneumonia CT scan will be scheduled recently has received Rituxan therapy for lymphoma platelets somewhat improved    HPI: Seen with currently treated with lymph for lymphoma    Cosmo Gauthier is a 79 y.o. male admitted with pneumonia which probably causes hypoxia    Review of Systems   All systems were reviewed and negative except for: Has been reviewed    Personal History     Past Medical History:   Diagnosis Date   • Anxiety and depression    • Arthritis    • Chronic back pain    • Cirrhosis    • Coronary artery disease    • Dementia    • Depression    • Diabetes mellitus    • Disease of thyroid gland    • Elevated cholesterol    • Family history of colon cancer    • Fatty liver    • Gastric ulcer    • GERD (gastroesophageal reflux disease)    • Heart disease    • High cholesterol    • Hypertension    • Hyperthyroidism    • Knee pain    • Lymphoma     History of lymphoma, has been in remission   • Memory change 10/18/2017   • 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    • Primary osteoarthritis of left knee    • Sleep apnea    • Sleep apnea    • Thrombocytopenia 2018   • Thyroid disease        Past Surgical History:   Procedure Laterality Date   • CARDIAC SURGERY     • COLONOSCOPY     • COLONOSCOPY N/A 9/3/2024    Procedure: COLONOSCOPY FOR SCREENING WITH COLD SNARE;  Surgeon: Chris Bradshaw MD;  Location: [facility] ENDOSCOPY;  Service: Gastroenterology;  Laterality: N/A;  COLON POLYPS    • CORONARY ANGIOPLASTY WITH STENT PLACEMENT     • CORONARY ARTERY BYPASS GRAFT N/A 05/20/2021    Procedure: MIDLINE STERNOTOMY,CORONARY ARTERY BYPASS GRAFTING X 5, UTILIZING THE LEFT COMPA AND LEFT SAPHENOUS VEIN, ABHIJEET, PRP;  Surgeon: Jr Tom Tubbs MD;  Location: [facility];  Service: Cardiothoracic;  Laterality: N/A;   • ENDOSCOPY     • ENDOSCOPY N/A 1/24/2024    Procedure: ESOPHAGOGASTRODUODENOSCOPY;  Surgeon: Chris Bradshaw MD;  Location: [facility] ENDOSCOPY;  Service: Gastroenterology;  Laterality: N/A;  hiatal hernia, schatzki's ring   • ENDOSCOPY N/A 1/29/2024    Procedure: ESOPHAGOGASTRODUODENOSCOPY with balloon dilaitation 12-15cm;  Surgeon: Chris Bradshaw MD;  Location: [facility] ENDOSCOPY;  Service: Gastroenterology;  Laterality: N/A;  hiatal hernia, schatskis ring   • ENDOSCOPY N/A 2/17/2025    Procedure: ESOPHAGOGASTRODUODENOSCOPY WITH BIOPSIES;  Surgeon: Chris Bradshaw MD;  Location: [facility] ENDOSCOPY;  Service: Gastroenterology;  Laterality: N/A;  GASTRITIS/HIATAL HERNIA   • HERNIA REPAIR     • JOINT REPLACEMENT     • SHOULDER SURGERY      SHOULDER REPAIR   • TOTAL KNEE ARTHROPLASTY     • TRANSESOPHAGEAL ECHOCARDIOGRAM (ABHIJEET) N/A 05/20/2021    Procedure: TRANSESOPHAGEAL ECHOCARDIOGRAM WITH ANESTHESIA;  Surgeon: Jr Tom Tubbs MD;  Location: [facility];  Service: Cardiothoracic;  Laterality: N/A;       Family History: family history includes Colon cancer (age of onset: 65) in his father; Diabetes in his mother. Otherwise pertinent FHx was reviewed and not pertinent to current issue.    Social History:  reports that he has never smoked. He has never used smokeless tobacco. He reports that he does not currently use alcohol. He reports that he does not use drugs.    Home Medications:  Insulin Lispro (1 Unit Dial), amitriptyline, aspirin, clotrimazole-betamethasone, finasteride, fluticasone, furosemide, insulin glargine, lactulose, levothyroxine, metFORMIN, midodrine,  montelukast, nitroglycerin, pantoprazole, riFAXIMin, sertraline, simvastatin, and tamsulosin      Allergies:  No Known Allergies    Objective   Objective     Vitals:   Temp:  [99 °F (37.2 °C)-103.4 °F (39.7 °C)] 100.4 °F (38 °C)  Heart Rate:  [102-123] 103  Resp:  [23-33] 31  BP: ()/(44-87) 102/54  Flow (L/min) (Oxygen Therapy):  [15-55] 55  Physical Exam    Constitutional: Alert and oriented not in acute distress   Eyes: Pupils equal reacting to light and accommodation   HENT: Normocephalic   Neck: No lymphadenopathy   Respiratory: No rales or rhonchi   Cardiovascular: S1-S2 normal no S3 or S4   Gastrointestinal: No palpable organomegaly   Musculoskeletal: No deformities   Neurologic: No localizing signs  Skin: No rash    Result Review    Result Review:  I have personally reviewed the results from the time of this admission to 6/9/2025 07:59 EDT and agree with these findings:  [x]  Laboratory  []  Microbiology  [x]  Radiology  []  EKG/Telemetry   []  Cardiology/Vascular   []  Pathology  []  Old records  []  Other:  Most notable findings include: Has been reviewed and discussed    Assessment & Plan   Assessment / Plan     Brief Patient Summary:  Cosmo Gauthier is a 79 y.o. male who patient with pneumonia with diminished breath sounds in the right side    Active Hospital Problems:  Active Hospital Problems    Diagnosis    • **Sepsis        Plan:   IV antibiotics    VTE Prophylaxis:  No VTE prophylaxis order currently exists.        CODE STATUS:    Code Status (Patient has no pulse and is not breathing): CPR (Attempt to Resuscitate)  Medical Interventions (Patient has pulse or is breathing): Full Support  Level Of Support Discussed With: Patient      Admission Status:  I believe this patient meets inpatient status.    Electronically signed by Seven Saldana MD, 06/09/25, 7:59 AM EDT.      Part of this note may be an electronic transcription/translation of spoken language to printed text using the Dragon  Dictation System.

## 2025-06-09 NOTE — PROGRESS NOTES
Respiratory Therapist Broncho-Pulmonary Hygiene Progress Note      Patient Name:  Cosmo Gauthier  YOB: 1945    Cosmo Gauthier meets the qualification for Level 2 of the Bronco-Pulmonary Hygiene Protocol. This was based on my daily patient assessment and includes review of chest x-ray results, cough ability and quality, oxygenation, secretions or risk for secretion development and patient mobility.     Broncho-Pulmonary Hygiene Assessment:    Level of Movement: Actively changing positions without assistance  Alert/ oriented/ cooperative    Breath Sounds: Diminished and/or coarse rhonchi    Cough: Strong, effective    Chest X-Ray: Mild consolidation and/or atelectasis.    Sputum Productions: None or small amount of thin or watery secretions with effective cough    History and Physical: Chronic condition    SpO2 to Oxygen Need: greater than 90% on  greater than 6L, Vapotherm, oxygen mask greater than 40% or ventilator    Current SpO2 is: 94 on 50L, 62% airvo    Based on this information, I have completed the following interventions: CPT (precussor)      Electronically signed by Codie Vargas RRT, 06/09/25, 12:19 PM EDT.

## 2025-06-09 NOTE — PLAN OF CARE
Goal Outcome Evaluation:           Progress: improving  Outcome Evaluation: Pt admitted to ICU from ED. febrile upon arrival, IV tylenol given per MD order with improvement in temperature. remains on airvo for oxygenation needs, tolerating well. VSS

## 2025-06-09 NOTE — ED PROVIDER NOTES
Time: 11:17 PM EDT  Date of encounter:  6/8/2025  Independent Historian/Clinical History and Information was obtained by:   Patient and Family    History is limited by: N/A    Chief Complaint: weakness      History of Present Illness:  Patient is a 79 y.o. year old male who presents to the emergency department for evaluation of Weakness.  Patient has a history of lymphoma.  Per family he has been extremely weak.  They were told his platelet count is extremely low.  This evening he slid down the bed.  He denies hitting his head or loss of consciousness.  Patient was so weak he was unable to get up.  O2 sat in the low 80s on arrival.  Patient reports cough and congestion and sore throat.  Denies any abdominal pain.  No nausea vomiting.  Last treatment for lymphoma was several weeks ago.      Patient Care Team  Primary Care Provider: Alex Buckley MD    Past Medical History:     No Known Allergies  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;  Service: Gastroenterology;  Laterality: N/A;  COLON POLYPS    CORONARY  ANGIOPLASTY WITH STENT PLACEMENT      CORONARY ARTERY BYPASS GRAFT N/A 05/20/2021    Procedure: MIDLINE STERNOTOMY,CORONARY ARTERY BYPASS GRAFTING X 5, UTILIZING THE LEFT COMPA AND LEFT SAPHENOUS VEIN, ABHIJEET, PRP;  Surgeon: Jr Tom Tubbs MD;  Location: [facility];  Service: Cardiothoracic;  Laterality: N/A;    ENDOSCOPY      ENDOSCOPY N/A 1/24/2024    Procedure: ESOPHAGOGASTRODUODENOSCOPY;  Surgeon: Chris Bradshaw MD;  Location: [facility] ENDOSCOPY;  Service: Gastroenterology;  Laterality: N/A;  hiatal hernia, schatzki's ring    ENDOSCOPY N/A 1/29/2024    Procedure: ESOPHAGOGASTRODUODENOSCOPY with balloon dilaitation 12-15cm;  Surgeon: Chris Bradshaw MD;  Location: [facility] ENDOSCOPY;  Service: Gastroenterology;  Laterality: N/A;  hiatal hernia, schatskis ring    ENDOSCOPY N/A 2/17/2025    Procedure: ESOPHAGOGASTRODUODENOSCOPY WITH BIOPSIES;  Surgeon: Chris Bradshaw MD;  Location: [facility] ENDOSCOPY;  Service: Gastroenterology;  Laterality: N/A;  GASTRITIS/HIATAL HERNIA    HERNIA REPAIR      JOINT REPLACEMENT      SHOULDER SURGERY      SHOULDER REPAIR    TOTAL KNEE ARTHROPLASTY      TRANSESOPHAGEAL ECHOCARDIOGRAM (ABHIJEET) N/A 05/20/2021    Procedure: TRANSESOPHAGEAL ECHOCARDIOGRAM WITH ANESTHESIA;  Surgeon: Jr Tom Tubbs MD;  Location: [facility];  Service: Cardiothoracic;  Laterality: N/A;     Family History   Problem Relation Age of Onset    Diabetes Mother     Colon cancer Father 65       Home Medications:  Prior to Admission medications    Medication Sig Start Date End Date Taking? Authorizing Provider   amitriptyline (ELAVIL) 50 MG tablet Take 1 tablet by mouth Every Night.    Provider, MD Michael   aspirin 81 MG EC tablet Take 1 tablet by mouth Daily.    Provider, MD Michael   clotrimazole-betamethasone (LOTRISONE) 1-0.05 % cream Apply 1 Application topically to the appropriate area as directed 2 (Two) Times a Day. Apply to affected area twice daily 2/12/25   Reyna  Hunter Tejeda DPM   finasteride (PROSCAR) 5 MG tablet Take 1 tablet by mouth Daily. 6/1/21   Jr Tom Tubbs MD   fluticasone (FLONASE) 50 MCG/ACT nasal spray 2 sprays into the nostril(s) as directed by provider Daily.  Patient not taking: Reported on 1/13/2025 4/11/24   Rosa Maria Saha MD   furosemide (LASIX) 40 MG tablet Take 0.5 tablets by mouth Every Other Day. 12/21/23   Betty Rodríguez APRN   insulin glargine (LANTUS, SEMGLEE) 100 UNIT/ML injection Inject 15 Units under the skin into the appropriate area as directed Every Night. 7/25/23   Alex Buckley MD   Insulin Lispro, 1 Unit Dial, (HumaLOG KwikPen) 100 UNIT/ML solution pen-injector 150-200 = 4 units,  201-250 = 6 units,  251-300 = 8 units,  301-350 = 10 units,  >350 = 14 units 7/2/24   Alex Buckley MD   lactulose (CHRONULAC) 10 GM/15ML solution Take 30 mL by mouth 2 (Two) Times a Day. 7/15/24   Donna Castro APRN   levothyroxine (SYNTHROID, LEVOTHROID) 112 MCG tablet Take 2 tablets by mouth Take As Directed. Takes two tablets every day Mon - Sat. Takes 1-1/2 tablets on Sunday. 6/17/24   Alex Buckley MD   metFORMIN (GLUCOPHAGE) 1000 MG tablet Take 1 tablet by mouth 2 (Two) Times a Day With Meals.    ProviderMichael MD   midodrine (PROAMATINE) 2.5 MG tablet Take 1 tablet by mouth Daily. 8/17/23   Betty Rodríguez APRN   montelukast (Singulair) 10 MG tablet Take 1 tablet by mouth Every Night. 4/11/24   Rosa Maria Saha MD   nitroglycerin (NITROSTAT) 0.4 MG SL tablet Place 1 tablet under the tongue Every 5 (Five) Minutes As Needed. 8/16/21   Michael Snowden MD   pantoprazole (PROTONIX) 40 MG EC tablet Take 1 tablet by mouth Daily. 7/15/24   Donna Castro APRN   sertraline (ZOLOFT) 100 MG tablet Take 2 tablets by mouth Daily. 12/10/24   Alex Buckley MD   simvastatin (ZOCOR) 40 MG tablet Take 0.5 tablets by mouth Every Night.    Provider, MD Michael   tamsulosin  "(FLOMAX) 0.4 MG capsule 24 hr capsule Take 1 capsule by mouth Daily. 2/20/21   Jr Tom Tubbs MD   Xifaxan 550 MG tablet TAKE 1 TABLET BY MOUTH EVERY 12  HOURS 4/16/25   Donna Castro, APRN        Social History:   Social History     Tobacco Use    Smoking status: Never    Smokeless tobacco: Never   Vaping Use    Vaping status: Never Used   Substance Use Topics    Alcohol use: Not Currently     Comment: QUIT DRINKING 32 YEARS AGO    Drug use: Never         Review of Systems:  Review of Systems   Constitutional:  Positive for activity change and appetite change.   HENT:  Positive for sore throat.    Respiratory:  Positive for cough and shortness of breath.    Skin:  Positive for pallor.   Neurological:  Positive for weakness.        Physical Exam:  /56   Pulse 108   Temp 99 °F (37.2 °C) (Oral)   Resp 26   Ht 182.9 cm (72\")   Wt 111 kg (244 lb 11.4 oz)   SpO2 92%   BMI 33.19 kg/m²     Physical Exam  Constitutional:       Appearance: Normal appearance.   HENT:      Head: Normocephalic and atraumatic.      Nose: Nose normal.      Mouth/Throat:      Mouth: Mucous membranes are moist.   Eyes:      Extraocular Movements: Extraocular movements intact.      Conjunctiva/sclera: Conjunctivae normal.      Pupils: Pupils are equal, round, and reactive to light.   Cardiovascular:      Rate and Rhythm: Regular rhythm. Tachycardia present.      Pulses: Normal pulses.      Heart sounds: Normal heart sounds.   Pulmonary:      Effort: Pulmonary effort is normal.      Breath sounds: Rhonchi present.   Abdominal:      General: There is no distension.      Palpations: Abdomen is soft.      Tenderness: There is no abdominal tenderness.   Musculoskeletal:         General: Normal range of motion.      Cervical back: Normal range of motion.   Skin:     General: Skin is warm and dry.      Capillary Refill: Capillary refill takes less than 2 seconds.   Neurological:      General: No focal deficit present.      " Mental Status: He is alert and oriented to person, place, and time. Mental status is at baseline.   Psychiatric:         Mood and Affect: Mood normal.         Behavior: Behavior normal.                    Medical Decision Making:      Comorbidities that affect care:    Cirrhosis, thrombocytopenia, Cancer, Coronary Artery Disease, Diabetes, Thyroid Disease    External Notes reviewed:    Previous Clinic Note: Patient seen by his PCP on 3/5/2025 for Medicare annual well visit, diabetes, chronic kidney disease, hypertension, hypothyroidism      The following orders were placed and all results were independently analyzed by me:  Orders Placed This Encounter   Procedures    Blood Culture - Blood,    Blood Culture - Blood,    COVID PRE-OP / PRE-PROCEDURE SCREENING ORDER (NO ISOLATION) - Swab, Nasopharynx    COVID-19, FLU A/B, RSV PCR 1 HR TAT - Swab, Nasopharynx    XR Chest 1 View    Arterial Blood Gas,H&H,Lytes,Lactate    [place] Draw    Comprehensive Metabolic Panel    BNP    High Sensitivity Troponin T    CBC Auto Differential    Blood Gas, Arterial -    STAT Lactic Acid, Reflex    Scan Slide    Manual Differential    High Sensitivity Troponin T 1Hr    STAT Lactic Acid, Reflex    Urinalysis With Culture If Indicated - Urine, Clean Catch    NPO Diet NPO Type: Strict NPO    Undress & Gown    Continuous Pulse Oximetry    Vital Signs    IP General Consult (Use specialty-specific consult if known)    Oxygen Therapy- Nasal Cannula; Titrate 1-6 LPM Per SpO2; 90 - 95%    POC Glucose Once    POC Lactate    POC Electrolyte Panel    ECG 12 Lead ED Triage Standing Order; SOA    Type & Screen    ABO RH Specimen Verification    Insert Peripheral IV    Inpatient Admission    CBC & Differential    Green Top (Gel)    Lavender Top    Gold Top - SST    Light Blue Top       Medications Given in the Emergency Department:  Medications   sodium chloride 0.9 % flush 10 mL (has no administration in time range)   vancomycin 2250 mg/500 mL 0.9%  NS IVPB ([facility]) (has no administration in time range)   cefepime 2000 mg IVPB in 100 mL NS (VTB) (2,000 mg Intravenous New Bag 6/9/25 0050)   sodium chloride 0.9 % bolus 1,000 mL (0 mL Intravenous Stopped 6/8/25 2342)   sodium chloride 0.9 % bolus 1,000 mL (1,000 mL Intravenous New Bag 6/9/25 0002)        ED Course:    ED Course as of 06/09/25 0117 Mon Jun 09, 2025   0117 ECG 12 Lead ED Triage Standing Order; SOA  Sinus tachycardia with rate of 107.  Artifact present.  Prolonged QT interval.  No definite ST elevation.  EKG interpreted by me [LD]      ED Course User Index  [LD] Americo Lemos MD       Labs:    Lab Results (last 24 hours)       Procedure Component Value Units Date/Time    POC Glucose Once [[id number]]  (Abnormal) Collected: 06/08/25 2258    Specimen: Arterial Blood Updated: 06/08/25 2302     Glucose 239 mg/dL      Comment: Serial Number: [id number]:  [id number]       Blood Gas, Arterial - [[id number]]  (Abnormal) Collected: 06/08/25 2258    Specimen: Arterial Blood Updated: 06/08/25 2302     Site Right Radial     Jaun's Test Positive     pH, Arterial 7.397 pH units      pCO2, Arterial 23.8 mm Hg      pO2, Arterial 57.9 mm Hg      HCO3, Arterial 14.6 mmol/L      Base Excess, Arterial -8.9 mmol/L      Comment: Serial Number: [id number]:  [id number]        O2 Saturation, Arterial 90.4 %      Hemoglobin, Blood Gas 9.9 g/dL      Hematocrit, Blood Gas 29.0 %      Barometric Pressure for Blood Gas 738.6000 mmHg      Modality Cannula     FIO2 44 %      Flow Rate 6.0000 lpm      Notified Who Dr Vargas     Read Back Yes     Notified Time --     Hemodilution No     PO2/FIO2 132    POC Lactate [[id number]]  (Abnormal) Collected: 06/08/25 2258    Specimen: Arterial Blood Updated: 06/08/25 2302     Lactate 4.6 mmol/L      Comment: Serial Number: [id number]:  [id number]        Notified Time --     Notified Who Dr Vargas     Read Back Yes    POC Electrolyte Panel [[id number]]  (Abnormal) Collected: 06/08/25 2258     Specimen: Arterial Blood Updated: 06/08/25 2302     Sodium 128 mmol/L      POC Potassium 4.5 mmol/L      Chloride 102 mmol/L      Ionized Calcium 1.05 mmol/L      Comment: Serial Number: [id number]:  [id number]       CBC & Differential [[id number]]  (Abnormal) Collected: 06/08/25 2303    Specimen: Blood Updated: 06/09/25 0012    Narrative:      The following orders were created for panel order CBC & Differential.  Procedure                               Abnormality         Status                     ---------                               -----------         ------                     CBC Auto Differential[[id number]]        Abnormal            Final result               Scan Slide[[id number]]                                       Final result                 Please view results for these tests on the individual orders.    Comprehensive Metabolic Panel [[id number]]  (Abnormal) Collected: 06/08/25 2303    Specimen: Blood Updated: 06/09/25 0003     Glucose 209 mg/dL      BUN 33.7 mg/dL      Creatinine 2.49 mg/dL      Sodium 132 mmol/L      Potassium 5.1 mmol/L      Chloride 98 mmol/L      CO2 16.8 mmol/L      Calcium 8.3 mg/dL      Total Protein 6.3 g/dL      Albumin 3.8 g/dL      ALT (SGPT) 11 U/L      AST (SGOT) 15 U/L      Alkaline Phosphatase 67 U/L      Total Bilirubin 0.8 mg/dL      Globulin 2.5 gm/dL      A/G Ratio 1.5 g/dL      BUN/Creatinine Ratio 13.5     Anion Gap 17.2 mmol/L      eGFR 25.9 mL/min/1.73     Narrative:      GFR Categories in Chronic Kidney Disease (CKD)              GFR Category          GFR (mL/min/1.73)    Interpretation  G1                    90 or greater        Normal or high (1)  G2                    60-89                Mild decrease (1)  G3a                   45-59                Mild to moderate decrease  G3b                   30-44                Moderate to severe decrease  G4                    15-29                Severe decrease  G5                    14 or less           Kidney  failure    (1)In the absence of evidence of kidney disease, neither GFR category G1 or G2 fulfill the criteria for CKD.    eGFR calculation 2021 CKD-EPI creatinine equation, which does not include race as a factor    BNP [[id number]]  (Normal) Collected: 06/08/25 2303    Specimen: Blood Updated: 06/09/25 0006     proBNP 1,401.0 pg/mL     Narrative:      This assay is used as an aid in the diagnosis of individuals suspected of having heart failure. It can be used as an aid in the diagnosis of acute decompensated heart failure (ADHF) in patients presenting with signs and symptoms of ADHF to the emergency department (ED). In addition, NT-proBNP of <300 pg/mL indicates ADHF is not likely.    Age Range Result Interpretation  NT-proBNP Concentration (pg/mL:      <50             Positive            >450                   Gray                 300-450                    Negative             <300    50-75           Positive            >900                  Gray                300-900                  Negative            <300      >75             Positive            >1800                  Gray                300-1800                  Negative            <300    High Sensitivity Troponin T [[id number]]  (Abnormal) Collected: 06/08/25 2303    Specimen: Blood Updated: 06/09/25 0021     HS Troponin T 55 ng/L     Narrative:      High Sensitive Troponin T Reference Range:  <14.0 ng/L- Negative Female for AMI  <22.0 ng/L- Negative Male for AMI  >=14 - Abnormal Female indicating possible myocardial injury.  >=22 - Abnormal Male indicating possible myocardial injury.   Clinicians would have to utilize clinical acumen, EKG, Troponin, and serial changes to determine if it is an Acute Myocardial Infarction or myocardial injury due to an underlying chronic condition.         CBC Auto Differential [[id number]]  (Abnormal) Collected: 06/08/25 2303    Specimen: Blood Updated: 06/09/25 0012     WBC 22.54 10*3/mm3      RBC 3.86 10*6/mm3       Hemoglobin 10.2 g/dL      Hematocrit 32.7 %      MCV 84.7 fL      MCH 26.4 pg      MCHC 31.2 g/dL      RDW 17.9 %      RDW-SD 54.9 fl      MPV --     Comment: Unable to report due to sample interference        Platelets 17 10*3/mm3     STAT Lactic Acid, Reflex [[id number]]  (Abnormal) Collected: 06/08/25 2303    Specimen: Blood Updated: 06/09/25 0021     Lactate 6.1 mmol/L     Scan Slide [[id number]] Collected: 06/08/25 2303    Specimen: Blood Updated: 06/09/25 0012     Scan Slide --     Comment: See Manual Differential Results       Manual Differential [[id number]]  (Abnormal) Collected: 06/08/25 2303    Specimen: Blood Updated: 06/09/25 0012     Neutrophil % 25.0 %      Lymphocyte % 71.0 %      Monocyte % 3.0 %      Basophil % 1.0 %      Neutrophils Absolute 5.64 10*3/mm3      Lymphocytes Absolute 16.00 10*3/mm3      Monocytes Absolute 0.68 10*3/mm3      Basophils Absolute 0.23 10*3/mm3      Anisocytosis Slight/1+     Macrocytes Slight/1+     Microcytes Slight/1+     Polychromasia Slight/1+     Smudge Cells Slight/1+     Platelet Estimate Decreased    Blood Culture - Blood, Arm, Right [[id number]] Collected: 06/09/25 0024    Specimen: Blood from Arm, Right Updated: 06/09/25 0029    Blood Culture - Blood, Arm, Right [[id number]] Collected: 06/09/25 0024    Specimen: Blood from Arm, Right Updated: 06/09/25 0028             Imaging:    XR Chest 1 View  Result Date: 6/8/2025  AP PORTABLE CHEST  HISTORY: Shortness of air.  COMPARISON: 10/11/2023  TECHNIQUE: AP portable chest x-ray.  FINDINGS: Cardiac and mediastinal contours are normal. Patient is status post CABG. The left lung is clear. There are infiltrates in the right mid to lower lung compatible with pneumonia. No pneumothorax is identified. There is degenerative disease in the shoulders.      Right mid to lower lung pneumonia. Follow-up until clear recommended.  6/8/2025 11:16 PM by Dr. Cosmo Martínez MD on Workstation: [employer]          Differential Diagnosis and  Discussion:    Weakness: Based on the patient's history, signs, and symptoms, the diffential diagnosis includes but is not limited to meningitis, stroke, sepsis, subarachnoid hemorrhage, intracranial bleeding, encephalitis, acute uti, dehydration, MS, myasthenia gravis, Guillan [place], migraine variant, neuromuscular disorders vertigo, electrolyte imbalance, and metabolic disorders.    PROCEDURES:    Labs were collected in the emergency department and all labs were reviewed and interpreted by me.  X-ray were performed in the emergency department and all X-ray impressions were independently interpreted by me.  An EKG was performed and the EKG was interpreted by me.    ECG 12 Lead ED Triage Standing Order; SOA   Preliminary Result   HEART TAVQ=581  bpm   RR Cemdzswx=616  ms   MN Interval=63  ms   P Horizontal Axis=  deg   P Front Axis=0  deg   QRSD Sdqjqfsk=951  ms   QT Ejmvpjco=936  ms   XPmQ=507  ms   QRS Axis=66  deg   T Wave Axis=75  deg   - ABNORMAL ECG -   Sinus tachycardia   Prolonged QT interval   Date and Time of Study:2025-06-08 22:57:43          Procedures    MDM  Number of Diagnoses or Management Options  Diagnosis management comments: Patient presented emergency department with generalized weakness.  Patient also reports cough.  O2 sat in the 80s.  Patient was placed on Airvo.  Patient was also hypotensive.  He was given IV fluids.  Labs showed elevated white count of 22.  Patient also had elevated creatinine 2.5 concerning for acute kidney injury.  Lactic acid elevated to 6.  Patient was started on broad-spectrum antibiotics.  X-ray concerning for pneumonia.  Discussed patient with hospitalist and will admit for further care.       Amount and/or Complexity of Data Reviewed  Clinical lab tests: reviewed  Tests in the radiology section of CPT®: reviewed  Review and summarize past medical records: yes  Independent visualization of images, tracings, or specimens: yes                   Sepsis criteria was met  in the emergency department and the Sepsis protocol (including antibiotic administration) was initiated.      SIRS criteria considered:   1.  Temperature > 100.4 or <96.8    2.  Heart Rate > 90    3.  Respiratory Rate > 22    4.  WBC > 12K or <4K.             Severe Sepsis:     Respiratory: Mechanical Ventilation or Bipap  Hypotension: SBP > 90 or MAP < 65  Renal: Creatinine > 2  Metabolic: Lactic Acid > 2  Hematologic: Platelets < 100K or INR > 1.5  Hepatic: BILI  >  2  CNS: Sudden AMS     Septic Shock:     Severe Sepsis + Persistent hypotension or Lactic Acid > 4     Normal saline bolus, Antibiotics, and final disposition was based on these definitions.        Sepsis was recognized at 0030    Antibiotics were ordered.     30 mL/kg bolus was indicated.       Patient did not receive the recommended 30 mL/kg fluid bolus for sepsis because it would be harmful or detrimental to the patient.    The patient has Concern for Fluid Overload.   The patient was ordered 2L of fluids.  Patient was reassessed after fluid bolus.    Total Critical Care time of 45 minutes. Total critical care time documented does not include time spent on separately billed procedures for services of nurses or physician assistants. I personally saw and examined the patient. I have reviewed all diagnostic interpretations and treatment plans as written. I was present for the key portions of any procedures performed and the inclusive time noted in any critical care statement. Critical care time includes patient management by me, time spent at the patients bedside,  time to review lab and imaging results, discussing patient care, documentation in the medical record, and time spent with family or caregiver.    Patient Care Considerations:          Consultants/Shared Management Plan:    Hospitalist: I have discussed the case with Dr Saldana who agrees to accept the patient for admission.    Social Determinants of Health:    Patient has presented with  family members who are responsible, reliable and will ensure follow up care.      Disposition and Care Coordination:    Admit:   Through independent evaluation of the patient's history, physical, and imperical data, the patient meets criteria for inpatient admission to the hospital.        Final diagnoses:   Sepsis, due to unspecified organism, unspecified whether acute organ dysfunction present   Pneumonia due to infectious organism, unspecified laterality, unspecified part of lung   Thrombocytopenia   Weakness generalized   Lactic acidosis        ED Disposition       ED Disposition   Decision to Admit    Condition   --    Comment   Level of Care: Critical Care [6]   Diagnosis: Sepsis [[id number]]   Admitting Physician: ZIA WANG [[id number]]   Attending Physician: ZIA WANG [[id number]]   Certification: I Certify That Inpatient Hospital Services Are Medically Necessary For Greater Than 2 Midnights                 This medical record created using voice recognition software.             Americo Lemos MD  06/09/25 0117

## 2025-06-10 ENCOUNTER — APPOINTMENT (OUTPATIENT)
Dept: GENERAL RADIOLOGY | Facility: HOSPITAL | Age: 80
DRG: 871 | End: 2025-06-10
Payer: OTHER GOVERNMENT

## 2025-06-10 LAB
ALBUMIN SERPL-MCNC: 3.2 G/DL (ref 3.5–5.2)
ALBUMIN/GLOB SERPL: 1.1 G/DL
ALP SERPL-CCNC: 62 U/L (ref 39–117)
ALT SERPL W P-5'-P-CCNC: 16 U/L (ref 1–41)
ANION GAP SERPL CALCULATED.3IONS-SCNC: 11.9 MMOL/L (ref 5–15)
AST SERPL-CCNC: 29 U/L (ref 1–40)
BILIRUB SERPL-MCNC: 0.5 MG/DL (ref 0–1.2)
BUN SERPL-MCNC: 48.8 MG/DL (ref 8–23)
BUN/CREAT SERPL: 21.7 (ref 7–25)
CALCIUM SPEC-SCNC: 7.7 MG/DL (ref 8.6–10.5)
CHLORIDE SERPL-SCNC: 101 MMOL/L (ref 98–107)
CO2 SERPL-SCNC: 19.1 MMOL/L (ref 22–29)
CREAT SERPL-MCNC: 2.25 MG/DL (ref 0.76–1.27)
DEPRECATED RDW RBC AUTO: 53.3 FL (ref 37–54)
EGFRCR SERPLBLD CKD-EPI 2021: 29.3 ML/MIN/1.73
ERYTHROCYTE [DISTWIDTH] IN BLOOD BY AUTOMATED COUNT: 17.5 % (ref 12.3–15.4)
GLOBULIN UR ELPH-MCNC: 2.8 GM/DL
GLUCOSE BLDC GLUCOMTR-MCNC: 135 MG/DL (ref 70–99)
GLUCOSE BLDC GLUCOMTR-MCNC: 145 MG/DL (ref 70–99)
GLUCOSE BLDC GLUCOMTR-MCNC: 180 MG/DL (ref 70–99)
GLUCOSE SERPL-MCNC: 106 MG/DL (ref 65–99)
HCT VFR BLD AUTO: 26.9 % (ref 37.5–51)
HGB BLD-MCNC: 8.6 G/DL (ref 13–17.7)
INR PPP: 1.43 (ref 0.86–1.15)
MAGNESIUM SERPL-MCNC: 1.6 MG/DL (ref 1.6–2.4)
MCH RBC QN AUTO: 26.6 PG (ref 26.6–33)
MCHC RBC AUTO-ENTMCNC: 32 G/DL (ref 31.5–35.7)
MCV RBC AUTO: 83.3 FL (ref 79–97)
PHOSPHATE SERPL-MCNC: 4.1 MG/DL (ref 2.5–4.5)
PLATELET # BLD AUTO: 12 10*3/MM3 (ref 140–450)
PMV BLD AUTO: ABNORMAL FL
POTASSIUM SERPL-SCNC: 4.6 MMOL/L (ref 3.5–5.2)
PROT SERPL-MCNC: 6 G/DL (ref 6–8.5)
PROTHROMBIN TIME: 18.1 SECONDS (ref 11.8–14.9)
QT INTERVAL: 290 MS
QT INTERVAL: 364 MS
QTC INTERVAL: 448 MS
QTC INTERVAL: 468 MS
RBC # BLD AUTO: 3.23 10*6/MM3 (ref 4.14–5.8)
SODIUM SERPL-SCNC: 132 MMOL/L (ref 136–145)
VANCOMYCIN SERPL-MCNC: 12.2 MCG/ML (ref 5–40)
WBC NRBC COR # BLD AUTO: 26.57 10*3/MM3 (ref 3.4–10.8)

## 2025-06-10 PROCEDURE — 25010000002 VANCOMYCIN 1 G RECONSTITUTED SOLUTION 1 EACH VIAL: Performed by: NURSE PRACTITIONER

## 2025-06-10 PROCEDURE — 99291 CRITICAL CARE FIRST HOUR: CPT | Performed by: STUDENT IN AN ORGANIZED HEALTH CARE EDUCATION/TRAINING PROGRAM

## 2025-06-10 PROCEDURE — 94799 UNLISTED PULMONARY SVC/PX: CPT

## 2025-06-10 PROCEDURE — 63710000001 INSULIN GLARGINE PER 5 UNITS: Performed by: INTERNAL MEDICINE

## 2025-06-10 PROCEDURE — 92611 MOTION FLUOROSCOPY/SWALLOW: CPT

## 2025-06-10 PROCEDURE — 80202 ASSAY OF VANCOMYCIN: CPT | Performed by: NURSE PRACTITIONER

## 2025-06-10 PROCEDURE — 25010000002 AMIODARONE IN DEXTROSE 5% 150-4.21 MG/100ML-% SOLUTION: Performed by: SPECIALIST

## 2025-06-10 PROCEDURE — 85027 COMPLETE CBC AUTOMATED: CPT | Performed by: INTERNAL MEDICINE

## 2025-06-10 PROCEDURE — 82948 REAGENT STRIP/BLOOD GLUCOSE: CPT

## 2025-06-10 PROCEDURE — 92610 EVALUATE SWALLOWING FUNCTION: CPT

## 2025-06-10 PROCEDURE — 25010000002 CEFTRIAXONE PER 250 MG: Performed by: INTERNAL MEDICINE

## 2025-06-10 PROCEDURE — 80053 COMPREHEN METABOLIC PANEL: CPT | Performed by: INTERNAL MEDICINE

## 2025-06-10 PROCEDURE — 85610 PROTHROMBIN TIME: CPT | Performed by: INTERNAL MEDICINE

## 2025-06-10 PROCEDURE — 93005 ELECTROCARDIOGRAM TRACING: CPT | Performed by: NURSE PRACTITIONER

## 2025-06-10 PROCEDURE — 74230 X-RAY XM SWLNG FUNCJ C+: CPT

## 2025-06-10 PROCEDURE — 25010000002 AMIODARONE IN DEXTROSE 5% 360-4.14 MG/200ML-% SOLUTION: Performed by: SPECIALIST

## 2025-06-10 PROCEDURE — 93005 ELECTROCARDIOGRAM TRACING: CPT | Performed by: INTERNAL MEDICINE

## 2025-06-10 PROCEDURE — 94669 MECHANICAL CHEST WALL OSCILL: CPT

## 2025-06-10 PROCEDURE — 25810000003 SODIUM CHLORIDE 0.9 % SOLUTION 250 ML FLEX CONT: Performed by: NURSE PRACTITIONER

## 2025-06-10 PROCEDURE — 94761 N-INVAS EAR/PLS OXIMETRY MLT: CPT

## 2025-06-10 PROCEDURE — 83735 ASSAY OF MAGNESIUM: CPT | Performed by: NURSE PRACTITIONER

## 2025-06-10 PROCEDURE — 84100 ASSAY OF PHOSPHORUS: CPT | Performed by: NURSE PRACTITIONER

## 2025-06-10 PROCEDURE — 25010000002 CEFEPIME PER 500 MG: Performed by: NURSE PRACTITIONER

## 2025-06-10 RX ORDER — INSULIN LISPRO 100 [IU]/ML
2-7 INJECTION, SOLUTION INTRAVENOUS; SUBCUTANEOUS
Status: DISCONTINUED | OUTPATIENT
Start: 2025-06-10 | End: 2025-06-13 | Stop reason: HOSPADM

## 2025-06-10 RX ORDER — LEVOTHYROXINE SODIUM 100 UG/1
200 TABLET ORAL
Status: DISCONTINUED | OUTPATIENT
Start: 2025-06-10 | End: 2025-06-13 | Stop reason: HOSPADM

## 2025-06-10 RX ORDER — IBUPROFEN 600 MG/1
1 TABLET ORAL
Status: DISCONTINUED | OUTPATIENT
Start: 2025-06-10 | End: 2025-06-13 | Stop reason: HOSPADM

## 2025-06-10 RX ORDER — NICOTINE POLACRILEX 4 MG
15 LOZENGE BUCCAL
Status: DISCONTINUED | OUTPATIENT
Start: 2025-06-10 | End: 2025-06-13 | Stop reason: HOSPADM

## 2025-06-10 RX ORDER — DEXTROSE MONOHYDRATE 25 G/50ML
25 INJECTION, SOLUTION INTRAVENOUS
Status: DISCONTINUED | OUTPATIENT
Start: 2025-06-10 | End: 2025-06-13 | Stop reason: HOSPADM

## 2025-06-10 RX ADMIN — FLUTICASONE PROPIONATE 2 SPRAY: 50 SPRAY, METERED NASAL at 09:03

## 2025-06-10 RX ADMIN — DOXYCYCLINE 100 MG: 100 CAPSULE ORAL at 09:00

## 2025-06-10 RX ADMIN — BARIUM SULFATE 15 ML: 400 PASTE ORAL at 11:40

## 2025-06-10 RX ADMIN — Medication 10 ML: at 20:05

## 2025-06-10 RX ADMIN — MUPIROCIN 1 APPLICATION: 20 OINTMENT TOPICAL at 20:04

## 2025-06-10 RX ADMIN — AMIODARONE HYDROCHLORIDE 0.5 MG/MIN: 1.8 INJECTION, SOLUTION INTRAVENOUS at 17:27

## 2025-06-10 RX ADMIN — CEFTRIAXONE SODIUM 2000 MG: 2 INJECTION, POWDER, FOR SOLUTION INTRAMUSCULAR; INTRAVENOUS at 17:28

## 2025-06-10 RX ADMIN — BARIUM SULFATE 55 ML: 0.81 POWDER, FOR SUSPENSION ORAL at 11:40

## 2025-06-10 RX ADMIN — DOCUSATE SODIUM 50 MG AND SENNOSIDES 8.6 MG 2 TABLET: 8.6; 5 TABLET, FILM COATED ORAL at 09:00

## 2025-06-10 RX ADMIN — CEFEPIME 2000 MG: 2 INJECTION, POWDER, FOR SOLUTION INTRAVENOUS at 08:59

## 2025-06-10 RX ADMIN — PANTOPRAZOLE SODIUM 40 MG: 40 TABLET, DELAYED RELEASE ORAL at 09:00

## 2025-06-10 RX ADMIN — ASPIRIN 81 MG: 81 TABLET, COATED ORAL at 09:00

## 2025-06-10 RX ADMIN — VANCOMYCIN HYDROCHLORIDE 1000 MG: 1 INJECTION, POWDER, LYOPHILIZED, FOR SOLUTION INTRAVENOUS at 12:37

## 2025-06-10 RX ADMIN — AMIODARONE HYDROCHLORIDE 1 MG/MIN: 1.8 INJECTION, SOLUTION INTRAVENOUS at 12:36

## 2025-06-10 RX ADMIN — MUPIROCIN 1 APPLICATION: 20 OINTMENT TOPICAL at 09:00

## 2025-06-10 RX ADMIN — Medication 10 ML: at 09:03

## 2025-06-10 RX ADMIN — INSULIN GLARGINE 15 UNITS: 100 INJECTION, SOLUTION SUBCUTANEOUS at 20:04

## 2025-06-10 RX ADMIN — DOCUSATE SODIUM 50 MG AND SENNOSIDES 8.6 MG 2 TABLET: 8.6; 5 TABLET, FILM COATED ORAL at 20:04

## 2025-06-10 RX ADMIN — AMIODARONE HYDROCHLORIDE 150 MG: 1.5 INJECTION, SOLUTION INTRAVENOUS at 12:36

## 2025-06-10 RX ADMIN — LEVOTHYROXINE SODIUM 200 MCG: 0.05 TABLET ORAL at 09:00

## 2025-06-10 RX ADMIN — FINASTERIDE 5 MG: 5 TABLET, FILM COATED ORAL at 09:00

## 2025-06-10 NOTE — PROGRESS NOTES
RT EQUIPMENT DEVICE RELATED - SKIN ASSESSMENT    RT Medical Equipment/Device:     High Flow Nasal Cannula:Airvo    Skin Assessment:      Cheek:  Intact  Ears:  Intact  Nares:  Intact  Neck:  Intact  Nose:  Intact    Device Skin Pressure Protection:  Positioning supports utilized and Pressure points protected    Nurse Notification:  Stacey York, RRT

## 2025-06-10 NOTE — PLAN OF CARE
Goal Outcome Evaluation:              Outcome Evaluation: Patient transfered from ICU today to 4MTU, patient alert and oriented, on 5L HFNC, chairez catheter patent some bleeding noted around catheter site, amiodarone drip started, episode of tachycardia and hypotension today RRT called, MD aware, converted to NSR.

## 2025-06-10 NOTE — PROCEDURES
Pulmonary / Critical Care Progress Note      Patient Name: Cosmo Gauthier  : 1945  MRN: [id number]  Attending:  Seven Saldana MD   Date of admission: 2025    Subjective   Subjective   Patient critically ill with acute hypoxic respiratory failure, multifocal pneumonia    Over past 24 hours:  Doing fair this morning  Able to be transitioned to high flow nasal cannula, currently on 8 L  Fever trend better  2.1 L urine output over last 24 hours  However leukocytosis worsened to 26    Review of Systems  Constitutional symptoms:  Denied complaints   Cardiovascular:  Denied complaints  Respiratory: + Cough, sputum production; denied complaints  Gastrointestinal: Denied complaints  Neurologic: Denied complaints      Objective   Objective     Vitals:   Vital signs for last 24 hours:  Temp:  [98.6 °F (37 °C)-102 °F (38.9 °C)] 99.7 °F (37.6 °C)  Heart Rate:  [] 91  Resp:  [15-28] 22  BP: ()/() 132/63    Intake/Output last 3 shifts:  I/O last 3 completed shifts:  In: 190 [P.O.:240; I.V.:1661]  Out: 2100 [Urine:2100]  Intake/Output this shift:  No intake/output data recorded.    Vent settings for last 24 hours:       Hemodynamic parameters for last 24 hours:       Physical Exam   Vital Signs Reviewed   General:  Alert, NAD. Lying in bed   HEENT:  PERRL, EOMI.  OP  Chest:  Clear to auscultation bilaterally, no work of breathing noted  CV: RRR, no MGR, pulses 2+, equal.  Abd:  Soft, NT, ND, + BS  EXT:  no clubbing, no cyanosis, no edema  Neuro:  A&Ox3, CN grossly intact, no focal deficits.  Skin: No rashes or lesions noted    Result Review    Result Review:  I have personally reviewed the results from the time of this admission to 6/10/2025 10:21 EDT and agree with these findings:  []  Laboratory  []  Microbiology  []  Radiology  []  EKG/Telemetry   []  Cardiology/Vascular   []  Pathology  []  Old records  []  Other:  Most notable findings include:   -    Assessment & Plan   Assessment / Plan      Active Hospital Problems:  Active Hospital Problems    Diagnosis     **Sepsis          Impression:  Acute hypoxic respiratory failure requiring Airvo  Multifocal pneumonia, unspecified organism  Parainfluenza positive  Pancytopenia  Lactic acidosis  MARKUS on CKD  Recent falls  NSTEMI  Elevated procalcitonin  History of lymphoma     Plan:  Continue with supplemental oxygen to keep SpO2 greater than 90%  Continue with broad-spectrum antibiotics with Vanco and cefepime and doxycycline  Will try to optimize airway clearance with chest vest therapy twice daily  Encourage I-S and flutter valve use  Trend lactic acid until clearance  Will consider bronchoscopy if patient does not improve  Pressors: None needed at this time  Cont aspiration precautions. Keep HOB 30 deg.   Replace electrolytes PRN to keep K 4.0, Mag 2.0, Phos 4.0.  Keep glucose 140-180 while in ICU. Cont SSI.  Transfuse to keep Hgb >7  DVT ppx: None due to profound thrombocytopenia  GI ppx: On PPI  Lines: Peripheral IVs     VTE Prophylaxis:  No VTE prophylaxis order currently exists.        CODE STATUS:   Code Status (Patient has no pulse and is not breathing): CPR (Attempt to Resuscitate)  Medical Interventions (Patient has pulse or is breathing): Full Support  Level Of Support Discussed With: Patient    Patient is critically ill in the ICU with acute hypoxic respiratory failure, multifocal pneumonia, in the setting of pancytopenia and history of lymphoma. 35 minutes of critical care time was spent managing this patient, excluding procedures. I, Dr. Carlos Linn, spent 35  minutes of critical care time. This included personally reviewing all pertinent labs, imaging, microbiology and documentation. Also discussing the case with the patient and any available family, the admitting physician and any available ancillary staff. Electronically signed by Carlos Linn MD, 06/10/25, 10:21 AM EDT.

## 2025-06-10 NOTE — PLAN OF CARE
Goal Outcome Evaluation:      Pt presents with limitations, noted below, that impede his ability to swallow. The skills of a therapist will be required to safely and effectively implement the following treatment plan to restore maximal level of function.     PROBLEMS:  1. Dysphagia      LTG 1: 30 days: The patient will maintain adequate hydration/nutrition with optimum safety and efficiency of swallowing function on P.O. intake without overt signs and symptoms of aspiration for the highest appropriate diet level.      STG 1a: 14 days: The client will demonstrate the ability to adequately self-monitor swallowing skills and perform appropriate compensatory techniques to reduce signs and symptoms of aspiration in 100% of trials with no cues.       STG 1b: 14 days: The patient will tolerate recommended diet without signs/symptoms of aspiration.       STG 1c: 14 days: Patient/family teaching regarding diet recommendations, safe swallow strategies and signs and symptoms of aspiration.       STG 1d: 14 days: Patient will increase ability to tolerate least restrictive diet improve functional communication measure for swallowing to a 6 of 7.       STG 1e: 14 days: Patient will complete base of tongue exercises to include 5 reps and 10 second holds with no weakness noted.       STG 1f: 14 days: Patient will complete 5 sets of the Mendelson maneuver to include 1-3 reps with a rest or a drink between sets to increase pharyngeal strength and coordination to eliminate signs and symptoms of laryngeal penetration and/or aspiration.       STG 1 days: Patient will complete 5 sets of the Carolyn maneuver to include 1-3 reps with minimal cues required to complete.       STG 1h: 14 days: Patient will complete 5 sets of the effortful swallow include 1-3 reps with minimal cues required to complete.       TREATMENT: Patient will continue to be monitored to ensure that he is tolerating the recommended diet without signs/symptoms of  aspiration. Patient will participate in swallowing exercises to improve strength of swallow.      FREQUENCY/DURATION:  once daily/ 5 times a week.      REHAB POTENTIAL:  Pt has good rehab potential.  The following limitations may influence improvement/ length of tx  medical status.     RECOMMENDATIONS:   1.   DIET: Patient may continue current diet. Recommendation may change after MBSS.      2.  POSITION: Upright for oral care and when eating or drinking.     3.  COMPENSATORY STRATEGIES: Eat/drink at a slow rate and take small bites and sips.

## 2025-06-10 NOTE — MBS/VFSS/FEES
Acute Care - Speech Language Pathology   Modified Barium Swallow Study SARKIS Dhillon     Patient Name: Cosmo Gauthier  : 1945  MRN: [id number]  Today's Date: 6/10/2025               Admit Date: 2025    Visit Dx:     ICD-10-CM ICD-9-CM   1. Sepsis, due to unspecified organism, unspecified whether acute organ dysfunction present  A41.9 038.9     995.91   2. Pneumonia due to infectious organism, unspecified laterality, unspecified part of lung  J18.9 486   3. Thrombocytopenia  D69.6 287.5   4. Weakness generalized  R53.1 780.79   5. Lactic acidosis  E87.20 276.2   6. Oropharyngeal dysphagia  R13.12 787.22     Patient Active Problem List   Diagnosis    Lymphoma    Type 2 diabetes mellitus with complications    Stage 3b chronic kidney disease    Thrombocytopenia    Coronary artery disease involving native coronary artery of native heart without angina pectoris    Hx of CABG    Hyperlipemia, mixed    Hypertension, essential    Hereditary and idiopathic neuropathy, unspecified    Low lying conus medullaris    Mood disorder    Other cirrhosis of liver    Sleep apnea    Spinal stenosis of lumbar region with neurogenic claudication    Vascular dementia without behavioral disturbance    Iron deficiency anemia    Hypothyroidism, adult    Traumatic subarachnoid hemorrhage with loss of consciousness of 30 minutes or less    Morbid (severe) obesity due to excess calories    Claudication of both lower extremities    Medicare annual wellness visit, subsequent    Weakness    Hepatic encephalopathy    Secondary esophageal varices without bleeding    Esophageal dysphagia    Seasonal allergic rhinitis due to pollen    History of colon polyps    Sepsis     Past Medical History:   Diagnosis Date    Anxiety and depression     Arthritis     Chronic back pain     Cirrhosis     Coronary artery disease     Dementia     Depression     Diabetes mellitus     Disease of thyroid gland     Elevated cholesterol     Family history of colon  cancer     Fatty liver     Gastric ulcer     GERD (gastroesophageal reflux disease)     Heart disease     High cholesterol     Hypertension     Hyperthyroidism     Knee pain     Lymphoma     History of lymphoma, has been in remission    Memory change 10/18/2017    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     Primary osteoarthritis of left knee     Sleep apnea     Sleep apnea     Thrombocytopenia 05/22/2018    Thyroid disease      Past Surgical History:   Procedure Laterality Date    CARDIAC SURGERY      COLONOSCOPY  2015    COLONOSCOPY N/A 9/3/2024    Procedure: COLONOSCOPY FOR SCREENING WITH COLD SNARE;  Surgeon: Chris Bradshaw MD;  Location: [facility];  Service: Gastroenterology;  Laterality: N/A;  COLON POLYPS    CORONARY ANGIOPLASTY WITH STENT PLACEMENT      CORONARY ARTERY BYPASS GRAFT N/A 05/20/2021    Procedure: MIDLINE STERNOTOMY,CORONARY ARTERY BYPASS GRAFTING X 5, UTILIZING THE LEFT COMPA AND LEFT SAPHENOUS VEIN, ABHIJEET, PRP;  Surgeon: Jr Tom Tubbs MD;  Location: [facility];  Service: Cardiothoracic;  Laterality: N/A;    ENDOSCOPY      ENDOSCOPY N/A 1/24/2024    Procedure: ESOPHAGOGASTRODUODENOSCOPY;  Surgeon: Chris Bradshaw MD;  Location: [facility]COPY;  Service: Gastroenterology;  Laterality: N/A;  hiatal hernia, schatzki's ring    ENDOSCOPY N/A 1/29/2024    Procedure: ESOPHAGOGASTRODUODENOSCOPY with balloon dilaitation 12-15cm;  Surgeon: Chris Bradshaw MD;  Location: [facility];  Service: Gastroenterology;  Laterality: N/A;  hiatal hernia, schatskis ring    ENDOSCOPY N/A 2/17/2025    Procedure: ESOPHAGOGASTRODUODENOSCOPY WITH BIOPSIES;  Surgeon: Chris Bradshaw MD;  Location: [facility];  Service: Gastroenterology;  Laterality: N/A;  GASTRITIS/HIATAL HERNIA    HERNIA REPAIR      JOINT REPLACEMENT      SHOULDER SURGERY      SHOULDER REPAIR    TOTAL KNEE ARTHROPLASTY      TRANSESOPHAGEAL ECHOCARDIOGRAM (ABHIEJET) N/A 05/20/2021    Procedure:  TRANSESOPHAGEAL ECHOCARDIOGRAM WITH ANESTHESIA;  Surgeon: Jr Tom Tubbs MD;  Location: [facility];  Service: Cardiothoracic;  Laterality: N/A;       SLP Recommendation and Plan      MODIFIED BARIUM SWALLOW STUDY: SPEECH PATHOLOGY REPORT        DATE OF SERVICE:  06/10/2025    PERTINENT INFORMATION:  Cosmo is a 79 year old with acute hypoxic respiratory failure and multifocal pneumonia.     Cosmo was referred for an MBSS by Heather Camilo to rule out aspiration as well as to determine appropriate treatment plan for this patient.      PROCEDURE:    Cosmo was alert and cooperative.  The patient was viewed in the lateral position.  The following Ba consistencies were administered: level 0 thin liquids, level 4 pureed solids, level 6 soft and bite sized/mech-soft solids, and level 7 regular solids.  The following compensatory swallowing strategies were performed: chin tuck, double swallow, and head turn to the right.       RESULTS:    Patient was positioned upright in chair. Patient was given trials of level 0 thin liquids, level 4 pureed solids, level 6 soft and bite sized/mech-soft solids, and level 7 regular solids mixed with barium.     Level 0 Thin barium: Patient was given trial of thin barium by cup and straw.    -By cup: Premature spillage to the vallecula.  Delayed epiglottic inversion.  Mild residue in the oral cavity and piriform sinuses.  Moderate residue in the vallecula.  Residue from the oral cavity and vallecula spilling into the piriform sinuses.  Double swallow and chin tuck minimally effective in clearing residue.  Chin tuck during secondary swallow resulted in shallow penetration.  Trace retrograde flow from beneath the PES into the piriform sinuses noted.  No penetration or aspiration noted with initial and double swallow.     -By cup with head turn right: Premature spillage to the level of the piriform sinuses.  Delayed epiglottic inversion.  Trace residue in the oral cavity,  vallecula, and piriform sinuses.  No penetration or aspiration noted.   -By straw with head turn right: Premature spillage to the level of the PES.  Delayed epiglottic inversion. Mild residue in the oral cavity and piriform sinuses.  Moderate residue in the vallecula.  Residue from the oral cavity and vallecula spilling into the piriform sinuses.  Deep laryngeal penetration noted.  No aspiration noted.    Level 4 Puree solids (Applesauce): Patient was given trials of puree solids mixed with barium.  Tongue pumping noted.  Premature spillage into the vallecula.  Trace residue in the oral cavity and piriform sinuses.  Mild residue in the piriform sinuses.  Residue possibly from prior trial of thin liquids.  Possible penetration noted; however, penetration may have been due to prior trial of thin liquids.  No aspiration noted.    Level 6 Soft and Bite sized/Mechanical soft solids (Softened cracker): Patient was given trials of mech soft solids mixed with barium.  Tongue pumping noted.  Premature spillage to the vallecula.  Trace residue in the oral cavity, piriform sinuses, and posterior pharyngeal wall.  Mild residue in the vallecula.  No penetration or aspiration noted.    Level 7 Regular solids (Alex cracker): Patient was given trial of regular solid mixed with barium.  Premature spillage to the vallecula.  Trace residue in the oral cavity, piriform sinuses, and posterior pharyngeal wall.  Mild residue in the vallecula.  No penetration or aspiration noted.       IMPRESSIONS:    Cosmo demonstrated oropharyngeal dysphagia characterized by deep penetration with thin liquids via straw, residue after the swallow, delayed epiglottic inversion, tongue pumping, and reduced hyolaryngeal excursion.    In all trials, irregular bolus flow was noted.  Possible cricopharyngeal bar noted.    FUNCTIONAL DEFICIT: Patient scored level 5 of 7 on Functional Communication Measures for swallowing indicating a 25% limitation in  function for current status, goal status, and discharge status.      RECOMMENDATIONS:   1.  SLP recommends the patient consume level 0 thin liquids and level 7 regular solids with use of strategies.  2.  Patient should turn his head to the right when consuming thin liquids.  3.  Patient should alternate liquids and solids to improve pharyngeal residue.  4.  Patient would benefit from follow-up speech services to improve swallow strength to a more functional level.  5.  Physician may wish to consider further GI workup as patient has an extensive history of esophageal dilations.        Patient/responsible party agrees with the plan of care and has been informed of all alternatives, risks and benefits.    Thank you for this referral.                                                                                    EDUCATION  The patient has been educated in the following areas:   Dysphagia (Swallowing Impairment).                Time Calculation:    Time Calculation- SLP       Row Name 06/10/25 1045 06/10/25 0900          Time Calculation- SLP    SLP Start Time 1045  -AW 0900  -AW     SLP Stop Time 1215  -AW 1000  -AW     SLP Time Calculation (min) 90 min  -AW 60 min  -AW     SLP Received On -- 06/10/25  -AW        Untimed Charges    83684-YQ Eval Oral Pharyng Swallow Minutes -- 60  -AW     95352-WO Motion Fluoro Eval Swallow Minutes 90  -AW --        Total Minutes    Untimed Charges Total Minutes 90  -AW 60  -AW      Total Minutes 90  -AW 60  -AW               User Key  (r) = Recorded By, (t) = Taken By, (c) = Cosigned By      Initials Name Provider Type    Anum Vasques SLP Speech and Language Pathologist                    Therapy Charges for Today       Code Description Service Date Service Provider Modifiers Qty    76375171403 HC ST EVAL ORAL PHARYNG SWALLOW 4 6/10/2025 Anum Zavala SLP GN 1    34908369380 HC ST MOTION FLUORO EVAL SWALLOW 6 6/10/2025 Anum Zavala SLP GN 1                 Anum Zavala  SLP  6/10/2025

## 2025-06-10 NOTE — THERAPY EVALUATION
Acute Care - Speech Language Pathology   Swallow Initial Evaluation SARKIS Dhillon     Patient Name: Cosmo Gauthier  : 1945  MRN: [id number]  Today's Date: 6/10/2025               Admit Date: 2025    Visit Dx:     ICD-10-CM ICD-9-CM   1. Sepsis, due to unspecified organism, unspecified whether acute organ dysfunction present  A41.9 038.9     995.91   2. Pneumonia due to infectious organism, unspecified laterality, unspecified part of lung  J18.9 486   3. Thrombocytopenia  D69.6 287.5   4. Weakness generalized  R53.1 780.79   5. Lactic acidosis  E87.20 276.2   6. Oropharyngeal dysphagia  R13.12 787.22     Patient Active Problem List   Diagnosis    Lymphoma    Type 2 diabetes mellitus with complications    Stage 3b chronic kidney disease    Thrombocytopenia    Coronary artery disease involving native coronary artery of native heart without angina pectoris    Hx of CABG    Hyperlipemia, mixed    Hypertension, essential    Hereditary and idiopathic neuropathy, unspecified    Low lying conus medullaris    Mood disorder    Other cirrhosis of liver    Sleep apnea    Spinal stenosis of lumbar region with neurogenic claudication    Vascular dementia without behavioral disturbance    Iron deficiency anemia    Hypothyroidism, adult    Traumatic subarachnoid hemorrhage with loss of consciousness of 30 minutes or less    Morbid (severe) obesity due to excess calories    Claudication of both lower extremities    Medicare annual wellness visit, subsequent    Weakness    Hepatic encephalopathy    Secondary esophageal varices without bleeding    Esophageal dysphagia    Seasonal allergic rhinitis due to pollen    History of colon polyps    Sepsis     Past Medical History:   Diagnosis Date    Anxiety and depression     Arthritis     Chronic back pain     Cirrhosis     Coronary artery disease     Dementia     Depression     Diabetes mellitus     Disease of thyroid gland     Elevated cholesterol     Family history of colon  cancer     Fatty liver     Gastric ulcer     GERD (gastroesophageal reflux disease)     Heart disease     High cholesterol     Hypertension     Hyperthyroidism     Knee pain     Lymphoma     History of lymphoma, has been in remission    Memory change 10/18/2017    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     Primary osteoarthritis of left knee     Sleep apnea     Sleep apnea     Thrombocytopenia 05/22/2018    Thyroid disease      Past Surgical History:   Procedure Laterality Date    CARDIAC SURGERY      COLONOSCOPY  2015    COLONOSCOPY N/A 9/3/2024    Procedure: COLONOSCOPY FOR SCREENING WITH COLD SNARE;  Surgeon: Chris Bradshaw MD;  Location: [facility] ENDOSCOPY;  Service: Gastroenterology;  Laterality: N/A;  COLON POLYPS    CORONARY ANGIOPLASTY WITH STENT PLACEMENT      CORONARY ARTERY BYPASS GRAFT N/A 05/20/2021    Procedure: MIDLINE STERNOTOMY,CORONARY ARTERY BYPASS GRAFTING X 5, UTILIZING THE LEFT COMPA AND LEFT SAPHENOUS VEIN, ABHIJEET, PRP;  Surgeon: Jr Tom Tubsb MD;  Location: [facility];  Service: Cardiothoracic;  Laterality: N/A;    ENDOSCOPY      ENDOSCOPY N/A 1/24/2024    Procedure: ESOPHAGOGASTRODUODENOSCOPY;  Surgeon: Chris Bradshaw MD;  Location: [facility] ENDOSCOPY;  Service: Gastroenterology;  Laterality: N/A;  hiatal hernia, schatzki's ring    ENDOSCOPY N/A 1/29/2024    Procedure: ESOPHAGOGASTRODUODENOSCOPY with balloon dilaitation 12-15cm;  Surgeon: Chris Bradshaw MD;  Location: [facility] ENDOSCOPY;  Service: Gastroenterology;  Laterality: N/A;  hiatal hernia, schatskis ring    ENDOSCOPY N/A 2/17/2025    Procedure: ESOPHAGOGASTRODUODENOSCOPY WITH BIOPSIES;  Surgeon: Chris Bradshaw MD;  Location: [facility] ENDOSCOPY;  Service: Gastroenterology;  Laterality: N/A;  GASTRITIS/HIATAL HERNIA    HERNIA REPAIR      JOINT REPLACEMENT      SHOULDER SURGERY      SHOULDER REPAIR    TOTAL KNEE ARTHROPLASTY      TRANSESOPHAGEAL ECHOCARDIOGRAM (ABHIJEET) N/A 05/20/2021    Procedure:  TRANSESOPHAGEAL ECHOCARDIOGRAM WITH ANESTHESIA;  Surgeon: Jr Tom Tubbs MD;  Location: [facility] OR;  Service: Cardiothoracic;  Laterality: N/A;       SLP Recommendation and Plan          Inpatient Speech Pathology Dysphagia Evaluation        PAIN SCALE: Patient did not indicate that he was having any pain.     PRECAUTIONS/CONTRAINDICATIONS:  Aspiration precautions. Fall precautions. Standard precautions.    SUSPECTED ABUSE/NEGLECT/EXPLOITATION:  None observed or reported.     SOCIAL/PSYCHOLOGICAL NEEDS/BARRIERS:  N/A    PAST SOCIAL HISTORY:  At home with spouse.     PRIOR FUNCTION:  Patient reported having several esophageal dilations in the past. Patient reported that the esophageal strictures would cause him to have difficulty swallowing. Patient reported since his last dilation, he has not had any difficulty with his swallow.    PATIENT GOALS/EXPECTATIONS:  To consume least restrictive diet without signs/symptoms of aspiration. To identify type and severity of current deficits and provide appropriate treatment.      HISTORY:  Patient is a 79 y.o. year old male who presents to the emergency department for evaluation of Weakness.  Patient has a history of lymphoma.     CURRENT DIET LEVEL:  Level 0 thin liquids and level 7 regular solids.     OBJECTIVE:    TEST ADMINISTERED:  Oral mechanisms exam and clinical swallow evaluation.    COGNITION/SAFETY AWARENESS:  Patient denied increased difficulty with memory, attention, or problem solving. Not formally assessed.     BEHAVIORAL OBSERVATIONS:  Patient was pleasant and cooperative. Patient did not demonstrate any instances of anomia or paraphasias. Patient denied any difficulty with auditory comprehension. Patient was appropriate during conversation, answered open ended questions well, and had good topic maintenance.       ORAL MOTOR EXAM:  Patient had mild overall weakness of the oral mechanisms with very slight lingual deviation to the left. Otherwise, no  other one-sided weakness noted.     VOICE QUALITY:  Severe rough quality. Patient reported that this is the chronic condition of his voice.    REFLEX EXAM:  Velum could not be well observed.     POSTURE:  Upright for oral care and when eating or drinking.    FEEDING/SWALLOWING FUNCTION:  Oral care provided prior to trials. Patient participated in clinical swallow evaluation and trialed level 0 thin liquids via cup and straw, level 4 pureed solids, level 6 soft and bite sized solids, and level 7 regular solids.  Patient did not demonstrate any overt signs/symptoms of aspiration with any of the consistencies trialed.       CLINICAL OBSERVATIONS:  Patient demonstrated delayed initiation of swallow and reduced hyolaryngeal excursion indicating weakness of the swallow. While patient did not demonstrate any overt signs/symptoms of aspiration due to abnormal voicing, poor respiratory status, and pneumonia, SLP recommends the patient undergo MBSS to rule out silent aspiration.  Patient agreeable to this plan.     DYSPHAGIA CRITERIA:  Oropharyngeal dysphagia.     FUNCTIONAL ASSESSMENT INSTRUMENT: Patient currently scored a level 5 of 7 on Functional Communication Measures for swallowing indicating a 25% limitation in function.    ASSESSMENT/ PLAN OF CARE:  Pt presents with limitations, noted below, that impede his ability to swallow. The skills of a therapist will be required to safely and effectively implement the following treatment plan to restore maximal level of function.    PROBLEMS:  1. Dysphagia     LTG 1: 30 days: The patient will maintain adequate hydration/nutrition with optimum safety and efficiency of swallowing function on P.O. intake without overt signs and symptoms of aspiration for the highest appropriate diet level.     STG 1a: 14 days: The client will demonstrate the ability to adequately self-monitor swallowing skills and perform appropriate compensatory techniques to reduce signs and symptoms of  aspiration in 100% of trials with no cues.      STG 1b: 14 days: The patient will tolerate recommended diet without signs/symptoms of aspiration.      STG 1c: 14 days: Patient/family teaching regarding diet recommendations, safe swallow strategies and signs and symptoms of aspiration.      STG 1d: 14 days: Patient will increase ability to tolerate least restrictive diet improve functional communication measure for swallowing to a 6 of 7.      STG 1e: 14 days: Patient will complete base of tongue exercises to include 5 reps and 10 second holds with no weakness noted.      STG 1f: 14 days: Patient will complete 5 sets of the Mendelson maneuver to include 1-3 reps with a rest or a drink between sets to increase pharyngeal strength and coordination to eliminate signs and symptoms of laryngeal penetration and/or aspiration.      STG 1 days: Patient will complete 5 sets of the Carolyn maneuver to include 1-3 reps with minimal cues required to complete.      STG 1h: 14 days: Patient will complete 5 sets of the effortful swallow include 1-3 reps with minimal cues required to complete.      TREATMENT: Patient will continue to be monitored to ensure that he is tolerating the recommended diet without signs/symptoms of aspiration. Patient will participate in swallowing exercises to improve strength of swallow.     FREQUENCY/DURATION:  once daily/ 5 times a week.     REHAB POTENTIAL:  Pt has good rehab potential.  The following limitations may influence improvement/ length of tx  medical status.    RECOMMENDATIONS:   1.   DIET: Patient may continue current diet. Recommendation may change after MBSS.     2.  POSITION: Upright for oral care and when eating or drinking.    3.  COMPENSATORY STRATEGIES: Eat/drink at a slow rate and take small bites and sips.    Pt/responsible party agrees with plan of care and has been informed of all alternatives, risks and benefits.                                                                                     EDUCATION  The patient has been educated in the following areas:   Dysphagia (Swallowing Impairment).                Time Calculation:    Time Calculation- SLP       Row Name 06/10/25 0900             Time Calculation- SLP    SLP Start Time 0900  -AW      SLP Stop Time 1000  -AW      SLP Time Calculation (min) 60 min  -AW      SLP Received On 06/10/25  -AW         Untimed Charges    06159-RY Eval Oral Pharyng Swallow Minutes 60  -AW         Total Minutes    Untimed Charges Total Minutes 60  -AW       Total Minutes 60  -AW                User Key  (r) = Recorded By, (t) = Taken By, (c) = Cosigned By      Initials Name Provider Type    AW Anum Zavala SLP Speech and Language Pathologist                    Therapy Charges for Today       Code Description Service Date Service Provider Modifiers Qty    [id number] HC ST EVAL ORAL PHARYNG SWALLOW 4 6/10/2025 Anum Zavala SLP GN 1                 LING Peraza  6/10/2025

## 2025-06-10 NOTE — PROGRESS NOTES
[facility]     Progress Note    Patient Name: Cosmo Gauthier  : 1945  MRN: [id number]  Primary Care Physician:  Alex Buckley MD  Date of admission: 2025    Subjective   Subjective     Chief Complaint: Stable and doing well will continue current management admitted with pneumonia    HPI: Stable comfortable feeling better    Review of Systems   All systems were reviewed and negative except for: Has been reviewed    Objective   Objective     Vitals:   Temp:  [98.6 °F (37 °C)-102 °F (38.9 °C)] 99.1 °F (37.3 °C)  Heart Rate:  [] 89  Resp:  [15-28] 22  BP: ()/() 103/82  Flow (L/min) (Oxygen Therapy):  [10-50] 10    Physical Exam    Constitutional: Alert and oriented not in acute distress   Eyes: Pupils equal reacting to light and accommodation   HENT: Normocephalic   Neck: No lymphadenopathy   Respiratory: No rales or rhonchi   Cardiovascular: S1-S2 normal no S3 or S4   Gastrointestinal: No palpable organomegaly   Musculoskeletal: No deformities   Neurologic: No localizing signs  Skin: No rash       Result Review    Result Review:  I have personally reviewed the results from the time of this admission to 6/10/2025 08:16 EDT and agree with these findings:  [x]  Laboratory  []  Microbiology  []  Radiology  []  EKG/Telemetry   []  Cardiology/Vascular   []  Pathology  []  Old records  []  Other:  Most notable findings include: Has been reviewed and discussed    Assessment & Plan   Assessment / Plan     Brief Patient Summary:  Cosmo Gauthier is a 79 y.o. male who patient with pneumonia history of lymphoma    Active Hospital Problems:  Active Hospital Problems    Diagnosis     **Sepsis        Plan:   Continue antibiotics discussed at length with the family    VTE Prophylaxis:  No VTE prophylaxis order currently exists.        CODE STATUS:   Code Status (Patient has no pulse and is not breathing): CPR (Attempt to Resuscitate)  Medical Interventions (Patient has pulse or is breathing):  Full Support  Level Of Support Discussed With: Patient    Disposition:  I expect patient to be discharged after the patient has been stabilized.    Electronically signed by Seven Saldana MD, 06/10/25, 8:16 AM EDT.      Part of this note may be an electronic transcription/translation of spoken language to printed text using the Dragon Dictation System.

## 2025-06-10 NOTE — PROGRESS NOTES
[facility] Clinical Pharmacy Services: Vancomycin Monitoring Note    Cosmo Gauthier is a 79 y.o. male who is on day 2/7 of pharmacy to dose vancomycin for Empiric.    Previous Vancomycin Dose: 2250mg x1 6/9 @ 0126  Imaging Reviewed?: Yes  Impression: CT CHEST 6/9 @ 1028   1.Posterior right upper lobe and bilateral lower lobe airspace consolidation with air bronchograms likely representing consolidative pneumonia.     Updated Cultures and Sensitivities:   Microbiology Results (last 10 days)       Procedure Component Value - Date/Time    Respiratory Culture - Sputum, Cough [[id number]] Collected: 06/09/25 1447    Lab Status: Preliminary result Specimen: Sputum from Cough Updated: 06/09/25 1549     Gram Stain Moderate (3+) WBCs per low power field      Rare (1+) Epithelial cells per low power field      Many (4+) Gram negative bacilli      Few (2+) Gram positive cocci in clusters      Rare (1+) Hyphal elements    MRSA Screen, PCR (Inpatient) - Swab, Nares [[id number]]  (Normal) Collected: 06/09/25 0904    Lab Status: Final result Specimen: Swab from Nares Updated: 06/09/25 1043     MRSA PCR No MRSA Detected    Narrative:      The negative predictive value of this diagnostic test is high and should only be used to consider de-escalating anti-MRSA therapy. A positive result may indicate colonization with MRSA and must be correlated clinically.    Respiratory Panel PCR w/COVID-19(SARS-CoV-2) GINO/ANG/UCHE/PAD/COR/NAIN In-House, NP Swab in UTM/VTM, 2 HR TAT - Swab, Nasopharynx [[id number]]  (Abnormal) Collected: 06/09/25 0904    Lab Status: Final result Specimen: Swab from Nasopharynx Updated: 06/09/25 1036     ADENOVIRUS, PCR Not Detected     Coronavirus 229E Not Detected     Coronavirus HKU1 Not Detected     Coronavirus NL63 Not Detected     Coronavirus OC43 Not Detected     COVID19 Not Detected     Human Metapneumovirus Not Detected     Human Rhinovirus/Enterovirus Not Detected     Influenza A PCR Not Detected      Influenza B PCR Not Detected     Parainfluenza Virus 1 Not Detected     Parainfluenza Virus 2 Not Detected     Parainfluenza Virus 3 Detected     Parainfluenza Virus 4 Not Detected     RSV, PCR Not Detected     Bordetella pertussis pcr Not Detected     Bordetella parapertussis PCR Not Detected     Chlamydophila pneumoniae PCR Not Detected     Mycoplasma pneumo by PCR Not Detected    Narrative:      In the setting of a positive respiratory panel with a viral infection PLUS a negative procalcitonin without other underlying concern for bacterial infection, consider observing off antibiotics or discontinuation of antibiotics and continue supportive care. If the respiratory panel is positive for atypical bacterial infection (Bordetella pertussis, Chlamydophila pneumoniae, or Mycoplasma pneumoniae), consider antibiotic de-escalation to target atypical bacterial infection.    Legionella Antigen, Urine - Urine, Urine, Clean Catch [[id number]]  (Normal) Collected: 06/09/25 0903    Lab Status: Final result Specimen: Urine, Clean Catch Updated: 06/09/25 1302     LEGIONELLA ANTIGEN, URINE Negative    S. Pneumo Ag Urine or CSF - Urine, Urine, Clean Catch [[id number]]  (Normal) Collected: 06/09/25 0903    Lab Status: Final result Specimen: Urine, Clean Catch Updated: 06/09/25 1304     Strep Pneumo Ag Negative    COVID PRE-OP / PRE-PROCEDURE SCREENING ORDER (NO ISOLATION) - Swab, Nasopharynx [[id number]]  (Normal) Collected: 06/09/25 0127    Lab Status: Final result Specimen: Swab from Nasopharynx Updated: 06/09/25 0223    Narrative:      The following orders were created for panel order COVID PRE-OP / PRE-PROCEDURE SCREENING ORDER (NO ISOLATION) - Swab, Nasopharynx.  Procedure                               Abnormality         Status                     ---------                               -----------         ------                     COVID-19, FLU A/B, RSV P...[[id number]]  Normal              Final result                 Please  "view results for these tests on the individual orders.    COVID-19, FLU A/B, RSV PCR 1 HR TAT - Swab, Nasopharynx [[id number]]  (Normal) Collected: 25 0127    Lab Status: Final result Specimen: Swab from Nasopharynx Updated: 25 0223     COVID19 Not Detected     Influenza A PCR Not Detected     Influenza B PCR Not Detected     RSV, PCR Not Detected    Narrative:      Fact sheet for providers: https://www.fda.gov/media/839588/download    Fact sheet for patients: https://www.fda.gov/media/748395/download    Test performed by PCR.    Blood Culture - Blood, Arm, Right [[id number]]  (Normal) Collected: 25    Lab Status: Preliminary result Specimen: Blood from Arm, Right Updated: 06/10/25 003     Blood Culture No growth at 24 hours    Narrative:      Less than seven (7) mL's of blood was collected.  Insufficient quantity may yield false negative results.    Blood Culture - Blood, Arm, Right [[id number]]  (Normal) Collected: 25    Lab Status: Preliminary result Specimen: Blood from Arm, Right Updated: 06/10/25 0030     Blood Culture No growth at 24 hours    Narrative:      Less than seven (7) mL's of blood was collected.  Insufficient quantity may yield false negative results.    Mycoplasma Pneumoniae Antibody, IgM - Blood, [[id number]]  (Normal) Collected: 25 2303    Lab Status: Final result Specimen: Blood Updated: 25 0854     Mycoplasma pneumo IgM Negative              Vitals/Labs  Ht: 182.9 cm (72\"); Wt: 110 kg (242 lb 15.2 oz)   Temp (24hrs), Av °F (37.8 °C), Min:98.6 °F (37 °C), Max:102 °F (38.9 °C)   Estimated Creatinine Clearance: 35.2 mL/min (A) (by C-G formula based on SCr of 2.25 mg/dL (H)).       Results from last 7 days   Lab Units 06/10/25  0241 25  0723 25  0429 25   VANCOMYCIN RM mcg/mL 12.20  --   --   --    CREATININE mg/dL 2.25*  --  2.37*  2.37* 2.49*   WBC 10*3/mm3 26.57* 21.39* 18.38* 22.54*     Assessment/Plan    Current " Vancomycin Dose: pulse dosing with 1000 mg IV once on 6/10 at 1100  Next Vanc Random ordered for 6/11 at 0600  We will continue to monitor patient changes and renal function     Thank you for involving pharmacy in this patient's care. Please contact pharmacy with any questions or concerns.    Deanna Andre  Clinical Pharmacist

## 2025-06-10 NOTE — CODE DOCUMENTATION
Primary RN called RRT due to patients heart rate being elevated and bp low. Manual BP, EKG obtained. Call placed to primary MD and instructed to call patients cardiologist. Call then placed to cardiology and orders received. Remained with patient during amiodarone bolus and patient converted to sinus rhythm. Patient denies DESMOND HO.

## 2025-06-10 NOTE — PLAN OF CARE
Goal Outcome Evaluation:  Plan of Care Reviewed With: patient, spouse, family        Progress: improving  Outcome Evaluation: Pt febrile initially, tylenol given x1 with good response, tmax 100.9. BP, HR stable. Weaned to 10L HFNC.

## 2025-06-10 NOTE — NURSING NOTE
Patient Cosmo Gauthier admitted to 4MTU from another Inpatient Unit. Patient is alert and oriented to person, place, time, and situation. Patient oriented to unit, call light system and bed alarm use, teaching effective. Patient agreed to bed alarm use. Candida Auris screening revealed patient will NOT need tested, contact isolation and bleach cleaning due to no risk factors. Patient currently is not a concern for an active CDIFF infection.    4 eyes skin assessment completed with Rogers Tripp RN. Per policy, the following skin interventions were put in place as a result of the skin assessment below: Wound care/nutrition consulted for skin breakdown, LDA created and pictures and measurements taken of current skin breakdown, Silicone border dressing placed over bony prominences due to change every 3 days or PRN for incontinence or peeling dressing, Avoiding use of disposable briefs, Limit number of layers underneath patient, Barrier cream for incontinence, Barrier cream for skin breakdown, Q2 turns, and Pillow supprt for offloading pressure, floating heels or padding for bony prominences    Skin Assessments (most recent)      Flowsheet Row Most Recent Value   Skin WDL .WDL except, all   Skin Color/Characteristics pale, maroon/purple, redness blanchable   Skin Temperature warm   Skin Moisture dry, flaky   Skin Elasticity slow return to original state   Skin Integrity bruised (ecchymotic), scab, scar(s)   Sensory Perception 2-->very limited   Moisture 3-->occasionally moist   Activity 1-->bedfast   Mobility 2-->very limited   Nutrition 2-->probably inadequate   Friction and Shear 2-->potential problem   Mandeep Score 12   Pressure Reduction Devices specialty bed utilized, positioning supports utilized   Pressure Reduction Techniques frequent weight shift encouraged, heels elevated off bed, pressure points protected            Fall assessment completed. Per policy, the patient was determined be a High fall risk due to  [employer] score 50 or greater (only used after first 24 hours of admission). Patient assessed to need the following mobility assistance: 1 Assist with the following assistive devices: Walker and Gait Belt, and will be using a bedpan for toileting. Per policy, the following fall interventions were put in place: Standard Fall Precautions - oriented to room and call light, bed low and locked, clutter free environment, adequate lighting, directed to call for assistance, non-skid footwear, hourly rounding and personal belongings within reach., Bed Alarm, Fall Risk Care Plan, Assistive devices at beside (hearing aids, glasses, walker, lifts), and Gait Belt.     Patient's belongings that were sent with family: None  Patient's belongings that were sent to security: None  Patient's belongings locked in safe box in room: None  Patient's belongings that were sent to pharmacy: None  Patient's belongings kept at bedside per patient: Clothing and Other:cell phone

## 2025-06-10 NOTE — PROGRESS NOTES
RT EQUIPMENT DEVICE RELATED - SKIN ASSESSMENT    RT Medical Equipment/Device:     High Flow Nasal Cannula:Airvo    Skin Assessment:      Chin:  Intact  Nares:  Intact  Nose:  Intact    Device Skin Pressure Protection:  Skin-to-device areas padded:  None Required    Nurse Notification:  Stacey Virk, CRT

## 2025-06-11 ENCOUNTER — APPOINTMENT (OUTPATIENT)
Dept: GENERAL RADIOLOGY | Facility: HOSPITAL | Age: 80
DRG: 871 | End: 2025-06-11
Payer: OTHER GOVERNMENT

## 2025-06-11 LAB
1,3 BETA GLUCAN SER QL: NEGATIVE
1,3 BETA GLUCAN SER-MCNC: 38.56 PG/ML
ANION GAP SERPL CALCULATED.3IONS-SCNC: 11.5 MMOL/L (ref 5–15)
BACTERIA SPEC RESP CULT: NORMAL
BUN SERPL-MCNC: 44.4 MG/DL (ref 8–23)
BUN/CREAT SERPL: 29 (ref 7–25)
CALCIUM SPEC-SCNC: 8.4 MG/DL (ref 8.6–10.5)
CHLORIDE SERPL-SCNC: 103 MMOL/L (ref 98–107)
CITATION REF LAB TEST: NORMAL
CO2 SERPL-SCNC: 18.5 MMOL/L (ref 22–29)
CREAT SERPL-MCNC: 1.53 MG/DL (ref 0.76–1.27)
DEPRECATED RDW RBC AUTO: 53.6 FL (ref 37–54)
EGFRCR SERPLBLD CKD-EPI 2021: 46.5 ML/MIN/1.73
ERYTHROCYTE [DISTWIDTH] IN BLOOD BY AUTOMATED COUNT: 17.5 % (ref 12.3–15.4)
GLUCOSE BLDC GLUCOMTR-MCNC: 125 MG/DL (ref 70–99)
GLUCOSE BLDC GLUCOMTR-MCNC: 141 MG/DL (ref 70–99)
GLUCOSE BLDC GLUCOMTR-MCNC: 155 MG/DL (ref 70–99)
GLUCOSE BLDC GLUCOMTR-MCNC: 172 MG/DL (ref 70–99)
GLUCOSE SERPL-MCNC: 132 MG/DL (ref 65–99)
GRAM STN SPEC: NORMAL
HCT VFR BLD AUTO: 27.2 % (ref 37.5–51)
HGB BLD-MCNC: 8.6 G/DL (ref 13–17.7)
IMP & REVIEW OF LAB RESULTS: NORMAL
LAB DIRECTOR NAME PROVIDER: NORMAL
LABORATORY COMMENT REPORT: NORMAL
MAGNESIUM SERPL-MCNC: 1.7 MG/DL (ref 1.6–2.4)
MCH RBC QN AUTO: 26.5 PG (ref 26.6–33)
MCHC RBC AUTO-ENTMCNC: 31.6 G/DL (ref 31.5–35.7)
MCV RBC AUTO: 83.7 FL (ref 79–97)
PHOSPHATE SERPL-MCNC: 2.9 MG/DL (ref 2.5–4.5)
PLATELET # BLD AUTO: 12 10*3/MM3 (ref 140–450)
POTASSIUM SERPL-SCNC: 4.4 MMOL/L (ref 3.5–5.2)
PROCALCITONIN SERPL-MCNC: 103.92 NG/ML (ref 0–0.25)
RBC # BLD AUTO: 3.25 10*6/MM3 (ref 4.14–5.8)
REF LAB TEST METHOD: NORMAL
SODIUM SERPL-SCNC: 133 MMOL/L (ref 136–145)
WBC NRBC COR # BLD AUTO: 28.45 10*3/MM3 (ref 3.4–10.8)

## 2025-06-11 PROCEDURE — 97161 PT EVAL LOW COMPLEX 20 MIN: CPT

## 2025-06-11 PROCEDURE — 84100 ASSAY OF PHOSPHORUS: CPT | Performed by: NURSE PRACTITIONER

## 2025-06-11 PROCEDURE — 80048 BASIC METABOLIC PNL TOTAL CA: CPT | Performed by: NURSE PRACTITIONER

## 2025-06-11 PROCEDURE — 84145 PROCALCITONIN (PCT): CPT | Performed by: NURSE PRACTITIONER

## 2025-06-11 PROCEDURE — 94799 UNLISTED PULMONARY SVC/PX: CPT

## 2025-06-11 PROCEDURE — 82948 REAGENT STRIP/BLOOD GLUCOSE: CPT | Performed by: INTERNAL MEDICINE

## 2025-06-11 PROCEDURE — 25010000002 AMIODARONE IN DEXTROSE 5% 360-4.14 MG/200ML-% SOLUTION: Performed by: SPECIALIST

## 2025-06-11 PROCEDURE — 82948 REAGENT STRIP/BLOOD GLUCOSE: CPT

## 2025-06-11 PROCEDURE — 25010000002 CEFTRIAXONE PER 250 MG: Performed by: INTERNAL MEDICINE

## 2025-06-11 PROCEDURE — 85027 COMPLETE CBC AUTOMATED: CPT | Performed by: NURSE PRACTITIONER

## 2025-06-11 PROCEDURE — 94669 MECHANICAL CHEST WALL OSCILL: CPT

## 2025-06-11 PROCEDURE — 94761 N-INVAS EAR/PLS OXIMETRY MLT: CPT

## 2025-06-11 PROCEDURE — 63710000001 INSULIN GLARGINE PER 5 UNITS: Performed by: INTERNAL MEDICINE

## 2025-06-11 PROCEDURE — 83735 ASSAY OF MAGNESIUM: CPT | Performed by: NURSE PRACTITIONER

## 2025-06-11 PROCEDURE — 63710000001 INSULIN LISPRO (HUMAN) PER 5 UNITS: Performed by: INTERNAL MEDICINE

## 2025-06-11 PROCEDURE — 99253 IP/OBS CNSLTJ NEW/EST LOW 45: CPT | Performed by: SPECIALIST

## 2025-06-11 PROCEDURE — 71045 X-RAY EXAM CHEST 1 VIEW: CPT

## 2025-06-11 PROCEDURE — 92526 ORAL FUNCTION THERAPY: CPT

## 2025-06-11 RX ORDER — SERTRALINE HYDROCHLORIDE 100 MG/1
100 TABLET, FILM COATED ORAL DAILY
Status: DISCONTINUED | OUTPATIENT
Start: 2025-06-12 | End: 2025-06-13 | Stop reason: HOSPADM

## 2025-06-11 RX ORDER — TAMSULOSIN HYDROCHLORIDE 0.4 MG/1
0.4 CAPSULE ORAL DAILY
Status: DISCONTINUED | OUTPATIENT
Start: 2025-06-12 | End: 2025-06-13 | Stop reason: HOSPADM

## 2025-06-11 RX ADMIN — RIFAXIMIN 550 MG: 550 TABLET ORAL at 21:31

## 2025-06-11 RX ADMIN — MUPIROCIN 1 APPLICATION: 20 OINTMENT TOPICAL at 21:31

## 2025-06-11 RX ADMIN — INSULIN LISPRO 2 UNITS: 100 INJECTION, SOLUTION INTRAVENOUS; SUBCUTANEOUS at 12:08

## 2025-06-11 RX ADMIN — MUPIROCIN 1 APPLICATION: 20 OINTMENT TOPICAL at 08:26

## 2025-06-11 RX ADMIN — Medication 10 ML: at 08:26

## 2025-06-11 RX ADMIN — FLUTICASONE PROPIONATE 2 SPRAY: 50 SPRAY, METERED NASAL at 12:08

## 2025-06-11 RX ADMIN — FINASTERIDE 5 MG: 5 TABLET, FILM COATED ORAL at 08:26

## 2025-06-11 RX ADMIN — PANTOPRAZOLE SODIUM 40 MG: 40 TABLET, DELAYED RELEASE ORAL at 08:26

## 2025-06-11 RX ADMIN — LEVOTHYROXINE SODIUM 200 MCG: 0.05 TABLET ORAL at 08:26

## 2025-06-11 RX ADMIN — INSULIN GLARGINE 15 UNITS: 100 INJECTION, SOLUTION SUBCUTANEOUS at 21:31

## 2025-06-11 RX ADMIN — AMIODARONE HYDROCHLORIDE 0.5 MG/MIN: 1.8 INJECTION, SOLUTION INTRAVENOUS at 05:47

## 2025-06-11 RX ADMIN — AMIODARONE HYDROCHLORIDE 0.5 MG/MIN: 1.8 INJECTION, SOLUTION INTRAVENOUS at 16:51

## 2025-06-11 RX ADMIN — DOCUSATE SODIUM 50 MG AND SENNOSIDES 8.6 MG 2 TABLET: 8.6; 5 TABLET, FILM COATED ORAL at 08:26

## 2025-06-11 RX ADMIN — ASPIRIN 81 MG: 81 TABLET, COATED ORAL at 08:26

## 2025-06-11 RX ADMIN — INSULIN LISPRO 2 UNITS: 100 INJECTION, SOLUTION INTRAVENOUS; SUBCUTANEOUS at 16:51

## 2025-06-11 RX ADMIN — Medication 10 ML: at 21:30

## 2025-06-11 RX ADMIN — CEFTRIAXONE SODIUM 2000 MG: 2 INJECTION, POWDER, FOR SOLUTION INTRAMUSCULAR; INTRAVENOUS at 16:37

## 2025-06-11 RX ADMIN — Medication 10 MG: at 22:23

## 2025-06-11 RX ADMIN — Medication 10 MG: at 03:29

## 2025-06-11 NOTE — CONSULTS
[facility]   Cardiology Consult Note    Patient Name: Cosmo Gauthier  : 1945  MRN: [id number]  Primary Care Physician:  Alex Buckley MD  Referring Physician: No Known Provider  Date of admission: 2025    Subjective   Subjective     Reason for Consult/ Chief Complaint: SVT    HPI:  Cosmo Gauthier is a 79 y.o. male admitted with respiratory failure sepsis and pancytopenia.  Had an episode of SVT yesterday which converted to sinus rhythm with IV amiodarone.    Review of Systems:      Constitutional no fever,  no weight loss   Skin no rash   Otolaryngeal no difficulty swallowing   Cardiovascular See HPI                             Personal History       Past Medical/Surgical History:   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     Primary osteoarthritis of left knee     Sleep apnea     Sleep apnea     Thrombocytopenia 2018    Thyroid disease      Past Surgical History:   Procedure Laterality Date    CARDIAC SURGERY      COLONOSCOPY      COLONOSCOPY N/A 9/3/2024    Procedure: COLONOSCOPY FOR SCREENING WITH COLD SNARE;  Surgeon: Chris Bradshaw MD;  Location: [facility] ENDOSCOPY;  Service: Gastroenterology;  Laterality: N/A;  COLON POLYPS    CORONARY ANGIOPLASTY WITH STENT PLACEMENT      CORONARY ARTERY BYPASS GRAFT N/A 2021    Procedure: MIDLINE STERNOTOMY,CORONARY ARTERY BYPASS GRAFTING X 5, UTILIZING THE LEFT COMPA AND LEFT SAPHENOUS  VEIN, ABHIJEET, PRP;  Surgeon: Jr Tom Tubbs MD;  Location: [facility] OR;  Service: Cardiothoracic;  Laterality: N/A;    ENDOSCOPY      ENDOSCOPY N/A 1/24/2024    Procedure: ESOPHAGOGASTRODUODENOSCOPY;  Surgeon: Chris Bradshaw MD;  Location: [facility] ENDOSCOPY;  Service: Gastroenterology;  Laterality: N/A;  hiatal hernia, schatzki's ring    ENDOSCOPY N/A 1/29/2024    Procedure: ESOPHAGOGASTRODUODENOSCOPY with balloon dilaitation 12-15cm;  Surgeon: Chris Bradshaw MD;  Location: [facility] ENDOSCOPY;  Service: Gastroenterology;  Laterality: N/A;  hiatal hernia, schatskis ring    ENDOSCOPY N/A 2/17/2025    Procedure: ESOPHAGOGASTRODUODENOSCOPY WITH BIOPSIES;  Surgeon: Chris Bradshaw MD;  Location: [facility] ENDOSCOPY;  Service: Gastroenterology;  Laterality: N/A;  GASTRITIS/HIATAL HERNIA    HERNIA REPAIR      JOINT REPLACEMENT      SHOULDER SURGERY      SHOULDER REPAIR    TOTAL KNEE ARTHROPLASTY      TRANSESOPHAGEAL ECHOCARDIOGRAM (ABHIJEET) N/A 05/20/2021    Procedure: TRANSESOPHAGEAL ECHOCARDIOGRAM WITH ANESTHESIA;  Surgeon: Jr Tom Tubbs MD;  Location: [facility] OR;  Service: Cardiothoracic;  Laterality: N/A;         Family History: No Family History of CAD.    Social History:  reports that he has never smoked. He has never used smokeless tobacco. He reports that he does not currently use alcohol. He reports that he does not use drugs.    Medications:  Medications Prior to Admission   Medication Sig Dispense Refill Last Dose/Taking    finasteride (PROSCAR) 5 MG tablet Take 1 tablet by mouth Daily. 60 tablet 5 Taking    furosemide (LASIX) 20 MG tablet Take 0.5 tablets by mouth Every Other Day.   Taking    insulin aspart (novoLOG FLEXPEN) 100 UNIT/ML solution pen-injector sc pen Inject 5 Units under the skin into the appropriate area as directed 3 (Three) Times a Day With Meals. Per sliding scale   Taking    insulin glargine (LANTUS, SEMGLEE) 100 UNIT/ML injection Inject 15 Units under the skin into  the appropriate area as directed Every Night.   Taking    lactulose (CHRONULAC) 10 GM/15ML solution Take 45 mL by mouth 2 (Two) Times a Day As Needed.   Taking As Needed    levothyroxine (SYNTHROID, LEVOTHROID) 200 MCG tablet Take 1 tablet by mouth Every Morning.   Taking    metFORMIN (GLUCOPHAGE) 1000 MG tablet Take 1 tablet by mouth 2 (Two) Times a Day With Meals.   Taking    nitroglycerin (NITROSTAT) 0.4 MG SL tablet Place 1 tablet under the tongue Every 5 (Five) Minutes As Needed.   Taking As Needed    pantoprazole (PROTONIX) 40 MG EC tablet Take 1 tablet by mouth Daily. 30 tablet 5 Taking    sertraline (ZOLOFT) 100 MG tablet Take 2 tablets by mouth Daily. 180 tablet 1 Taking    simvastatin (ZOCOR) 40 MG tablet Take 0.5 tablets by mouth Every Night.   Taking    tamsulosin (FLOMAX) 0.4 MG capsule 24 hr capsule Take 1 capsule by mouth Daily. 30 capsule 5 Taking    Throat Lozenges (Cough Drops) lozenge Dissolve 1 each in the mouth As Needed.   Taking As Needed    Xifaxan 550 MG tablet TAKE 1 TABLET BY MOUTH EVERY 12  HOURS 62 tablet 10 Taking    clotrimazole-betamethasone (LOTRISONE) 1-0.05 % cream Apply 1 Application topically to the appropriate area as directed 2 (Two) Times a Day. Apply to affected area twice daily (Patient taking differently: Apply 1 Application topically to the appropriate area as directed 2 (Two) Times a Day As Needed. Apply to affected area twice daily prn) 45 g 5 Unknown     Current medications:  aspirin, 81 mg, Oral, Daily  cefTRIAXone, 2,000 mg, Intravenous, Q24H  finasteride, 5 mg, Oral, Daily  fluticasone, 2 spray, Nasal, Daily  [Held by provider] furosemide, 20 mg, Oral, Every Other Day  insulin glargine, 15 Units, Subcutaneous, Nightly  insulin lispro, 2-7 Units, Subcutaneous, 4x Daily AC & at Bedtime  levothyroxine, 200 mcg, Oral, Q AM  metoprolol tartrate, 5 mg, Intravenous, Once  mupirocin, 1 Application, Each Nare, BID  pantoprazole, 40 mg, Oral, Daily  senna-docusate sodium, 2  "tablet, Oral, BID  sodium chloride, 10 mL, Intravenous, Q12H      Current IV drips:  amiodarone, 0.5 mg/min, Last Rate: 0.5 mg/min (06/11/25 0547)        Allergies:  No Known Allergies    Objective    Objective     Vitals:   Temp:  [97.5 °F (36.4 °C)-99.7 °F (37.6 °C)] 98.1 °F (36.7 °C)  Heart Rate:  [] 80  Resp:  [18-20] 18  BP: ()/() 127/73  Flow (L/min) (Oxygen Therapy):  [4-7] 4      Physical Exam:    Constitutional: Awake, alert, No acute distress   Eyes: PERRLA, sclerae anicteric, no conjunctival injection  HENT: NCAT, mucous membranes moist  Neck: Supple, no thyromegaly, no lymphadenopathy, trachea midline  Respiratory: Decreased to auscultation bilaterally, nonlabored respirations   Cardiovascular: RRR, no murmurs, rubs, or gallops, palpable pedal pulses bilaterally    Result Review    Result Review:  I have personally reviewed the results from the time of this admission to 6/11/2025 08:39 EDT and agree with these findings:  [x]  Laboratory  [x]  EKG/Telemetry   [x]  Cardiology/Vascular   []  Pathology  [x]  Old records  [x]  Medications  Basic Metabolic Panel    Sodium Sodium   Date Value Ref Range Status   06/11/2025 133 (L) 136 - 145 mmol/L Final   06/10/2025 132 (L) 136 - 145 mmol/L Final   06/09/2025 131 (L) 136 - 145 mmol/L Final   06/09/2025 131 (L) 136 - 145 mmol/L Final   06/08/2025 132 (L) 136 - 145 mmol/L Final      Potassium Potassium   Date Value Ref Range Status   06/11/2025 4.4 3.5 - 5.2 mmol/L Final   06/10/2025 4.6 3.5 - 5.2 mmol/L Final   06/09/2025 4.5 3.5 - 5.2 mmol/L Final   06/09/2025 4.5 3.5 - 5.2 mmol/L Final   06/08/2025 5.1 3.5 - 5.2 mmol/L Final      Chloride Chloride   Date Value Ref Range Status   06/11/2025 103 98 - 107 mmol/L Final   06/10/2025 101 98 - 107 mmol/L Final   06/09/2025 101 98 - 107 mmol/L Final   06/09/2025 101 98 - 107 mmol/L Final   06/08/2025 98 98 - 107 mmol/L Final      Bicarbonate No results found for: \"PLASMABICARB\"   BUN BUN   Date Value " "Ref Range Status   06/11/2025 44.4 (H) 8.0 - 23.0 mg/dL Final   06/10/2025 48.8 (H) 8.0 - 23.0 mg/dL Final   06/09/2025 39.6 (H) 8.0 - 23.0 mg/dL Final   06/09/2025 38.0 (H) 8.0 - 23.0 mg/dL Final   06/08/2025 33.7 (H) 8.0 - 23.0 mg/dL Final      Creatinine Creatinine   Date Value Ref Range Status   06/11/2025 1.53 (H) 0.76 - 1.27 mg/dL Final   06/10/2025 2.25 (H) 0.76 - 1.27 mg/dL Final   06/09/2025 2.37 (H) 0.76 - 1.27 mg/dL Final   06/09/2025 2.37 (H) 0.76 - 1.27 mg/dL Final   06/08/2025 2.49 (H) 0.76 - 1.27 mg/dL Final      Calcium Calcium   Date Value Ref Range Status   06/11/2025 8.4 (L) 8.6 - 10.5 mg/dL Final   06/10/2025 7.7 (L) 8.6 - 10.5 mg/dL Final   06/09/2025 7.7 (L) 8.6 - 10.5 mg/dL Final   06/09/2025 7.8 (L) 8.6 - 10.5 mg/dL Final   06/08/2025 8.3 (L) 8.6 - 10.5 mg/dL Final      Glucose      No components found for: \"GLUCOSE.*\"  Results for orders placed in visit on 05/20/21    Emergent/Open-Heart Anesthesia ABHIJEET    Narrative  Preanesthesia Checklist:  Patient identified, IV assessed, risks and benefits discussed, monitors and equipment assessed and procedure being performed at surgeon's request.    General Procedure Information  Diagnostic Indications for Echo:  assessment of ascending aorta, assessment of surgical repair, defect repair evaluation and hemodynamic monitoring  Physician Requesting Echo: Jr Tom Tubbs MD  ICD Code for Medical Necessity:  Aortic Insufficiency  Location performed:  OR  Intubated  Bite block placed  Heart visualized  Probe Insertion:  Easy  Probe Type:  Multiplane  Modalities:  Color flow mapping, 2D only, continuous wave Doppler and pulse wave Doppler    Echocardiographic and Doppler Measurements    Ventricles    Right Ventricle:  Cavity size normal.  Hypertrophy not present.  Left Ventricle:  Diastolic dimension 4.7 cm.  Hypertrophy not present.  Thrombus not present.  Global Function mildly impaired.  Ejection Fraction 50%.    Ventricular Regional Function:  13- " Apical Anterior:  hypokinetic  14- Apical Lateral:  hypokinetic  16- Apical Septal:  hypokinetic  17- Rotan:  hypokinetic      Valves    Aortic Valve:  Annulus normal.  Stenosis not present.  Pressure Half-time: 910 ms.  Regurgitation mild.  Leaflets normal.  Leaflet motions normal.    Mitral Valve:  Annulus normal.  Stenosis not present.  Regurgitation trace.    Tricuspid Valve:  Annulus normal.  Stenosis not present.  Regurgitation mild.  Pulmonic Valve:  Annulus normal.  Regurgitation trace.        Aorta    Ascending Aorta:  Size normal.  Diameter 3.6 cm.  Dissection not present.  Plaque thickness less than 3 mm.  Mobile plaque not present.  Aortic Arch:  Size normal.  Diameter 3.1 cm.  Dissection not present.  Plaque thickness less than 3 mm.  Mobile plaque not present.  Descending Aorta:  Size normal.  Diameter 2.5 cm.  Dissection not present.  Plaque thickness less than 3 mm.  Mobile plaque not present.        Atria    Right Atrium:  Size normal.  Spontaneous echo contrast not present.  Thrombus not present.  Tumor not present.  Device present.    Left Atrium:  Size normal.  Spontaneous echo contrast not present.  Thrombus not present.  Tumor not present.  Device not present.  Left atrial appendage normal.      Septa    Atrial Septum:  Intra-atrial septal morphology lipomatous hypertrophy.        Diastolic Function Measurements:  Diastolic Dysfunction Grade= II  E= 40.6 ms  A= 35.3 ms  E/A Ratio=  1.2  DT=  296 ms  S/D=  IVRT=    Other Findings  Pericardium:  normal  Pleural Effusion:  none  Pulmonary Arteries:  normal  Pulmonary Venous Flow:  normal    Anesthesia Information  Performed Personally      Echocardiogram Comments:  Post CPB:  LVEF much improved, 60%, apex no longer hypokinetic.  No aortic dissection post decannulation.  AI unchanged.        Lab Results   Component Value Date    PROBNP 1,401.0 06/08/2025          EKG shows sinus rhythm with no acute changes.  Telemetry reviewed    Assessment / Plan      Impression:   1.  Acute hypoxic respiratory failure.  2.  Multifocal pneumonia  3.  PSVT now in sinus rhythm    Plan:   1.  Add p.o. amiodarone.  Add Toprol.  2.  Discontinue IV amiodarone in AM.  3.  Continue current management for respiratory failure and pneumonia.    Electronically signed by Sven Duran MD, 06/11/25, 8:39 AM EDT.

## 2025-06-11 NOTE — PLAN OF CARE
Goal Outcome Evaluation:  Plan of Care Reviewed With: patient        Progress: no change  Outcome Evaluation: Patient presents with deficits with strength, coordination, endurance, transfers, ambulation, and likely bed mobility. Patient will highly benefit from skilled PT services to address these deficits to increase function and decrease fall risks prior to being discharged.    Anticipated Discharge Disposition (PT): sub acute care setting

## 2025-06-11 NOTE — PLAN OF CARE
Goal Outcome Evaluation:              Outcome Evaluation: Patient alert and oriented, on 1L NC, NSR on monitor, up to chair, chairez catheter patent some bleeding noted around catheter site, amiodarone continued per MD until tomorrow

## 2025-06-11 NOTE — PLAN OF CARE
Problem: Adult Inpatient Plan of Care  Goal: Plan of Care Review  Outcome: Progressing  Flowsheets (Taken 6/11/2025 0334)  Progress: improving  Goal: Patient-Specific Goal (Individualized)  Outcome: Progressing  Goal: Absence of Hospital-Acquired Illness or Injury  Outcome: Progressing  Intervention: Identify and Manage Fall Risk  Recent Flowsheet Documentation  Taken 6/11/2025 0100 by Dottie Lew RN  Safety Promotion/Fall Prevention:   nonskid shoes/slippers when out of bed   safety round/check completed  Taken 6/10/2025 2300 by Dottie Lew RN  Safety Promotion/Fall Prevention:   activity supervised   safety round/check completed  Taken 6/10/2025 2006 by Dottie Lew RN  Safety Promotion/Fall Prevention:   activity supervised   fall prevention program maintained   nonskid shoes/slippers when out of bed   safety round/check completed  Intervention: Prevent Skin Injury  Recent Flowsheet Documentation  Taken 6/11/2025 0100 by Dottie Lew RN  Body Position: position changed independently  Taken 6/10/2025 2300 by Dottie Lew RN  Body Position:   turned   position changed independently  Taken 6/10/2025 2006 by Dottie Lew RN  Body Position: position changed independently  Intervention: Prevent Infection  Recent Flowsheet Documentation  Taken 6/11/2025 0100 by Dottie Lew RN  Infection Prevention:   rest/sleep promoted   hand hygiene promoted  Taken 6/10/2025 2300 by Dottie Lew RN  Infection Prevention:   hand hygiene promoted   rest/sleep promoted  Taken 6/10/2025 2006 by Dottie Lew RN  Infection Prevention: rest/sleep promoted  Goal: Optimal Comfort and Wellbeing  Outcome: Progressing  Intervention: Provide Person-Centered Care  Recent Flowsheet Documentation  Taken 6/10/2025 2300 by Dottie Lew RN  Trust Relationship/Rapport:   care explained   choices provided   emotional support provided   empathic listening provided   questions answered   questions  encouraged   reassurance provided   thoughts/feelings acknowledged  Taken 6/10/2025 2100 by Dottie Lew RN  Trust Relationship/Rapport:   care explained   choices provided   emotional support provided   empathic listening provided   questions answered   questions encouraged   reassurance provided   thoughts/feelings acknowledged  Goal: Readiness for Transition of Care  Outcome: Progressing     Problem: Fall Injury Risk  Goal: Absence of Fall and Fall-Related Injury  Outcome: Progressing  Intervention: Promote Injury-Free Environment  Recent Flowsheet Documentation  Taken 6/11/2025 0100 by Dottie Lew RN  Safety Promotion/Fall Prevention:   nonskid shoes/slippers when out of bed   safety round/check completed  Taken 6/10/2025 2300 by Dottie Lew RN  Safety Promotion/Fall Prevention:   activity supervised   safety round/check completed  Taken 6/10/2025 2006 by Dottie Lew RN  Safety Promotion/Fall Prevention:   activity supervised   fall prevention program maintained   nonskid shoes/slippers when out of bed   safety round/check completed     Problem: Skin Injury Risk Increased  Goal: Skin Health and Integrity  Outcome: Progressing  Intervention: Optimize Skin Protection  Recent Flowsheet Documentation  Taken 6/11/2025 0100 by Dottie Lew RN  Activity Management: bedrest  Head of Bed (HOB) Positioning: HOB elevated  Taken 6/10/2025 2300 by Dottie Lew RN  Activity Management: bedrest  Head of Bed (HOB) Positioning: HOB at 20-30 degrees  Taken 6/10/2025 2006 by Dottie Lew RN  Activity Management: activity encouraged  Head of Bed (HOB) Positioning: HOB elevated     Problem: Comorbidity Management  Goal: Blood Glucose Level Within Target Range  Outcome: Progressing     Problem: Sepsis/Septic Shock  Goal: Optimal Coping  Outcome: Progressing  Intervention: Support Patient and Family Response  Recent Flowsheet Documentation  Taken 6/10/2025 2300 by Dottie Lew, RN  Supportive  Measures: active listening utilized  Taken 6/10/2025 2100 by Dottie Lew RN  Supportive Measures: active listening utilized  Goal: Absence of Bleeding  Outcome: Progressing  Goal: Blood Glucose Level Within Target Range  Outcome: Progressing  Intervention: Optimize Glycemic Control  Recent Flowsheet Documentation  Taken 6/10/2025 2006 by Dottie Lew RN  Hyperglycemia Management: blood glucose monitored  Hypoglycemia Management: blood glucose monitored  Goal: Absence of Infection Signs and Symptoms  Outcome: Progressing  Intervention: Initiate Sepsis Management  Recent Flowsheet Documentation  Taken 6/11/2025 0100 by Dottie Lew RN  Infection Prevention:   rest/sleep promoted   hand hygiene promoted  Taken 6/10/2025 2300 by Dottie Lew RN  Stabilization Measures: legs elevated  Infection Prevention:   hand hygiene promoted   rest/sleep promoted  Isolation Precautions:   contact   precautions maintained   spore  Taken 6/10/2025 2100 by Dottie Lew RN  Stabilization Measures: legs elevated  Isolation Precautions:   contact   precautions maintained   spore  Taken 6/10/2025 2006 by Dottie Lew RN  Infection Prevention: rest/sleep promoted  Intervention: Promote Recovery  Recent Flowsheet Documentation  Taken 6/11/2025 0100 by Dottie Lew RN  Activity Management: bedrest  Taken 6/10/2025 2300 by Dottie Lew RN  Activity Management: bedrest  Taken 6/10/2025 2006 by Dottie Lew RN  Activity Management: activity encouraged  Goal: Optimal Nutrition Delivery  Outcome: Progressing   Goal Outcome Evaluation:           Progress: improving

## 2025-06-11 NOTE — PROGRESS NOTES
[facility]     Progress Note    Patient Name: Cosmo Gauthier  : 1945  MRN: [id number]  Primary Care Physician:  Alex Buckley MD  Date of admission: 2025    Subjective   Subjective     Chief Complaint: Cardiac status is stable will repeat a chest x-ray patient afebrile vital signs are stable continue the current antibiotics at this time alert oriented    HPI: Patient stable doing better has recovered from infection    Review of Systems   All systems were reviewed and negative except for: Has been reviewed    Objective   Objective     Vitals:   Temp:  [97.5 °F (36.4 °C)-99.7 °F (37.6 °C)] 97.7 °F (36.5 °C)  Heart Rate:  [] 78  Resp:  [18-20] 18  BP: ()/() 121/67  Flow (L/min) (Oxygen Therapy):  [4-7] 4    Physical Exam    Constitutional: Alert and oriented not in acute distress   Eyes: Pupils equal reacting to light and accommodation   HENT: Normocephalic   Neck: No lymphadenopathy   Respiratory: Diminished breath sounds right base   Cardiovascular: S1-S2 normal no S3 or S4   Gastrointestinal: No palpable organomegaly   Musculoskeletal: No deformities   Neurologic: No localizing signs  Skin: No rash       Result Review    Result Review:  I have personally reviewed the results from the time of this admission to 2025 08:08 EDT and agree with these findings:  [x]  Laboratory  []  Microbiology  []  Radiology  [x]  EKG/Telemetry   []  Cardiology/Vascular   []  Pathology  []  Old records  []  Other:  Most notable findings include: Have been reviewed and discussed    Assessment & Plan   Assessment / Plan     Brief Patient Summary:  Cosmo Gauthier is a 79 y.o. male who patient with pneumonia history of lymphoma    Active Hospital Problems:  Active Hospital Problems    Diagnosis     **Sepsis        Plan:   Continue antibiotics    VTE Prophylaxis:  No VTE prophylaxis order currently exists.        CODE STATUS:   Code Status (Patient has no pulse and is not breathing): CPR (Attempt  to Resuscitate)  Medical Interventions (Patient has pulse or is breathing): Full Support  Level Of Support Discussed With: Patient    Disposition:  I expect patient to be discharged after the patient is stabilized.    Electronically signed by Seven Saldana MD, 06/11/25, 8:08 AM EDT.      Part of this note may be an electronic transcription/translation of spoken language to printed text using the Dragon Dictation System.

## 2025-06-11 NOTE — THERAPY EVALUATION
Acute Care - Physical Therapy Initial Evaluation  SARKIS Dhillon     Patient Name: Cosmo Gauthier  : 1945  MRN: [id number]  Today's Date: 2025      Visit Dx:     ICD-10-CM ICD-9-CM   1. Sepsis, due to unspecified organism, unspecified whether acute organ dysfunction present  A41.9 038.9     995.91   2. Pneumonia due to infectious organism, unspecified laterality, unspecified part of lung  J18.9 486   3. Thrombocytopenia  D69.6 287.5   4. Weakness generalized  R53.1 780.79   5. Lactic acidosis  E87.20 276.2   6. Oropharyngeal dysphagia  R13.12 787.22   7. Difficulty walking  R26.2 719.7     Patient Active Problem List   Diagnosis    Lymphoma    Type 2 diabetes mellitus with complications    Stage 3b chronic kidney disease    Thrombocytopenia    Coronary artery disease involving native coronary artery of native heart without angina pectoris    Hx of CABG    Hyperlipemia, mixed    Hypertension, essential    Hereditary and idiopathic neuropathy, unspecified    Low lying conus medullaris    Mood disorder    Other cirrhosis of liver    Sleep apnea    Spinal stenosis of lumbar region with neurogenic claudication    Vascular dementia without behavioral disturbance    Iron deficiency anemia    Hypothyroidism, adult    Traumatic subarachnoid hemorrhage with loss of consciousness of 30 minutes or less    Morbid (severe) obesity due to excess calories    Claudication of both lower extremities    Medicare annual wellness visit, subsequent    Weakness    Hepatic encephalopathy    Secondary esophageal varices without bleeding    Esophageal dysphagia    Seasonal allergic rhinitis due to pollen    History of colon polyps    Sepsis     Past Medical History:   Diagnosis Date    Anxiety and depression     Arthritis     Chronic back pain     Cirrhosis     Coronary artery disease     Dementia     Depression     Diabetes mellitus     Disease of thyroid gland     Elevated cholesterol     Family history of colon cancer     Fatty  liver     Gastric ulcer     GERD (gastroesophageal reflux disease)     Heart disease     High cholesterol     Hypertension     Hyperthyroidism     Knee pain     Lymphoma     History of lymphoma, has been in remission    Memory change 10/18/2017    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     Primary osteoarthritis of left knee     Sleep apnea     Sleep apnea     Thrombocytopenia 05/22/2018    Thyroid disease      Past Surgical History:   Procedure Laterality Date    CARDIAC SURGERY      COLONOSCOPY  2015    COLONOSCOPY N/A 9/3/2024    Procedure: COLONOSCOPY FOR SCREENING WITH COLD SNARE;  Surgeon: Chris Bradshaw MD;  Location: [facility] ENDOSCOPY;  Service: Gastroenterology;  Laterality: N/A;  COLON POLYPS    CORONARY ANGIOPLASTY WITH STENT PLACEMENT      CORONARY ARTERY BYPASS GRAFT N/A 05/20/2021    Procedure: MIDLINE STERNOTOMY,CORONARY ARTERY BYPASS GRAFTING X 5, UTILIZING THE LEFT COMPA AND LEFT SAPHENOUS VEIN, ABHIJEET, PRP;  Surgeon: Jr Tom Tubbs MD;  Location: [facility];  Service: Cardiothoracic;  Laterality: N/A;    ENDOSCOPY      ENDOSCOPY N/A 1/24/2024    Procedure: ESOPHAGOGASTRODUODENOSCOPY;  Surgeon: Chris Bradshaw MD;  Location: [facility] ENDOSCOPY;  Service: Gastroenterology;  Laterality: N/A;  hiatal hernia, schatzki's ring    ENDOSCOPY N/A 1/29/2024    Procedure: ESOPHAGOGASTRODUODENOSCOPY with balloon dilaitation 12-15cm;  Surgeon: Chris Bradshaw MD;  Location: [facility] ENDOSCOPY;  Service: Gastroenterology;  Laterality: N/A;  hiatal hernia, schatskis ring    ENDOSCOPY N/A 2/17/2025    Procedure: ESOPHAGOGASTRODUODENOSCOPY WITH BIOPSIES;  Surgeon: Chris Bradshaw MD;  Location: [facility] ENDOSCOPY;  Service: Gastroenterology;  Laterality: N/A;  GASTRITIS/HIATAL HERNIA    HERNIA REPAIR      JOINT REPLACEMENT      SHOULDER SURGERY      SHOULDER REPAIR    TOTAL KNEE ARTHROPLASTY      TRANSESOPHAGEAL ECHOCARDIOGRAM (ABHIJEET) N/A 05/20/2021    Procedure: TRANSESOPHAGEAL  ECHOCARDIOGRAM WITH ANESTHESIA;  Surgeon: Jr Tom Tubbs MD;  Location: [facility] OR;  Service: Cardiothoracic;  Laterality: N/A;     PT Assessment (Last 12 Hours)       PT Evaluation and Treatment       Row Name 06/11/25 1501          Physical Therapy Time and Intention    Subjective Information no complaints  -AV     Document Type evaluation  -AV     Mode of Treatment individual therapy;physical therapy  -AV     Patient Effort good  -AV     Symptoms Noted During/After Treatment other (see comments)  Increased coughing with symptoms improving after a sip of water and ambulation.  -AV       Row Name 06/11/25 1501          General Information    Patient Profile Reviewed yes  -AV     Patient Observations alert;cooperative;agree to therapy  -AV     Prior Level of Function independent:;all household mobility;gait;transfer;bed mobility  Patient lives at home with spouse, has 4 step onto the porch and 1 to enter the home, has 15 steps to walkout basement, uses a rollator for household mobility and a motorized wheelchair for community mobility like shopping. Patient uses a cpap at night.  -AV     Equipment Currently Used at Home bipap/ cpap;rollator;wheelchair, motorized  Patient uses the rollator for household mobility and  motorized wheelchair when shopping. Patient wears the cpap at night.  -AV     Existing Precautions/Restrictions fall  -AV       Row Name 06/11/25 1501          Living Environment    Current Living Arrangements home  -AV     Home Accessibility stairs to enter home;stairs within home  -AV     People in Home spouse  -AV     Primary Care Provided by self  -AV       Row Name 06/11/25 1501          Home Main Entrance    Number of Stairs, Main Entrance five  4 steps into the porch with railings and 1 step to enter the house.  -AV     Stair Railings, Main Entrance railings on both sides of stairs  -AV       Row Name 06/11/25 1501          Stairs Within Home, Primary    Stairs, Within Home, Primary  Walkout basement  -AV     Number of Stairs, Within Home, Primary other (see comments)  15  -AV       Row Name 06/11/25 1501          Home Use of Assistive/Adaptive Equipment    Equipment Currently Used at Home wheelchair, motorized;rollator;cpap  -AV       Row Name 06/11/25 1501          Pain    Additional Documentation Pain Scale: FACES Pre/Post-Treatment (Group)  -AV       Row Name 06/11/25 1501          Pain Scale: FACES Pre/Post-Treatment    Pain: FACES Scale, Pretreatment 0-->no hurt  -AV     Posttreatment Pain Rating 0-->no hurt  -AV       [facility] Name 06/11/25 1501          Cognition    Affect/Mental Status (Cognition) WNL  -AV     Orientation Status (Cognition) oriented x 3  -AV     Follows Commands (Cognition) WNL  -AV     Cognitive Function (Cognition) WNL  -AV       [facility] Name 06/11/25 1501          Range of Motion (ROM)    Range of Motion ROM is WFL  -AV       [facility] Name 06/11/25 1501          Range of Motion Comprehensive    General Range of Motion bilateral lower extremity ROM L  AV       [facility] Name 06/11/25 1501          Strength (Manual Muscle Testing)    Strength (Manual Muscle Testing) strength is L  -AV       [facility] Name 06/11/25 1501          Bed Mobility    Bed Mobility other (see comments)  Patient was found seated at bedside recliner upon entry.  -AV       Row Name 06/11/25 1501          Transfers    Transfers sit-stand transfer;stand-sit transfer  -AV       Row Name 06/11/25 1501          Sit-Stand Transfer    Sit-Stand [place] (Transfers) contact guard  -AV     Assistive Device (Sit-Stand Transfers) walker, front-wheeled  -AV       Row Name 06/11/25 1501          Stand-Sit Transfer    Stand-Sit [place] (Transfers) contact guard  -AV     Assistive Device (Stand-Sit Transfers) walker, front-wheeled  -AV       Row Name 06/11/25 1501          Gait/Stairs (Locomotion)    Gait/Stairs Locomotion gait/ambulation independence;gait/ambulation assistive device  -AV     [place] Level (Gait)  contact guard  -AV     Assistive Device (Gait) walker, front-wheeled  -AV     Distance in Feet (Gait) 15  -AV     Pattern (Gait) step-to  -AV     Deviations/Abnormal Patterns (Gait) gait speed decreased;stride length decreased;gonzalo decreased  -AV     Bilateral Gait Deviations forward flexed posture  -AV       Row Name 06/11/25 1501          Safety Issues/Impairments Affecting Functional Mobility    Safety Issues Affecting Function (Mobility) sequencing abilities;insight into deficits/self-awareness;awareness of need for assistance  -AV     Impairments Affecting Function (Mobility) balance;coordination;endurance/activity tolerance;strength  -AV       Row Name 06/11/25 1501          Balance    Balance Assessment standing dynamic balance  -AV     Dynamic Standing Balance contact guard  -AV     Position/Device Used, Standing Balance supported;walker, front-wheeled  -AV       Row Name 06/11/25 1501          Plan of Care Review    Plan of Care Reviewed With patient  -AV     Progress no change  -AV     Outcome Evaluation Patient presents with deficits with strength, coordination, endurance, transfers, ambulation, and likely bed mobility. Patient will highly benefit from skilled PT services to address these deficits to increase function and decrease fall risks prior to being discharged.  -AV       Row Name 06/11/25 1501          Vital Signs    O2 Delivery Pre Treatment room air  -AV     O2 Delivery Intra Treatment room air  -AV     O2 Delivery Post Treatment room air  -AV     Pre Patient Position Sitting  -AV     Intra Patient Position Standing  -AV     Post Patient Position Sitting  -AV       Row Name 06/11/25 1501          Positioning and Restraints    Pre-Treatment Position sitting in chair/recliner  -AV     Post Treatment Position chair  -AV     In Chair reclined;call light within reach;encouraged to call for assist;exit alarm on  -AV       Row Name 06/11/25 1501          Therapy Assessment/Plan (PT)    Rehab  Potential (PT) good  -AV     Criteria for Skilled Interventions Met (PT) yes;meets criteria;skilled treatment is necessary  -AV     Therapy Frequency (PT) daily  -AV     Predicted Duration of Therapy Intervention (PT) 10 days  -AV     Problem List (PT) problems related to;balance;coordination;mobility;strength  -AV     Activity Limitations Related to Problem List (PT) unable to ambulate safely;unable to transfer safely  -AV       Row Name 06/11/25 1501          Therapy Plan Review/Discharge Plan (PT)    Therapy Plan Review (PT) evaluation/treatment results reviewed;patient  -AV       Row Name 06/11/25 1501          Physical Therapy Goals    Bed Mobility Goal Selection (PT) bed mobility, PT goal 1  -AV     Transfer Goal Selection (PT) transfer, PT goal 1  -AV     Gait Training Goal Selection (PT) gait training, PT goal 1  -AV       Row Name 06/11/25 1501          Bed Mobility Goal 1 (PT)    Activity/Assistive Device (Bed Mobility Goal 1, PT) bed mobility activities, all;bed rails  -AV     [place] Level/Cues Needed (Bed Mobility Goal 1, PT) modified independence  -AV     Time Frame (Bed Mobility Goal 1, PT) 10 days  -AV       Row Name 06/11/25 1501          Transfer Goal 1 (PT)    Activity/Assistive Device (Transfer Goal 1, PT) sit-to-stand/stand-to-sit;walker, rolling  -AV     [place] Level/Cues Needed (Transfer Goal 1, PT) modified independence  -AV     Time Frame (Transfer Goal 1, PT) 10 days  -AV       Row Name 06/11/25 1501          Gait Training Goal 1 (PT)    Activity/Assistive Device (Gait Training Goal 1, PT) gait (walking locomotion);assistive device use;walker, rolling  -AV     [place] Level (Gait Training Goal 1, PT) modified independence  -AV     Distance (Gait Training Goal 1, PT) 150  -AV     Time Frame (Gait Training Goal 1, PT) 10 days  -AV               User Key  (r) = Recorded By, (t) = Taken By, (c) = Cosigned By      Initials Name Provider Type    AV Rogers Espinosa, PT Physical  Therapist                    Physical Therapy Education       Title: PT OT SLP Therapies (Done)       Topic: Physical Therapy (Done)       Point: Mobility training (Done)       Learning Progress Summary            Patient Acceptance, E,TB, VU by AV at 6/11/2025 1532                      Point: Home exercise program (Done)       Learning Progress Summary            Patient Acceptance, E,TB, VU by AV at 6/11/2025 1532                      Point: Body mechanics (Done)       Learning Progress Summary            Patient Acceptance, E,TB, VU by AV at 6/11/2025 1532                      Point: Precautions (Done)       Learning Progress Summary            Patient Acceptance, E,TB, VU by AV at 6/11/2025 1532                                      User Key       Initials Effective Dates Name Provider Type Discipline    AV 06/11/21 -  Rogers Espinosa, PT Physical Therapist PT                  PT Recommendation and Plan  Anticipated Discharge Disposition (PT): sub acute care setting  Planned Therapy Interventions (PT): balance training, bed mobility training, gait training, home exercise program, strengthening, transfer training, patient/family education  Therapy Frequency (PT): daily  Plan of Care Reviewed With: patient  Progress: no change  Outcome Evaluation: Patient presents with deficits with strength, coordination, endurance, transfers, ambulation, and likely bed mobility. Patient will highly benefit from skilled PT services to address these deficits to increase function and decrease fall risks prior to being discharged.   Outcome Measures       Row Name 06/11/25 1500             How much help from another person do you currently need...    Turning from your back to your side while in flat bed without using bedrails? 3  -AV      Moving from lying on back to sitting on the side of a flat bed without bedrails? 3  -AV      Moving to and from a bed to a chair (including a wheelchair)? 3  -AV      Standing up from a chair  using your arms (e.g., wheelchair, bedside chair)? 3  -AV      Climbing 3-5 steps with a railing? 3  -AV      To walk in hospital room? 3  -AV      AM-PAC 6 Clicks Score (PT) 18  -AV         Functional Assessment    Outcome Measure Options AM-PAC 6 Clicks Basic Mobility (PT)  -AV                User Key  (r) = Recorded By, (t) = Taken By, (c) = Cosigned By      Initials Name Provider Type    AV Rogers Espinosa, PT Physical Therapist                     Time Calculation:    PT Charges       Row Name 06/11/25 1502             Time Calculation    PT Received On 06/11/25  -AV      PT Goal Re-Cert Due Date 06/20/25  -AV         Untimed Charges    PT Eval/Re-eval Minutes 35  -AV         Total Minutes    Untimed Charges Total Minutes 35  -AV       Total Minutes 35  -AV                User Key  (r) = Recorded By, (t) = Taken By, (c) = Cosigned By      Initials Name Provider Type    AV Rogers Espinosa, PT Physical Therapist                  Therapy Charges for Today       Code Description Service Date Service Provider Modifiers Qty    73783247893 HC PT EVAL LOW COMPLEXITY 3 6/11/2025 Rogers Espinosa, PT GP 1            PT G-Codes  Outcome Measure Options: AM-PAC 6 Clicks Basic Mobility (PT)  AM-PAC 6 Clicks Score (PT): 18    Rogers Espinosa, NASIR  6/11/2025

## 2025-06-11 NOTE — THERAPY TREATMENT NOTE
Acute Care - Speech Language Pathology   Swallow Treatment Note SARKIS Dhillon     Patient Name: Cosmo Gauthier  : 1945  MRN: [id number]  Today's Date: 2025               Admit Date: 2025    Visit Dx:     ICD-10-CM ICD-9-CM   1. Sepsis, due to unspecified organism, unspecified whether acute organ dysfunction present  A41.9 038.9     995.91   2. Pneumonia due to infectious organism, unspecified laterality, unspecified part of lung  J18.9 486   3. Thrombocytopenia  D69.6 287.5   4. Weakness generalized  R53.1 780.79   5. Lactic acidosis  E87.20 276.2   6. Oropharyngeal dysphagia  R13.12 787.22     Patient Active Problem List   Diagnosis    Lymphoma    Type 2 diabetes mellitus with complications    Stage 3b chronic kidney disease    Thrombocytopenia    Coronary artery disease involving native coronary artery of native heart without angina pectoris    Hx of CABG    Hyperlipemia, mixed    Hypertension, essential    Hereditary and idiopathic neuropathy, unspecified    Low lying conus medullaris    Mood disorder    Other cirrhosis of liver    Sleep apnea    Spinal stenosis of lumbar region with neurogenic claudication    Vascular dementia without behavioral disturbance    Iron deficiency anemia    Hypothyroidism, adult    Traumatic subarachnoid hemorrhage with loss of consciousness of 30 minutes or less    Morbid (severe) obesity due to excess calories    Claudication of both lower extremities    Medicare annual wellness visit, subsequent    Weakness    Hepatic encephalopathy    Secondary esophageal varices without bleeding    Esophageal dysphagia    Seasonal allergic rhinitis due to pollen    History of colon polyps    Sepsis     Past Medical History:   Diagnosis Date    Anxiety and depression     Arthritis     Chronic back pain     Cirrhosis     Coronary artery disease     Dementia     Depression     Diabetes mellitus     Disease of thyroid gland     Elevated cholesterol     Family history of colon cancer      Fatty liver     Gastric ulcer     GERD (gastroesophageal reflux disease)     Heart disease     High cholesterol     Hypertension     Hyperthyroidism     Knee pain     Lymphoma     History of lymphoma, has been in remission    Memory change 10/18/2017    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     Primary osteoarthritis of left knee     Sleep apnea     Sleep apnea     Thrombocytopenia 05/22/2018    Thyroid disease      Past Surgical History:   Procedure Laterality Date    CARDIAC SURGERY      COLONOSCOPY  2015    COLONOSCOPY N/A 9/3/2024    Procedure: COLONOSCOPY FOR SCREENING WITH COLD SNARE;  Surgeon: Crhis Bradshaw MD;  Location: [facility] ENDOSCOPY;  Service: Gastroenterology;  Laterality: N/A;  COLON POLYPS    CORONARY ANGIOPLASTY WITH STENT PLACEMENT      CORONARY ARTERY BYPASS GRAFT N/A 05/20/2021    Procedure: MIDLINE STERNOTOMY,CORONARY ARTERY BYPASS GRAFTING X 5, UTILIZING THE LEFT COMPA AND LEFT SAPHENOUS VEIN, ABHIJEET, PRP;  Surgeon: Jr Tom Tubbs MD;  Location: [facility];  Service: Cardiothoracic;  Laterality: N/A;    ENDOSCOPY      ENDOSCOPY N/A 1/24/2024    Procedure: ESOPHAGOGASTRODUODENOSCOPY;  Surgeon: Chris Bradshaw MD;  Location: [facility] ENDOSCOPY;  Service: Gastroenterology;  Laterality: N/A;  hiatal hernia, schatzki's ring    ENDOSCOPY N/A 1/29/2024    Procedure: ESOPHAGOGASTRODUODENOSCOPY with balloon dilaitation 12-15cm;  Surgeon: Chris Bradshaw MD;  Location: [facility] ENDOSCOPY;  Service: Gastroenterology;  Laterality: N/A;  hiatal hernia, schatskis ring    ENDOSCOPY N/A 2/17/2025    Procedure: ESOPHAGOGASTRODUODENOSCOPY WITH BIOPSIES;  Surgeon: Chris Bradshaw MD;  Location: [facility] ENDOSCOPY;  Service: Gastroenterology;  Laterality: N/A;  GASTRITIS/HIATAL HERNIA    HERNIA REPAIR      JOINT REPLACEMENT      SHOULDER SURGERY      SHOULDER REPAIR    TOTAL KNEE ARTHROPLASTY      TRANSESOPHAGEAL ECHOCARDIOGRAM (ABHIJEET) N/A 05/20/2021    Procedure:  TRANSESOPHAGEAL ECHOCARDIOGRAM WITH ANESTHESIA;  Surgeon: Jr Tom Tubbs MD;  Location: [facility];  Service: Cardiothoracic;  Laterality: N/A;       SLP Recommendation and Plan      SPEECH PATHOLOGY DYSPHAGIA TREATMENT    Subjective/Behavioral Observations: Patient was pleasant and cooperative. Patient's daughter was present for the session. Patient reported that he was more tired this morning as he had been given a sleeping pill around 3:00am. Patient had a nap and then felt ready to work with SLP.         Day/time of Treatment: 06/11/2025        Current Diet: Level 0 thin liquids and level 7 regular solids.         Current Strategies: Patient should turn his head to the right when consuming thin liquids. Patient should alternate liquids and solids to improve pharyngeal residue.        Treatment received: Patient participated in swallowing exercises to improve swallow strength to a more functional level.         Results of treatment: Patient completed the following exercises given maximum to minimal faded cuing: effortful swallow x 10 reps, Mendelsohn Maneuver x 5 reps, Carolyn Maneuver X 5 reps, Front lingual resistance x 5 reps, back lingual resistance x 5 reps. Patient tolerated exercises well and reported understanding how to complete them. Patient encouraged to try completing them throughout the day and to stop an exercise if a sharp pain occurs. Patient indicated understanding.         Progress toward goals: Progressing.         Barriers to Achieving goals: Medical status.         Plan of care:/changes in plan: No change to plan of care. Patient continues to benefit from skilled services.                                                                                                 EDUCATION  The patient has been educated in the following areas:   Dysphagia (Swallowing Impairment).                Time Calculation:    Time Calculation- SLP       Row Name 06/11/25 3942             Time Calculation-  SLP    SLP Start Time 1330  -AW      SLP Stop Time 1430  -AW      SLP Time Calculation (min) 60 min  -AW         Untimed Charges    26747-HA Treatment Swallow Minutes 60  -AW         Total Minutes    Untimed Charges Total Minutes 60  -AW       Total Minutes 60  -AW                User Key  (r) = Recorded By, (t) = Taken By, (c) = Cosigned By      Initials Name Provider Type    AW Anum Zavala SLP Speech and Language Pathologist                    Therapy Charges for Today       Code Description Service Date Service Provider Modifiers Qty    33394449483 HC ST EVAL ORAL PHARYNG SWALLOW 4 6/10/2025 Anum Zavala, SLP GN 1    03495828699 HC ST MOTION FLUORO EVAL SWALLOW 6 6/10/2025 Anum Zavala, SLP GN 1    35646630343 HC ST TREATMENT SWALLOW 4 6/11/2025 Anum Zavala, SLP GN 1                 LING Peraza  6/11/2025

## 2025-06-12 ENCOUNTER — APPOINTMENT (OUTPATIENT)
Dept: GENERAL RADIOLOGY | Facility: HOSPITAL | Age: 80
DRG: 871 | End: 2025-06-12
Payer: OTHER GOVERNMENT

## 2025-06-12 LAB
ALBUMIN SERPL-MCNC: 3.3 G/DL (ref 3.5–5.2)
ALBUMIN/GLOB SERPL: 1.1 G/DL
ALP SERPL-CCNC: 75 U/L (ref 39–117)
ALT SERPL W P-5'-P-CCNC: 26 U/L (ref 1–41)
ANION GAP SERPL CALCULATED.3IONS-SCNC: 11.5 MMOL/L (ref 5–15)
ANISOCYTOSIS BLD QL: ABNORMAL
AST SERPL-CCNC: 26 U/L (ref 1–40)
BILIRUB SERPL-MCNC: 0.3 MG/DL (ref 0–1.2)
BUN SERPL-MCNC: 36.9 MG/DL (ref 8–23)
BUN/CREAT SERPL: 27.1 (ref 7–25)
CALCIUM SPEC-SCNC: 8.7 MG/DL (ref 8.6–10.5)
CHLORIDE SERPL-SCNC: 104 MMOL/L (ref 98–107)
CO2 SERPL-SCNC: 18.5 MMOL/L (ref 22–29)
CREAT SERPL-MCNC: 1.36 MG/DL (ref 0.76–1.27)
CYTO UR: NORMAL
DEPRECATED RDW RBC AUTO: 53.5 FL (ref 37–54)
EGFRCR SERPLBLD CKD-EPI 2021: 53.6 ML/MIN/1.73
EOSINOPHIL # BLD MANUAL: 0.34 10*3/MM3 (ref 0–0.4)
EOSINOPHIL NFR BLD MANUAL: 1 % (ref 0.3–6.2)
ERYTHROCYTE [DISTWIDTH] IN BLOOD BY AUTOMATED COUNT: 17.4 % (ref 12.3–15.4)
GLOBULIN UR ELPH-MCNC: 3.1 GM/DL
GLUCOSE BLDC GLUCOMTR-MCNC: 134 MG/DL (ref 70–99)
GLUCOSE BLDC GLUCOMTR-MCNC: 139 MG/DL (ref 70–99)
GLUCOSE BLDC GLUCOMTR-MCNC: 168 MG/DL (ref 70–99)
GLUCOSE BLDC GLUCOMTR-MCNC: 195 MG/DL (ref 70–99)
GLUCOSE SERPL-MCNC: 136 MG/DL (ref 65–99)
HCT VFR BLD AUTO: 29 % (ref 37.5–51)
HGB BLD-MCNC: 9.1 G/DL (ref 13–17.7)
HYPOCHROMIA BLD QL: ABNORMAL
LAB AP CASE REPORT: NORMAL
LAB AP CLINICAL INFORMATION: NORMAL
LYMPHOCYTES # BLD MANUAL: 29.48 10*3/MM3 (ref 0.7–3.1)
LYMPHOCYTES NFR BLD MANUAL: 1 % (ref 5–12)
MCH RBC QN AUTO: 26.3 PG (ref 26.6–33)
MCHC RBC AUTO-ENTMCNC: 31.4 G/DL (ref 31.5–35.7)
MCV RBC AUTO: 83.8 FL (ref 79–97)
MONOCYTES # BLD: 0.34 10*3/MM3 (ref 0.1–0.9)
NEUTROPHILS # BLD AUTO: 3.73 10*3/MM3 (ref 1.7–7)
NEUTROPHILS NFR BLD MANUAL: 11 % (ref 42.7–76)
PATH REPORT.FINAL DX SPEC: NORMAL
PATH REPORT.GROSS SPEC: NORMAL
PATHOLOGY REVIEW: YES
PLATELET # BLD AUTO: 15 10*3/MM3 (ref 140–450)
PMV BLD AUTO: ABNORMAL FL
POTASSIUM SERPL-SCNC: 4.3 MMOL/L (ref 3.5–5.2)
PROT SERPL-MCNC: 6.4 G/DL (ref 6–8.5)
RBC # BLD AUTO: 3.46 10*6/MM3 (ref 4.14–5.8)
SCAN SLIDE: NORMAL
SMALL PLATELETS BLD QL SMEAR: ABNORMAL
SMUDGE CELLS BLD QL SMEAR: ABNORMAL
SODIUM SERPL-SCNC: 134 MMOL/L (ref 136–145)
VARIANT LYMPHS NFR BLD MANUAL: 2 % (ref 0–5)
VARIANT LYMPHS NFR BLD MANUAL: 85 % (ref 19.6–45.3)
WBC NRBC COR # BLD AUTO: 33.89 10*3/MM3 (ref 3.4–10.8)

## 2025-06-12 PROCEDURE — 82948 REAGENT STRIP/BLOOD GLUCOSE: CPT | Performed by: INTERNAL MEDICINE

## 2025-06-12 PROCEDURE — 97165 OT EVAL LOW COMPLEX 30 MIN: CPT

## 2025-06-12 PROCEDURE — 82948 REAGENT STRIP/BLOOD GLUCOSE: CPT

## 2025-06-12 PROCEDURE — 99232 SBSQ HOSP IP/OBS MODERATE 35: CPT | Performed by: SPECIALIST

## 2025-06-12 PROCEDURE — 85007 BL SMEAR W/DIFF WBC COUNT: CPT | Performed by: INTERNAL MEDICINE

## 2025-06-12 PROCEDURE — 63710000001 INSULIN LISPRO (HUMAN) PER 5 UNITS: Performed by: INTERNAL MEDICINE

## 2025-06-12 PROCEDURE — 25010000002 CEFTRIAXONE PER 250 MG: Performed by: INTERNAL MEDICINE

## 2025-06-12 PROCEDURE — 71045 X-RAY EXAM CHEST 1 VIEW: CPT

## 2025-06-12 PROCEDURE — 94799 UNLISTED PULMONARY SVC/PX: CPT

## 2025-06-12 PROCEDURE — 92526 ORAL FUNCTION THERAPY: CPT

## 2025-06-12 PROCEDURE — 99232 SBSQ HOSP IP/OBS MODERATE 35: CPT | Performed by: INTERNAL MEDICINE

## 2025-06-12 PROCEDURE — 94669 MECHANICAL CHEST WALL OSCILL: CPT

## 2025-06-12 PROCEDURE — 80053 COMPREHEN METABOLIC PANEL: CPT | Performed by: INTERNAL MEDICINE

## 2025-06-12 PROCEDURE — 85025 COMPLETE CBC W/AUTO DIFF WBC: CPT | Performed by: INTERNAL MEDICINE

## 2025-06-12 PROCEDURE — 94618 PULMONARY STRESS TESTING: CPT

## 2025-06-12 PROCEDURE — 63710000001 INSULIN GLARGINE PER 5 UNITS: Performed by: INTERNAL MEDICINE

## 2025-06-12 RX ORDER — METOPROLOL SUCCINATE 25 MG/1
12.5 TABLET, EXTENDED RELEASE ORAL EVERY 12 HOURS SCHEDULED
Status: DISCONTINUED | OUTPATIENT
Start: 2025-06-12 | End: 2025-06-13 | Stop reason: HOSPADM

## 2025-06-12 RX ORDER — AMIODARONE HYDROCHLORIDE 200 MG/1
200 TABLET ORAL
Status: DISCONTINUED | OUTPATIENT
Start: 2025-06-12 | End: 2025-06-13 | Stop reason: HOSPADM

## 2025-06-12 RX ADMIN — AMIODARONE HYDROCHLORIDE 200 MG: 200 TABLET ORAL at 09:42

## 2025-06-12 RX ADMIN — FLUTICASONE PROPIONATE 2 SPRAY: 50 SPRAY, METERED NASAL at 08:00

## 2025-06-12 RX ADMIN — CEFTRIAXONE SODIUM 2000 MG: 2 INJECTION, POWDER, FOR SOLUTION INTRAMUSCULAR; INTRAVENOUS at 16:07

## 2025-06-12 RX ADMIN — INSULIN LISPRO 2 UNITS: 100 INJECTION, SOLUTION INTRAVENOUS; SUBCUTANEOUS at 11:54

## 2025-06-12 RX ADMIN — TAMSULOSIN HYDROCHLORIDE 0.4 MG: 0.4 CAPSULE ORAL at 08:00

## 2025-06-12 RX ADMIN — METOPROLOL SUCCINATE ER TABLETS 12.5 MG: 25 TABLET, FILM COATED, EXTENDED RELEASE ORAL at 09:42

## 2025-06-12 RX ADMIN — INSULIN GLARGINE 15 UNITS: 100 INJECTION, SOLUTION SUBCUTANEOUS at 20:29

## 2025-06-12 RX ADMIN — RIFAXIMIN 550 MG: 550 TABLET ORAL at 20:29

## 2025-06-12 RX ADMIN — METOPROLOL SUCCINATE ER TABLETS 12.5 MG: 25 TABLET, FILM COATED, EXTENDED RELEASE ORAL at 20:30

## 2025-06-12 RX ADMIN — SERTRALINE HYDROCHLORIDE 100 MG: 100 TABLET ORAL at 08:00

## 2025-06-12 RX ADMIN — INSULIN LISPRO 2 UNITS: 100 INJECTION, SOLUTION INTRAVENOUS; SUBCUTANEOUS at 20:29

## 2025-06-12 RX ADMIN — Medication 10 ML: at 08:00

## 2025-06-12 RX ADMIN — LEVOTHYROXINE SODIUM 200 MCG: 0.05 TABLET ORAL at 05:59

## 2025-06-12 RX ADMIN — RIFAXIMIN 550 MG: 550 TABLET ORAL at 08:00

## 2025-06-12 RX ADMIN — MUPIROCIN 1 APPLICATION: 20 OINTMENT TOPICAL at 08:00

## 2025-06-12 RX ADMIN — MUPIROCIN 1 APPLICATION: 20 OINTMENT TOPICAL at 20:29

## 2025-06-12 RX ADMIN — FINASTERIDE 5 MG: 5 TABLET, FILM COATED ORAL at 08:00

## 2025-06-12 RX ADMIN — DOCUSATE SODIUM 50 MG AND SENNOSIDES 8.6 MG 2 TABLET: 8.6; 5 TABLET, FILM COATED ORAL at 20:30

## 2025-06-12 RX ADMIN — Medication 10 ML: at 20:31

## 2025-06-12 RX ADMIN — PANTOPRAZOLE SODIUM 40 MG: 40 TABLET, DELAYED RELEASE ORAL at 08:00

## 2025-06-12 NOTE — THERAPY TREATMENT NOTE
Acute Care - Speech Language Pathology   Swallow Treatment Note SARKIS Dhillon     Patient Name: Cosmo Gauthier  : 1945  MRN: [id number]  Today's Date: 2025               Admit Date: 2025    Visit Dx:     ICD-10-CM ICD-9-CM   1. Sepsis, due to unspecified organism, unspecified whether acute organ dysfunction present  A41.9 038.9     995.91   2. Pneumonia due to infectious organism, unspecified laterality, unspecified part of lung  J18.9 486   3. Thrombocytopenia  D69.6 287.5   4. Weakness generalized  R53.1 780.79   5. Lactic acidosis  E87.20 276.2   6. Oropharyngeal dysphagia  R13.12 787.22   7. Difficulty walking  R26.2 719.7     Patient Active Problem List   Diagnosis    Lymphoma    Type 2 diabetes mellitus with complications    Stage 3b chronic kidney disease    Thrombocytopenia    Coronary artery disease involving native coronary artery of native heart without angina pectoris    Hx of CABG    Hyperlipemia, mixed    Hypertension, essential    Hereditary and idiopathic neuropathy, unspecified    Low lying conus medullaris    Mood disorder    Other cirrhosis of liver    Sleep apnea    Spinal stenosis of lumbar region with neurogenic claudication    Vascular dementia without behavioral disturbance    Iron deficiency anemia    Hypothyroidism, adult    Traumatic subarachnoid hemorrhage with loss of consciousness of 30 minutes or less    Morbid (severe) obesity due to excess calories    Claudication of both lower extremities    Medicare annual wellness visit, subsequent    Weakness    Hepatic encephalopathy    Secondary esophageal varices without bleeding    Esophageal dysphagia    Seasonal allergic rhinitis due to pollen    History of colon polyps    Sepsis     Past Medical History:   Diagnosis Date    Anxiety and depression     Arthritis     Chronic back pain     Cirrhosis     Coronary artery disease     Dementia     Depression     Diabetes mellitus     Disease of thyroid gland     Elevated cholesterol      Family history of colon cancer     Fatty liver     Gastric ulcer     GERD (gastroesophageal reflux disease)     Heart disease     High cholesterol     Hypertension     Hyperthyroidism     Knee pain     Lymphoma     History of lymphoma, has been in remission    Memory change 10/18/2017    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     Primary osteoarthritis of left knee     Sleep apnea     Sleep apnea     Thrombocytopenia 05/22/2018    Thyroid disease      Past Surgical History:   Procedure Laterality Date    CARDIAC SURGERY      COLONOSCOPY  2015    COLONOSCOPY N/A 9/3/2024    Procedure: COLONOSCOPY FOR SCREENING WITH COLD SNARE;  Surgeon: Chris Bradshaw MD;  Location: [facility] ENDOSCOPY;  Service: Gastroenterology;  Laterality: N/A;  COLON POLYPS    CORONARY ANGIOPLASTY WITH STENT PLACEMENT      CORONARY ARTERY BYPASS GRAFT N/A 05/20/2021    Procedure: MIDLINE STERNOTOMY,CORONARY ARTERY BYPASS GRAFTING X 5, UTILIZING THE LEFT COMPA AND LEFT SAPHENOUS VEIN, ABHIJEET, PRP;  Surgeon: Jr Tom Tubbs MD;  Location: [facility];  Service: Cardiothoracic;  Laterality: N/A;    ENDOSCOPY      ENDOSCOPY N/A 1/24/2024    Procedure: ESOPHAGOGASTRODUODENOSCOPY;  Surgeon: Chris Bradshaw MD;  Location: [facility] ENDOSCOPY;  Service: Gastroenterology;  Laterality: N/A;  hiatal hernia, schatzki's ring    ENDOSCOPY N/A 1/29/2024    Procedure: ESOPHAGOGASTRODUODENOSCOPY with balloon dilaitation 12-15cm;  Surgeon: Chris Bradshaw MD;  Location: [facility] ENDOSCOPY;  Service: Gastroenterology;  Laterality: N/A;  hiatal hernia, schatskis ring    ENDOSCOPY N/A 2/17/2025    Procedure: ESOPHAGOGASTRODUODENOSCOPY WITH BIOPSIES;  Surgeon: Chris Bradshaw MD;  Location: [facility] ENDOSCOPY;  Service: Gastroenterology;  Laterality: N/A;  GASTRITIS/HIATAL HERNIA    HERNIA REPAIR      JOINT REPLACEMENT      SHOULDER SURGERY      SHOULDER REPAIR    TOTAL KNEE ARTHROPLASTY      TRANSESOPHAGEAL ECHOCARDIOGRAM (ABHIJEET)  N/A 05/20/2021    Procedure: TRANSESOPHAGEAL ECHOCARDIOGRAM WITH ANESTHESIA;  Surgeon: Jr Tom Tubbs MD;  Location: [facility];  Service: Cardiothoracic;  Laterality: N/A;       SLP Recommendation and Plan         SPEECH PATHOLOGY DYSPHAGIA TREATMENT    Subjective/Behavioral Observations: Patient was pleasant and cooperative. Patient reported that he has been doing well. Patient's wife was present and reported that he will be going to rehab. SLP informed them that typically rehab has speech services to continue swallowing exercises once discharged.         Day/time of Treatment: 06/12/2025          Current Diet: Level 0 thin liquids and level 7 regular solids.            Current Strategies: Patient should turn his head to the right when consuming thin liquids. Patient should alternate liquids and solids to improve pharyngeal residue.        Treatment received: Patient participated in swallowing exercises to improve swallow strength to a more functional level.         Results of treatment: Patient completed the following exercises given maximum to minimal faded cuing: effortful swallow x 10 reps, Mendelsohn Maneuver x 5 reps, Carolyn Maneuver X 5 reps, Front lingual resistance x 5 reps, back lingual resistance x 5 reps. Patient tolerated exercises well and continues to benefit from skilled services. Wife indicated that she would help him complete his exercises throughout the day.         Progress toward goals: Progressing.            Barriers to Achieving goals: Medical status.            Plan of care:/changes in plan: No change to plan of care. Patient continues to benefit from skilled services.                                                                                              EDUCATION  The patient has been educated in the following areas:   Dysphagia (Swallowing Impairment).                Time Calculation:    Time Calculation- SLP       Row Name 06/12/25 1300             Time Calculation- SLP     SLP Start Time 1300  -AW      SLP Stop Time 1330  -AW      SLP Time Calculation (min) 30 min  -AW         Untimed Charges    73160-FR Treatment Swallow Minutes 30  -AW         Total Minutes    Untimed Charges Total Minutes 30  -AW       Total Minutes 30  -AW                User Key  (r) = Recorded By, (t) = Taken By, (c) = Cosigned By      Initials Name Provider Type     Anum Zavala SLP Speech and Language Pathologist                    Therapy Charges for Today       Code Description Service Date Service Provider Modifiers Qty    19509280431 HC ST TREATMENT SWALLOW 4 6/11/2025 Anum Zavala, SLP GN 1    48168234952 HC ST TREATMENT SWALLOW 2 6/12/2025 Anum Zavala, SLP GN 1                 LING Peraza  6/12/2025

## 2025-06-12 NOTE — PROGRESS NOTES
Walking Oximetry Progress Note      Patient Name:  Cosmo Gauthier  YOB: 1945  Date of Procedure: 06/12/25              ROOM AIR BASELINE   SpO2% 91   Heart Rate 73     EXERCISE ON ROOM AIR SpO2% EXERCISE ON O2 LPM SpO2%   1 MINUTE 86 1 MINUTE PD 2 89   2 MINUTES  2 MINUTES PD 3 89   3 MINUTES  3 MINUTES PD 4 91   4 MINUTES  4 MINUTES PD 4 92   5 MINUTES  5 MINUTES PD 4 90   6 MINUTES  6 MINUTES PD 4 91              Time to Recovery  2'   SpO2% Post Exercise  93 on 2LPM CF.    HR Post Exercise  73     Comments:           Electronically signed by Rishi Marx CRT, 06/12/25, 11:33 AM EDT.

## 2025-06-12 NOTE — PROGRESS NOTES
[facility]   Cardiology Progress Note      Patient Name: Cosmo Gauthier  : 1945  MRN: [id number]  Primary Care Physician:  Alex Buckley MD  Referring Physician: No Known Provider  Date of admission: 2025    Subjective   Subjective     Chief Complaint: Pneumonia/respiratory failure    HPI:  Cosmo Gauthier is a 79 y.o. male admitted with sepsis.  Had SVT has been in sinus rhythm.      Objective    Objective     Vitals:   Vitals:    25 0500 25 0600 25 0700 25 0708   BP: 126/67 145/82 136/80    BP Location: Left arm  Left arm    Patient Position: Sitting  Lying    Pulse: 69 68 64 66   Resp: 18  18    Temp: 97.9 °F (36.6 °C)  98.2 °F (36.8 °C)    TempSrc: Oral  Oral    SpO2: 91% 91% 94% 94%   Weight:       Height:                Physical Exam:   Constitutional: Awake, alert, No acute distress    Eyes: PERRLA, sclerae anicteric, no conjunctival injection   HENT: NCAT, mucous membranes moist   Neck: Supple, no thyromegaly, no lymphadenopathy, trachea midline   Respiratory: Clear to auscultation bilaterally, nonlabored respirations    Cardiovascular: RRR, no murmurs, rubs, or gallops, palpable pedal pulses bilaterally         Current medications:  amiodarone, 200 mg, Oral, Q24H  cefTRIAXone, 2,000 mg, Intravenous, Q24H  finasteride, 5 mg, Oral, Daily  fluticasone, 2 spray, Nasal, Daily  [Held by provider] furosemide, 20 mg, Oral, Every Other Day  insulin glargine, 15 Units, Subcutaneous, Nightly  insulin lispro, 2-7 Units, Subcutaneous, 4x Daily AC & at Bedtime  levothyroxine, 200 mcg, Oral, Q AM  metoprolol succinate XL, 12.5 mg, Oral, Q12H  mupirocin, 1 Application, Each Nare, BID  pantoprazole, 40 mg, Oral, Daily  rifAXIMin, 550 mg, Oral, BID  senna-docusate sodium, 2 tablet, Oral, BID  sertraline, 100 mg, Oral, Daily  sodium chloride, 10 mL, Intravenous, Q12H  tamsulosin, 0.4 mg, Oral, Daily      Current IV drips:       Result Review    Result Review:  I have personally  reviewed the results from the time of this admission to 6/12/2025 09:09 EDT and agree with these findings:  []  Laboratory  []  EKG/Telemetry   []  Cardiology/Vascular   []  Radiology         CBC          6/10/2025    02:41 6/11/2025    05:15 6/12/2025    05:10   CBC   WBC 26.57  28.45  33.89    RBC 3.23  3.25  3.46    Hemoglobin 8.6  8.6  9.1    Hematocrit 26.9  27.2  29.0    MCV 83.3  83.7  83.8    MCH 26.6  26.5  26.3    MCHC 32.0  31.6  31.4    RDW 17.5  17.5  17.4    Platelets 12  12  15      CMP          6/10/2025    02:41 6/11/2025    05:15 6/12/2025    05:10   CMP   Glucose 106  132  136    BUN 48.8  44.4  36.9    Creatinine 2.25  1.53  1.36    EGFR 29.3  46.5  53.6    Sodium 132  133  134    Potassium 4.6  4.4  4.3    Chloride 101  103  104    Calcium 7.7  8.4  8.7    Total Protein 6.0   6.4    Albumin 3.2   3.3    Globulin 2.8   3.1    Total Bilirubin 0.5   0.3    Alkaline Phosphatase 62   75    AST (SGOT) 29   26    ALT (SGPT) 16   26    Albumin/Globulin Ratio 1.1   1.1    BUN/Creatinine Ratio 21.7  29.0  27.1    Anion Gap 11.9  11.5  11.5      Results for orders placed in visit on 05/20/21    Emergent/Open-Heart Anesthesia ABHIJEET    Narrative  Preanesthesia Checklist:  Patient identified, IV assessed, risks and benefits discussed, monitors and equipment assessed and procedure being performed at surgeon's request.    General Procedure Information  Diagnostic Indications for Echo:  assessment of ascending aorta, assessment of surgical repair, defect repair evaluation and hemodynamic monitoring  Physician Requesting Echo: Jr Tom Tubbs MD  ICD Code for Medical Necessity:  Aortic Insufficiency  Location performed:  OR  Intubated  Bite block placed  Heart visualized  Probe Insertion:  Easy  Probe Type:  Multiplane  Modalities:  Color flow mapping, 2D only, continuous wave Doppler and pulse wave Doppler    Echocardiographic and Doppler Measurements    Ventricles    Right Ventricle:  Cavity size normal.   Hypertrophy not present.  Left Ventricle:  Diastolic dimension 4.7 cm.  Hypertrophy not present.  Thrombus not present.  Global Function mildly impaired.  Ejection Fraction 50%.    Ventricular Regional Function:  13- Apical Anterior:  hypokinetic  14- Apical Lateral:  hypokinetic  16- Apical Septal:  hypokinetic  17- Lisle:  hypokinetic      Valves    Aortic Valve:  Annulus normal.  Stenosis not present.  Pressure Half-time: 910 ms.  Regurgitation mild.  Leaflets normal.  Leaflet motions normal.    Mitral Valve:  Annulus normal.  Stenosis not present.  Regurgitation trace.    Tricuspid Valve:  Annulus normal.  Stenosis not present.  Regurgitation mild.  Pulmonic Valve:  Annulus normal.  Regurgitation trace.        Aorta    Ascending Aorta:  Size normal.  Diameter 3.6 cm.  Dissection not present.  Plaque thickness less than 3 mm.  Mobile plaque not present.  Aortic Arch:  Size normal.  Diameter 3.1 cm.  Dissection not present.  Plaque thickness less than 3 mm.  Mobile plaque not present.  Descending Aorta:  Size normal.  Diameter 2.5 cm.  Dissection not present.  Plaque thickness less than 3 mm.  Mobile plaque not present.        Atria    Right Atrium:  Size normal.  Spontaneous echo contrast not present.  Thrombus not present.  Tumor not present.  Device present.    Left Atrium:  Size normal.  Spontaneous echo contrast not present.  Thrombus not present.  Tumor not present.  Device not present.  Left atrial appendage normal.      Septa    Atrial Septum:  Intra-atrial septal morphology lipomatous hypertrophy.        Diastolic Function Measurements:  Diastolic Dysfunction Grade= II  E= 40.6 ms  A= 35.3 ms  E/A Ratio=  1.2  DT=  296 ms  S/D=  IVRT=    Other Findings  Pericardium:  normal  Pleural Effusion:  none  Pulmonary Arteries:  normal  Pulmonary Venous Flow:  normal    Anesthesia Information  Performed Personally      Echocardiogram Comments:  Post CPB:  LVEF much improved, 60%, apex no longer hypokinetic.  No  aortic dissection post decannulation.  AI unchanged.        Lab Results   Component Value Date    PROBNP 1,401.0 06/08/2025         Telemetry reviewed     Assessment / Plan     ASSESSMENT:  1.  PSVT stable  2.  Sepsis  3.  Hypoxic respiratory failure improved.      PLAN:  1.  Add amiodarone 200 mg once a day.  Add Toprol-XL 12.5 mg twice a day.  2.  Continue current management for sepsis and respiratory failure.  3.  Will sign off and see if needed.    Electronically signed by Sven Duran MD, 06/12/25, 9:09 AM EDT.

## 2025-06-12 NOTE — PROGRESS NOTES
Pulmonary / Critical Care Progress Note      Patient Name: Cosmo Gauthier  : 1945  MRN: [id number]  Primary Care Physician:  Alex Buckley MD  Date of admission: 2025    Subjective   Subjective   Follow-up for Acute hypoxic respiratory failure, multifocal pneumonia    No acute events overnight.    This morning,  Sitting up in bed  Overall feeling better  Currently on 1/2 L nasal cannula  Oxygen discontinued  Dyspnea much improved  Tolerating chest vest  No fever chills      Objective   Objective     Vitals:   Temp:  [97.7 °F (36.5 °C)-98.6 °F (37 °C)] 98.2 °F (36.8 °C)  Heart Rate:  [64-79] 79  Resp:  [18] 18  BP: (106-145)/(61-89) 136/80  Flow (L/min) (Oxygen Therapy):  [1-12] 12    Physical Exam   Director comfortably no acute distress lungs are clear to auscultation    Result Review    Result Review:  I have personally reviewed the results from the time of this admission to 2025 10:13 EDT and agree with these findings:  [x]  Laboratory  [x]  Microbiology  [x]  Radiology  []  EKG/Telemetry   []  Cardiology/Vascular   []  Pathology  []  Old records  []  Other:  Most notable findings include:     Assessment & Plan   Assessment / Plan     Active Hospital Problems:  Active Hospital Problems    Diagnosis     **Sepsis      Impression:  Multifocal pneumonia  Hypoxic respiratory failure    History of lymphoma    Plan:  Patient improved  Continue oxygen  Continue airway clearance  Continue nebulizer therapy  Antibiotics complete course    Doing well we will sign off at this time please call with questions          VTE Prophylaxis:  No VTE prophylaxis order currently exists.        CODE STATUS:   Code Status (Patient has no pulse and is not breathing): CPR (Attempt to Resuscitate)  Medical Interventions (Patient has pulse or is breathing): Full Support  Level Of Support Discussed With: Patient      Electronically signed by Cristhian Henderson DO, 25, 10:13 AM EDT.    Electronically signed by  Lee Ann Edmondson, APRN, 06/12/25, 2:17 PM EDT.    I personally seen  examined this patient and the medical decision making and assessment and plan reflect my and I assume responsibility for the care of this patient       Electronically signed by Cristhian Henderson DO, 06/12/25, 3:46 PM EDT.

## 2025-06-12 NOTE — PLAN OF CARE
Goal Outcome Evaluation:  Plan of Care Reviewed With: patient, spouse        Progress: no change  Outcome Evaluation: Patient presents with limitations in self-care, functional transfers, balance, and endurance. He would benefit from continued skilled occupational therapy services to maximize independence with ADLs/functional transfers.    Anticipated Discharge Disposition (OT): sub acute care setting

## 2025-06-12 NOTE — PLAN OF CARE
Goal Outcome Evaluation:              Outcome Evaluation: Patient alert and oriented, on 1L NC, NSR on monitor, failed walk test required 4L NC with exertion, patient and family want to discharge to rehab.

## 2025-06-12 NOTE — THERAPY EVALUATION
Patient Name: Cosmo Gauthier  : 1945    MRN: [id number]                              Today's Date: 2025       Admit Date: 2025    Visit Dx:     ICD-10-CM ICD-9-CM   1. Sepsis, due to unspecified organism, unspecified whether acute organ dysfunction present  A41.9 038.9     995.91   2. Pneumonia due to infectious organism, unspecified laterality, unspecified part of lung  J18.9 486   3. Thrombocytopenia  D69.6 287.5   4. Weakness generalized  R53.1 780.79   5. Lactic acidosis  E87.20 276.2   6. Oropharyngeal dysphagia  R13.12 787.22   7. Difficulty walking  R26.2 719.7   8. Decreased activities of daily living (ADL)  Z78.9 V49.89     Patient Active Problem List   Diagnosis    Lymphoma    Type 2 diabetes mellitus with complications    Stage 3b chronic kidney disease    Thrombocytopenia    Coronary artery disease involving native coronary artery of native heart without angina pectoris    Hx of CABG    Hyperlipemia, mixed    Hypertension, essential    Hereditary and idiopathic neuropathy, unspecified    Low lying conus medullaris    Mood disorder    Other cirrhosis of liver    Sleep apnea    Spinal stenosis of lumbar region with neurogenic claudication    Vascular dementia without behavioral disturbance    Iron deficiency anemia    Hypothyroidism, adult    Traumatic subarachnoid hemorrhage with loss of consciousness of 30 minutes or less    Morbid (severe) obesity due to excess calories    Claudication of both lower extremities    Medicare annual wellness visit, subsequent    Weakness    Hepatic encephalopathy    Secondary esophageal varices without bleeding    Esophageal dysphagia    Seasonal allergic rhinitis due to pollen    History of colon polyps    Sepsis     Past Medical History:   Diagnosis Date    Anxiety and depression     Arthritis     Chronic back pain     Cirrhosis     Coronary artery disease     Dementia     Depression     Diabetes mellitus     Disease of thyroid gland     Elevated  cholesterol     Family history of colon cancer     Fatty liver     Gastric ulcer     GERD (gastroesophageal reflux disease)     Heart disease     High cholesterol     Hypertension     Hyperthyroidism     Knee pain     Lymphoma     History of lymphoma, has been in remission    Memory change 10/18/2017    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     Primary osteoarthritis of left knee     Sleep apnea     Sleep apnea     Thrombocytopenia 05/22/2018    Thyroid disease      Past Surgical History:   Procedure Laterality Date    CARDIAC SURGERY      COLONOSCOPY  2015    COLONOSCOPY N/A 9/3/2024    Procedure: COLONOSCOPY FOR SCREENING WITH COLD SNARE;  Surgeon: Chris Bradshaw MD;  Location: [facility] ENDOSCOPY;  Service: Gastroenterology;  Laterality: N/A;  COLON POLYPS    CORONARY ANGIOPLASTY WITH STENT PLACEMENT      CORONARY ARTERY BYPASS GRAFT N/A 05/20/2021    Procedure: MIDLINE STERNOTOMY,CORONARY ARTERY BYPASS GRAFTING X 5, UTILIZING THE LEFT COMPA AND LEFT SAPHENOUS VEIN, ABHIJEET, PRP;  Surgeon: Jr Tom Tubbs MD;  Location: [facility] OR;  Service: Cardiothoracic;  Laterality: N/A;    ENDOSCOPY      ENDOSCOPY N/A 1/24/2024    Procedure: ESOPHAGOGASTRODUODENOSCOPY;  Surgeon: Chris Bradshaw MD;  Location: [facility] ENDOSCOPY;  Service: Gastroenterology;  Laterality: N/A;  hiatal hernia, schatzki's ring    ENDOSCOPY N/A 1/29/2024    Procedure: ESOPHAGOGASTRODUODENOSCOPY with balloon dilaitation 12-15cm;  Surgeon: Chris Bradshaw MD;  Location: [facility] ENDOSCOPY;  Service: Gastroenterology;  Laterality: N/A;  hiatal hernia, schatskis ring    ENDOSCOPY N/A 2/17/2025    Procedure: ESOPHAGOGASTRODUODENOSCOPY WITH BIOPSIES;  Surgeon: Chris Bradshaw MD;  Location: [facility] ENDOSCOPY;  Service: Gastroenterology;  Laterality: N/A;  GASTRITIS/HIATAL HERNIA    HERNIA REPAIR      JOINT REPLACEMENT      SHOULDER SURGERY      SHOULDER REPAIR    TOTAL KNEE ARTHROPLASTY      TRANSESOPHAGEAL  ECHOCARDIOGRAM (ABHIJEET) N/A 05/20/2021    Procedure: TRANSESOPHAGEAL ECHOCARDIOGRAM WITH ANESTHESIA;  Surgeon: Jr Tom Tubbs MD;  Location: [facility];  Service: Cardiothoracic;  Laterality: N/A;      General Information       Row Name 06/12/25 1412          OT Time and Intention    Document Type evaluation  -     Mode of Treatment individual therapy;occupational therapy  -       Row Name 06/12/25 1412          General Information    Patient Profile Reviewed yes  -     Prior Level of Function --  Assist with ADLs PRN, ambulates with rollator in the home, and uses a motorized w/c in the community. Has a walk-in shower with shower chair/grab bars/handheld shower head, elevated commode, stands to groom, and wears a CPAP at night.  -     Existing Precautions/Restrictions fall  -     Barriers to Rehab none identified  -       Row Name 06/12/25 1412          Occupational Profile    Reason for Services/Referral (Occupational Profile) Patient is a 79 year old male admitted to [facility] for pneumonia on June 8th, 2025. Occupational therapy consulted due to recent decline in ADLs/functional transfers. No previous occupational therapy services for current condition.  -       Row Name 06/12/25 1412          Living Environment    Current Living Arrangements home  -     People in Home spouse  -       Row Name 06/12/25 1412          Home Main Entrance    Number of Stairs, Main Entrance none  ramp  -       Row Name 06/12/25 1412          Stairs Within Home, Primary    Stairs, Within Home, Primary Resides currently in the basement with walkout entrance but has plans to move upstairs at d/c.  -       Row Name 06/12/25 1412          Cognition    Orientation Status (Cognition) oriented x 3  -       Row Name 06/12/25 1412          Safety Issues/Impairments Affecting Functional Mobility    Impairments Affecting Function (Mobility) balance;endurance/activity tolerance  -               User  Key  (r) = Recorded By, (t) = Taken By, (c) = Cosigned By      Initials Name Provider Type     Chasity Pickard, OT Occupational Therapist                     Mobility/ADL's       Row Name 06/12/25 1417          Bed Mobility    Comment, (Bed Mobility) Pt upright and seated in recliner on arrival.  -LF       Row Name 06/12/25 1417          Transfers    Transfers sit-stand transfer;stand-sit transfer  -LF       Row Name 06/12/25 1417          Sit-Stand Transfer    Sit-Stand [place] (Transfers) contact guard  -     Assistive Device (Sit-Stand Transfers) walker, front-wheeled  -       Row Name 06/12/25 1417          Stand-Sit Transfer    Stand-Sit [place] (Transfers) contact guard  -     Assistive Device (Stand-Sit Transfers) walker, front-wheeled  -[facility] Name 06/12/25 1417          Activities of Daily Living    BADL Assessment/Intervention bathing;upper body dressing;lower body dressing;grooming;feeding;toileting  -LF       Row Name 06/12/25 1417          Bathing Assessment/Intervention    [place] Level (Bathing) bathing skills;upper body;standby assist;lower body;maximum assist (25% patient effort)  -LF       Row Name 06/12/25 1417          Upper Body Dressing Assessment/Training    [place] Level (Upper Body Dressing) upper body dressing skills;standby assist  -LF       Row Name 06/12/25 1417          Lower Body Dressing Assessment/Training    [place] Level (Lower Body Dressing) lower body dressing skills;maximum assist (25% patient effort)  -LF       Row Name 06/12/25 1417          Grooming Assessment/Training    [place] Level (Grooming) grooming skills;standby assist  -LF       Row Name 06/12/25 1417          Self-Feeding Assessment/Training    [place] Level (Feeding) feeding skills;set up  -LF       Row Name 06/12/25 1417          Toileting Assessment/Training    [place] Level (Toileting) toileting skills;maximum assist (25% patient effort)  -                User Key  (r) = Recorded By, (t) = Taken By, (c) = Cosigned By      Initials Name Provider Type     Chasity Pickard OT Occupational Therapist                   Obj/Interventions       Row Name 06/12/25 1418          Sensory Assessment (Somatosensory)    Sensory Assessment (Somatosensory) UE sensation intact  -LF       Row Name 06/12/25 1418          Vision Assessment/Intervention    Visual Impairment/Limitations WFL  -LF       Row Name 06/12/25 1418          Range of Motion Comprehensive    General Range of Motion bilateral upper extremity ROM WFL  -LF       Row Name 06/12/25 1418          Strength Comprehensive (MMT)    Comment, General Manual Muscle Testing (MMT) Assessment 4/5 BUEs  -LF       Row Name 06/12/25 1418          Motor Skills    Motor Skills coordination;functional endurance  -LF     Coordination bilateral;upper extremity;WFL  -     Functional Endurance Fair-  -LF       Row Name 06/12/25 1418          Balance    Balance Assessment sitting dynamic balance;standing dynamic balance  -LF     Dynamic Sitting Balance supervision  -LF     Position, Sitting Balance unsupported;sitting in chair  -LF     Dynamic Standing Balance contact guard  -LF     Position/Device Used, Standing Balance supported;walker, front-wheeled  -LF               User Key  (r) = Recorded By, (t) = Taken By, (c) = Cosigned By      Initials Name Provider Type    LF Chasity Pickard OT Occupational Therapist                   Goals/Plan       Row Name 06/12/25 1419          Bed Mobility Goal 1 (OT)    Activity/Assistive Device (Bed Mobility Goal 1, OT) bed mobility activities, all  -LF     [place] Level/Cues Needed (Bed Mobility Goal 1, OT) modified independence  -LF     Time Frame (Bed Mobility Goal 1, OT) long term goal (LTG);10 days  -LF       Row Name 06/12/25 1419          Transfer Goal 1 (OT)    Activity/Assistive Device (Transfer Goal 1, OT) transfers, all  -LF     [place] Level/Cues Needed (Transfer Goal 1, OT)  modified independence  -LF     Time Frame (Transfer Goal 1, OT) long term goal (LTG);10 days  -LF       Row Name 06/12/25 1419          Bathing Goal 1 (OT)    Activity/Device (Bathing Goal 1, OT) bathing skills, all  -LF     [place] Level/Cues Needed (Bathing Goal 1, OT) modified independence  -LF     Time Frame (Bathing Goal 1, OT) long term goal (LTG);10 days  -LF       Row Name 06/12/25 1419          Dressing Goal 1 (OT)    Activity/Device (Dressing Goal 1, OT) dressing skills, all  -LF     [place]/Cues Needed (Dressing Goal 1, OT) modified independence  -LF     Time Frame (Dressing Goal 1, OT) long term goal (LTG);10 days  -LF       Row Name 06/12/25 1419          Toileting Goal 1 (OT)    Activity/Device (Toileting Goal 1, OT) toileting skills, all  -LF     [place] Level/Cues Needed (Toileting Goal 1, OT) modified independence  -LF     Time Frame (Toileting Goal 1, OT) long term goal (LTG);10 days  -LF       Row Name 06/12/25 1419          Problem Specific Goal 1 (OT)    Problem Specific Goal 1 (OT) Patient will demonstrate good- endurance to support ADLs/functional transfers.  -LF     Time Frame (Problem Specific Goal 1, OT) long term goal (LTG);10 days  -       Row Name 06/12/25 1419          Therapy Assessment/Plan (OT)    Planned Therapy Interventions (OT) activity tolerance training;BADL retraining;functional balance retraining;occupation/activity based interventions;patient/caregiver education/training;strengthening exercise;transfer/mobility retraining  -               User Key  (r) = Recorded By, (t) = Taken By, (c) = Cosigned By      Initials Name Provider Type    LF Chasity Pickard OT Occupational Therapist                   Clinical Impression       Row Name 06/12/25 1418          Pain Assessment    Additional Documentation Pain Scale: FACES Pre/Post-Treatment (Group)  -LF       Row Name 06/12/25 1418          Pain Scale: FACES Pre/Post-Treatment    Pain: FACES Scale, Pretreatment  0-->no hurt  -LF     Posttreatment Pain Rating 0-->no hurt  -LF       Row Name 06/12/25 1418          Plan of Care Review    Plan of Care Reviewed With patient;spouse  -     Progress no change  -     Outcome Evaluation Patient presents with limitations in self-care, functional transfers, balance, and endurance. He would benefit from continued skilled occupational therapy services to maximize independence with ADLs/functional transfers.  -       Row Name 06/12/25 1418          Therapy Assessment/Plan (OT)    Patient/Family Therapy Goal Statement (OT) To maximize independence.  -     Rehab Potential (OT) good  -LF     Criteria for Skilled Therapeutic Interventions Met (OT) yes;meets criteria;skilled treatment is necessary  -     Therapy Frequency (OT) 5 times/wk  -       Row Name 06/12/25 1418          Therapy Plan Review/Discharge Plan (OT)    Anticipated Discharge Disposition (OT) sub acute care setting  -       Row Name 06/12/25 1418          Vital Signs    O2 Delivery Pre Treatment room air  -     O2 Delivery Intra Treatment room air  -     O2 Delivery Post Treatment room air  -       Row Name 06/12/25 1418          Positioning and Restraints    Pre-Treatment Position sitting in chair/recliner  -LF     Post Treatment Position chair  -LF     In Chair reclined;call light within reach;encouraged to call for assist;exit alarm on;with family/caregiver  -               User Key  (r) = Recorded By, (t) = Taken By, (c) = Cosigned By      Initials Name Provider Type     Chasity Pickard, OT Occupational Therapist                   Outcome Measures       Row Name 06/12/25 1419          How much help from another is currently needed...    Putting on and taking off regular lower body clothing? 2  -LF     Bathing (including washing, rinsing, and drying) 2  -LF     Toileting (which includes using toilet bed pan or urinal) 2  -LF     Putting on and taking off regular upper body clothing 3  -LF     Taking  care of personal grooming (such as brushing teeth) 3  -LF     Eating meals 4  -LF     AM-PAC 6 Clicks Score (OT) 16  -LF       Row Name 06/12/25 0708          How much help from another person do you currently need...    Turning from your back to your side while in flat bed without using bedrails? 4  -AH     Moving from lying on back to sitting on the side of a flat bed without bedrails? 4  -AH     Moving to and from a bed to a chair (including a wheelchair)? 4  -AH     Standing up from a chair using your arms (e.g., wheelchair, bedside chair)? 3  -AH     Climbing 3-5 steps with a railing? 2  -AH     To walk in hospital room? 3  -AH     AM-PAC 6 Clicks Score (PT) 20  -AH     Highest Level of Mobility Goal Walk 10 Steps or More-6  -AH       Row Name 06/12/25 1419          Functional Assessment    Outcome Measure Options AM-PAC 6 Clicks Daily Activity (OT);Optimal Instrument  -LF       Row Name 06/12/25 1419          Optimal Instrument    Optimal Instrument Optimal - 3  -LF     Bending/Stooping 4  -LF     Standing 2  -LF     Reaching 1  -LF     From the list, choose the 3 activities you would most like to be able to do without any difficulty Bending/stooping;Standing;Reaching  -LF     Total Score Optimal - 3 7  -LF               User Key  (r) = Recorded By, (t) = Taken By, (c) = Cosigned By      Initials Name Provider Type    Glenys Muñiz RN Registered Nurse    Chasity Oakes OT Occupational Therapist                    Occupational Therapy Education       Title: PT OT SLP Therapies (In Progress)       Topic: Occupational Therapy (In Progress)       Point: ADL training (In Progress)       Learning Progress Summary            Patient Acceptance, E,TB, NR by  at 6/12/2025 1420                      Point: Precautions (In Progress)       Learning Progress Summary            Patient Acceptance, E,TB, NR by  at 6/12/2025 1420                      Point: Body mechanics (In Progress)       Learning Progress  Summary            Patient Acceptance, E,TB, NR by  at 6/12/2025 1420                                      User Key       Initials Effective Dates Name Provider Type Discipline     06/16/21 -  Chasity Pickard OT Occupational Therapist OT                  OT Recommendation and Plan  Planned Therapy Interventions (OT): activity tolerance training, BADL retraining, functional balance retraining, occupation/activity based interventions, patient/caregiver education/training, strengthening exercise, transfer/mobility retraining  Therapy Frequency (OT): 5 times/wk  Plan of Care Review  Plan of Care Reviewed With: patient, spouse  Progress: no change  Outcome Evaluation: Patient presents with limitations in self-care, functional transfers, balance, and endurance. He would benefit from continued skilled occupational therapy services to maximize independence with ADLs/functional transfers.     Time Calculation:   Evaluation Complexity (OT)  Review Occupational Profile/Medical/Therapy History Complexity: brief/low complexity  Assessment, Occupational Performance/Identification of Deficit Complexity: 3-5 performance deficits  Clinical Decision Making Complexity (OT): problem focused assessment/low complexity  Overall Complexity of Evaluation (OT): low complexity     Time Calculation- OT       Row Name 06/12/25 1420             Time Calculation- OT    OT Received On 06/12/25  -      OT Goal Re-Cert Due Date 06/21/25  -         Untimed Charges    OT Eval/Re-eval Minutes 35  -LF         Total Minutes    Untimed Charges Total Minutes 35  -LF       Total Minutes 35  -LF                User Key  (r) = Recorded By, (t) = Taken By, (c) = Cosigned By      Initials Name Provider Type     Chasity Pickard OT Occupational Therapist                  Therapy Charges for Today       Code Description Service Date Service Provider Modifiers Qty    [id number]  OT EVAL LOW COMPLEXITY 3 6/12/2025 Chasity Pickard OT GO 1                  Chasity Pickard, OT  6/12/2025

## 2025-06-12 NOTE — PROGRESS NOTES
[facility]     Progress Note    Patient Name: Cosmo Gauthier  : 1945  MRN: [id number]  Primary Care Physician:  Alex Buckley MD  Date of admission: 2025    Subjective   Subjective     Chief Complaint: Patient stable and doing well cardiac status stabilized amiodarone has been stopped will observe him for 24 hours and we will discharge him tomorrow    HPI: Stable and doing well continue current manage    Review of Systems   All systems were reviewed and negative except for: Has been reviewed    Objective   Objective     Vitals:   Temp:  [97.7 °F (36.5 °C)-98.6 °F (37 °C)] 98.2 °F (36.8 °C)  Heart Rate:  [64-80] 64  Resp:  [18] 18  BP: (106-145)/(61-89) 136/80  Flow (L/min) (Oxygen Therapy):  [1-4] 1    Physical Exam    Constitutional: Alert and oriented not in acute distress   Eyes: Pupils equal reacting to light and accommodate   HENT: Normocephalic   Neck: No lymphadenopathy   Respiratory: No rales or rhonchi   Cardiovascular: S1 S2 normal no S3 or S4   Gastrointestinal: Splenomegaly   Musculoskeletal: No deformities   Neurologic: No localizing sign  Skin: No rash       Result Review    Result Review:  I have personally reviewed the results from the time of this admission to 2025 07:46 EDT and agree with these findings:  [x]  Laboratory  []  Microbiology  []  Radiology  []  EKG/Telemetry   []  Cardiology/Vascular   []  Pathology  []  Old records  []  Other:  Most notable findings include: Have been reviewed and discussed    Assessment & Plan   Assessment / Plan     Brief Patient Summary:  Cosmo Gauthier is a 79 y.o. male who with history of lymphoma splenomegaly was admitted with pneumonia had episode of SVT which has resolved    Active Hospital Problems:  Active Hospital Problems    Diagnosis     **Sepsis        Plan:   Observation and antibiotics    VTE Prophylaxis:  No VTE prophylaxis order currently exists.        CODE STATUS:   Code Status (Patient has no pulse and is not  breathing): CPR (Attempt to Resuscitate)  Medical Interventions (Patient has pulse or is breathing): Full Support  Level Of Support Discussed With: Patient    Disposition:  I expect patient to be discharged tomorrow.    Electronically signed by Seven Saldana MD, 06/12/25, 7:46 AM EDT.      Part of this note may be an electronic transcription/translation of spoken language to printed text using the Dragon Dictation System.

## 2025-06-12 NOTE — PROGRESS NOTES
Enter Query Response Below      Query Response: NSTEMI type 2 likely               If applicable, please update the problem list.

## 2025-06-13 VITALS
TEMPERATURE: 98 F | DIASTOLIC BLOOD PRESSURE: 91 MMHG | SYSTOLIC BLOOD PRESSURE: 124 MMHG | HEART RATE: 80 BPM | OXYGEN SATURATION: 98 % | WEIGHT: 242.95 LBS | RESPIRATION RATE: 20 BRPM | BODY MASS INDEX: 32.91 KG/M2 | HEIGHT: 72 IN

## 2025-06-13 LAB
ANION GAP SERPL CALCULATED.3IONS-SCNC: 8.8 MMOL/L (ref 5–15)
BASOPHILS # BLD AUTO: 0.23 10*3/MM3 (ref 0–0.2)
BASOPHILS NFR BLD AUTO: 0.7 % (ref 0–1.5)
BUN SERPL-MCNC: 33.2 MG/DL (ref 8–23)
BUN/CREAT SERPL: 22.7 (ref 7–25)
CALCIUM SPEC-SCNC: 8.8 MG/DL (ref 8.6–10.5)
CHLORIDE SERPL-SCNC: 101 MMOL/L (ref 98–107)
CO2 SERPL-SCNC: 23.2 MMOL/L (ref 22–29)
CREAT SERPL-MCNC: 1.46 MG/DL (ref 0.76–1.27)
DEPRECATED RDW RBC AUTO: 54.1 FL (ref 37–54)
EGFRCR SERPLBLD CKD-EPI 2021: 49.2 ML/MIN/1.73
EOSINOPHIL # BLD AUTO: 0.22 10*3/MM3 (ref 0–0.4)
EOSINOPHIL NFR BLD AUTO: 0.7 % (ref 0.3–6.2)
ERYTHROCYTE [DISTWIDTH] IN BLOOD BY AUTOMATED COUNT: 17 % (ref 12.3–15.4)
GLUCOSE BLDC GLUCOMTR-MCNC: 122 MG/DL (ref 70–99)
GLUCOSE BLDC GLUCOMTR-MCNC: 214 MG/DL (ref 70–99)
GLUCOSE SERPL-MCNC: 131 MG/DL (ref 65–99)
HCT VFR BLD AUTO: 30.6 % (ref 37.5–51)
HGB BLD-MCNC: 9.2 G/DL (ref 13–17.7)
IMM GRANULOCYTES # BLD AUTO: 0.22 10*3/MM3 (ref 0–0.05)
IMM GRANULOCYTES NFR BLD AUTO: 0.7 % (ref 0–0.5)
LYMPHOCYTES # BLD AUTO: 26.52 10*3/MM3 (ref 0.7–3.1)
LYMPHOCYTES NFR BLD AUTO: 81.3 % (ref 19.6–45.3)
MCH RBC QN AUTO: 25.9 PG (ref 26.6–33)
MCHC RBC AUTO-ENTMCNC: 30.1 G/DL (ref 31.5–35.7)
MCV RBC AUTO: 86.2 FL (ref 79–97)
MONOCYTES # BLD AUTO: 0.69 10*3/MM3 (ref 0.1–0.9)
MONOCYTES NFR BLD AUTO: 2.1 % (ref 5–12)
NEUTROPHILS NFR BLD AUTO: 14.5 % (ref 42.7–76)
NEUTROPHILS NFR BLD AUTO: 4.75 10*3/MM3 (ref 1.7–7)
NRBC BLD AUTO-RTO: 0 /100 WBC (ref 0–0.2)
PLATELET # BLD AUTO: 17 10*3/MM3 (ref 140–450)
PMV BLD AUTO: 12 FL (ref 6–12)
POTASSIUM SERPL-SCNC: 4.5 MMOL/L (ref 3.5–5.2)
RBC # BLD AUTO: 3.55 10*6/MM3 (ref 4.14–5.8)
SODIUM SERPL-SCNC: 133 MMOL/L (ref 136–145)
WBC NRBC COR # BLD AUTO: 32.63 10*3/MM3 (ref 3.4–10.8)

## 2025-06-13 PROCEDURE — 94618 PULMONARY STRESS TESTING: CPT

## 2025-06-13 PROCEDURE — 82948 REAGENT STRIP/BLOOD GLUCOSE: CPT | Performed by: INTERNAL MEDICINE

## 2025-06-13 PROCEDURE — 94799 UNLISTED PULMONARY SVC/PX: CPT

## 2025-06-13 PROCEDURE — 80048 BASIC METABOLIC PNL TOTAL CA: CPT | Performed by: INTERNAL MEDICINE

## 2025-06-13 PROCEDURE — 92526 ORAL FUNCTION THERAPY: CPT

## 2025-06-13 PROCEDURE — 85025 COMPLETE CBC W/AUTO DIFF WBC: CPT | Performed by: INTERNAL MEDICINE

## 2025-06-13 PROCEDURE — 82948 REAGENT STRIP/BLOOD GLUCOSE: CPT

## 2025-06-13 PROCEDURE — 63710000001 INSULIN LISPRO (HUMAN) PER 5 UNITS: Performed by: INTERNAL MEDICINE

## 2025-06-13 PROCEDURE — 94669 MECHANICAL CHEST WALL OSCILL: CPT

## 2025-06-13 RX ORDER — FLUTICASONE PROPIONATE 50 MCG
2 SPRAY, SUSPENSION (ML) NASAL DAILY
Qty: 16 G | Refills: 1 | Status: SHIPPED | OUTPATIENT
Start: 2025-06-13

## 2025-06-13 RX ORDER — LEVOFLOXACIN 500 MG/1
500 TABLET, FILM COATED ORAL EVERY 24 HOURS
Qty: 5 TABLET | Refills: 0 | Status: SHIPPED | OUTPATIENT
Start: 2025-06-13 | End: 2025-06-13

## 2025-06-13 RX ORDER — AMIODARONE HYDROCHLORIDE 200 MG/1
200 TABLET ORAL
Qty: 30 TABLET | Refills: 1 | Status: SHIPPED | OUTPATIENT
Start: 2025-06-14

## 2025-06-13 RX ORDER — METOPROLOL SUCCINATE 25 MG/1
12.5 TABLET, EXTENDED RELEASE ORAL EVERY 12 HOURS SCHEDULED
Qty: 30 TABLET | Refills: 1 | Status: SHIPPED | OUTPATIENT
Start: 2025-06-13

## 2025-06-13 RX ORDER — METOPROLOL SUCCINATE 25 MG/1
12.5 TABLET, EXTENDED RELEASE ORAL EVERY 12 HOURS SCHEDULED
Qty: 30 TABLET | Refills: 1 | Status: SHIPPED | OUTPATIENT
Start: 2025-06-13 | End: 2025-06-13

## 2025-06-13 RX ORDER — LEVOFLOXACIN 500 MG/1
500 TABLET, FILM COATED ORAL EVERY 24 HOURS
Status: DISCONTINUED | OUTPATIENT
Start: 2025-06-13 | End: 2025-06-13 | Stop reason: ALTCHOICE

## 2025-06-13 RX ORDER — AMIODARONE HYDROCHLORIDE 200 MG/1
200 TABLET ORAL
Qty: 30 TABLET | Refills: 1 | Status: SHIPPED | OUTPATIENT
Start: 2025-06-14 | End: 2025-06-13

## 2025-06-13 RX ADMIN — METOPROLOL SUCCINATE ER TABLETS 12.5 MG: 25 TABLET, FILM COATED, EXTENDED RELEASE ORAL at 08:37

## 2025-06-13 RX ADMIN — PANTOPRAZOLE SODIUM 40 MG: 40 TABLET, DELAYED RELEASE ORAL at 08:37

## 2025-06-13 RX ADMIN — TAMSULOSIN HYDROCHLORIDE 0.4 MG: 0.4 CAPSULE ORAL at 08:37

## 2025-06-13 RX ADMIN — AMIODARONE HYDROCHLORIDE 200 MG: 200 TABLET ORAL at 08:37

## 2025-06-13 RX ADMIN — FLUTICASONE PROPIONATE 2 SPRAY: 50 SPRAY, METERED NASAL at 08:39

## 2025-06-13 RX ADMIN — INSULIN LISPRO 3 UNITS: 100 INJECTION, SOLUTION INTRAVENOUS; SUBCUTANEOUS at 12:11

## 2025-06-13 RX ADMIN — DOCUSATE SODIUM 50 MG AND SENNOSIDES 8.6 MG 2 TABLET: 8.6; 5 TABLET, FILM COATED ORAL at 08:37

## 2025-06-13 RX ADMIN — AMOXICILLIN AND CLAVULANATE POTASSIUM 1 TABLET: 875; 125 TABLET, FILM COATED ORAL at 15:11

## 2025-06-13 RX ADMIN — RIFAXIMIN 550 MG: 550 TABLET ORAL at 08:37

## 2025-06-13 RX ADMIN — MUPIROCIN 1 APPLICATION: 20 OINTMENT TOPICAL at 08:37

## 2025-06-13 RX ADMIN — Medication 10 ML: at 08:37

## 2025-06-13 RX ADMIN — SERTRALINE HYDROCHLORIDE 100 MG: 100 TABLET ORAL at 08:37

## 2025-06-13 RX ADMIN — FINASTERIDE 5 MG: 5 TABLET, FILM COATED ORAL at 08:37

## 2025-06-13 RX ADMIN — LEVOTHYROXINE SODIUM 200 MCG: 0.05 TABLET ORAL at 08:37

## 2025-06-13 NOTE — DISCHARGE INSTR - APPOINTMENTS
[facility] will contact you for home visit. Their number is [phone] if you have questions.

## 2025-06-13 NOTE — PLAN OF CARE
Goal Outcome Evaluation:  Plan of Care Reviewed With: patient        Progress: improving  Outcome Evaluation: Patient AOx4. Discharging home per Dr. Saldana, patient is going home rather than to rehab per MD, patient needs to get home so he can start treatment sooner. Patient states he is okay with that. Walk test done, patient will require home O2. Discharging home with son. Oxygen delivered to room.

## 2025-06-13 NOTE — THERAPY TREATMENT NOTE
Acute Care - Speech Language Pathology   Swallow Treatment Note SARKIS Dhillon     Patient Name: Cosmo Gauthier  : 1945  MRN: [id number]  Today's Date: 2025               Admit Date: 2025    Visit Dx:     ICD-10-CM ICD-9-CM   1. Sepsis, due to unspecified organism, unspecified whether acute organ dysfunction present  A41.9 038.9     995.91   2. Pneumonia due to infectious organism, unspecified laterality, unspecified part of lung  J18.9 486   3. Thrombocytopenia  D69.6 287.5   4. Weakness generalized  R53.1 780.79   5. Lactic acidosis  E87.20 276.2   6. Oropharyngeal dysphagia  R13.12 787.22   7. Difficulty walking  R26.2 719.7   8. Decreased activities of daily living (ADL)  Z78.9 V49.89   9. Seasonal allergic rhinitis due to pollen  J30.1 477.0     Patient Active Problem List   Diagnosis    Lymphoma    Type 2 diabetes mellitus with complications    Stage 3b chronic kidney disease    Thrombocytopenia    Coronary artery disease involving native coronary artery of native heart without angina pectoris    Hx of CABG    Hyperlipemia, mixed    Hypertension, essential    Hereditary and idiopathic neuropathy, unspecified    Low lying conus medullaris    Mood disorder    Other cirrhosis of liver    Sleep apnea    Spinal stenosis of lumbar region with neurogenic claudication    Vascular dementia without behavioral disturbance    Iron deficiency anemia    Hypothyroidism, adult    Traumatic subarachnoid hemorrhage with loss of consciousness of 30 minutes or less    Morbid (severe) obesity due to excess calories    Claudication of both lower extremities    Medicare annual wellness visit, subsequent    Weakness    Hepatic encephalopathy    Secondary esophageal varices without bleeding    Esophageal dysphagia    Seasonal allergic rhinitis due to pollen    History of colon polyps    Sepsis     Past Medical History:   Diagnosis Date    Anxiety and depression     Arthritis     Chronic back pain     Cirrhosis      Coronary artery disease     Dementia     Depression     Diabetes mellitus     Disease of thyroid gland     Elevated cholesterol     Family history of colon cancer     Fatty liver     Gastric ulcer     GERD (gastroesophageal reflux disease)     Heart disease     High cholesterol     Hypertension     Hyperthyroidism     Knee pain     Lymphoma     History of lymphoma, has been in remission    Memory change 10/18/2017    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     Primary osteoarthritis of left knee     Sleep apnea     Sleep apnea     Thrombocytopenia 05/22/2018    Thyroid disease      Past Surgical History:   Procedure Laterality Date    CARDIAC SURGERY      COLONOSCOPY  2015    COLONOSCOPY N/A 9/3/2024    Procedure: COLONOSCOPY FOR SCREENING WITH COLD SNARE;  Surgeon: Chris Bradshaw MD;  Location: [facility];  Service: Gastroenterology;  Laterality: N/A;  COLON POLYPS    CORONARY ANGIOPLASTY WITH STENT PLACEMENT      CORONARY ARTERY BYPASS GRAFT N/A 05/20/2021    Procedure: MIDLINE STERNOTOMY,CORONARY ARTERY BYPASS GRAFTING X 5, UTILIZING THE LEFT COMPA AND LEFT SAPHENOUS VEIN, ABHIJEET, PRP;  Surgeon: Jr Tom Tubbs MD;  Location: [facility];  Service: Cardiothoracic;  Laterality: N/A;    ENDOSCOPY      ENDOSCOPY N/A 1/24/2024    Procedure: ESOPHAGOGASTRODUODENOSCOPY;  Surgeon: Chris Bradshaw MD;  Location: [facility];  Service: Gastroenterology;  Laterality: N/A;  hiatal hernia, schatzki's ring    ENDOSCOPY N/A 1/29/2024    Procedure: ESOPHAGOGASTRODUODENOSCOPY with balloon dilaitation 12-15cm;  Surgeon: Chris Bradshaw MD;  Location: [facility];  Service: Gastroenterology;  Laterality: N/A;  hiatal hernia, schatskis ring    ENDOSCOPY N/A 2/17/2025    Procedure: ESOPHAGOGASTRODUODENOSCOPY WITH BIOPSIES;  Surgeon: Chris Bradshaw MD;  Location: [facility];  Service: Gastroenterology;  Laterality: N/A;  GASTRITIS/HIATAL HERNIA    HERNIA REPAIR       JOINT REPLACEMENT      SHOULDER SURGERY      SHOULDER REPAIR    TOTAL KNEE ARTHROPLASTY      TRANSESOPHAGEAL ECHOCARDIOGRAM (ABHIJEET) N/A 05/20/2021    Procedure: TRANSESOPHAGEAL ECHOCARDIOGRAM WITH ANESTHESIA;  Surgeon: Jr Tom Tubbs MD;  Location: [facility];  Service: Cardiothoracic;  Laterality: N/A;       SLP Recommendation and Plan      SPEECH PATHOLOGY DYSPHAGIA TREATMENT    Subjective/Behavioral Observations: Patient was pleasant and cooperative. Patient reported that he is feeling better. Patient reported that he is expecting to discharge to rehab soon but he is unsure of where he may be going.         Day/time of Treatment: 06/13/2025        Current Diet: Level 0 thin liquids and level 7 regular solids.            Current Strategies: Patient should turn his head to the right when consuming thin liquids. Patient should alternate liquids and solids to improve pharyngeal residue.           Treatment received: Patient participated in swallowing exercises to improve swallow strength to a more functional level.            Results of treatment: Patient completed the following exercises given maximum to minimal faded cuing: effortful swallow x 10 reps, Mendelsohn Maneuver x 7 reps, Carolyn Maneuver X 7 reps, Front lingual resistance x 7 reps, back lingual resistance x 7 reps. Patient did well increasing amount of repetitions for exercises. Patient reported that he does feel like there has been a positive change in his swallow since starting exercises.           Progress toward goals: Progressing.            Barriers to Achieving goals: Medical status.            Plan of care:/changes in plan: No change to plan of care. Patient continues to benefit from skilled services.                                                                                                 EDUCATION  The patient has been educated in the following areas:   Dysphagia (Swallowing Impairment).                Time Calculation:    Time  Calculation- SLP       Row Name 06/13/25 0700             Time Calculation- SLP    SLP Start Time 0700  -AW      SLP Stop Time 0800  -AW      SLP Time Calculation (min) 60 min  -AW      SLP Received On 06/13/25  -AW         Untimed Charges    32922-RT Treatment Swallow Minutes 60  -AW         Total Minutes    Untimed Charges Total Minutes 60  -AW       Total Minutes 60  -AW                User Key  (r) = Recorded By, (t) = Taken By, (c) = Cosigned By      Initials Name Provider Type    Anum Vasques SLP Speech and Language Pathologist                    Therapy Charges for Today       Code Description Service Date Service Provider Modifiers Qty    54476983288 HC ST TREATMENT SWALLOW 2 6/12/2025 Anum Zavala SLP GN 1    61155403486 HC ST TREATMENT SWALLOW 4 6/13/2025 Anum Zavala SLP GN 1                 LING Peraza  6/13/2025

## 2025-06-13 NOTE — PROGRESS NOTES
Walking Oximetry Progress Note      Patient Name:  Cosmo Gauthier  YOB: 1945  Date of Procedure: 06/13/25              ROOM AIR BASELINE   SpO2% 88   Heart Rate 71     EXERCISE ON ROOM AIR SpO2% EXERCISE ON O2 LPM SpO2%   1 MINUTE  1 MINUTE 1 L PD 87   2 MINUTES  2 MINUTES 2 L PD 89   3 MINUTES  3 MINUTES 2 L PD 88   4 MINUTES  4 MINUTES 3 L PD 90   5 MINUTES  5 MINUTES 3 L PD 91   6 MINUTES  6 MINUTES 3 L PD 90              Time to Recovery  1 min and 30 seconds   SpO2% Post Exercise  91 on 3 L pulse-dose oxygen concentrator.    HR Post Exercise  73 (MAX 92)     Comments: Patient walked whole duration of test, claimed he was having any trouble breathing but RT noted patient did appear out of breath residential through. Walker needed for ambulation.  present in room post test, notified of need for home oxygen.          Electronically signed by Rochelle Aleman RRT, 06/13/25, 2:08 PM EDT.

## 2025-06-13 NOTE — DISCHARGE SUMMARY
[facility]         DISCHARGE SUMMARY    Patient Name: Cosmo Gauthier  : 1945  MRN: [id number]    Date of Admission: 2025  Date of Discharge: 2025  Primary Care Physician: Alex Buckley MD    Consults       Date and Time Order Name Status Description    6/10/2025 12:30 PM Inpatient Cardiology Consult      2025  7:18 AM Inpatient Intensivist Consult Completed     2025 12:33 AM IP General Consult (Use specialty-specific consult if known)              Presenting Problem:   Lactic acidosis [E87.20]  Weakness generalized [R53.1]  Thrombocytopenia [D69.6]  Sepsis [A41.9]  Pneumonia due to infectious organism, unspecified laterality, unspecified part of lung [J18.9]  Sepsis, due to unspecified organism, unspecified whether acute organ dysfunction present [A41.9]    Active and Resolved Hospital Problems:  Active Hospital Problems    Diagnosis POA    **Sepsis [A41.9] Yes      Resolved Hospital Problems   No resolved problems to display.         Hospital Course     Hospital Course:  Cosmo Gauthier is a 79 y.o. male patient was admitted to the hospital because of shortness of air he was found to have right lower lobe pneumonia he is immune compromised with lymphopenia patient was treated initially to the unit because of shortness of air but it has improved he did have tachycardia atrial fibrillation was seen by cardiology and was started on amiodarone at this time patient is alert oriented afebrile cultures have been negative x-ray has improved he will be switched over to oral antibiotics and will be seen in a follow-up visit in 1 week in the office  .dme patient is getting desaturated after walking and failed his walk test he will therefore require oxygen at home home oxygen has been ordered which will be arranged to the DME by the pulmonary medicine      DISCHARGE Follow Up Recommendations for labs and diagnostics: Admitted with pneumonia with history of leukemia and lymphoma  with history of diabetes      Pertinent  and/or Most Recent Results     LAB RESULTS:      Lab 06/13/25  0526 06/12/25  0510 06/11/25  0515 06/10/25  0241 06/09/25  0723 06/09/25  0429 06/09/25  0126 06/08/25 2303 06/08/25 2303 06/08/25  2258   WBC 32.63* 33.89* 28.45* 26.57* 21.39* 18.38*  --    < > 22.54*  --    HEMOGLOBIN 9.2* 9.1* 8.6* 8.6* 8.5* 8.7*  --    < > 10.2*  --    HEMATOCRIT 30.6* 29.0* 27.2* 26.9* 26.7* 27.3*  --    < > 32.7*  --    PLATELETS 17* 15* 12* 12* 13* 15*  --    < > 17*  --    NEUTROS ABS 4.75 3.73  --   --   --   --   --   --  5.64  --    IMMATURE GRANS (ABS) 0.22*  --   --   --   --   --   --   --   --   --    LYMPHS ABS 26.52*  --   --   --   --   --   --   --   --   --    MONOS ABS 0.69  --   --   --   --   --   --   --   --   --    EOS ABS 0.22 0.34  --   --   --   --   --   --   --   --    MCV 86.2 83.8 83.7 83.3 83.2 84.3  --    < > 84.7  --    PROCALCITONIN  --   --  103.92*  --   --  222.24*  --   --   --   --    LACTATE  --   --   --   --   --  1.8 3.5*  --  6.1* 4.6*   PROTIME  --   --   --  18.1*  --   --   --   --   --   --     < > = values in this interval not displayed.         Lab 06/13/25  0526 06/12/25  0510 06/11/25  0515 06/10/25  0241 06/09/25  0429   SODIUM 133* 134* 133* 132* 131*  131*   POTASSIUM 4.5 4.3 4.4 4.6 4.5  4.5   CHLORIDE 101 104 103 101 101  101   CO2 23.2 18.5* 18.5* 19.1* 16.0*  16.7*   ANION GAP 8.8 11.5 11.5 11.9 14.0  13.3   BUN 33.2* 36.9* 44.4* 48.8* 38.0*  39.6*   CREATININE 1.46* 1.36* 1.53* 2.25* 2.37*  2.37*   EGFR 49.2* 53.6* 46.5* 29.3* 27.5*  27.5*   GLUCOSE 131* 136* 132* 106* 154*  156*   CALCIUM 8.8 8.7 8.4* 7.7* 7.8*  7.7*   MAGNESIUM  --   --  1.7 1.6  --    PHOSPHORUS  --   --  2.9 4.1  --          Lab 06/12/25  0510 06/10/25  0241 06/09/25  0429 06/08/25  2303   TOTAL PROTEIN 6.4 6.0 5.5* 6.3   ALBUMIN 3.3* 3.2* 3.2* 3.8   GLOBULIN 3.1 2.8 2.3 2.5   ALT (SGPT) 26 16 10 11   AST (SGOT) 26 29 21 15   BILIRUBIN 0.3 0.5 0.6 0.8    ALK PHOS 75 62 51 67         Lab 06/10/25  0241 06/09/25  0126 06/08/25  2303   PROBNP  --   --  1,401.0   HSTROP T  --  51* 55*   PROTIME 18.1*  --   --    INR 1.43*  --   --              Lab 06/09/25  0015 06/08/25  2303   ABO TYPING AB AB   RH TYPING Negative Negative   ANTIBODY SCREEN  --  Negative         Lab 06/08/25  2258   PH, ARTERIAL 7.397   PCO2, ARTERIAL 23.8*   PO2 ART 57.9*   O2 SATURATION ART 90.4*   FIO2 44   HCO3 ART 14.6*   BASE EXCESS ART -8.9*     Brief Urine Lab Results  (Last result in the past 365 days)        Color   Clarity   Blood   Leuk Est   Nitrite   Protein   CREAT   Urine HCG        06/09/25 1046 Yellow   Clear   Negative   Negative   Negative   30 mg/dL (1+)                 Microbiology Results (last 10 days)       Procedure Component Value - Date/Time    Respiratory Culture - Sputum, Cough [[id number]] Collected: 06/09/25 1447    Lab Status: Final result Specimen: Sputum from Cough Updated: 06/11/25 0952     Respiratory Culture Light growth (2+) Normal respiratory irma. No S. aureus or Pseudomonas aeruginosa detected. Final report.     Gram Stain Moderate (3+) WBCs per low power field      Rare (1+) Epithelial cells per low power field      Many (4+) Gram negative bacilli      Few (2+) Gram positive cocci in clusters      Rare (1+) Hyphal elements    MRSA Screen, PCR (Inpatient) - Swab, Nares [[id number]]  (Normal) Collected: 06/09/25 0904    Lab Status: Final result Specimen: Swab from Nares Updated: 06/09/25 1043     MRSA PCR No MRSA Detected    Narrative:      The negative predictive value of this diagnostic test is high and should only be used to consider de-escalating anti-MRSA therapy. A positive result may indicate colonization with MRSA and must be correlated clinically.    Respiratory Panel PCR w/COVID-19(SARS-CoV-2) GINO/ANG/CUHE/PAD/COR/NAIN In-House, NP Swab in UTM/VTM, 2 HR TAT - Swab, Nasopharynx [[id number]]  (Abnormal) Collected: 06/09/25 0904    Lab Status: Final result  Specimen: Swab from Nasopharynx Updated: 06/09/25 1036     ADENOVIRUS, PCR Not Detected     Coronavirus 229E Not Detected     Coronavirus HKU1 Not Detected     Coronavirus NL63 Not Detected     Coronavirus OC43 Not Detected     COVID19 Not Detected     Human Metapneumovirus Not Detected     Human Rhinovirus/Enterovirus Not Detected     Influenza A PCR Not Detected     Influenza B PCR Not Detected     Parainfluenza Virus 1 Not Detected     Parainfluenza Virus 2 Not Detected     Parainfluenza Virus 3 Detected     Parainfluenza Virus 4 Not Detected     RSV, PCR Not Detected     Bordetella pertussis pcr Not Detected     Bordetella parapertussis PCR Not Detected     Chlamydophila pneumoniae PCR Not Detected     Mycoplasma pneumo by PCR Not Detected    Narrative:      In the setting of a positive respiratory panel with a viral infection PLUS a negative procalcitonin without other underlying concern for bacterial infection, consider observing off antibiotics or discontinuation of antibiotics and continue supportive care. If the respiratory panel is positive for atypical bacterial infection (Bordetella pertussis, Chlamydophila pneumoniae, or Mycoplasma pneumoniae), consider antibiotic de-escalation to target atypical bacterial infection.    Legionella Antigen, Urine - Urine, Urine, Clean Catch [[id number]]  (Normal) Collected: 06/09/25 0903    Lab Status: Final result Specimen: Urine, Clean Catch Updated: 06/09/25 1302     LEGIONELLA ANTIGEN, URINE Negative    S. Pneumo Ag Urine or CSF - Urine, Urine, Clean Catch [[id number]]  (Normal) Collected: 06/09/25 0903    Lab Status: Final result Specimen: Urine, Clean Catch Updated: 06/09/25 1304     Strep Pneumo Ag Negative    COVID PRE-OP / PRE-PROCEDURE SCREENING ORDER (NO ISOLATION) - Swab, Nasopharynx [[id number]]  (Normal) Collected: 06/09/25 0127    Lab Status: Final result Specimen: Swab from Nasopharynx Updated: 06/09/25 0223    Narrative:      The following orders were  created for panel order COVID PRE-OP / PRE-PROCEDURE SCREENING ORDER (NO ISOLATION) - Swab, Nasopharynx.  Procedure                               Abnormality         Status                     ---------                               -----------         ------                     COVID-19, FLU A/B, RSV P...[[id number]]  Normal              Final result                 Please view results for these tests on the individual orders.    COVID-19, FLU A/B, RSV PCR 1 HR TAT - Swab, Nasopharynx [[id number]]  (Normal) Collected: 06/09/25 0127    Lab Status: Final result Specimen: Swab from Nasopharynx Updated: 06/09/25 0223     COVID19 Not Detected     Influenza A PCR Not Detected     Influenza B PCR Not Detected     RSV, PCR Not Detected    Narrative:      Fact sheet for providers: https://www.fda.gov/media/189328/download    Fact sheet for patients: https://www.fda.gov/media/060609/download    Test performed by PCR.    Blood Culture - Blood, Arm, Right [[id number]]  (Normal) Collected: 06/09/25 0024    Lab Status: Preliminary result Specimen: Blood from Arm, Right Updated: 06/13/25 0030     Blood Culture No growth at 4 days    Narrative:      Less than seven (7) mL's of blood was collected.  Insufficient quantity may yield false negative results.    Blood Culture - Blood, Arm, Right [[id number]]  (Normal) Collected: 06/09/25 0024    Lab Status: Preliminary result Specimen: Blood from Arm, Right Updated: 06/13/25 0030     Blood Culture No growth at 4 days    Narrative:      Less than seven (7) mL's of blood was collected.  Insufficient quantity may yield false negative results.    Mycoplasma Pneumoniae Antibody, IgM - Blood, [[id number]]  (Normal) Collected: 06/08/25 2303    Lab Status: Final result Specimen: Blood Updated: 06/09/25 0854     Mycoplasma pneumo IgM Negative            XR Chest 1 View  Result Date: 6/12/2025  Impression: 1.Stable right mid to lower lung infiltrates and trace right pleural effusion. 2.Mild left  basilar atelectasis. Electronically Signed: Cosmo Martínez MD  6/12/2025 5:35 AM EDT  Workstation ID: [id number]    XR Chest 1 View  Result Date: 6/11/2025  Impression: Impression: Redemonstration of bibasilar pneumonia, perhaps mildly improved in the right lung. No new or worsening airspace disease. Electronically Signed: Joao Lockwood MD  6/11/2025 9:30 AM EDT  Workstation ID: [id number]    FL Video Swallow With Speech Single Contrast  Result Date: 6/10/2025  Impression: Impression: 1. Deep laryngeal penetration with thin liquid barium, penetration with applesauce of barium 2. Prominent cricopharyngeus muscle Please consult speech pathology report for further details regarding the exam and for dietary recommendations. Report dictated by: Shayy Mcknight  I have personally reviewed this case and agree with the findings above: Electronically Signed: Markie Rivas MD  6/10/2025 4:33 PM EDT  Workstation ID: [id number]    CT Chest Without Contrast Diagnostic  Result Date: 6/9/2025  Impression: Impression: CT CHEST: 1.Posterior right upper lobe and bilateral lower lobe airspace consolidation with air bronchograms likely representing consolidative pneumonia. 2.Mild enlargement of the ascending thoracic aorta measuring up to 4.2 cm. CT ABDOMEN AND PELVIS: 1.Hepatomegaly with diffuse hepatic steatosis. 2.Splenomegaly measuring 21 cm in the AP dimension and 16.7 cm in the craniocaudal dimension. 3.No pathologic lymphadenopathy within the abdomen or pelvis. Electronically Signed: Davonte Conte MD  6/9/2025 11:01 AM EDT  Workstation ID: [id number]    CT Abdomen Pelvis Without Contrast  Result Date: 6/9/2025  Impression: Impression: CT CHEST: 1.Posterior right upper lobe and bilateral lower lobe airspace consolidation with air bronchograms likely representing consolidative pneumonia. 2.Mild enlargement of the ascending thoracic aorta measuring up to 4.2 cm. CT ABDOMEN AND PELVIS: 1.Hepatomegaly with diffuse hepatic steatosis.  2.Splenomegaly measuring 21 cm in the AP dimension and 16.7 cm in the craniocaudal dimension. 3.No pathologic lymphadenopathy within the abdomen or pelvis. Electronically Signed: Davonte Conte MD  6/9/2025 11:01 AM EDT  Workstation ID: [id number]    XR Chest 1 View  Result Date: 6/8/2025  Impression: Right mid to lower lung pneumonia. Follow-up until clear recommended.  6/8/2025 11:16 PM by Dr. Cosmo Martínez MD on Workstation: [id number]        Results for orders placed during the hospital encounter of 10/27/23    Duplex carotid ultrasound CAR    Interpretation Summary    Right internal carotid artery demonstrates normal flow without evidence of hemodynamically significant stenosis.    All other right side carotid system vessels are normal.    Left internal carotid artery demonstrates normal flow without evidence of hemodynamically significant stenosis.    All other left side carotid system vessels are normal.    No significant change from carotid duplex study dated 5/18/2021, and CTA performed on 8/20/2023.      Results for orders placed during the hospital encounter of 10/27/23    Duplex carotid ultrasound CAR    Interpretation Summary    Right internal carotid artery demonstrates normal flow without evidence of hemodynamically significant stenosis.    All other right side carotid system vessels are normal.    Left internal carotid artery demonstrates normal flow without evidence of hemodynamically significant stenosis.    All other left side carotid system vessels are normal.    No significant change from carotid duplex study dated 5/18/2021, and CTA performed on 8/20/2023.      Results for orders placed in visit on 05/20/21    Emergent/Open-Heart Anesthesia ABHIJEET    Narrative  Preanesthesia Checklist:  Patient identified, IV assessed, risks and benefits discussed, monitors and equipment assessed and procedure being performed at surgeon's request.    General Procedure Information  Diagnostic Indications for Echo:   assessment of ascending aorta, assessment of surgical repair, defect repair evaluation and hemodynamic monitoring  Physician Requesting Echo: Jr Tom Tubbs MD  ICD Code for Medical Necessity:  Aortic Insufficiency  Location performed:  OR  Intubated  Bite block placed  Heart visualized  Probe Insertion:  Easy  Probe Type:  Multiplane  Modalities:  Color flow mapping, 2D only, continuous wave Doppler and pulse wave Doppler    Echocardiographic and Doppler Measurements    Ventricles    Right Ventricle:  Cavity size normal.  Hypertrophy not present.  Left Ventricle:  Diastolic dimension 4.7 cm.  Hypertrophy not present.  Thrombus not present.  Global Function mildly impaired.  Ejection Fraction 50%.    Ventricular Regional Function:  13- Apical Anterior:  hypokinetic  14- Apical Lateral:  hypokinetic  16- Apical Septal:  hypokinetic  17- [place]:  hypokinetic      Valves    Aortic Valve:  Annulus normal.  Stenosis not present.  Pressure Half-time: 910 ms.  Regurgitation mild.  Leaflets normal.  Leaflet motions normal.    Mitral Valve:  Annulus normal.  Stenosis not present.  Regurgitation trace.    Tricuspid Valve:  Annulus normal.  Stenosis not present.  Regurgitation mild.  Pulmonic Valve:  Annulus normal.  Regurgitation trace.        Aorta    Ascending Aorta:  Size normal.  Diameter 3.6 cm.  Dissection not present.  Plaque thickness less than 3 mm.  Mobile plaque not present.  Aortic Arch:  Size normal.  Diameter 3.1 cm.  Dissection not present.  Plaque thickness less than 3 mm.  Mobile plaque not present.  Descending Aorta:  Size normal.  Diameter 2.5 cm.  Dissection not present.  Plaque thickness less than 3 mm.  Mobile plaque not present.        Atria    Right Atrium:  Size normal.  Spontaneous echo contrast not present.  Thrombus not present.  Tumor not present.  Device present.    Left Atrium:  Size normal.  Spontaneous echo contrast not present.  Thrombus not present.  Tumor not present.  Device not  present.  Left atrial appendage normal.      Septa    Atrial Septum:  Intra-atrial septal morphology lipomatous hypertrophy.        Diastolic Function Measurements:  Diastolic Dysfunction Grade= II  E= 40.6 ms  A= 35.3 ms  E/A Ratio=  1.2  DT=  296 ms  S/D=  IVRT=    Other Findings  Pericardium:  normal  Pleural Effusion:  none  Pulmonary Arteries:  normal  Pulmonary Venous Flow:  normal    Anesthesia Information  Performed Personally      Echocardiogram Comments:  Post CPB:  LVEF much improved, 60%, apex no longer hypokinetic.  No aortic dissection post decannulation.  AI unchanged.      Labs Pending at Discharge:  Pending Labs       Order Current Status    Blood Culture - Blood, Arm, Right Preliminary result    Blood Culture - Blood, Arm, Right Preliminary result              Discharge Details        Discharge Medications        New Medications        Instructions Start Date   amiodarone 200 MG tablet  Commonly known as: PACERONE   200 mg, Oral, Every 24 Hours Scheduled   Start Date: June 14, 2025     levoFLOXacin 500 MG tablet  Commonly known as: LEVAQUIN   500 mg, Oral, Every 24 Hours      metoprolol succinate XL 25 MG 24 hr tablet  Commonly known as: TOPROL-XL   12.5 mg, Oral, Every 12 Hours Scheduled             Changes to Medications        Instructions Start Date   clotrimazole-betamethasone 1-0.05 % cream  Commonly known as: LOTRISONE   1 Application, Topical, 2 Times Daily, Apply to affected area twice daily             Continue These Medications        Instructions Start Date   Cough Drops lozenge   1 each, As Needed      finasteride 5 MG tablet  Commonly known as: PROSCAR   5 mg, Oral, Daily      fluticasone 50 MCG/ACT nasal spray  Commonly known as: FLONASE   2 sprays, Nasal, Daily      furosemide 20 MG tablet  Commonly known as: LASIX   10 mg, Every Other Day      insulin aspart 100 UNIT/ML solution pen-injector sc pen  Commonly known as: novoLOG FLEXPEN   5 Units, 3 Times Daily With Meals      insulin  glargine 100 UNIT/ML injection  Commonly known as: LANTUS, SEMGLEE   15 Units, Nightly      lactulose 10 GM/15ML solution  Commonly known as: CHRONULAC   30 g, 2 Times Daily PRN      levothyroxine 200 MCG tablet  Commonly known as: SYNTHROID, LEVOTHROID   200 mcg, Every Early Morning      metFORMIN 1000 MG tablet  Commonly known as: GLUCOPHAGE   1,000 mg, 2 Times Daily With Meals      nitroglycerin 0.4 MG SL tablet  Commonly known as: NITROSTAT   0.4 mg, Every 5 Minutes PRN      pantoprazole 40 MG EC tablet  Commonly known as: PROTONIX   40 mg, Oral, Daily      sertraline 100 MG tablet  Commonly known as: ZOLOFT   200 mg, Oral, Daily      simvastatin 40 MG tablet  Commonly known as: ZOCOR   20 mg, Nightly      tamsulosin 0.4 MG capsule 24 hr capsule  Commonly known as: FLOMAX   0.4 mg, Oral, Daily      Xifaxan 550 MG tablet  Generic drug: riFAXIMin   550 mg, Oral, Every 12 Hours Scheduled               No Known Allergies      Discharge Disposition:  Home or Self Care    Diet:  Hospital:  Diet Order   Procedures    Diet: Cardiac, Diabetic; Healthy Heart (2-3 Na+); Consistent Carbohydrate; No Straw; Fluid Consistency: Thin (IDDSI 0)         Discharge Activity: As tolerated        CODE STATUS:  Code Status and Medical Interventions: CPR (Attempt to Resuscitate); Full Support   Ordered at: 06/09/25 0718     Code Status (Patient has no pulse and is not breathing):    CPR (Attempt to Resuscitate)     Medical Interventions (Patient has pulse or is breathing):    Full Support     Level Of Support Discussed With:    Patient         Future Appointments   Date Time Provider Department Center   6/23/2025  9:30 AM Hunter Orellana DPM [facility] [place] [facility] POD ETWN [facility]   7/2/2025  8:30 AM Hunter Orellana DPM [facility] [place] [facility] POD ETWN [facility]   7/8/2025 10:45 AM Sven Duran MD [facility] [place] [facility] CD ETOWN [facility]   7/14/2025  9:00 AM Donna Castro APRN [facility] GE ETWR [facility]   7/16/2025  9:15 AM Alex Buckley MD [facility] [place] [facility] PC W KEVIN [facility]   8/11/2025  2:45 PM  Hunter Orellana DPM [facility] POD ETWBERTA [facility]           Time spent on Discharge including face to face service: 45 minutes    Electronically signed by Seven Saldana MD, 06/13/25, 1:24 PM EDT.    Part of this note may be an electronic transcription/translation of spoken language to printed text using the Dragon Dictation System.

## 2025-06-14 ENCOUNTER — READMISSION MANAGEMENT (OUTPATIENT)
Dept: CALL CENTER | Facility: HOSPITAL | Age: 80
End: 2025-06-14
Payer: MEDICARE

## 2025-06-14 LAB
BACTERIA SPEC AEROBE CULT: NORMAL
BACTERIA SPEC AEROBE CULT: NORMAL

## 2025-06-14 NOTE — OUTREACH NOTE
Prep Survey      Flowsheet Row Responses   Sabianism facility patient discharged from? [facility]   Is LACE score < 7 ? No   Eligibility [facility]   Date of Admission 06/08/25   Date of Discharge 06/13/25   Discharge Disposition Home-Health Care Sv   Discharge diagnosis Sepsis   Does the patient have one of the following disease processes/diagnoses(primary or secondary)? Sepsis   Does the patient have Home health ordered? Yes   What is the Home health agency?  [facility]   Is there a DME ordered? No   Prep survey completed? Yes            TIKI CORBIN - Registered Nurse

## 2025-06-16 ENCOUNTER — TRANSITIONAL CARE MANAGEMENT TELEPHONE ENCOUNTER (OUTPATIENT)
Dept: CALL CENTER | Facility: HOSPITAL | Age: 80
End: 2025-06-16
Payer: OTHER GOVERNMENT

## 2025-06-16 NOTE — OUTREACH NOTE
Call Center TCM Note      Flowsheet Row Responses   [facility] [place] patient discharged from? [facility]   Does the patient have one of the following disease processes/diagnoses(primary or secondary)? Sepsis   TCM attempt successful? Yes   Call start time 1247   Call end time 1251   Discharge diagnosis Pneumonia, sepsis, tachycardia, atrial fibrillation   Is patient permission given to speak with other caregiver? Yes   Person spoke with today (if not patient) and relationship wife, Carla   Meds reviewed with patient/caregiver? Yes  [New: amoxicillin-clavulanate, amiodarone, metoprolol succinate XL]   Does the patient have all medications related to this admission filled (includes all antibiotics, inhalers, nebulizers,steroids,etc.) Yes   Is the patient taking all medications as directed (includes completed medication regime)? Yes   Comments PCP Dr Buckley. Hospital follow up appt scheduled for 6/24  230pm [facility] PCP   Does the patient have an appointment with their PCP within 7-14 days of discharge? No   Nursing Interventions Assisted patient with making appointment per protocol, Routed TCM call to PCP office   What is the Home health agency?  [facility]   Has home health visited the patient within 72 hours of discharge? Call prior to 72 hours   What DME was ordered? Home O23L   Has all DME been delivered? Yes   Psychosocial issues? No   Did the patient receive a copy of their discharge instructions? Yes   Nursing interventions Reviewed instructions with patient   What is the patient's perception of their health status since discharge? Improving   Is the patient/caregiver able to teach back TIME? T emperature - higher or lower than normal   Nursing interventions Nurse provided reassurance to patient, Nurse provided patient education   Is patient/caregiver able to teach back steps to recovery at home? Set small, achievable goals for return to baseline health, Rest and regain strength   Is the  patient/caregiver able to teach back signs and symptoms of worsening condition: Fever, Rapid heart rate (>90), Shortness of breath/rapid respiratory rate   If the patient is a current smoker, are they able to teach back resources for cessation? Not a smoker   Is the patient/caregiver able to teach back the hierarchy of who to call/visit for symptoms/problems? PCP, Specialist, Home health nurse, Urgent Care, ED, 911 Yes   TCM call completed? Yes   Call end time 1251   Would this patient benefit from a Referral to [facility]? No   Is the patient interested in additional calls from an ambulatory ? No            RICARDO DAWN - Registered Nurse    6/16/2025, 12:54 EDT

## 2025-06-19 LAB
QT INTERVAL: 290 MS
QT INTERVAL: 364 MS
QTC INTERVAL: 448 MS
QTC INTERVAL: 468 MS

## 2025-06-23 ENCOUNTER — TELEPHONE (OUTPATIENT)
Dept: GASTROENTEROLOGY | Facility: CLINIC | Age: 80
End: 2025-06-23
Payer: OTHER GOVERNMENT

## 2025-06-23 ENCOUNTER — OFFICE VISIT (OUTPATIENT)
Dept: PODIATRY | Facility: CLINIC | Age: 80
End: 2025-06-23
Payer: MEDICARE

## 2025-06-23 VITALS
WEIGHT: 238 LBS | HEART RATE: 67 BPM | HEIGHT: 72 IN | OXYGEN SATURATION: 90 % | DIASTOLIC BLOOD PRESSURE: 60 MMHG | SYSTOLIC BLOOD PRESSURE: 103 MMHG | TEMPERATURE: 97.3 F | BODY MASS INDEX: 32.23 KG/M2

## 2025-06-23 DIAGNOSIS — R26.2 DIFFICULTY WALKING: ICD-10-CM

## 2025-06-23 DIAGNOSIS — E11.42 TYPE 2 DIABETES MELLITUS WITH DIABETIC POLYNEUROPATHY, WITH LONG-TERM CURRENT USE OF INSULIN: ICD-10-CM

## 2025-06-23 DIAGNOSIS — L60.0 ONYCHOCRYPTOSIS: ICD-10-CM

## 2025-06-23 DIAGNOSIS — M79.672 FOOT PAIN, BILATERAL: Primary | ICD-10-CM

## 2025-06-23 DIAGNOSIS — M79.671 FOOT PAIN, BILATERAL: Primary | ICD-10-CM

## 2025-06-23 DIAGNOSIS — G62.2 NEUROPATHY DUE TO CHEMICAL SUBSTANCE: ICD-10-CM

## 2025-06-23 DIAGNOSIS — Z79.4 TYPE 2 DIABETES MELLITUS WITH DIABETIC POLYNEUROPATHY, WITH LONG-TERM CURRENT USE OF INSULIN: ICD-10-CM

## 2025-06-23 DIAGNOSIS — L89.891 PRESSURE INJURY OF RIGHT FOOT, STAGE 1: ICD-10-CM

## 2025-06-23 DIAGNOSIS — Z77.098 HISTORY OF AGENT ORANGE EXPOSURE: ICD-10-CM

## 2025-06-23 NOTE — PROGRESS NOTES
[facility] - PODIATRY    Today's Date: 06/23/25    Patient Name: Cosmo Gauthier  MRN: [id number]  CSN: [id number]  PCP: Alex Buckley MD, Last PCP Visit: 5 March 2025  Referring Provider: No ref. provider found    SUBJECTIVE     Chief Complaint   Patient presents with    Left Foot - Follow-up, Nail Problem    Right Foot - Follow-up, Nail Problem     HPI: Cosmo Gauthier, a 79 y.o.male, presents to clinic for painful toenails:    New, Established, New Problem: established  Location:  Toenails  Duration:   Greater than five years  Onset:  Gradual  Nature:  sore with palpation.  Stable, worsening, improving:   Stable  Aggravating factors:  Pain with shoe gear and ambulation.  Previous Treatment: debridement    New, Established, New Problem:  New problem  Location: Plantar right fifth metatarsal head  Duration:  > one week  Onset:  Insidious  Nature:  Painful, sore hyperkeraition lesion(s)  Aggravating factors:  Patient relates pain is aggravated by shoe gear and ambulation.    Previous Treatment:  None    Patient controlling diabetes via:  insulin    Patient states their last blood glucose was: 130    Dx dementia 2 mos ago    Patient denies any fevers, chills, nausea, vomiting, shortness of breathe, nor any other constitutional signs nor symptoms.    I have reviewed/confirmed previously documented HPI with no changes.     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     Primary osteoarthritis of left knee     Sleep apnea     Sleep apnea     Thrombocytopenia 05/22/2018    Thyroid disease      Past Surgical History:   Procedure Laterality Date    CARDIAC SURGERY      COLONOSCOPY  2015    COLONOSCOPY N/A 9/3/2024    Procedure: COLONOSCOPY FOR SCREENING WITH COLD SNARE;  Surgeon: Chris Bradshaw MD;  Location: [facility] ENDOSCOPY;  Service: Gastroenterology;  Laterality: N/A;  COLON POLYPS    CORONARY ANGIOPLASTY WITH STENT PLACEMENT      CORONARY ARTERY BYPASS GRAFT N/A 05/20/2021    Procedure: MIDLINE STERNOTOMY,CORONARY ARTERY BYPASS GRAFTING X 5, UTILIZING THE LEFT COMPA AND LEFT SAPHENOUS VEIN, ABHIJEET, PRP;  Surgeon: Jr Tom Tubbs MD;  Location: [facility] OR;  Service: Cardiothoracic;  Laterality: N/A;    ENDOSCOPY      ENDOSCOPY N/A 1/24/2024    Procedure: ESOPHAGOGASTRODUODENOSCOPY;  Surgeon: Chris Bradshaw MD;  Location: [facility] ENDOSCOPY;  Service: Gastroenterology;  Laterality: N/A;  hiatal hernia, schatzki's ring    ENDOSCOPY N/A 1/29/2024    Procedure: ESOPHAGOGASTRODUODENOSCOPY with balloon dilaitation 12-15cm;  Surgeon: Chris Bradshaw MD;  Location: [facility] ENDOSCOPY;  Service: Gastroenterology;  Laterality: N/A;  hiatal hernia, schatskis ring    ENDOSCOPY N/A 2/17/2025    Procedure: ESOPHAGOGASTRODUODENOSCOPY WITH BIOPSIES;  Surgeon: Chris Bradshaw MD;  Location: [facility] ENDOSCOPY;  Service: Gastroenterology;  Laterality: N/A;  GASTRITIS/HIATAL HERNIA    HERNIA REPAIR      JOINT REPLACEMENT      SHOULDER SURGERY      SHOULDER REPAIR    TOTAL KNEE ARTHROPLASTY      TRANSESOPHAGEAL ECHOCARDIOGRAM (ABHIJEET) N/A 05/20/2021    Procedure: TRANSESOPHAGEAL ECHOCARDIOGRAM WITH ANESTHESIA;  Surgeon: Jr Tom Tubbs MD;  Location: [facility] OR;  Service: Cardiothoracic;  Laterality: N/A;     Family History   Problem Relation Age of Onset    Diabetes Mother     Colon cancer Father 65     Social History     Socioeconomic History     Marital status:    Tobacco Use    Smoking status: Never    Smokeless tobacco: Never   Vaping Use    Vaping status: Never Used   Substance and Sexual Activity    Alcohol use: Not Currently     Comment: QUIT DRINKING 32 YEARS AGO    Drug use: Never    Sexual activity: Defer     No Known Allergies  Current Outpatient Medications   Medication Sig Dispense Refill    amiodarone (PACERONE) 200 MG tablet Take 1 tablet by mouth Daily. 30 tablet 1    clotrimazole-betamethasone (LOTRISONE) 1-0.05 % cream Apply 1 Application topically to the appropriate area as directed 2 (Two) Times a Day. Apply to affected area twice daily 45 g 5    finasteride (PROSCAR) 5 MG tablet Take 1 tablet by mouth Daily. 60 tablet 5    fluticasone (FLONASE) 50 MCG/ACT nasal spray Administer 2 sprays into the nostril(s) as directed by provider Daily. 16 g 1    furosemide (LASIX) 20 MG tablet Take 0.5 tablets by mouth Every Other Day.      insulin aspart (novoLOG FLEXPEN) 100 UNIT/ML solution pen-injector sc pen Inject 5 Units under the skin into the appropriate area as directed 3 (Three) Times a Day With Meals. Per sliding scale      insulin glargine (LANTUS, SEMGLEE) 100 UNIT/ML injection Inject 15 Units under the skin into the appropriate area as directed Every Night.      lactulose (CHRONULAC) 10 GM/15ML solution Take 45 mL by mouth 2 (Two) Times a Day As Needed.      levothyroxine (SYNTHROID, LEVOTHROID) 200 MCG tablet Take 1 tablet by mouth Every Morning.      metFORMIN (GLUCOPHAGE) 1000 MG tablet Take 1 tablet by mouth 2 (Two) Times a Day With Meals.      metoprolol succinate XL (TOPROL-XL) 25 MG 24 hr tablet Take 1/2 tablet by mouth Every 12 (Twelve) Hours. 30 tablet 1    nitroglycerin (NITROSTAT) 0.4 MG SL tablet Place 1 tablet under the tongue Every 5 (Five) Minutes As Needed.      pantoprazole (PROTONIX) 40 MG EC tablet Take 1 tablet by mouth Daily. 30 tablet 5    sertraline (ZOLOFT) 100 MG tablet Take 2 tablets by mouth Daily. 180  tablet 1    simvastatin (ZOCOR) 40 MG tablet Take 0.5 tablets by mouth Every Night.      tamsulosin (FLOMAX) 0.4 MG capsule 24 hr capsule Take 1 capsule by mouth Daily. 30 capsule 5    Throat Lozenges (Cough Drops) lozenge Dissolve 1 each in the mouth As Needed.      Xifaxan 550 MG tablet TAKE 1 TABLET BY MOUTH EVERY 12  HOURS 62 tablet 10     No current facility-administered medications for this visit.     Review of Systems   Constitutional: Negative.    Skin:         Painful toenails   Neurological:  Positive for numbness.   All other systems reviewed and are negative.      OBJECTIVE     Vitals:    06/23/25 1002   BP: 103/60   Pulse: 67   Temp: 97.3 °F (36.3 °C)   SpO2: 90%       Body mass index is 32.28 kg/m².    Lab Results   Component Value Date    HGBA1C 7.00 (H) 11/05/2024       Lab Results   Component Value Date    GLUCOSE 131 (H) 06/13/2025    CALCIUM 8.8 06/13/2025     (L) 06/13/2025    K 4.5 06/13/2025    CO2 23.2 06/13/2025     06/13/2025    BUN 33.2 (H) 06/13/2025    CREATININE 1.46 (H) 06/13/2025    EGFRIFNONA 48 (L) 01/13/2022    BCR 22.7 06/13/2025    ANIONGAP 8.8 06/13/2025       Patient seen in no apparent distress.      PHYSICAL EXAM:     Foot/Ankle Exam    GENERAL  Appearance:  elderly  Orientation:  AAOx3  Affect:  appropriate  Gait:  antalgic  Assistance:  walker  Right shoe gear: casual shoe  Left shoe gear: casual shoe    VASCULAR     Right Foot Vascularity   Dorsalis pedis:  1+  Posterior tibial:  1+  Skin temperature:  warm  Edema grading:  None  CFT:  < 3 seconds  Pedal hair growth:  Absent  Varicosities:  mild varicosities     Left Foot Vascularity   Dorsalis pedis:  1+  Posterior tibial:  1+  Skin temperature:  warm  Edema grading:  None  CFT:  < 3 seconds  Pedal hair growth:  Absent  Varicosities:  mild varicosities     NEUROLOGIC     Right Foot Neurologic   Light touch sensation: diminished  Vibratory sensation: diminished  Hot/Cold sensation: diminished  Protective  Sensation using Remus-Tanya Monofilament:   Sites intact: 2  Sites tested: 10     Left Foot Neurologic   Light touch sensation: diminished  Vibratory sensation: diminished  Hot/Cold sensation:  diminished  Protective Sensation using Remus-Tanya Monofilament:   Sites intact: 2  Sites tested: 10    MUSCULOSKELETAL     Right Foot Musculoskeletal   Hammertoe:  Second toe, third toe, fourth toe and fifth toe     Left Foot Musculoskeletal   Hammertoe:  Second toe, third toe, fourth toe and fifth toe    MUSCLE STRENGTH     Right Foot Muscle Strength   Foot dorsiflexion:  4-  Foot plantar flexion:  4-  Foot inversion:  4-  Foot eversion:  4-     Left Foot Muscle Strength   Foot dorsiflexion:  4-  Foot plantar flexion:  4-  Foot inversion:  4-  Foot eversion:  4-    RANGE OF MOTION     Right Foot Range of Motion   Foot and ankle ROM within normal limits       Left Foot Range of Motion   Foot and ankle ROM within normal limits      DERMATOLOGIC      Right Foot Dermatologic   Skin  Positive for ulcer.   Nails  1.  Positive for elongated, onychomycosis, abnormal thickness, subungual debris and ingrown toenail.  2.  Positive for elongated, onychomycosis, abnormal thickness, subungual debris and ingrown toenail.  3.  Positive for elongated, onychomycosis, abnormal thickness, subungual debris and ingrown toenail.  4.  Positive for elongated, onychomycosis, abnormal thickness, subungual debris and ingrown toenail.  5.  Positive for elongated, onychomycosis, abnormal thickness, subungual debris and ingrown toenail.  Nails comment:  Toenails 1, 2, 3, 4, and 5     Left Foot Dermatologic   Skin  Positive for tinea (Plantar left arch).   Nails comment:  Toenails 1, 2, 3, 4, and 5  Nails  1.  Positive for elongated, onychomycosis, abnormal thickness, subungual debris and ingrown toenail.  2.  Positive for elongated, onychomycosis, abnormal thickness, subungual debris and ingrown toenail.  3.  Positive for elongated,  onychomycosis, abnormal thickness, subungual debris and ingrown toenail.  4.  Positive for elongated, onychomycosis, abnormally thick, subungual debris and ingrown toenail.  5.  Positive for elongated, onychomycosis, abnormally thick, subungual debris and ingrown toenail.     Right foot additional comments: Stage 1 ulceration on the fifth metatarsal head area of the foot.  Hyperkeratotic tissue with subepidermal hemosiderin deposition is present.  No surrounding, edema, erythema, lymphangitis, fluctuance, nor signs of infection.  No drainage present.  25 mm x 22 mm x 4 mm.  This area was debrided via excisional partial thickness debridement with a 10 scalpel blade.  Post debridement measurements were 20 mm x 19 mm x 1 mm in depth.    I have reexamined the patient the results are consistent with the previously documented exam.    ASSESSMENT/PLAN     Diagnoses and all orders for this visit:    1. Foot pain, bilateral (Primary)    2. Type 2 diabetes mellitus with diabetic polyneuropathy, with long-term current use of insulin    3. Onychocryptosis    4. 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 History of agent Orange exposure    7. Pressure injury of right foot, stage 1    Comprehensive lower extremity examination and evaluation was performed.    Discussed findings and treatment plan including risks, benefits, and treatment options with patient in detail. Patient agreed with treatment plan.    Medications and allergies reviewed.  Reviewed available blood glucose and HgB A1C lab values along with other pertinent labs.  These were discussed with the patient as to their importance of diabetic maintenance.    Toenails 1, 2, 3, 4, 5 on Right and 1, 2, 3, 4, 5 on Left were debrided with nail nippers then filed with a Herminiomel nail tavia.  Patient tolerated procedure well without complications.    An After Visit Summary was printed and given to the patient at discharge, including (if requested) any  available informative/educational handouts regarding diagnosis, treatment, or medications. All questions were answered to patient/family satisfaction. Should symptoms fail to improve or worsen they agree to call or return to clinic or to go to the Emergency Department. Discussed the importance of following up with any needed screening tests/labs/specialist appointments and any requested follow-up recommended by me today. Importance of maintaining follow-up discussed and patient accepts that missed appointments can delay diagnosis and potentially lead to worsening of conditions.    Return in about 9 weeks (around 8/25/2025) for Toenail Care, Ulcer Follow-Up., or sooner if acute issues arise.    I have reviewed the assessment and plan and verified the accuracy of it. No changes to assessment and plan since the information was documented. Hunter Orellana DPM 06/23/25     I have dictated this note utilizing Dragon Dictation.  Please note that portions of this note were completed with a voice recognition program.  Part of this note may be an electronic transcription/translation of spoken language to printed text using the Dragon Dictation System.      This document has been electronically signed by Hunter Orellana DPM on June 23, 2025 10:37 EDT

## 2025-06-24 ENCOUNTER — OFFICE VISIT (OUTPATIENT)
Age: 80
End: 2025-06-24
Payer: OTHER GOVERNMENT

## 2025-06-24 VITALS
BODY MASS INDEX: 31.53 KG/M2 | SYSTOLIC BLOOD PRESSURE: 80 MMHG | HEIGHT: 72 IN | OXYGEN SATURATION: 97 % | DIASTOLIC BLOOD PRESSURE: 50 MMHG | WEIGHT: 232.8 LBS | HEART RATE: 92 BPM

## 2025-06-24 DIAGNOSIS — E78.2 HYPERLIPEMIA, MIXED: ICD-10-CM

## 2025-06-24 DIAGNOSIS — Z09 HOSPITAL DISCHARGE FOLLOW-UP: Primary | ICD-10-CM

## 2025-06-24 DIAGNOSIS — J18.9 PNEUMONIA OF BOTH LOWER LOBES DUE TO INFECTIOUS ORGANISM: ICD-10-CM

## 2025-06-24 DIAGNOSIS — N18.32 STAGE 3B CHRONIC KIDNEY DISEASE: ICD-10-CM

## 2025-06-24 DIAGNOSIS — R06.09 DYSPNEA ON EXERTION: ICD-10-CM

## 2025-06-24 DIAGNOSIS — I48.0 PAF (PAROXYSMAL ATRIAL FIBRILLATION): ICD-10-CM

## 2025-06-24 DIAGNOSIS — E03.9 HYPOTHYROIDISM, ADULT: ICD-10-CM

## 2025-06-24 DIAGNOSIS — E11.8 TYPE 2 DIABETES MELLITUS WITH COMPLICATIONS: ICD-10-CM

## 2025-06-24 DIAGNOSIS — R09.02 HYPOXIA: ICD-10-CM

## 2025-06-24 PROBLEM — J30.1 SEASONAL ALLERGIC RHINITIS DUE TO POLLEN: Status: RESOLVED | Noted: 2024-04-11 | Resolved: 2025-06-24

## 2025-06-24 PROBLEM — A41.9 SEPSIS: Status: RESOLVED | Noted: 2025-06-09 | Resolved: 2025-06-24

## 2025-06-24 PROCEDURE — 99495 TRANSJ CARE MGMT MOD F2F 14D: CPT | Performed by: INTERNAL MEDICINE

## 2025-06-24 NOTE — ASSESSMENT & PLAN NOTE
Patient had infiltrates in bilateral lower lobes as well as in the right upper lobe.  He did not have any positive cultures.  He was treated with IV antibiotics and then discharged on Augmentin, he has since completed that as of his 6/25 hospital follow-up.

## 2025-06-24 NOTE — ASSESSMENT & PLAN NOTE
Patient had episode of this during his recent hospitalization, he had RVR.    He was treated with amiodarone and DC'd on 200 mg daily.  He was also placed on low-dose metoprolol.    He is in sinus presently, has follow-up with cardiology here in a week or so, will defer to their expertise.    Of note he is not a candidate for anticoagulation given his severe thrombocytopenia and liver disease.

## 2025-06-24 NOTE — ASSESSMENT & PLAN NOTE
This is persistent as of his 6/25 hospital follow-up.  His sats are in the upper 90s presently, he is on 3 L, he uses this when he is out and walking.  He can likely be on 1 to 2 L when seated.  She can check him when he is sleeping to see if he needs more than the 1 to 2 L at that time as well.

## 2025-06-24 NOTE — ASSESSMENT & PLAN NOTE
Patient far was only 27 when he was admitted for sepsis.  He was treated with IV fluids etc. and has rebounded, he was at 49 at the time of discharge and that is pretty much his baseline.

## 2025-06-24 NOTE — ASSESSMENT & PLAN NOTE
This is the indication for his 6/25 OV.  He admitted secondary to pneumonia, developed sepsis, lactic acidosis, generalized weakness.    The H&P and discharge summaries were reviewed as were appropriate lab and imaging studies.    Please see the other headings for further details.

## 2025-06-24 NOTE — PROGRESS NOTES
"Chief Complaint  hospital f/u (Pt was hospitalized for bacteria pneumonia that caused him to go sepsis. Pt states that he feels some better. )    Subjective          Cosmo Gauthier presents to [facility]IC[facility]     History of Present Illness:  Patient is pleasant but unfortunate 79-year-old male with multiple medical issues, status post 5 vessel bypass 5/21, with underlying hypertension, hyperlipidemia, and diabetes, coming in 3/25 for routine 3-4-month follow-up. We will review all his labs and address any new concerns.  ---> Patient being seen early in 6/25 for hospital d/c f/u:  Date of Admission: 6/8/2025  Date of Discharge: 6/13/2025  Lactic acidosis [E87.20]  Weakness generalized [R53.1]  Thrombocytopenia [D69.6]  Sepsis [A41.9]  Pneumonia due to infectious organism, unspecified laterality, unspecified part of lung [J18.9]  Sepsis, due to unspecified organism, unspecified whether acute organ dysfunction present [A41.9]  ...he did have tachycardia atrial fibrillation was seen by cardiology and was started on amiodarone     Review of Systems   Constitutional:  Negative for appetite change, fatigue and fever.   HENT:  Negative for congestion and ear pain.    Eyes:  Negative for blurred vision.   Respiratory:  Negative for cough, chest tightness and wheezing.    Cardiovascular:  Negative for chest pain, palpitations and leg swelling.   Gastrointestinal:  Negative for abdominal pain.   Genitourinary:  Negative for difficulty urinating, dysuria and hematuria.   Musculoskeletal:  Negative for arthralgias and gait problem.   Skin:  Negative for skin lesions.   Neurological:  Negative for syncope, memory problem and confusion.   Psychiatric/Behavioral:  Negative for self-injury and depressed mood.        Objective   Vital Signs:   BP (!) 80/50   Pulse 92   Ht 182.9 cm (72.01\")   Wt 106 kg (232 lb 12.8 oz)   SpO2 97% Comment: On 3 Liters  BMI 31.57 kg/m²           Physical " Exam  Vitals and nursing note reviewed.   Constitutional:       General: He is not in acute distress.     Appearance: Normal appearance. He is not toxic-appearing.   HENT:      Head: Atraumatic.      Right Ear: External ear normal.      Left Ear: External ear normal.      Nose: Nose normal.      Mouth/Throat:      Mouth: Mucous membranes are moist.   Eyes:      General:         Right eye: No discharge.         Left eye: No discharge.      Extraocular Movements: Extraocular movements intact.      Pupils: Pupils are equal, round, and reactive to light.   Cardiovascular:      Rate and Rhythm: Normal rate and regular rhythm.      Pulses: Normal pulses.      Heart sounds: Normal heart sounds. No murmur heard.     No gallop.   Pulmonary:      Effort: Pulmonary effort is normal. No respiratory distress.      Breath sounds: No wheezing, rhonchi or rales.   Abdominal:      General: There is no distension.      Palpations: Abdomen is soft. There is no mass.      Tenderness: There is no abdominal tenderness. There is no guarding.   Musculoskeletal:         General: No swelling or tenderness.      Cervical back: No tenderness.      Right lower leg: No edema.      Left lower leg: No edema.   Skin:     General: Skin is warm and dry.      Findings: No rash.   Neurological:      General: No focal deficit present.      Mental Status: He is alert and oriented to person, place, and time. Mental status is at baseline.      Motor: No weakness.      Gait: Gait normal.   Psychiatric:         Mood and Affect: Mood normal.         Thought Content: Thought content normal.          Result Review :   The following data was reviewed by: Alex Buckley MD on 08/02/2021:                  Assessment and Plan    Diagnoses and all orders for this visit:    1. Hospital discharge follow-up (Primary)  Assessment & Plan:  This is the indication for his 6/25 OV.  He admitted secondary to pneumonia, developed sepsis, lactic acidosis, generalized  weakness.    The H&P and discharge summaries were reviewed as were appropriate lab and imaging studies.    Please see the other headings for further details.    Orders:  -     Vitamin B12 anemia; Future  -     Folate anemia; Future    2. Pneumonia of both lower lobes due to infectious organism  Assessment & Plan:  Patient had infiltrates in bilateral lower lobes as well as in the right upper lobe.  He did not have any positive cultures.  He was treated with IV antibiotics and then discharged on Augmentin, he has since completed that as of his 6/25 hospital follow-up.      3. Hypoxia  Assessment & Plan:  This is persistent as of his 6/25 hospital follow-up.  His sats are in the upper 90s presently, he is on 3 L, he uses this when he is out and walking.  He can likely be on 1 to 2 L when seated.  She can check him when he is sleeping to see if he needs more than the 1 to 2 L at that time as well.      4. PAF (paroxysmal atrial fibrillation)  Assessment & Plan:  Patient had episode of this during his recent hospitalization, he had RVR.    He was treated with amiodarone and DC'd on 200 mg daily.  He was also placed on low-dose metoprolol.    He is in sinus presently, has follow-up with cardiology here in a week or so, will defer to their expertise.    Of note he is not a candidate for anticoagulation given his severe thrombocytopenia and liver disease.      5. Stage 3b chronic kidney disease  Assessment & Plan:  Patient far was only 27 when he was admitted for sepsis.  He was treated with IV fluids etc. and has rebounded, he was at 49 at the time of discharge and that is pretty much his baseline.    Orders:  -     Comprehensive Metabolic Panel; Future    6. Hyperlipemia, mixed  -     Lipid Panel; Future    7. Hypothyroidism, adult  -     TSH; Future  -     T4, free; Future    8. Type 2 diabetes mellitus with complications  Overview:  Jardiance = athletes feet/dizziness/etc = wife concerned    Orders:  -     Hemoglobin  A1c; Future    9. Dyspnea on exertion  -     BNP; Future              BMI is >= 30 and <35. (Class 1 Obesity). The following options were offered after discussion;: exercise counseling/recommendations and nutrition counseling/recommendations     --  --  Older notes:  HOSP F/U 6/21 = S/P 5V CABG 5/21 per Dr Milligan=I reviewed records sent and the med list provided by wife.  TELEVISIT 2/25/21:  HOSP F/U 10/20 = I reviewed 9/20 [facility] records; I d/w pt labs/meds in detail:  1) CHEST PAIN and pt is being admitted to R/O MI; cardiology was notified...SPECT was neg, so will f/u with cards in a few weeks; may still need a cath=no new sxs and is gideon to see him next week as of 10/20 OV...to [facility] for CABG per Dr Milligan, but PLT's too low; has f/u with cards.  2) CAD/MI with prior stents; ? last stress was done in cardiology office 5/19; will continue home meds for now...no rest angina; ? increase Imdur=defer to cards appt he has on 3/9/21---> S/P 5V CABG as above; looks great.    3) HTN will be followed closely on home meds=stable 10/20 OV, BUT is orthostatic, so drink more and lower ACEI to 5 mg---> stable 6/21.  4) HYPERLIPIDEMIA=continue statin=LDL 61 (9/20)---> 46 in 6/21.    5) CIRRHOSIS per Dr Bradshaw; watch volume status.  6) THROMBOCYTOPENIA is related to the cirrhosis and is stable around 70K...on Prednisone per Dr Saldana---> off this; labs on RTO.    7) DM per orders...A1C was only 7.3 in 1/21; she d/w me BS fine despite prednisone as of 2/21 OV; ? lost 20+ lbs---> 5.6 and I d/w stop glipizide 10 bid and stay on Humalog 15 tid, but then again not totally sure he's on it?    8) HYPOTHYROIDISM is wnl as of 2/21 OV---> RTO.    9) BPH on flomax after retenion issues in BE as of 2/21 OV; to see Dr Dill---> saw him for chairez post-op = it's out now.    (lost friend/valeria 60's to covid)    VISIT 6/20:  ANNUAL MEDICARE WELLNESS EXAM 10/19 = reviewed all forms with pt; he is much more depressed, so zoloft was increased.  H.M.  ISSUES:  DM = A1C as below; Optho=q 12 mo with last 4/18 = early diabetic retinopathy and ? laser next visit?  --  HTN...needs ACEI now at 6/17 OV; repeat urine micro as well...remains controlled...ACEI fell off his list; resume now 1/19...better already---> stable.  ? CKD3 noted 6/20 = no new meds; needs repeat few weeks.  --  DM...max out januvia...8.1 an has f/u with [facility]...on lantus 20, d/w increase 2-4 units every week to get FBS to 110...7.1 is great...7.5 is stuck and I d/w again to increase Lantus=told him 24 now... 8.8 is horrible, so increase glucotrol to whole tab in AM...8.9, so try whole tab bid before increase lantus...9.5 is worse and needs Humalog before meals; d/w qid testing; stop glucotrol and increase lantus to 30 qhs...BS noted per phone and in office; rec 16/20/16 and stay on Lantus 30 qhs; d/w NO SSI for now...A1C down to 8.0 already and Fructosamine of 300=A1C closer to 7.5 actually...6.3 is great with TSH back down...6.8 is ok for now=7/19...7.3 in 10/19 and I d/w take 8 units with breakfast since he has been skipping this and only taking 15 with lunch/supper---> 7.3 great 6/20.   HYPOTHYROIDSIM stable 6/17...0.2 at 1/18 OV...0.7 better...increase 1/19 for 3.5 given above...? n/c, b/c now=10; will add 50 as of 1/19 OV...TSH 64 and apparently he missed them past week; I rec 250 qd for now and close f/u...0.7 and will leave this alone for now---> 1.5 in 2/20.  --  CAD per Dr Pritchett; s/p Spect '15 per pt and was neg---> still no CP/SOB and sees cards q 6 mo=no recent as of 6/20 OV.  LIPIDS with LDL 72...83 ok for now...LDL 57---> 55 in 2/20.  --  THROMBOCYTOPENIA is new 4/16 OV=99, so to HEME...off ASA but plavix ok per Dr Chaudhry; had BM bx=?...75 holding...on iron per her---> CBC stable 6/20 per her.  --  HOSP F/U 8/16 for FUO.  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  --  DJD s/p R TKR and then L TKR per Dr Eckert 1/16 and rehab with [facility] still on-going = 3x/wk at 4/16  OV...still with issues L knee 4/18...to have redo L TKR per Dr Zayas as of 10/18 OV=Dr Duran cleared him already...is gideon for 2/4/19, but apparently wants his A1C below 7 for this?? = d/w me 1/19...I d/w not going to get there that soon, but current blood sugars excellent and pt cleared for surgery from my standpoint as well=reviewed all their pre-op labs as well as EKG and CXR...s/p redo L TKR on 2/4/19 and looks great at 2/27/19 OV...finishing up as of 4/19 OV.  --  GERD per Dr Bradshaw.  ? CIRRHOSIS=tried on Nadolol per Dr Bradshaw=stopped it 6/20 due to diarrhea?; ? has CT/NH3 gideon.  --  OBESITY up 3 more to 263...258--->270 is stuck 2/20 and I d/w no meds=must go to [facility] ( Cards said no to surgery).  --  DEPRESSION on maint med...? Dementia per pt, sent to Dr Kim; he sent for [facility] as of 4/18 OV...will address depression 8/18 = increase zoloft   [facility] as [facility]17 [facility]---> c/w as of 2/21 OV; neg O2 in [facility] recently;   --  --  PSA 0.5 (4/7/16)...defer.  Colon 9/24 = 3 polyp = 3 years per Dr Bradshaw.  Prevnar 11/16; Pneumovax # 1 9/17;  (, retired [employer] '97, 3 kids)    Follow Up   Return in about 6 weeks (around 8/5/2025).    Total Time Spent:  minutes     This time includes time spent by me in the following activities: preparing for the visit, reviewing extensive past medical history and tests, performing a medically appropriate examination and/or evaluation, counseling and educating the patient and/or caregivers, ordering medications, tests, or procedures, referring and/or communicating with other health care professionals and documenting information in the medical record all on this date of service.     Patient was given instructions and counseling regarding his condition or for health maintenance advice. Please see specific information pulled into the AVS if appropriate.

## 2025-06-25 NOTE — PROGRESS NOTES
[facility]  Cardiology progress Note    Patient Name: Cosmo Gauthier  : 1945    CHIEF COMPLAINT  PSVT        Subjective   Subjective     HISTORY OF PRESENT ILLNESS    Cosmo Gauthier is a 79 y.o. male with history of PSVT.    REVIEW OF SYSTEMS    Constitutional:    No fever, no weight loss  Skin:     No rash  Otolaryngeal:    No difficulty swallowing  Cardiovascular: See HPI.  Pulmonary:    No cough, no sputum production    Personal History     Social History:    reports that he has never smoked. He has never used smokeless tobacco. He reports that he does not currently use alcohol. He reports that he does not use drugs.    Home Medications:  Current Outpatient Medications on File Prior to Visit   Medication Sig    amiodarone (PACERONE) 200 MG tablet Take 1 tablet by mouth Daily.    clotrimazole-betamethasone (LOTRISONE) 1-0.05 % cream Apply 1 Application topically to the appropriate area as directed 2 (Two) Times a Day. Apply to affected area twice daily    finasteride (PROSCAR) 5 MG tablet Take 1 tablet by mouth Daily.    fluticasone (FLONASE) 50 MCG/ACT nasal spray Administer 2 sprays into the nostril(s) as directed by provider Daily.    furosemide (LASIX) 20 MG tablet Take 0.5 tablets by mouth Every Other Day.    insulin aspart (novoLOG FLEXPEN) 100 UNIT/ML solution pen-injector sc pen Inject 5 Units under the skin into the appropriate area as directed 3 (Three) Times a Day With Meals. Per sliding scale    insulin glargine (LANTUS, SEMGLEE) 100 UNIT/ML injection Inject 15 Units under the skin into the appropriate area as directed Every Night.    lactulose (CHRONULAC) 10 GM/15ML solution Take 45 mL by mouth 2 (Two) Times a Day As Needed.    levothyroxine (SYNTHROID, LEVOTHROID) 200 MCG tablet Take 1 tablet by mouth Every Morning.    metFORMIN (GLUCOPHAGE) 1000 MG tablet Take 1 tablet by mouth 2 (Two) Times a Day With Meals.    metoprolol succinate XL (TOPROL-XL) 25 MG 24 hr tablet Take 1/2  tablet by mouth Every 12 (Twelve) Hours.    nitroglycerin (NITROSTAT) 0.4 MG SL tablet Place 1 tablet under the tongue Every 5 (Five) Minutes As Needed.    pantoprazole (PROTONIX) 40 MG EC tablet Take 1 tablet by mouth Daily.    sertraline (ZOLOFT) 100 MG tablet Take 2 tablets by mouth Daily.    simvastatin (ZOCOR) 40 MG tablet Take 0.5 tablets by mouth Every Night.    tamsulosin (FLOMAX) 0.4 MG capsule 24 hr capsule Take 1 capsule by mouth Daily.    Throat Lozenges (Cough Drops) lozenge Dissolve 1 each in the mouth As Needed.    Xifaxan 550 MG tablet TAKE 1 TABLET BY MOUTH EVERY 12  HOURS     No current facility-administered medications on file prior to visit.       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     PHYSICAL EXAM:    General Appearance:   well developed  well nourished  HENT:   oropharynx moist  lips not cyanotic  Neck:  thyroid not enlarged  supple  Respiratory:  no respiratory distress  normal breath sounds  no rales  Cardiovascular:  no jugular venous distention  regular rhythm  apical impulse normal  S1 normal, S2 normal  no S3, no S4   no murmur  no rub, no thrill  carotid pulses  normal; no bruit  pedal pulses normal  lower extremity edema: none    Skin:   warm, dry  Psychiatric:  judgement and insight appropriate  normal mood and affect        Result Review:  I have personally reviewed the available results from  [x]  Laboratory  [x]  EKG  [x]  Cardiology  [x]  Medications  [x]  Old records  []  Other:     Procedures  Lab Results   Component Value Date    CHOL 108 06/27/2024    CHOL 111 12/21/2023    CHOL 128 09/12/2023     Lab Results   Component Value Date    TRIG 90 06/27/2024    TRIG 97 12/21/2023    TRIG 179 (H) 09/12/2023     Lab Results   Component Value Date    HDL 45 06/27/2024    HDL 45 12/21/2023    HDL 48 09/12/2023     Lab Results   Component Value Date    LDL 45 06/27/2024    LDL 48 12/21/2023    LDL 51 09/12/2023     Lab Results   Component Value Date    VLDL 18 06/27/2024    VLDL 18 12/21/2023    VLDL 29 09/12/2023     Results for orders placed in visit on 05/20/21    Emergent/Open-Heart Anesthesia ABHIJEET    Narrative  Preanesthesia Checklist:  Patient identified, IV assessed, risks and benefits discussed, monitors and equipment assessed and procedure being performed at surgeon's request.    General Procedure Information  Diagnostic Indications for Echo:  assessment of ascending aorta, assessment of surgical repair, defect repair evaluation and hemodynamic monitoring  Physician Requesting Echo: Jr Tom Tubbs MD  ICD Code for Medical Necessity:  Aortic Insufficiency  Location performed:  OR  Intubated  Bite block placed  Heart visualized  Probe Insertion:  Easy  Probe Type:  Multiplane  Modalities:  Color flow mapping, 2D only, continuous wave Doppler and pulse wave Doppler    Echocardiographic and Doppler Measurements    Ventricles    Right Ventricle:  Cavity size normal.  Hypertrophy not present.  Left Ventricle:  Diastolic dimension 4.7 cm.  Hypertrophy not present.  Thrombus not present.  Global Function mildly impaired.  Ejection Fraction 50%.    Ventricular  Regional Function:  13- Apical Anterior:  hypokinetic  14- Apical Lateral:  hypokinetic  16- Apical Septal:  hypokinetic  17- Paint Bank:  hypokinetic      Valves    Aortic Valve:  Annulus normal.  Stenosis not present.  Pressure Half-time: 910 ms.  Regurgitation mild.  Leaflets normal.  Leaflet motions normal.    Mitral Valve:  Annulus normal.  Stenosis not present.  Regurgitation trace.    Tricuspid Valve:  Annulus normal.  Stenosis not present.  Regurgitation mild.  Pulmonic Valve:  Annulus normal.  Regurgitation trace.        Aorta    Ascending Aorta:  Size normal.  Diameter 3.6 cm.  Dissection not present.  Plaque thickness less than 3 mm.  Mobile plaque not present.  Aortic Arch:  Size normal.  Diameter 3.1 cm.  Dissection not present.  Plaque thickness less than 3 mm.  Mobile plaque not present.  Descending Aorta:  Size normal.  Diameter 2.5 cm.  Dissection not present.  Plaque thickness less than 3 mm.  Mobile plaque not present.        Atria    Right Atrium:  Size normal.  Spontaneous echo contrast not present.  Thrombus not present.  Tumor not present.  Device present.    Left Atrium:  Size normal.  Spontaneous echo contrast not present.  Thrombus not present.  Tumor not present.  Device not present.  Left atrial appendage normal.      Septa    Atrial Septum:  Intra-atrial septal morphology lipomatous hypertrophy.        Diastolic Function Measurements:  Diastolic Dysfunction Grade= II  E= 40.6 ms  A= 35.3 ms  E/A Ratio=  1.2  DT=  296 ms  S/D=  IVRT=    Other Findings  Pericardium:  normal  Pleural Effusion:  none  Pulmonary Arteries:  normal  Pulmonary Venous Flow:  normal    Anesthesia Information  Performed Personally      Echocardiogram Comments:  Post CPB:  LVEF much improved, 60%, apex no longer hypokinetic.  No aortic dissection post decannulation.  AI unchanged.     Impression/Plan:   1. CAD post CABG stable: Continue aspirin 81 mg a day.    2.  Chronic diastolic heart failure stable: Continue Lasix 10  mg every other day.  Monitor BMP.  Repeat echocardiogram.  3.  Essential hypertension controlled: Running low but asymptomatic.  Consider midodrine if blood pressure continues to be low and patient is symptomatic.  4.  Hyperlipidemia: Continue simvastatin 40 mg a day.  Monitor lipid and hepatic profile.  5.  PSVT: Continue amiodarone 200 mg once a day.  Continue Toprol-XL 12.5 mg twice a day.           Sven Duran MD   06/26/25   10:27 EDT

## 2025-06-26 ENCOUNTER — OFFICE VISIT (OUTPATIENT)
Dept: CARDIOLOGY | Facility: CLINIC | Age: 80
End: 2025-06-26
Payer: OTHER GOVERNMENT

## 2025-06-26 ENCOUNTER — READMISSION MANAGEMENT (OUTPATIENT)
Dept: CALL CENTER | Facility: HOSPITAL | Age: 80
End: 2025-06-26
Payer: OTHER GOVERNMENT

## 2025-06-26 VITALS
WEIGHT: 230 LBS | HEART RATE: 69 BPM | SYSTOLIC BLOOD PRESSURE: 96 MMHG | BODY MASS INDEX: 31.15 KG/M2 | HEIGHT: 72 IN | DIASTOLIC BLOOD PRESSURE: 53 MMHG

## 2025-06-26 DIAGNOSIS — I50.32 DIASTOLIC CHF, CHRONIC: ICD-10-CM

## 2025-06-26 DIAGNOSIS — I10 HYPERTENSION, ESSENTIAL: ICD-10-CM

## 2025-06-26 DIAGNOSIS — E78.2 HYPERLIPEMIA, MIXED: ICD-10-CM

## 2025-06-26 DIAGNOSIS — Z95.1 HX OF CABG: ICD-10-CM

## 2025-06-26 DIAGNOSIS — I25.10 CORONARY ARTERY DISEASE INVOLVING NATIVE CORONARY ARTERY OF NATIVE HEART WITHOUT ANGINA PECTORIS: Primary | ICD-10-CM

## 2025-06-26 NOTE — OUTREACH NOTE
Sepsis Week 2 Survey      Flowsheet Row Responses   [facility] patient discharged from? [facility]   Does the patient have one of the following disease processes/diagnoses(primary or secondary)? Sepsis   Week 2 attempt successful? Yes   Call start time 1445   Call end time 1446   Discharge diagnosis Pneumonia, sepsis, tachycardia, atrial fibrillation   Is patient permission given to speak with other caregiver? Yes   Person spoke with today (if not patient) and relationship wife, Carla Holloways reviewed with patient/caregiver? Yes   Is the patient having any side effects they believe may be caused by any medication additions or changes? No   Does the patient have all medications related to this admission filled (includes all antibiotics, inhalers, nebulizers,steroids,etc.) Yes   Is the patient taking all medications as directed (includes completed medication regime)? Yes   Does the patient have a primary care provider?  Yes   Does the patient have an appointment with their PCP within 7 days of discharge? Yes   Has the patient kept scheduled appointments due by today? Yes   Has home health visited the patient within 72 hours of discharge? Yes   Psychosocial issues? No   Did the patient receive a copy of their discharge instructions? Yes   Nursing interventions Reviewed instructions with patient   What is the patient's perception of their health status since discharge? Improving   Nursing interventions Nurse provided patient education   Is the patient/caregiver able to teach back TIME? M ental Decline - confused, sleepy, difficult to arouse, E xtremely Ill - severe pain, discomfort, shortness of breath   Nursing interventions Nurse provided patient education   Is patient/caregiver able to teach back steps to recovery at home? Set small, achievable goals for return to baseline health, Rest and regain strength, Eat a balanced diet   Is the patient/caregiver able to teach back signs and symptoms of worsening condition:  Shortness of breath/rapid respiratory rate, Altered mental status(confusion/coma)   Is the patient/caregiver able to teach back the hierarchy of who to call/visit for symptoms/problems? PCP, Specialist, Home health nurse, Urgent Care, ED, 911 Yes   Week 2 call completed? Yes   Is the patient interested in additional calls from an ambulatory ? No   Would this patient benefit from a Referral to [facility] Social Work? No   Call end time 1446            Peridot T - Registered Nurse

## 2025-06-30 ENCOUNTER — LAB (OUTPATIENT)
Dept: LAB | Facility: HOSPITAL | Age: 80
End: 2025-06-30
Payer: MEDICARE

## 2025-06-30 ENCOUNTER — HOSPITAL ENCOUNTER (EMERGENCY)
Facility: HOSPITAL | Age: 80
Discharge: HOME | End: 2025-06-30
Attending: EMERGENCY MEDICINE | Admitting: EMERGENCY MEDICINE
Payer: OTHER GOVERNMENT

## 2025-06-30 VITALS
HEART RATE: 64 BPM | RESPIRATION RATE: 18 BRPM | DIASTOLIC BLOOD PRESSURE: 68 MMHG | SYSTOLIC BLOOD PRESSURE: 127 MMHG | OXYGEN SATURATION: 98 % | TEMPERATURE: 98.3 F

## 2025-06-30 DIAGNOSIS — E03.9 HYPOTHYROIDISM, ADULT: ICD-10-CM

## 2025-06-30 DIAGNOSIS — I10 HYPERTENSION, ESSENTIAL: ICD-10-CM

## 2025-06-30 DIAGNOSIS — K74.69 OTHER CIRRHOSIS OF LIVER: ICD-10-CM

## 2025-06-30 DIAGNOSIS — C91.10 CLL (CHRONIC LYMPHOCYTIC LEUKEMIA): Primary | ICD-10-CM

## 2025-06-30 DIAGNOSIS — N17.9 ACUTE KIDNEY INJURY: ICD-10-CM

## 2025-06-30 DIAGNOSIS — Z79.4 TYPE 2 DIABETES MELLITUS WITH DIABETIC POLYNEUROPATHY, WITH LONG-TERM CURRENT USE OF INSULIN: ICD-10-CM

## 2025-06-30 DIAGNOSIS — E11.42 TYPE 2 DIABETES MELLITUS WITH DIABETIC POLYNEUROPATHY, WITH LONG-TERM CURRENT USE OF INSULIN: ICD-10-CM

## 2025-06-30 DIAGNOSIS — E78.2 HYPERLIPEMIA, MIXED: ICD-10-CM

## 2025-06-30 DIAGNOSIS — E11.8 TYPE 2 DIABETES MELLITUS WITH COMPLICATIONS: ICD-10-CM

## 2025-06-30 DIAGNOSIS — D69.6 THROMBOCYTOPENIA: ICD-10-CM

## 2025-06-30 DIAGNOSIS — K76.82 HEPATIC ENCEPHALOPATHY: ICD-10-CM

## 2025-06-30 LAB
ALBUMIN SERPL-MCNC: 4.1 G/DL (ref 3.5–5.2)
ALBUMIN SERPL-MCNC: 4.2 G/DL (ref 3.5–5.2)
ALBUMIN UR-MCNC: <1.2 MG/DL
ALBUMIN/GLOB SERPL: 1.6 G/DL
ALBUMIN/GLOB SERPL: 1.7 G/DL
ALP SERPL-CCNC: 87 U/L (ref 39–117)
ALP SERPL-CCNC: 90 U/L (ref 39–117)
ALPHA-FETOPROTEIN: 2.18 NG/ML (ref 0–8.3)
ALT SERPL W P-5'-P-CCNC: 10 U/L (ref 1–41)
ALT SERPL W P-5'-P-CCNC: 14 U/L (ref 1–41)
AMMONIA BLD-SCNC: <10 UMOL/L (ref 16–60)
ANION GAP SERPL CALCULATED.3IONS-SCNC: 10.4 MMOL/L (ref 5–15)
ANION GAP SERPL CALCULATED.3IONS-SCNC: 10.4 MMOL/L (ref 5–15)
ANISOCYTOSIS BLD QL: ABNORMAL
ANISOCYTOSIS BLD QL: ABNORMAL
AST SERPL-CCNC: 11 U/L (ref 1–40)
AST SERPL-CCNC: 17 U/L (ref 1–40)
BASOPHILS # BLD MANUAL: 0.71 10*3/MM3 (ref 0–0.2)
BASOPHILS NFR BLD MANUAL: 2 % (ref 0–1.5)
BILIRUB SERPL-MCNC: 0.3 MG/DL (ref 0–1.2)
BILIRUB SERPL-MCNC: 0.3 MG/DL (ref 0–1.2)
BUN SERPL-MCNC: 25 MG/DL (ref 8–23)
BUN SERPL-MCNC: 32.3 MG/DL (ref 8–23)
BUN/CREAT SERPL: 16.1 (ref 7–25)
BUN/CREAT SERPL: 19.7 (ref 7–25)
CALCIUM SPEC-SCNC: 8.7 MG/DL (ref 8.6–10.5)
CALCIUM SPEC-SCNC: 9.3 MG/DL (ref 8.6–10.5)
CHLORIDE SERPL-SCNC: 103 MMOL/L (ref 98–107)
CHLORIDE SERPL-SCNC: 105 MMOL/L (ref 98–107)
CHOLEST SERPL-MCNC: 99 MG/DL (ref 0–200)
CO2 SERPL-SCNC: 22.6 MMOL/L (ref 22–29)
CO2 SERPL-SCNC: 23.6 MMOL/L (ref 22–29)
CREAT SERPL-MCNC: 1.55 MG/DL (ref 0.76–1.27)
CREAT SERPL-MCNC: 1.64 MG/DL (ref 0.76–1.27)
CREAT UR-MCNC: 95.3 MG/DL
D-LACTATE SERPL-SCNC: 1.3 MMOL/L (ref 0.5–2)
DEPRECATED RDW RBC AUTO: 54.6 FL (ref 37–54)
DEPRECATED RDW RBC AUTO: 63.7 FL (ref 37–54)
EGFRCR SERPLBLD CKD-EPI 2021: 42.8 ML/MIN/1.73
EGFRCR SERPLBLD CKD-EPI 2021: 45.8 ML/MIN/1.73
EOSINOPHIL # BLD MANUAL: 0.35 10*3/MM3 (ref 0–0.4)
EOSINOPHIL NFR BLD MANUAL: 1 % (ref 0.3–6.2)
ERYTHROCYTE [DISTWIDTH] IN BLOOD BY AUTOMATED COUNT: 18.2 % (ref 12.3–15.4)
ERYTHROCYTE [DISTWIDTH] IN BLOOD BY AUTOMATED COUNT: 20.2 % (ref 12.3–15.4)
GLOBULIN UR ELPH-MCNC: 2.5 GM/DL
GLOBULIN UR ELPH-MCNC: 2.6 GM/DL
GLUCOSE SERPL-MCNC: 135 MG/DL (ref 65–99)
GLUCOSE SERPL-MCNC: 149 MG/DL (ref 65–99)
HBA1C MFR BLD: 6.2 % (ref 4.8–5.6)
HCT VFR BLD AUTO: 31.3 % (ref 37.5–51)
HCT VFR BLD AUTO: 31.6 % (ref 37.5–51)
HDLC SERPL-MCNC: 35 MG/DL (ref 40–60)
HGB BLD-MCNC: 10 G/DL (ref 13–17.7)
HGB BLD-MCNC: 9.6 G/DL (ref 13–17.7)
HOLD SPECIMEN: NORMAL
HOLD SPECIMEN: NORMAL
INR PPP: 0.99 (ref 0.86–1.15)
LDLC SERPL CALC-MCNC: 41 MG/DL (ref 0–100)
LDLC/HDLC SERPL: 1.11 {RATIO}
LIPASE SERPL-CCNC: 49 U/L (ref 13–60)
LYMPHOCYTES # BLD MANUAL: 28.3 10*3/MM3 (ref 0.7–3.1)
LYMPHOCYTES # BLD MANUAL: 33.98 10*3/MM3 (ref 0.7–3.1)
LYMPHOCYTES NFR BLD MANUAL: 3 % (ref 5–12)
MCH RBC QN AUTO: 26.7 PG (ref 26.6–33)
MCH RBC QN AUTO: 26.8 PG (ref 26.6–33)
MCHC RBC AUTO-ENTMCNC: 30.7 G/DL (ref 31.5–35.7)
MCHC RBC AUTO-ENTMCNC: 31.6 G/DL (ref 31.5–35.7)
MCV RBC AUTO: 84.7 FL (ref 79–97)
MCV RBC AUTO: 87.2 FL (ref 79–97)
MICROALBUMIN/CREAT UR: NORMAL MG/G{CREAT}
MICROCYTES BLD QL: ABNORMAL
MONOCYTES # BLD: 1.06 10*3/MM3 (ref 0.1–0.9)
NEUTROPHILS # BLD AUTO: 2.96 10*3/MM3 (ref 1.7–7)
NEUTROPHILS # BLD AUTO: 4.95 10*3/MM3 (ref 1.7–7)
NEUTROPHILS NFR BLD MANUAL: 14 % (ref 42.7–76)
NEUTROPHILS NFR BLD MANUAL: 8 % (ref 42.7–76)
PLAT MORPH BLD: NORMAL
PLATELET # BLD AUTO: 19 10*3/MM3 (ref 140–450)
PLATELET # BLD AUTO: 21 10*3/MM3 (ref 140–450)
PMV BLD AUTO: 11.1 FL (ref 6–12)
PMV BLD AUTO: 9.8 FL (ref 6–12)
POIKILOCYTOSIS BLD QL SMEAR: ABNORMAL
POTASSIUM SERPL-SCNC: 4.9 MMOL/L (ref 3.5–5.2)
POTASSIUM SERPL-SCNC: 5.1 MMOL/L (ref 3.5–5.2)
PROT SERPL-MCNC: 6.7 G/DL (ref 6–8.5)
PROT SERPL-MCNC: 6.7 G/DL (ref 6–8.5)
PROTHROMBIN TIME: 13.5 SECONDS (ref 11.8–14.9)
RBC # BLD AUTO: 3.59 10*6/MM3 (ref 4.14–5.8)
RBC # BLD AUTO: 3.73 10*6/MM3 (ref 4.14–5.8)
SCAN SLIDE: NORMAL
SMALL PLATELETS BLD QL SMEAR: ABNORMAL
SODIUM SERPL-SCNC: 136 MMOL/L (ref 136–145)
SODIUM SERPL-SCNC: 139 MMOL/L (ref 136–145)
TRIGL SERPL-MCNC: 126 MG/DL (ref 0–150)
TSH SERPL DL<=0.05 MIU/L-ACNC: 6.31 UIU/ML (ref 0.27–4.2)
VARIANT LYMPHS NFR BLD MANUAL: 80 % (ref 19.6–45.3)
VARIANT LYMPHS NFR BLD MANUAL: 92 % (ref 19.6–45.3)
VLDLC SERPL-MCNC: 23 MG/DL (ref 5–40)
WBC MORPH BLD: NORMAL
WBC MORPH BLD: NORMAL
WBC NRBC COR # BLD AUTO: 35.38 10*3/MM3 (ref 3.4–10.8)
WBC NRBC COR # BLD AUTO: 36.94 10*3/MM3 (ref 3.4–10.8)
WHOLE BLOOD HOLD COAG: NORMAL
WHOLE BLOOD HOLD SPECIMEN: NORMAL

## 2025-06-30 PROCEDURE — 85610 PROTHROMBIN TIME: CPT

## 2025-06-30 PROCEDURE — 85007 BL SMEAR W/DIFF WBC COUNT: CPT

## 2025-06-30 PROCEDURE — 82570 ASSAY OF URINE CREATININE: CPT

## 2025-06-30 PROCEDURE — 87040 BLOOD CULTURE FOR BACTERIA: CPT

## 2025-06-30 PROCEDURE — 85025 COMPLETE CBC W/AUTO DIFF WBC: CPT

## 2025-06-30 PROCEDURE — 99283 EMERGENCY DEPT VISIT LOW MDM: CPT

## 2025-06-30 PROCEDURE — 36415 COLL VENOUS BLD VENIPUNCTURE: CPT

## 2025-06-30 PROCEDURE — 82043 UR ALBUMIN QUANTITATIVE: CPT

## 2025-06-30 PROCEDURE — 82105 ALPHA-FETOPROTEIN SERUM: CPT

## 2025-06-30 PROCEDURE — 82140 ASSAY OF AMMONIA: CPT

## 2025-06-30 PROCEDURE — 80053 COMPREHEN METABOLIC PANEL: CPT

## 2025-06-30 PROCEDURE — 83690 ASSAY OF LIPASE: CPT

## 2025-06-30 PROCEDURE — 83605 ASSAY OF LACTIC ACID: CPT

## 2025-06-30 PROCEDURE — 83036 HEMOGLOBIN GLYCOSYLATED A1C: CPT

## 2025-06-30 PROCEDURE — 80061 LIPID PANEL: CPT

## 2025-06-30 PROCEDURE — 84443 ASSAY THYROID STIM HORMONE: CPT

## 2025-06-30 RX ORDER — SODIUM CHLORIDE 0.9 % (FLUSH) 0.9 %
10 SYRINGE (ML) INJECTION AS NEEDED
Status: DISCONTINUED | OUTPATIENT
Start: 2025-06-30 | End: 2025-07-01 | Stop reason: HOSPADM

## 2025-06-30 NOTE — ED PROVIDER NOTES
Time: 6:31 PM EDT  Date of encounter:  6/30/2025  Independent Historian/Clinical History and Information was obtained by:   Patient and Family    History is limited by: N/A    Chief complaint: Abnormal labs        History of Present Illness:  Patient is a 79 y.o. year old male who presents to the emergency department for evaluation of abnormal labs.  Patient family note that the patient has an appointment with Dr. Bradshaw coming up soon.  In preparation for that appointment the patient had outpatient labs.  The patient was called by the gastroenterology office because the patient had a significant elevated white blood cell count and told to go to the emergency room.  The patient has a history of chronic lymphocytic leukemia and non-Hodgkin's lymphoma.  He is under the care of Dr. Saldana.  The patient states that he has had some diarrhea the last 2 days.  He states approximately 5 stools a day.  Denies any abdominal pain.  He has no nausea or vomiting.  He has no fever or rigors.  He denies any chest pain or shortness of breath      Patient Care Team  Primary Care Provider: Alex Buckley MD    Past Medical History:     No Known Allergies  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     Primary osteoarthritis of left knee     Sleep apnea     Sleep apnea     Thrombocytopenia 05/22/2018    Thyroid disease      Past Surgical History:   Procedure Laterality Date    CARDIAC  SURGERY      COLONOSCOPY  2015    COLONOSCOPY N/A 9/3/2024    Procedure: COLONOSCOPY FOR SCREENING WITH COLD SNARE;  Surgeon: Chris Bradshaw MD;  Location: [facility] ENDOSCOPY;  Service: Gastroenterology;  Laterality: N/A;  COLON POLYPS    CORONARY ANGIOPLASTY WITH STENT PLACEMENT      CORONARY ARTERY BYPASS GRAFT N/A 05/20/2021    Procedure: MIDLINE STERNOTOMY,CORONARY ARTERY BYPASS GRAFTING X 5, UTILIZING THE LEFT COMPA AND LEFT SAPHENOUS VEIN, ABHIJEET, PRP;  Surgeon: Jr Tom Tubbs MD;  Location: [facility] OR;  Service: Cardiothoracic;  Laterality: N/A;    ENDOSCOPY      ENDOSCOPY N/A 1/24/2024    Procedure: ESOPHAGOGASTRODUODENOSCOPY;  Surgeon: Chris Bradshaw MD;  Location: [facility] ENDOSCOPY;  Service: Gastroenterology;  Laterality: N/A;  hiatal hernia, schatzki's ring    ENDOSCOPY N/A 1/29/2024    Procedure: ESOPHAGOGASTRODUODENOSCOPY with balloon dilaitation 12-15cm;  Surgeon: Chris Bradshaw MD;  Location: [facility] ENDOSCOPY;  Service: Gastroenterology;  Laterality: N/A;  hiatal hernia, schatskis ring    ENDOSCOPY N/A 2/17/2025    Procedure: ESOPHAGOGASTRODUODENOSCOPY WITH BIOPSIES;  Surgeon: Chris Bradshaw MD;  Location: [facility] ENDOSCOPY;  Service: Gastroenterology;  Laterality: N/A;  GASTRITIS/HIATAL HERNIA    HERNIA REPAIR      JOINT REPLACEMENT      SHOULDER SURGERY      SHOULDER REPAIR    TOTAL KNEE ARTHROPLASTY      TRANSESOPHAGEAL ECHOCARDIOGRAM (ABHIJEET) N/A 05/20/2021    Procedure: TRANSESOPHAGEAL ECHOCARDIOGRAM WITH ANESTHESIA;  Surgeon: Jr Tom Tubbs MD;  Location: [facility] OR;  Service: Cardiothoracic;  Laterality: N/A;     Family History   Problem Relation Age of Onset    Diabetes Mother     Colon cancer Father 65       Home Medications:  Prior to Admission medications    Medication Sig Start Date End Date Taking? Authorizing Provider   amiodarone (PACERONE) 200 MG tablet Take 1 tablet by mouth Daily. 6/14/25   Seven Saldana MD   clotrimazole-betamethasone  (LOTRISONE) 1-0.05 % cream Apply 1 Application topically to the appropriate area as directed 2 (Two) Times a Day. Apply to affected area twice daily 2/12/25   Hunter Orellana DPM   finasteride (PROSCAR) 5 MG tablet Take 1 tablet by mouth Daily. 6/1/21   Jr Tom Tubbs MD   fluticasone (FLONASE) 50 MCG/ACT nasal spray Administer 2 sprays into the nostril(s) as directed by provider Daily. 6/13/25   Seven Saldana MD   furosemide (LASIX) 20 MG tablet Take 0.5 tablets by mouth Every Other Day.    Michael Snowden MD   insulin aspart (novoLOG FLEXPEN) 100 UNIT/ML solution pen-injector sc pen Inject 5 Units under the skin into the appropriate area as directed 3 (Three) Times a Day With Meals. Per sliding scale    Michael Snowden MD   insulin glargine (LANTUS, SEMGLEE) 100 UNIT/ML injection Inject 15 Units under the skin into the appropriate area as directed Every Night. 7/25/23   Alex Buckley MD   lactulose (CHRONULAC) 10 GM/15ML solution Take 45 mL by mouth 2 (Two) Times a Day As Needed.    Michael Snowden MD   levothyroxine (SYNTHROID, LEVOTHROID) 200 MCG tablet Take 1 tablet by mouth Every Morning.    Michael Snowden MD   metFORMIN (GLUCOPHAGE) 1000 MG tablet Take 1 tablet by mouth 2 (Two) Times a Day With Meals.    Michael Snowden MD   metoprolol succinate XL (TOPROL-XL) 25 MG 24 hr tablet Take 1/2 tablet by mouth Every 12 (Twelve) Hours. 6/13/25   Seven Saldana MD   nitroglycerin (NITROSTAT) 0.4 MG SL tablet Place 1 tablet under the tongue Every 5 (Five) Minutes As Needed. 8/16/21   Michael Snowden MD   pantoprazole (PROTONIX) 40 MG EC tablet Take 1 tablet by mouth Daily. 7/15/24   Donna Castro APRN   sertraline (ZOLOFT) 100 MG tablet Take 2 tablets by mouth Daily. 12/10/24   Alex Buckley MD   simvastatin (ZOCOR) 40 MG tablet Take 0.5 tablets by mouth Every Night.    Sp MD Michael   tamsulosin (FLOMAX) 0.4 MG capsule 24 hr capsule Take  1 capsule by mouth Daily. 2/20/21   Jr Tom Tubbs MD   Throat Lozenges (Cough Drops) lozenge Dissolve 1 each in the mouth As Needed.    Provider, MD Michael   Xifaxan 550 MG tablet TAKE 1 TABLET BY MOUTH EVERY 12  HOURS 4/16/25   Castro, Donna Lynn, APRN        Social History:   Social History     Tobacco Use    Smoking status: Never    Smokeless tobacco: Never   Vaping Use    Vaping status: Never Used   Substance Use Topics    Alcohol use: Not Currently     Comment: QUIT DRINKING 32 YEARS AGO    Drug use: Never         Review of Systems:  Review of Systems   Constitutional:  Negative for chills, diaphoresis and fever.   HENT:  Negative for congestion, postnasal drip, rhinorrhea and sore throat.    Eyes:  Negative for photophobia.   Respiratory:  Negative for cough, chest tightness and shortness of breath.    Cardiovascular:  Negative for chest pain, palpitations and leg swelling.   Gastrointestinal:  Positive for diarrhea. Negative for abdominal pain, nausea and vomiting.   Genitourinary:  Negative for difficulty urinating, dysuria, flank pain, frequency, hematuria and urgency.   Musculoskeletal:  Negative for neck pain and neck stiffness.   Skin:  Negative for pallor and rash.   Neurological:  Negative for dizziness, syncope, weakness, numbness and headaches.   Hematological:  Negative for adenopathy. Does not bruise/bleed easily.   Psychiatric/Behavioral: Negative.          Physical Exam:  /69   Pulse 62   Temp 99.1 °F (37.3 °C) (Oral)   Resp 20   SpO2 97%         Physical Exam  Vitals and nursing note reviewed.   Constitutional:       General: He is not in acute distress.     Appearance: Normal appearance. He is not ill-appearing, toxic-appearing or diaphoretic.   HENT:      Head: Normocephalic and atraumatic.      Mouth/Throat:      Mouth: Mucous membranes are moist.   Eyes:      Pupils: Pupils are equal, round, and reactive to light.   Cardiovascular:      Rate and Rhythm: Normal  rate and regular rhythm.      Pulses: Normal pulses.           Carotid pulses are 2+ on the right side and 2+ on the left side.       Radial pulses are 2+ on the right side and 2+ on the left side.        Femoral pulses are 2+ on the right side and 2+ on the left side.       Popliteal pulses are 2+ on the right side and 2+ on the left side.        Dorsalis pedis pulses are 2+ on the right side and 2+ on the left side.        Posterior tibial pulses are 2+ on the right side and 2+ on the left side.      Heart sounds: Normal heart sounds. No murmur heard.  Pulmonary:      Effort: Pulmonary effort is normal. No accessory muscle usage, respiratory distress or retractions.      Breath sounds: Normal breath sounds. No wheezing, rhonchi or rales.   Abdominal:      General: Abdomen is flat. There is no distension.      Palpations: Abdomen is soft. There is hepatomegaly and splenomegaly. There is no mass or pulsatile mass.      Tenderness: There is no abdominal tenderness. There is no right CVA tenderness, left CVA tenderness, guarding or rebound.      Comments: No rigidity   Musculoskeletal:         General: No swelling, tenderness or deformity.      Cervical back: Neck supple. No tenderness.      Right lower leg: No edema.      Left lower leg: No edema.   Skin:     General: Skin is warm and dry.      Capillary Refill: Capillary refill takes less than 2 seconds.      Coloration: Skin is not jaundiced or pale.      Findings: No erythema.   Neurological:      General: No focal deficit present.      Mental Status: He is alert and oriented to person, place, and time. Mental status is at baseline.      Cranial Nerves: Cranial nerves 2-12 are intact. No cranial nerve deficit.      Sensory: Sensation is intact. No sensory deficit.      Motor: Motor function is intact. No weakness or pronator drift.      Coordination: Coordination is intact. Coordination normal.   Psychiatric:         Mood and Affect: Mood normal.         Behavior:  Behavior normal.                            Medical Decision Making:      Comorbidities that affect care:    Hyperlipidemia, coronary disease, diabetes, GERD, hypertension, anxiety, cirrhosis,    External Notes reviewed:    None      The following orders were placed and all results were independently analyzed by me:  Orders Placed This Encounter   Procedures    Blood Culture - Blood,    Blood Culture - Blood,    [place] Draw    Comprehensive Metabolic Panel    Lipase    Urinalysis With Microscopic If Indicated (No Culture) - Urine, Clean Catch    Lactic Acid, Plasma    CBC Auto Differential    Scan Slide    Manual Differential    NPO Diet NPO Type: Strict NPO    Undress & Gown    General MD Inpatient Consult    Insert Peripheral IV    CBC & Differential    Green Top (Gel)    Lavender Top    Gold Top - SST    Light Blue Top       Medications Given in the Emergency Department:  Medications   sodium chloride 0.9 % flush 10 mL (has no administration in time range)        ED Course:    The patient was initially evaluated in the triage area where orders were placed. The patient was later dispositioned by Flavio Sr DO.      The patient was advised to stay for completion of workup which includes but is not limited to communication of labs and radiological results, reassessment and plan. The patient was advised that leaving prior to disposition by a provider could result in critical findings that are not communicated to the patient.     ED Course as of 06/30/25 2133 Mon Jun 30, 2025   1834 PROVIDER IN TRIAGE  Patient was evaluated by Rishi florence PA-C. Orders were placed and awaiting final results and disposition.   [MV]      ED Course User Index  [MV] Rishi Rodríguez PA       Labs:    Lab Results (last 24 hours)       Procedure Component Value Units Date/Time    Comprehensive Metabolic Panel [[id number]]  (Abnormal) Collected: 06/30/25 0902    Specimen: Blood Updated: 06/30/25 1653     Glucose 149 mg/dL      BUN 25.0  mg/dL      Creatinine 1.55 mg/dL      Sodium 139 mmol/L      Potassium 4.9 mmol/L      Chloride 105 mmol/L      CO2 23.6 mmol/L      Calcium 9.3 mg/dL      Total Protein 6.7 g/dL      Albumin 4.2 g/dL      ALT (SGPT) 14 U/L      AST (SGOT) 17 U/L      Alkaline Phosphatase 87 U/L      Total Bilirubin 0.3 mg/dL      Globulin 2.5 gm/dL      A/G Ratio 1.7 g/dL      BUN/Creatinine Ratio 16.1     Anion Gap 10.4 mmol/L      eGFR 45.8 mL/min/1.73     Narrative:      GFR Categories in Chronic Kidney Disease (CKD)              GFR Category          GFR (mL/min/1.73)    Interpretation  G1                    90 or greater        Normal or high (1)  G2                    60-89                Mild decrease (1)  G3a                   45-59                Mild to moderate decrease  G3b                   30-44                Moderate to severe decrease  G4                    15-29                Severe decrease  G5                    14 or less           Kidney failure    (1)In the absence of evidence of kidney disease, neither GFR category G1 or G2 fulfill the criteria for CKD.    eGFR calculation 2021 CKD-EPI creatinine equation, which does not include race as a factor    Lipid Panel [[id number]]  (Abnormal) Collected: 06/30/25 0902    Specimen: Blood Updated: 06/30/25 1653     Total Cholesterol 99 mg/dL      Triglycerides 126 mg/dL      HDL Cholesterol 35 mg/dL      LDL Cholesterol  41 mg/dL      VLDL Cholesterol 23 mg/dL      LDL/HDL Ratio 1.11    Narrative:      Cholesterol Reference Ranges  (U.S. Department of Health and Human Services ATP III Classifications)    Desirable          <200 mg/dL  Borderline High    200-239 mg/dL  High Risk          >240 mg/dL      Triglyceride Reference Ranges  (U.S. Department of Health and Human Services ATP III Classifications)    Normal           <150 mg/dL  Borderline High  150-199 mg/dL  High             200-499 mg/dL  Very High        >500 mg/dL    HDL Reference Ranges  (U.S. Department of  Health and Human Services ATP III Classifications)    Low     <40 mg/dl (major risk factor for CHD)  High    >60 mg/dl ('negative' risk factor for CHD)        LDL Reference Ranges  (U.S. Department of Health and Human Services ATP III Classifications)    Optimal          <100 mg/dL  Near Optimal     100-129 mg/dL  Borderline High  130-159 mg/dL  High             160-189 mg/dL  Very High        >189 mg/dL    LDL is calculated using the NIH LDL-C calculation.      Hemoglobin A1c [[id number]]  (Abnormal) Collected: 06/30/25 0902    Specimen: Blood Updated: 06/30/25 1643     Hemoglobin A1C 6.20 %     Narrative:      Hemoglobin A1C Ranges:    Increased Risk for Diabetes  5.7% to 6.4%  Diabetes                     >= 6.5%  Diabetic Goal                < 7.0%    AFP Tumor Marker [[id number]]  (Normal) Collected: 06/30/25 0902    Specimen: Blood Updated: 06/30/25 1658     ALPHA-FETOPROTEIN 2.18 ng/mL     Narrative:      Alpha Fetoprotein Tumor Marker Reference Range:    0.0-8.3 ng/mL    Note: Normal values apply only to males and nonpregnant females. These results are not interpretable for pregnant females.    Testing Method: Roche [employer] Electrochemiluminescence Immunoassay(ECLIA)  Values obtained with different assay methods or kits cannot be used interchangeably.    CBC & Differential [[id number]]  (Abnormal) Collected: 06/30/25 0902    Specimen: Blood Updated: 06/30/25 1708    Narrative:      The following orders were created for panel order CBC & Differential.  Procedure                               Abnormality         Status                     ---------                               -----------         ------                     CBC Auto Differential[[id number]]        Abnormal            Final result                 Please view results for these tests on the individual orders.    Protime-INR [[id number]]  (Normal) Collected: 06/30/25 0902    Specimen: Blood Updated: 06/30/25 1254     Protime 13.5 Seconds      INR  0.99    Narrative:      Suggested Therapeutic Ranges For Oral Anticoagulant Therapy:  Level of Therapy                      INR Target Range  Standard Dose                            2.0-3.0  High Dose                                2.5-3.5  Patients not receiving anticoagulant  Therapy Normal Range                     0.86-1.15    Ammonia [[id number]]  (Abnormal) Collected: 06/30/25 0902    Specimen: Blood Updated: 06/30/25 1250     Ammonia <10 umol/L     TSH [[id number]]  (Abnormal) Collected: 06/30/25 0902    Specimen: Blood Updated: 06/30/25 1658     TSH 6.310 uIU/mL     CBC Auto Differential [[id number]]  (Abnormal) Collected: 06/30/25 0902    Specimen: Blood Updated: 06/30/25 1708     WBC 35.38 10*3/mm3      RBC 3.73 10*6/mm3      Hemoglobin 10.0 g/dL      Hematocrit 31.6 %      MCV 84.7 fL      MCH 26.8 pg      MCHC 31.6 g/dL      RDW 18.2 %      RDW-SD 54.6 fl      MPV 11.1 fL      Platelets 21 10*3/mm3     Manual Differential [[id number]]  (Abnormal) Collected: 06/30/25 0902    Specimen: Blood Updated: 06/30/25 1727     Neutrophil % 14.0 %      Lymphocyte % 80.0 %      Monocyte % 3.0 %      Eosinophil % 1.0 %      Basophil % 2.0 %      Neutrophils Absolute 4.95 10*3/mm3      Lymphocytes Absolute 28.30 10*3/mm3      Monocytes Absolute 1.06 10*3/mm3      Eosinophils Absolute 0.35 10*3/mm3      Basophils Absolute 0.71 10*3/mm3      Anisocytosis Slight/1+     Microcytes Slight/1+     WBC Morphology Normal     Platelet Morphology Normal    Microalbumin / Creatinine Urine Ratio - Urine, Clean Catch [[id number]] Collected: 06/30/25 0918    Specimen: Urine, Clean Catch Updated: 06/30/25 1652     Microalbumin/Creatinine Ratio --     Comment: Unable to calculate        Creatinine, Urine 95.3 mg/dL      Microalbumin, Urine <1.2 mg/dL     CBC & Differential [[id number]]  (Abnormal) Collected: 06/30/25 1844    Specimen: Blood from Arm, Right Updated: 06/30/25 1941    Narrative:      The following orders were created for panel  order CBC & Differential.  Procedure                               Abnormality         Status                     ---------                               -----------         ------                     CBC Auto Differential[[id number]]        Abnormal            Final result               Scan Slide[[id number]]                                       Final result                 Please view results for these tests on the individual orders.    Comprehensive Metabolic Panel [[id number]]  (Abnormal) Collected: 06/30/25 1844    Specimen: Blood from Arm, Right Updated: 06/30/25 1918     Glucose 135 mg/dL      BUN 32.3 mg/dL      Creatinine 1.64 mg/dL      Sodium 136 mmol/L      Potassium 5.1 mmol/L      Chloride 103 mmol/L      CO2 22.6 mmol/L      Calcium 8.7 mg/dL      Total Protein 6.7 g/dL      Albumin 4.1 g/dL      ALT (SGPT) 10 U/L      AST (SGOT) 11 U/L      Alkaline Phosphatase 90 U/L      Total Bilirubin 0.3 mg/dL      Globulin 2.6 gm/dL      A/G Ratio 1.6 g/dL      BUN/Creatinine Ratio 19.7     Anion Gap 10.4 mmol/L      eGFR 42.8 mL/min/1.73     Narrative:      GFR Categories in Chronic Kidney Disease (CKD)              GFR Category          GFR (mL/min/1.73)    Interpretation  G1                    90 or greater        Normal or high (1)  G2                    60-89                Mild decrease (1)  G3a                   45-59                Mild to moderate decrease  G3b                   30-44                Moderate to severe decrease  G4                    15-29                Severe decrease  G5                    14 or less           Kidney failure    (1)In the absence of evidence of kidney disease, neither GFR category G1 or G2 fulfill the criteria for CKD.    eGFR calculation 2021 CKD-EPI creatinine equation, which does not include race as a factor    Lipase [[id number]]  (Normal) Collected: 06/30/25 1844    Specimen: Blood from Arm, Right Updated: 06/30/25 1918     Lipase 49 U/L     Lactic Acid, Plasma  [[id number]]  (Normal) Collected: 06/30/25 1844    Specimen: Blood from Arm, Right Updated: 06/30/25 1916     Lactate 1.3 mmol/L     CBC Auto Differential [[id number]]  (Abnormal) Collected: 06/30/25 1844    Specimen: Blood from Arm, Right Updated: 06/30/25 1941     WBC 36.94 10*3/mm3      RBC 3.59 10*6/mm3      Hemoglobin 9.6 g/dL      Hematocrit 31.3 %      MCV 87.2 fL      MCH 26.7 pg      MCHC 30.7 g/dL      RDW 20.2 %      RDW-SD 63.7 fl      MPV 9.8 fL      Platelets 19 10*3/mm3     Narrative:      The previously reported component NRBC is no longer being reported. Previous result was 0.0 /100 WBC (Reference Range: 0.0-0.2 /100 WBC) on 6/30/2025 at 1923 EDT.    Blood Culture - Blood, Arm, Right [[id number]] Collected: 06/30/25 1844    Specimen: Blood from Arm, Right Updated: 06/30/25 1849    Blood Culture - Blood, Arm, Left [[id number]] Collected: 06/30/25 1844    Specimen: Blood from Arm, Left Updated: 06/30/25 1849    Scan Slide [[id number]] Collected: 06/30/25 1844    Specimen: Blood from Arm, Right Updated: 06/30/25 1941     Scan Slide --     Comment: See Manual Differential Results       Manual Differential [[id number]]  (Abnormal) Collected: 06/30/25 1844    Specimen: Blood from Arm, Right Updated: 06/30/25 1941     Neutrophil % 8.0 %      Lymphocyte % 92.0 %      Neutrophils Absolute 2.96 10*3/mm3      Lymphocytes Absolute 33.98 10*3/mm3      Anisocytosis Slight/1+     Poikilocytes Slight/1+     WBC Morphology Normal     Platelet Estimate Decreased             Imaging:    No Radiology Exams Resulted Within Past 24 Hours      Differential Diagnosis and Discussion:      Metabolic: Differential diagnosis includes but is not limited to hypertension, hyperglycemia, hyperkalemia, hypocalcemia, metabolic acidosis, hypokalemia, hypoglycemia, malnutrition, hypothyroidism, hyperthyroidism, and adrenal insufficiency.     PROCEDURES:    Labs were collected in the emergency department and all labs were reviewed and  interpreted by me.    No orders to display        Procedures    MDM  Number of Diagnoses or Management Options  Acute kidney injury  CLL (chronic lymphocytic leukemia)  Thrombocytopenia  Diagnosis management comments: The patient's CMP was reviewed and shows no abnormalities of critical concern.  Of note, the patient's sodium and potassium are acceptable.  The patient's liver enzymes are unremarkable.  The patient has a normal anion gap.  The patient's BUN is 32 and the patient's creatinine is 1.64.  In comparison to old labs it does appear the patient has chronic kidney disease    Patient CBC demonstrates white blood cell count of 35.4 thousand.  [place] old CBCs the patient has a chronically elevated white blood cell count.  Patient white blood cell count was 32,030 3002 weeks ago.  Patient appears to have chronic anemia and is stable.  The platelets are 21,000.  This is actually improved from 2 weeks ago where the platelets were 17, 15 and 12 respectively    2 blood cultures were ordered.  The results are pending at the time of disposition               Amount and/or Complexity of Data Reviewed  Clinical lab tests: reviewed and ordered  Tests in the radiology section of CPT®: reviewed and ordered  Tests in the medicine section of CPT®: ordered and reviewed  Decide to obtain previous medical records or to obtain history from someone other than the patient: yes  Discuss the patient with other providers: yes (21:32 EDT  I discussed the case with .  He knows this patient very well.  He feels the patient's labs are at baseline.  He feels the patient can be managed on an outpatient basis.  He will have the patient call the office tomorrow for an appointment.)           Social Determinants of Health:    Patient is independent, reliable, and has access to care.       Disposition and Care Coordination:    Discharged: The patient is suitable and stable for discharge with no need for consideration of  admission.    I have explained discharge medications and the need for follow up with the patient/caretakers. This was also printed in the discharge instructions. Patient was discharged with the following medications and follow up:      Medication List      No changes were made to your prescriptions during this visit.      Alex Buckley MD  [address]  [address]  [facility] [address]  [phone]    Call on 7/1/2025  Acute kidney injury, dehydration    Seven Saldana MD  [facility]   [place] [address]  [facility] [address]  [phone]    Call on 7/1/2025  CLL       Final diagnoses:   CLL (chronic lymphocytic leukemia)   Thrombocytopenia   Acute kidney injury        ED Disposition       ED Disposition   Discharge    Condition   Stable    Comment   --               This medical record created using voice recognition software.             Flavio Sr DO  07/02/25 0646

## 2025-07-01 ENCOUNTER — TELEPHONE (OUTPATIENT)
Age: 80
End: 2025-07-01
Payer: OTHER GOVERNMENT

## 2025-07-01 NOTE — DISCHARGE INSTRUCTIONS
Your kidney function was slightly impaired today indicating dehydration please push oral fluids.  Please review your BUN, creatinine and GFR with your primary care physician discussed the need to recheck your kidney function at the end of the week    Please follow-up with your oncologist in regards to your CLL    Please return to emergency room for abdominal pain, fever, vomiting, worsening diarrhea, altered mental status, shortness of breath, near passing out, passing out or any new symptoms you are concerned

## 2025-07-05 LAB
BACTERIA SPEC AEROBE CULT: NORMAL
BACTERIA SPEC AEROBE CULT: NORMAL

## 2025-07-08 ENCOUNTER — READMISSION MANAGEMENT (OUTPATIENT)
Dept: CALL CENTER | Facility: HOSPITAL | Age: 80
End: 2025-07-08
Payer: OTHER GOVERNMENT

## 2025-07-08 NOTE — OUTREACH NOTE
Sepsis Week 3 Survey      Flowsheet Row Responses   [facility] patient discharged from? [facility]   Does the patient have one of the following disease processes/diagnoses(primary or secondary)? Sepsis   Week 3 attempt successful? Yes   Call start time 1425   Call end time 1429   Discharge diagnosis Pneumonia, sepsis, tachycardia, atrial fibrillation   Person spoke with today (if not patient) and relationship wife, Carla Fraga reviewed with patient/caregiver? Yes   Is the patient taking all medications as directed (includes completed medication regime)? Yes   Does the patient have a primary care provider?  Yes   Comments regarding PCP [facility]   Does the patient have an appointment with their PCP within 7 days of discharge? Yes   Has the patient kept scheduled appointments due by today? Yes   What is the Home health agency?  [facility]   Has home health visited the patient within 72 hours of discharge? Yes   Home health comments PT is visiting   What DME was ordered? Home O23L, patient weaned off   Psychosocial issues? No   Did the patient receive a copy of their discharge instructions? Yes   Nursing interventions Reviewed instructions with patient   What is the patient's perception of their health status since discharge? Improving   Is patient/caregiver able to teach back steps to recovery at home? Set small, achievable goals for return to baseline health, Rest and regain strength   Is the patient/caregiver able to teach back signs and symptoms of worsening condition: Fever, Rapid heart rate (>90), Shortness of breath/rapid respiratory rate   If the patient is a current smoker, are they able to teach back resources for cessation? Not a smoker   Is the patient/caregiver able to teach back the hierarchy of who to call/visit for symptoms/problems? PCP, Specialist, Home health nurse, Urgent Care, ED, 911 Yes   Week 3 call completed? Yes   Graduated Yes   Is the patient interested in additional  "calls from an ambulatory ? No   Would this patient benefit from a Referral to [facility] Social Work? No   Wrap up additional comments Patient is doing ok per spouse.  He has been \"laying in bed, not doing much but coming out to eat\".  no other needs reported.   Call end time [id number]            RONNA HARDY - Registered Nurse    "

## 2025-07-14 ENCOUNTER — OFFICE VISIT (OUTPATIENT)
Dept: GASTROENTEROLOGY | Facility: CLINIC | Age: 80
End: 2025-07-14
Payer: MEDICARE

## 2025-07-14 VITALS
HEIGHT: 72 IN | BODY MASS INDEX: 30.2 KG/M2 | DIASTOLIC BLOOD PRESSURE: 66 MMHG | WEIGHT: 223 LBS | SYSTOLIC BLOOD PRESSURE: 103 MMHG | HEART RATE: 72 BPM

## 2025-07-14 DIAGNOSIS — K76.82 HEPATIC ENCEPHALOPATHY: ICD-10-CM

## 2025-07-14 DIAGNOSIS — I85.10 SECONDARY ESOPHAGEAL VARICES WITHOUT BLEEDING: ICD-10-CM

## 2025-07-14 DIAGNOSIS — E66.811 CLASS 1 OBESITY WITH SERIOUS COMORBIDITY AND BODY MASS INDEX (BMI) OF 30.0 TO 30.9 IN ADULT, UNSPECIFIED OBESITY TYPE: ICD-10-CM

## 2025-07-14 DIAGNOSIS — K74.69 OTHER CIRRHOSIS OF LIVER: Primary | ICD-10-CM

## 2025-07-14 NOTE — PROGRESS NOTES
Patient Name: Cosmo Gauthier   Visit Date: 07/14/2025   Patient ID: [id number]  Provider: MAXWELL Denise    Sex: male  Location:  Location Address:  Location Phone: [address]  [place] [address] [phone]    YOB: 1945  Age: 79 y.o.   Primary Care Provider Alex Buckley MD      Referring Provider: No ref. provider found        Chief Complaint  Cirrhosis    History of Present Illness    Pt w history of fatty liver noted in 2010 with liver biopsy in 2011 that showed CANSECO.  History of alcohol for 20 years but he quit in the 1990s.   Esophageal varices were noted in 2014 and patient was started on nadolol.  At visit in 2020 patient was off Xifaxan and was not sure why.     EGD 1/29/24: Small hiatal hernia, severe Schatzki's ring was found at the GE junction, dilation performed from 12 to 15 mm normal stomach, normal duodenum       Colonoscopy 9/3/2024: Good prep, 3 small polyps removed from the descending and ascending colon-adenomatous     Patient was last seen 1/13/2025: Reported fatigue during the day and getting days and nights mixed up which his wife attributed to dementia, his conversation was normal, he had lost 23 pounds since the previous visit he felt he had decreased his snacking and was trying to lose weight Xifaxan was prescribed again, recommended lactulose daily.  Patient intolerant to nadolol so EGD ordered; noted pt has metoprolol on list now (12.5 BID)    Last liver ultrasound in January, patient has had CTs since then, the last June 8, 2025-hepatomegaly with diffuse fatty liver, splenomegaly, no lymphadenopathy, CT of the chest showed pneumonia.  Patient admitted 6/8-6/13/2025 6/8/2025 modified barium swallow, there was no aspiration noted    I recommended patient return to the ER 6/30/2025 due to white count 35, patient had normal blood cultures; he does have a history of chronic lymphocytic leukemia and non-Hodgkin's lymphoma under the care of Dr. Saldana  patient was not admitted    EGD 2/17/2025: Hiatal hernia, normal esophagus, diffuse mild gastritis-biopsy with mild chronic inactive gastritis, normal duodenum   History of Present Illness  The patient presents for evaluation of liver issues, fatigue, and weight loss.    He reports feeling unwell since his hospital discharge in June 2025, where he was treated for pneumonia. He describes a lack of energy and a need to exert himself for daily activities. His sleep pattern is irregular, often waking up after an hour or two of sleep at night and then napping during the day. He typically wakes up around 7:00 AM to take his medication, has a light breakfast and coffee, and then may nap again for an hour. His daily routine varies depending on his activities. He is currently taking Xifaxan twice daily, once in the morning and once at night. He also takes lactulose, which results in one or two bowel movements per day. His appetite is poor. His conversation today is normal.    He has lost 3 pounds since his last visit in January 2025. He is making efforts to maintain a healthy diet, avoiding foods like ice cream, potato chips, candy, and cake.    He does not have a specialist for his kidney issues. He sees Dr. Stout and Dr. Saldana. He has an appointment with his cardiologist in July 2025.    PAST SURGICAL HISTORY:  Upper scope done in February 2025.  Swallowing study in June 2025.  CT scan of the abdomen in June 2025.    SOCIAL HISTORY  Diet: The patient consumes coffee in the morning, typically 2 to 4 cups a day. The patient avoids ice cream, potato chips, candy, and cake. Occasionally consumes pizza.  Coffee/Tea/Caffeine-containing Drinks: The patient consumes coffee in the morning, typically 2 to 4 cups a day.      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     Primary osteoarthritis of left knee     Sleep apnea     Sleep apnea     Thrombocytopenia 05/22/2018    Thyroid disease        Past Surgical History:   Procedure Laterality Date    CARDIAC SURGERY      COLONOSCOPY  2015    COLONOSCOPY N/A 09/03/2024    Procedure: COLONOSCOPY FOR SCREENING WITH COLD SNARE;  Surgeon: Chris Bradshaw MD;  Location: [facility] ENDOSCOPY;  Service: Gastroenterology;  Laterality: N/A;  COLON POLYPS    CORONARY ANGIOPLASTY WITH STENT PLACEMENT      CORONARY ARTERY BYPASS GRAFT N/A 05/20/2021    Procedure: MIDLINE STERNOTOMY,CORONARY ARTERY BYPASS GRAFTING X 5, UTILIZING THE LEFT COMPA AND LEFT SAPHENOUS VEIN, ABHIJEET, PRP;  Surgeon: Jr Tom Tubbs MD;  Location: [facility];  Service: Cardiothoracic;  Laterality: N/A;    ENDOSCOPY      ENDOSCOPY N/A 01/24/2024    Procedure: ESOPHAGOGASTRODUODENOSCOPY;  Surgeon: Chris Bradshaw MD;  Location: [facility] ENDOSCOPY;  Service: Gastroenterology;  Laterality: N/A;  hiatal hernia, schatzki's ring    ENDOSCOPY N/A 01/29/2024    Procedure: ESOPHAGOGASTRODUODENOSCOPY with balloon dilaitation 12-15cm;  Surgeon: Chris Bradshaw MD;  Location: [facility] ENDOSCOPY;  Service: Gastroenterology;  Laterality: N/A;  hiatal hernia, schatskis ring    ENDOSCOPY N/A 02/17/2025    Procedure: ESOPHAGOGASTRODUODENOSCOPY WITH BIOPSIES;  Surgeon: Chris Bradshaw MD;  Location: [facility] ENDOSCOPY;  Service: Gastroenterology;  Laterality: N/A;  GASTRITIS/HIATAL HERNIA    HERNIA REPAIR      JOINT REPLACEMENT      SHOULDER SURGERY      SHOULDER REPAIR    TOTAL KNEE  "ARTHROPLASTY      TRANSESOPHAGEAL ECHOCARDIOGRAM (ABHIJEET) N/A 05/20/2021    Procedure: TRANSESOPHAGEAL ECHOCARDIOGRAM WITH ANESTHESIA;  Surgeon: Jr Tom Tubbs MD;  Location: [facility];  Service: Cardiothoracic;  Laterality: N/A;    UPPER GASTROINTESTINAL ENDOSCOPY         No Known Allergies    Family History   Problem Relation Age of Onset    Diabetes Mother     Colon cancer Father 65        Social History     Tobacco Use    Smoking status: Never    Smokeless tobacco: Never   Vaping Use    Vaping status: Never Used   Substance Use Topics    Alcohol use: Not Currently     Comment: QUIT DRINKING 32 YEARS AGO    Drug use: Never       Objective     Vital Signs:   /66 (BP Location: Left arm, Patient Position: Sitting, Cuff Size: Adult)   Pulse 72   Ht 182.9 cm (72\")   Wt 101 kg (223 lb)   BMI 30.24 kg/m²       Physical Exam  Constitutional:       General: The patient is not in acute distress.     Appearance: Normal appearance.   HENT:      Head: Normocephalic and atraumatic.      Nose: Nose normal.   Pulmonary:      Effort: Pulmonary effort is normal. No respiratory distress.   Abdominal:      General: Abdomen is flat.      Palpations: Abdomen is soft. There is no mass.      Tenderness: There is no abdominal tenderness. There is no guarding.   Musculoskeletal:      Cervical back: Neck supple.      Right lower leg: No edema.      Left lower leg: No edema.   Skin:     General: Skin is warm and dry.   Neurological:      General: No focal deficit present.      Mental Status: The patient is alert and oriented to person, place, and time.      Gait: Gait normal.   Psychiatric:         Mood and Affect: Mood normal.         Speech: Speech normal.         Behavior: Behavior normal.         Thought Content: Thought content normal.     Result Review :   The following data was reviewed by: MAXWELL Denise on 07/14/2025:    CBC w/diff          6/12/2025    05:10 6/13/2025    05:26 6/30/2025    09:02 " 6/30/2025    18:44   CBC w/Diff   WBC 33.89  32.63  35.38  36.94    RBC 3.46  3.55  3.73  3.59    Hemoglobin 9.1  9.2  10.0  9.6    Hematocrit 29.0  30.6  31.6  31.3    MCV 83.8  86.2  84.7  87.2    MCH 26.3  25.9  26.8  26.7    MCHC 31.4  30.1  31.6  30.7    RDW 17.4  17.0  18.2  20.2    Platelets 15  17  21  19    Neutrophil Rel %  14.5      Immature Granulocyte Rel %  0.7      Lymphocyte Rel %  81.3      Monocyte Rel %  2.1      Eosinophil Rel %  0.7      Basophil Rel %  0.7        CMP          6/12/2025    05:10 6/13/2025    05:26 6/30/2025    09:02 6/30/2025    18:44   CMP   Glucose 136  131  149  135    BUN 36.9  33.2  25.0  32.3    Creatinine 1.36  1.46  1.55  1.64    EGFR 53.6  49.2  45.8  42.8    Sodium 134  133  139  136    Potassium 4.3  4.5  4.9  5.1    Chloride 104  101  105  103    Calcium 8.7  8.8  9.3  8.7    Total Protein 6.4   6.7  6.7    Albumin 3.3   4.2  4.1    Globulin 3.1   2.5  2.6    Total Bilirubin 0.3   0.3  0.3    Alkaline Phosphatase 75   87  90    AST (SGOT) 26   17  11    ALT (SGPT) 26   14  10    Albumin/Globulin Ratio 1.1   1.7  1.6    BUN/Creatinine Ratio 27.1  22.7  16.1  19.7    Anion Gap 11.5  8.8  10.4  10.4        AFP   ALPHA-FETOPROTEIN   Date Value Ref Range Status   06/30/2025 2.18 0 - 8.3 ng/mL Final      PT/INR   Protime   Date Value Ref Range Status   06/30/2025 13.5 11.8 - 14.9 Seconds Final   12/14/2018 11.7 10.3 - 13.3 s Final     Comment:     (note)  Anticoagulants may alter the results of Laboratory   Coagulation assays. New-generation anticoagulants such as   direct Thrombin inhibitors (Dabigatran/Pradaxa, Argatroban,   Bivalrudin) and direct/indirect factor Xa inhibitors   (Rivaroxaban/Xarelto,Apixaban/Eliquis, Danaparoid/Orgaran,   Fondaparinux/Arixtra) have been shown to affect the results   of Laboratory Coagulation assays, creating falsely elevated   or decreased values. Correlation with medication history is   advised.     INR   Date Value Ref Range Status    06/30/2025 0.99 0.86 - 1.15 Final   12/14/2018 1.0 INR Final     Comment:     INR Therapeutic Ranges:  Low Intensity Range: 2.0-3.0  High Intensity Range:  3.0-4.5               Assessment and Plan    Diagnoses and all orders for this visit:    1. Other cirrhosis of liver (Primary)  -     CBC (No Diff); Future  -     Comprehensive Metabolic Panel; Future  -     Protime-INR; Future  -     AFP Tumor Marker; Future  -     US Liver; Future    2. Hepatic encephalopathy  Comments:  ? mild, pt converses normally, he does have insomnia    3. Secondary esophageal varices without bleeding    4. Class 1 obesity with serious comorbidity and body mass index (BMI) of 30.0 to 30.9 in adult, unspecified obesity type          Assessment & Plan  1. Liver issues:  - Ammonia levels within normal range, as confirmed by recent blood work.  - CT scan of the abdomen conducted in 06/2025 revealed no new liver-related issues.  - Liver function tests from the end of 06/2025 were satisfactory, with no indications of liver failure.  - Upper endoscopy performed in 02/2025 showed no esophageal varices.  - Previously on nadolol, but discontinued due to intolerance. Currently on Toprol, which is not effective in preventing varices. As last EGD normal, will continue to monitor.   - Avoid late-day napping and limit caffeine intake after 3:00 PM to improve sleep quality.  - Avoid sugar, sweets, sugary drinks, and high fructose corn syrup.  - Consume 2 to 4 cups of coffee daily if tolerated.  - Ensure Xifaxan is at home and contact the office if not.  - Increase lactulose dosage to achieve at least two bowel movements per day.  - Liver ultrasound and blood work scheduled for 12/2025.  - Upper endoscopy to be repeated in 02/2026.    2. Fatigue:  - Fatigue could be attributed to recent recovery from pneumonia.  - Blood disorders managed by Dr. Saldana may also contribute to fatigue.  - Continue current medications, including Xifaxan and lactulose, to  manage liver condition.    3. Weight loss:  - Lost 3 pounds since last visit in 01/2025  - Maintain a healthy diet and avoid sugary foods and drinks to support liver health and overall well-being. Encouraged protein shake before bed    Follow-up:  - Scheduled for 12/2025.            Follow Up   Return in about 6 months (around 1/14/2026).    Patient or patient representative verbalized consent for the use of Ambient Listening during the visit with  MAXWELL Denise for chart documentation. 7/14/2025  09:38 EDT    Patient was given instructions and counseling regarding his condition or for health maintenance advice. Please see specific information pulled into the AVS if appropriate.

## 2025-07-14 NOTE — PATIENT INSTRUCTIONS
Long-Term Liver Injury (Cirrhosis): What to Know    Cirrhosis is a long-term (chronic) liver injury. The liver is an organ in your body that has many jobs.  Your liver:  Changes food into energy.  Gets rid of toxic things in your blood.  Makes proteins.  Absorbs vitamins from food.  If you have cirrhosis, scar tissue takes the place of your healthy liver cells. This stops blood from flowing through your liver. It makes it hard for your liver to do what it needs to do.  What are the causes?  Cirrhosis may be caused by:  Hepatitis C.  Drinking a lot of alcohol for a long time.  Diseases, such as:  Nonalcoholic fatty liver disease (NAFLD). It's also called metabolic dysfunction-associated steatotic liver disease (MASLD).  Diseases that block your liver ducts.  Liver diseases you were born with.  Hepatitis B.  Autoimmune hepatitis. This is when your body's defense system (immune system) attacks your liver cells by mistake.  An infection with a parasite.  A reaction to things you've been exposed to for a long time, such as:  Certain medicines you take. These include heart medicines.  Certain toxins. These include organic solvents.  What increases the risk?  You're more likely to get cirrhosis if:  You have hepatitis.  You drink a lot of alcohol for a long time.  You're overweight.  You're male.  You're over 40 years old.  You use IV drugs and share needles.  You have sex without protection, and the other person has hepatitis.  What are the signs or symptoms?  You may not have symptoms at first. Symptoms may start when the damage to your liver gets worse.  Early symptoms may include:  Feeling weak and tired.  Trouble sleeping.  Itchy skin.  Your belly feeling tender.  Losing weight or not getting as hungry as normal.  Feeling like you may throw up.  Muscle loss and cramps.  Later symptoms may include:  Jaundice. This is when your skin or the white parts of your eyes turn yellow.  A buildup of fluid in your belly. Your  clothes may fit tight around your waist.  Weight gain and swelling of your feet and ankles.  Trouble breathing.  Easy bruising and bleeding.  Throwing up blood.  Black or bloody poop.  Feeling confused.  How is this diagnosed?  Cirrhosis may be diagnosed based on your symptoms, medical history, and an exam. Your health care provider may feel your liver.  You may also need tests. These may include:  Blood tests to check:  For hepatitis B or C.  How well your kidney works.  How well your liver works.  Imaging tests, such as:  An MRI or CT scan.  An ultrasound. This can check if your liver tissue is being replaced by scar tissue.  How is this treated?  Treatment may depend on how damaged your liver is and what caused the damage. It may mean treating your symptoms or treating the problems that caused the cirrhosis. Treatment may include:  Changes to what you eat and drink. You may need to:  Eat healthy. Work with your provider to make an eating plan.  Not take in as much salt.  Stay at a healthy weight.  Not use drugs or alcohol.  Taking medicines to:  Treat infections.  Help with itching.  Reduce fluid buildup.  Reduce certain toxins in your blood.  Lower your risk of bleeding.  Getting a liver transplant. This is done if you have liver failure.  Follow these instructions at home:  Take your medicines only as told. Do not take medicines that are bad for your liver.  Ask your provider before taking any new medicines. Ask before you take acetaminophen.  Rest as needed.  Eat a healthy diet.  Eat smaller meals every 3-4 hours.  Limit your salt or water intake as told.  Avoid:  Raw or undercooked shellfish, fish, and meat.  Unpasteurized milk and milk products.  Do not drink alcohol.  Keep all follow-up visits. Your provider will check if you're getting better.  Contact a health care provider if:  You have tiredness or weakness that's getting worse.  You have swelling in:  Your hands.  Your feet.  Your legs.  Fluid builds  up in your belly.  You have a fever or chills.  You lose or gain weight.  You throw up or feel like you may throw up.  You get jaundice.  You start to bruise or bleed easily.  Get help right away if:  You throw up, and it looks like:  Bright red blood.  Coffee grounds.  Your poop looks bloody or black.  You get confused.  You have chest pain or trouble breathing.  These symptoms may be an emergency. Call 911 right away.  Do not wait to see if the symptoms will go away.  Do not drive yourself to the hospital.  This information is not intended to replace advice given to you by your health care provider. Make sure you discuss any questions you have with your health care provider.  Document Revised: 06/07/2024 Document Reviewed: 06/07/2024  Elsevier Patient Education © 2024 [employer]

## 2025-08-05 ENCOUNTER — OFFICE VISIT (OUTPATIENT)
Age: 80
End: 2025-08-05
Payer: MEDICARE

## 2025-08-05 VITALS
DIASTOLIC BLOOD PRESSURE: 50 MMHG | BODY MASS INDEX: 30.77 KG/M2 | SYSTOLIC BLOOD PRESSURE: 100 MMHG | HEIGHT: 72 IN | WEIGHT: 227.2 LBS | OXYGEN SATURATION: 97 % | HEART RATE: 73 BPM

## 2025-08-05 DIAGNOSIS — E11.8 TYPE 2 DIABETES MELLITUS WITH COMPLICATIONS: Primary | ICD-10-CM

## 2025-08-05 DIAGNOSIS — N18.32 STAGE 3B CHRONIC KIDNEY DISEASE: ICD-10-CM

## 2025-08-05 DIAGNOSIS — E03.9 HYPOTHYROIDISM, ADULT: ICD-10-CM

## 2025-08-05 DIAGNOSIS — I10 HYPERTENSION, ESSENTIAL: ICD-10-CM

## 2025-08-05 DIAGNOSIS — E78.2 HYPERLIPEMIA, MIXED: ICD-10-CM

## 2025-08-05 PROBLEM — J18.9 PNEUMONIA OF BOTH LOWER LOBES DUE TO INFECTIOUS ORGANISM: Status: RESOLVED | Noted: 2025-06-24 | Resolved: 2025-08-05

## 2025-08-05 PROBLEM — Z09 HOSPITAL DISCHARGE FOLLOW-UP: Status: RESOLVED | Noted: 2025-06-24 | Resolved: 2025-08-05

## 2025-08-05 PROCEDURE — 1126F AMNT PAIN NOTED NONE PRSNT: CPT | Performed by: INTERNAL MEDICINE

## 2025-08-05 PROCEDURE — 99214 OFFICE O/P EST MOD 30 MIN: CPT | Performed by: INTERNAL MEDICINE

## 2025-08-05 PROCEDURE — G2211 COMPLEX E/M VISIT ADD ON: HCPCS | Performed by: INTERNAL MEDICINE

## 2025-08-05 PROCEDURE — 3078F DIAST BP <80 MM HG: CPT | Performed by: INTERNAL MEDICINE

## 2025-08-05 PROCEDURE — 3074F SYST BP LT 130 MM HG: CPT | Performed by: INTERNAL MEDICINE

## 2025-08-07 ENCOUNTER — HOSPITAL ENCOUNTER (OUTPATIENT)
Facility: HOSPITAL | Age: 80
Setting detail: OBSERVATION
Discharge: HOME | End: 2025-08-10
Attending: EMERGENCY MEDICINE | Admitting: INTERNAL MEDICINE
Payer: MEDICARE

## 2025-08-07 DIAGNOSIS — D69.6 SEVERE THROMBOCYTOPENIA: Primary | ICD-10-CM

## 2025-08-07 DIAGNOSIS — J30.1 SEASONAL ALLERGIC RHINITIS DUE TO POLLEN: ICD-10-CM

## 2025-08-07 LAB
ABO GROUP BLD: NORMAL
ALBUMIN SERPL-MCNC: 4.4 G/DL (ref 3.5–5.2)
ALBUMIN/GLOB SERPL: 1.5 G/DL
ALP SERPL-CCNC: 71 U/L (ref 39–117)
ALT SERPL W P-5'-P-CCNC: 14 U/L (ref 1–41)
ANION GAP SERPL CALCULATED.3IONS-SCNC: 10.4 MMOL/L (ref 5–15)
ANISOCYTOSIS BLD QL: ABNORMAL
AST SERPL-CCNC: 15 U/L (ref 1–40)
BILIRUB SERPL-MCNC: 0.4 MG/DL (ref 0–1.2)
BLD GP AB SCN SERPL QL: NEGATIVE
BUN SERPL-MCNC: 43.9 MG/DL (ref 8–23)
BUN/CREAT SERPL: 31.8 (ref 7–25)
CALCIUM SPEC-SCNC: 9 MG/DL (ref 8.6–10.5)
CHLORIDE SERPL-SCNC: 105 MMOL/L (ref 98–107)
CO2 SERPL-SCNC: 20.6 MMOL/L (ref 22–29)
CREAT SERPL-MCNC: 1.38 MG/DL (ref 0.76–1.27)
DEPRECATED RDW RBC AUTO: 59.7 FL (ref 37–54)
EGFRCR SERPLBLD CKD-EPI 2021: 52.7 ML/MIN/1.73
EOSINOPHIL # BLD MANUAL: 0.58 10*3/MM3 (ref 0–0.4)
EOSINOPHIL NFR BLD MANUAL: 2 % (ref 0.3–6.2)
ERYTHROCYTE [DISTWIDTH] IN BLOOD BY AUTOMATED COUNT: 18.5 % (ref 12.3–15.4)
GLOBULIN UR ELPH-MCNC: 2.9 GM/DL
GLUCOSE SERPL-MCNC: 150 MG/DL (ref 65–99)
HCT VFR BLD AUTO: 31.7 % (ref 37.5–51)
HGB BLD-MCNC: 9.7 G/DL (ref 13–17.7)
HOLD SPECIMEN: NORMAL
HOLD SPECIMEN: NORMAL
INR PPP: 1.01 (ref 0.86–1.15)
LYMPHOCYTES # BLD MANUAL: 25.12 10*3/MM3 (ref 0.7–3.1)
MCH RBC QN AUTO: 27.1 PG (ref 26.6–33)
MCHC RBC AUTO-ENTMCNC: 30.6 G/DL (ref 31.5–35.7)
MCV RBC AUTO: 88.5 FL (ref 79–97)
NEUTROPHILS # BLD AUTO: 3.18 10*3/MM3 (ref 1.7–7)
NEUTROPHILS NFR BLD MANUAL: 11 % (ref 42.7–76)
PLATELET # BLD AUTO: 5 10*3/MM3 (ref 140–450)
PMV BLD AUTO: ABNORMAL FL
POTASSIUM SERPL-SCNC: 5.1 MMOL/L (ref 3.5–5.2)
PROT SERPL-MCNC: 7.3 G/DL (ref 6–8.5)
PROTHROMBIN TIME: 13.7 SECONDS (ref 11.8–14.9)
RBC # BLD AUTO: 3.58 10*6/MM3 (ref 4.14–5.8)
RH BLD: NEGATIVE
SCAN SLIDE: NORMAL
SMALL PLATELETS BLD QL SMEAR: ABNORMAL
SODIUM SERPL-SCNC: 136 MMOL/L (ref 136–145)
T&S EXPIRATION DATE: NORMAL
VARIANT LYMPHS NFR BLD MANUAL: 5 % (ref 0–5)
VARIANT LYMPHS NFR BLD MANUAL: 82 % (ref 19.6–45.3)
WBC MORPH BLD: NORMAL
WBC NRBC COR # BLD AUTO: 28.87 10*3/MM3 (ref 3.4–10.8)
WHOLE BLOOD HOLD COAG: NORMAL
WHOLE BLOOD HOLD SPECIMEN: NORMAL

## 2025-08-07 PROCEDURE — G0378 HOSPITAL OBSERVATION PER HR: HCPCS

## 2025-08-07 PROCEDURE — 85025 COMPLETE CBC W/AUTO DIFF WBC: CPT

## 2025-08-07 PROCEDURE — 85610 PROTHROMBIN TIME: CPT

## 2025-08-07 PROCEDURE — 80053 COMPREHEN METABOLIC PANEL: CPT

## 2025-08-07 PROCEDURE — 36415 COLL VENOUS BLD VENIPUNCTURE: CPT

## 2025-08-07 PROCEDURE — 86850 RBC ANTIBODY SCREEN: CPT

## 2025-08-07 PROCEDURE — 99285 EMERGENCY DEPT VISIT HI MDM: CPT

## 2025-08-07 PROCEDURE — 86901 BLOOD TYPING SEROLOGIC RH(D): CPT

## 2025-08-07 PROCEDURE — 86900 BLOOD TYPING SEROLOGIC ABO: CPT

## 2025-08-07 PROCEDURE — 85007 BL SMEAR W/DIFF WBC COUNT: CPT

## 2025-08-08 ENCOUNTER — APPOINTMENT (OUTPATIENT)
Dept: CT IMAGING | Facility: HOSPITAL | Age: 80
End: 2025-08-08
Payer: OTHER GOVERNMENT

## 2025-08-08 LAB
ALBUMIN SERPL-MCNC: 4.2 G/DL (ref 3.5–5.2)
ALBUMIN/GLOB SERPL: 2.1 G/DL
ALP SERPL-CCNC: 59 U/L (ref 39–117)
ALT SERPL W P-5'-P-CCNC: 14 U/L (ref 1–41)
ANION GAP SERPL CALCULATED.3IONS-SCNC: 10.6 MMOL/L (ref 5–15)
AST SERPL-CCNC: 13 U/L (ref 1–40)
BASOPHILS # BLD AUTO: 0.1 10*3/MM3 (ref 0–0.2)
BASOPHILS NFR BLD AUTO: 0.5 % (ref 0–1.5)
BILIRUB SERPL-MCNC: 0.3 MG/DL (ref 0–1.2)
BUN SERPL-MCNC: 37.5 MG/DL (ref 8–23)
BUN/CREAT SERPL: 26.6 (ref 7–25)
CALCIUM SPEC-SCNC: 9.3 MG/DL (ref 8.6–10.5)
CHLORIDE SERPL-SCNC: 106 MMOL/L (ref 98–107)
CO2 SERPL-SCNC: 19.4 MMOL/L (ref 22–29)
CREAT SERPL-MCNC: 1.41 MG/DL (ref 0.76–1.27)
DEPRECATED RDW RBC AUTO: 58.1 FL (ref 37–54)
EGFRCR SERPLBLD CKD-EPI 2021: 51.3 ML/MIN/1.73
EOSINOPHIL # BLD AUTO: 0.26 10*3/MM3 (ref 0–0.4)
EOSINOPHIL NFR BLD AUTO: 1.4 % (ref 0.3–6.2)
ERYTHROCYTE [DISTWIDTH] IN BLOOD BY AUTOMATED COUNT: 18.3 % (ref 12.3–15.4)
GLOBULIN UR ELPH-MCNC: 2 GM/DL
GLUCOSE BLDC GLUCOMTR-MCNC: 111 MG/DL (ref 70–99)
GLUCOSE BLDC GLUCOMTR-MCNC: 135 MG/DL (ref 70–99)
GLUCOSE BLDC GLUCOMTR-MCNC: 165 MG/DL (ref 70–99)
GLUCOSE BLDC GLUCOMTR-MCNC: 174 MG/DL (ref 70–99)
GLUCOSE SERPL-MCNC: 143 MG/DL (ref 65–99)
HCT VFR BLD AUTO: 28.2 % (ref 37.5–51)
HGB BLD-MCNC: 8.9 G/DL (ref 13–17.7)
IMM GRANULOCYTES # BLD AUTO: 0.02 10*3/MM3 (ref 0–0.05)
IMM GRANULOCYTES NFR BLD AUTO: 0.1 % (ref 0–0.5)
LYMPHOCYTES # BLD AUTO: 15.9 10*3/MM3 (ref 0.7–3.1)
LYMPHOCYTES NFR BLD AUTO: 84.6 % (ref 19.6–45.3)
MCH RBC QN AUTO: 27.6 PG (ref 26.6–33)
MCHC RBC AUTO-ENTMCNC: 31.6 G/DL (ref 31.5–35.7)
MCV RBC AUTO: 87.3 FL (ref 79–97)
MONOCYTES # BLD AUTO: 0.43 10*3/MM3 (ref 0.1–0.9)
MONOCYTES NFR BLD AUTO: 2.3 % (ref 5–12)
NEUTROPHILS NFR BLD AUTO: 11.1 % (ref 42.7–76)
NEUTROPHILS NFR BLD AUTO: 2.09 10*3/MM3 (ref 1.7–7)
NRBC BLD AUTO-RTO: 0 /100 WBC (ref 0–0.2)
PLATELET # BLD AUTO: 4 10*3/MM3 (ref 140–450)
PMV BLD AUTO: ABNORMAL FL
POTASSIUM SERPL-SCNC: 5.2 MMOL/L (ref 3.5–5.2)
PROT SERPL-MCNC: 6.2 G/DL (ref 6–8.5)
RBC # BLD AUTO: 3.23 10*6/MM3 (ref 4.14–5.8)
SODIUM SERPL-SCNC: 136 MMOL/L (ref 136–145)
WBC NRBC COR # BLD AUTO: 18.8 10*3/MM3 (ref 3.4–10.8)

## 2025-08-08 PROCEDURE — 63710000001 INSULIN GLARGINE PER 5 UNITS: Performed by: INTERNAL MEDICINE

## 2025-08-08 PROCEDURE — P9037 PLATE PHERES LEUKOREDU IRRAD: HCPCS

## 2025-08-08 PROCEDURE — 80053 COMPREHEN METABOLIC PANEL: CPT | Performed by: INTERNAL MEDICINE

## 2025-08-08 PROCEDURE — 25510000001 IOPAMIDOL PER 1 ML: Performed by: INTERNAL MEDICINE

## 2025-08-08 PROCEDURE — G0378 HOSPITAL OBSERVATION PER HR: HCPCS

## 2025-08-08 PROCEDURE — 74177 CT ABD & PELVIS W/CONTRAST: CPT

## 2025-08-08 PROCEDURE — 88237 TISSUE CULTURE BONE MARROW: CPT

## 2025-08-08 PROCEDURE — 82948 REAGENT STRIP/BLOOD GLUCOSE: CPT

## 2025-08-08 PROCEDURE — 88275 CYTOGENETICS 100-300: CPT

## 2025-08-08 PROCEDURE — 36430 TRANSFUSION BLD/BLD COMPNT: CPT

## 2025-08-08 PROCEDURE — 88185 FLOWCYTOMETRY/TC ADD-ON: CPT

## 2025-08-08 PROCEDURE — 71260 CT THORAX DX C+: CPT

## 2025-08-08 PROCEDURE — 81305 MYD88 GENE P.LEU265PRO VRNT: CPT | Performed by: INTERNAL MEDICINE

## 2025-08-08 PROCEDURE — 88184 FLOWCYTOMETRY/ TC 1 MARKER: CPT | Performed by: INTERNAL MEDICINE

## 2025-08-08 PROCEDURE — 96376 TX/PRO/DX INJ SAME DRUG ADON: CPT

## 2025-08-08 PROCEDURE — 25010000002 DEXAMETHASONE SODIUM PHOSPHATE 10 MG/ML SOLUTION: Performed by: INTERNAL MEDICINE

## 2025-08-08 PROCEDURE — 88271 CYTOGENETICS DNA PROBE: CPT | Performed by: INTERNAL MEDICINE

## 2025-08-08 PROCEDURE — 96374 THER/PROPH/DIAG INJ IV PUSH: CPT

## 2025-08-08 PROCEDURE — 82948 REAGENT STRIP/BLOOD GLUCOSE: CPT | Performed by: INTERNAL MEDICINE

## 2025-08-08 PROCEDURE — P9100 PATHOGEN TEST FOR PLATELETS: HCPCS

## 2025-08-08 PROCEDURE — 63710000001 INSULIN LISPRO (HUMAN) PER 5 UNITS: Performed by: INTERNAL MEDICINE

## 2025-08-08 PROCEDURE — 85025 COMPLETE CBC W/AUTO DIFF WBC: CPT | Performed by: INTERNAL MEDICINE

## 2025-08-08 RX ORDER — PANTOPRAZOLE SODIUM 40 MG/1
40 TABLET, DELAYED RELEASE ORAL
Status: DISCONTINUED | OUTPATIENT
Start: 2025-08-08 | End: 2025-08-10 | Stop reason: HOSPADM

## 2025-08-08 RX ORDER — SODIUM CHLORIDE 0.9 % (FLUSH) 0.9 %
10 SYRINGE (ML) INJECTION EVERY 12 HOURS SCHEDULED
Status: DISCONTINUED | OUTPATIENT
Start: 2025-08-08 | End: 2025-08-10 | Stop reason: HOSPADM

## 2025-08-08 RX ORDER — FINASTERIDE 5 MG/1
5 TABLET, FILM COATED ORAL DAILY
Status: DISCONTINUED | OUTPATIENT
Start: 2025-08-08 | End: 2025-08-10 | Stop reason: HOSPADM

## 2025-08-08 RX ORDER — IOPAMIDOL 755 MG/ML
100 INJECTION, SOLUTION INTRAVASCULAR
Status: COMPLETED | OUTPATIENT
Start: 2025-08-08 | End: 2025-08-08

## 2025-08-08 RX ORDER — LACTULOSE 10 G/15ML
30 SOLUTION ORAL 2 TIMES DAILY
Status: DISCONTINUED | OUTPATIENT
Start: 2025-08-08 | End: 2025-08-10 | Stop reason: HOSPADM

## 2025-08-08 RX ORDER — SODIUM CHLORIDE 0.9 % (FLUSH) 0.9 %
10 SYRINGE (ML) INJECTION AS NEEDED
Status: DISCONTINUED | OUTPATIENT
Start: 2025-08-08 | End: 2025-08-10 | Stop reason: HOSPADM

## 2025-08-08 RX ORDER — INSULIN GLARGINE 100 [IU]/ML
15 INJECTION, SOLUTION SUBCUTANEOUS NIGHTLY
Status: DISCONTINUED | OUTPATIENT
Start: 2025-08-08 | End: 2025-08-10 | Stop reason: HOSPADM

## 2025-08-08 RX ORDER — DEXTROSE MONOHYDRATE 25 G/50ML
25 INJECTION, SOLUTION INTRAVENOUS
Status: DISCONTINUED | OUTPATIENT
Start: 2025-08-08 | End: 2025-08-10 | Stop reason: HOSPADM

## 2025-08-08 RX ORDER — LEVOTHYROXINE SODIUM 100 UG/1
200 TABLET ORAL
Status: DISCONTINUED | OUTPATIENT
Start: 2025-08-08 | End: 2025-08-10 | Stop reason: HOSPADM

## 2025-08-08 RX ORDER — AMOXICILLIN 250 MG
2 CAPSULE ORAL 2 TIMES DAILY
Status: DISCONTINUED | OUTPATIENT
Start: 2025-08-08 | End: 2025-08-10 | Stop reason: HOSPADM

## 2025-08-08 RX ORDER — SERTRALINE HYDROCHLORIDE 100 MG/1
200 TABLET, FILM COATED ORAL DAILY
Status: DISCONTINUED | OUTPATIENT
Start: 2025-08-08 | End: 2025-08-10 | Stop reason: HOSPADM

## 2025-08-08 RX ORDER — INSULIN LISPRO 100 [IU]/ML
2-7 INJECTION, SOLUTION INTRAVENOUS; SUBCUTANEOUS
Status: DISCONTINUED | OUTPATIENT
Start: 2025-08-08 | End: 2025-08-10 | Stop reason: HOSPADM

## 2025-08-08 RX ORDER — FLUTICASONE PROPIONATE 50 MCG
2 SPRAY, SUSPENSION (ML) NASAL DAILY
Status: DISCONTINUED | OUTPATIENT
Start: 2025-08-08 | End: 2025-08-10 | Stop reason: HOSPADM

## 2025-08-08 RX ORDER — ONDANSETRON 2 MG/ML
4 INJECTION INTRAMUSCULAR; INTRAVENOUS EVERY 6 HOURS PRN
Status: DISCONTINUED | OUTPATIENT
Start: 2025-08-08 | End: 2025-08-10 | Stop reason: HOSPADM

## 2025-08-08 RX ORDER — BISACODYL 10 MG
10 SUPPOSITORY, RECTAL RECTAL DAILY PRN
Status: DISCONTINUED | OUTPATIENT
Start: 2025-08-08 | End: 2025-08-10 | Stop reason: HOSPADM

## 2025-08-08 RX ORDER — IBUPROFEN 600 MG/1
1 TABLET ORAL
Status: DISCONTINUED | OUTPATIENT
Start: 2025-08-08 | End: 2025-08-10 | Stop reason: HOSPADM

## 2025-08-08 RX ORDER — ALUMINA, MAGNESIA, AND SIMETHICONE 2400; 2400; 240 MG/30ML; MG/30ML; MG/30ML
15 SUSPENSION ORAL EVERY 6 HOURS PRN
Status: DISCONTINUED | OUTPATIENT
Start: 2025-08-08 | End: 2025-08-10 | Stop reason: HOSPADM

## 2025-08-08 RX ORDER — BISACODYL 5 MG/1
5 TABLET, DELAYED RELEASE ORAL DAILY PRN
Status: DISCONTINUED | OUTPATIENT
Start: 2025-08-08 | End: 2025-08-10 | Stop reason: HOSPADM

## 2025-08-08 RX ORDER — FUROSEMIDE 20 MG/1
10 TABLET ORAL EVERY OTHER DAY
Status: DISCONTINUED | OUTPATIENT
Start: 2025-08-08 | End: 2025-08-10 | Stop reason: HOSPADM

## 2025-08-08 RX ORDER — AMIODARONE HYDROCHLORIDE 200 MG/1
200 TABLET ORAL
Status: DISCONTINUED | OUTPATIENT
Start: 2025-08-08 | End: 2025-08-10 | Stop reason: HOSPADM

## 2025-08-08 RX ORDER — DEXAMETHASONE SODIUM PHOSPHATE 10 MG/ML
8 INJECTION, SOLUTION INTRAMUSCULAR; INTRAVENOUS EVERY 8 HOURS
Status: DISCONTINUED | OUTPATIENT
Start: 2025-08-08 | End: 2025-08-10 | Stop reason: HOSPADM

## 2025-08-08 RX ORDER — SODIUM CHLORIDE 9 MG/ML
40 INJECTION, SOLUTION INTRAVENOUS AS NEEDED
Status: DISCONTINUED | OUTPATIENT
Start: 2025-08-08 | End: 2025-08-10 | Stop reason: HOSPADM

## 2025-08-08 RX ORDER — NITROGLYCERIN 0.4 MG/1
0.4 TABLET SUBLINGUAL
Status: DISCONTINUED | OUTPATIENT
Start: 2025-08-08 | End: 2025-08-10 | Stop reason: HOSPADM

## 2025-08-08 RX ORDER — POLYETHYLENE GLYCOL 3350 17 G/17G
17 POWDER, FOR SOLUTION ORAL DAILY PRN
Status: DISCONTINUED | OUTPATIENT
Start: 2025-08-08 | End: 2025-08-10 | Stop reason: HOSPADM

## 2025-08-08 RX ORDER — TAMSULOSIN HYDROCHLORIDE 0.4 MG/1
0.4 CAPSULE ORAL DAILY
Status: DISCONTINUED | OUTPATIENT
Start: 2025-08-08 | End: 2025-08-10 | Stop reason: HOSPADM

## 2025-08-08 RX ORDER — NICOTINE POLACRILEX 4 MG
15 LOZENGE BUCCAL
Status: DISCONTINUED | OUTPATIENT
Start: 2025-08-08 | End: 2025-08-10 | Stop reason: HOSPADM

## 2025-08-08 RX ADMIN — INSULIN LISPRO 2 UNITS: 100 INJECTION, SOLUTION INTRAVENOUS; SUBCUTANEOUS at 18:11

## 2025-08-08 RX ADMIN — METFORMIN HYDROCHLORIDE 1000 MG: 500 TABLET, FILM COATED ORAL at 09:42

## 2025-08-08 RX ADMIN — LACTULOSE SOLUTION USP, 10 G/15 ML 30 G: 10 SOLUTION ORAL; RECTAL at 09:45

## 2025-08-08 RX ADMIN — FINASTERIDE 5 MG: 5 TABLET, FILM COATED ORAL at 09:42

## 2025-08-08 RX ADMIN — TAMSULOSIN HYDROCHLORIDE 0.4 MG: 0.4 CAPSULE ORAL at 09:41

## 2025-08-08 RX ADMIN — METFORMIN HYDROCHLORIDE 1000 MG: 500 TABLET, FILM COATED ORAL at 18:11

## 2025-08-08 RX ADMIN — FUROSEMIDE 10 MG: 20 TABLET ORAL at 09:44

## 2025-08-08 RX ADMIN — DEXAMETHASONE SODIUM PHOSPHATE 8 MG: 10 INJECTION INTRAMUSCULAR; INTRAVENOUS at 18:11

## 2025-08-08 RX ADMIN — AMIODARONE HYDROCHLORIDE 200 MG: 200 TABLET ORAL at 09:42

## 2025-08-08 RX ADMIN — LEVOTHYROXINE SODIUM 200 MCG: 100 TABLET ORAL at 09:44

## 2025-08-08 RX ADMIN — LACTULOSE SOLUTION USP, 10 G/15 ML 30 G: 10 SOLUTION ORAL; RECTAL at 21:13

## 2025-08-08 RX ADMIN — RIFAXIMIN 550 MG: 550 TABLET ORAL at 21:14

## 2025-08-08 RX ADMIN — PANTOPRAZOLE SODIUM 40 MG: 40 TABLET, DELAYED RELEASE ORAL at 09:41

## 2025-08-08 RX ADMIN — DEXAMETHASONE SODIUM PHOSPHATE 8 MG: 10 INJECTION INTRAMUSCULAR; INTRAVENOUS at 10:37

## 2025-08-08 RX ADMIN — RIFAXIMIN 550 MG: 550 TABLET ORAL at 09:42

## 2025-08-08 RX ADMIN — SERTRALINE HYDROCHLORIDE 200 MG: 100 TABLET ORAL at 09:41

## 2025-08-08 RX ADMIN — INSULIN LISPRO 2 UNITS: 100 INJECTION, SOLUTION INTRAVENOUS; SUBCUTANEOUS at 21:13

## 2025-08-08 RX ADMIN — Medication 10 ML: at 10:38

## 2025-08-08 RX ADMIN — Medication 10 ML: at 21:18

## 2025-08-08 RX ADMIN — INSULIN GLARGINE 15 UNITS: 100 INJECTION, SOLUTION SUBCUTANEOUS at 21:13

## 2025-08-08 RX ADMIN — IOPAMIDOL 100 ML: 755 INJECTION, SOLUTION INTRAVENOUS at 13:25

## 2025-08-09 LAB
ALBUMIN SERPL-MCNC: 4.1 G/DL (ref 3.5–5.2)
ALBUMIN/GLOB SERPL: 1.6 G/DL
ALP SERPL-CCNC: 60 U/L (ref 39–117)
ALT SERPL W P-5'-P-CCNC: 14 U/L (ref 1–41)
ANION GAP SERPL CALCULATED.3IONS-SCNC: 12.7 MMOL/L (ref 5–15)
ANISOCYTOSIS BLD QL: ABNORMAL
AST SERPL-CCNC: 14 U/L (ref 1–40)
BH BB BLOOD EXPIRATION DATE: NORMAL
BH BB BLOOD TYPE BARCODE: 6200
BH BB DISPENSE STATUS: NORMAL
BH BB PRODUCT CODE: NORMAL
BH BB UNIT NUMBER: NORMAL
BILIRUB SERPL-MCNC: 0.3 MG/DL (ref 0–1.2)
BUN SERPL-MCNC: 36.2 MG/DL (ref 8–23)
BUN/CREAT SERPL: 27.6 (ref 7–25)
CALCIUM SPEC-SCNC: 8.9 MG/DL (ref 8.6–10.5)
CHLORIDE SERPL-SCNC: 105 MMOL/L (ref 98–107)
CO2 SERPL-SCNC: 16.3 MMOL/L (ref 22–29)
CREAT SERPL-MCNC: 1.31 MG/DL (ref 0.76–1.27)
DEPRECATED RDW RBC AUTO: 55.2 FL (ref 37–54)
EGFRCR SERPLBLD CKD-EPI 2021: 56.1 ML/MIN/1.73
ERYTHROCYTE [DISTWIDTH] IN BLOOD BY AUTOMATED COUNT: 17.9 % (ref 12.3–15.4)
GLOBULIN UR ELPH-MCNC: 2.5 GM/DL
GLUCOSE BLDC GLUCOMTR-MCNC: 168 MG/DL (ref 70–99)
GLUCOSE BLDC GLUCOMTR-MCNC: 174 MG/DL (ref 70–99)
GLUCOSE BLDC GLUCOMTR-MCNC: 185 MG/DL (ref 70–99)
GLUCOSE BLDC GLUCOMTR-MCNC: 198 MG/DL (ref 70–99)
GLUCOSE SERPL-MCNC: 154 MG/DL (ref 65–99)
HCT VFR BLD AUTO: 28.2 % (ref 37.5–51)
HGB BLD-MCNC: 9 G/DL (ref 13–17.7)
HOLD SPECIMEN: NORMAL
INR PPP: 1.1 (ref 0.86–1.15)
LYMPHOCYTES # BLD MANUAL: 9.91 10*3/MM3 (ref 0.7–3.1)
LYMPHOCYTES NFR BLD MANUAL: 1 % (ref 5–12)
MAGNESIUM SERPL-MCNC: 1.7 MG/DL (ref 1.6–2.4)
MCH RBC QN AUTO: 27.4 PG (ref 26.6–33)
MCHC RBC AUTO-ENTMCNC: 31.9 G/DL (ref 31.5–35.7)
MCV RBC AUTO: 86 FL (ref 79–97)
MONOCYTES # BLD: 0.16 10*3/MM3 (ref 0.1–0.9)
NEUTROPHILS # BLD AUTO: 6.18 10*3/MM3 (ref 1.7–7)
NEUTROPHILS NFR BLD MANUAL: 38 % (ref 42.7–76)
PLATELET # BLD AUTO: 10 10*3/MM3 (ref 140–450)
PMV BLD AUTO: ABNORMAL FL
POTASSIUM SERPL-SCNC: 5.6 MMOL/L (ref 3.5–5.2)
PROT SERPL-MCNC: 6.6 G/DL (ref 6–8.5)
PROTHROMBIN TIME: 14.6 SECONDS (ref 11.8–14.9)
RBC # BLD AUTO: 3.28 10*6/MM3 (ref 4.14–5.8)
RETICS # AUTO: 0.13 10*6/MM3 (ref 0.02–0.13)
RETICS/RBC NFR AUTO: 4 % (ref 0.7–1.9)
SCAN SLIDE: NORMAL
SMALL PLATELETS BLD QL SMEAR: ABNORMAL
SODIUM SERPL-SCNC: 134 MMOL/L (ref 136–145)
UNIT  ABO: NORMAL
UNIT  RH: NORMAL
VARIANT LYMPHS NFR BLD MANUAL: 61 % (ref 19.6–45.3)
WBC MORPH BLD: NORMAL
WBC NRBC COR # BLD AUTO: 16.25 10*3/MM3 (ref 3.4–10.8)

## 2025-08-09 PROCEDURE — 25010000002 DEXAMETHASONE SODIUM PHOSPHATE 10 MG/ML SOLUTION: Performed by: INTERNAL MEDICINE

## 2025-08-09 PROCEDURE — G0378 HOSPITAL OBSERVATION PER HR: HCPCS

## 2025-08-09 PROCEDURE — 63710000001 INSULIN GLARGINE PER 5 UNITS: Performed by: INTERNAL MEDICINE

## 2025-08-09 PROCEDURE — 85610 PROTHROMBIN TIME: CPT | Performed by: INTERNAL MEDICINE

## 2025-08-09 PROCEDURE — 83735 ASSAY OF MAGNESIUM: CPT | Performed by: INTERNAL MEDICINE

## 2025-08-09 PROCEDURE — 85025 COMPLETE CBC W/AUTO DIFF WBC: CPT | Performed by: INTERNAL MEDICINE

## 2025-08-09 PROCEDURE — 63710000001 INSULIN LISPRO (HUMAN) PER 5 UNITS: Performed by: INTERNAL MEDICINE

## 2025-08-09 PROCEDURE — P9100 PATHOGEN TEST FOR PLATELETS: HCPCS

## 2025-08-09 PROCEDURE — 80053 COMPREHEN METABOLIC PANEL: CPT | Performed by: INTERNAL MEDICINE

## 2025-08-09 PROCEDURE — 85045 AUTOMATED RETICULOCYTE COUNT: CPT | Performed by: INTERNAL MEDICINE

## 2025-08-09 PROCEDURE — 82948 REAGENT STRIP/BLOOD GLUCOSE: CPT

## 2025-08-09 PROCEDURE — 96376 TX/PRO/DX INJ SAME DRUG ADON: CPT

## 2025-08-09 PROCEDURE — 36430 TRANSFUSION BLD/BLD COMPNT: CPT

## 2025-08-09 PROCEDURE — P9037 PLATE PHERES LEUKOREDU IRRAD: HCPCS

## 2025-08-09 PROCEDURE — 82948 REAGENT STRIP/BLOOD GLUCOSE: CPT | Performed by: INTERNAL MEDICINE

## 2025-08-09 PROCEDURE — 85007 BL SMEAR W/DIFF WBC COUNT: CPT | Performed by: INTERNAL MEDICINE

## 2025-08-09 RX ADMIN — TAMSULOSIN HYDROCHLORIDE 0.4 MG: 0.4 CAPSULE ORAL at 08:08

## 2025-08-09 RX ADMIN — INSULIN LISPRO 2 UNITS: 100 INJECTION, SOLUTION INTRAVENOUS; SUBCUTANEOUS at 11:54

## 2025-08-09 RX ADMIN — PANTOPRAZOLE SODIUM 40 MG: 40 TABLET, DELAYED RELEASE ORAL at 06:56

## 2025-08-09 RX ADMIN — SERTRALINE HYDROCHLORIDE 200 MG: 100 TABLET ORAL at 08:31

## 2025-08-09 RX ADMIN — INSULIN LISPRO 2 UNITS: 100 INJECTION, SOLUTION INTRAVENOUS; SUBCUTANEOUS at 16:54

## 2025-08-09 RX ADMIN — Medication 10 ML: at 22:15

## 2025-08-09 RX ADMIN — SENNOSIDES, DOCUSATE SODIUM 2 TABLET: 50; 8.6 TABLET, FILM COATED ORAL at 22:14

## 2025-08-09 RX ADMIN — METFORMIN HYDROCHLORIDE 1000 MG: 500 TABLET, FILM COATED ORAL at 08:07

## 2025-08-09 RX ADMIN — LACTULOSE SOLUTION USP, 10 G/15 ML 30 G: 10 SOLUTION ORAL; RECTAL at 22:14

## 2025-08-09 RX ADMIN — DEXAMETHASONE SODIUM PHOSPHATE 8 MG: 10 INJECTION INTRAMUSCULAR; INTRAVENOUS at 11:50

## 2025-08-09 RX ADMIN — FLUTICASONE PROPIONATE 2 SPRAY: 50 SPRAY, METERED NASAL at 08:31

## 2025-08-09 RX ADMIN — FINASTERIDE 5 MG: 5 TABLET, FILM COATED ORAL at 08:08

## 2025-08-09 RX ADMIN — DEXAMETHASONE SODIUM PHOSPHATE 8 MG: 10 INJECTION INTRAMUSCULAR; INTRAVENOUS at 03:00

## 2025-08-09 RX ADMIN — METFORMIN HYDROCHLORIDE 1000 MG: 500 TABLET, FILM COATED ORAL at 16:55

## 2025-08-09 RX ADMIN — DEXAMETHASONE SODIUM PHOSPHATE 8 MG: 10 INJECTION INTRAMUSCULAR; INTRAVENOUS at 19:02

## 2025-08-09 RX ADMIN — LACTULOSE SOLUTION USP, 10 G/15 ML 30 G: 10 SOLUTION ORAL; RECTAL at 08:07

## 2025-08-09 RX ADMIN — INSULIN LISPRO 2 UNITS: 100 INJECTION, SOLUTION INTRAVENOUS; SUBCUTANEOUS at 22:14

## 2025-08-09 RX ADMIN — LEVOTHYROXINE SODIUM 200 MCG: 100 TABLET ORAL at 06:56

## 2025-08-09 RX ADMIN — INSULIN LISPRO 2 UNITS: 100 INJECTION, SOLUTION INTRAVENOUS; SUBCUTANEOUS at 08:07

## 2025-08-09 RX ADMIN — Medication 10 ML: at 08:07

## 2025-08-09 RX ADMIN — Medication 5 MG: at 22:14

## 2025-08-09 RX ADMIN — RIFAXIMIN 550 MG: 550 TABLET ORAL at 08:07

## 2025-08-09 RX ADMIN — AMIODARONE HYDROCHLORIDE 200 MG: 200 TABLET ORAL at 08:07

## 2025-08-09 RX ADMIN — RIFAXIMIN 550 MG: 550 TABLET ORAL at 22:14

## 2025-08-09 RX ADMIN — INSULIN GLARGINE 15 UNITS: 100 INJECTION, SOLUTION SUBCUTANEOUS at 22:14

## 2025-08-10 VITALS
TEMPERATURE: 98.2 F | OXYGEN SATURATION: 98 % | HEART RATE: 63 BPM | BODY MASS INDEX: 30.76 KG/M2 | WEIGHT: 227.07 LBS | SYSTOLIC BLOOD PRESSURE: 132 MMHG | RESPIRATION RATE: 17 BRPM | DIASTOLIC BLOOD PRESSURE: 69 MMHG | HEIGHT: 72 IN

## 2025-08-10 LAB
ANISOCYTOSIS BLD QL: NORMAL
BASOPHILS # BLD AUTO: 0.08 10*3/MM3 (ref 0–0.2)
BASOPHILS NFR BLD AUTO: 0.4 % (ref 0–1.5)
DEPRECATED RDW RBC AUTO: 57.8 FL (ref 37–54)
EOSINOPHIL # BLD AUTO: 0 10*3/MM3 (ref 0–0.4)
EOSINOPHIL NFR BLD AUTO: 0 % (ref 0.3–6.2)
ERYTHROCYTE [DISTWIDTH] IN BLOOD BY AUTOMATED COUNT: 18.2 % (ref 12.3–15.4)
GLUCOSE BLDC GLUCOMTR-MCNC: 148 MG/DL (ref 70–99)
GLUCOSE BLDC GLUCOMTR-MCNC: 174 MG/DL (ref 70–99)
HCT VFR BLD AUTO: 28 % (ref 37.5–51)
HGB BLD-MCNC: 8.9 G/DL (ref 13–17.7)
HOLD SPECIMEN: NORMAL
IMM GRANULOCYTES # BLD AUTO: 0.14 10*3/MM3 (ref 0–0.05)
IMM GRANULOCYTES NFR BLD AUTO: 0.7 % (ref 0–0.5)
LARGE PLATELETS: NORMAL
LYMPHOCYTES # BLD AUTO: 13.06 10*3/MM3 (ref 0.7–3.1)
LYMPHOCYTES NFR BLD AUTO: 67.1 % (ref 19.6–45.3)
Lab: NORMAL
MCH RBC QN AUTO: 27.4 PG (ref 26.6–33)
MCHC RBC AUTO-ENTMCNC: 31.8 G/DL (ref 31.5–35.7)
MCV RBC AUTO: 86.2 FL (ref 79–97)
MONOCYTES # BLD AUTO: 0.44 10*3/MM3 (ref 0.1–0.9)
MONOCYTES NFR BLD AUTO: 2.3 % (ref 5–12)
NEUTROPHILS NFR BLD AUTO: 29.5 % (ref 42.7–76)
NEUTROPHILS NFR BLD AUTO: 5.73 10*3/MM3 (ref 1.7–7)
NRBC BLD AUTO-RTO: 0 /100 WBC (ref 0–0.2)
PLATELET # BLD AUTO: 25 10*3/MM3 (ref 140–450)
PMV BLD AUTO: 10 FL (ref 6–12)
RBC # BLD AUTO: 3.25 10*6/MM3 (ref 4.14–5.8)
SMALL PLATELETS BLD QL SMEAR: NORMAL
WBC MORPH BLD: NORMAL
WBC NRBC COR # BLD AUTO: 19.45 10*3/MM3 (ref 3.4–10.8)

## 2025-08-10 PROCEDURE — 85007 BL SMEAR W/DIFF WBC COUNT: CPT | Performed by: INTERNAL MEDICINE

## 2025-08-10 PROCEDURE — 82948 REAGENT STRIP/BLOOD GLUCOSE: CPT

## 2025-08-10 PROCEDURE — 85025 COMPLETE CBC W/AUTO DIFF WBC: CPT | Performed by: INTERNAL MEDICINE

## 2025-08-10 PROCEDURE — 82948 REAGENT STRIP/BLOOD GLUCOSE: CPT | Performed by: INTERNAL MEDICINE

## 2025-08-10 PROCEDURE — 96376 TX/PRO/DX INJ SAME DRUG ADON: CPT

## 2025-08-10 PROCEDURE — 25010000002 DEXAMETHASONE SODIUM PHOSPHATE 10 MG/ML SOLUTION: Performed by: INTERNAL MEDICINE

## 2025-08-10 PROCEDURE — 63710000001 INSULIN LISPRO (HUMAN) PER 5 UNITS: Performed by: INTERNAL MEDICINE

## 2025-08-10 PROCEDURE — G0378 HOSPITAL OBSERVATION PER HR: HCPCS

## 2025-08-10 RX ORDER — DEXAMETHASONE 4 MG/1
4 TABLET ORAL 2 TIMES DAILY WITH MEALS
Qty: 60 TABLET | Refills: 0 | Status: SHIPPED | OUTPATIENT
Start: 2025-08-10 | End: 2025-09-10

## 2025-08-10 RX ORDER — FLUTICASONE PROPIONATE 50 MCG
2 SPRAY, SUSPENSION (ML) NASAL DAILY
Qty: 48 G | Refills: 1 | Status: SHIPPED | OUTPATIENT
Start: 2025-08-10

## 2025-08-10 RX ORDER — FINASTERIDE 5 MG/1
5 TABLET, FILM COATED ORAL DAILY
Qty: 60 TABLET | Refills: 5 | Status: SHIPPED | OUTPATIENT
Start: 2025-08-10

## 2025-08-10 RX ORDER — PANTOPRAZOLE SODIUM 40 MG/1
40 TABLET, DELAYED RELEASE ORAL DAILY
Qty: 30 TABLET | Refills: 5 | Status: SHIPPED | OUTPATIENT
Start: 2025-08-10

## 2025-08-10 RX ADMIN — INSULIN LISPRO 2 UNITS: 100 INJECTION, SOLUTION INTRAVENOUS; SUBCUTANEOUS at 12:07

## 2025-08-10 RX ADMIN — FINASTERIDE 5 MG: 5 TABLET, FILM COATED ORAL at 08:14

## 2025-08-10 RX ADMIN — Medication 10 ML: at 08:13

## 2025-08-10 RX ADMIN — DEXAMETHASONE SODIUM PHOSPHATE 8 MG: 10 INJECTION INTRAMUSCULAR; INTRAVENOUS at 05:15

## 2025-08-10 RX ADMIN — PANTOPRAZOLE SODIUM 40 MG: 40 TABLET, DELAYED RELEASE ORAL at 05:15

## 2025-08-10 RX ADMIN — FLUTICASONE PROPIONATE 2 SPRAY: 50 SPRAY, METERED NASAL at 08:13

## 2025-08-10 RX ADMIN — DEXAMETHASONE SODIUM PHOSPHATE 8 MG: 10 INJECTION INTRAMUSCULAR; INTRAVENOUS at 11:05

## 2025-08-10 RX ADMIN — RIFAXIMIN 550 MG: 550 TABLET ORAL at 08:14

## 2025-08-10 RX ADMIN — LEVOTHYROXINE SODIUM 200 MCG: 100 TABLET ORAL at 05:15

## 2025-08-10 RX ADMIN — METFORMIN HYDROCHLORIDE 1000 MG: 500 TABLET, FILM COATED ORAL at 08:14

## 2025-08-10 RX ADMIN — TAMSULOSIN HYDROCHLORIDE 0.4 MG: 0.4 CAPSULE ORAL at 08:14

## 2025-08-10 RX ADMIN — AMIODARONE HYDROCHLORIDE 200 MG: 200 TABLET ORAL at 08:14

## 2025-08-10 RX ADMIN — SERTRALINE HYDROCHLORIDE 200 MG: 100 TABLET ORAL at 08:14

## 2025-08-10 RX ADMIN — FUROSEMIDE 10 MG: 20 TABLET ORAL at 08:14

## 2025-08-11 ENCOUNTER — READMISSION MANAGEMENT (OUTPATIENT)
Dept: CALL CENTER | Facility: HOSPITAL | Age: 80
End: 2025-08-11
Payer: MEDICARE

## 2025-08-11 LAB
BH BB BLOOD EXPIRATION DATE: NORMAL
BH BB BLOOD EXPIRATION DATE: NORMAL
BH BB BLOOD TYPE BARCODE: 6200
BH BB BLOOD TYPE BARCODE: 7300
BH BB DISPENSE STATUS: NORMAL
BH BB DISPENSE STATUS: NORMAL
BH BB PRODUCT CODE: NORMAL
BH BB PRODUCT CODE: NORMAL
BH BB UNIT NUMBER: NORMAL
BH BB UNIT NUMBER: NORMAL
UNIT  ABO: NORMAL
UNIT  ABO: NORMAL
UNIT  RH: NORMAL
UNIT  RH: NORMAL

## 2025-08-12 ENCOUNTER — TRANSITIONAL CARE MANAGEMENT TELEPHONE ENCOUNTER (OUTPATIENT)
Dept: CALL CENTER | Facility: HOSPITAL | Age: 80
End: 2025-08-12
Payer: MEDICARE

## 2025-08-15 LAB — TARGETGENE RESULT: NORMAL

## 2025-08-16 ENCOUNTER — HOSPITAL ENCOUNTER (EMERGENCY)
Facility: HOSPITAL | Age: 80
Discharge: HOME | End: 2025-08-16
Attending: EMERGENCY MEDICINE
Payer: OTHER GOVERNMENT

## 2025-08-16 ENCOUNTER — APPOINTMENT (OUTPATIENT)
Dept: GENERAL RADIOLOGY | Facility: HOSPITAL | Age: 80
End: 2025-08-16
Payer: OTHER GOVERNMENT

## 2025-08-16 VITALS
RESPIRATION RATE: 20 BRPM | DIASTOLIC BLOOD PRESSURE: 57 MMHG | HEART RATE: 80 BPM | BODY MASS INDEX: 30.31 KG/M2 | OXYGEN SATURATION: 96 % | TEMPERATURE: 97.5 F | HEIGHT: 72 IN | WEIGHT: 223.77 LBS | SYSTOLIC BLOOD PRESSURE: 146 MMHG

## 2025-08-16 DIAGNOSIS — D69.6 THROMBOCYTOPENIA: Primary | ICD-10-CM

## 2025-08-16 LAB
ABO GROUP BLD: NORMAL
ALBUMIN SERPL-MCNC: 4.3 G/DL (ref 3.5–5.2)
ALBUMIN/GLOB SERPL: 1.7 G/DL
ALP SERPL-CCNC: 67 U/L (ref 39–117)
ALT SERPL W P-5'-P-CCNC: 15 U/L (ref 1–41)
ANION GAP SERPL CALCULATED.3IONS-SCNC: 10.1 MMOL/L (ref 5–15)
ANISOCYTOSIS BLD QL: ABNORMAL
AST SERPL-CCNC: 13 U/L (ref 1–40)
BILIRUB SERPL-MCNC: 0.4 MG/DL (ref 0–1.2)
BLD GP AB SCN SERPL QL: NEGATIVE
BUN SERPL-MCNC: 36.8 MG/DL (ref 8–23)
BUN/CREAT SERPL: 25.2 (ref 7–25)
CALCIUM SPEC-SCNC: 9 MG/DL (ref 8.6–10.5)
CHLORIDE SERPL-SCNC: 106 MMOL/L (ref 98–107)
CO2 SERPL-SCNC: 20.9 MMOL/L (ref 22–29)
CREAT SERPL-MCNC: 1.46 MG/DL (ref 0.76–1.27)
DACRYOCYTES BLD QL SMEAR: ABNORMAL
DEPRECATED RDW RBC AUTO: 55.8 FL (ref 37–54)
EGFRCR SERPLBLD CKD-EPI 2021: 49.2 ML/MIN/1.73
ELLIPTOCYTES BLD QL SMEAR: ABNORMAL
EOSINOPHIL # BLD MANUAL: 0.34 10*3/MM3 (ref 0–0.4)
EOSINOPHIL NFR BLD MANUAL: 2 % (ref 0.3–6.2)
ERYTHROCYTE [DISTWIDTH] IN BLOOD BY AUTOMATED COUNT: 17.2 % (ref 12.3–15.4)
GEN 5 1HR TROPONIN T REFLEX: 18 NG/L
GLOBULIN UR ELPH-MCNC: 2.5 GM/DL
GLUCOSE SERPL-MCNC: 193 MG/DL (ref 65–99)
HCT VFR BLD AUTO: 31.5 % (ref 37.5–51)
HGB BLD-MCNC: 9.7 G/DL (ref 13–17.7)
HOLD SPECIMEN: NORMAL
HOLD SPECIMEN: NORMAL
HYPOCHROMIA BLD QL: ABNORMAL
LYMPHOCYTES # BLD MANUAL: 12.85 10*3/MM3 (ref 0.7–3.1)
LYMPHOCYTES NFR BLD MANUAL: 3 % (ref 5–12)
MAGNESIUM SERPL-MCNC: 1.6 MG/DL (ref 1.6–2.4)
MCH RBC QN AUTO: 27 PG (ref 26.6–33)
MCHC RBC AUTO-ENTMCNC: 30.8 G/DL (ref 31.5–35.7)
MCV RBC AUTO: 87.7 FL (ref 79–97)
MONOCYTES # BLD: 0.51 10*3/MM3 (ref 0.1–0.9)
NEUTROPHILS # BLD AUTO: 3.21 10*3/MM3 (ref 1.7–7)
NEUTROPHILS NFR BLD MANUAL: 19 % (ref 42.7–76)
NRBC SPEC MANUAL: 1 /100 WBC (ref 0–0.2)
OVALOCYTES BLD QL SMEAR: ABNORMAL
PLAT MORPH BLD: NORMAL
PLATELET # BLD AUTO: 7 10*3/MM3 (ref 140–450)
PMV BLD AUTO: ABNORMAL FL
POTASSIUM SERPL-SCNC: 4.9 MMOL/L (ref 3.5–5.2)
PROT SERPL-MCNC: 6.8 G/DL (ref 6–8.5)
QT INTERVAL: 409 MS
QTC INTERVAL: 434 MS
RBC # BLD AUTO: 3.59 10*6/MM3 (ref 4.14–5.8)
RH BLD: NEGATIVE
SODIUM SERPL-SCNC: 137 MMOL/L (ref 136–145)
T&S EXPIRATION DATE: NORMAL
TROPONIN T NUMERIC DELTA: -2 NG/L
TROPONIN T SERPL HS-MCNC: 20 NG/L
VARIANT LYMPHS NFR BLD MANUAL: 69 % (ref 19.6–45.3)
VARIANT LYMPHS NFR BLD MANUAL: 7 % (ref 0–5)
WBC MORPH BLD: NORMAL
WBC NRBC COR # BLD AUTO: 16.91 10*3/MM3 (ref 3.4–10.8)
WHOLE BLOOD HOLD COAG: NORMAL
WHOLE BLOOD HOLD SPECIMEN: NORMAL

## 2025-08-16 PROCEDURE — 85007 BL SMEAR W/DIFF WBC COUNT: CPT | Performed by: EMERGENCY MEDICINE

## 2025-08-16 PROCEDURE — 86850 RBC ANTIBODY SCREEN: CPT | Performed by: EMERGENCY MEDICINE

## 2025-08-16 PROCEDURE — P9035 PLATELET PHERES LEUKOREDUCED: HCPCS

## 2025-08-16 PROCEDURE — 80053 COMPREHEN METABOLIC PANEL: CPT | Performed by: EMERGENCY MEDICINE

## 2025-08-16 PROCEDURE — P9100 PATHOGEN TEST FOR PLATELETS: HCPCS

## 2025-08-16 PROCEDURE — 71045 X-RAY EXAM CHEST 1 VIEW: CPT

## 2025-08-16 PROCEDURE — 84484 ASSAY OF TROPONIN QUANT: CPT | Performed by: EMERGENCY MEDICINE

## 2025-08-16 PROCEDURE — 83735 ASSAY OF MAGNESIUM: CPT | Performed by: EMERGENCY MEDICINE

## 2025-08-16 PROCEDURE — 36430 TRANSFUSION BLD/BLD COMPNT: CPT

## 2025-08-16 PROCEDURE — 36415 COLL VENOUS BLD VENIPUNCTURE: CPT

## 2025-08-16 PROCEDURE — 85025 COMPLETE CBC W/AUTO DIFF WBC: CPT | Performed by: EMERGENCY MEDICINE

## 2025-08-16 PROCEDURE — 86901 BLOOD TYPING SEROLOGIC RH(D): CPT | Performed by: EMERGENCY MEDICINE

## 2025-08-16 PROCEDURE — 93005 ELECTROCARDIOGRAM TRACING: CPT | Performed by: EMERGENCY MEDICINE

## 2025-08-16 PROCEDURE — 86900 BLOOD TYPING SEROLOGIC ABO: CPT | Performed by: EMERGENCY MEDICINE

## 2025-08-16 PROCEDURE — P9037 PLATE PHERES LEUKOREDU IRRAD: HCPCS

## 2025-08-16 PROCEDURE — 99285 EMERGENCY DEPT VISIT HI MDM: CPT

## 2025-08-16 RX ORDER — SODIUM CHLORIDE 0.9 % (FLUSH) 0.9 %
10 SYRINGE (ML) INJECTION AS NEEDED
Status: DISCONTINUED | OUTPATIENT
Start: 2025-08-16 | End: 2025-08-16 | Stop reason: HOSPADM

## 2025-08-18 LAB
BH BB BLOOD EXPIRATION DATE: NORMAL
BH BB BLOOD EXPIRATION DATE: NORMAL
BH BB BLOOD TYPE BARCODE: 6200
BH BB BLOOD TYPE BARCODE: 6200
BH BB DISPENSE STATUS: NORMAL
BH BB DISPENSE STATUS: NORMAL
BH BB PRODUCT CODE: NORMAL
BH BB PRODUCT CODE: NORMAL
BH BB UNIT NUMBER: NORMAL
BH BB UNIT NUMBER: NORMAL
UNIT  ABO: NORMAL
UNIT  ABO: NORMAL
UNIT  RH: NORMAL
UNIT  RH: NORMAL

## 2025-08-18 RX ORDER — AMIODARONE HYDROCHLORIDE 200 MG/1
200 TABLET ORAL
Qty: 30 TABLET | Refills: 1 | Status: CANCELLED | OUTPATIENT
Start: 2025-08-18

## 2025-08-20 LAB — MYD88 MUTATION DETECTION BY PCR ANALYSIS RESULT: NORMAL

## 2025-08-26 ENCOUNTER — READMISSION MANAGEMENT (OUTPATIENT)
Dept: CALL CENTER | Facility: HOSPITAL | Age: 80
End: 2025-08-26
Payer: MEDICARE

## 2025-08-28 LAB
QT INTERVAL: 409 MS
QTC INTERVAL: 434 MS

## (undated) DEVICE — Device

## (undated) DEVICE — SOL IRRG H2O PL/BG 1000ML STRL

## (undated) DEVICE — GLV SURG BIOGEL M LTX PF 7 1/2

## (undated) DEVICE — DRP SLUSH WARMR MACH CIR 44X44IN

## (undated) DEVICE — BLCK/BITE BLOX WO/DENTL/RIM W/STRAP 54F

## (undated) DEVICE — CONN JET HYDRA H20 AUXILIARY DISP

## (undated) DEVICE — HARMONIC SYNERGY DISSECTING HOOK WITH TORQUE WRENCH. FOR USE WITH BLUE HAND PIECE ONLY: Brand: HARMONIC SYNERGY

## (undated) DEVICE — BLOWER/MISTER AXIOUS OPCAB W/TBG

## (undated) DEVICE — PK ATS CUST W CARDIOTOMY RESEVOIR

## (undated) DEVICE — MEDICINE CUP, GRADUATED, STER: Brand: MEDLINE

## (undated) DEVICE — SENSR CERBRL O2 PK/2

## (undated) DEVICE — PK PERFUS CUST W/CARDIOPLEGIA

## (undated) DEVICE — Device: Brand: DEFENDO AIR/WATER/SUCTION AND BIOPSY VALVE

## (undated) DEVICE — KT VLV BIOP DEFENDO SXN AIR/WATER

## (undated) DEVICE — LINER SURG CANSTR SXN S/RIGD 1500CC

## (undated) DEVICE — CANN ART EOPA 3D NV W/CONN 22F

## (undated) DEVICE — ROTATING SURGICAL PUNCHES, 1 PER POUCH: Brand: A&E MEDICAL / ROTATING SURGICAL PUNCHES

## (undated) DEVICE — DEV INFL CRE STERIFLATE 60CC DISP

## (undated) DEVICE — SINGLE-USE BIOPSY FORCEPS: Brand: RADIAL JAW 4

## (undated) DEVICE — SNAR E/S POLYP SNAREMASTER OVL/10MM 2.8X2300MM YEL

## (undated) DEVICE — SOLIDIFIER LIQLOC PLS 1500CC BT

## (undated) DEVICE — DRSNG WND GEL FIBR OPTICELL AG PLS W/SLV LF 4X5IN  STRL

## (undated) DEVICE — BIOPATCH™ ANTIMICROBIAL DRESSING WITH CHLORHEXIDINE GLUCONATE IS A HYDROPHILLIC POLYURETHANE ABSORPTIVE FOAM WITH CHLORHEXIDINE GLUCONATE (CHG) WHICH INHIBITS BACTERIAL GROWTH UNDER THE DRESSING. THE DRESSING IS INTENDED TO BE USED TO ABSORB EXUDATE, COVER A WOUND CAUSED BY VASCULAR AND NONVASCULAR PERCUTANEOUS MEDICAL DEVICES DURING SURGERY, AS WELL AS REDUCE LOCAL INFECTION AND COLONIZATION OF MICROORGANISMS.: Brand: BIOPATCH

## (undated) DEVICE — DRN WND CH RND FUL/FLUT NO/TROC 3/8IN 28F

## (undated) DEVICE — SYS PERFUS SEP PLATLT W TIPS CUST

## (undated) DEVICE — 12 FOOT DISPOSABLE EXTENSION CABLE WITH SAFE CONNECT / SCREW-DOWN

## (undated) DEVICE — HEMOCONCENTRATOR PERFUS LPS06

## (undated) DEVICE — SOL IRR H2O BTL 1000ML STRL

## (undated) DEVICE — CORONARY ARTERY BYPASS GRAFT MARKERS, STAINLESS STEEL, DISTAL, WITHOUT HOLDER: Brand: ANASTOMARK CORONARY ARTERY BYPASS GRAFT MARKERS, STAINLESS STEEL, DISTAL

## (undated) DEVICE — Device: Brand: LEVEL 1

## (undated) DEVICE — SOL IRR NACL 0.9PCT BT 1000ML

## (undated) DEVICE — SYR LUERLOK 20CC BX/50

## (undated) DEVICE — OASIS DRAIN, DUAL, IN-LINE, ATS COMPATIBLE: Brand: OASIS

## (undated) DEVICE — BLAKE SILICONE DRAINS CARDIO CONNECTOR 2:1: Brand: BLAKE

## (undated) DEVICE — CVR PROB 96IN LF STRL

## (undated) DEVICE — GLV SURG SIGNATURE ESSENTIAL PF LTX SZ7.5

## (undated) DEVICE — TOWEL,OR,DSP,ST,WHITE,DLX,4/PK,20PK/CS: Brand: MEDLINE

## (undated) DEVICE — SUT PROLN MO.5 7/0 DBLARM BV175 6 2X30 BX/12

## (undated) DEVICE — LOU OPEN HEART DR POLLOCK: Brand: MEDLINE INDUSTRIES, INC.

## (undated) DEVICE — THE STERILE LIGHT HANDLE COVER IS USED WITH STERIS SURGICAL LIGHTING AND VISUALIZATION SYSTEMS.

## (undated) DEVICE — ST. SORBAVIEW ULTIMATE IJ SYSTEM A,C: Brand: CENTURION

## (undated) DEVICE — SYS VASOVIEW HEMOPRO ENDOSCOPIC HARVST VESL

## (undated) DEVICE — PK SUT OPN HEART POLLOCK CUST

## (undated) DEVICE — ESOPHAGEAL BALLOON DILATATION CATHETER: Brand: CRE FIXED WIRE

## (undated) DEVICE — TBG INSUFFLATION LUER LOCK: Brand: MEDLINE INDUSTRIES, INC.

## (undated) DEVICE — 28 FR STRAIGHT – SOFT PVC CATHETER: Brand: PVC THORACIC CATHETERS

## (undated) DEVICE — SUT VIC 0 CT1 CR8 27IN JJ41G

## (undated) DEVICE — SUT SILK 0 CT1 CR8 18IN C021D

## (undated) DEVICE — ADHS SKIN SURG TISS VISC PREMIERPRO EXOFIN HI/VISC FAST/DRY

## (undated) DEVICE — THE SINGLE USE ETRAP – POLYP TRAP IS USED FOR SUCTION RETRIEVAL OF ENDOSCOPICALLY REMOVED POLYPS.: Brand: ETRAP

## (undated) DEVICE — NEEDLE, QUINCKE, 20GX3.5": Brand: MEDLINE

## (undated) DEVICE — GLV SURG BIOGEL SENSR LTX PF SZ7.5

## (undated) DEVICE — PK HEART OPN 40

## (undated) DEVICE — DRSNG SURESITE WNDW 2.38X2.75

## (undated) DEVICE — CLAMP INSERT: Brand: STEALTH® CLAMP INSERT

## (undated) DEVICE — SOL ISO/ALC RUB 70PCT 4OZ